# Patient Record
Sex: FEMALE | Race: BLACK OR AFRICAN AMERICAN | NOT HISPANIC OR LATINO | Employment: OTHER | URBAN - METROPOLITAN AREA
[De-identification: names, ages, dates, MRNs, and addresses within clinical notes are randomized per-mention and may not be internally consistent; named-entity substitution may affect disease eponyms.]

---

## 2017-01-03 PROBLEM — D50.9 IRON DEFICIENCY ANEMIA: Status: ACTIVE | Noted: 2017-01-03

## 2017-01-04 ENCOUNTER — LAB VISIT (OUTPATIENT)
Dept: LAB | Facility: HOSPITAL | Age: 72
End: 2017-01-04
Attending: INTERNAL MEDICINE
Payer: MEDICARE

## 2017-01-04 DIAGNOSIS — I82.5Z9 CHRONIC DEEP VEIN THROMBOSIS (DVT) OF DISTAL VEIN OF LOWER EXTREMITY, UNSPECIFIED LATERALITY: ICD-10-CM

## 2017-01-04 PROBLEM — K57.31 DIVERTICULOSIS OF LARGE INTESTINE WITH HEMORRHAGE: Status: ACTIVE | Noted: 2017-01-04

## 2017-01-04 PROBLEM — R79.89 ELEVATED D-DIMER: Status: ACTIVE | Noted: 2017-01-04

## 2017-01-04 LAB
ALBUMIN SERPL BCP-MCNC: 4.4 G/DL
ALP SERPL-CCNC: 76 U/L
ALT SERPL W/O P-5'-P-CCNC: 30 U/L
ANION GAP SERPL CALC-SCNC: 13 MMOL/L
AST SERPL-CCNC: 29 U/L
BASOPHILS # BLD AUTO: 0.02 K/UL
BASOPHILS NFR BLD: 0.3 %
BILIRUB SERPL-MCNC: 0.3 MG/DL
BUN SERPL-MCNC: 9 MG/DL
CALCIUM SERPL-MCNC: 10 MG/DL
CHLORIDE SERPL-SCNC: 103 MMOL/L
CO2 SERPL-SCNC: 23 MMOL/L
CREAT SERPL-MCNC: 0.8 MG/DL
D DIMER PPP IA.FEU-MCNC: 0.21 MG/L FEU
DIFFERENTIAL METHOD: ABNORMAL
EOSINOPHIL # BLD AUTO: 0.3 K/UL
EOSINOPHIL NFR BLD: 4.1 %
ERYTHROCYTE [DISTWIDTH] IN BLOOD BY AUTOMATED COUNT: 15.2 %
EST. GFR  (AFRICAN AMERICAN): >60 ML/MIN/1.73 M^2
EST. GFR  (NON AFRICAN AMERICAN): >60 ML/MIN/1.73 M^2
GLUCOSE SERPL-MCNC: 97 MG/DL
HCT VFR BLD AUTO: 38.1 %
HGB BLD-MCNC: 12.7 G/DL
LYMPHOCYTES # BLD AUTO: 2.1 K/UL
LYMPHOCYTES NFR BLD: 28.6 %
MCH RBC QN AUTO: 30.3 PG
MCHC RBC AUTO-ENTMCNC: 33.3 %
MCV RBC AUTO: 91 FL
MONOCYTES # BLD AUTO: 0.4 K/UL
MONOCYTES NFR BLD: 5.5 %
NEUTROPHILS # BLD AUTO: 4.5 K/UL
NEUTROPHILS NFR BLD: 61.4 %
PLATELET # BLD AUTO: 346 K/UL
PMV BLD AUTO: 10.6 FL
POTASSIUM SERPL-SCNC: 3.4 MMOL/L
PROT SERPL-MCNC: 7.8 G/DL
RBC # BLD AUTO: 4.19 M/UL
SODIUM SERPL-SCNC: 139 MMOL/L
WBC # BLD AUTO: 7.25 K/UL

## 2017-01-04 PROCEDURE — 80053 COMPREHEN METABOLIC PANEL: CPT

## 2017-01-04 PROCEDURE — 85379 FIBRIN DEGRADATION QUANT: CPT

## 2017-01-04 PROCEDURE — 85025 COMPLETE CBC W/AUTO DIFF WBC: CPT

## 2017-01-04 PROCEDURE — 36415 COLL VENOUS BLD VENIPUNCTURE: CPT | Mod: PO

## 2017-01-12 ENCOUNTER — OFFICE VISIT (OUTPATIENT)
Dept: PSYCHIATRY | Facility: CLINIC | Age: 72
End: 2017-01-12
Payer: COMMERCIAL

## 2017-01-12 VITALS
HEART RATE: 74 BPM | BODY MASS INDEX: 28.28 KG/M2 | SYSTOLIC BLOOD PRESSURE: 166 MMHG | HEIGHT: 66 IN | WEIGHT: 176 LBS | RESPIRATION RATE: 96 BRPM | DIASTOLIC BLOOD PRESSURE: 74 MMHG

## 2017-01-12 DIAGNOSIS — F41.1 GENERALIZED ANXIETY DISORDER: ICD-10-CM

## 2017-01-12 DIAGNOSIS — F33.0 MAJOR DEPRESSIVE DISORDER, RECURRENT EPISODE, MILD: Primary | ICD-10-CM

## 2017-01-12 DIAGNOSIS — F43.10 PTSD (POST-TRAUMATIC STRESS DISORDER): ICD-10-CM

## 2017-01-12 DIAGNOSIS — F60.9 PERSONALITY DISORDER: ICD-10-CM

## 2017-01-12 PROCEDURE — 3077F SYST BP >= 140 MM HG: CPT | Mod: S$GLB,,, | Performed by: PSYCHIATRY & NEUROLOGY

## 2017-01-12 PROCEDURE — 99214 OFFICE O/P EST MOD 30 MIN: CPT | Mod: S$GLB,,, | Performed by: PSYCHIATRY & NEUROLOGY

## 2017-01-12 PROCEDURE — 1157F ADVNC CARE PLAN IN RCRD: CPT | Mod: S$GLB,,, | Performed by: PSYCHIATRY & NEUROLOGY

## 2017-01-12 PROCEDURE — 1160F RVW MEDS BY RX/DR IN RCRD: CPT | Mod: S$GLB,,, | Performed by: PSYCHIATRY & NEUROLOGY

## 2017-01-12 PROCEDURE — 3078F DIAST BP <80 MM HG: CPT | Mod: S$GLB,,, | Performed by: PSYCHIATRY & NEUROLOGY

## 2017-01-12 PROCEDURE — 1125F AMNT PAIN NOTED PAIN PRSNT: CPT | Mod: S$GLB,,, | Performed by: PSYCHIATRY & NEUROLOGY

## 2017-01-12 PROCEDURE — 1159F MED LIST DOCD IN RCRD: CPT | Mod: S$GLB,,, | Performed by: PSYCHIATRY & NEUROLOGY

## 2017-01-12 PROCEDURE — 99999 PR PBB SHADOW E&M-EST. PATIENT-LVL III: CPT | Mod: PBBFAC,,, | Performed by: PSYCHIATRY & NEUROLOGY

## 2017-01-12 RX ORDER — DULOXETIN HYDROCHLORIDE 30 MG/1
CAPSULE, DELAYED RELEASE ORAL
Qty: 90 CAPSULE | Refills: 1 | OUTPATIENT
Start: 2017-01-12

## 2017-01-12 RX ORDER — DULOXETIN HYDROCHLORIDE 30 MG/1
30 CAPSULE, DELAYED RELEASE ORAL DAILY
Qty: 30 CAPSULE | Refills: 1 | Status: SHIPPED | OUTPATIENT
Start: 2017-01-12 | End: 2017-05-31 | Stop reason: SDUPTHER

## 2017-01-12 RX ORDER — BUSPIRONE HYDROCHLORIDE 15 MG/1
15 TABLET ORAL 3 TIMES DAILY
Qty: 270 TABLET | Refills: 0 | Status: SHIPPED | OUTPATIENT
Start: 2017-01-12 | End: 2017-04-05 | Stop reason: SDUPTHER

## 2017-01-12 NOTE — PROGRESS NOTES
"Outpatient Psychiatry Follow-Up Visit (MD/NP)    1/12/2017    Clinical Status of Patient:  Outpatient (Ambulatory)    Chief Complaint:  Isabell Preston is a 71 y.o. female who presents today for follow-up of depression and anxiety.  Met with patient.      Interval History and Content of Current Session:  Interim Events/Subjective Report/Content of Current Session: Patient Mohan presents to clinic for follow up after a long hiatus.  She was admitted to the hospital in 11/2016 for a GI bleed on coumadin.  She is sad because her sister told her to "quit complaining".  She is also not speaking to her daughter because of some disagreement.  She feels physically limited with pains and has many somatic complaints.  Says that she has had stress over the holidays which caused more depression.  She says that when she gets upset and anxious, her face "tingles".  She did go to the therapist a couple visits since last I saw her but stopped going.  Still not very open to medication changes but does express a desire to get better.    She has failed Zoloft, Prozac, Lexapro, Effexor, Wellbutrin XL.    Psychotherapy:  · Target symptoms: depression, anxiety   · Why chosen therapy is appropriate versus another modality: relevant to diagnosis  · Outcome monitoring methods: self-report, observation  · Therapeutic intervention type: supportive psychotherapy  · Topics discussed/themes: difficulty managing affect in interpersonal relationships, building skills sets for symptom management, symptom recognition, legal stressors  · The patient's response to the intervention is reluctant. The patient's progress toward treatment goals is limited.   · Duration of intervention: 15 minutes.    Review of Systems   · PSYCHIATRIC: Pertinant items are noted in the narrative.  · CONSTITUTIONAL: No weight gain or loss.   · MUSCULOSKELETAL: Positive for muscle stiffness/tightening and pain.  · NEUROLOGIC: No weakness, sensory changes, seizures, " "confusion, memory loss, tremor or other abnormal movements.  · RESPIRATORY: No shortness of breath.  · CARDIOVASCULAR: No tachycardia or chest pain.  · GASTROINTESTINAL: No nausea, vomiting, pain, constipation or diarrhea.    Past Medical, Family and Social History: The patient's past medical, family and social history have been reviewed and updated as appropriate within the electronic medical record - see encounter notes.    Compliance: no    Side effects: None    Risk Parameters:  Patient reports no suicidal ideation  Patient reports no homicidal ideation  Patient reports no self-injurious behavior  Patient reports no violent behavior    Exam (detailed: at least 9 elements; comprehensive: all 15 elements)   Constitutional  Vitals:  Most recent vital signs, dated less than 90 days prior to this appointment, were reviewed.   Vitals:    01/12/17 0935   BP: (!) 166/74   Pulse: 74   Resp: (!) 96   Weight: 79.8 kg (176 lb)   Height: 5' 6" (1.676 m)        General:  unremarkable, age appropriate     Musculoskeletal  Muscle Strength/Tone:  no tremor, no tic   Gait & Station:  non-ataxic     Psychiatric  Speech:  no latency; no press   Mood & Affect:  depressed  blunted, guarded   Thought Process:  normal and logical   Associations:  intact, circumstantial   Thought Content:  normal, no suicidality, no homicidality, delusions, or paranoia   Insight:  limited awareness of illness   Judgement: behavior is adequate to circumstances   Orientation:  grossly intact, person, place, situation, time/date   Memory: intact for content of interview   Language: grossly intact   Attention Span & Concentration:  able to focus   Fund of Knowledge:  intact and appropriate to age and level of education     Assessment and Diagnosis   Status/Progress: Based on the examination today, the patient's problem(s) is/are inadequately controlled.  New problems have been presented today.   Co-morbidities are complicating management of the primary " condition.  There are no active rule-out diagnoses for this patient at this time.     General Impression: We will continue pharmacological management and adjunctive therapy.      ICD-10-CM ICD-9-CM   1. Major depressive disorder, recurrent episode, mild F33.0 296.31   2. Generalized anxiety disorder F41.1 300.02   3. PTSD (post-traumatic stress disorder) F43.10 309.81   4. Personality disorder F60.9 301.9       Intervention/Counseling/Treatment Plan   · Medication Management: Continue current medications. The risks and benefits of medication were discussed with the patient.  · Counseling provided with patient as follows: importance of compliance with chosen treatment options was emphasized, risks and benefits of treatment options, including medications, were discussed with the patient, risk factor reduction, prognosis, patient education, instructions for  management, treatment and follow-up were reviewed  1. Continue buspirone 15mg TID targeting depression and anxiety. Warned of risk of sola, suicidality, serotonin syndrome. We will have to monitor and make sure this does not interfere with her coumadin.   2. Start trial of Cymbalta 30mg daily targeting depression, anxiety, and chronic pains.  Warned of risk of sola, suicidality, serotonin syndrome.  3. Patient instructed to restart care with a therapist to help work through many deep rooted stressors but has been hesitant.   4. Difficult to ascertain at this time but in addition to depression and anxiety, seems to be a strong underlying personality disorder.  5. Difficult patient.      Return to Clinic: 6 weeks, as needed

## 2017-01-12 NOTE — PATIENT INSTRUCTIONS
"        You have been provided with a certain amount of medication with a specified number of refills.  Please follow up within an adequate time before you run out of medications.  If you run out of medication before your next appointment you may be charged a refill fee.  REFILLS FOR CONTROLLED SUBSTANCES WILL NOT BE GIVEN WITHOUT AN APPOINTMENT.  I will not honor or fill automated refill requests from pharmacies.    Please book your next appointment today by phone: [phone].  Call 8am and 10:30am to speak with my assistant.    PLEASE BE AT LEAST 15 MINUTES EARLY FOR YOUR NEXT APPOINTMENT.  PLEASE, DO NOT BE LATE OR YOU WILL BE TURNED AWAY AND ASKED TO RESCHEDULE.  YOU MUST ALLOW TIME FOR CHECK-IN AS WELL AS GET YOUR VITAL SIGNS AND GO OVER YOUR MEDICATIONS.  Tardiness can affect and is not fair to the patients who present after you and are on time for their appointments.  It causes a delay in the appointments for patients and staff.  IF YOU ARE LATE FOR YOUR APPOINTMENT TIME, YOU WILL BE SEEN FOR A SHORTENED AMOUNT OF TIME OR ASKED TO RESCHEDULE.  THERE IS A POSSIBILITY THAT YOU WILL BE CHARGED FOR THE APPOINTMENT TIME PERSONALLY AND IT WILL NOT GO TO YOUR INSURANCE.  YOU MAY ALSO BE DISCHARGED FROM CLINIC with multiple "No Show" appointments.     -----------------------------------------------------------------------------------------------------------------  IF YOU FEEL SUICIDAL OR HAVING THOUGHTS OR PLANS TO HURT YOURSELF OR OTHERS, CALL 911 OR REPORT TO THE NEAREST EMERGENCY ROOM.  YOU CAN ALSO ACCESS ONE OF THE FOLLOWING HOTLINES:    OMID QUINN  [facility]  [phone]    [facility]   [facility] Crisis Line   [phone]     [facility]  24 hours / 7 days   [phone]       "

## 2017-01-12 NOTE — MR AVS SNAPSHOT
[facility]  [address] [place] [address]  [place] [address]  Phone: [phone]  Fax: [phone]                  Isabell Preston   2017 9:30 AM   Office Visit    Description:  Female : 1945   Provider:  Luke Vasquez MD   Department:  [facility]                To Do List           Future Appointments        Provider Department Dept Phone    2017 10:00 AM [facility] CT2 LIMIT 500 LBS [facility] [phone]    2017 10:30 AM Ben Fine Jr., MD [facility] Hand [facility] [phone]    2017 11:00 AM Teresa Hampton MD [facility] [phone]    2017 10:40 AM Ayo Archuleta MD [facility] [phone]      Goals (5 Years of Data)     None      Follow-Up and Disposition     Return in about 6 weeks (around 2017), or if symptoms worsen or fail to improve.       These Medications        Disp Refills Start End    duloxetine (CYMBALTA) 30 MG capsule 30 capsule 1 2017     Take 1 capsule (30 mg total) by mouth once daily. - Oral    Pharmacy: [facility] [address] [place] [address] [place] AT [facility] Ph #: [phone]       busPIRone (BUSPAR) 15 MG tablet 270 tablet 0 2017     Take 1 tablet (15 mg total) by mouth 3 (three) times daily. - Oral    Pharmacy: [facility] [address] [place] [address] [place] AT [facility] Ph #: [phone]         [facility] On [facility]     [facility] -  Assistance  Registered nurses in the [facility] provide clinical advisement, health education, appointment booking, and other advisory services.  Call for this free service at [phone].             Medications           Message regarding Medications     Verify the changes and/or additions to your medication regime listed below are the same as discussed with your clinician today.  If any of these  "changes or additions are incorrect, please notify your healthcare provider.        START taking these NEW medications        Refills    duloxetine (CYMBALTA) 30 MG capsule 1    Sig: Take 1 capsule (30 mg total) by mouth once daily.    Class: Normal    Route: Oral           Verify that the below list of medications is an accurate representation of the medications you are currently taking.  If none reported, the list may be blank. If incorrect, please contact your healthcare provider. Carry this list with you in case of emergency.           Current Medications     atorvastatin (LIPITOR) 10 MG tablet Take 1 tablet (10 mg total) by mouth once daily.    busPIRone (BUSPAR) 15 MG tablet Take 1 tablet (15 mg total) by mouth 3 (three) times daily.    dicyclomine (BENTYL) 10 MG capsule TK 1 C PO TID    duloxetine (CYMBALTA) 30 MG capsule Take 1 capsule (30 mg total) by mouth once daily.    enoxaparin (LOVENOX) 80 mg/0.8 mL Syrg Inject 0.8 mLs (80 mg total) into the skin once daily.    gabapentin (NEURONTIN) 100 MG capsule TAKE 1 CAPSULE(100 MG) BY MOUTH THREE TIMES DAILY    lancets (TRUEPLUS LANCETS) 28 gauge Misc 1 lancet by Misc.(Non-Drug; Combo Route) route once daily. Test blood sugar once daily, type 2 diabetes, controlled. E11.9    metformin (GLUCOPHAGE) 500 MG tablet Take 500 mg by mouth 2 (two) times daily with meals.    polyethylene glycol (GLYCOLAX) 17 gram/dose powder Take 17 g by mouth daily as needed.           Clinical Reference Information           Vital Signs - Last Recorded  Most recent update: 1/12/2017  9:35 AM by Erica Valencia MA    BP Pulse Resp Ht Wt BMI    (!) 166/74 74 (!) 96 5' 6" (1.676 m) 79.8 kg (176 lb) 28.41 kg/m2      Blood Pressure          Most Recent Value    BP  (!)  166/74      Allergies as of 1/12/2017     Ciprofloxacin    Fructose    Gluten Protein    Lactase    Latex, Natural Rubber      Immunizations Administered on Date of Encounter - 1/12/2017     None      [facility] Sign-Up     " "Activating your [facility] account is as easy as 1-2-3!     1) Visit [email or web address], select Sign Up Now, enter this activation code and your date of birth, then select Next.  [email or web address]  Expires: 2/11/2017 11:50 AM      2) Create a username and password to use when you visit [facility] in the future and select a security question in case you lose your password and select Next.    3) Enter your e-mail address and click Sign Up!    Additional Information  If you have questions, please e-mail [email or web address] or call [phone] to talk to our [facility] staff. Remember, [facility] is NOT to be used for urgent needs. For medical emergencies, dial 911.         Instructions            You have been provided with a certain amount of medication with a specified number of refills.  Please follow up within an adequate time before you run out of medications.  If you run out of medication before your next appointment you may be charged a refill fee.  REFILLS FOR CONTROLLED SUBSTANCES WILL NOT BE GIVEN WITHOUT AN APPOINTMENT.  I will not honor or fill automated refill requests from pharmacies.    Please book your next appointment today by phone: [phone].  Call 8am and 10:30am to speak with my assistant.    PLEASE BE AT LEAST 15 MINUTES EARLY FOR YOUR NEXT APPOINTMENT.  PLEASE, DO NOT BE LATE OR YOU WILL BE TURNED AWAY AND ASKED TO RESCHEDULE.  YOU MUST ALLOW TIME FOR CHECK-IN AS WELL AS GET YOUR VITAL SIGNS AND GO OVER YOUR MEDICATIONS.  Tardiness can affect and is not fair to the patients who present after you and are on time for their appointments.  It causes a delay in the appointments for patients and staff.  IF YOU ARE LATE FOR YOUR APPOINTMENT TIME, YOU WILL BE SEEN FOR A SHORTENED AMOUNT OF TIME OR ASKED TO RESCHEDULE.  THERE IS A POSSIBILITY THAT YOU WILL BE CHARGED FOR THE APPOINTMENT TIME PERSONALLY AND IT WILL NOT GO TO YOUR INSURANCE.  YOU MAY ALSO BE DISCHARGED FROM CLINIC with multiple "No Show" " appointments.     -----------------------------------------------------------------------------------------------------------------  IF YOU FEEL SUICIDAL OR HAVING THOUGHTS OR PLANS TO HURT YOURSELF OR OTHERS, CALL 911 OR REPORT TO THE NEAREST EMERGENCY ROOM.  YOU CAN ALSO ACCESS ONE OF THE FOLLOWING HOTLINES:    OMID QUINN  [facility] Mobile Crisis  [phone]    [facility] Crisis Line   [phone]     [facility]  24 hours / 7 days   [phone])

## 2017-01-13 RX ORDER — METFORMIN HYDROCHLORIDE 500 MG/1
TABLET ORAL
Qty: 180 TABLET | Refills: 0 | Status: SHIPPED | OUTPATIENT
Start: 2017-01-13 | End: 2017-01-23 | Stop reason: SDUPTHER

## 2017-01-16 ENCOUNTER — HOSPITAL ENCOUNTER (OUTPATIENT)
Dept: RADIOLOGY | Facility: HOSPITAL | Age: 72
Discharge: HOME | End: 2017-01-16
Attending: INTERNAL MEDICINE | Admitting: INTERNAL MEDICINE
Payer: MEDICARE

## 2017-01-16 ENCOUNTER — TELEPHONE (OUTPATIENT)
Dept: ORTHOPEDICS | Facility: CLINIC | Age: 72
End: 2017-01-16

## 2017-01-16 DIAGNOSIS — R10.32 LEFT LOWER QUADRANT PAIN: ICD-10-CM

## 2017-01-16 PROCEDURE — 74177 CT ABD & PELVIS W/CONTRAST: CPT | Mod: TC

## 2017-01-16 PROCEDURE — 25500020 PHARM REV CODE 255: Performed by: INTERNAL MEDICINE

## 2017-01-16 PROCEDURE — 74177 CT ABD & PELVIS W/CONTRAST: CPT | Mod: 26,,, | Performed by: RADIOLOGY

## 2017-01-16 RX ADMIN — IOHEXOL 30 ML: 300 INJECTION, SOLUTION INTRAVENOUS at 11:01

## 2017-01-16 RX ADMIN — IOHEXOL 100 ML: 350 INJECTION, SOLUTION INTRAVENOUS at 11:01

## 2017-01-20 ENCOUNTER — OFFICE VISIT (OUTPATIENT)
Dept: SPINE | Facility: CLINIC | Age: 72
End: 2017-01-20
Attending: PHYSICAL MEDICINE & REHABILITATION
Payer: MEDICARE

## 2017-01-20 VITALS
DIASTOLIC BLOOD PRESSURE: 69 MMHG | HEIGHT: 66 IN | HEART RATE: 61 BPM | WEIGHT: 176 LBS | SYSTOLIC BLOOD PRESSURE: 149 MMHG | BODY MASS INDEX: 28.28 KG/M2

## 2017-01-20 DIAGNOSIS — M53.3 SI (SACROILIAC) JOINT DYSFUNCTION: ICD-10-CM

## 2017-01-20 DIAGNOSIS — M51.36 DDD (DEGENERATIVE DISC DISEASE), LUMBAR: ICD-10-CM

## 2017-01-20 DIAGNOSIS — M54.42 CHRONIC LEFT-SIDED LOW BACK PAIN WITH LEFT-SIDED SCIATICA: ICD-10-CM

## 2017-01-20 DIAGNOSIS — M70.62 TROCHANTERIC BURSITIS OF LEFT HIP: Primary | ICD-10-CM

## 2017-01-20 DIAGNOSIS — G89.29 CHRONIC LEFT-SIDED LOW BACK PAIN WITH LEFT-SIDED SCIATICA: ICD-10-CM

## 2017-01-20 PROCEDURE — 99214 OFFICE O/P EST MOD 30 MIN: CPT | Mod: S$GLB,,, | Performed by: PHYSICAL MEDICINE & REHABILITATION

## 2017-01-20 PROCEDURE — 3077F SYST BP >= 140 MM HG: CPT | Mod: S$GLB,,, | Performed by: PHYSICAL MEDICINE & REHABILITATION

## 2017-01-20 PROCEDURE — 3078F DIAST BP <80 MM HG: CPT | Mod: S$GLB,,, | Performed by: PHYSICAL MEDICINE & REHABILITATION

## 2017-01-20 PROCEDURE — 99999 PR PBB SHADOW E&M-EST. PATIENT-LVL III: CPT | Mod: PBBFAC,,, | Performed by: PHYSICAL MEDICINE & REHABILITATION

## 2017-01-20 PROCEDURE — 1157F ADVNC CARE PLAN IN RCRD: CPT | Mod: S$GLB,,, | Performed by: PHYSICAL MEDICINE & REHABILITATION

## 2017-01-20 PROCEDURE — 1160F RVW MEDS BY RX/DR IN RCRD: CPT | Mod: S$GLB,,, | Performed by: PHYSICAL MEDICINE & REHABILITATION

## 2017-01-20 PROCEDURE — 1125F AMNT PAIN NOTED PAIN PRSNT: CPT | Mod: S$GLB,,, | Performed by: PHYSICAL MEDICINE & REHABILITATION

## 2017-01-20 PROCEDURE — 1159F MED LIST DOCD IN RCRD: CPT | Mod: S$GLB,,, | Performed by: PHYSICAL MEDICINE & REHABILITATION

## 2017-01-20 RX ORDER — METOPROLOL SUCCINATE 25 MG/1
50 TABLET, EXTENDED RELEASE ORAL DAILY
COMMUNITY
End: 2017-03-09
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
## 2017-01-23 ENCOUNTER — OFFICE VISIT (OUTPATIENT)
Dept: FAMILY MEDICINE | Facility: CLINIC | Age: 72
End: 2017-01-23
Payer: MEDICARE

## 2017-01-23 ENCOUNTER — LAB VISIT (OUTPATIENT)
Dept: LAB | Facility: HOSPITAL | Age: 72
End: 2017-01-23
Attending: INTERNAL MEDICINE
Payer: MEDICARE

## 2017-01-23 VITALS
BODY MASS INDEX: 27.99 KG/M2 | RESPIRATION RATE: 16 BRPM | SYSTOLIC BLOOD PRESSURE: 132 MMHG | OXYGEN SATURATION: 95 % | DIASTOLIC BLOOD PRESSURE: 84 MMHG | WEIGHT: 174.19 LBS | HEART RATE: 62 BPM | HEIGHT: 66 IN | TEMPERATURE: 98 F

## 2017-01-23 DIAGNOSIS — M54.2 CERVICALGIA: ICD-10-CM

## 2017-01-23 DIAGNOSIS — K57.33 DIVERTICULITIS OF LARGE INTESTINE WITHOUT PERFORATION OR ABSCESS WITH BLEEDING: ICD-10-CM

## 2017-01-23 DIAGNOSIS — E11.9 CONTROLLED TYPE 2 DIABETES MELLITUS WITHOUT COMPLICATION, WITHOUT LONG-TERM CURRENT USE OF INSULIN: Chronic | ICD-10-CM

## 2017-01-23 DIAGNOSIS — E11.9 CONTROLLED TYPE 2 DIABETES MELLITUS WITHOUT COMPLICATION, WITHOUT LONG-TERM CURRENT USE OF INSULIN: Primary | Chronic | ICD-10-CM

## 2017-01-23 DIAGNOSIS — I82.5Z9 CHRONIC DEEP VEIN THROMBOSIS (DVT) OF DISTAL VEIN OF LOWER EXTREMITY, UNSPECIFIED LATERALITY: ICD-10-CM

## 2017-01-23 LAB
ANION GAP SERPL CALC-SCNC: 11 MMOL/L
BASOPHILS # BLD AUTO: 0.03 K/UL
BASOPHILS NFR BLD: 0.3 %
BUN SERPL-MCNC: 15 MG/DL
CALCIUM SERPL-MCNC: 9.9 MG/DL
CHLORIDE SERPL-SCNC: 103 MMOL/L
CO2 SERPL-SCNC: 26 MMOL/L
CREAT SERPL-MCNC: 0.8 MG/DL
DIFFERENTIAL METHOD: ABNORMAL
EOSINOPHIL # BLD AUTO: 0.3 K/UL
EOSINOPHIL NFR BLD: 3 %
ERYTHROCYTE [DISTWIDTH] IN BLOOD BY AUTOMATED COUNT: 14.9 %
EST. GFR  (AFRICAN AMERICAN): >60 ML/MIN/1.73 M^2
EST. GFR  (NON AFRICAN AMERICAN): >60 ML/MIN/1.73 M^2
GLUCOSE SERPL-MCNC: 56 MG/DL
HCT VFR BLD AUTO: 42.4 %
HGB BLD-MCNC: 13.7 G/DL
LYMPHOCYTES # BLD AUTO: 2 K/UL
LYMPHOCYTES NFR BLD: 22 %
MCH RBC QN AUTO: 30.3 PG
MCHC RBC AUTO-ENTMCNC: 32.3 %
MCV RBC AUTO: 94 FL
MONOCYTES # BLD AUTO: 0.7 K/UL
MONOCYTES NFR BLD: 7.8 %
NEUTROPHILS # BLD AUTO: 6 K/UL
NEUTROPHILS NFR BLD: 66.8 %
PLATELET # BLD AUTO: 367 K/UL
PMV BLD AUTO: 10.6 FL
POTASSIUM SERPL-SCNC: 3.8 MMOL/L
RBC # BLD AUTO: 4.52 M/UL
SODIUM SERPL-SCNC: 140 MMOL/L
WBC # BLD AUTO: 8.98 K/UL

## 2017-01-23 PROCEDURE — 85025 COMPLETE CBC W/AUTO DIFF WBC: CPT

## 2017-01-23 PROCEDURE — 1160F RVW MEDS BY RX/DR IN RCRD: CPT | Mod: S$GLB,,, | Performed by: INTERNAL MEDICINE

## 2017-01-23 PROCEDURE — 3044F HG A1C LEVEL LT 7.0%: CPT | Mod: S$GLB,,, | Performed by: INTERNAL MEDICINE

## 2017-01-23 PROCEDURE — 3079F DIAST BP 80-89 MM HG: CPT | Mod: S$GLB,,, | Performed by: INTERNAL MEDICINE

## 2017-01-23 PROCEDURE — 3060F POS MICROALBUMINURIA REV: CPT | Mod: S$GLB,,, | Performed by: INTERNAL MEDICINE

## 2017-01-23 PROCEDURE — 80048 BASIC METABOLIC PNL TOTAL CA: CPT

## 2017-01-23 PROCEDURE — 99214 OFFICE O/P EST MOD 30 MIN: CPT | Mod: S$GLB,,, | Performed by: INTERNAL MEDICINE

## 2017-01-23 PROCEDURE — 1157F ADVNC CARE PLAN IN RCRD: CPT | Mod: S$GLB,,, | Performed by: INTERNAL MEDICINE

## 2017-01-23 PROCEDURE — 1159F MED LIST DOCD IN RCRD: CPT | Mod: S$GLB,,, | Performed by: INTERNAL MEDICINE

## 2017-01-23 PROCEDURE — 36415 COLL VENOUS BLD VENIPUNCTURE: CPT

## 2017-01-23 PROCEDURE — 3075F SYST BP GE 130 - 139MM HG: CPT | Mod: S$GLB,,, | Performed by: INTERNAL MEDICINE

## 2017-01-23 PROCEDURE — 1125F AMNT PAIN NOTED PAIN PRSNT: CPT | Mod: S$GLB,,, | Performed by: INTERNAL MEDICINE

## 2017-01-23 PROCEDURE — 2022F DILAT RTA XM EVC RTNOPTHY: CPT | Mod: S$GLB,,, | Performed by: INTERNAL MEDICINE

## 2017-01-23 PROCEDURE — 83036 HEMOGLOBIN GLYCOSYLATED A1C: CPT

## 2017-01-23 PROCEDURE — 99999 PR PBB SHADOW E&M-EST. PATIENT-LVL IV: CPT | Mod: PBBFAC,,, | Performed by: INTERNAL MEDICINE

## 2017-01-23 RX ORDER — TIZANIDINE 4 MG/1
4 TABLET ORAL NIGHTLY PRN
Qty: 30 TABLET | Refills: 0 | Status: SHIPPED | OUTPATIENT
Start: 2017-01-23 | End: 2017-01-23 | Stop reason: SDUPTHER

## 2017-01-23 RX ORDER — TIZANIDINE 4 MG/1
TABLET ORAL
Qty: 90 TABLET | Refills: 0 | Status: SHIPPED | OUTPATIENT
Start: 2017-01-23 | End: 2017-07-12 | Stop reason: SDUPTHER

## 2017-01-23 RX ORDER — HYDROCHLOROTHIAZIDE 25 MG/1
25 TABLET ORAL DAILY
COMMUNITY
End: 2017-11-03

## 2017-01-23 RX ORDER — ATORVASTATIN CALCIUM 10 MG/1
10 TABLET, FILM COATED ORAL DAILY
Qty: 90 TABLET | Refills: 2 | Status: SHIPPED | OUTPATIENT
Start: 2017-01-23 | End: 2017-07-12 | Stop reason: SDUPTHER

## 2017-01-23 RX ORDER — METFORMIN HYDROCHLORIDE 500 MG/1
500 TABLET ORAL 2 TIMES DAILY
Qty: 180 TABLET | Refills: 0 | Status: SHIPPED | OUTPATIENT
Start: 2017-01-23 | End: 2017-05-01 | Stop reason: SDUPTHER

## 2017-01-23 NOTE — PROGRESS NOTES
"Subjective:       Patient ID: Isabell Preston is a 71 y.o. female.    Chief Complaint: Hypertension; Diabetes; Follow-up (3 month ); and Medication Refill    HPI Comments: F/u chronic conditions    HPI: 71 y.o. With history of multiple DVT's with recent GI bleed related to diveritulitis requiring blood transfusion. Has been following with Dr. Celeste of hematology currently on lovenox injections for anticoagulation in anticiopation of repeat cscope (not yet scheduled). No further bleeding does have chronic lower abdominal "bloating" worse after eating relieved with bowel movements no melean or BRBPR. She also has DM on metformin no hypoglycemic symptoms due for repeat a1c today    Only complaint today is intermittent right occipital pain worse at end of day "throbbing" some relief with APAP no vision changes no radiation no change with neck flexion/extension. No parathesia or upper extremity symtpoms    Review of Systems   Constitutional: Negative for activity change, appetite change, fatigue, fever and unexpected weight change.   HENT: Negative for ear pain, rhinorrhea and sore throat.    Eyes: Negative for discharge and visual disturbance.   Respiratory: Negative for chest tightness, shortness of breath and wheezing.    Cardiovascular: Negative for chest pain, palpitations and leg swelling.   Gastrointestinal: Negative for abdominal pain, constipation and diarrhea.   Endocrine: Negative for cold intolerance and heat intolerance.   Genitourinary: Negative for dysuria and hematuria.   Musculoskeletal: Negative for joint swelling and neck stiffness.   Skin: Negative for rash.   Neurological: Negative for dizziness, syncope, weakness and headaches.   Psychiatric/Behavioral: Negative for suicidal ideas.       Objective:     Vitals:    01/23/17 1046   BP: 132/84   BP Location: Left arm   Patient Position: Sitting   BP Method: Manual   Pulse: 62   Resp: 16   Temp: 98.2 °F (36.8 °C)   TempSrc: Oral   SpO2: 95% " "  Weight: 79 kg (174 lb 2.6 oz)   Height: 5' 6" (1.676 m)          Physical Exam   Constitutional: She is oriented to person, place, and time. She appears well-developed and well-nourished.   HENT:   Head: Normocephalic and atraumatic.   Eyes: Conjunctivae are normal. Pupils are equal, round, and reactive to light.   Neck: Normal range of motion.   Full AROM no palpable masses of mastoid no cervical LAD   Cardiovascular: Normal rate and regular rhythm.  Exam reveals no gallop and no friction rub.    No murmur heard.  No pitting edema of le   Pulmonary/Chest: Effort normal and breath sounds normal. She has no wheezes. She has no rales.   Abdominal: Soft. Bowel sounds are normal. There is no tenderness. There is no rebound and no guarding.   Musculoskeletal: Normal range of motion. She exhibits no edema or tenderness.   Neurological: She is alert and oriented to person, place, and time. No cranial nerve deficit.   Skin: Skin is warm and dry.   Psychiatric: She has a normal mood and affect.       Assessment:       1. Controlled type 2 diabetes mellitus without complication, without long-term current use of insulin    2. Chronic deep vein thrombosis (DVT) of distal vein of lower extremity, unspecified laterality    3. Cervicalgia    4. Diverticulitis of large intestine without perforation or abscess with bleeding        Plan:    1. Continue metformin repeat A1c today due for optomety exam urine for microalbumin    2. Continue lovenox per heme    3. Muscle relaxer qhs topical heat prn    4. referal for GI evaluaiton to plan follow up cscope after symptomatic diverticulitis      "

## 2017-01-24 ENCOUNTER — TELEPHONE (OUTPATIENT)
Dept: FAMILY MEDICINE | Facility: CLINIC | Age: 72
End: 2017-01-24

## 2017-01-24 LAB
CREAT UR-MCNC: 288 MG/DL
ESTIMATED AVG GLUCOSE: 105 MG/DL
HBA1C MFR BLD HPLC: 5.3 %
MICROALBUMIN UR DL<=1MG/L-MCNC: 30 UG/ML
MICROALBUMIN/CREATININE RATIO: 10.4 UG/MG

## 2017-01-24 NOTE — TELEPHONE ENCOUNTER
I spoke with the patient regarding a referral to Optometry she refuse to schedule stating that she will be seeing Dr. Clayton

## 2017-01-27 ENCOUNTER — ANTI-COAG VISIT (OUTPATIENT)
Dept: CARDIOLOGY | Facility: CLINIC | Age: 72
End: 2017-01-27

## 2017-01-27 DIAGNOSIS — I82.5Z9 CHRONIC DEEP VEIN THROMBOSIS (DVT) OF DISTAL VEIN OF LOWER EXTREMITY, UNSPECIFIED LATERALITY: ICD-10-CM

## 2017-02-27 RX ORDER — BUSPIRONE HYDROCHLORIDE 15 MG/1
TABLET ORAL
Qty: 270 TABLET | Refills: 0 | Status: SHIPPED | OUTPATIENT
Start: 2017-02-27 | End: 2017-06-08 | Stop reason: SDUPTHER

## 2017-03-06 RX ORDER — DULOXETIN HYDROCHLORIDE 30 MG/1
CAPSULE, DELAYED RELEASE ORAL
Qty: 30 CAPSULE | Refills: 0 | OUTPATIENT
Start: 2017-03-06

## 2017-03-09 RX ORDER — METOPROLOL SUCCINATE 50 MG/1
50 TABLET, EXTENDED RELEASE ORAL DAILY
Qty: 30 TABLET | Refills: 1 | Status: SHIPPED | OUTPATIENT
Start: 2017-03-09 | End: 2017-06-12 | Stop reason: SDUPTHER

## 2017-03-09 RX ORDER — METOPROLOL TARTRATE 50 MG/1
TABLET ORAL
Qty: 180 TABLET | Refills: 0 | OUTPATIENT
Start: 2017-03-09

## 2017-04-05 ENCOUNTER — OFFICE VISIT (OUTPATIENT)
Dept: FAMILY MEDICINE | Facility: CLINIC | Age: 72
End: 2017-04-05
Payer: MEDICARE

## 2017-04-05 VITALS
RESPIRATION RATE: 17 BRPM | DIASTOLIC BLOOD PRESSURE: 70 MMHG | SYSTOLIC BLOOD PRESSURE: 130 MMHG | OXYGEN SATURATION: 96 % | WEIGHT: 169.75 LBS | BODY MASS INDEX: 27.28 KG/M2 | HEIGHT: 66 IN | HEART RATE: 63 BPM | TEMPERATURE: 98 F

## 2017-04-05 DIAGNOSIS — Z79.01 CHRONIC ANTICOAGULATION: Chronic | ICD-10-CM

## 2017-04-05 DIAGNOSIS — E11.9 CONTROLLED TYPE 2 DIABETES MELLITUS WITHOUT COMPLICATION, WITHOUT LONG-TERM CURRENT USE OF INSULIN: Chronic | ICD-10-CM

## 2017-04-05 DIAGNOSIS — M77.01 EPICONDYLITIS ELBOW, MEDIAL, RIGHT: Primary | ICD-10-CM

## 2017-04-05 DIAGNOSIS — I82.5Z9 CHRONIC DEEP VEIN THROMBOSIS (DVT) OF DISTAL VEIN OF LOWER EXTREMITY, UNSPECIFIED LATERALITY: Chronic | ICD-10-CM

## 2017-04-05 PROCEDURE — 99214 OFFICE O/P EST MOD 30 MIN: CPT | Mod: S$GLB,,, | Performed by: INTERNAL MEDICINE

## 2017-04-05 PROCEDURE — 1160F RVW MEDS BY RX/DR IN RCRD: CPT | Mod: S$GLB,,, | Performed by: INTERNAL MEDICINE

## 2017-04-05 PROCEDURE — 1159F MED LIST DOCD IN RCRD: CPT | Mod: S$GLB,,, | Performed by: INTERNAL MEDICINE

## 2017-04-05 PROCEDURE — 1125F AMNT PAIN NOTED PAIN PRSNT: CPT | Mod: S$GLB,,, | Performed by: INTERNAL MEDICINE

## 2017-04-05 PROCEDURE — 3060F POS MICROALBUMINURIA REV: CPT | Mod: S$GLB,,, | Performed by: INTERNAL MEDICINE

## 2017-04-05 PROCEDURE — 3075F SYST BP GE 130 - 139MM HG: CPT | Mod: S$GLB,,, | Performed by: INTERNAL MEDICINE

## 2017-04-05 PROCEDURE — 3078F DIAST BP <80 MM HG: CPT | Mod: S$GLB,,, | Performed by: INTERNAL MEDICINE

## 2017-04-05 PROCEDURE — 1157F ADVNC CARE PLAN IN RCRD: CPT | Mod: S$GLB,,, | Performed by: INTERNAL MEDICINE

## 2017-04-05 PROCEDURE — 99499 UNLISTED E&M SERVICE: CPT | Mod: S$GLB,,, | Performed by: INTERNAL MEDICINE

## 2017-04-05 PROCEDURE — 99999 PR PBB SHADOW E&M-EST. PATIENT-LVL III: CPT | Mod: PBBFAC,,, | Performed by: INTERNAL MEDICINE

## 2017-04-05 PROCEDURE — 3044F HG A1C LEVEL LT 7.0%: CPT | Mod: S$GLB,,, | Performed by: INTERNAL MEDICINE

## 2017-04-05 NOTE — MR AVS SNAPSHOT
[facility]  [address]  [place] [address]  Phone: [phone]                  Isabell WATTS Mohan   2017 10:40 AM   Office Visit    Description:  Female : 1945   Provider:  Ayo Archuleta MD   Department:  [facility]           Reason for Visit     Arm Pain           Diagnoses this Visit        Comments    Epicondylitis elbow, medial, right    -  Primary            To Do List           Future Appointments        Provider Department Dept Phone    2017 10:30 AM Teresa Hampton MD [facility] [phone]      Goals (5 Years of Data)     None      [facility]     [facility] Nurse Care Line -  Assistance  Unless otherwise directed by your provider, please contact [facility], our nurse care line that is available for  assistance.     Registered nurses in the [facility] Center provide: appointment scheduling, clinical advisement, health education, and other advisory services.  Call: [phone] (toll free)               Medications           Message regarding Medications     Verify the changes and/or additions to your medication regime listed below are the same as discussed with your clinician today.  If any of these changes or additions are incorrect, please notify your healthcare provider.             Verify that the below list of medications is an accurate representation of the medications you are currently taking.  If none reported, the list may be blank. If incorrect, please contact your healthcare provider. Carry this list with you in case of emergency.           Current Medications     atorvastatin (LIPITOR) 10 MG tablet Take 1 tablet (10 mg total) by mouth once daily.    busPIRone (BUSPAR) 15 MG tablet TAKE 1 TABLET(15 MG) BY MOUTH THREE TIMES DAILY    dicyclomine (BENTYL) 10 MG capsule TK 1 C PO TID    duloxetine (CYMBALTA) 30 MG capsule Take 1 capsule (30 mg total) by mouth once daily.     "gabapentin (NEURONTIN) 100 MG capsule TAKE 1 CAPSULE(100 MG) BY MOUTH THREE TIMES DAILY    hydrochlorothiazide (HYDRODIURIL) 25 MG tablet Take 25 mg by mouth once daily.    lancets (TRUEPLUS LANCETS) 28 gauge Misc 1 lancet by Misc.(Non-Drug; Combo Route) route once daily. Test blood sugar once daily, type 2 diabetes, controlled. E11.9    metformin (GLUCOPHAGE) 500 MG tablet Take 1 tablet (500 mg total) by mouth 2 (two) times daily.    metoprolol succinate (TOPROL-XL) 50 MG 24 hr tablet Take 1 tablet (50 mg total) by mouth once daily.    senna-docusate 8.6-50 mg (PERICOLACE) 8.6-50 mg per tablet Take 1 tablet by mouth once daily.    SENNOSIDES (SENNA LAX ORAL) Take by mouth.    tizanidine (ZANAFLEX) 4 MG tablet TAKE 1 TABLET(4 MG) BY MOUTH EVERY NIGHT AS NEEDED    warfarin (COUMADIN) 5 MG tablet Take 1 tablet (5 mg total) by mouth Daily.           Clinical Reference Information           Your Vitals Were     BP Pulse Temp Resp Height Weight    130/70 (BP Location: Right arm, Patient Position: Sitting, BP Method: Manual) 63 98.2 °F (36.8 °C) (Oral) 17 5' 6" (1.676 m) 77 kg (169 lb 12.1 oz)    SpO2 BMI             96% 27.4 kg/m2         Blood Pressure          Most Recent Value    BP  130/70      Allergies as of 4/5/2017     Ciprofloxacin    Fructose    Gluten Protein    Lactase    Latex, Natural Rubber      Immunizations Administered on Date of Encounter - 4/5/2017     None      [facility] Sign-Up     Activating your [facility] account is as easy as 1-2-3!     1) Visit [email or web address], select Sign Up Now, enter this activation code and your date of birth, then select Next.  [email or web address]  Expires: 4/6/2017 11:49 AM      2) Create a username and password to use when you visit [facility] in the future and select a security question in case you lose your password and select Next.    3) Enter your e-mail address and click Sign Up!    Additional Information  If you have questions, please e-mail [email or web address] or " call [phone] to talk to our [facility] staff. Remember, [facility] is NOT to be used for urgent needs. For medical emergencies, dial 911.         Instructions    capscasin cream apply to wrist twice per day       Language Assistance Services     ATTENTION: Language assistance services are available, free of charge. Please call [phone].      ATENCIÓN: Si habla español, tiene a alvarez disposición servicios gratuitos de asistencia lingüística. Llame al [phone].     CHÚ Ý: N?u b?n nói Ti?ng Vi?t, có các d?ch v? h? tr? ngôn ng? mi?n phí dành cho b?n. G?i s? [phone].         [facility] complies with applicable Federal civil rights laws and does not discriminate on the basis of race, color, national origin, age, disability, or sex.

## 2017-04-05 NOTE — PROGRESS NOTES
"Subjective:       Patient ID: Isabell Preston is a 71 y.o. female.    Chief Complaint: Arm Pain (right)    HPI Comments: Wrist pain x 6 weeks    HPI: 72 y/o w/ h/o multiple DVT ons coumadin presents with six weeks of intermittent right medial wrist pain. Pain worse with suppination. No radiation to digits. No motor weakness no parathesia. Has not taken anymedicaitons  For pain. Tried epson salt soaks w/o signficant relief. No new activities or any inciting trauma she does not lifting a heavy object prior to onset of symptoms no  weakness    Review of Systems   Constitutional: Negative for activity change, appetite change, fatigue, fever and unexpected weight change.   HENT: Negative for ear pain, rhinorrhea and sore throat.    Eyes: Negative for discharge and visual disturbance.   Respiratory: Negative for chest tightness, shortness of breath and wheezing.    Cardiovascular: Negative for chest pain, palpitations and leg swelling.   Gastrointestinal: Negative for abdominal pain, constipation and diarrhea.   Endocrine: Negative for cold intolerance and heat intolerance.   Genitourinary: Negative for dysuria and hematuria.   Musculoskeletal: Positive for arthralgias. Negative for joint swelling and neck stiffness.   Skin: Negative for rash.   Neurological: Negative for dizziness, syncope, weakness and headaches.   Psychiatric/Behavioral: Negative for suicidal ideas.       Objective:     Vitals:    04/05/17 1054   BP: 130/70   BP Location: Right arm   Patient Position: Sitting   BP Method: Manual   Pulse: 63   Resp: 17   Temp: 98.2 °F (36.8 °C)   TempSrc: Oral   SpO2: 96%   Weight: 77 kg (169 lb 12.1 oz)   Height: 5' 6" (1.676 m)          Physical Exam   Constitutional: She is oriented to person, place, and time. She appears well-developed and well-nourished.   HENT:   Head: Normocephalic and atraumatic.   Eyes: Conjunctivae are normal. Pupils are equal, round, and reactive to light.   Neck: Normal range of " motion.   Cardiovascular: Normal rate and regular rhythm.  Exam reveals no gallop and no friction rub.    No murmur heard.  No LE edema   Pulmonary/Chest: Effort normal and breath sounds normal. She has no wheezes. She has no rales.   Abdominal: Soft. Bowel sounds are normal. There is no tenderness. There is no rebound and no guarding.   Musculoskeletal: Normal range of motion. She exhibits tenderness. She exhibits no edema.   Pain with opposed supination. No palable nodules of wrist or elbow, no fifth metacarpal pain   Neurological: She is alert and oriented to person, place, and time. No cranial nerve deficit.   Fine motor testing intact w/o deficit. 5/5  strength bilaterally   Skin: Skin is warm and dry.   Psychiatric: She has a normal mood and affect.       Assessment:       1. Epicondylitis elbow, medial, right    2. Controlled type 2 diabetes mellitus without complication, without long-term current use of insulin    3. Chronic deep vein thrombosis (DVT) of distal vein of lower extremity, unspecified laterality    4. Chronic anticoagulation        Plan:     1. Topical heating cream and stretching exercises reveiwed twice per day    2. Well controlled continue metformin    3/4. On coumadin followed by Dr. Celeste of hematology

## 2017-04-07 ENCOUNTER — ANTI-COAG VISIT (OUTPATIENT)
Dept: CARDIOLOGY | Facility: CLINIC | Age: 72
End: 2017-04-07

## 2017-04-07 DIAGNOSIS — I82.5Z9 CHRONIC DEEP VEIN THROMBOSIS (DVT) OF DISTAL VEIN OF LOWER EXTREMITY, UNSPECIFIED LATERALITY: ICD-10-CM

## 2017-04-24 ENCOUNTER — OFFICE VISIT (OUTPATIENT)
Dept: FAMILY MEDICINE | Facility: CLINIC | Age: 72
End: 2017-04-24
Payer: MEDICARE

## 2017-04-24 VITALS
HEART RATE: 57 BPM | DIASTOLIC BLOOD PRESSURE: 90 MMHG | OXYGEN SATURATION: 97 % | WEIGHT: 169.75 LBS | TEMPERATURE: 98 F | SYSTOLIC BLOOD PRESSURE: 140 MMHG | BODY MASS INDEX: 27.28 KG/M2 | HEIGHT: 66 IN

## 2017-04-24 DIAGNOSIS — I10 HYPERTENSION, ESSENTIAL: Chronic | ICD-10-CM

## 2017-04-24 DIAGNOSIS — S90.852A: Primary | ICD-10-CM

## 2017-04-24 DIAGNOSIS — L60.2 LONG TOENAIL: ICD-10-CM

## 2017-04-24 DIAGNOSIS — M46.00 SPINAL ENTHESOPATHY: ICD-10-CM

## 2017-04-24 PROCEDURE — 3077F SYST BP >= 140 MM HG: CPT | Mod: S$GLB,,, | Performed by: INTERNAL MEDICINE

## 2017-04-24 PROCEDURE — 99499 UNLISTED E&M SERVICE: CPT | Mod: S$GLB,,, | Performed by: INTERNAL MEDICINE

## 2017-04-24 PROCEDURE — 1160F RVW MEDS BY RX/DR IN RCRD: CPT | Mod: S$GLB,,, | Performed by: INTERNAL MEDICINE

## 2017-04-24 PROCEDURE — 3080F DIAST BP >= 90 MM HG: CPT | Mod: S$GLB,,, | Performed by: INTERNAL MEDICINE

## 2017-04-24 PROCEDURE — 28190 REMOVAL OF FOOT FOREIGN BODY: CPT | Mod: S$GLB,,, | Performed by: INTERNAL MEDICINE

## 2017-04-24 PROCEDURE — 99214 OFFICE O/P EST MOD 30 MIN: CPT | Mod: 25,S$GLB,, | Performed by: INTERNAL MEDICINE

## 2017-04-24 PROCEDURE — 1159F MED LIST DOCD IN RCRD: CPT | Mod: S$GLB,,, | Performed by: INTERNAL MEDICINE

## 2017-04-24 PROCEDURE — 1125F AMNT PAIN NOTED PAIN PRSNT: CPT | Mod: S$GLB,,, | Performed by: INTERNAL MEDICINE

## 2017-04-24 PROCEDURE — 99999 PR PBB SHADOW E&M-EST. PATIENT-LVL III: CPT | Mod: PBBFAC,,, | Performed by: INTERNAL MEDICINE

## 2017-04-24 RX ORDER — MUPIROCIN 20 MG/G
OINTMENT TOPICAL 3 TIMES DAILY
Qty: 15 G | Refills: 2 | Status: SHIPPED | OUTPATIENT
Start: 2017-04-24 | End: 2018-02-22 | Stop reason: ALTCHOICE
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
## 2017-04-24 NOTE — PROCEDURES
Removal of Foreign Body    Verbal agreement was obtained. Pt was prepped and draped. L heel was cleansed with hydrogen peroxide. L heel wound probed with scissors until enough surrounding skin removed to fully expose small foreign body. Tissue forceps were then used to grab foreign object and was removed. Bacitracin was applied followed by clean, dry dressing.

## 2017-05-01 DIAGNOSIS — E11.9 CONTROLLED TYPE 2 DIABETES MELLITUS WITHOUT COMPLICATION, WITHOUT LONG-TERM CURRENT USE OF INSULIN: Chronic | ICD-10-CM

## 2017-05-02 RX ORDER — TIZANIDINE 4 MG/1
TABLET ORAL
Qty: 270 TABLET | Refills: 2 | Status: SHIPPED | OUTPATIENT
Start: 2017-05-02 | End: 2018-02-22 | Stop reason: ALTCHOICE

## 2017-05-02 RX ORDER — METFORMIN HYDROCHLORIDE 500 MG/1
TABLET ORAL
Qty: 180 TABLET | Refills: 0 | Status: SHIPPED | OUTPATIENT
Start: 2017-05-02 | End: 2017-07-13 | Stop reason: ALTCHOICE

## 2017-05-02 RX ORDER — DULOXETIN HYDROCHLORIDE 30 MG/1
CAPSULE, DELAYED RELEASE ORAL
Qty: 30 CAPSULE | Refills: 0 | OUTPATIENT
Start: 2017-05-02

## 2017-05-27 ENCOUNTER — HOSPITAL ENCOUNTER (EMERGENCY)
Facility: OTHER | Age: 72
Discharge: HOME | End: 2017-05-27
Attending: INTERNAL MEDICINE
Payer: MEDICARE

## 2017-05-27 VITALS
DIASTOLIC BLOOD PRESSURE: 75 MMHG | TEMPERATURE: 99 F | SYSTOLIC BLOOD PRESSURE: 168 MMHG | HEART RATE: 70 BPM | HEIGHT: 66 IN | OXYGEN SATURATION: 99 % | RESPIRATION RATE: 16 BRPM | BODY MASS INDEX: 27.64 KG/M2 | WEIGHT: 172 LBS

## 2017-05-27 DIAGNOSIS — S80.12XA CONTUSION OF LEFT LEG, INITIAL ENCOUNTER: Primary | ICD-10-CM

## 2017-05-27 LAB
POC PTINR: 2.3 (ref 0.9–1.2)
POC PTWBT: 26.7 SEC (ref 9.7–14.3)
SAMPLE: ABNORMAL

## 2017-05-27 PROCEDURE — 99283 EMERGENCY DEPT VISIT LOW MDM: CPT
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
## 2017-05-31 ENCOUNTER — TELEPHONE (OUTPATIENT)
Dept: PSYCHIATRY | Facility: HOSPITAL | Age: 72
End: 2017-05-31

## 2017-05-31 RX ORDER — DULOXETIN HYDROCHLORIDE 30 MG/1
30 CAPSULE, DELAYED RELEASE ORAL DAILY
Qty: 10 CAPSULE | Refills: 0 | Status: SHIPPED | OUTPATIENT
Start: 2017-05-31 | End: 2017-06-08 | Stop reason: SDUPTHER

## 2017-05-31 NOTE — TELEPHONE ENCOUNTER
----- Message from Sonam Correia MA sent at 5/31/2017  1:01 PM CDT -----  Contact: Self [phone]      ----- Message -----  From: Dwain Espino  Sent: 5/31/2017  12:34 PM  To: Sonam Correia MA    Medication Refill for duloxetine (CYMBALTA) 30 MG capsule

## 2017-06-08 ENCOUNTER — OFFICE VISIT (OUTPATIENT)
Dept: PSYCHIATRY | Facility: CLINIC | Age: 72
End: 2017-06-08
Payer: COMMERCIAL

## 2017-06-08 VITALS
HEIGHT: 66 IN | DIASTOLIC BLOOD PRESSURE: 66 MMHG | WEIGHT: 172.5 LBS | BODY MASS INDEX: 27.72 KG/M2 | HEART RATE: 72 BPM | SYSTOLIC BLOOD PRESSURE: 132 MMHG

## 2017-06-08 DIAGNOSIS — F60.9 PERSONALITY DISORDER: ICD-10-CM

## 2017-06-08 DIAGNOSIS — F41.1 GENERALIZED ANXIETY DISORDER: ICD-10-CM

## 2017-06-08 DIAGNOSIS — F33.41 MAJOR DEPRESSIVE DISORDER, RECURRENT EPISODE, IN PARTIAL REMISSION: Primary | ICD-10-CM

## 2017-06-08 DIAGNOSIS — F43.10 PTSD (POST-TRAUMATIC STRESS DISORDER): ICD-10-CM

## 2017-06-08 PROCEDURE — 1125F AMNT PAIN NOTED PAIN PRSNT: CPT | Mod: S$GLB,,, | Performed by: PSYCHIATRY & NEUROLOGY

## 2017-06-08 PROCEDURE — 1159F MED LIST DOCD IN RCRD: CPT | Mod: S$GLB,,, | Performed by: PSYCHIATRY & NEUROLOGY

## 2017-06-08 PROCEDURE — 99213 OFFICE O/P EST LOW 20 MIN: CPT | Mod: PBBFAC | Performed by: PSYCHIATRY & NEUROLOGY

## 2017-06-08 PROCEDURE — 99999 PR PBB SHADOW E&M-EST. PATIENT-LVL III: CPT | Mod: PBBFAC,,, | Performed by: PSYCHIATRY & NEUROLOGY

## 2017-06-08 PROCEDURE — 99499 UNLISTED E&M SERVICE: CPT | Mod: S$PBB,,, | Performed by: PSYCHIATRY & NEUROLOGY

## 2017-06-08 PROCEDURE — 99213 OFFICE O/P EST LOW 20 MIN: CPT | Mod: S$GLB,,, | Performed by: PSYCHIATRY & NEUROLOGY

## 2017-06-08 RX ORDER — BUSPIRONE HYDROCHLORIDE 15 MG/1
TABLET ORAL
Qty: 270 TABLET | Refills: 1 | Status: SHIPPED | OUTPATIENT
Start: 2017-06-08 | End: 2017-07-12

## 2017-06-08 RX ORDER — METOPROLOL SUCCINATE 25 MG/1
25 TABLET, EXTENDED RELEASE ORAL 2 TIMES DAILY
COMMUNITY
End: 2017-06-12 | Stop reason: SDUPTHER

## 2017-06-08 RX ORDER — DULOXETIN HYDROCHLORIDE 30 MG/1
30 CAPSULE, DELAYED RELEASE ORAL DAILY
Qty: 90 CAPSULE | Refills: 1 | Status: SHIPPED | OUTPATIENT
Start: 2017-06-08 | End: 2018-01-03 | Stop reason: SDUPTHER

## 2017-06-08 NOTE — PATIENT INSTRUCTIONS
"        You have been provided with a certain amount of medication with a specified number of refills.  Please follow up within an adequate time before you run out of medications.    REFILLS FOR CONTROLLED SUBSTANCES WILL NOT BE GIVEN WITHOUT AN APPOINTMENT.  I will not honor or fill automated refill requests from pharmacies.  You must come in for an appointment to get refills.    Please book your next appointment for myself or therapist by phone: [phone].        PLEASE BE AT LEAST 15 MINUTES EARLY FOR YOUR NEXT APPOINTMENT.  PLEASE, DO NOT BE LATE OR YOU WILL BE TURNED AWAY AND ASKED TO RESCHEDULE.  YOU MUST ALLOW TIME FOR CHECK-IN AS WELL AS GET YOUR VITAL SIGNS AND GO OVER YOUR MEDICATIONS.  Tardiness can affect and is not fair to the patients who present after you and are on time for their appointments.  It causes a delay in the appointments for patients and staff.  THERE IS A POSSIBILITY THAT YOU WILL BE CHARGED FOR THE APPOINTMENT TIME PERSONALLY AND IT WILL NOT GO TO YOUR INSURANCE.  YOU MAY ALSO BE DISCHARGED FROM CLINIC with multiple "No Show" appointments.       -----------------------------------------------------------------------------------------------------------------  IF YOU FEEL SUICIDAL OR HAVING THOUGHTS OR PLANS TO HURT YOURSELF OR OTHERS, CALL 911 OR REPORT TO THE NEAREST EMERGENCY ROOM.  YOU CAN ALSO ACCESS ONE OF THE FOLLOWING HOTLINES:    [facility]  [facility]  [phone]    [place]   [facility]   [phone]     [facility]  24 hours / 7 days   [phone]       "
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
## 2017-06-12 RX ORDER — METOPROLOL SUCCINATE 50 MG/1
50 TABLET, EXTENDED RELEASE ORAL DAILY
Qty: 30 TABLET | Refills: 5 | Status: SHIPPED | OUTPATIENT
Start: 2017-06-12 | End: 2017-07-11

## 2017-06-29 ENCOUNTER — TELEPHONE (OUTPATIENT)
Dept: FAMILY MEDICINE | Facility: CLINIC | Age: 72
End: 2017-06-29

## 2017-06-29 NOTE — TELEPHONE ENCOUNTER
----- Message from Afshan Castro sent at 6/29/2017  1:05 PM CDT -----  Contact: Self  Pt called to f/u on previous mesg regarding clearance apt. Pt states she needs call back ASAP & needs to come in btw July 12-14. Pt can be reached @ [phone].

## 2017-07-06 ENCOUNTER — TELEPHONE (OUTPATIENT)
Dept: ADMINISTRATIVE | Facility: HOSPITAL | Age: 72
End: 2017-07-06

## 2017-07-11 PROBLEM — R10.84 GENERALIZED ABDOMINAL PAIN: Status: ACTIVE | Noted: 2017-07-11

## 2017-07-11 PROBLEM — I10 HTN, GOAL BELOW 140/90: Status: ACTIVE | Noted: 2017-07-11

## 2017-07-12 ENCOUNTER — LAB VISIT (OUTPATIENT)
Dept: LAB | Facility: HOSPITAL | Age: 72
End: 2017-07-12
Attending: INTERNAL MEDICINE
Payer: MEDICARE

## 2017-07-12 ENCOUNTER — OFFICE VISIT (OUTPATIENT)
Dept: FAMILY MEDICINE | Facility: CLINIC | Age: 72
End: 2017-07-12
Payer: MEDICARE

## 2017-07-12 VITALS
RESPIRATION RATE: 17 BRPM | BODY MASS INDEX: 27.77 KG/M2 | HEIGHT: 66 IN | SYSTOLIC BLOOD PRESSURE: 124 MMHG | OXYGEN SATURATION: 97 % | HEART RATE: 62 BPM | WEIGHT: 172.81 LBS | DIASTOLIC BLOOD PRESSURE: 78 MMHG | TEMPERATURE: 98 F

## 2017-07-12 DIAGNOSIS — K59.00 CONSTIPATION, UNSPECIFIED CONSTIPATION TYPE: ICD-10-CM

## 2017-07-12 DIAGNOSIS — Z79.01 CURRENT USE OF LONG TERM ANTICOAGULATION: ICD-10-CM

## 2017-07-12 DIAGNOSIS — Z01.818 PRE-OP EXAMINATION: Primary | ICD-10-CM

## 2017-07-12 DIAGNOSIS — E11.9 CONTROLLED TYPE 2 DIABETES MELLITUS WITHOUT COMPLICATION, WITHOUT LONG-TERM CURRENT USE OF INSULIN: Chronic | ICD-10-CM

## 2017-07-12 LAB
ANION GAP SERPL CALC-SCNC: 9 MMOL/L
BUN SERPL-MCNC: 10 MG/DL
CALCIUM SERPL-MCNC: 9.7 MG/DL
CHLORIDE SERPL-SCNC: 101 MMOL/L
CO2 SERPL-SCNC: 29 MMOL/L
CREAT SERPL-MCNC: 0.8 MG/DL
EST. GFR  (AFRICAN AMERICAN): >60 ML/MIN/1.73 M^2
EST. GFR  (NON AFRICAN AMERICAN): >60 ML/MIN/1.73 M^2
GLUCOSE SERPL-MCNC: 87 MG/DL
POTASSIUM SERPL-SCNC: 3.4 MMOL/L
SODIUM SERPL-SCNC: 139 MMOL/L

## 2017-07-12 PROCEDURE — 36415 COLL VENOUS BLD VENIPUNCTURE: CPT | Mod: PN

## 2017-07-12 PROCEDURE — 82306 VITAMIN D 25 HYDROXY: CPT

## 2017-07-12 PROCEDURE — 83036 HEMOGLOBIN GLYCOSYLATED A1C: CPT

## 2017-07-12 PROCEDURE — 99214 OFFICE O/P EST MOD 30 MIN: CPT | Mod: S$GLB,,, | Performed by: INTERNAL MEDICINE

## 2017-07-12 PROCEDURE — 1159F MED LIST DOCD IN RCRD: CPT | Mod: S$GLB,,, | Performed by: INTERNAL MEDICINE

## 2017-07-12 PROCEDURE — 1125F AMNT PAIN NOTED PAIN PRSNT: CPT | Mod: S$GLB,,, | Performed by: INTERNAL MEDICINE

## 2017-07-12 PROCEDURE — 99499 UNLISTED E&M SERVICE: CPT | Mod: S$GLB,,, | Performed by: INTERNAL MEDICINE

## 2017-07-12 PROCEDURE — 80048 BASIC METABOLIC PNL TOTAL CA: CPT

## 2017-07-12 PROCEDURE — 99999 PR PBB SHADOW E&M-EST. PATIENT-LVL III: CPT | Mod: PBBFAC,,, | Performed by: INTERNAL MEDICINE

## 2017-07-12 PROCEDURE — 3044F HG A1C LEVEL LT 7.0%: CPT | Mod: S$GLB,,, | Performed by: INTERNAL MEDICINE

## 2017-07-12 RX ORDER — METFORMIN HYDROCHLORIDE 500 MG/1
TABLET ORAL
Qty: 180 TABLET | Refills: 1 | Status: CANCELLED | OUTPATIENT
Start: 2017-07-12

## 2017-07-12 RX ORDER — ATORVASTATIN CALCIUM 10 MG/1
10 TABLET, FILM COATED ORAL DAILY
Qty: 90 TABLET | Refills: 2 | Status: SHIPPED | OUTPATIENT
Start: 2017-07-12 | End: 2018-04-13 | Stop reason: SDUPTHER

## 2017-07-12 RX ORDER — CLOTRIMAZOLE 1 %
CREAM (GRAM) TOPICAL 2 TIMES DAILY
Qty: 24 G | Refills: 1 | Status: SHIPPED | OUTPATIENT
Start: 2017-07-12 | End: 2018-02-22 | Stop reason: ALTCHOICE

## 2017-07-12 NOTE — PROGRESS NOTES
"Subjective:       Patient ID: Isabell Preston is a 71 y.o. female.    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 She has chronic lower abdominal pain she is undergoing evaluation with MRI of abdomen and surgical consultation. seh is moving bowels daily no nausea/vomtting denies exertional dyspnea or chest pain. Able to walk two flights of stairs w/o stopping. Denies orthopne or PND no LE swelling.       Review of Systems   Constitutional: Negative for activity change, appetite change, fatigue, fever and unexpected weight change.   HENT: Negative for ear pain, rhinorrhea and sore throat.    Eyes: Negative for discharge and visual disturbance.   Respiratory: Negative for chest tightness, shortness of breath and wheezing.    Cardiovascular: Negative for chest pain, palpitations and leg swelling.   Gastrointestinal: Positive for abdominal pain (chronic). Negative for constipation and diarrhea.   Endocrine: Negative for cold intolerance and heat intolerance.   Genitourinary: Negative for dysuria and hematuria.   Musculoskeletal: Negative for joint swelling and neck stiffness.   Skin: Negative for rash.   Neurological: Negative for dizziness, syncope, weakness and headaches.   Psychiatric/Behavioral: Negative for suicidal ideas.       Objective:     Vitals:    07/12/17 1111   BP: 124/78   BP Location: Right arm   Patient Position: Sitting   BP Method: Manual   Pulse: 62   Resp: 17   Temp: 98.1 °F (36.7 °C)   TempSrc: Oral   SpO2: 97%   Weight: 78.4 kg (172 lb 13.5 oz)   Height: 5' 6" (1.676 m)          Physical Exam   Constitutional: She is oriented to person, place, and time. She appears well-developed and well-nourished.   HENT:   Head: Normocephalic and atraumatic.   Eyes: Conjunctivae are normal. Pupils are equal, round, and reactive to light.   Neck: Normal " range of motion.   Cardiovascular: Normal rate and regular rhythm.  Exam reveals no gallop and no friction rub.    No murmur heard.  Pulmonary/Chest: Effort normal and breath sounds normal. She has no wheezes. She has no rales.   Abdominal: Soft. Bowel sounds are normal. There is tenderness. There is no rebound and no guarding.   Tenderness to deep palpation bilateral lower quadrents w/o rebound   Musculoskeletal: Normal range of motion. She exhibits no edema or tenderness.   Neurological: She is alert and oriented to person, place, and time. No cranial nerve deficit.   Skin: Skin is warm and dry.   Psychiatric: She has a normal mood and affect.       Assessment:       1. Pre-op examination    2. Controlled type 2 diabetes mellitus without complication, without long-term current use of insulin    3. Constipation, unspecified constipation type    4. Current use of long term anticoagulation        Plan:       1. Intermediate risk for low risk procedure no indication for further CV testing. Clearance form completed will fax    2. Repeat a1c in light of weight loss may be reasonable to discontinue metformin if a1c< 6.0    3. Well controlled on prn laxitative continue follow up with general surgery       4. INR followed by Dr. Celeste agree with him on holding coumadin three days prior to surgery and resume day after surgery with inr monitoring prior

## 2017-07-13 ENCOUNTER — TELEPHONE (OUTPATIENT)
Dept: FAMILY MEDICINE | Facility: CLINIC | Age: 72
End: 2017-07-13

## 2017-07-13 LAB
25(OH)D3+25(OH)D2 SERPL-MCNC: 28 NG/ML
ESTIMATED AVG GLUCOSE: 117 MG/DL
HBA1C MFR BLD HPLC: 5.7 %

## 2017-07-13 NOTE — TELEPHONE ENCOUNTER
Patient contacted and ID confirmed by name and   a1c 5.7% in light of weight loss and abdominal cramping will hold this medication and look for effect. Plan to repeat a1c in three months to monitor

## 2017-07-13 NOTE — TELEPHONE ENCOUNTER
----- Message from Ayo Archuleta MD sent at 7/12/2017 11:50 AM CDT -----  Regarding: call cell for results  If a1c<6.0 would stop metformin

## 2017-07-21 ENCOUNTER — HOSPITAL ENCOUNTER (OUTPATIENT)
Dept: RADIOLOGY | Facility: HOSPITAL | Age: 72
Discharge: HOME | End: 2017-07-21
Attending: INTERNAL MEDICINE
Payer: MEDICARE

## 2017-07-21 DIAGNOSIS — R10.84 GENERALIZED ABDOMINAL PAIN: ICD-10-CM

## 2017-07-21 PROCEDURE — 76856 US EXAM PELVIC COMPLETE: CPT | Mod: 26,,, | Performed by: RADIOLOGY

## 2017-07-21 PROCEDURE — 76830 TRANSVAGINAL US NON-OB: CPT | Mod: 26,,, | Performed by: RADIOLOGY

## 2017-07-21 PROCEDURE — 76856 US EXAM PELVIC COMPLETE: CPT | Mod: TC

## 2017-08-07 ENCOUNTER — HOSPITAL ENCOUNTER (OUTPATIENT)
Dept: RADIOLOGY | Facility: HOSPITAL | Age: 72
Discharge: HOME | End: 2017-08-07
Attending: INTERNAL MEDICINE
Payer: MEDICARE

## 2017-08-07 DIAGNOSIS — R10.84 GENERALIZED ABDOMINAL PAIN: ICD-10-CM

## 2017-08-07 PROCEDURE — 74181 MRI ABDOMEN W/O CONTRAST: CPT | Mod: TC

## 2017-08-07 PROCEDURE — 74181 MRI ABDOMEN W/O CONTRAST: CPT | Mod: 26,,, | Performed by: RADIOLOGY

## 2017-08-22 PROBLEM — R53.82 CHRONIC FATIGUE: Status: ACTIVE | Noted: 2017-08-22

## 2017-09-27 PROBLEM — M79.2 NEUROPATHIC PAIN: Status: ACTIVE | Noted: 2017-09-27

## 2017-10-06 RX ORDER — METOPROLOL SUCCINATE 50 MG/1
50 TABLET, EXTENDED RELEASE ORAL DAILY
Qty: 30 TABLET | Refills: 5 | Status: SHIPPED | OUTPATIENT
Start: 2017-10-06 | End: 2017-11-30 | Stop reason: SDUPTHER

## 2017-10-06 NOTE — TELEPHONE ENCOUNTER
----- Message from Jennifer Grossman sent at 10/6/2017  1:12 PM CDT -----  Contact: self   Patient need a refill for the 25mg . Please call patient at [phone].      metoprolol succinate (TOPROL-XL)      [facility] [address]

## 2017-10-09 ENCOUNTER — TELEPHONE (OUTPATIENT)
Dept: FAMILY MEDICINE | Facility: CLINIC | Age: 72
End: 2017-10-09

## 2017-10-09 NOTE — TELEPHONE ENCOUNTER
----- Message from Jennifer Grossman sent at 10/7/2017  9:24 AM CDT -----  Contact: Self   Patient refill . The wrong medication was called to the pharmacy .  Please send again the correct milligrams     metoprolol 25 mg ER Succinate time release capsule

## 2017-10-12 ENCOUNTER — TELEPHONE (OUTPATIENT)
Dept: FAMILY MEDICINE | Facility: CLINIC | Age: 72
End: 2017-10-12

## 2017-10-12 NOTE — TELEPHONE ENCOUNTER
----- Message from Darshana Hdez sent at 10/12/2017  2:00 PM CDT -----  Contact: [facility]  Pharmacy called stating metoprolol succinate (TOPROL-XL) 50 MG 24 hr tablet should be 25 mg not 50 mg.     Paramjit  [facility] [facility] [address] [place] [id number]  [phone]

## 2017-11-03 DIAGNOSIS — E11.9 CONTROLLED TYPE 2 DIABETES MELLITUS WITHOUT COMPLICATION, WITHOUT LONG-TERM CURRENT USE OF INSULIN: Chronic | ICD-10-CM

## 2017-11-03 RX ORDER — METFORMIN HYDROCHLORIDE 500 MG/1
TABLET ORAL
Qty: 180 TABLET | Refills: 0 | Status: SHIPPED | OUTPATIENT
Start: 2017-11-03 | End: 2018-02-19 | Stop reason: SDUPTHER

## 2017-11-03 RX ORDER — HYDROCHLOROTHIAZIDE 25 MG/1
TABLET ORAL
Qty: 90 TABLET | Refills: 0 | Status: SHIPPED | OUTPATIENT
Start: 2017-11-03 | End: 2018-02-20 | Stop reason: DRUGHIGH

## 2017-11-30 DIAGNOSIS — I10 HTN, GOAL BELOW 140/90: ICD-10-CM

## 2017-11-30 RX ORDER — METOPROLOL SUCCINATE 50 MG/1
50 TABLET, EXTENDED RELEASE ORAL DAILY
Qty: 90 TABLET | Refills: 2 | Status: SHIPPED | OUTPATIENT
Start: 2017-11-30 | End: 2018-02-07 | Stop reason: SDUPTHER

## 2017-11-30 RX ORDER — METOPROLOL TARTRATE 75 MG/1
75 TABLET, FILM COATED ORAL 2 TIMES DAILY
Qty: 180 TABLET | Refills: 3 | Status: CANCELLED | OUTPATIENT
Start: 2017-11-30 | End: 2018-11-30

## 2017-11-30 RX ORDER — METOPROLOL SUCCINATE 25 MG/1
25 TABLET, EXTENDED RELEASE ORAL DAILY
Qty: 90 TABLET | Refills: 2 | Status: SHIPPED | OUTPATIENT
Start: 2017-11-30 | End: 2018-02-07 | Stop reason: SDUPTHER

## 2017-11-30 NOTE — TELEPHONE ENCOUNTER
----- Message from Thuy Torres sent at 11/30/2017 10:00 AM CST -----  Contact: self [phone]  Pt is requesting a refill on Metoprolol 50 mg and 25 MG Time release. Pt said it has to be time release.Pls call [facility] [phone]. Pls call pt [phone]. Thanks......Hiral

## 2017-12-01 DIAGNOSIS — E11.9 TYPE 2 DIABETES MELLITUS WITHOUT COMPLICATION: ICD-10-CM

## 2017-12-20 ENCOUNTER — TELEPHONE (OUTPATIENT)
Dept: FAMILY MEDICINE | Facility: CLINIC | Age: 72
End: 2017-12-20

## 2017-12-20 DIAGNOSIS — Z12.39 SCREENING FOR BREAST CANCER: Primary | ICD-10-CM

## 2017-12-20 NOTE — TELEPHONE ENCOUNTER
----- Message from Yeimi Porter sent at 12/19/2017  4:24 PM CST -----  Contact: self  Patient would like mammogram orders place into the system. Patient last mammogram was 10/26/2016.      Patient can be reached at [phone].      Thanks,

## 2018-01-03 RX ORDER — DULOXETIN HYDROCHLORIDE 30 MG/1
30 CAPSULE, DELAYED RELEASE ORAL DAILY
Qty: 90 CAPSULE | Refills: 0 | Status: SHIPPED | OUTPATIENT
Start: 2018-01-03 | End: 2018-01-15

## 2018-01-15 ENCOUNTER — TELEPHONE (OUTPATIENT)
Dept: PSYCHIATRY | Facility: HOSPITAL | Age: 73
End: 2018-01-15

## 2018-01-15 RX ORDER — DESVENLAFAXINE SUCCINATE 50 MG/1
50 TABLET, EXTENDED RELEASE ORAL DAILY
Qty: 30 TABLET | Refills: 1 | Status: SHIPPED | OUTPATIENT
Start: 2018-01-15 | End: 2018-05-10 | Stop reason: SDUPTHER

## 2018-01-15 NOTE — TELEPHONE ENCOUNTER
Says that she got a new  for duloxetine.  Side effects of palpitations, headaches, dizziness.  It happened again when she took it the next day.  She went to 3 pharmacies and they are all covering Solco generic duloxetine.    Will try Pristiq 50 mg daily.  Told to call back tomorrow and get set up with an appointment.

## 2018-01-15 NOTE — TELEPHONE ENCOUNTER
----- Message from Sonam Correia MA sent at 1/15/2018  2:18 PM CST -----  Contact: Patient  Patient called to say that she is having very bad side effects from the duloxetine 30 mg that she feels is  related. She said the first Rx she took was great but when she refilled it was from a different  ([facility]) and this is when side effects began. She is unsure what she should do.  Please call her at [phone].

## 2018-01-16 RX ORDER — DESVENLAFAXINE SUCCINATE 50 MG/1
TABLET, EXTENDED RELEASE ORAL
Qty: 90 TABLET | Refills: 1 | OUTPATIENT
Start: 2018-01-16

## 2018-01-24 DIAGNOSIS — Z13.5 DIABETIC RETINOPATHY SCREENING: ICD-10-CM

## 2018-02-07 ENCOUNTER — OFFICE VISIT (OUTPATIENT)
Dept: FAMILY MEDICINE | Facility: CLINIC | Age: 73
End: 2018-02-07
Payer: MEDICARE

## 2018-02-07 VITALS
RESPIRATION RATE: 17 BRPM | HEART RATE: 78 BPM | TEMPERATURE: 98 F | BODY MASS INDEX: 28.53 KG/M2 | OXYGEN SATURATION: 97 % | WEIGHT: 177.5 LBS | SYSTOLIC BLOOD PRESSURE: 140 MMHG | DIASTOLIC BLOOD PRESSURE: 70 MMHG | HEIGHT: 66 IN

## 2018-02-07 DIAGNOSIS — Z23 NEED FOR INFLUENZA VACCINATION: ICD-10-CM

## 2018-02-07 DIAGNOSIS — I10 HTN, GOAL BELOW 140/90: ICD-10-CM

## 2018-02-07 DIAGNOSIS — N91.2 AMENORRHEA: ICD-10-CM

## 2018-02-07 DIAGNOSIS — E11.9 CONTROLLED TYPE 2 DIABETES MELLITUS WITHOUT COMPLICATION, WITHOUT LONG-TERM CURRENT USE OF INSULIN: Primary | Chronic | ICD-10-CM

## 2018-02-07 DIAGNOSIS — R10.84 GENERALIZED ABDOMINAL PAIN: ICD-10-CM

## 2018-02-07 DIAGNOSIS — K57.31 DIVERTICULOSIS OF LARGE INTESTINE WITH HEMORRHAGE: ICD-10-CM

## 2018-02-07 DIAGNOSIS — Z23 NEEDS FLU SHOT: ICD-10-CM

## 2018-02-07 PROCEDURE — 90662 IIV NO PRSV INCREASED AG IM: CPT | Mod: S$GLB,,, | Performed by: INTERNAL MEDICINE

## 2018-02-07 PROCEDURE — 99214 OFFICE O/P EST MOD 30 MIN: CPT | Mod: S$GLB,,, | Performed by: INTERNAL MEDICINE

## 2018-02-07 PROCEDURE — 1159F MED LIST DOCD IN RCRD: CPT | Mod: S$GLB,,, | Performed by: INTERNAL MEDICINE

## 2018-02-07 PROCEDURE — G0008 ADMIN INFLUENZA VIRUS VAC: HCPCS | Mod: S$GLB,,, | Performed by: INTERNAL MEDICINE

## 2018-02-07 PROCEDURE — 99999 PR PBB SHADOW E&M-EST. PATIENT-LVL IV: CPT | Mod: PBBFAC,,, | Performed by: INTERNAL MEDICINE

## 2018-02-07 PROCEDURE — 3008F BODY MASS INDEX DOCD: CPT | Mod: S$GLB,,, | Performed by: INTERNAL MEDICINE

## 2018-02-07 PROCEDURE — 1125F AMNT PAIN NOTED PAIN PRSNT: CPT | Mod: S$GLB,,, | Performed by: INTERNAL MEDICINE

## 2018-02-07 PROCEDURE — 99499 UNLISTED E&M SERVICE: CPT | Mod: S$GLB,,, | Performed by: INTERNAL MEDICINE

## 2018-02-07 RX ORDER — METOPROLOL SUCCINATE 50 MG/1
50 TABLET, EXTENDED RELEASE ORAL DAILY
Qty: 90 TABLET | Refills: 2 | Status: SHIPPED | OUTPATIENT
Start: 2018-02-07 | End: 2019-03-15 | Stop reason: SDUPTHER

## 2018-02-07 RX ORDER — CYCLOBENZAPRINE HCL 5 MG
TABLET ORAL
Refills: 4 | COMMUNITY
Start: 2017-11-15 | End: 2019-04-26

## 2018-02-07 RX ORDER — METOPROLOL SUCCINATE 25 MG/1
25 TABLET, EXTENDED RELEASE ORAL DAILY
Qty: 90 TABLET | Refills: 2 | Status: SHIPPED | OUTPATIENT
Start: 2018-02-07 | End: 2019-03-15 | Stop reason: SDUPTHER

## 2018-02-07 NOTE — PROGRESS NOTES
"Subjective:       Patient ID: Isabell Preston is a 72 y.o. female.    Chief Complaint: Abdominal Pain    F/u chronic abdominal pain    HPI: 71 y/o with history of diverticulosis requiring colon resection x 2 DVT on coumadin (monitored at [facility]) presents to discuss chronic abdominal pain. Pain worse approximately one hour after eating she will move bowels with relief. No constipation no vomitting. She has had multiple abdominal surgeries including hysterectomy and cholecystectomy. GI evaluation has included repeat Cscopeds (last in March 2017) abdominal imaging has failed to show any significant abnormality. She using bentyl two to three times per week for sensation of gastric fullness.       Review of Systems   Constitutional: Negative for activity change, appetite change, fatigue, fever and unexpected weight change.   HENT: Negative for ear pain, rhinorrhea and sore throat.    Eyes: Negative for discharge and visual disturbance.   Respiratory: Negative for chest tightness, shortness of breath and wheezing.    Cardiovascular: Negative for chest pain, palpitations and leg swelling.   Gastrointestinal: Positive for abdominal distention and abdominal pain. Negative for blood in stool, constipation, diarrhea, rectal pain and vomiting.   Endocrine: Negative for cold intolerance and heat intolerance.   Genitourinary: Negative for dysuria and hematuria.   Musculoskeletal: Negative for joint swelling and neck stiffness.   Skin: Negative for rash.   Neurological: Negative for dizziness, syncope, weakness and headaches.   Psychiatric/Behavioral: Negative for suicidal ideas.       Objective:     Vitals:    02/07/18 1413   BP: (!) 140/70   BP Location: Left arm   Patient Position: Sitting   BP Method: Medium (Manual)   Pulse: 78   Resp: 17   Temp: 97.7 °F (36.5 °C)   TempSrc: Oral   SpO2: 97%   Weight: 80.5 kg (177 lb 7.5 oz)   Height: 5' 6" (1.676 m)          Physical Exam   Constitutional: She is oriented to person, " place, and time. She appears well-developed and well-nourished.   HENT:   Head: Normocephalic and atraumatic.   Eyes: Conjunctivae are normal. Pupils are equal, round, and reactive to light.   Neck: Normal range of motion.   Cardiovascular: Normal rate and regular rhythm.  Exam reveals no gallop and no friction rub.    No murmur heard.  Pulmonary/Chest: Effort normal and breath sounds normal. She has no wheezes. She has no rales.   Abdominal: Soft. Bowel sounds are normal. There is tenderness. There is no rebound and no guarding.   Lower abdominal incision well healed. There are bowel sounds in all four quadrents. She has some tenderness bilateral lower quadrents wtihout rebound or perotineal signs   Musculoskeletal: Normal range of motion. She exhibits no edema or tenderness.   Neurological: She is alert and oriented to person, place, and time. No cranial nerve deficit.   Skin: Skin is warm and dry.   Psychiatric: She has a normal mood and affect.       Assessment:       1. Controlled type 2 diabetes mellitus without complication, without long-term current use of insulin    2. HTN, goal below 140/90    3. Generalized abdominal pain    4. Diverticulosis of large intestine with hemorrhage    5. Amenorrhea    6. Need for influenza vaccination    7. Needs flu shot        Plan:    1. Due for repeat a1c and renal function on metformin continue    2. bp at goal on  Current meds continue    3/4. Suspect some pain related to abdominal adhesions in light of her numerous abdominal surgeries she does not have any evidence of obstruction or acute abdomen. referal to general surgery for consideration of surgery for lysis    5. DEXA screen for osteoporosis    6/7. Flu vaccine today

## 2018-02-19 ENCOUNTER — HOSPITAL ENCOUNTER (OUTPATIENT)
Dept: RADIOLOGY | Facility: CLINIC | Age: 73
Discharge: HOME | End: 2018-02-19
Attending: INTERNAL MEDICINE
Payer: MEDICARE

## 2018-02-19 DIAGNOSIS — E11.9 CONTROLLED TYPE 2 DIABETES MELLITUS WITHOUT COMPLICATION, WITHOUT LONG-TERM CURRENT USE OF INSULIN: Chronic | ICD-10-CM

## 2018-02-19 DIAGNOSIS — N91.2 AMENORRHEA: ICD-10-CM

## 2018-02-19 PROCEDURE — 77080 DXA BONE DENSITY AXIAL: CPT | Mod: 26,,, | Performed by: INTERNAL MEDICINE

## 2018-02-19 PROCEDURE — 77080 DXA BONE DENSITY AXIAL: CPT | Mod: TC,PO

## 2018-02-19 RX ORDER — METFORMIN HYDROCHLORIDE 500 MG/1
TABLET ORAL
Qty: 180 TABLET | Refills: 2 | Status: SHIPPED | OUTPATIENT
Start: 2018-02-19 | End: 2018-12-14

## 2018-02-19 NOTE — TELEPHONE ENCOUNTER
----- Message from Caterina Stewart sent at 2/16/2018  2:26 PM CST -----  Contact: SELF  REFILL: metFORMIN (GLUCOPHAGE) 500 MG tablet    PLEASE SEND TO [facility]

## 2018-02-20 ENCOUNTER — TELEPHONE (OUTPATIENT)
Dept: FAMILY MEDICINE | Facility: CLINIC | Age: 73
End: 2018-02-20

## 2018-02-20 DIAGNOSIS — E87.6 HYPOKALEMIA: ICD-10-CM

## 2018-02-20 DIAGNOSIS — I10 HYPERTENSION, ESSENTIAL: Primary | ICD-10-CM

## 2018-02-20 RX ORDER — TRIAMTERENE/HYDROCHLOROTHIAZID 37.5-25 MG
1 TABLET ORAL DAILY
Qty: 90 TABLET | Refills: 3 | Status: SHIPPED | OUTPATIENT
Start: 2018-02-20 | End: 2019-02-22 | Stop reason: SDUPTHER

## 2018-02-20 RX ORDER — POTASSIUM CHLORIDE 20 MEQ/1
TABLET, EXTENDED RELEASE ORAL
Qty: 90 TABLET | Refills: 0 | OUTPATIENT
Start: 2018-02-20

## 2018-02-20 RX ORDER — POTASSIUM CHLORIDE 20 MEQ/1
20 TABLET, EXTENDED RELEASE ORAL DAILY
Qty: 7 TABLET | Refills: 0 | Status: SHIPPED | OUTPATIENT
Start: 2018-02-20 | End: 2018-02-27

## 2018-02-20 NOTE — TELEPHONE ENCOUNTER
Patient contacted and ID confirmed by name and   Reviewed low potassium switch HCTZ to HCTZ/trimeterene. Will give daily potassium supplement x one week

## 2018-02-22 ENCOUNTER — OFFICE VISIT (OUTPATIENT)
Dept: PSYCHIATRY | Facility: CLINIC | Age: 73
End: 2018-02-22
Payer: MEDICAID

## 2018-02-22 VITALS
HEART RATE: 68 BPM | DIASTOLIC BLOOD PRESSURE: 78 MMHG | WEIGHT: 178.81 LBS | BODY MASS INDEX: 28.74 KG/M2 | HEIGHT: 66 IN | SYSTOLIC BLOOD PRESSURE: 124 MMHG

## 2018-02-22 DIAGNOSIS — F41.1 GENERALIZED ANXIETY DISORDER: ICD-10-CM

## 2018-02-22 DIAGNOSIS — F60.9 PERSONALITY DISORDER: ICD-10-CM

## 2018-02-22 DIAGNOSIS — F43.10 PTSD (POST-TRAUMATIC STRESS DISORDER): ICD-10-CM

## 2018-02-22 DIAGNOSIS — F33.41 MAJOR DEPRESSIVE DISORDER, RECURRENT EPISODE, IN PARTIAL REMISSION: Primary | ICD-10-CM

## 2018-02-22 PROCEDURE — 1125F AMNT PAIN NOTED PAIN PRSNT: CPT | Mod: ,,, | Performed by: PSYCHIATRY & NEUROLOGY

## 2018-02-22 PROCEDURE — 3008F BODY MASS INDEX DOCD: CPT | Mod: ,,, | Performed by: PSYCHIATRY & NEUROLOGY

## 2018-02-22 PROCEDURE — 99214 OFFICE O/P EST MOD 30 MIN: CPT | Mod: S$PBB,,, | Performed by: PSYCHIATRY & NEUROLOGY

## 2018-02-22 PROCEDURE — 99999 PR PBB SHADOW E&M-EST. PATIENT-LVL III: CPT | Mod: PBBFAC,,, | Performed by: PSYCHIATRY & NEUROLOGY

## 2018-02-22 PROCEDURE — 99213 OFFICE O/P EST LOW 20 MIN: CPT | Mod: PBBFAC | Performed by: PSYCHIATRY & NEUROLOGY

## 2018-02-22 PROCEDURE — 1159F MED LIST DOCD IN RCRD: CPT | Mod: ,,, | Performed by: PSYCHIATRY & NEUROLOGY

## 2018-02-22 RX ORDER — DULOXETIN HYDROCHLORIDE 30 MG/1
30 CAPSULE, DELAYED RELEASE ORAL DAILY
Qty: 90 CAPSULE | Refills: 1 | Status: SHIPPED | OUTPATIENT
Start: 2018-02-22 | End: 2018-08-16 | Stop reason: DRUGHIGH

## 2018-02-22 NOTE — PATIENT INSTRUCTIONS
"        You have been provided with a certain amount of medication with a specified number of refills.  Please follow up within an adequate time before you run out of medications.    REFILLS FOR CONTROLLED SUBSTANCES WILL NOT BE GIVEN WITHOUT AN APPOINTMENT.  I will not honor or fill automated refill requests from pharmacies.  You must come in for an appointment to get refills.    Please book your next appointment for myself or therapist by phone: [phone].        PLEASE BE AT LEAST 15 MINUTES EARLY FOR YOUR NEXT APPOINTMENT.  PLEASE, DO NOT BE LATE OR YOU WILL BE TURNED AWAY AND ASKED TO RESCHEDULE.  YOU MUST ALLOW TIME FOR CHECK-IN AS WELL AS GET YOUR VITAL SIGNS AND GO OVER YOUR MEDICATIONS.  Tardiness can affect and is not fair to the patients who present after you and are on time for their appointments.  It causes a delay in the appointments for patients and staff.  THERE IS A POSSIBILITY THAT YOU WILL BE CHARGED FOR THE APPOINTMENT TIME PERSONALLY AND IT WILL NOT GO TO YOUR INSURANCE.  YOU MAY ALSO BE DISCHARGED FROM CLINIC with multiple "No Show" appointments.       -----------------------------------------------------------------------------------------------------------------  IF YOU FEEL SUICIDAL OR HAVING THOUGHTS OR PLANS TO HURT YOURSELF OR OTHERS, CALL 911 OR REPORT TO THE NEAREST EMERGENCY ROOM.  YOU CAN ALSO ACCESS ONE OF THE FOLLOWING HOTLINES:    [facility]  [facility]  [phone]    [place]   [facility]   [phone]     [facility]  24 hours / 7 days   [phone]       "
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
## 2018-02-26 DIAGNOSIS — E87.6 HYPOKALEMIA: ICD-10-CM

## 2018-02-26 DIAGNOSIS — R21 RASH: Primary | ICD-10-CM

## 2018-03-13 NOTE — TELEPHONE ENCOUNTER
Spoke with patient. She states that she does not need a refill for Potassium. Advised per telephone encounter 2-. She was given only 7 days supply of Potassium due to low levels. She would like to know if she needs to have a repeat Potassium level drawn    She does however, needs a referral to dermatology for a rash on her forehead and under breast. Rash under breast is really itchy and dark red. Rash on forehead is quarter size and dime size for over a month. Advised office visit. Patient refused, would like to go to dermatology.       Please advise

## 2018-03-14 RX ORDER — POTASSIUM CHLORIDE 20 MEQ/1
TABLET, EXTENDED RELEASE ORAL
Qty: 7 TABLET | Refills: 0 | OUTPATIENT
Start: 2018-03-14

## 2018-03-20 RX ORDER — DESVENLAFAXINE SUCCINATE 50 MG/1
TABLET, EXTENDED RELEASE ORAL
Qty: 30 TABLET | Refills: 0 | OUTPATIENT
Start: 2018-03-20

## 2018-03-28 ENCOUNTER — OFFICE VISIT (OUTPATIENT)
Dept: FAMILY MEDICINE | Facility: CLINIC | Age: 73
End: 2018-03-28
Payer: MEDICARE

## 2018-03-28 VITALS
SYSTOLIC BLOOD PRESSURE: 136 MMHG | TEMPERATURE: 98 F | HEART RATE: 68 BPM | RESPIRATION RATE: 17 BRPM | HEIGHT: 66 IN | OXYGEN SATURATION: 97 % | DIASTOLIC BLOOD PRESSURE: 74 MMHG | WEIGHT: 176.13 LBS | BODY MASS INDEX: 28.31 KG/M2

## 2018-03-28 DIAGNOSIS — H69.93 DYSFUNCTION OF BOTH EUSTACHIAN TUBES: ICD-10-CM

## 2018-03-28 DIAGNOSIS — H81.10 BENIGN PAROXYSMAL POSITIONAL VERTIGO, UNSPECIFIED LATERALITY: ICD-10-CM

## 2018-03-28 DIAGNOSIS — B35.4 TINEA CORPORIS: Primary | ICD-10-CM

## 2018-03-28 DIAGNOSIS — R42 DIZZINESS: ICD-10-CM

## 2018-03-28 DIAGNOSIS — E11.9 TYPE 2 DIABETES MELLITUS WITHOUT COMPLICATION: ICD-10-CM

## 2018-03-28 DIAGNOSIS — L30.4 INTERTRIGO: ICD-10-CM

## 2018-03-28 LAB — GLUCOSE SERPL-MCNC: 86 MG/DL (ref 70–110)

## 2018-03-28 PROCEDURE — 99999 PR PBB SHADOW E&M-EST. PATIENT-LVL V: CPT | Mod: PBBFAC,,, | Performed by: NURSE PRACTITIONER

## 2018-03-28 PROCEDURE — 3078F DIAST BP <80 MM HG: CPT | Mod: CPTII,S$GLB,, | Performed by: NURSE PRACTITIONER

## 2018-03-28 PROCEDURE — 82948 REAGENT STRIP/BLOOD GLUCOSE: CPT | Mod: S$GLB,,, | Performed by: NURSE PRACTITIONER

## 2018-03-28 PROCEDURE — 99214 OFFICE O/P EST MOD 30 MIN: CPT | Mod: S$GLB,,, | Performed by: NURSE PRACTITIONER

## 2018-03-28 PROCEDURE — 3075F SYST BP GE 130 - 139MM HG: CPT | Mod: CPTII,S$GLB,, | Performed by: NURSE PRACTITIONER

## 2018-03-28 RX ORDER — NYSTATIN 100000 U/G
CREAM TOPICAL 2 TIMES DAILY
Qty: 15 G | Refills: 0 | Status: SHIPPED | OUTPATIENT
Start: 2018-03-28 | End: 2018-05-02

## 2018-03-28 RX ORDER — TIZANIDINE HYDROCHLORIDE 4 MG/1
CAPSULE, GELATIN COATED ORAL
COMMUNITY
End: 2019-04-26

## 2018-03-28 RX ORDER — NYSTATIN 100000 [USP'U]/G
POWDER TOPICAL 4 TIMES DAILY
Qty: 15 G | Refills: 0 | Status: SHIPPED | OUTPATIENT
Start: 2018-03-28 | End: 2018-05-02

## 2018-03-28 RX ORDER — KETOCONAZOLE 20 MG/ML
SHAMPOO, SUSPENSION TOPICAL
Qty: 120 ML | Refills: 0 | Status: SHIPPED | OUTPATIENT
Start: 2018-03-29 | End: 2018-08-16

## 2018-03-28 RX ORDER — MECLIZINE HYDROCHLORIDE 25 MG/1
25 TABLET ORAL 3 TIMES DAILY PRN
Qty: 30 TABLET | Refills: 0 | Status: SHIPPED | OUTPATIENT
Start: 2018-03-28 | End: 2018-07-11

## 2018-03-28 RX ORDER — FLUTICASONE PROPIONATE 50 MCG
1 SPRAY, SUSPENSION (ML) NASAL DAILY
Qty: 1 BOTTLE | Refills: 2 | Status: SHIPPED | OUTPATIENT
Start: 2018-03-28 | End: 2018-06-25 | Stop reason: SDUPTHER

## 2018-03-28 NOTE — PROGRESS NOTES
Subjective:       Patient ID: Isabell Preston is a 72 y.o. female.    Chief Complaint: Abdominal Pain; Dizziness; and Anorexia    HPI Ms Preston is here for a 4 day history of some dizziness and light headedness.  She denies any inciting event.  It is worse with position changes.  She has not attempted any treatment.  She denies any bloody or black stool.  She also reports a rash to her forehead and under her L breast, it itches, she's been using OTC powder with limited relief.   Review of Systems   Constitutional: Negative for fever.   Cardiovascular: Negative.    Gastrointestinal: Negative.    Skin: Positive for rash.   Neurological: Positive for dizziness.       Objective:      Physical Exam   Constitutional: She is oriented to person, place, and time. She appears well-developed and well-nourished. She does not appear ill. No distress.   HENT:   Right Ear: A middle ear effusion is present.   Left Ear: A middle ear effusion is present.   Nose: Nose normal.   Mouth/Throat: Uvula is midline, oropharynx is clear and moist and mucous membranes are normal.   Eyes: EOM are normal. Pupils are equal, round, and reactive to light.   Cardiovascular: Normal rate, regular rhythm and normal heart sounds.  Exam reveals no friction rub.    No murmur heard.  Pulmonary/Chest: Effort normal and breath sounds normal. No respiratory distress. She has no decreased breath sounds. She has no wheezes. She has no rhonchi. She has no rales.   Musculoskeletal: Normal range of motion.   Neurological: She is alert and oriented to person, place, and time.   CN 2-12 intact   Skin: Skin is warm and dry.   Reddened irritated area under L breast, small circular area to R side of forehead.     Psychiatric: She has a normal mood and affect. Her behavior is normal.   Vitals reviewed.      Assessment:       1. Tinea corporis    2. Intertrigo    3. Benign paroxysmal positional vertigo, unspecified laterality    4. Dysfunction of both eustachian  tubes    5. Dizziness        Plan:       Tinea corporis  -     ketoconazole (NIZORAL) 2 % shampoo; Apply topically twice a week.  Dispense: 120 mL; Refill: 0  -     nystatin (MYCOSTATIN) cream; Apply topically 2 (two) times daily.  Dispense: 15 g; Refill: 0    Intertrigo  -     nystatin (MYCOSTATIN) powder; Apply topically 4 (four) times daily.  Dispense: 15 g; Refill: 0    Benign paroxysmal positional vertigo, unspecified laterality  -     meclizine (ANTIVERT) 25 mg tablet; Take 1 tablet (25 mg total) by mouth 3 (three) times daily as needed for Dizziness.  Dispense: 30 tablet; Refill: 0    Dysfunction of both eustachian tubes  -     fluticasone (FLONASE) 50 mcg/actuation nasal spray; 1 spray (50 mcg total) by Each Nare route once daily.  Dispense: 1 Bottle; Refill: 2    Dizziness  -     POCT Glucose  -     CBC auto differential; Future; Expected date: 03/28/2018  -     Basic metabolic panel; Future; Expected date: 03/28/2018    No red flags, she recently had a low potassium, will recheck, treat with meclizine in the meantime.  F/u if not improved      Follow up with primary care provider if not improved  Go to ER for new, worse or concerning symptoms

## 2018-03-28 NOTE — PATIENT INSTRUCTIONS
Follow up with primary care provider if not improved  Go to ER for new, worse or concerning symptoms    Benign Paroxysmal Positional Vertigo     Your health care provider may move your head in certain ways to treat your BPPV.     Benign paroxysmal positional vertigo (BPPV) is a problem with the inner ear. The inner ear contains the vestibular system. This system is what helps you keep your balance. BPPV causes a feeling of spinning. It is a common problem of the vestibular system.  Understanding the vestibular system  The vestibular system of the ear is made up of very tiny parts. They include the utricle, saccule, and semicircular canals. The utricle is a tiny organ that contains calcium crystals. In some people, the crystals can move into the semicircular canals. When this happens, the system no longer works as it should. This causes BPPV. Benign means it is not life-threatening. Paroxysmal means it happens suddenly. Positional means that it happens when you move your head. Vertigo is a feeling of spinning.  What causes BPPV?  Causes include injury to your head or neck. Other problems with the vestibular system may cause BPPV. In many people, the cause of BPPV is not known.  Symptoms of BPPV  You many have repeated feelings of spinning (vertigo). The vertigo usually lasts less than 1 minute. Some movements, suchas rolling over in bed, can bring on vertigo.  Diagnosing BPPV  Your primary health care provider may diagnose and treat your BPPV. Or you may see an ear, nose, and throat doctor (otolaryngologist). In some cases, you may see a nervous system doctor (neurologist).  The health care provider will ask about your symptoms and your medical history. He or she will examine you. You may have hearing and balance tests. As part of the exam, your health care provider may have you move your head and body in certain ways. If you have BPPV, the movements can bring on vertigo. Your provider will also look for abnormal  movements of your eyes. You may have other tests to check your vestibular or nervous systems.  Treatment for BPPV  Your health care provider may try to move the calcium crystals. This is done by having you move your head and neck in certain ways. This treatment is safe and often works well. You may also be told to do these movements at home. You may still have vertigo for a few weeks. Your health care provider will recheck your symptoms, usually in about a month. Special physical therapy may also be part of treatment. In rare cases surgery may be needed for BPPV that does not go away.     When to call the health care provider  Call your health care provider right away if you have any of these:  · Symptoms that do not go away with treatment  · Symptoms that get worse  · New symptoms   Date Last Reviewed: 3/19/2015  © 0336-4534 [employer]. [address] [place] [address], [place] [address]. All rights reserved. This information is not intended as a substitute for professional medical care. Always follow your healthcare professional's instructions.

## 2018-04-04 RX ORDER — DULOXETIN HYDROCHLORIDE 30 MG/1
CAPSULE, DELAYED RELEASE ORAL
Qty: 90 CAPSULE | Refills: 0 | OUTPATIENT
Start: 2018-04-04

## 2018-04-04 NOTE — TELEPHONE ENCOUNTER
----- Message from Sonam Correia MA sent at 4/4/2018  1:14 PM CDT -----  Contact: pt       ----- Message -----  From: Lydia Vivar  Sent: 4/4/2018  12:44 PM  To: Sonam Correia MA    Refill for meds  desvenlafaxine succinate (PRISTIQ) 50 MG Tb24  [facility] [address] [place] [address] EXPY AT [facility]   [phone]

## 2018-04-06 ENCOUNTER — LAB VISIT (OUTPATIENT)
Dept: LAB | Facility: HOSPITAL | Age: 73
End: 2018-04-06
Attending: INTERNAL MEDICINE
Payer: MEDICARE

## 2018-04-06 DIAGNOSIS — E87.6 HYPOKALEMIA: ICD-10-CM

## 2018-04-06 DIAGNOSIS — R42 DIZZINESS: ICD-10-CM

## 2018-04-06 LAB
LEFT EYE DM RETINOPATHY: NEGATIVE
RIGHT EYE DM RETINOPATHY: NEGATIVE

## 2018-04-09 ENCOUNTER — LAB VISIT (OUTPATIENT)
Dept: LAB | Facility: HOSPITAL | Age: 73
End: 2018-04-09
Attending: INTERNAL MEDICINE
Payer: MEDICARE

## 2018-04-09 ENCOUNTER — TELEPHONE (OUTPATIENT)
Dept: FAMILY MEDICINE | Facility: CLINIC | Age: 73
End: 2018-04-09

## 2018-04-09 DIAGNOSIS — I10 ESSENTIAL HYPERTENSION: ICD-10-CM

## 2018-04-09 DIAGNOSIS — I10 ESSENTIAL HYPERTENSION: Primary | ICD-10-CM

## 2018-04-09 LAB
ANION GAP SERPL CALC-SCNC: 10 MMOL/L
BASOPHILS # BLD AUTO: 0.04 K/UL
BASOPHILS NFR BLD: 0.5 %
BUN SERPL-MCNC: 17 MG/DL
CALCIUM SERPL-MCNC: 9.8 MG/DL
CHLORIDE SERPL-SCNC: 105 MMOL/L
CO2 SERPL-SCNC: 28 MMOL/L
CREAT SERPL-MCNC: 1.2 MG/DL
DIFFERENTIAL METHOD: NORMAL
EOSINOPHIL # BLD AUTO: 0.3 K/UL
EOSINOPHIL NFR BLD: 3.4 %
ERYTHROCYTE [DISTWIDTH] IN BLOOD BY AUTOMATED COUNT: 14 %
EST. GFR  (AFRICAN AMERICAN): 52.5 ML/MIN/1.73 M^2
EST. GFR  (NON AFRICAN AMERICAN): 45.6 ML/MIN/1.73 M^2
GLUCOSE SERPL-MCNC: 149 MG/DL
HCT VFR BLD AUTO: 39.9 %
HGB BLD-MCNC: 12.9 G/DL
IMM GRANULOCYTES # BLD AUTO: 0.02 K/UL
IMM GRANULOCYTES NFR BLD AUTO: 0.2 %
LYMPHOCYTES # BLD AUTO: 1.7 K/UL
LYMPHOCYTES NFR BLD: 20.5 %
MAGNESIUM SERPL-MCNC: 1.8 MG/DL
MCH RBC QN AUTO: 30.4 PG
MCHC RBC AUTO-ENTMCNC: 32.3 G/DL
MCV RBC AUTO: 94 FL
MONOCYTES # BLD AUTO: 0.4 K/UL
MONOCYTES NFR BLD: 5.4 %
NEUTROPHILS # BLD AUTO: 5.6 K/UL
NEUTROPHILS NFR BLD: 70 %
NRBC BLD-RTO: 0 /100 WBC
PLATELET # BLD AUTO: 310 K/UL
PMV BLD AUTO: 10.4 FL
POTASSIUM SERPL-SCNC: 3.7 MMOL/L
RBC # BLD AUTO: 4.24 M/UL
SODIUM SERPL-SCNC: 143 MMOL/L
WBC # BLD AUTO: 8.03 K/UL

## 2018-04-09 PROCEDURE — 83735 ASSAY OF MAGNESIUM: CPT

## 2018-04-09 PROCEDURE — 80048 BASIC METABOLIC PNL TOTAL CA: CPT

## 2018-04-09 PROCEDURE — 85025 COMPLETE CBC W/AUTO DIFF WBC: CPT

## 2018-04-09 PROCEDURE — 36415 COLL VENOUS BLD VENIPUNCTURE: CPT | Mod: PO

## 2018-04-10 ENCOUNTER — TELEPHONE (OUTPATIENT)
Dept: FAMILY MEDICINE | Facility: CLINIC | Age: 73
End: 2018-04-10

## 2018-04-10 DIAGNOSIS — N18.30 CKD (CHRONIC KIDNEY DISEASE), STAGE III: Primary | ICD-10-CM

## 2018-04-11 ENCOUNTER — TELEPHONE (OUTPATIENT)
Dept: FAMILY MEDICINE | Facility: CLINIC | Age: 73
End: 2018-04-11

## 2018-04-13 DIAGNOSIS — E11.9 CONTROLLED TYPE 2 DIABETES MELLITUS WITHOUT COMPLICATION, WITHOUT LONG-TERM CURRENT USE OF INSULIN: Chronic | ICD-10-CM

## 2018-04-13 RX ORDER — ATORVASTATIN CALCIUM 10 MG/1
TABLET, FILM COATED ORAL
Qty: 90 TABLET | Refills: 0 | Status: SHIPPED | OUTPATIENT
Start: 2018-04-13 | End: 2018-08-20 | Stop reason: SDUPTHER

## 2018-04-16 ENCOUNTER — TELEPHONE (OUTPATIENT)
Dept: FAMILY MEDICINE | Facility: CLINIC | Age: 73
End: 2018-04-16

## 2018-04-16 NOTE — TELEPHONE ENCOUNTER
----- Message from Jennifer Grossman sent at 4/16/2018 10:31 AM CDT -----  Contact: Self   Patient says when she go to the restroom she is seeing blood since yesterday and she is on Coumadin, patient says she stop taking it once she saw the blood. Please call to advise  at [phone]

## 2018-04-24 ENCOUNTER — HOSPITAL ENCOUNTER (OUTPATIENT)
Dept: RADIOLOGY | Facility: HOSPITAL | Age: 73
Discharge: HOME | End: 2018-04-24
Attending: INTERNAL MEDICINE
Payer: MEDICARE

## 2018-04-24 DIAGNOSIS — N18.30 CKD (CHRONIC KIDNEY DISEASE), STAGE III: ICD-10-CM

## 2018-04-24 PROCEDURE — 93975 VASCULAR STUDY: CPT | Mod: 26,,, | Performed by: RADIOLOGY

## 2018-04-24 PROCEDURE — 76770 US EXAM ABDO BACK WALL COMP: CPT | Mod: 26,59,, | Performed by: RADIOLOGY

## 2018-04-24 PROCEDURE — 93975 VASCULAR STUDY: CPT | Mod: TC

## 2018-04-25 ENCOUNTER — OFFICE VISIT (OUTPATIENT)
Dept: NEPHROLOGY | Facility: CLINIC | Age: 73
End: 2018-04-25
Payer: MEDICARE

## 2018-04-25 VITALS
BODY MASS INDEX: 28.12 KG/M2 | HEART RATE: 65 BPM | HEIGHT: 66 IN | OXYGEN SATURATION: 97 % | WEIGHT: 175 LBS | SYSTOLIC BLOOD PRESSURE: 124 MMHG | DIASTOLIC BLOOD PRESSURE: 78 MMHG

## 2018-04-25 DIAGNOSIS — R79.89 ELEVATED SERUM CREATININE: ICD-10-CM

## 2018-04-25 DIAGNOSIS — N18.30 CHRONIC KIDNEY DISEASE, STAGE III (MODERATE): Primary | ICD-10-CM

## 2018-04-25 PROCEDURE — 99999 PR PBB SHADOW E&M-EST. PATIENT-LVL III: CPT | Mod: PBBFAC,GC,, | Performed by: INTERNAL MEDICINE

## 2018-04-25 PROCEDURE — 3074F SYST BP LT 130 MM HG: CPT | Mod: CPTII,GC,S$GLB, | Performed by: INTERNAL MEDICINE

## 2018-04-25 PROCEDURE — 3078F DIAST BP <80 MM HG: CPT | Mod: CPTII,GC,S$GLB, | Performed by: INTERNAL MEDICINE

## 2018-04-25 PROCEDURE — 99204 OFFICE O/P NEW MOD 45 MIN: CPT | Mod: GC,S$GLB,, | Performed by: INTERNAL MEDICINE

## 2018-04-25 NOTE — PROGRESS NOTES
"Subjective:       Patient ID: Isabell Preston is a 72 y.o. Black or  female who presents for new evaluation of Chronic Kidney Disease    Referred to nephrology clinic for decreased eGFR on labs drawn in surgical clinic earlier this month.  Note with GFR in the 50s, previously. All labs show eGFR > 60.  There is no obvious change in what she has been doing, except maybe taking a little more Goody powder than usual for headaches, she also may not have been drinking "enough water" on the day of the lab test.       Review of Systems   Constitutional: Negative for chills, fatigue and fever.   Respiratory: Negative for chest tightness and shortness of breath.    Cardiovascular: Negative for chest pain and leg swelling.   Gastrointestinal: Negative for abdominal distention, abdominal pain, diarrhea and nausea.   Genitourinary: Negative for decreased urine volume, difficulty urinating, flank pain, frequency, hematuria and urgency.   Skin: Negative for rash.   Neurological: Negative for tremors, speech difficulty and weakness.       Objective:      Physical Exam   HENT:   Head: Normocephalic and atraumatic.   Neck: No JVD present.   Cardiovascular: Normal rate and regular rhythm.    Pulmonary/Chest: Effort normal and breath sounds normal.   Abdominal: Soft. She exhibits no distension.   Musculoskeletal: She exhibits edema. She exhibits no tenderness.   Neurological: She is alert.   Skin: Skin is warm.       Assessment & Plan:       Elevated serum creatinine  Noted with sCr of 1.2 (baseline 0.8) and eGFR of 50 and previously always with negative eGFR > 60, she is a diabetic but seems well controlled, no significant microalbuminuria, no retinopathy, possibly she could have been a little volume depleted at time of lab draw in, addition to taking more Goody powder than usual for headache, we have no recent urine studies, except microalbumin that was unremarkable    Plan:  1) repeat RFP post hydration  2) check " UA, UPC and repeat urine microalbumin  3) further w/u pending repeat labs  4) avoid Goody powder and NSAIDs       Patient seen with Dr Moraes

## 2018-04-25 NOTE — PROGRESS NOTES
ATTENDING PHYSICIAN ATTESTATION  I have personally interviewed and examined the patient. I thoroughly reviewed the demographic, clinical, laboratorial and imaging information available in medical records. I agree with the assessment and recommendations provided by the subspecialty resident. Dr. Swanson was under my supervision.

## 2018-04-25 NOTE — ASSESSMENT & PLAN NOTE
Noted with sCr of 1.2 (baseline 0.8) and eGFR of 50 and previously always with negative eGFR > 60, she is a diabetic but seems well controlled, no significant microalbuminuria, no retinopathy, possibly she could have been a little volume depleted at time of lab draw in, addition to taking more Goody powder than usual for headache, we have no recent urine studies, except microalbumin that was unremarkable    Plan:  1) repeat RFP post hydration  2) check UA, UPC and repeat urine microalbumin  3) further w/u pending repeat labs  4) avoid Goody powder and NSAIDs

## 2018-04-26 ENCOUNTER — LAB VISIT (OUTPATIENT)
Dept: LAB | Facility: HOSPITAL | Age: 73
End: 2018-04-26
Payer: MEDICARE

## 2018-04-26 DIAGNOSIS — N18.30 CHRONIC KIDNEY DISEASE, STAGE III (MODERATE): ICD-10-CM

## 2018-04-26 LAB
ALBUMIN SERPL BCP-MCNC: 3.9 G/DL
ANION GAP SERPL CALC-SCNC: 9 MMOL/L
BUN SERPL-MCNC: 19 MG/DL
CALCIUM SERPL-MCNC: 8.9 MG/DL
CHLORIDE SERPL-SCNC: 95 MMOL/L
CO2 SERPL-SCNC: 26 MMOL/L
CREAT SERPL-MCNC: 1 MG/DL
EST. GFR  (AFRICAN AMERICAN): >60 ML/MIN/1.73 M^2
EST. GFR  (NON AFRICAN AMERICAN): 56.8 ML/MIN/1.73 M^2
GLUCOSE SERPL-MCNC: 103 MG/DL
PHOSPHATE SERPL-MCNC: 2.4 MG/DL
POTASSIUM SERPL-SCNC: 3.6 MMOL/L
SODIUM SERPL-SCNC: 130 MMOL/L

## 2018-04-26 PROCEDURE — 36415 COLL VENOUS BLD VENIPUNCTURE: CPT

## 2018-04-26 PROCEDURE — 80069 RENAL FUNCTION PANEL: CPT

## 2018-04-27 ENCOUNTER — HOSPITAL ENCOUNTER (OUTPATIENT)
Dept: RADIOLOGY | Facility: HOSPITAL | Age: 73
Discharge: HOME | End: 2018-04-27
Attending: SURGERY
Payer: MEDICARE

## 2018-04-27 DIAGNOSIS — R10.84 GENERALIZED ABDOMINAL PAIN: ICD-10-CM

## 2018-04-27 PROCEDURE — 25500020 PHARM REV CODE 255: Performed by: SURGERY

## 2018-04-27 PROCEDURE — 74177 CT ABD & PELVIS W/CONTRAST: CPT | Mod: TC

## 2018-04-27 PROCEDURE — 74177 CT ABD & PELVIS W/CONTRAST: CPT | Mod: 26,,, | Performed by: RADIOLOGY

## 2018-04-27 RX ADMIN — IOHEXOL 80 ML: 350 INJECTION, SOLUTION INTRAVENOUS at 09:04

## 2018-04-27 RX ADMIN — IOHEXOL 15 ML: 300 INJECTION, SOLUTION INTRAVENOUS at 09:04

## 2018-05-10 ENCOUNTER — TELEPHONE (OUTPATIENT)
Dept: PSYCHIATRY | Facility: HOSPITAL | Age: 73
End: 2018-05-10

## 2018-05-10 RX ORDER — DESVENLAFAXINE SUCCINATE 50 MG/1
50 TABLET, EXTENDED RELEASE ORAL DAILY
Qty: 30 TABLET | Refills: 2 | Status: SHIPPED | OUTPATIENT
Start: 2018-05-10 | End: 2018-08-16

## 2018-05-10 NOTE — TELEPHONE ENCOUNTER
----- Message from Sonam Correia MA sent at 5/10/2018  9:53 AM CDT -----  Contact: Patient  Patient asked that refill for Pristiq 50 mg be sent to pharmacy. She said she spoke to someone last week when she called us and they told her she has to see you first before it can be refilled. She is not due for follow up until late Aug.

## 2018-05-24 RX ORDER — DICYCLOMINE HYDROCHLORIDE 10 MG/1
CAPSULE ORAL
Qty: 84 CAPSULE | Refills: 0 | Status: SHIPPED | OUTPATIENT
Start: 2018-05-24 | End: 2018-08-21 | Stop reason: SDUPTHER

## 2018-06-25 DIAGNOSIS — H69.93 DYSFUNCTION OF BOTH EUSTACHIAN TUBES: ICD-10-CM

## 2018-06-25 RX ORDER — FLUTICASONE PROPIONATE 50 MCG
1 SPRAY, SUSPENSION (ML) NASAL DAILY
Qty: 1 BOTTLE | Refills: 2 | Status: SHIPPED | OUTPATIENT
Start: 2018-06-25 | End: 2018-07-06 | Stop reason: CLARIF

## 2018-07-06 ENCOUNTER — OFFICE VISIT (OUTPATIENT)
Dept: FAMILY MEDICINE | Facility: CLINIC | Age: 73
End: 2018-07-06
Payer: MEDICARE

## 2018-07-06 ENCOUNTER — LAB VISIT (OUTPATIENT)
Dept: LAB | Facility: HOSPITAL | Age: 73
End: 2018-07-06
Attending: FAMILY MEDICINE
Payer: MEDICARE

## 2018-07-06 VITALS
HEART RATE: 68 BPM | SYSTOLIC BLOOD PRESSURE: 138 MMHG | OXYGEN SATURATION: 98 % | RESPIRATION RATE: 18 BRPM | DIASTOLIC BLOOD PRESSURE: 75 MMHG | HEIGHT: 66 IN | TEMPERATURE: 98 F | WEIGHT: 170.44 LBS | BODY MASS INDEX: 27.39 KG/M2

## 2018-07-06 DIAGNOSIS — F48.8 PSYCHOGENIC GENERAL FATIGUE: ICD-10-CM

## 2018-07-06 DIAGNOSIS — Z79.01 CHRONIC ANTICOAGULATION: Chronic | ICD-10-CM

## 2018-07-06 DIAGNOSIS — R19.5 NONSPECIFIC ABNORMAL FINDING IN STOOL CONTENTS: Primary | ICD-10-CM

## 2018-07-06 LAB
INR PPP: 2.8
PROTHROMBIN TIME: 29.4 SEC

## 2018-07-06 PROCEDURE — 85610 PROTHROMBIN TIME: CPT

## 2018-07-06 PROCEDURE — 3075F SYST BP GE 130 - 139MM HG: CPT | Mod: CPTII,S$GLB,, | Performed by: FAMILY MEDICINE

## 2018-07-06 PROCEDURE — 99999 PR PBB SHADOW E&M-EST. PATIENT-LVL IV: CPT | Mod: PBBFAC,,, | Performed by: FAMILY MEDICINE

## 2018-07-06 PROCEDURE — 99214 OFFICE O/P EST MOD 30 MIN: CPT | Mod: S$GLB,,, | Performed by: FAMILY MEDICINE

## 2018-07-06 PROCEDURE — 36415 COLL VENOUS BLD VENIPUNCTURE: CPT | Mod: PN

## 2018-07-06 PROCEDURE — 3078F DIAST BP <80 MM HG: CPT | Mod: CPTII,S$GLB,, | Performed by: FAMILY MEDICINE

## 2018-07-06 RX ORDER — MOMETASONE FUROATE 1 MG/G
CREAM TOPICAL
Refills: 1 | COMMUNITY
Start: 2018-05-24 | End: 2019-03-19 | Stop reason: SDUPTHER

## 2018-07-06 NOTE — PATIENT INSTRUCTIONS
Symptoms With Uncertain Cause (Adult)  You have been examined, and tests may have been done. However, the exact cause of your symptoms is still not certain. Watch for any new symptoms or worsening of your condition. Another exam or more testing at a later time may be needed. Unless told otherwise, you can go back to your normal routine.  Follow-up care  Follow up with your healthcare provider if your symptoms do not begin to improve in the next few days, or as advised by our staff.   When to seek medical advice  Call your healthcare provider if your symptoms get worse or if new symptoms appear.  Date Last Reviewed: 6/26/2015  © 5703-0834 [employer]. [address] [place] [address], [place] [address]. All rights reserved. This information is not intended as a substitute for professional medical care. Always follow your healthcare professional's instructions.

## 2018-07-06 NOTE — PROGRESS NOTES
Chief Complaint   Patient presents with    Follow-up    Diabetes       HPI    Isabell Preston is 72 y.o. female. The primary encounter diagnosis was Nonspecific abnormal finding in stool contents. Diagnoses of Chronic anticoagulation and Psychogenic general fatigue were also pertinent to this visit.    72 year old female with Diabetes comes to clinic with complaint of foreign body in her stool.  Patient reports that she initially thought this abnormality was her undigested Metformin.  Patient reports about 2 months ago she recalls that she began to see a formed disc in her stool.  This occurs randomly therefore cannot associate with any medications.  Seen in the stool 3-4 times per week.  No pain associated with it.  Patient reports that she has been on Linzess for 2 months as well.         She also mentions increased bruising. She is seen in Coumadin clinic at [facility] and admits missing her last INR check.  She denies seeing blood in her stool or urine.  She has only noticed 2-3 bruised areas on her lower legs.    Review of Systems   Constitutional: Negative for activity change.   Respiratory: Negative for shortness of breath.    Cardiovascular: Negative for chest pain.   Gastrointestinal: Negative for abdominal pain, blood in stool, constipation, diarrhea, nausea and vomiting.        Foreign body in stool   Musculoskeletal: Negative for gait problem.   Hematological: Bruises/bleeds easily.   Psychiatric/Behavioral: Negative for suicidal ideas.           Current Outpatient Prescriptions:     atorvastatin (LIPITOR) 10 MG tablet, TAKE 1 TABLET(10 MG) BY MOUTH EVERY DAY, Disp: 90 tablet, Rfl: 0    cyclobenzaprine (FLEXERIL) 5 MG tablet, TK 1 TO 2 TS PO HS, Disp: , Rfl: 4    desvenlafaxine succinate (PRISTIQ) 50 MG Tb24, Take 1 tablet (50 mg total) by mouth once daily., Disp: 30 tablet, Rfl: 2    dicyclomine (BENTYL) 10 MG capsule, TAKE 1 CAPSULE BY MOUTH THREE TIMES DAILY, Disp: 84 capsule, Rfl: 0    DULoxetine  "(CYMBALTA) 30 MG capsule, Take 1 capsule (30 mg total) by mouth once daily., Disp: 90 capsule, Rfl: 1    gabapentin (NEURONTIN) 100 MG capsule, TAKE 1 CAPSULE(100 MG) BY MOUTH THREE TIMES DAILY, Disp: 270 capsule, Rfl: 2    ketoconazole (NIZORAL) 2 % shampoo, Apply topically twice a week., Disp: 120 mL, Rfl: 0    lancets (TRUEPLUS LANCETS) 28 gauge Misc, 1 lancet by Misc.(Non-Drug; Combo Route) route once daily. Test blood sugar once daily, type 2 diabetes, controlled. E11.9, Disp: 100 each, Rfl: 3    linaclotide (LINZESS) 145 mcg Cap capsule, Take 1 capsule (145 mcg total) by mouth once daily., Disp: 30 capsule, Rfl: 2    metFORMIN (GLUCOPHAGE) 500 MG tablet, TAKE 1 TABLET(500 MG) BY MOUTH TWICE DAILY, Disp: 180 tablet, Rfl: 2    metoprolol succinate (TOPROL-XL) 25 MG 24 hr tablet, Take 1 tablet (25 mg total) by mouth once daily. Total daily dose 75mg, Disp: 90 tablet, Rfl: 2    metoprolol succinate (TOPROL-XL) 50 MG 24 hr tablet, Take 1 tablet (50 mg total) by mouth once daily. Total daily dose 75mg, Disp: 90 tablet, Rfl: 2    mometasone 0.1% (ELOCON) 0.1 % cream, BALWINDER TO DARK AREAS BID ON LEGS PRN, Disp: , Rfl: 1    senna-docusate 8.6-50 mg (PERICOLACE) 8.6-50 mg per tablet, Take 1 tablet by mouth once daily., Disp: , Rfl:     tiZANidine 4 mg Cap, Take by mouth., Disp: , Rfl:     triamterene-hydrochlorothiazide 37.5-25 mg (MAXZIDE-25) 37.5-25 mg per tablet, Take 1 tablet by mouth once daily., Disp: 90 tablet, Rfl: 3    antipyrine-benzocaine (AURALGAN OR EQUIV) 5.4-1.4 % Drop, 3 drops every 2 (two) hours as needed., Disp: , Rfl:     meclizine (ANTIVERT) 25 mg tablet, Take 1 tablet (25 mg total) by mouth 3 (three) times daily as needed., Disp: 30 tablet, Rfl: 0    warfarin (COUMADIN) 5 MG tablet, Take 1 tablet (5 mg total) by mouth Daily., Disp: 30 tablet, Rfl: 11      Blood pressure 138/75, pulse 68, temperature 98.2 °F (36.8 °C), temperature source Oral, resp. rate 18, height 5' 6" (1.676 m), weight " 77.3 kg (170 lb 6.7 oz), SpO2 98 %.    Physical Exam   Constitutional: Vital signs are normal. She appears well-developed.   Cardiovascular: Normal heart sounds.    No murmur heard.  Pulmonary/Chest: Effort normal and breath sounds normal.   Skin: Skin is dry and intact. Bruising noted. No ecchymosis, no lesion and no rash noted. No pallor.        Psychiatric: She has a normal mood and affect. Her behavior is normal.       Lab Visit on 07/06/2018   Component Date Value Ref Range Status    Prothrombin Time 07/06/2018 29.4* 9.0 - 12.5 sec Final    INR 07/06/2018 2.8* 0.8 - 1.2 Final   Lab Visit on 04/26/2018   Component Date Value Ref Range Status    Protein, Urine Random 04/26/2018 <7  0 - 15 mg/dL Final    Creatinine, Random Ur 04/26/2018 16.0  15.0 - 325.0 mg/dL Final    Prot/Creat Ratio, Ur 04/26/2018 Unable to calculate  0.00 - 0.20 Final   Lab Visit on 04/26/2018   Component Date Value Ref Range Status    Glucose 04/26/2018 103  70 - 110 mg/dL Final    Sodium 04/26/2018 130* 136 - 145 mmol/L Final    Potassium 04/26/2018 3.6  3.5 - 5.1 mmol/L Final    Chloride 04/26/2018 95  95 - 110 mmol/L Final    CO2 04/26/2018 26  23 - 29 mmol/L Final    BUN, Bld 04/26/2018 19  8 - 23 mg/dL Final    Calcium 04/26/2018 8.9  8.7 - 10.5 mg/dL Final    Creatinine 04/26/2018 1.0  0.5 - 1.4 mg/dL Final    Albumin 04/26/2018 3.9  3.5 - 5.2 g/dL Final    Phosphorus 04/26/2018 2.4* 2.7 - 4.5 mg/dL Final    eGFR if African American 04/26/2018 >60.0  >60 mL/min/1.73 m^2 Final    eGFR if non African American 04/26/2018 56.8* >60 mL/min/1.73 m^2 Final    Anion Gap 04/26/2018 9  8 - 16 mmol/L Final   Lab Visit on 04/24/2018   Component Date Value Ref Range Status    Microalbum.,U,Random 04/24/2018 65.0  ug/mL Final    Creatinine, Random Ur 04/24/2018 181.0  15.0 - 325.0 mg/dL Final    Microalb Creat Ratio 04/24/2018 35.9* 0.0 - 30.0 ug/mg Final   Lab Visit on 04/09/2018   Component Date Value Ref Range Status     Sodium 04/09/2018 143  136 - 145 mmol/L Final    Potassium 04/09/2018 3.7  3.5 - 5.1 mmol/L Final    Chloride 04/09/2018 105  95 - 110 mmol/L Final    CO2 04/09/2018 28  23 - 29 mmol/L Final    Glucose 04/09/2018 149* 70 - 110 mg/dL Final    BUN, Bld 04/09/2018 17  8 - 23 mg/dL Final    Creatinine 04/09/2018 1.2  0.5 - 1.4 mg/dL Final    Calcium 04/09/2018 9.8  8.7 - 10.5 mg/dL Final    Anion Gap 04/09/2018 10  8 - 16 mmol/L Final    eGFR if  04/09/2018 52.5* >60 mL/min/1.73 m^2 Final    eGFR if non African American 04/09/2018 45.6* >60 mL/min/1.73 m^2 Final    WBC 04/09/2018 8.03  3.90 - 12.70 K/uL Final    RBC 04/09/2018 4.24  4.00 - 5.40 M/uL Final    Hemoglobin 04/09/2018 12.9  12.0 - 16.0 g/dL Final    Hematocrit 04/09/2018 39.9  37.0 - 48.5 % Final    MCV 04/09/2018 94  82 - 98 fL Final    MCH 04/09/2018 30.4  27.0 - 31.0 pg Final    MCHC 04/09/2018 32.3  32.0 - 36.0 g/dL Final    RDW 04/09/2018 14.0  11.5 - 14.5 % Final    Platelets 04/09/2018 310  150 - 350 K/uL Final    MPV 04/09/2018 10.4  9.2 - 12.9 fL Final    Immature Granulocytes 04/09/2018 0.2  0.0 - 0.5 % Final    Gran # (ANC) 04/09/2018 5.6  1.8 - 7.7 K/uL Final    Immature Grans (Abs) 04/09/2018 0.02  0.00 - 0.04 K/uL Final    Lymph # 04/09/2018 1.7  1.0 - 4.8 K/uL Final    Mono # 04/09/2018 0.4  0.3 - 1.0 K/uL Final    Eos # 04/09/2018 0.3  0.0 - 0.5 K/uL Final    Baso # 04/09/2018 0.04  0.00 - 0.20 K/uL Final    nRBC 04/09/2018 0  0 /100 WBC Final    Gran% 04/09/2018 70.0  38.0 - 73.0 % Final    Lymph% 04/09/2018 20.5  18.0 - 48.0 % Final    Mono% 04/09/2018 5.4  4.0 - 15.0 % Final    Eosinophil% 04/09/2018 3.4  0.0 - 8.0 % Final    Basophil% 04/09/2018 0.5  0.0 - 1.9 % Final    Differential Method 04/09/2018 Automated   Final    Magnesium 04/09/2018 1.8  1.6 - 2.6 mg/dL Final   ]    Assessment:    1. Nonspecific abnormal finding in stool contents    2. Chronic anticoagulation    3. Psychogenic  general fatigue          Isabell was seen today for follow-up and diabetes.    Diagnoses and all orders for this visit:    Nonspecific abnormal finding in stool contents  -     Tissue Specimen To Pathology, Internal Medicine; Future  - New problem.  Patient's description of foreign body is non-specific.  Patient given specimen cup to collect stool contents next time this is observed.  - Patient to continue monitor for changes associated with this lesion.    Chronic anticoagulation  -     Protime-INR; Future  - Stable. Check INR today.    Psychogenic general fatigue   -New problem.  Patient counseled on lifestyle modifications. Well balanced diet to include fiber, carbs from whole grains and fruit, and lean protein, water, appropriate exercise, and adequate sleep.        FOLLOW UP: Follow-up if symptoms worsen or fail to improve.

## 2018-07-09 ENCOUNTER — TELEPHONE (OUTPATIENT)
Dept: FAMILY MEDICINE | Facility: CLINIC | Age: 73
End: 2018-07-09

## 2018-07-09 NOTE — TELEPHONE ENCOUNTER
----- Message from Marcella Loo MD sent at 7/8/2018  6:15 PM CDT -----  Please contact patient and inform that her INR level is 2.8.  She does not require any changes in her Coumadin at this time.  This is on the higher end of NORMAL so she may experience increased bruising.  She should reschedule her missed appointment with her Coumadin clinic in 1 week for continued monitoring

## 2018-07-11 ENCOUNTER — OFFICE VISIT (OUTPATIENT)
Dept: FAMILY MEDICINE | Facility: CLINIC | Age: 73
End: 2018-07-11
Payer: MEDICARE

## 2018-07-11 VITALS
HEART RATE: 79 BPM | RESPIRATION RATE: 12 BRPM | WEIGHT: 170.63 LBS | SYSTOLIC BLOOD PRESSURE: 120 MMHG | OXYGEN SATURATION: 97 % | TEMPERATURE: 99 F | HEIGHT: 66 IN | BODY MASS INDEX: 27.42 KG/M2 | DIASTOLIC BLOOD PRESSURE: 72 MMHG

## 2018-07-11 DIAGNOSIS — H81.13 BENIGN PAROXYSMAL POSITIONAL VERTIGO DUE TO BILATERAL VESTIBULAR DISORDER: Primary | ICD-10-CM

## 2018-07-11 PROCEDURE — 3078F DIAST BP <80 MM HG: CPT | Mod: CPTII,S$GLB,, | Performed by: FAMILY MEDICINE

## 2018-07-11 PROCEDURE — 3074F SYST BP LT 130 MM HG: CPT | Mod: CPTII,S$GLB,, | Performed by: FAMILY MEDICINE

## 2018-07-11 PROCEDURE — 99214 OFFICE O/P EST MOD 30 MIN: CPT | Mod: S$GLB,,, | Performed by: FAMILY MEDICINE

## 2018-07-11 PROCEDURE — 99999 PR PBB SHADOW E&M-EST. PATIENT-LVL IV: CPT | Mod: PBBFAC,,, | Performed by: FAMILY MEDICINE

## 2018-07-11 RX ORDER — MECLIZINE HYDROCHLORIDE 25 MG/1
25 TABLET ORAL 3 TIMES DAILY PRN
Qty: 30 TABLET | Refills: 0 | Status: SHIPPED | OUTPATIENT
Start: 2018-07-11 | End: 2019-03-26 | Stop reason: SDUPTHER
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
## 2018-08-01 ENCOUNTER — TELEPHONE (OUTPATIENT)
Dept: PSYCHIATRY | Facility: CLINIC | Age: 73
End: 2018-08-01

## 2018-08-01 NOTE — TELEPHONE ENCOUNTER
Returned patient's call, follow up appointment scheduled. We will call to offer sooner appointment if someone cancels.

## 2018-08-07 RX ORDER — DESVENLAFAXINE SUCCINATE 50 MG/1
TABLET, EXTENDED RELEASE ORAL
Qty: 30 TABLET | Refills: 0 | OUTPATIENT
Start: 2018-08-07

## 2018-08-10 ENCOUNTER — TELEPHONE (OUTPATIENT)
Dept: PSYCHIATRY | Facility: HOSPITAL | Age: 73
End: 2018-08-10

## 2018-08-10 RX ORDER — HYDROXYZINE HYDROCHLORIDE 25 MG/1
25 TABLET, FILM COATED ORAL 2 TIMES DAILY PRN
Qty: 60 TABLET | Refills: 0 | Status: SHIPPED | OUTPATIENT
Start: 2018-08-10 | End: 2018-08-16 | Stop reason: SDUPTHER

## 2018-08-10 NOTE — TELEPHONE ENCOUNTER
"----- Message from Sonam Correia MA sent at 8/10/2018  1:43 PM CDT -----  Contact: Patient  Patient called for sooner appointment which is scheduled for 8/20 at 7:30 because she is having "horrible anxiety." She asked you to send medication for anxiety to pharmacy  "

## 2018-08-16 ENCOUNTER — OFFICE VISIT (OUTPATIENT)
Dept: PSYCHIATRY | Facility: CLINIC | Age: 73
End: 2018-08-16
Payer: MEDICARE

## 2018-08-16 ENCOUNTER — LAB VISIT (OUTPATIENT)
Dept: LAB | Facility: HOSPITAL | Age: 73
End: 2018-08-16
Attending: PSYCHIATRY & NEUROLOGY
Payer: MEDICARE

## 2018-08-16 VITALS
HEART RATE: 72 BPM | SYSTOLIC BLOOD PRESSURE: 128 MMHG | WEIGHT: 168.13 LBS | HEIGHT: 66 IN | DIASTOLIC BLOOD PRESSURE: 64 MMHG | BODY MASS INDEX: 27.02 KG/M2

## 2018-08-16 DIAGNOSIS — Z79.899 ENCOUNTER FOR LONG-TERM (CURRENT) USE OF MEDICATIONS: ICD-10-CM

## 2018-08-16 DIAGNOSIS — F41.1 GENERALIZED ANXIETY DISORDER: ICD-10-CM

## 2018-08-16 DIAGNOSIS — F33.41 MAJOR DEPRESSIVE DISORDER, RECURRENT EPISODE, IN PARTIAL REMISSION: Primary | ICD-10-CM

## 2018-08-16 DIAGNOSIS — F60.9 PERSONALITY DISORDER: ICD-10-CM

## 2018-08-16 LAB
T4 FREE SERPL-MCNC: 0.87 NG/DL
TSH SERPL DL<=0.005 MIU/L-ACNC: 1.9 UIU/ML

## 2018-08-16 PROCEDURE — 99999 PR PBB SHADOW E&M-EST. PATIENT-LVL III: CPT | Mod: PBBFAC,,, | Performed by: PSYCHIATRY & NEUROLOGY

## 2018-08-16 PROCEDURE — 36415 COLL VENOUS BLD VENIPUNCTURE: CPT

## 2018-08-16 PROCEDURE — 3074F SYST BP LT 130 MM HG: CPT | Mod: CPTII,S$GLB,, | Performed by: PSYCHIATRY & NEUROLOGY

## 2018-08-16 PROCEDURE — 99214 OFFICE O/P EST MOD 30 MIN: CPT | Mod: S$GLB,,, | Performed by: PSYCHIATRY & NEUROLOGY

## 2018-08-16 PROCEDURE — 84439 ASSAY OF FREE THYROXINE: CPT

## 2018-08-16 PROCEDURE — 3078F DIAST BP <80 MM HG: CPT | Mod: CPTII,S$GLB,, | Performed by: PSYCHIATRY & NEUROLOGY

## 2018-08-16 PROCEDURE — 84443 ASSAY THYROID STIM HORMONE: CPT

## 2018-08-16 PROCEDURE — 90833 PSYTX W PT W E/M 30 MIN: CPT | Mod: S$GLB,,, | Performed by: PSYCHIATRY & NEUROLOGY

## 2018-08-16 PROCEDURE — 99499 UNLISTED E&M SERVICE: CPT | Mod: S$GLB,,, | Performed by: PSYCHIATRY & NEUROLOGY

## 2018-08-16 RX ORDER — POTASSIUM CHLORIDE 750 MG/1
10 TABLET, EXTENDED RELEASE ORAL
COMMUNITY
End: 2019-05-20

## 2018-08-16 RX ORDER — BUSPIRONE HYDROCHLORIDE 7.5 MG/1
7.5 TABLET ORAL
COMMUNITY
End: 2018-08-16

## 2018-08-16 RX ORDER — HYOSCYAMINE SULFATE 0.12 MG/1
0.12 TABLET SUBLINGUAL
COMMUNITY
End: 2019-06-11

## 2018-08-16 RX ORDER — DULOXETIN HYDROCHLORIDE 60 MG/1
60 CAPSULE, DELAYED RELEASE ORAL DAILY
Qty: 30 CAPSULE | Refills: 1 | Status: SHIPPED | OUTPATIENT
Start: 2018-08-16 | End: 2018-10-19 | Stop reason: SDUPTHER

## 2018-08-16 RX ORDER — HYDROXYZINE HYDROCHLORIDE 25 MG/1
25 TABLET, FILM COATED ORAL 2 TIMES DAILY PRN
Qty: 60 TABLET | Refills: 1 | Status: SHIPPED | OUTPATIENT
Start: 2018-08-16 | End: 2018-09-14

## 2018-08-16 RX ORDER — VENLAFAXINE HYDROCHLORIDE 37.5 MG/1
37.5 CAPSULE, EXTENDED RELEASE ORAL
COMMUNITY
End: 2018-08-16

## 2018-08-16 RX ORDER — DULOXETIN HYDROCHLORIDE 60 MG/1
CAPSULE, DELAYED RELEASE ORAL
Qty: 90 CAPSULE | Refills: 1 | OUTPATIENT
Start: 2018-08-16

## 2018-08-16 NOTE — PATIENT INSTRUCTIONS
"        You have been provided with a certain amount of medication with a specified number of refills.  Please follow up within an adequate time before you run out of medications.    REFILLS FOR CONTROLLED SUBSTANCES WILL NOT BE GIVEN WITHOUT AN APPOINTMENT.  I will not honor or fill automated refill requests from pharmacies.  You must come in for an appointment to get refills.        Please book your next appointment for myself or therapist by phone by calling our office at [phone].          PLEASE BE AT LEAST 15 MINUTES EARLY FOR YOUR NEXT APPOINTMENT.  PLEASE, DO NOT BE LATE OR YOU WILL BE TURNED AWAY AND ASKED TO RESCHEDULE.  YOU MUST COME EARLY TO ALLOW TIME FOR CHECK-IN AS WELL AS GET YOUR VITAL SIGNS AND GO OVER YOUR MEDICATIONS.  [employer] is not fair to the patients who present after you and are on time for their appointments.  It causes a delay in the appointments for patients and staff.  IF YOU ARE LATE, THERE IS A POSSIBILITY THAT YOU WILL BE CHARGED FOR THE APPOINTMENT TIME PERSONALLY AND IT WILL NOT GO TO YOUR INSURANCE.  YOU MAY ALSO BE DISCHARGED FROM CLINIC with multiple "No Show" appointments.       -----------------------------------------------------------------------------------------------------------------  IF YOU FEEL SUICIDAL OR HAVING THOUGHTS OR PLANS TO HURT YOURSELF OR OTHERS, CALL 911 OR REPORT TO THE NEAREST EMERGENCY ROOM.  YOU CAN ALSO ACCESS THE FOLLOWING HOTLINE:    National Suicide Hotline Number 9-542-027-TALK (4232)                 "

## 2018-08-16 NOTE — PROGRESS NOTES
"Outpatient Psychiatry Follow-Up Visit (MD/NP)    8/16/2018    Clinical Status of Patient:  Outpatient (Ambulatory)    Chief Complaint:  Isabell Preston is a 72 y.o. female who presents today for follow-up of depression and anxiety.  Met with patient.      Interval History and Content of Current Session:  Interim Events/Subjective Report/Content of Current Session: Patient Mohan presents to clinic for follow up.  What is scheduled as a 30 minute appointment actually takes 45 minutes of time due to counseling and education.  Was given a prescription of Cymbalta at last visit for patient to take after completing Pristiq.  She presents today with dark glasses and says that she is "not good" and "hiding from everyone".  She is staying in night clothes and in her bed for days/weeks.  She doesn't leave her house, has crying spells, and not cleaning as she normally would.  She is having "anxiety attacks" which she describes as trembling inside, headaches, and dizziness.  Has lost interest in reading, cooking, and company.  Says that she shakes if she gets up to fast and perceives this as anxiety.  Not sure if she changes to Cymbalta as discussed at last visit.  Seems somewhat confused about her meds.  She is also taking hydroxyzine once daily and feels that it calms her down a little bit.  She is very somatic and complains a lot today, more than usual.  She complains mostly of stress and anxiety but doesn't exactly identify what her stressor may be.  Mention about her going to therapy but is very hesitant.    She has failed Zoloft, Prozac, Lexapro, Effexor, Wellbutrin XL.    Psychotherapy:  · Target symptoms: depression, anxiety   · Why chosen therapy is appropriate versus another modality: relevant to diagnosis  · Outcome monitoring methods: self-report, observation  · Therapeutic intervention type: supportive psychotherapy  · Topics discussed/themes: difficulty managing affect in interpersonal relationships, building " "skills sets for symptom management, symptom recognition, legal stressors  · The patient's response to the intervention is reluctant. The patient's progress toward treatment goals is limited.   · Duration of intervention: 25 minutes.    Review of Systems   · PSYCHIATRIC: Pertinant items are noted in the narrative.  · CONSTITUTIONAL: No weight gain or loss.   · MUSCULOSKELETAL: Positive for muscle stiffness/tightening and pain.  · NEUROLOGIC: No weakness, sensory changes, seizures, confusion, memory loss, tremor or other abnormal movements.  · RESPIRATORY: No shortness of breath.  · CARDIOVASCULAR: No tachycardia or chest pain.  · GASTROINTESTINAL: No nausea, vomiting, pain, constipation or diarrhea.    Past Medical, Family and Social History: The patient's past medical, family and social history have been reviewed and updated as appropriate within the electronic medical record - see encounter notes.    Compliance: no    Side effects: None    Risk Parameters:  Patient reports no suicidal ideation  Patient reports no homicidal ideation  Patient reports no self-injurious behavior  Patient reports no violent behavior    Exam (detailed: at least 9 elements; comprehensive: all 15 elements)   Constitutional  Vitals:  Most recent vital signs, dated less than 90 days prior to this appointment, were reviewed.   Vitals:    08/16/18 0737   BP: 128/64   Pulse: 72   Weight: 76.2 kg (168 lb 1.6 oz)   Height: 5' 6" (1.676 m)        General:  unremarkable, age appropriate     Musculoskeletal  Muscle Strength/Tone:  no tremor, no tic   Gait & Station:  non-ataxic     Psychiatric  Speech:  no latency; no press   Mood & Affect:  anxious, dysthymic  guarded   Thought Process:  normal and logical   Associations:  intact, circumstantial   Thought Content:  normal, no suicidality, no homicidality, delusions, or paranoia   Insight:  limited awareness of illness   Judgement: behavior is adequate to circumstances   Orientation:  grossly " intact, person, place, situation, time/date   Memory: intact for content of interview   Language: grossly intact   Attention Span & Concentration:  able to focus   Fund of Knowledge:  intact and appropriate to age and level of education     Assessment and Diagnosis   Status/Progress: Based on the examination today, the patient's problem(s) is/are adequately but not ideally controlled.  New problems have been presented today.   Co-morbidities and Lack of compliance are complicating management of the primary condition.  There are no active rule-out diagnoses for this patient at this time.     General Impression: We will continue pharmacological management and adjunctive therapy.      ICD-10-CM ICD-9-CM   1. 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 Generalized anxiety disorder F41.1 300.02   3. Personality disorder F60.9 301.9   4. Encounter for long-term (current) use of medications Z79.899 V58.69       Intervention/Counseling/Treatment Plan   · Medication Management: Continue current medications. The risks and benefits of medication were discussed with the patient.  · Counseling provided with patient as follows: importance of compliance with chosen treatment options was emphasized, risks and benefits of treatment options, including medications, were discussed with the patient, risk factor reduction, prognosis, patient education, instructions for  management, treatment and follow-up were reviewed  1.  Increase Cymbalta to 60mg daily targeting depression, anxiety, and chronic pains.  Warned of risk of sola, suicidality, serotonin syndrome.  2.  Ordered thyroid studies.  Non on record for a while.  Other labs reviewed.  3.  Stop desvenlefaxine.  4.  Strong underlying personality disorder (attention seeking) characteristics.  Would do best with therapy but does not want.  5.  Difficult patient.  Encouraged compliance.  6.  Continue hydroxyzine 25mg PO BID PRN anxiety.  Warned  of risk of oversedation.      Return to Clinic: 2 months, as needed

## 2018-08-20 DIAGNOSIS — E11.9 CONTROLLED TYPE 2 DIABETES MELLITUS WITHOUT COMPLICATION, WITHOUT LONG-TERM CURRENT USE OF INSULIN: Chronic | ICD-10-CM

## 2018-08-20 RX ORDER — ATORVASTATIN CALCIUM 10 MG/1
TABLET, FILM COATED ORAL
Qty: 90 TABLET | Refills: 0 | Status: SHIPPED | OUTPATIENT
Start: 2018-08-20 | End: 2018-11-20 | Stop reason: SDUPTHER

## 2018-08-22 RX ORDER — DICYCLOMINE HYDROCHLORIDE 10 MG/1
CAPSULE ORAL
Qty: 90 CAPSULE | Refills: 0 | Status: SHIPPED | OUTPATIENT
Start: 2018-08-22 | End: 2019-04-01 | Stop reason: SDUPTHER

## 2018-08-29 ENCOUNTER — OFFICE VISIT (OUTPATIENT)
Dept: PSYCHIATRY | Facility: CLINIC | Age: 73
End: 2018-08-29
Payer: COMMERCIAL

## 2018-08-29 DIAGNOSIS — F33.41 MAJOR DEPRESSIVE DISORDER, RECURRENT EPISODE, IN PARTIAL REMISSION: Primary | ICD-10-CM

## 2018-08-29 DIAGNOSIS — F60.9 PERSONALITY DISORDER: ICD-10-CM

## 2018-08-29 DIAGNOSIS — F41.1 GENERALIZED ANXIETY DISORDER: ICD-10-CM

## 2018-08-29 PROCEDURE — 90791 PSYCH DIAGNOSTIC EVALUATION: CPT | Mod: S$GLB,,, | Performed by: SOCIAL WORKER
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
## 2018-09-14 ENCOUNTER — OFFICE VISIT (OUTPATIENT)
Dept: FAMILY MEDICINE | Facility: CLINIC | Age: 73
End: 2018-09-14
Payer: MEDICARE

## 2018-09-14 VITALS
SYSTOLIC BLOOD PRESSURE: 118 MMHG | TEMPERATURE: 98 F | OXYGEN SATURATION: 97 % | RESPIRATION RATE: 17 BRPM | BODY MASS INDEX: 27.46 KG/M2 | WEIGHT: 170.88 LBS | HEIGHT: 66 IN | DIASTOLIC BLOOD PRESSURE: 70 MMHG | HEART RATE: 82 BPM

## 2018-09-14 DIAGNOSIS — F33.41 RECURRENT MAJOR DEPRESSIVE DISORDER, IN PARTIAL REMISSION: ICD-10-CM

## 2018-09-14 DIAGNOSIS — E11.9 CONTROLLED TYPE 2 DIABETES MELLITUS WITHOUT COMPLICATION, WITHOUT LONG-TERM CURRENT USE OF INSULIN: Primary | Chronic | ICD-10-CM

## 2018-09-14 DIAGNOSIS — R10.84 GENERALIZED ABDOMINAL PAIN: ICD-10-CM

## 2018-09-14 PROCEDURE — 3078F DIAST BP <80 MM HG: CPT | Mod: CPTII,,, | Performed by: INTERNAL MEDICINE

## 2018-09-14 PROCEDURE — 99213 OFFICE O/P EST LOW 20 MIN: CPT | Mod: PBBFAC,PN | Performed by: INTERNAL MEDICINE

## 2018-09-14 PROCEDURE — 3044F HG A1C LEVEL LT 7.0%: CPT | Mod: CPTII,,, | Performed by: INTERNAL MEDICINE

## 2018-09-14 PROCEDURE — 3074F SYST BP LT 130 MM HG: CPT | Mod: CPTII,,, | Performed by: INTERNAL MEDICINE

## 2018-09-14 PROCEDURE — 99215 OFFICE O/P EST HI 40 MIN: CPT | Mod: S$PBB,,, | Performed by: INTERNAL MEDICINE

## 2018-09-14 PROCEDURE — 1101F PT FALLS ASSESS-DOCD LE1/YR: CPT | Mod: CPTII,,, | Performed by: INTERNAL MEDICINE

## 2018-09-14 PROCEDURE — 99999 PR PBB SHADOW E&M-EST. PATIENT-LVL III: CPT | Mod: PBBFAC,,, | Performed by: INTERNAL MEDICINE

## 2018-09-14 PROCEDURE — 99499 UNLISTED E&M SERVICE: CPT | Mod: S$GLB,,, | Performed by: INTERNAL MEDICINE

## 2018-09-14 RX ORDER — BUSPIRONE HYDROCHLORIDE 7.5 MG/1
7.5 TABLET ORAL
Status: ON HOLD | COMMUNITY
End: 2019-06-06

## 2018-09-14 RX ORDER — METOPROLOL SUCCINATE 25 MG/1
25 TABLET, EXTENDED RELEASE ORAL
COMMUNITY
End: 2019-03-15 | Stop reason: DRUGHIGH

## 2018-09-14 RX ORDER — METFORMIN HYDROCHLORIDE 500 MG/1
500 TABLET ORAL
COMMUNITY
End: 2018-12-14

## 2018-09-14 RX ORDER — CHOLECALCIFEROL (VITAMIN D3) 25 MCG
1000 TABLET ORAL
Status: ON HOLD | COMMUNITY
End: 2019-06-06

## 2018-09-14 RX ORDER — KETOCONAZOLE 20 MG/ML
SHAMPOO, SUSPENSION TOPICAL
COMMUNITY
End: 2019-03-19

## 2018-09-14 RX ORDER — VENLAFAXINE HYDROCHLORIDE 37.5 MG/1
37.5 CAPSULE, EXTENDED RELEASE ORAL
COMMUNITY
End: 2018-11-27

## 2018-09-14 RX ORDER — NYSTATIN 100000 U/G
CREAM TOPICAL 2 TIMES DAILY
Status: ON HOLD | COMMUNITY
End: 2019-06-06

## 2018-09-14 RX ORDER — DESOXIMETASONE 0.5 MG/G
CREAM TOPICAL
Status: ON HOLD | COMMUNITY
End: 2019-06-06

## 2018-09-14 NOTE — Clinical Note
Please inform patient a1c is unchanged from last check at 6.1%. As we discussed at her appointment I would like for her to discontinue metformin to see if this helps at all with her abdominal pain, and repeat her a1c in December with follow up with me after (please schedule this). Thank you

## 2018-09-15 ENCOUNTER — LAB VISIT (OUTPATIENT)
Dept: LAB | Facility: HOSPITAL | Age: 73
End: 2018-09-15
Attending: INTERNAL MEDICINE
Payer: MEDICARE

## 2018-09-15 DIAGNOSIS — E11.9 CONTROLLED TYPE 2 DIABETES MELLITUS WITHOUT COMPLICATION, WITHOUT LONG-TERM CURRENT USE OF INSULIN: Chronic | ICD-10-CM

## 2018-09-15 LAB
ALBUMIN SERPL BCP-MCNC: 4 G/DL
ALP SERPL-CCNC: 83 U/L
ALT SERPL W/O P-5'-P-CCNC: 15 U/L
ANION GAP SERPL CALC-SCNC: 10 MMOL/L
AST SERPL-CCNC: 20 U/L
BILIRUB SERPL-MCNC: 0.5 MG/DL
BUN SERPL-MCNC: 29 MG/DL
CALCIUM SERPL-MCNC: 9.8 MG/DL
CHLORIDE SERPL-SCNC: 105 MMOL/L
CO2 SERPL-SCNC: 22 MMOL/L
CREAT SERPL-MCNC: 1.3 MG/DL
EST. GFR  (AFRICAN AMERICAN): 47.7 ML/MIN/1.73 M^2
EST. GFR  (NON AFRICAN AMERICAN): 41.4 ML/MIN/1.73 M^2
ESTIMATED AVG GLUCOSE: 128 MG/DL
GLUCOSE SERPL-MCNC: 108 MG/DL
HBA1C MFR BLD HPLC: 6.1 %
POTASSIUM SERPL-SCNC: 3.9 MMOL/L
PROT SERPL-MCNC: 7.5 G/DL
SODIUM SERPL-SCNC: 137 MMOL/L

## 2018-09-15 PROCEDURE — 36415 COLL VENOUS BLD VENIPUNCTURE: CPT | Mod: PO

## 2018-09-15 PROCEDURE — 80053 COMPREHEN METABOLIC PANEL: CPT

## 2018-09-15 PROCEDURE — 83036 HEMOGLOBIN GLYCOSYLATED A1C: CPT

## 2018-09-17 ENCOUNTER — TELEPHONE (OUTPATIENT)
Dept: FAMILY MEDICINE | Facility: CLINIC | Age: 73
End: 2018-09-17

## 2018-09-17 NOTE — PROGRESS NOTES
"Subjective:       Patient ID: Isabell Preston is a 72 y.o. female.    Chief Complaint: Diabetes and Abdominal Pain    Mood and abdominal pain    HPI: 73 y/o with history of multiple DVT's on coumadin therapy ([facility] monitors INR) s/p partial colectomy chronic abdominal pain presents with daugther to follow up. Daughter concerned due to long standing abdominal pain and over last six month more social isolated. Patient endorses depressed mood related to fear of abdominal pain limiting her activities. Does have constipation recently started on linzess, but developed diarrhea. No vomitting. Weight is unchanged       Review of Systems   Constitutional: Negative for activity change, appetite change, fatigue, fever and unexpected weight change.   HENT: Negative for ear pain, rhinorrhea and sore throat.    Eyes: Negative for discharge and visual disturbance.   Respiratory: Negative for chest tightness, shortness of breath and wheezing.    Cardiovascular: Negative for chest pain, palpitations and leg swelling.   Gastrointestinal: Positive for abdominal pain and constipation. Negative for diarrhea.   Endocrine: Negative for cold intolerance and heat intolerance.   Genitourinary: Negative for dysuria and hematuria.   Musculoskeletal: Negative for joint swelling and neck stiffness.   Skin: Negative for rash.   Neurological: Negative for dizziness, syncope, weakness and headaches.   Psychiatric/Behavioral: Negative for suicidal ideas.       Objective:     Vitals:    09/14/18 1504   BP: 118/70   BP Location: Right arm   Patient Position: Sitting   BP Method: Medium (Manual)   Pulse: 82   Resp: 17   Temp: 98.1 °F (36.7 °C)   TempSrc: Oral   SpO2: 97%   Weight: 77.5 kg (170 lb 13.7 oz)   Height: 5' 6" (1.676 m)          Physical Exam   Constitutional: She is oriented to person, place, and time. She appears well-developed and well-nourished.   HENT:   Head: Normocephalic and atraumatic.   Eyes: Conjunctivae are normal. Pupils " are equal, round, and reactive to light. No scleral icterus.   Neck: Normal range of motion.   Cardiovascular: Normal rate and regular rhythm. Exam reveals no gallop and no friction rub.   No murmur heard.  Pulmonary/Chest: Effort normal and breath sounds normal. She has no wheezes. She has no rales.   Abdominal: Soft. Bowel sounds are normal. There is no tenderness. There is no rebound and no guarding.   Bowel sounds present in all quadrents   Musculoskeletal: Normal range of motion. She exhibits no edema or tenderness.   Neurological: She is alert and oriented to person, place, and time. No cranial nerve deficit.   Skin: Skin is warm and dry.   Psychiatric: She has a normal mood and affect.       Assessment and Plan   1. Controlled type 2 diabetes mellitus without complication, without long-term current use of insulin  a1c has been at goal given GI symptoms if still at goal would discontinue metformin and repeat a1c in three more months  - Hemoglobin A1c; Future  - Comprehensive metabolic panel; Future    2. Generalized abdominal pain  Decrease dose of linzess given diarrhea wit higher dose  - linaclotide (LINZESS) 72 mcg Cap; Take 1 capsule (72 mcg total) by mouth Daily.  Dispense: 30 capsule; Refill: 1    3. Recurrent major depressive disorder, in partial remission  Would benefit from counselling services continue follow up with psychiatry, number given to patient and daughter to scheduled this  - Ambulatory referral to Psychology    > 45 minutes spent on counseling with patient and her daughter reviewing of all medications and developing treatment plan

## 2018-09-17 NOTE — TELEPHONE ENCOUNTER
Patient notified of charted test result with charted recommendations. Patient scheduled lab for 12/12/18 with follow up office visit 12/14/18. Appointment slips in the mail.

## 2018-09-17 NOTE — TELEPHONE ENCOUNTER
----- Message from Ayo Archuleta MD sent at 9/17/2018  7:56 AM CDT -----  Please inform patient a1c is unchanged from last check at 6.1%. As we discussed at her appointment I would like for her to discontinue metformin to see if this helps at all with her abdominal pain, and repeat her a1c in December with follow up with me after (please schedule this). Thank you

## 2018-10-03 ENCOUNTER — HOSPITAL ENCOUNTER (OUTPATIENT)
Dept: RADIOLOGY | Facility: HOSPITAL | Age: 73
Discharge: HOME | End: 2018-10-03
Attending: INTERNAL MEDICINE
Payer: MEDICARE

## 2018-10-03 DIAGNOSIS — Z12.39 SCREENING FOR BREAST CANCER: ICD-10-CM

## 2018-10-03 PROCEDURE — 77063 BREAST TOMOSYNTHESIS BI: CPT | Mod: TC,PO

## 2018-10-03 PROCEDURE — 77067 SCR MAMMO BI INCL CAD: CPT | Mod: TC,PO

## 2018-10-03 PROCEDURE — 77067 SCR MAMMO BI INCL CAD: CPT | Mod: 26,,, | Performed by: RADIOLOGY

## 2018-10-03 PROCEDURE — 77063 BREAST TOMOSYNTHESIS BI: CPT | Mod: 26,,, | Performed by: RADIOLOGY

## 2018-10-08 ENCOUNTER — TELEPHONE (OUTPATIENT)
Dept: RADIOLOGY | Facility: HOSPITAL | Age: 73
End: 2018-10-08

## 2018-10-19 RX ORDER — DULOXETIN HYDROCHLORIDE 60 MG/1
CAPSULE, DELAYED RELEASE ORAL
Qty: 30 CAPSULE | Refills: 0 | Status: SHIPPED | OUTPATIENT
Start: 2018-10-19 | End: 2018-11-27 | Stop reason: SDUPTHER

## 2018-11-02 ENCOUNTER — OFFICE VISIT (OUTPATIENT)
Dept: PSYCHIATRY | Facility: CLINIC | Age: 73
End: 2018-11-02
Payer: COMMERCIAL

## 2018-11-02 DIAGNOSIS — F43.10 PTSD (POST-TRAUMATIC STRESS DISORDER): Primary | ICD-10-CM

## 2018-11-02 PROCEDURE — 90834 PSYTX W PT 45 MINUTES: CPT | Mod: S$GLB,,, | Performed by: SOCIAL WORKER

## 2018-11-02 PROCEDURE — 90834 PSYTX W PT 45 MINUTES: CPT | Mod: PBBFAC | Performed by: SOCIAL WORKER
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
## 2018-11-05 ENCOUNTER — HOSPITAL ENCOUNTER (OUTPATIENT)
Dept: RADIOLOGY | Facility: HOSPITAL | Age: 73
Discharge: HOME | End: 2018-11-05
Attending: INTERNAL MEDICINE
Payer: MEDICARE

## 2018-11-05 DIAGNOSIS — R92.8 ABNORMAL MAMMOGRAM OF RIGHT BREAST: ICD-10-CM

## 2018-11-05 PROCEDURE — 77061 BREAST TOMOSYNTHESIS UNI: CPT | Mod: TC

## 2018-11-05 PROCEDURE — 77065 DX MAMMO INCL CAD UNI: CPT | Mod: TC

## 2018-11-05 PROCEDURE — 76642 ULTRASOUND BREAST LIMITED: CPT | Mod: TC,RT

## 2018-11-05 PROCEDURE — 77065 DX MAMMO INCL CAD UNI: CPT | Mod: 26,,, | Performed by: RADIOLOGY

## 2018-11-05 PROCEDURE — 76642 ULTRASOUND BREAST LIMITED: CPT | Mod: 26,RT,, | Performed by: RADIOLOGY

## 2018-11-05 PROCEDURE — 77061 BREAST TOMOSYNTHESIS UNI: CPT | Mod: 26,,, | Performed by: RADIOLOGY

## 2018-11-20 DIAGNOSIS — E11.9 CONTROLLED TYPE 2 DIABETES MELLITUS WITHOUT COMPLICATION, WITHOUT LONG-TERM CURRENT USE OF INSULIN: Chronic | ICD-10-CM

## 2018-11-20 RX ORDER — ATORVASTATIN CALCIUM 10 MG/1
TABLET, FILM COATED ORAL
Qty: 90 TABLET | Refills: 0 | Status: SHIPPED | OUTPATIENT
Start: 2018-11-20 | End: 2018-11-27 | Stop reason: SDUPTHER

## 2018-11-20 RX ORDER — METFORMIN HYDROCHLORIDE 500 MG/1
TABLET ORAL
Qty: 180 TABLET | Refills: 0 | OUTPATIENT
Start: 2018-11-20

## 2018-11-27 ENCOUNTER — TELEPHONE (OUTPATIENT)
Dept: PSYCHIATRY | Facility: HOSPITAL | Age: 73
End: 2018-11-27

## 2018-11-27 DIAGNOSIS — E11.9 CONTROLLED TYPE 2 DIABETES MELLITUS WITHOUT COMPLICATION, WITHOUT LONG-TERM CURRENT USE OF INSULIN: Chronic | ICD-10-CM

## 2018-11-27 RX ORDER — DULOXETIN HYDROCHLORIDE 60 MG/1
60 CAPSULE, DELAYED RELEASE ORAL DAILY
Qty: 90 CAPSULE | Refills: 0 | Status: SHIPPED | OUTPATIENT
Start: 2018-11-27 | End: 2019-01-22 | Stop reason: SDUPTHER

## 2018-11-27 RX ORDER — ATORVASTATIN CALCIUM 10 MG/1
TABLET, FILM COATED ORAL
Qty: 90 TABLET | Refills: 0 | Status: SHIPPED | OUTPATIENT
Start: 2018-11-27 | End: 2019-06-10 | Stop reason: SDUPTHER

## 2018-11-27 RX ORDER — DULOXETIN HYDROCHLORIDE 60 MG/1
CAPSULE, DELAYED RELEASE ORAL
Qty: 30 CAPSULE | Refills: 0 | OUTPATIENT
Start: 2018-11-27

## 2018-11-27 RX ORDER — HYDROXYZINE HYDROCHLORIDE 25 MG/1
25 TABLET, FILM COATED ORAL DAILY PRN
Qty: 90 TABLET | Refills: 0 | Status: ON HOLD | OUTPATIENT
Start: 2018-11-27 | End: 2019-07-04 | Stop reason: ALTCHOICE

## 2018-11-27 NOTE — TELEPHONE ENCOUNTER
----- Message from Vaishnavi Solis sent at 11/27/2018  1:29 PM CST -----  Contact: Patient  Cell     Cell    Patient called regarding refills for the following medication.    DULOXETINE DR 60 MG     HYDROXYZINE HCL 25MG ([facility] AUTHORIZED)    Pharmacy- [facility] ([address])

## 2018-12-06 ENCOUNTER — TELEPHONE (OUTPATIENT)
Dept: PSYCHIATRY | Facility: CLINIC | Age: 73
End: 2018-12-06

## 2018-12-06 NOTE — TELEPHONE ENCOUNTER
I called MsMana Mohan about the upcoming CPT group for PTSD.  She did not answer and I left a voicemail requesting a call back to me or Aleshia Jones.

## 2018-12-12 ENCOUNTER — LAB VISIT (OUTPATIENT)
Dept: LAB | Facility: HOSPITAL | Age: 73
End: 2018-12-12
Attending: INTERNAL MEDICINE
Payer: MEDICARE

## 2018-12-12 DIAGNOSIS — E11.9 CONTROLLED TYPE 2 DIABETES MELLITUS WITHOUT COMPLICATION, WITHOUT LONG-TERM CURRENT USE OF INSULIN: Chronic | ICD-10-CM

## 2018-12-12 LAB
ESTIMATED AVG GLUCOSE: 123 MG/DL
HBA1C MFR BLD HPLC: 5.9 %

## 2018-12-12 PROCEDURE — 83036 HEMOGLOBIN GLYCOSYLATED A1C: CPT

## 2018-12-12 PROCEDURE — 36415 COLL VENOUS BLD VENIPUNCTURE: CPT | Mod: PO

## 2018-12-14 ENCOUNTER — OFFICE VISIT (OUTPATIENT)
Dept: FAMILY MEDICINE | Facility: CLINIC | Age: 73
End: 2018-12-14
Payer: MEDICARE

## 2018-12-14 VITALS
BODY MASS INDEX: 27.77 KG/M2 | RESPIRATION RATE: 16 BRPM | TEMPERATURE: 98 F | HEIGHT: 66 IN | HEART RATE: 76 BPM | DIASTOLIC BLOOD PRESSURE: 70 MMHG | OXYGEN SATURATION: 98 % | WEIGHT: 172.81 LBS | SYSTOLIC BLOOD PRESSURE: 134 MMHG

## 2018-12-14 DIAGNOSIS — Z79.01 CHRONIC ANTICOAGULATION: Chronic | ICD-10-CM

## 2018-12-14 DIAGNOSIS — Z23 NEED FOR PNEUMOCOCCAL VACCINE: ICD-10-CM

## 2018-12-14 DIAGNOSIS — R10.9 CHRONIC ABDOMINAL PAIN: ICD-10-CM

## 2018-12-14 DIAGNOSIS — F43.10 PTSD (POST-TRAUMATIC STRESS DISORDER): Primary | ICD-10-CM

## 2018-12-14 DIAGNOSIS — I82.5Z9 CHRONIC DEEP VEIN THROMBOSIS (DVT) OF DISTAL VEIN OF LOWER EXTREMITY, UNSPECIFIED LATERALITY: Chronic | ICD-10-CM

## 2018-12-14 DIAGNOSIS — G89.29 CHRONIC ABDOMINAL PAIN: ICD-10-CM

## 2018-12-14 PROCEDURE — 3075F SYST BP GE 130 - 139MM HG: CPT | Mod: CPTII,S$GLB,, | Performed by: INTERNAL MEDICINE

## 2018-12-14 PROCEDURE — 3078F DIAST BP <80 MM HG: CPT | Mod: CPTII,S$GLB,, | Performed by: INTERNAL MEDICINE

## 2018-12-14 PROCEDURE — 99999 PR PBB SHADOW E&M-EST. PATIENT-LVL III: CPT | Mod: PBBFAC,,, | Performed by: INTERNAL MEDICINE

## 2018-12-14 PROCEDURE — 99214 OFFICE O/P EST MOD 30 MIN: CPT | Mod: S$GLB,,, | Performed by: INTERNAL MEDICINE

## 2018-12-14 PROCEDURE — 1101F PT FALLS ASSESS-DOCD LE1/YR: CPT | Mod: CPTII,S$GLB,, | Performed by: INTERNAL MEDICINE

## 2018-12-14 NOTE — PROGRESS NOTES
"Subjective:       Patient ID: Isabell Preston is a 73 y.o. female.    Chief Complaint: Follow-up (f/u on Diabetes)    HPI  Review of Systems    Objective:     Vitals:    12/14/18 1141   BP: 134/70   BP Location: Right arm   Patient Position: Sitting   Pulse: 76   Resp: 16   Temp: 98.1 °F (36.7 °C)   TempSrc: Oral   SpO2: 98%   Weight: 78.4 kg (172 lb 13.5 oz)   Height: 5' 6" (1.676 m)          Physical Exam    Assessment and Plan   1. Need for pneumococcal vaccine  ***  - Pneumococcal Polysaccharide Vaccine (23 Valent) (SQ/IM)    2. Chronic anticoagulation  ***    3. Chronic deep vein thrombosis (DVT) of distal vein of lower extremity, unspecified laterality  ***    4. PTSD (post-traumatic stress disorder)  ***    5. Chronic abdominal pain  ***  "
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
## 2019-01-17 ENCOUNTER — HOSPITAL ENCOUNTER (EMERGENCY)
Facility: HOSPITAL | Age: 74
Discharge: HOME | End: 2019-01-17
Attending: EMERGENCY MEDICINE
Payer: MEDICARE

## 2019-01-17 VITALS
RESPIRATION RATE: 20 BRPM | WEIGHT: 175 LBS | TEMPERATURE: 99 F | HEART RATE: 88 BPM | OXYGEN SATURATION: 99 % | DIASTOLIC BLOOD PRESSURE: 76 MMHG | BODY MASS INDEX: 28.25 KG/M2 | SYSTOLIC BLOOD PRESSURE: 128 MMHG

## 2019-01-17 DIAGNOSIS — I80.00 SUPERFICIAL THROMBOPHLEBITIS OF LOWER EXTREMITY, UNSPECIFIED LATERALITY: Primary | ICD-10-CM

## 2019-01-17 DIAGNOSIS — M79.606 LEG PAIN: ICD-10-CM

## 2019-01-17 DIAGNOSIS — S80.12XA CONTUSION OF LEFT LOWER EXTREMITY, INITIAL ENCOUNTER: ICD-10-CM

## 2019-01-17 LAB
ALBUMIN SERPL-MCNC: 3.9 G/DL (ref 3.3–5.5)
ALP SERPL-CCNC: 81 U/L (ref 42–141)
BILIRUB SERPL-MCNC: 0.7 MG/DL (ref 0.2–1.6)
BUN SERPL-MCNC: 18 MG/DL (ref 7–22)
CALCIUM SERPL-MCNC: 9.5 MG/DL (ref 8–10.3)
CHLORIDE SERPL-SCNC: 102 MMOL/L (ref 98–108)
CREAT SERPL-MCNC: 1.5 MG/DL (ref 0.6–1.2)
GLUCOSE SERPL-MCNC: 138 MG/DL (ref 73–118)
POC ALT (SGPT): 22 U/L (ref 10–47)
POC AST (SGOT): 30 U/L (ref 11–38)
POC PTINR: 2.1 (ref 0.9–1.2)
POC PTWBT: 24 SEC (ref 9.7–14.3)
POC TCO2: 30 MMOL/L (ref 18–33)
POCT GLUCOSE: 151 MG/DL (ref 70–110)
POTASSIUM BLD-SCNC: 3.7 MMOL/L (ref 3.6–5.1)
PROTEIN, POC: 7.5 G/DL (ref 6.4–8.1)
SAMPLE: ABNORMAL
SODIUM BLD-SCNC: 142 MMOL/L (ref 128–145)

## 2019-01-17 PROCEDURE — 80053 COMPREHEN METABOLIC PANEL: CPT | Mod: ER

## 2019-01-17 PROCEDURE — 85025 COMPLETE CBC W/AUTO DIFF WBC: CPT | Mod: ER

## 2019-01-17 PROCEDURE — 85610 PROTHROMBIN TIME: CPT | Mod: ER

## 2019-01-17 PROCEDURE — 99284 EMERGENCY DEPT VISIT MOD MDM: CPT | Mod: ER
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
## 2019-01-22 ENCOUNTER — TELEPHONE (OUTPATIENT)
Dept: PSYCHIATRY | Facility: HOSPITAL | Age: 74
End: 2019-01-22

## 2019-01-22 RX ORDER — DULOXETIN HYDROCHLORIDE 60 MG/1
60 CAPSULE, DELAYED RELEASE ORAL DAILY
Qty: 90 CAPSULE | Refills: 0 | Status: SHIPPED | OUTPATIENT
Start: 2019-01-22 | End: 2019-06-10 | Stop reason: SDUPTHER

## 2019-01-22 NOTE — TELEPHONE ENCOUNTER
----- Message from Sonam Correia MA sent at 1/22/2019  2:28 PM CST -----  Contact: Patient  Patient asked for refill for Cymbalta 60 mg. Please send to [address]

## 2019-02-22 DIAGNOSIS — I10 HYPERTENSION, ESSENTIAL: ICD-10-CM

## 2019-02-22 RX ORDER — TRIAMTERENE/HYDROCHLOROTHIAZID 37.5-25 MG
TABLET ORAL
Qty: 90 TABLET | Refills: 0 | Status: SHIPPED | OUTPATIENT
Start: 2019-02-22 | End: 2019-04-26 | Stop reason: SDUPTHER

## 2019-03-15 DIAGNOSIS — I10 HTN, GOAL BELOW 140/90: ICD-10-CM

## 2019-03-15 RX ORDER — METOPROLOL SUCCINATE 25 MG/1
TABLET, EXTENDED RELEASE ORAL
Qty: 90 TABLET | Refills: 0 | Status: SHIPPED | OUTPATIENT
Start: 2019-03-15 | End: 2019-06-10 | Stop reason: SDUPTHER

## 2019-03-15 RX ORDER — METOPROLOL SUCCINATE 50 MG/1
TABLET, EXTENDED RELEASE ORAL
Qty: 90 TABLET | Refills: 0 | Status: ON HOLD | OUTPATIENT
Start: 2019-03-15 | End: 2019-06-07 | Stop reason: HOSPADM

## 2019-03-19 ENCOUNTER — OFFICE VISIT (OUTPATIENT)
Dept: FAMILY MEDICINE | Facility: CLINIC | Age: 74
End: 2019-03-19
Payer: MEDICARE

## 2019-03-19 ENCOUNTER — LAB VISIT (OUTPATIENT)
Dept: LAB | Facility: HOSPITAL | Age: 74
End: 2019-03-19
Attending: INTERNAL MEDICINE
Payer: MEDICARE

## 2019-03-19 VITALS
BODY MASS INDEX: 29.23 KG/M2 | SYSTOLIC BLOOD PRESSURE: 130 MMHG | WEIGHT: 181.88 LBS | OXYGEN SATURATION: 97 % | RESPIRATION RATE: 17 BRPM | HEIGHT: 66 IN | TEMPERATURE: 99 F | HEART RATE: 75 BPM | DIASTOLIC BLOOD PRESSURE: 67 MMHG

## 2019-03-19 DIAGNOSIS — R10.84 GENERALIZED ABDOMINAL PAIN: Primary | ICD-10-CM

## 2019-03-19 DIAGNOSIS — E11.9 CONTROLLED TYPE 2 DIABETES MELLITUS WITHOUT COMPLICATION, WITHOUT LONG-TERM CURRENT USE OF INSULIN: Chronic | ICD-10-CM

## 2019-03-19 DIAGNOSIS — Z79.01 CHRONIC ANTICOAGULATION: Chronic | ICD-10-CM

## 2019-03-19 DIAGNOSIS — Z23 NEED FOR SHINGLES VACCINE: ICD-10-CM

## 2019-03-19 DIAGNOSIS — I83.93 VARICOSE VEINS OF BOTH LOWER EXTREMITIES, UNSPECIFIED WHETHER COMPLICATED: ICD-10-CM

## 2019-03-19 DIAGNOSIS — I82.5Z9 CHRONIC DEEP VEIN THROMBOSIS (DVT) OF DISTAL VEIN OF LOWER EXTREMITY, UNSPECIFIED LATERALITY: Chronic | ICD-10-CM

## 2019-03-19 LAB
ALBUMIN SERPL BCP-MCNC: 4 G/DL
ALP SERPL-CCNC: 90 U/L
ALT SERPL W/O P-5'-P-CCNC: 17 U/L
ANION GAP SERPL CALC-SCNC: 8 MMOL/L
AST SERPL-CCNC: 24 U/L
BASOPHILS # BLD AUTO: 0.03 K/UL
BASOPHILS NFR BLD: 0.4 %
BILIRUB SERPL-MCNC: 0.4 MG/DL
BUN SERPL-MCNC: 21 MG/DL
CALCIUM SERPL-MCNC: 9.8 MG/DL
CHLORIDE SERPL-SCNC: 104 MMOL/L
CO2 SERPL-SCNC: 28 MMOL/L
CREAT SERPL-MCNC: 1.2 MG/DL
DIFFERENTIAL METHOD: NORMAL
EOSINOPHIL # BLD AUTO: 0.2 K/UL
EOSINOPHIL NFR BLD: 2.5 %
ERYTHROCYTE [DISTWIDTH] IN BLOOD BY AUTOMATED COUNT: 14.2 %
EST. GFR  (AFRICAN AMERICAN): 52 ML/MIN/1.73 M^2
EST. GFR  (NON AFRICAN AMERICAN): 45 ML/MIN/1.73 M^2
GLUCOSE SERPL-MCNC: 88 MG/DL
HCT VFR BLD AUTO: 37.2 %
HGB BLD-MCNC: 12.2 G/DL
INR PPP: 2
LYMPHOCYTES # BLD AUTO: 2.1 K/UL
LYMPHOCYTES NFR BLD: 29.2 %
MCH RBC QN AUTO: 30.3 PG
MCHC RBC AUTO-ENTMCNC: 32.8 G/DL
MCV RBC AUTO: 93 FL
MONOCYTES # BLD AUTO: 0.6 K/UL
MONOCYTES NFR BLD: 8.1 %
NEUTROPHILS # BLD AUTO: 4.2 K/UL
NEUTROPHILS NFR BLD: 59.8 %
PLATELET # BLD AUTO: 345 K/UL
PMV BLD AUTO: 10.2 FL
POTASSIUM SERPL-SCNC: 3.4 MMOL/L
PROT SERPL-MCNC: 7.6 G/DL
PROTHROMBIN TIME: 20.8 SEC
RBC # BLD AUTO: 4.02 M/UL
SODIUM SERPL-SCNC: 140 MMOL/L
WBC # BLD AUTO: 7.06 K/UL

## 2019-03-19 PROCEDURE — 80053 COMPREHEN METABOLIC PANEL: CPT

## 2019-03-19 PROCEDURE — 85025 COMPLETE CBC W/AUTO DIFF WBC: CPT

## 2019-03-19 PROCEDURE — 1101F PR PT FALLS ASSESS DOC 0-1 FALLS W/OUT INJ PAST YR: ICD-10-PCS | Mod: CPTII,S$GLB,, | Performed by: INTERNAL MEDICINE

## 2019-03-19 PROCEDURE — 99214 PR OFFICE/OUTPT VISIT, EST, LEVL IV, 30-39 MIN: ICD-10-PCS | Mod: S$GLB,,, | Performed by: INTERNAL MEDICINE

## 2019-03-19 PROCEDURE — 3075F SYST BP GE 130 - 139MM HG: CPT | Mod: CPTII,S$GLB,, | Performed by: INTERNAL MEDICINE

## 2019-03-19 PROCEDURE — 3044F PR MOST RECENT HEMOGLOBIN A1C LEVEL <7.0%: ICD-10-PCS | Mod: CPTII,S$GLB,, | Performed by: INTERNAL MEDICINE

## 2019-03-19 PROCEDURE — 3044F HG A1C LEVEL LT 7.0%: CPT | Mod: CPTII,S$GLB,, | Performed by: INTERNAL MEDICINE

## 2019-03-19 PROCEDURE — 3078F PR MOST RECENT DIASTOLIC BLOOD PRESSURE < 80 MM HG: ICD-10-PCS | Mod: CPTII,S$GLB,, | Performed by: INTERNAL MEDICINE

## 2019-03-19 PROCEDURE — 99999 PR PBB SHADOW E&M-EST. PATIENT-LVL IV: ICD-10-PCS | Mod: PBBFAC,,, | Performed by: INTERNAL MEDICINE

## 2019-03-19 PROCEDURE — 99999 PR PBB SHADOW E&M-EST. PATIENT-LVL IV: CPT | Mod: PBBFAC,,, | Performed by: INTERNAL MEDICINE

## 2019-03-19 PROCEDURE — 99214 OFFICE O/P EST MOD 30 MIN: CPT | Mod: S$GLB,,, | Performed by: INTERNAL MEDICINE

## 2019-03-19 PROCEDURE — 83036 HEMOGLOBIN GLYCOSYLATED A1C: CPT

## 2019-03-19 PROCEDURE — 36415 COLL VENOUS BLD VENIPUNCTURE: CPT | Mod: PN

## 2019-03-19 PROCEDURE — 3075F PR MOST RECENT SYSTOLIC BLOOD PRESS GE 130-139MM HG: ICD-10-PCS | Mod: CPTII,S$GLB,, | Performed by: INTERNAL MEDICINE

## 2019-03-19 PROCEDURE — 3078F DIAST BP <80 MM HG: CPT | Mod: CPTII,S$GLB,, | Performed by: INTERNAL MEDICINE

## 2019-03-19 PROCEDURE — 1101F PT FALLS ASSESS-DOCD LE1/YR: CPT | Mod: CPTII,S$GLB,, | Performed by: INTERNAL MEDICINE

## 2019-03-19 PROCEDURE — 85610 PROTHROMBIN TIME: CPT

## 2019-03-19 RX ORDER — MOMETASONE FUROATE 1 MG/G
CREAM TOPICAL
Qty: 15 G | Refills: 1 | Status: SHIPPED | OUTPATIENT
Start: 2019-03-19 | End: 2021-01-01

## 2019-03-19 NOTE — PROGRESS NOTES
"Subjective:       Patient ID: Isabell Preston is a 73 y.o. female.    Chief Complaint: Establish Care; Follow-up; and Medication Refill    F/u chronic conditions    HPI: 74 y/o w/ impaired fasting glucose, chronic abdominal pain and h/o DVT on coumadin therapy presents for scheduled follow up. She feels well bowels more regular using lizness every other day no melena or BRBPR no orthostatic symptoms. Last INR three weeks ago was elevated at 3.9 she is taking half tab on Tuesday and one tab every other day       Review of Systems   Constitutional: Negative for activity change, appetite change, fatigue, fever and unexpected weight change.   HENT: Negative for ear pain, rhinorrhea and sore throat.    Eyes: Negative for discharge and visual disturbance.   Respiratory: Negative for chest tightness, shortness of breath and wheezing.    Cardiovascular: Negative for chest pain, palpitations and leg swelling.   Gastrointestinal: Negative for abdominal pain, constipation and diarrhea.   Endocrine: Negative for cold intolerance and heat intolerance.   Genitourinary: Negative for dysuria and hematuria.   Musculoskeletal: Negative for joint swelling and neck stiffness.   Skin: Negative for rash.   Neurological: Negative for dizziness, syncope, weakness and headaches.   Psychiatric/Behavioral: Negative for suicidal ideas.       Objective:     Vitals:    03/19/19 1113   BP: 130/67   BP Location: Right arm   Patient Position: Sitting   Pulse: 75   Resp: 17   Temp: 98.9 °F (37.2 °C)   TempSrc: Oral   SpO2: 97%   Weight: 82.5 kg (181 lb 14.1 oz)   Height: 5' 6" (1.676 m)          Physical Exam   Constitutional: She is oriented to person, place, and time. She appears well-developed and well-nourished.   HENT:   Head: Normocephalic and atraumatic.   Eyes: Conjunctivae are normal. No scleral icterus.   Neck: Normal range of motion.   Cardiovascular: Normal rate and regular rhythm. Exam reveals no gallop and no friction rub.   No " murmur heard.  No LE edema   Pulmonary/Chest: Effort normal and breath sounds normal. She has no wheezes. She has no rales.   Abdominal: Soft. Bowel sounds are normal. There is no tenderness. There is no rebound and no guarding.   Musculoskeletal: Normal range of motion. She exhibits no edema or tenderness.   Neurological: She is alert and oriented to person, place, and time. No cranial nerve deficit.   Skin: Skin is warm and dry.   Psychiatric: She has a normal mood and affect.       Assessment and Plan   1. Generalized abdominal pain  Well controlled with bentyl and prn linzess continue    2. Controlled type 2 diabetes mellitus without complication, without long-term current use of insulin  Repeat labs today   - CBC auto differential; Future  - Comprehensive metabolic panel; Future  - Hemoglobin A1c; Future    3. Chronic deep vein thrombosis (DVT) of distal vein of lower extremity, unspecified laterality  inr today no evidence of bleeding  - Protime-INR; Future    4. Chronic anticoagulation  As above  - Protime-INR; Future    5. Varicose veins of both lower extremities, unspecified whether complicated  Topical steroid cream prn  - mometasone 0.1% (ELOCON) 0.1 % cream; BALWINDER TO DARK AREAS BID ON LEGS PRN  Dispense: 15 g; Refill: 1    6. Need for shingles vaccine  shingrix vaccine at her pharmacy  - varicella-zoster [id number], PF, (SHINGRIX, PF,) 50 mcg/0.5 mL injection; Inject 0.5 mLs into the muscle once. for 1 dose  Dispense: 0.5 mL; Refill: 0

## 2019-03-20 LAB
ESTIMATED AVG GLUCOSE: 143 MG/DL
HBA1C MFR BLD HPLC: 6.6 %

## 2019-03-22 DIAGNOSIS — E11.9 TYPE 2 DIABETES MELLITUS WITHOUT COMPLICATION: ICD-10-CM

## 2019-03-25 DIAGNOSIS — R10.84 GENERALIZED ABDOMINAL PAIN: ICD-10-CM

## 2019-03-25 DIAGNOSIS — H81.10 BENIGN PAROXYSMAL POSITIONAL VERTIGO, UNSPECIFIED LATERALITY: ICD-10-CM

## 2019-03-26 RX ORDER — MECLIZINE HYDROCHLORIDE 25 MG/1
TABLET ORAL
Qty: 30 TABLET | Refills: 0 | Status: SHIPPED | OUTPATIENT
Start: 2019-03-26 | End: 2019-06-11 | Stop reason: SDUPTHER

## 2019-03-26 RX ORDER — LINACLOTIDE 72 UG/1
CAPSULE, GELATIN COATED ORAL
Qty: 30 CAPSULE | Refills: 0 | Status: SHIPPED | OUTPATIENT
Start: 2019-03-26 | End: 2019-04-27 | Stop reason: SDUPTHER

## 2019-04-01 DIAGNOSIS — I10 HYPERTENSION, ESSENTIAL: ICD-10-CM

## 2019-04-01 RX ORDER — DICYCLOMINE HYDROCHLORIDE 10 MG/1
CAPSULE ORAL
Qty: 90 CAPSULE | Refills: 0 | Status: SHIPPED | OUTPATIENT
Start: 2019-04-01 | End: 2019-05-10 | Stop reason: SDUPTHER

## 2019-04-01 RX ORDER — TRIAMTERENE/HYDROCHLOROTHIAZID 37.5-25 MG
TABLET ORAL
Qty: 90 TABLET | Refills: 0 | Status: ON HOLD | OUTPATIENT
Start: 2019-04-01 | End: 2019-06-06

## 2019-04-24 ENCOUNTER — TELEPHONE (OUTPATIENT)
Dept: FAMILY MEDICINE | Facility: CLINIC | Age: 74
End: 2019-04-24

## 2019-04-24 DIAGNOSIS — I10 HYPERTENSION, ESSENTIAL: Primary | ICD-10-CM

## 2019-04-24 RX ORDER — LOSARTAN POTASSIUM 50 MG/1
50 TABLET ORAL DAILY
Qty: 90 TABLET | Refills: 0 | Status: SHIPPED | OUTPATIENT
Start: 2019-04-24 | End: 2019-05-15 | Stop reason: ALTCHOICE

## 2019-04-24 NOTE — TELEPHONE ENCOUNTER
Advise starting third blood pressure medication, losartan 50mg To take once per day (sent to her [facility] pharmacy). Please schedule office visit with myself or NP jude within next two weeks to monitor blood pressure on new medication

## 2019-04-24 NOTE — TELEPHONE ENCOUNTER
Patient states while she was at the coumadin clinic yesterday her BP was 188/60. Today when she took her BP it was 183/62. Patient states she has a slight headache but denies any SOB, or CP. Please advise.

## 2019-04-24 NOTE — TELEPHONE ENCOUNTER
Patient made aware of new BP medication called in. Patient has OV scheduled for 5/8/19. Patient verbalized understanding of all of the above.

## 2019-04-26 ENCOUNTER — OFFICE VISIT (OUTPATIENT)
Dept: FAMILY MEDICINE | Facility: CLINIC | Age: 74
End: 2019-04-26
Payer: MEDICARE

## 2019-04-26 VITALS
BODY MASS INDEX: 29.05 KG/M2 | OXYGEN SATURATION: 96 % | SYSTOLIC BLOOD PRESSURE: 146 MMHG | HEIGHT: 66 IN | DIASTOLIC BLOOD PRESSURE: 73 MMHG | HEART RATE: 76 BPM | WEIGHT: 180.75 LBS | RESPIRATION RATE: 17 BRPM | TEMPERATURE: 99 F

## 2019-04-26 DIAGNOSIS — I10 HYPERTENSION, ESSENTIAL: ICD-10-CM

## 2019-04-26 DIAGNOSIS — I82.5Z9 CHRONIC DEEP VEIN THROMBOSIS (DVT) OF DISTAL VEIN OF LOWER EXTREMITY, UNSPECIFIED LATERALITY: Chronic | ICD-10-CM

## 2019-04-26 DIAGNOSIS — E11.9 TYPE 2 DIABETES MELLITUS WITHOUT COMPLICATION, UNSPECIFIED WHETHER LONG TERM INSULIN USE: ICD-10-CM

## 2019-04-26 DIAGNOSIS — R35.0 URINARY FREQUENCY: ICD-10-CM

## 2019-04-26 DIAGNOSIS — E11.9 TYPE 2 DIABETES MELLITUS WITHOUT COMPLICATION, WITHOUT LONG-TERM CURRENT USE OF INSULIN: Primary | ICD-10-CM

## 2019-04-26 LAB
BILIRUB SERPL-MCNC: NORMAL MG/DL
BLOOD URINE, POC: NORMAL
COLOR, POC UA: YELLOW
GLUCOSE SERPL-MCNC: 107 MG/DL (ref 70–110)
GLUCOSE UR QL STRIP: NORMAL
KETONES UR QL STRIP: NORMAL
LEUKOCYTE ESTERASE URINE, POC: NORMAL
NITRITE, POC UA: NORMAL
PH, POC UA: 5
PROTEIN, POC: NORMAL
SPECIFIC GRAVITY, POC UA: 1.01
UROBILINOGEN, POC UA: NORMAL

## 2019-04-26 PROCEDURE — 99214 OFFICE O/P EST MOD 30 MIN: CPT | Mod: 25,S$GLB,, | Performed by: INTERNAL MEDICINE

## 2019-04-26 PROCEDURE — 81002 URINALYSIS NONAUTO W/O SCOPE: CPT | Mod: S$GLB,,, | Performed by: INTERNAL MEDICINE

## 2019-04-26 PROCEDURE — 99214 PR OFFICE/OUTPT VISIT, EST, LEVL IV, 30-39 MIN: ICD-10-PCS | Mod: 25,S$GLB,, | Performed by: INTERNAL MEDICINE

## 2019-04-26 PROCEDURE — 99499 RISK ADDL DX/OHS AUDIT: ICD-10-PCS | Mod: S$GLB,,, | Performed by: INTERNAL MEDICINE

## 2019-04-26 PROCEDURE — 82962 POCT GLUCOSE, HAND-HELD DEVICE: ICD-10-PCS | Mod: S$GLB,,, | Performed by: INTERNAL MEDICINE

## 2019-04-26 PROCEDURE — 81002 POCT URINE DIPSTICK WITHOUT MICROSCOPE: ICD-10-PCS | Mod: S$GLB,,, | Performed by: INTERNAL MEDICINE

## 2019-04-26 PROCEDURE — 3077F SYST BP >= 140 MM HG: CPT | Mod: CPTII,S$GLB,, | Performed by: INTERNAL MEDICINE

## 2019-04-26 PROCEDURE — 1101F PR PT FALLS ASSESS DOC 0-1 FALLS W/OUT INJ PAST YR: ICD-10-PCS | Mod: CPTII,S$GLB,, | Performed by: INTERNAL MEDICINE

## 2019-04-26 PROCEDURE — 3077F PR MOST RECENT SYSTOLIC BLOOD PRESSURE >= 140 MM HG: ICD-10-PCS | Mod: CPTII,S$GLB,, | Performed by: INTERNAL MEDICINE

## 2019-04-26 PROCEDURE — 99499 UNLISTED E&M SERVICE: CPT | Mod: S$GLB,,, | Performed by: INTERNAL MEDICINE

## 2019-04-26 PROCEDURE — 82962 GLUCOSE BLOOD TEST: CPT | Mod: S$GLB,,, | Performed by: INTERNAL MEDICINE

## 2019-04-26 PROCEDURE — 99999 PR PBB SHADOW E&M-EST. PATIENT-LVL IV: ICD-10-PCS | Mod: PBBFAC,,, | Performed by: INTERNAL MEDICINE

## 2019-04-26 PROCEDURE — 3078F PR MOST RECENT DIASTOLIC BLOOD PRESSURE < 80 MM HG: ICD-10-PCS | Mod: CPTII,S$GLB,, | Performed by: INTERNAL MEDICINE

## 2019-04-26 PROCEDURE — 3078F DIAST BP <80 MM HG: CPT | Mod: CPTII,S$GLB,, | Performed by: INTERNAL MEDICINE

## 2019-04-26 PROCEDURE — 3044F PR MOST RECENT HEMOGLOBIN A1C LEVEL <7.0%: ICD-10-PCS | Mod: CPTII,S$GLB,, | Performed by: INTERNAL MEDICINE

## 2019-04-26 PROCEDURE — 99999 PR PBB SHADOW E&M-EST. PATIENT-LVL IV: CPT | Mod: PBBFAC,,, | Performed by: INTERNAL MEDICINE

## 2019-04-26 PROCEDURE — 3044F HG A1C LEVEL LT 7.0%: CPT | Mod: CPTII,S$GLB,, | Performed by: INTERNAL MEDICINE

## 2019-04-26 PROCEDURE — 1101F PT FALLS ASSESS-DOCD LE1/YR: CPT | Mod: CPTII,S$GLB,, | Performed by: INTERNAL MEDICINE

## 2019-04-26 RX ORDER — TIZANIDINE 4 MG/1
4 TABLET ORAL NIGHTLY PRN
Qty: 90 TABLET | Refills: 1 | Status: SHIPPED | OUTPATIENT
Start: 2019-04-26 | End: 2019-05-06

## 2019-04-26 NOTE — PROGRESS NOTES
"Subjective:       Patient ID: Isabell Preston is a 73 y.o. female.    Chief Complaint: Hypertension (ER last week for High BP); Establish Care; Anticoagulation; Headache; and Dizziness    Discuss blood pressure and headache    HPI: 72 y/o w/ h/o DVT on coumadin HTN presents after elevated blood pressure reading two days ago. Was at coumadin clinic blood pressure was systolic 180's sent to ED no evidence of end organ damage. She reports a posterior headache for last day. I did start her on losartan which she first took yesterday. No LE swelling no orthopnea no chest pain no vision changes no motor weakness. She does endorse more frequent urination. She has previously been on metformin but this was discontinued due to weight loss not currently taking any hypoglycemic agents    Review of Systems   Constitutional: Negative for activity change, appetite change, fatigue, fever and unexpected weight change.   HENT: Negative for ear pain, rhinorrhea and sore throat.    Eyes: Negative for discharge and visual disturbance.   Respiratory: Negative for chest tightness, shortness of breath and wheezing.    Cardiovascular: Negative for chest pain, palpitations and leg swelling.   Gastrointestinal: Negative for abdominal pain, constipation and diarrhea.   Endocrine: Negative for cold intolerance and heat intolerance.   Genitourinary: Negative for dysuria and hematuria.   Musculoskeletal: Negative for joint swelling and neck stiffness.   Skin: Negative for rash.   Neurological: Positive for headaches. Negative for dizziness, syncope and weakness.   Psychiatric/Behavioral: Negative for suicidal ideas.       Objective:     Vitals:    04/26/19 1431 04/26/19 1435   BP: (!) 165/95 (!) 146/73   BP Location: Right arm    Patient Position: Sitting    Pulse: 76    Resp: 17    Temp: 98.5 °F (36.9 °C)    TempSrc: Oral    SpO2: 96%    Weight: 82 kg (180 lb 12.4 oz)    Height: 5' 6" (1.676 m)           Physical Exam   Constitutional: She is " oriented to person, place, and time. She appears well-developed and well-nourished.   HENT:   Head: Normocephalic and atraumatic.   Eyes: Conjunctivae are normal. No scleral icterus.   Neck: Normal range of motion.   Cardiovascular: Normal rate and regular rhythm. Exam reveals no gallop and no friction rub.   No murmur heard.  No LE edema   Pulmonary/Chest: Effort normal and breath sounds normal. She has no wheezes. She has no rales.   Abdominal: Soft. Bowel sounds are normal. There is no tenderness. There is no rebound and no guarding.   Musculoskeletal: Normal range of motion. She exhibits no edema or tenderness.   Neurological: She is alert and oriented to person, place, and time. No cranial nerve deficit.   Negative pronator drift 5/5 distal motor strength in all extremities normal gait symmetric face   Skin: Skin is warm and dry.   Psychiatric: She has a normal mood and affect.       Assessment and Plan   1. Type 2 diabetes mellitus without complication, without long-term current use of insulin  Glucose not acutely elevated today will monitor at follow up yesenia a1c  - POCT Glucose, Hand-Held Device    2. Hypertension, essential  Above goal recently started arb short follow up in two weeks. Trial nightly muscle relaxer avoid NSAIDs    3. Urinary frequency  Minerva urine no glucosuria or nitrites will monitor  - POCT URINE DIPSTICK WITHOUT MICROSCOPE    4. Chronic deep vein thrombosis (DVT) of distal vein of lower extremity, unspecified laterality  Continue follow up at coumadin clinic

## 2019-04-27 DIAGNOSIS — R10.84 GENERALIZED ABDOMINAL PAIN: ICD-10-CM

## 2019-04-29 RX ORDER — LINACLOTIDE 72 UG/1
CAPSULE, GELATIN COATED ORAL
Qty: 90 CAPSULE | Refills: 0 | Status: SHIPPED | OUTPATIENT
Start: 2019-04-29 | End: 2019-10-16 | Stop reason: SDUPTHER

## 2019-05-01 ENCOUNTER — HOSPITAL ENCOUNTER (EMERGENCY)
Facility: HOSPITAL | Age: 74
Discharge: HOME | End: 2019-05-01
Attending: EMERGENCY MEDICINE
Payer: MEDICARE

## 2019-05-01 VITALS
HEIGHT: 66 IN | HEART RATE: 60 BPM | OXYGEN SATURATION: 97 % | DIASTOLIC BLOOD PRESSURE: 90 MMHG | BODY MASS INDEX: 29.57 KG/M2 | WEIGHT: 184 LBS | RESPIRATION RATE: 17 BRPM | TEMPERATURE: 99 F | SYSTOLIC BLOOD PRESSURE: 191 MMHG

## 2019-05-01 DIAGNOSIS — I10 HYPERTENSION, UNSPECIFIED TYPE: Primary | ICD-10-CM

## 2019-05-01 DIAGNOSIS — R42 DIZZY: ICD-10-CM

## 2019-05-01 LAB
ALBUMIN SERPL-MCNC: 3.7 G/DL
ALP SERPL-CCNC: 79 U/L
BILIRUB SERPL-MCNC: 0.6 MG/DL
BUN SERPL-MCNC: 14 MG/DL
CALCIUM SERPL-MCNC: 8.9 MG/DL
CHLORIDE SERPL-SCNC: 109 MMOL/L
CREAT SERPL-MCNC: 0.9 MG/DL
GLUCOSE SERPL-MCNC: 103 MG/DL (ref 70–110)
POC ALT (SGPT): 16 U/L
POC AST (SGOT): 26 U/L
POC TCO2: 26 MMOL/L
POTASSIUM BLD-SCNC: 3.5 MMOL/L
PROTEIN, POC: 6.8 G/DL
SODIUM BLD-SCNC: 143 MMOL/L

## 2019-05-01 PROCEDURE — 93005 ELECTROCARDIOGRAM TRACING: CPT | Mod: ER

## 2019-05-01 PROCEDURE — 80053 COMPREHEN METABOLIC PANEL: CPT | Mod: ER

## 2019-05-01 PROCEDURE — 93010 ELECTROCARDIOGRAM REPORT: CPT | Mod: ,,, | Performed by: INTERNAL MEDICINE

## 2019-05-01 PROCEDURE — 85025 COMPLETE CBC W/AUTO DIFF WBC: CPT | Mod: ER

## 2019-05-01 PROCEDURE — 93010 EKG 12-LEAD: ICD-10-PCS | Mod: ,,, | Performed by: INTERNAL MEDICINE

## 2019-05-01 PROCEDURE — 99284 EMERGENCY DEPT VISIT MOD MDM: CPT | Mod: 25,ER

## 2019-05-02 NOTE — ED NOTES
"Pt educated in depth on sx to be aware of with elevated blood pressures. Examples given such as numbness and tingling to extremities, feeling of being "off balanced", inability to walk or stand straight, changes in vision, etc. Pt verbalizes understanding and given literature for reference. Pt very thankful of information.  "
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
## 2019-05-02 NOTE — DISCHARGE INSTRUCTIONS
MonitorYour blood pressure morning noon and night at the same time every day and bring this to your doctor so that he can make appropriate adjustments to her antihypertensive medications.

## 2019-05-06 ENCOUNTER — HOSPITAL ENCOUNTER (EMERGENCY)
Facility: HOSPITAL | Age: 74
Discharge: HOME | End: 2019-05-06
Attending: EMERGENCY MEDICINE
Payer: MEDICARE

## 2019-05-06 VITALS
SYSTOLIC BLOOD PRESSURE: 150 MMHG | DIASTOLIC BLOOD PRESSURE: 72 MMHG | HEIGHT: 67 IN | WEIGHT: 184 LBS | HEART RATE: 74 BPM | BODY MASS INDEX: 28.88 KG/M2 | TEMPERATURE: 100 F | OXYGEN SATURATION: 98 % | RESPIRATION RATE: 18 BRPM

## 2019-05-06 DIAGNOSIS — I10 HTN (HYPERTENSION): Primary | ICD-10-CM

## 2019-05-06 LAB
ALBUMIN SERPL-MCNC: 4.1 G/DL (ref 3.3–5.5)
ALP SERPL-CCNC: 84 U/L (ref 42–141)
BILIRUB SERPL-MCNC: 0.6 MG/DL (ref 0.2–1.6)
BILIRUBIN, POC UA: NEGATIVE
BLOOD, POC UA: ABNORMAL
BUN SERPL-MCNC: 14 MG/DL (ref 7–22)
CALCIUM SERPL-MCNC: 9.6 MG/DL (ref 8–10.3)
CHLORIDE SERPL-SCNC: 104 MMOL/L (ref 98–108)
CLARITY, POC UA: CLEAR
COLOR, POC UA: YELLOW
CREAT SERPL-MCNC: 1 MG/DL (ref 0.6–1.2)
GLUCOSE SERPL-MCNC: 97 MG/DL (ref 73–118)
GLUCOSE, POC UA: NEGATIVE
KETONES, POC UA: NEGATIVE
LEUKOCYTE EST, POC UA: NEGATIVE
NITRITE, POC UA: NEGATIVE
PH UR STRIP: 6 [PH]
POC ALT (SGPT): 20 U/L (ref 10–47)
POC AST (SGOT): 28 U/L (ref 11–38)
POC TCO2: 30 MMOL/L (ref 18–33)
POTASSIUM BLD-SCNC: 4 MMOL/L (ref 3.6–5.1)
PROTEIN, POC UA: ABNORMAL
PROTEIN, POC: 7.8 G/DL (ref 6.4–8.1)
SODIUM BLD-SCNC: 143 MMOL/L (ref 128–145)
SPECIFIC GRAVITY, POC UA: 1.01
UROBILINOGEN, POC UA: 0.2 E.U./DL

## 2019-05-06 PROCEDURE — 81003 URINALYSIS AUTO W/O SCOPE: CPT | Mod: ER

## 2019-05-06 PROCEDURE — 99283 EMERGENCY DEPT VISIT LOW MDM: CPT | Mod: 25,ER

## 2019-05-06 PROCEDURE — 93010 ELECTROCARDIOGRAM REPORT: CPT | Mod: ,,, | Performed by: INTERNAL MEDICINE

## 2019-05-06 PROCEDURE — 93005 ELECTROCARDIOGRAM TRACING: CPT | Mod: ER

## 2019-05-06 PROCEDURE — 80053 COMPREHEN METABOLIC PANEL: CPT | Mod: ER

## 2019-05-06 PROCEDURE — 85025 COMPLETE CBC W/AUTO DIFF WBC: CPT | Mod: ER

## 2019-05-06 PROCEDURE — 93010 EKG 12-LEAD: ICD-10-PCS | Mod: ,,, | Performed by: INTERNAL MEDICINE

## 2019-05-06 NOTE — DISCHARGE INSTRUCTIONS
Continue your current medication regimen. Follow-up with Dr. Archuleta as planned. Return to this ED if your headache worsens, if the character of your symptoms changes, if you begin with chest pain, if you begin with facial or one-sided numbness or weakness, if any other problems occur.

## 2019-05-06 NOTE — ED NOTES
Venous blood draw performed w/o complication or complaint from pt. Gauze with coban place to draw area. Bleeding controlled at this time.
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
## 2019-05-08 ENCOUNTER — OFFICE VISIT (OUTPATIENT)
Dept: FAMILY MEDICINE | Facility: CLINIC | Age: 74
End: 2019-05-08
Payer: MEDICARE

## 2019-05-08 VITALS
DIASTOLIC BLOOD PRESSURE: 88 MMHG | OXYGEN SATURATION: 95 % | BODY MASS INDEX: 28.86 KG/M2 | WEIGHT: 183.88 LBS | HEART RATE: 60 BPM | HEIGHT: 67 IN | TEMPERATURE: 99 F | SYSTOLIC BLOOD PRESSURE: 158 MMHG | RESPIRATION RATE: 18 BRPM

## 2019-05-08 DIAGNOSIS — R06.02 SHORTNESS OF BREATH: ICD-10-CM

## 2019-05-08 DIAGNOSIS — I10 HYPERTENSION, ESSENTIAL: Primary | Chronic | ICD-10-CM

## 2019-05-08 PROCEDURE — 3079F PR MOST RECENT DIASTOLIC BLOOD PRESSURE 80-89 MM HG: ICD-10-PCS | Mod: CPTII,S$GLB,, | Performed by: INTERNAL MEDICINE

## 2019-05-08 PROCEDURE — 99214 PR OFFICE/OUTPT VISIT, EST, LEVL IV, 30-39 MIN: ICD-10-PCS | Mod: S$GLB,,, | Performed by: INTERNAL MEDICINE

## 2019-05-08 PROCEDURE — 99999 PR PBB SHADOW E&M-EST. PATIENT-LVL IV: ICD-10-PCS | Mod: PBBFAC,,, | Performed by: INTERNAL MEDICINE

## 2019-05-08 PROCEDURE — 3077F SYST BP >= 140 MM HG: CPT | Mod: CPTII,S$GLB,, | Performed by: INTERNAL MEDICINE

## 2019-05-08 PROCEDURE — 1101F PR PT FALLS ASSESS DOC 0-1 FALLS W/OUT INJ PAST YR: ICD-10-PCS | Mod: CPTII,S$GLB,, | Performed by: INTERNAL MEDICINE

## 2019-05-08 PROCEDURE — 1101F PT FALLS ASSESS-DOCD LE1/YR: CPT | Mod: CPTII,S$GLB,, | Performed by: INTERNAL MEDICINE

## 2019-05-08 PROCEDURE — 3079F DIAST BP 80-89 MM HG: CPT | Mod: CPTII,S$GLB,, | Performed by: INTERNAL MEDICINE

## 2019-05-08 PROCEDURE — 3077F PR MOST RECENT SYSTOLIC BLOOD PRESSURE >= 140 MM HG: ICD-10-PCS | Mod: CPTII,S$GLB,, | Performed by: INTERNAL MEDICINE

## 2019-05-08 PROCEDURE — 99999 PR PBB SHADOW E&M-EST. PATIENT-LVL IV: CPT | Mod: PBBFAC,,, | Performed by: INTERNAL MEDICINE

## 2019-05-08 PROCEDURE — 99214 OFFICE O/P EST MOD 30 MIN: CPT | Mod: S$GLB,,, | Performed by: INTERNAL MEDICINE

## 2019-05-08 RX ORDER — AMLODIPINE BESYLATE 10 MG/1
10 TABLET ORAL DAILY
Qty: 90 TABLET | Refills: 1 | Status: ON HOLD | OUTPATIENT
Start: 2019-05-08 | End: 2019-06-06 | Stop reason: CLARIF

## 2019-05-08 NOTE — PROGRESS NOTES
"Subjective:       Patient ID: Isabell Preston is a 73 y.o. female.    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 Last visit was have bitemporal headahces and noted in coumadin clinic to have systolic BP In 180s, started on losartan 50mg. She feels since starting the medicaiton her blood pressure has been higher. She denies any high salt foods no preserved meats or canned foods. No LE swelling. She has gone to ED twice because of high readings at home. She denies vision changes. She reports today headache is less severe. She does not feel more short of breath when doing house work she has not blacked out/passed out. She denies chest pain.     Review of Systems   Constitutional: Negative for activity change, appetite change, fatigue, fever and unexpected weight change.   HENT: Negative for ear pain, rhinorrhea and sore throat.    Eyes: Negative for discharge and visual disturbance.   Respiratory: Positive for shortness of breath. Negative for chest tightness and wheezing.    Cardiovascular: Negative for chest pain, palpitations and leg swelling.   Gastrointestinal: Negative for abdominal pain, constipation and diarrhea.   Endocrine: Negative for cold intolerance and heat intolerance.   Genitourinary: Negative for dysuria and hematuria.   Musculoskeletal: Negative for joint swelling and neck stiffness.   Skin: Negative for rash.   Neurological: Positive for headaches. Negative for dizziness, syncope and weakness.   Psychiatric/Behavioral: Negative for suicidal ideas.       Objective:     Vitals:    05/08/19 1354 05/08/19 1432   BP: (!) 160/90 (!) 158/88   BP Location: Left arm    Patient Position: Sitting    Pulse: 75 60   Resp: 18    Temp: 98.6 °F (37 °C)    TempSrc: Oral    SpO2: 95%    Weight: 83.4 kg (183 lb 13.8 oz)    Height: 5' 7" (1.702 m)           Physical Exam "   Constitutional: She is oriented to person, place, and time. She appears well-developed and well-nourished.   HENT:   Head: Normocephalic and atraumatic.   Eyes: Pupils are equal, round, and reactive to light. Conjunctivae are normal.   Neck: Normal range of motion.   Cardiovascular: Normal rate and regular rhythm. Exam reveals no gallop and no friction rub.   No murmur heard.  Pulmonary/Chest: Effort normal and breath sounds normal. She has no wheezes. She has no rales.   Abdominal: Soft. Bowel sounds are normal. There is no tenderness. There is no rebound and no guarding.   Musculoskeletal: Normal range of motion. She exhibits no edema or tenderness.   Neurological: She is alert and oriented to person, place, and time. No cranial nerve deficit.   Skin: Skin is warm and dry.   Psychiatric: She has a normal mood and affect.     reviewed recent ekgs from ED visit without ischemic changes  Assessment and Plan   1. Hypertension, essential  She is reluctant to increase losartan because she feels this is causing her blood pressure to increase, add amlodipine referral for ischemic testing with cardiology BP check in one week, I suggested she not repeatedly check blood pressure as worry over elevated readings could be driving pressure up no evidence of end organ damage  - amLODIPine (NORVASC) 10 MG tablet; Take 1 tablet (10 mg total) by mouth once daily.  Dispense: 90 tablet; Refill: 1  - Ambulatory referral to Cardiology    2. Shortness of breath  As above  - Ambulatory referral to Cardiology

## 2019-05-10 RX ORDER — DICYCLOMINE HYDROCHLORIDE 10 MG/1
CAPSULE ORAL
Qty: 90 CAPSULE | Refills: 0 | Status: ON HOLD | OUTPATIENT
Start: 2019-05-10 | End: 2019-07-08 | Stop reason: SDUPTHER

## 2019-05-15 ENCOUNTER — OFFICE VISIT (OUTPATIENT)
Dept: FAMILY MEDICINE | Facility: CLINIC | Age: 74
End: 2019-05-15
Payer: MEDICARE

## 2019-05-15 VITALS
BODY MASS INDEX: 28.58 KG/M2 | TEMPERATURE: 99 F | WEIGHT: 182.13 LBS | OXYGEN SATURATION: 97 % | DIASTOLIC BLOOD PRESSURE: 88 MMHG | HEIGHT: 67 IN | RESPIRATION RATE: 16 BRPM | SYSTOLIC BLOOD PRESSURE: 150 MMHG | HEART RATE: 65 BPM

## 2019-05-15 DIAGNOSIS — I10 HYPERTENSION, ESSENTIAL: Chronic | ICD-10-CM

## 2019-05-15 DIAGNOSIS — I10 HTN, GOAL BELOW 140/90: Primary | ICD-10-CM

## 2019-05-15 DIAGNOSIS — R07.89 COSTOCHONDRAL CHEST PAIN: ICD-10-CM

## 2019-05-15 PROCEDURE — 99999 PR PBB SHADOW E&M-EST. PATIENT-LVL III: ICD-10-PCS | Mod: PBBFAC,,, | Performed by: NURSE PRACTITIONER

## 2019-05-15 PROCEDURE — 1101F PR PT FALLS ASSESS DOC 0-1 FALLS W/OUT INJ PAST YR: ICD-10-PCS | Mod: CPTII,S$GLB,, | Performed by: NURSE PRACTITIONER

## 2019-05-15 PROCEDURE — 99214 OFFICE O/P EST MOD 30 MIN: CPT | Mod: S$GLB,,, | Performed by: NURSE PRACTITIONER

## 2019-05-15 PROCEDURE — 99499 RISK ADDL DX/OHS AUDIT: ICD-10-PCS | Mod: S$GLB,,, | Performed by: NURSE PRACTITIONER

## 2019-05-15 PROCEDURE — 3077F PR MOST RECENT SYSTOLIC BLOOD PRESSURE >= 140 MM HG: ICD-10-PCS | Mod: CPTII,S$GLB,, | Performed by: NURSE PRACTITIONER

## 2019-05-15 PROCEDURE — 1101F PT FALLS ASSESS-DOCD LE1/YR: CPT | Mod: CPTII,S$GLB,, | Performed by: NURSE PRACTITIONER

## 2019-05-15 PROCEDURE — 3079F DIAST BP 80-89 MM HG: CPT | Mod: CPTII,S$GLB,, | Performed by: NURSE PRACTITIONER

## 2019-05-15 PROCEDURE — 3077F SYST BP >= 140 MM HG: CPT | Mod: CPTII,S$GLB,, | Performed by: NURSE PRACTITIONER

## 2019-05-15 PROCEDURE — 99499 UNLISTED E&M SERVICE: CPT | Mod: S$GLB,,, | Performed by: NURSE PRACTITIONER

## 2019-05-15 PROCEDURE — 99214 PR OFFICE/OUTPT VISIT, EST, LEVL IV, 30-39 MIN: ICD-10-PCS | Mod: S$GLB,,, | Performed by: NURSE PRACTITIONER

## 2019-05-15 PROCEDURE — 3079F PR MOST RECENT DIASTOLIC BLOOD PRESSURE 80-89 MM HG: ICD-10-PCS | Mod: CPTII,S$GLB,, | Performed by: NURSE PRACTITIONER

## 2019-05-15 PROCEDURE — 99999 PR PBB SHADOW E&M-EST. PATIENT-LVL III: CPT | Mod: PBBFAC,,, | Performed by: NURSE PRACTITIONER

## 2019-05-15 RX ORDER — LOSARTAN POTASSIUM 100 MG/1
100 TABLET ORAL DAILY
Qty: 30 TABLET | Refills: 1 | Status: ON HOLD | OUTPATIENT
Start: 2019-05-15 | End: 2019-06-30 | Stop reason: SDUPTHER
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
## 2019-05-20 ENCOUNTER — OFFICE VISIT (OUTPATIENT)
Dept: CARDIOLOGY | Facility: CLINIC | Age: 74
End: 2019-05-20
Payer: MEDICARE

## 2019-05-20 VITALS
HEART RATE: 83 BPM | OXYGEN SATURATION: 96 % | HEIGHT: 67 IN | BODY MASS INDEX: 28.55 KG/M2 | WEIGHT: 181.88 LBS | DIASTOLIC BLOOD PRESSURE: 74 MMHG | SYSTOLIC BLOOD PRESSURE: 132 MMHG

## 2019-05-20 DIAGNOSIS — E78.2 MIXED HYPERLIPIDEMIA: ICD-10-CM

## 2019-05-20 DIAGNOSIS — Z86.73 HISTORY OF CVA (CEREBROVASCULAR ACCIDENT): ICD-10-CM

## 2019-05-20 DIAGNOSIS — Z79.01 CHRONIC ANTICOAGULATION: ICD-10-CM

## 2019-05-20 DIAGNOSIS — R07.9 CHEST PAIN, UNSPECIFIED TYPE: Primary | ICD-10-CM

## 2019-05-20 DIAGNOSIS — I82.5Z9 CHRONIC DEEP VEIN THROMBOSIS (DVT) OF DISTAL VEIN OF LOWER EXTREMITY, UNSPECIFIED LATERALITY: ICD-10-CM

## 2019-05-20 DIAGNOSIS — I10 HYPERTENSION, ESSENTIAL: ICD-10-CM

## 2019-05-20 DIAGNOSIS — R06.02 SHORTNESS OF BREATH: ICD-10-CM

## 2019-05-20 PROCEDURE — 1101F PT FALLS ASSESS-DOCD LE1/YR: CPT | Mod: CPTII,S$GLB,, | Performed by: INTERNAL MEDICINE

## 2019-05-20 PROCEDURE — 1101F PR PT FALLS ASSESS DOC 0-1 FALLS W/OUT INJ PAST YR: ICD-10-PCS | Mod: CPTII,S$GLB,, | Performed by: INTERNAL MEDICINE

## 2019-05-20 PROCEDURE — 3078F PR MOST RECENT DIASTOLIC BLOOD PRESSURE < 80 MM HG: ICD-10-PCS | Mod: CPTII,S$GLB,, | Performed by: INTERNAL MEDICINE

## 2019-05-20 PROCEDURE — 99999 PR PBB SHADOW E&M-EST. PATIENT-LVL III: ICD-10-PCS | Mod: PBBFAC,,, | Performed by: INTERNAL MEDICINE

## 2019-05-20 PROCEDURE — 99204 OFFICE O/P NEW MOD 45 MIN: CPT | Mod: S$GLB,,, | Performed by: INTERNAL MEDICINE

## 2019-05-20 PROCEDURE — 3075F SYST BP GE 130 - 139MM HG: CPT | Mod: CPTII,S$GLB,, | Performed by: INTERNAL MEDICINE

## 2019-05-20 PROCEDURE — 3078F DIAST BP <80 MM HG: CPT | Mod: CPTII,S$GLB,, | Performed by: INTERNAL MEDICINE

## 2019-05-20 PROCEDURE — 99204 PR OFFICE/OUTPT VISIT, NEW, LEVL IV, 45-59 MIN: ICD-10-PCS | Mod: S$GLB,,, | Performed by: INTERNAL MEDICINE

## 2019-05-20 PROCEDURE — 99999 PR PBB SHADOW E&M-EST. PATIENT-LVL III: CPT | Mod: PBBFAC,,, | Performed by: INTERNAL MEDICINE

## 2019-05-20 PROCEDURE — 3075F PR MOST RECENT SYSTOLIC BLOOD PRESS GE 130-139MM HG: ICD-10-PCS | Mod: CPTII,S$GLB,, | Performed by: INTERNAL MEDICINE

## 2019-05-20 NOTE — LETTER
May 20, 2019      Ayo Archuleta MD  [address] [facility] [address]           [facility] - Cardiology  [address] [facility] [address]  [facility] [address]  Phone: [phone]          Patient: Isabell Preston   MR Number: [id number]   YOB: 1945   Date of Visit: 5/20/2019       Dear Dr. Ayo Archuleta:    Thank you for referring Isabell rPeston to me for evaluation. Attached you will find relevant portions of my assessment and plan of care.    If you have questions, please do not hesitate to call me. I look forward to following Isabell Preston along with you.    Sincerely,    Barry Ortiz MD    Enclosure  CC:  No Recipients    If you would like to receive this communication electronically, please contact [email or web address] or [phone] to request more information on [employer] access.    For providers and/or their staff who would like to refer a patient to [facility], please contact us through our one-stop-shop provider referral line, [facility], at [phone].    If you feel you have received this communication in error or would no longer like to receive these types of communications, please e-mail [email or web address]
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
## 2019-05-22 ENCOUNTER — PATIENT OUTREACH (OUTPATIENT)
Dept: ADMINISTRATIVE | Facility: HOSPITAL | Age: 74
End: 2019-05-22

## 2019-05-22 NOTE — PROGRESS NOTES
Reached out to pt in regards to overdue HM pt unavailable, left voicemail. Letter also mailed out in regards to overdue HM. Please schedule overdue lipid and discuss eye exam.

## 2019-05-24 ENCOUNTER — CLINICAL SUPPORT (OUTPATIENT)
Dept: FAMILY MEDICINE | Facility: CLINIC | Age: 74
End: 2019-05-24
Payer: MEDICARE

## 2019-05-24 ENCOUNTER — TELEPHONE (OUTPATIENT)
Dept: FAMILY MEDICINE | Facility: CLINIC | Age: 74
End: 2019-05-24

## 2019-05-24 VITALS
HEIGHT: 67 IN | BODY MASS INDEX: 28.49 KG/M2 | HEART RATE: 80 BPM | DIASTOLIC BLOOD PRESSURE: 60 MMHG | RESPIRATION RATE: 18 BRPM | SYSTOLIC BLOOD PRESSURE: 138 MMHG

## 2019-05-24 DIAGNOSIS — I10 HYPERTENSION, ESSENTIAL: Primary | ICD-10-CM

## 2019-05-24 PROCEDURE — 99499 NO LOS: ICD-10-PCS | Mod: S$GLB,,, | Performed by: INTERNAL MEDICINE

## 2019-05-24 PROCEDURE — 99499 UNLISTED E&M SERVICE: CPT | Mod: S$GLB,,, | Performed by: INTERNAL MEDICINE

## 2019-05-24 PROCEDURE — 99999 PR PBB SHADOW E&M-EST. PATIENT-LVL II: ICD-10-PCS | Mod: PBBFAC,,,

## 2019-05-24 PROCEDURE — 99999 PR PBB SHADOW E&M-EST. PATIENT-LVL II: CPT | Mod: PBBFAC,,,

## 2019-05-24 NOTE — PROGRESS NOTES
Isabell WATTS Mohan 73 y.o. female is here today for Blood Pressure check.   History of HTN yes.    Review of patient's allergies indicates:   Allergen Reactions    Ciprofloxacin Anaphylaxis    Fructose     Gluten protein Other (See Comments)     GI upset  GI upset    Lactase Other (See Comments)     GI upset  GI upset    Latex, natural rubber Rash     Creatinine   Date Value Ref Range Status   03/19/2019 1.2 0.5 - 1.4 mg/dL Final     Sodium   Date Value Ref Range Status   03/19/2019 140 136 - 145 mmol/L Final     Potassium   Date Value Ref Range Status   03/19/2019 3.4 (L) 3.5 - 5.1 mmol/L Final   ]  Patient verifies taking blood pressure medications on a regular basis at the same time of the day.     Current Outpatient Medications:     amLODIPine (NORVASC) 10 MG tablet, Take 1 tablet (10 mg total) by mouth once daily., Disp: 90 tablet, Rfl: 1    antipyrine-benzocaine (AURALGAN OR EQUIV) 5.4-1.4 % Drop, 3 drops every 2 (two) hours as needed., Disp: , Rfl:     atorvastatin (LIPITOR) 10 MG tablet, TAKE 1 TABLET(10 MG) BY MOUTH EVERY DAY, Disp: 90 tablet, Rfl: 0    busPIRone (BUSPAR) 7.5 MG tablet, Take 7.5 mg by mouth., Disp: , Rfl:     COUMADIN 5 mg tablet, , Disp: , Rfl: 1    desoximetasone (TOPICORT) 0.05 % cream, Apply topically., Disp: , Rfl:     dicyclomine (BENTYL) 10 MG capsule, TAKE 1 CAPSULE BY MOUTH THREE TIMES DAILY, Disp: 90 capsule, Rfl: 0    DULoxetine (CYMBALTA) 60 MG capsule, Take 1 capsule (60 mg total) by mouth once daily., Disp: 90 capsule, Rfl: 0    gabapentin (NEURONTIN) 100 MG capsule, TAKE 1 CAPSULE(100 MG) BY MOUTH THREE TIMES DAILY, Disp: 270 capsule, Rfl: 2    glucosamine/chondr alvarez A sod (OSTEO BI-FLEX ORAL), Take 1 tablet by mouth., Disp: , Rfl:     hydrOXYzine HCl (ATARAX) 25 MG tablet, Take 1 tablet (25 mg total) by mouth daily as needed for Anxiety., Disp: 90 tablet, Rfl: 0    hyoscyamine (LEVSIN/SL) 0.125 mg Subl, Take 0.125 mg by mouth., Disp: , Rfl:     lancets  "(TRUEPLUS LANCETS) 28 gauge Misc, 1 lancet by Misc.(Non-Drug; Combo Route) route once daily. Test blood sugar once daily, type 2 diabetes, controlled. E11.9, Disp: 100 each, Rfl: 3    LINZESS 72 mcg Cap, TAKE 1 CAPSULE(72 MCG) BY MOUTH DAILY, Disp: 90 capsule, Rfl: 0    losartan (COZAAR) 100 MG tablet, Take 1 tablet (100 mg total) by mouth once daily., Disp: 30 tablet, Rfl: 1    meclizine (ANTIVERT) 25 mg tablet, TAKE 1 TABLET(25 MG) BY MOUTH THREE TIMES DAILY AS NEEDED FOR DIZZINESS, Disp: 30 tablet, Rfl: 0    metoprolol succinate (TOPROL-XL) 25 MG 24 hr tablet, TAKE 1 TABLET(25 MG) BY MOUTH EVERY DAY. TOTAL DAILY DOSE 75 MG, Disp: 90 tablet, Rfl: 0    metoprolol succinate (TOPROL-XL) 50 MG 24 hr tablet, TAKE 1 TABLET(50 MG) BY MOUTH EVERY DAY. TOTAL DAILY DOSE 75 MG, Disp: 90 tablet, Rfl: 0    mometasone 0.1% (ELOCON) 0.1 % cream, BALWINDER TO DARK AREAS BID ON LEGS PRN, Disp: 15 g, Rfl: 1    nystatin (MYCOSTATIN) cream, Apply topically 2 (two) times daily., Disp: , Rfl:     oxiconazole (OXISTAT) 1 % lotion, Apply topically., Disp: , Rfl:     senna-docusate 8.6-50 mg (PERICOLACE) 8.6-50 mg per tablet, Take 1 tablet by mouth once daily., Disp: , Rfl:     triamterene-hydrochlorothiazide 37.5-25 mg (MAXZIDE-25) 37.5-25 mg per tablet, TAKE 1 TABLET BY MOUTH EVERY DAY, Disp: 90 tablet, Rfl: 0    vitamin D (VITAMIN D3) 1000 units Tab, Take 1,000 Units by mouth., Disp: , Rfl:   Does patient have record of home blood pressure readings yes.   Last dose of blood pressure medication was taken at 9 am  Patient is asymptomatic.       Vitals:    05/24/19 0856   BP: 138/60   BP Location: Right arm   Patient Position: Sitting   BP Method: Large (Manual)   Pulse: 80   Resp: 18   Height: 5' 7" (1.702 m)          informed of nurse visit.   "

## 2019-05-28 ENCOUNTER — HOSPITAL ENCOUNTER (OUTPATIENT)
Dept: CARDIOLOGY | Facility: HOSPITAL | Age: 74
Discharge: HOME | End: 2019-05-28
Attending: INTERNAL MEDICINE
Payer: MEDICARE

## 2019-05-28 ENCOUNTER — HOSPITAL ENCOUNTER (OUTPATIENT)
Dept: RADIOLOGY | Facility: HOSPITAL | Age: 74
Discharge: HOME | End: 2019-05-28
Attending: INTERNAL MEDICINE
Payer: MEDICARE

## 2019-05-28 VITALS
BODY MASS INDEX: 28.41 KG/M2 | SYSTOLIC BLOOD PRESSURE: 132 MMHG | DIASTOLIC BLOOD PRESSURE: 74 MMHG | HEIGHT: 67 IN | WEIGHT: 181 LBS | HEART RATE: 66 BPM

## 2019-05-28 DIAGNOSIS — R07.9 CHEST PAIN, UNSPECIFIED TYPE: ICD-10-CM

## 2019-05-28 LAB
AORTIC ROOT ANNULUS: 2.73 CM
AORTIC VALVE CUSP SEPERATION: 1.85 CM
ASCENDING AORTA: 2.91 CM
AV INDEX (PROSTH): 0.68
AV MEAN GRADIENT: 3.97 MMHG
AV PEAK GRADIENT: 7.18 MMHG
AV VALVE AREA: 2.28 CM2
AV VELOCITY RATIO: 0.8
BSA FOR ECHO PROCEDURE: 1.97 M2
CV ECHO LV RWT: 0.54 CM
CV STRESS BASE HR: 64 BPM
DIASTOLIC BLOOD PRESSURE: 74 MMHG
DOP CALC AO PEAK VEL: 1.34 M/S
DOP CALC AO VTI: 32.68 CM
DOP CALC LVOT AREA: 3.33 CM2
DOP CALC LVOT DIAMETER: 2.06 CM
DOP CALC LVOT PEAK VEL: 1.08 M/S
DOP CALC LVOT STROKE VOLUME: 74.39 CM3
DOP CALCLVOT PEAK VEL VTI: 22.33 CM
E WAVE DECELERATION TIME: 185.96 MSEC
E/A RATIO: 1
ECHO LV POSTERIOR WALL: 1.19 CM (ref 0.6–1.1)
FRACTIONAL SHORTENING: 37 % (ref 28–44)
INTERVENTRICULAR SEPTUM: 1.29 CM (ref 0.6–1.1)
IVRT: 0.09 MSEC
LA MAJOR: 4.91 CM
LA MINOR: 4.88 CM
LA WIDTH: 3.8 CM
LEFT ATRIUM SIZE: 3.62 CM
LEFT ATRIUM VOLUME INDEX: 29.5 ML/M2
LEFT ATRIUM VOLUME: 57.23 CM3
LEFT INTERNAL DIMENSION IN SYSTOLE: 2.8 CM (ref 2.1–4)
LEFT VENTRICLE DIASTOLIC VOLUME INDEX: 46.06 ML/M2
LEFT VENTRICLE DIASTOLIC VOLUME: 89.26 ML
LEFT VENTRICLE MASS INDEX: 104.6 G/M2
LEFT VENTRICLE SYSTOLIC VOLUME INDEX: 15.2 ML/M2
LEFT VENTRICLE SYSTOLIC VOLUME: 29.53 ML
LEFT VENTRICULAR INTERNAL DIMENSION IN DIASTOLE: 4.43 CM (ref 3.5–6)
LEFT VENTRICULAR MASS: 202.78 G
MV PEAK A VEL: 0.62 M/S
MV PEAK E VEL: 0.62 M/S
NUC STRESS DIASTOLIC VOLUME INDEX: 61
NUC STRESS EJECTION FRACTION: 70 %
NUC STRESS SYSTOLIC VOLUME INDEX: 18
OHS CV CPX 85 PERCENT MAX PREDICTED HEART RATE MALE: 121
OHS CV CPX MAX PREDICTED HEART RATE: 143
OHS CV CPX PATIENT IS FEMALE: 1
OHS CV CPX PATIENT IS MALE: 0
OHS CV CPX PEAK DIASTOLIC BLOOD PRESSURE: 57 MMHG
OHS CV CPX PEAK HEAR RATE: 82 BPM
OHS CV CPX PEAK RATE PRESSURE PRODUCT: NORMAL
OHS CV CPX PEAK SYSTOLIC BLOOD PRESSURE: 141 MMHG
OHS CV CPX PERCENT MAX PREDICTED HEART RATE ACHIEVED: 57
OHS CV CPX RATE PRESSURE PRODUCT PRESENTING: 8960
PISA TR MAX VEL: 3.09 M/S
PULM VEIN S/D RATIO: 1.85
PV PEAK D VEL: 0.41 M/S
PV PEAK S VEL: 0.76 M/S
PV PEAK VELOCITY: 0.73 CM/S
RA MAJOR: 4.21 CM
RA PRESSURE: 8 MMHG
RA WIDTH: 3.95 CM
RIGHT VENTRICULAR END-DIASTOLIC DIMENSION: 3.21 CM
RV TISSUE DOPPLER FREE WALL SYSTOLIC VELOCITY 1 (APICAL 4 CHAMBER VIEW): 11.85 M/S
SINUS: 2.95 CM
STJ: 2.76 CM
STRESS ECHO TARGET HR: 125.8 BPM
SYSTOLIC BLOOD PRESSURE: 140 MMHG
TR MAX PG: 38.19 MMHG
TRICUSPID ANNULAR PLANE SYSTOLIC EXCURSION: 2.81 CM
TV REST PULMONARY ARTERY PRESSURE: 46 MMHG

## 2019-05-28 PROCEDURE — 93016 STRESS TEST WITH MYOCARDIAL PERFUSION (CUPID ONLY): ICD-10-PCS | Mod: ,,, | Performed by: INTERNAL MEDICINE

## 2019-05-28 PROCEDURE — 93306 TRANSTHORACIC ECHO (TTE) COMPLETE (CUPID ONLY): ICD-10-PCS | Mod: 26,,, | Performed by: INTERNAL MEDICINE

## 2019-05-28 PROCEDURE — 93306 TTE W/DOPPLER COMPLETE: CPT | Mod: 26,,, | Performed by: INTERNAL MEDICINE

## 2019-05-28 PROCEDURE — 93018 STRESS TEST WITH MYOCARDIAL PERFUSION (CUPID ONLY): ICD-10-PCS | Mod: ,,, | Performed by: INTERNAL MEDICINE

## 2019-05-28 PROCEDURE — A9502 TC99M TETROFOSMIN: HCPCS

## 2019-05-28 PROCEDURE — 93017 CV STRESS TEST TRACING ONLY: CPT

## 2019-05-28 PROCEDURE — 78452 STRESS TEST WITH MYOCARDIAL PERFUSION (CUPID ONLY): ICD-10-PCS | Mod: 26,,, | Performed by: INTERNAL MEDICINE

## 2019-05-28 PROCEDURE — 78452 HT MUSCLE IMAGE SPECT MULT: CPT | Mod: 26,,, | Performed by: INTERNAL MEDICINE

## 2019-05-28 PROCEDURE — 93016 CV STRESS TEST SUPVJ ONLY: CPT | Mod: ,,, | Performed by: INTERNAL MEDICINE

## 2019-05-28 PROCEDURE — 63600175 PHARM REV CODE 636 W HCPCS: Performed by: INTERNAL MEDICINE

## 2019-05-28 PROCEDURE — 93306 TTE W/DOPPLER COMPLETE: CPT

## 2019-05-28 PROCEDURE — 93018 CV STRESS TEST I&R ONLY: CPT | Mod: ,,, | Performed by: INTERNAL MEDICINE

## 2019-05-28 RX ORDER — REGADENOSON 0.08 MG/ML
0.4 INJECTION, SOLUTION INTRAVENOUS ONCE
Status: COMPLETED | OUTPATIENT
Start: 2019-05-28 | End: 2019-05-28

## 2019-05-28 RX ADMIN — REGADENOSON 0.4 MG: 0.08 INJECTION, SOLUTION INTRAVENOUS at 09:05

## 2019-06-05 ENCOUNTER — LAB VISIT (OUTPATIENT)
Dept: LAB | Facility: HOSPITAL | Age: 74
End: 2019-06-05
Attending: INTERNAL MEDICINE
Payer: MEDICARE

## 2019-06-05 ENCOUNTER — OFFICE VISIT (OUTPATIENT)
Dept: FAMILY MEDICINE | Facility: CLINIC | Age: 74
End: 2019-06-05
Payer: MEDICARE

## 2019-06-05 ENCOUNTER — TELEPHONE (OUTPATIENT)
Dept: FAMILY MEDICINE | Facility: CLINIC | Age: 74
End: 2019-06-05

## 2019-06-05 ENCOUNTER — PATIENT OUTREACH (OUTPATIENT)
Dept: ADMINISTRATIVE | Facility: HOSPITAL | Age: 74
End: 2019-06-05

## 2019-06-05 VITALS
HEART RATE: 66 BPM | TEMPERATURE: 99 F | SYSTOLIC BLOOD PRESSURE: 130 MMHG | RESPIRATION RATE: 17 BRPM | OXYGEN SATURATION: 96 % | BODY MASS INDEX: 28.82 KG/M2 | WEIGHT: 183.63 LBS | DIASTOLIC BLOOD PRESSURE: 80 MMHG | HEIGHT: 67 IN

## 2019-06-05 DIAGNOSIS — I27.20 PULMONARY HYPERTENSION: ICD-10-CM

## 2019-06-05 DIAGNOSIS — I10 HYPERTENSION, ESSENTIAL: Chronic | ICD-10-CM

## 2019-06-05 DIAGNOSIS — E78.2 MIXED HYPERLIPIDEMIA: Primary | Chronic | ICD-10-CM

## 2019-06-05 DIAGNOSIS — E11.36 TYPE 2 DIABETES MELLITUS WITH DIABETIC CATARACT, WITHOUT LONG-TERM CURRENT USE OF INSULIN: ICD-10-CM

## 2019-06-05 DIAGNOSIS — I10 HYPERTENSION, ESSENTIAL: Primary | Chronic | ICD-10-CM

## 2019-06-05 DIAGNOSIS — I82.5Z9 CHRONIC DEEP VEIN THROMBOSIS (DVT) OF DISTAL VEIN OF LOWER EXTREMITY, UNSPECIFIED LATERALITY: Chronic | ICD-10-CM

## 2019-06-05 DIAGNOSIS — J90 PLEURAL EFFUSION: Primary | ICD-10-CM

## 2019-06-05 LAB
ALBUMIN SERPL BCP-MCNC: 3.7 G/DL (ref 3.5–5.2)
ALP SERPL-CCNC: 101 U/L (ref 55–135)
ALT SERPL W/O P-5'-P-CCNC: 16 U/L (ref 10–44)
ANION GAP SERPL CALC-SCNC: 6 MMOL/L (ref 8–16)
AST SERPL-CCNC: 23 U/L (ref 10–40)
BILIRUB SERPL-MCNC: 0.5 MG/DL (ref 0.1–1)
BUN SERPL-MCNC: 21 MG/DL (ref 8–23)
CALCIUM SERPL-MCNC: 9.6 MG/DL (ref 8.7–10.5)
CHLORIDE SERPL-SCNC: 104 MMOL/L (ref 95–110)
CHOLEST SERPL-MCNC: 144 MG/DL (ref 120–199)
CHOLEST/HDLC SERPL: 2.9 {RATIO} (ref 2–5)
CO2 SERPL-SCNC: 29 MMOL/L (ref 23–29)
CREAT SERPL-MCNC: 1.3 MG/DL (ref 0.5–1.4)
EST. GFR  (AFRICAN AMERICAN): 47 ML/MIN/1.73 M^2
EST. GFR  (NON AFRICAN AMERICAN): 41 ML/MIN/1.73 M^2
GLUCOSE SERPL-MCNC: 105 MG/DL (ref 70–110)
HDLC SERPL-MCNC: 49 MG/DL (ref 40–75)
HDLC SERPL: 34 % (ref 20–50)
INR PPP: 2.5 (ref 0.8–1.2)
LDLC SERPL CALC-MCNC: 72.4 MG/DL (ref 63–159)
NONHDLC SERPL-MCNC: 95 MG/DL
POTASSIUM SERPL-SCNC: 3.7 MMOL/L (ref 3.5–5.1)
PROT SERPL-MCNC: 7.6 G/DL (ref 6–8.4)
PROTHROMBIN TIME: 26.2 SEC (ref 9–12.5)
SODIUM SERPL-SCNC: 139 MMOL/L (ref 136–145)
TRIGL SERPL-MCNC: 113 MG/DL (ref 30–150)

## 2019-06-05 PROCEDURE — 85610 PROTHROMBIN TIME: CPT

## 2019-06-05 PROCEDURE — 3044F PR MOST RECENT HEMOGLOBIN A1C LEVEL <7.0%: ICD-10-PCS | Mod: CPTII,S$GLB,, | Performed by: INTERNAL MEDICINE

## 2019-06-05 PROCEDURE — 99999 PR PBB SHADOW E&M-EST. PATIENT-LVL IV: ICD-10-PCS | Mod: PBBFAC,,, | Performed by: INTERNAL MEDICINE

## 2019-06-05 PROCEDURE — 99499 RISK ADDL DX/OHS AUDIT: ICD-10-PCS | Mod: S$GLB,,, | Performed by: INTERNAL MEDICINE

## 2019-06-05 PROCEDURE — 1101F PT FALLS ASSESS-DOCD LE1/YR: CPT | Mod: CPTII,S$GLB,, | Performed by: INTERNAL MEDICINE

## 2019-06-05 PROCEDURE — 99999 PR PBB SHADOW E&M-EST. PATIENT-LVL IV: CPT | Mod: PBBFAC,,, | Performed by: INTERNAL MEDICINE

## 2019-06-05 PROCEDURE — 36415 COLL VENOUS BLD VENIPUNCTURE: CPT | Mod: PN

## 2019-06-05 PROCEDURE — 3075F SYST BP GE 130 - 139MM HG: CPT | Mod: CPTII,S$GLB,, | Performed by: INTERNAL MEDICINE

## 2019-06-05 PROCEDURE — 99499 UNLISTED E&M SERVICE: CPT | Mod: S$GLB,,, | Performed by: INTERNAL MEDICINE

## 2019-06-05 PROCEDURE — 3044F HG A1C LEVEL LT 7.0%: CPT | Mod: CPTII,S$GLB,, | Performed by: INTERNAL MEDICINE

## 2019-06-05 PROCEDURE — 83036 HEMOGLOBIN GLYCOSYLATED A1C: CPT

## 2019-06-05 PROCEDURE — 3075F PR MOST RECENT SYSTOLIC BLOOD PRESS GE 130-139MM HG: ICD-10-PCS | Mod: CPTII,S$GLB,, | Performed by: INTERNAL MEDICINE

## 2019-06-05 PROCEDURE — 3079F PR MOST RECENT DIASTOLIC BLOOD PRESSURE 80-89 MM HG: ICD-10-PCS | Mod: CPTII,S$GLB,, | Performed by: INTERNAL MEDICINE

## 2019-06-05 PROCEDURE — 1101F PR PT FALLS ASSESS DOC 0-1 FALLS W/OUT INJ PAST YR: ICD-10-PCS | Mod: CPTII,S$GLB,, | Performed by: INTERNAL MEDICINE

## 2019-06-05 PROCEDURE — 99214 PR OFFICE/OUTPT VISIT, EST, LEVL IV, 30-39 MIN: ICD-10-PCS | Mod: S$GLB,,, | Performed by: INTERNAL MEDICINE

## 2019-06-05 PROCEDURE — 99214 OFFICE O/P EST MOD 30 MIN: CPT | Mod: S$GLB,,, | Performed by: INTERNAL MEDICINE

## 2019-06-05 PROCEDURE — 3079F DIAST BP 80-89 MM HG: CPT | Mod: CPTII,S$GLB,, | Performed by: INTERNAL MEDICINE

## 2019-06-05 PROCEDURE — 80061 LIPID PANEL: CPT

## 2019-06-05 PROCEDURE — 80053 COMPREHEN METABOLIC PANEL: CPT

## 2019-06-05 NOTE — LETTER
June 5, 2019     Arnulfo Lynn OD                June 5, 2019     Patient: Isabell Preston    YOB: 1945   Date of Visit: 6/5/2019       To Whom It May Concern:                                             We are seeing Isabell Preston in the clinic at [facility].  Ayo Archuleta MD is their PCP.  She/He has an outstanding lab/procedure at the time we reviewed their chart.  To help with our Health Maintenance records will you please supply the following report(s):      []  Mammogram                                                []  Colonoscopy   []  Pap Smear                                                   []  Outside Lab Results   []  Dexa scans                                                   [x]  Eye Exam   []  Foot Exam                                                     [] Other___________   []  Outside Immunizations                                               Please Fax to [facility] at [phone].    Thank you for your help. If my Care Coordinator can be of any assistance, you can call Gabbie Sun MA-[facility] at [phone].

## 2019-06-05 NOTE — TELEPHONE ENCOUNTER
Chest xray shows large right pleural effusion given patient on coumdin not canidate for thoracentesis currently. Will pursue advanced imaging with CT and refer for outpaitent IR with plan to hold coumadin three days prior scheduled tap and INR prior to procedure. Results reviewed in person with patient and her daughter

## 2019-06-05 NOTE — PROGRESS NOTES
"Subjective:       Patient ID: Isabell Preston is a 73 y.o. female.    Chief Complaint: bp follow up and Establish Care    F/u blood pressure    HPI: 74 y/o with recurrent DVT on coumadin HTN and DM (diet controlled) presents for scheduled follow up. Since last visit had losartan increased to 100mg two weeks ago. Ischemic testing with cardiology was negative. Did have evidence on transthoracic echo of pulmonary HTN. No further headaches still with exertional dyspnea. No LE swelling.     Review of Systems   Constitutional: Negative for activity change, appetite change, fatigue, fever and unexpected weight change.   HENT: Negative for ear pain, rhinorrhea and sore throat.    Eyes: Negative for discharge and visual disturbance.   Respiratory: Positive for shortness of breath. Negative for chest tightness and wheezing.    Cardiovascular: Negative for chest pain, palpitations and leg swelling.   Gastrointestinal: Negative for abdominal pain, constipation and diarrhea.   Endocrine: Negative for cold intolerance and heat intolerance.   Genitourinary: Negative for dysuria and hematuria.   Musculoskeletal: Negative for joint swelling and neck stiffness.   Skin: Negative for rash.   Neurological: Negative for dizziness, syncope, weakness and headaches.   Psychiatric/Behavioral: Negative for suicidal ideas.       Objective:     Vitals:    06/05/19 1454   BP: 130/80   BP Location: Right arm   Patient Position: Sitting   Pulse: 66   Resp: 17   Temp: 98.7 °F (37.1 °C)   TempSrc: Oral   SpO2: 96%   Weight: 83.3 kg (183 lb 10.3 oz)   Height: 5' 7" (1.702 m)          Physical Exam   Constitutional: She is oriented to person, place, and time. She appears well-developed and well-nourished.   HENT:   Head: Normocephalic and atraumatic.   Eyes: Conjunctivae are normal. No scleral icterus.   Neck: Normal range of motion.   Cardiovascular: Normal rate and regular rhythm. Exam reveals no gallop and no friction rub.   No murmur heard.  No " LE edema   Pulmonary/Chest: Effort normal and breath sounds normal. She has no wheezes. She has no rales.   Abdominal: Soft. Bowel sounds are normal. There is no tenderness. There is no rebound and no guarding.   Musculoskeletal: Normal range of motion. She exhibits no edema or tenderness.   Neurological: She is alert and oriented to person, place, and time. No cranial nerve deficit.   Skin: Skin is warm and dry.   Psychiatric: She has a normal mood and affect.       Assessment and Plan   1. Hypertension, essential  Improved with ARB and CCB check renal funciton and electrolytes  - Comprehensive metabolic panel; Future    2. Chronic deep vein thrombosis (DVT) of distal vein of lower extremity, unspecified laterality  Due for INR check results to [facility] coumadin clinic  - Protime-INR; Future    3. Pulmonary hypertension  Check chest xray for pulmonary effusion   - X-Ray Chest PA And Lateral; Future    4. Type 2 diabetes mellitus with diabetic cataract, without long-term current use of insulin  Repeat a1c today diet controlled will get DFE with outside optometrist (Dr. Delgado)  - Hemoglobin A1c; Future  - Lipid panel; Future

## 2019-06-05 NOTE — LETTER
June 25, 2019     Arnulfo Lynn OD                June 25, 2019     Patient: Isabell Preston    YOB: 1945   Date of Visit: 6/5/2019       To Whom It May Concern:                                             We are seeing Isabell Preston in the clinic at [facility].  Ayo Archuleta MD is their PCP.  She/He has an outstanding lab/procedure at the time we reviewed their chart.  To help with our Health Maintenance records will you please supply the following report(s):      []  Mammogram                                                []  Colonoscopy   []  Pap Smear                                                   []  Outside Lab Results   []  Dexa scans                                                   [x]  Eye Exam   []  Foot Exam                                                     [] Other___________   []  Outside Immunizations                                               Please Fax to [facility] at [phone].    Thank you for your help. If my Care Coordinator can be of any assistance, you can call Gabbie Sun MA-[facility] at [phone].

## 2019-06-05 NOTE — PROGRESS NOTES
Reached out to pt in regards to overdue HM pt unavailable, left voicemail. Letter also mailed out in regards to overdue HM. Please schedule overdue lipid. Efax sent requesting most recent eye exam.

## 2019-06-06 ENCOUNTER — TELEPHONE (OUTPATIENT)
Dept: FAMILY MEDICINE | Facility: CLINIC | Age: 74
End: 2019-06-06

## 2019-06-06 ENCOUNTER — HOSPITAL ENCOUNTER (OUTPATIENT)
Facility: HOSPITAL | Age: 74
Discharge: HOME | End: 2019-06-07
Attending: EMERGENCY MEDICINE | Admitting: INTERNAL MEDICINE
Payer: MEDICARE

## 2019-06-06 ENCOUNTER — HOSPITAL ENCOUNTER (OUTPATIENT)
Dept: RADIOLOGY | Facility: HOSPITAL | Age: 74
Discharge: HOME | End: 2019-06-06
Attending: INTERNAL MEDICINE
Payer: MEDICARE

## 2019-06-06 DIAGNOSIS — R09.02 HYPOXIA: ICD-10-CM

## 2019-06-06 DIAGNOSIS — J90 PLEURAL EFFUSION: ICD-10-CM

## 2019-06-06 DIAGNOSIS — Z79.01 CHRONIC ANTICOAGULATION: Primary | Chronic | ICD-10-CM

## 2019-06-06 DIAGNOSIS — E78.2 MIXED HYPERLIPIDEMIA: Chronic | ICD-10-CM

## 2019-06-06 DIAGNOSIS — I82.5Z9 CHRONIC DEEP VEIN THROMBOSIS (DVT) OF DISTAL VEIN OF LOWER EXTREMITY, UNSPECIFIED LATERALITY: Chronic | ICD-10-CM

## 2019-06-06 DIAGNOSIS — I10 HYPERTENSION, ESSENTIAL: Chronic | ICD-10-CM

## 2019-06-06 DIAGNOSIS — R06.02 SOB (SHORTNESS OF BREATH): ICD-10-CM

## 2019-06-06 DIAGNOSIS — D50.0 IRON DEFICIENCY ANEMIA DUE TO CHRONIC BLOOD LOSS: ICD-10-CM

## 2019-06-06 DIAGNOSIS — J90 PLEURAL EFFUSION, RIGHT: ICD-10-CM

## 2019-06-06 DIAGNOSIS — E11.36 TYPE 2 DIABETES MELLITUS WITH DIABETIC CATARACT, WITHOUT LONG-TERM CURRENT USE OF INSULIN: Chronic | ICD-10-CM

## 2019-06-06 PROBLEM — S80.12XA CONTUSION OF LEFT LEG: Status: RESOLVED | Noted: 2019-01-17 | Resolved: 2019-06-06

## 2019-06-06 LAB
ABO + RH BLD: NORMAL
ALBUMIN SERPL BCP-MCNC: 3.9 G/DL (ref 3.5–5.2)
ALLENS TEST: ABNORMAL
ALP SERPL-CCNC: 108 U/L (ref 55–135)
ALT SERPL W/O P-5'-P-CCNC: 16 U/L (ref 10–44)
ANION GAP SERPL CALC-SCNC: 8 MMOL/L (ref 8–16)
APTT BLDCRRT: 42.7 SEC (ref 21–32)
AST SERPL-CCNC: 21 U/L (ref 10–40)
BASOPHILS # BLD AUTO: 0.02 K/UL (ref 0–0.2)
BASOPHILS NFR BLD: 0.2 % (ref 0–1.9)
BILIRUB SERPL-MCNC: 0.5 MG/DL (ref 0.1–1)
BLD GP AB SCN CELLS X3 SERPL QL: NORMAL
BNP SERPL-MCNC: 80 PG/ML (ref 0–99)
BUN SERPL-MCNC: 18 MG/DL (ref 8–23)
CALCIUM SERPL-MCNC: 9.7 MG/DL (ref 8.7–10.5)
CHLORIDE SERPL-SCNC: 105 MMOL/L (ref 95–110)
CO2 SERPL-SCNC: 27 MMOL/L (ref 23–29)
CREAT SERPL-MCNC: 1.1 MG/DL (ref 0.5–1.4)
DELSYS: ABNORMAL
DIFFERENTIAL METHOD: ABNORMAL
EOSINOPHIL # BLD AUTO: 0.1 K/UL (ref 0–0.5)
EOSINOPHIL NFR BLD: 0.6 % (ref 0–8)
ERYTHROCYTE [DISTWIDTH] IN BLOOD BY AUTOMATED COUNT: 14.3 % (ref 11.5–14.5)
EST. GFR  (AFRICAN AMERICAN): 58 ML/MIN/1.73 M^2
EST. GFR  (NON AFRICAN AMERICAN): 50 ML/MIN/1.73 M^2
ESTIMATED AVG GLUCOSE: 131 MG/DL (ref 68–131)
FIO2: 21
GLUCOSE SERPL-MCNC: 108 MG/DL (ref 70–110)
HBA1C MFR BLD HPLC: 6.2 % (ref 4–5.6)
HCO3 UR-SCNC: 26.2 MMOL/L (ref 24–28)
HCT VFR BLD AUTO: 38.4 % (ref 37–48.5)
HGB BLD-MCNC: 12.5 G/DL (ref 12–16)
INR PPP: 2.6 (ref 0.8–1.2)
LACTATE SERPL-SCNC: 1 MMOL/L (ref 0.5–2.2)
LYMPHOCYTES # BLD AUTO: 1.3 K/UL (ref 1–4.8)
LYMPHOCYTES NFR BLD: 13.4 % (ref 18–48)
MCH RBC QN AUTO: 29.5 PG (ref 27–31)
MCHC RBC AUTO-ENTMCNC: 32.6 G/DL (ref 32–36)
MCV RBC AUTO: 91 FL (ref 82–98)
MODE: ABNORMAL
MONOCYTES # BLD AUTO: 0.7 K/UL (ref 0.3–1)
MONOCYTES NFR BLD: 7.2 % (ref 4–15)
NEUTROPHILS # BLD AUTO: 7.4 K/UL (ref 1.8–7.7)
NEUTROPHILS NFR BLD: 78.6 % (ref 38–73)
PCO2 BLDA: 44.1 MMHG (ref 35–45)
PH SMN: 7.38 [PH] (ref 7.35–7.45)
PLATELET # BLD AUTO: 350 K/UL (ref 150–350)
PMV BLD AUTO: 9.5 FL (ref 9.2–12.9)
PO2 BLDA: 66 MMHG (ref 80–100)
POC BE: 1 MMOL/L
POC SATURATED O2: 92 % (ref 95–100)
POC TCO2: 28 MMOL/L (ref 23–27)
POTASSIUM SERPL-SCNC: 3.3 MMOL/L (ref 3.5–5.1)
PROT SERPL-MCNC: 7.6 G/DL (ref 6–8.4)
PROTHROMBIN TIME: 26.9 SEC (ref 9–12.5)
RBC # BLD AUTO: 4.24 M/UL (ref 4–5.4)
SAMPLE: ABNORMAL
SITE: ABNORMAL
SODIUM SERPL-SCNC: 140 MMOL/L (ref 136–145)
SP02: 95
TROPONIN I SERPL DL<=0.01 NG/ML-MCNC: 0.01 NG/ML (ref 0–0.03)
WBC # BLD AUTO: 9.4 K/UL (ref 3.9–12.7)

## 2019-06-06 PROCEDURE — 86850 RBC ANTIBODY SCREEN: CPT

## 2019-06-06 PROCEDURE — 63600175 PHARM REV CODE 636 W HCPCS: Performed by: HOSPITALIST

## 2019-06-06 PROCEDURE — 25000003 PHARM REV CODE 250: Performed by: EMERGENCY MEDICINE

## 2019-06-06 PROCEDURE — 94761 N-INVAS EAR/PLS OXIMETRY MLT: CPT

## 2019-06-06 PROCEDURE — 96375 TX/PRO/DX INJ NEW DRUG ADDON: CPT

## 2019-06-06 PROCEDURE — 93005 ELECTROCARDIOGRAM TRACING: CPT

## 2019-06-06 PROCEDURE — 84484 ASSAY OF TROPONIN QUANT: CPT

## 2019-06-06 PROCEDURE — 25000003 PHARM REV CODE 250: Performed by: HOSPITALIST

## 2019-06-06 PROCEDURE — 85610 PROTHROMBIN TIME: CPT

## 2019-06-06 PROCEDURE — G0378 HOSPITAL OBSERVATION PER HR: HCPCS

## 2019-06-06 PROCEDURE — 93010 EKG 12-LEAD: ICD-10-PCS | Mod: ,,, | Performed by: INTERNAL MEDICINE

## 2019-06-06 PROCEDURE — 96374 THER/PROPH/DIAG INJ IV PUSH: CPT | Mod: 59

## 2019-06-06 PROCEDURE — 83605 ASSAY OF LACTIC ACID: CPT

## 2019-06-06 PROCEDURE — 25500020 PHARM REV CODE 255: Performed by: EMERGENCY MEDICINE

## 2019-06-06 PROCEDURE — 99900035 HC TECH TIME PER 15 MIN (STAT)

## 2019-06-06 PROCEDURE — 85730 THROMBOPLASTIN TIME PARTIAL: CPT

## 2019-06-06 PROCEDURE — 71250 CT THORAX DX C-: CPT | Mod: 26,,, | Performed by: RADIOLOGY

## 2019-06-06 PROCEDURE — 99285 EMERGENCY DEPT VISIT HI MDM: CPT | Mod: 25

## 2019-06-06 PROCEDURE — 36600 WITHDRAWAL OF ARTERIAL BLOOD: CPT

## 2019-06-06 PROCEDURE — 83880 ASSAY OF NATRIURETIC PEPTIDE: CPT

## 2019-06-06 PROCEDURE — 71250 CT THORAX DX C-: CPT | Mod: TC

## 2019-06-06 PROCEDURE — 96361 HYDRATE IV INFUSION ADD-ON: CPT

## 2019-06-06 PROCEDURE — 27000221 HC OXYGEN, UP TO 24 HOURS

## 2019-06-06 PROCEDURE — 82803 BLOOD GASES ANY COMBINATION: CPT

## 2019-06-06 PROCEDURE — 71250 CT CHEST WITHOUT CONTRAST: ICD-10-PCS | Mod: 26,,, | Performed by: RADIOLOGY

## 2019-06-06 PROCEDURE — 87040 BLOOD CULTURE FOR BACTERIA: CPT

## 2019-06-06 PROCEDURE — 63600175 PHARM REV CODE 636 W HCPCS: Performed by: EMERGENCY MEDICINE

## 2019-06-06 PROCEDURE — 93010 ELECTROCARDIOGRAM REPORT: CPT | Mod: ,,, | Performed by: INTERNAL MEDICINE

## 2019-06-06 PROCEDURE — 85025 COMPLETE CBC W/AUTO DIFF WBC: CPT

## 2019-06-06 PROCEDURE — 80053 COMPREHEN METABOLIC PANEL: CPT

## 2019-06-06 RX ORDER — HYDRALAZINE HYDROCHLORIDE 20 MG/ML
10 INJECTION INTRAMUSCULAR; INTRAVENOUS
Status: COMPLETED | OUTPATIENT
Start: 2019-06-06 | End: 2019-06-06

## 2019-06-06 RX ORDER — METOPROLOL SUCCINATE 25 MG/1
25 TABLET, EXTENDED RELEASE ORAL DAILY
Status: DISCONTINUED | OUTPATIENT
Start: 2019-06-07 | End: 2019-06-06

## 2019-06-06 RX ORDER — ONDANSETRON 2 MG/ML
4 INJECTION INTRAMUSCULAR; INTRAVENOUS EVERY 6 HOURS PRN
Status: DISCONTINUED | OUTPATIENT
Start: 2019-06-06 | End: 2019-06-07 | Stop reason: HOSPADM

## 2019-06-06 RX ORDER — DIPHENHYDRAMINE HYDROCHLORIDE 50 MG/ML
25 INJECTION INTRAMUSCULAR; INTRAVENOUS ONCE
Status: COMPLETED | OUTPATIENT
Start: 2019-06-06 | End: 2019-06-06

## 2019-06-06 RX ORDER — HYDROXYZINE HYDROCHLORIDE 25 MG/1
25 TABLET, FILM COATED ORAL DAILY PRN
Status: DISCONTINUED | OUTPATIENT
Start: 2019-06-06 | End: 2019-06-07 | Stop reason: HOSPADM

## 2019-06-06 RX ORDER — GABAPENTIN 100 MG/1
100 CAPSULE ORAL 2 TIMES DAILY
Status: DISCONTINUED | OUTPATIENT
Start: 2019-06-06 | End: 2019-06-07 | Stop reason: HOSPADM

## 2019-06-06 RX ORDER — PROCHLORPERAZINE EDISYLATE 5 MG/ML
10 INJECTION INTRAMUSCULAR; INTRAVENOUS ONCE
Status: COMPLETED | OUTPATIENT
Start: 2019-06-06 | End: 2019-06-06

## 2019-06-06 RX ORDER — DICYCLOMINE HYDROCHLORIDE 10 MG/1
10 CAPSULE ORAL 3 TIMES DAILY
Status: DISCONTINUED | OUTPATIENT
Start: 2019-06-06 | End: 2019-06-07 | Stop reason: HOSPADM

## 2019-06-06 RX ORDER — LOSARTAN POTASSIUM 25 MG/1
100 TABLET ORAL DAILY
Status: DISCONTINUED | OUTPATIENT
Start: 2019-06-07 | End: 2019-06-07 | Stop reason: HOSPADM

## 2019-06-06 RX ORDER — SODIUM CHLORIDE 9 MG/ML
1000 INJECTION, SOLUTION INTRAVENOUS
Status: COMPLETED | OUTPATIENT
Start: 2019-06-06 | End: 2019-06-07

## 2019-06-06 RX ORDER — METOPROLOL SUCCINATE 50 MG/1
50 TABLET, EXTENDED RELEASE ORAL DAILY
Status: DISCONTINUED | OUTPATIENT
Start: 2019-06-07 | End: 2019-06-06

## 2019-06-06 RX ORDER — ATORVASTATIN CALCIUM 10 MG/1
10 TABLET, FILM COATED ORAL DAILY
Status: DISCONTINUED | OUTPATIENT
Start: 2019-06-07 | End: 2019-06-07 | Stop reason: HOSPADM

## 2019-06-06 RX ORDER — ACETAMINOPHEN 325 MG/1
650 TABLET ORAL EVERY 6 HOURS PRN
Status: DISCONTINUED | OUTPATIENT
Start: 2019-06-06 | End: 2019-06-07 | Stop reason: HOSPADM

## 2019-06-06 RX ORDER — SODIUM CHLORIDE 0.9 % (FLUSH) 0.9 %
10 SYRINGE (ML) INJECTION
Status: DISCONTINUED | OUTPATIENT
Start: 2019-06-06 | End: 2019-06-07 | Stop reason: HOSPADM

## 2019-06-06 RX ORDER — RAMELTEON 8 MG/1
8 TABLET ORAL NIGHTLY PRN
Status: DISCONTINUED | OUTPATIENT
Start: 2019-06-06 | End: 2019-06-07 | Stop reason: HOSPADM

## 2019-06-06 RX ORDER — DULOXETIN HYDROCHLORIDE 30 MG/1
60 CAPSULE, DELAYED RELEASE ORAL NIGHTLY
Status: DISCONTINUED | OUTPATIENT
Start: 2019-06-06 | End: 2019-06-07 | Stop reason: HOSPADM

## 2019-06-06 RX ORDER — WARFARIN SODIUM 5 MG/1
5 TABLET ORAL DAILY
Status: DISCONTINUED | OUTPATIENT
Start: 2019-06-07 | End: 2019-06-07 | Stop reason: HOSPADM

## 2019-06-06 RX ADMIN — SODIUM CHLORIDE 1000 ML: 0.9 INJECTION, SOLUTION INTRAVENOUS at 05:06

## 2019-06-06 RX ADMIN — HYDRALAZINE HYDROCHLORIDE 10 MG: 20 INJECTION INTRAMUSCULAR; INTRAVENOUS at 04:06

## 2019-06-06 RX ADMIN — GABAPENTIN 100 MG: 100 CAPSULE ORAL at 08:06

## 2019-06-06 RX ADMIN — DICYCLOMINE HYDROCHLORIDE 10 MG: 10 CAPSULE ORAL at 08:06

## 2019-06-06 RX ADMIN — PROCHLORPERAZINE EDISYLATE 10 MG: 5 INJECTION INTRAMUSCULAR; INTRAVENOUS at 08:06

## 2019-06-06 RX ADMIN — IOHEXOL 75 ML: 350 INJECTION, SOLUTION INTRAVENOUS at 03:06

## 2019-06-06 RX ADMIN — DIPHENHYDRAMINE HYDROCHLORIDE 25 MG: 50 INJECTION, SOLUTION INTRAMUSCULAR; INTRAVENOUS at 08:06

## 2019-06-06 RX ADMIN — DULOXETINE 60 MG: 30 CAPSULE, DELAYED RELEASE ORAL at 08:06

## 2019-06-06 NOTE — TELEPHONE ENCOUNTER
Called patient to advised  at MD request states on her way to ER now for breathing staff sending message to MD

## 2019-06-06 NOTE — ED TRIAGE NOTES
Pt presents to ED via personal vehicle by self; pt c/o SOB x 3 days with last night being the worst night; pt states she had to sleep sitting up and she was told that she had fluid on her lungs the other day when she went to her PCP; hx of DM and HTN; pt is in NAD; AAOx4; will continue to monitor

## 2019-06-06 NOTE — ASSESSMENT & PLAN NOTE
Last HgbA1c   Lab Results   Component Value Date    HGBA1C 6.2 (H) 06/05/2019     Hold oral antihyperglycemics while inpatient  PRN sliding scale insulin  ACHS glucose monitoring   ADA diet

## 2019-06-06 NOTE — NURSING
1730-Patient arrived to floor vitals obtained came with one liter of fluid from emergency area of care infusing x one liter report received from daniel marquez rn bag started in emergency area of care prior to arrival . Patient on 4 liters of oxygen nasal canula. Head of bed up side rail up x 3 bed alarm on . Home medication verified. Patient does report mild headache . Assessment completed plan on board updated and reviewed with patient.
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
## 2019-06-06 NOTE — PROGRESS NOTES
ABG Results for ANA LANDA (MRN [id number]) as of 6/6/2019 14:53   Ref. Range 6/6/2019 14:45   POC PH Latest Ref Range: 7.35 - 7.45  7.382   POC PCO2 Latest Ref Range: 35 - 45 mmHg 44.1   POC PO2 Latest Ref Range: 80 - 100 mmHg 66 (L)   POC BE Latest Ref Range: -2 to 2 mmol/L 1   POC HCO3 Latest Ref Range: 24 - 28 mmol/L 26.2   POC SATURATED O2 Latest Ref Range: 95 - 100 % 92 (L)   POC TCO2 Latest Ref Range: 23 - 27 mmol/L 28 (H)   FiO2 Unknown 21   Sample Unknown ARTERIAL   DelSys Unknown Room Air   Allens Test Unknown Pass   Site Unknown LR   Mode Unknown SPONT   Per Dr. Mcneal I placed pt on 4 l/m NC to treat the PO2 of 66. MAY Lennon, RRT

## 2019-06-06 NOTE — HPI
73 y.o. female with hypertension, hyperlipidemia, chronic DVT on chronic OA C with Coumadin, lumbar DDD, chronic pain, iron deficiency anemia, chronic fatigue, dm 2, history of psychiatric hospitalization and history of CVA presents with a complaint of dyspnea on exertion which has been progressively worsening over the past few weeks.  Particularly severe for the past 3 days.  Associated with orthopnea and right flank pain beginning yesterday.  Chest x-ray and chest CT performed as outpatient by her PCP reveal large right pleural effusion.  She denies fever, chills, cough, chest pain, palpitations, lower extremity edema, syncope, dizziness, nausea, vomiting, diarrhea, abdominal pain, dysuria, frequency, or urgency.  Noted to be mildly hypoxic on room air in the ED and placed on supplemental oxygen.  Routine labs unremarkable.  INR is in therapeutic range.  Placed in observation for further evaluation and treatment.
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
## 2019-06-07 VITALS
RESPIRATION RATE: 18 BRPM | BODY MASS INDEX: 28.56 KG/M2 | SYSTOLIC BLOOD PRESSURE: 155 MMHG | DIASTOLIC BLOOD PRESSURE: 73 MMHG | TEMPERATURE: 98 F | OXYGEN SATURATION: 98 % | HEIGHT: 67 IN | WEIGHT: 182 LBS | HEART RATE: 63 BPM

## 2019-06-07 VITALS — OXYGEN SATURATION: 95 %

## 2019-06-07 LAB
INR PPP: 2.4 (ref 0.8–1.2)
LDH SERPL L TO P-CCNC: 248 U/L (ref 110–260)
PROT SERPL-MCNC: 6.9 G/DL (ref 6–8.4)
PROTHROMBIN TIME: 24.8 SEC (ref 9–12.5)

## 2019-06-07 PROCEDURE — 84157 ASSAY OF PROTEIN OTHER: CPT

## 2019-06-07 PROCEDURE — 88112 CYTOLOGY SPECIMEN- PULMONARY MEDICAL CYTOLOGY: ICD-10-PCS | Mod: 26,,, | Performed by: PATHOLOGY

## 2019-06-07 PROCEDURE — 85610 PROTHROMBIN TIME: CPT

## 2019-06-07 PROCEDURE — 96376 TX/PRO/DX INJ SAME DRUG ADON: CPT

## 2019-06-07 PROCEDURE — 82042 OTHER SOURCE ALBUMIN QUAN EA: CPT

## 2019-06-07 PROCEDURE — 88305 TISSUE EXAM BY PATHOLOGIST: CPT | Performed by: PATHOLOGY

## 2019-06-07 PROCEDURE — 84155 ASSAY OF PROTEIN SERUM: CPT

## 2019-06-07 PROCEDURE — 89051 BODY FLUID CELL COUNT: CPT

## 2019-06-07 PROCEDURE — 87015 SPECIMEN INFECT AGNT CONCNTJ: CPT

## 2019-06-07 PROCEDURE — 87116 MYCOBACTERIA CULTURE: CPT

## 2019-06-07 PROCEDURE — 87206 SMEAR FLUORESCENT/ACID STAI: CPT

## 2019-06-07 PROCEDURE — 84311 SPECTROPHOTOMETRY: CPT

## 2019-06-07 PROCEDURE — G0378 HOSPITAL OBSERVATION PER HR: HCPCS

## 2019-06-07 PROCEDURE — 83615 LACTATE (LD) (LDH) ENZYME: CPT | Mod: 91

## 2019-06-07 PROCEDURE — 96375 TX/PRO/DX INJ NEW DRUG ADDON: CPT

## 2019-06-07 PROCEDURE — 83615 LACTATE (LD) (LDH) ENZYME: CPT

## 2019-06-07 PROCEDURE — 25000003 PHARM REV CODE 250: Performed by: HOSPITALIST

## 2019-06-07 PROCEDURE — 99204 OFFICE O/P NEW MOD 45 MIN: CPT | Mod: ,,, | Performed by: INTERNAL MEDICINE

## 2019-06-07 PROCEDURE — 99204 PR OFFICE/OUTPT VISIT, NEW, LEVL IV, 45-59 MIN: ICD-10-PCS | Mod: ,,, | Performed by: INTERNAL MEDICINE

## 2019-06-07 PROCEDURE — 88112 CYTOPATH CELL ENHANCE TECH: CPT | Mod: 26,,, | Performed by: PATHOLOGY

## 2019-06-07 PROCEDURE — 88305 TISSUE EXAM BY PATHOLOGIST: CPT | Mod: 26,,, | Performed by: PATHOLOGY

## 2019-06-07 PROCEDURE — 87102 FUNGUS ISOLATION CULTURE: CPT

## 2019-06-07 PROCEDURE — 63600175 PHARM REV CODE 636 W HCPCS: Performed by: RADIOLOGY

## 2019-06-07 PROCEDURE — 82945 GLUCOSE OTHER FLUID: CPT

## 2019-06-07 PROCEDURE — 36415 COLL VENOUS BLD VENIPUNCTURE: CPT

## 2019-06-07 PROCEDURE — 25000003 PHARM REV CODE 250: Performed by: NURSE PRACTITIONER

## 2019-06-07 PROCEDURE — 88305 CYTOLOGY SPECIMEN- PULMONARY MEDICAL CYTOLOGY: ICD-10-PCS | Mod: 26,,, | Performed by: PATHOLOGY

## 2019-06-07 RX ORDER — OXYCODONE AND ACETAMINOPHEN 5; 325 MG/1; MG/1
1 TABLET ORAL EVERY 4 HOURS PRN
Status: DISCONTINUED | OUTPATIENT
Start: 2019-06-07 | End: 2019-06-07 | Stop reason: HOSPADM

## 2019-06-07 RX ORDER — METHYLPREDNISOLONE SOD SUCC 125 MG
125 VIAL (EA) INJECTION
Status: COMPLETED | OUTPATIENT
Start: 2019-06-07 | End: 2019-06-07

## 2019-06-07 RX ORDER — BUTALBITAL, ACETAMINOPHEN AND CAFFEINE 50; 325; 40 MG/1; MG/1; MG/1
1 TABLET ORAL EVERY 4 HOURS PRN
Status: DISCONTINUED | OUTPATIENT
Start: 2019-06-07 | End: 2019-06-07 | Stop reason: HOSPADM

## 2019-06-07 RX ORDER — DIPHENHYDRAMINE HYDROCHLORIDE 50 MG/ML
50 INJECTION INTRAMUSCULAR; INTRAVENOUS ONCE
Status: COMPLETED | OUTPATIENT
Start: 2019-06-07 | End: 2019-06-07

## 2019-06-07 RX ADMIN — GABAPENTIN 100 MG: 100 CAPSULE ORAL at 09:06

## 2019-06-07 RX ADMIN — OXYCODONE HYDROCHLORIDE AND ACETAMINOPHEN 1 TABLET: 5; 325 TABLET ORAL at 04:06

## 2019-06-07 RX ADMIN — DIPHENHYDRAMINE HYDROCHLORIDE 50 MG: 50 INJECTION INTRAMUSCULAR; INTRAVENOUS at 02:06

## 2019-06-07 RX ADMIN — METOPROLOL SUCCINATE 75 MG: 50 TABLET, EXTENDED RELEASE ORAL at 09:06

## 2019-06-07 RX ADMIN — WARFARIN SODIUM 5 MG: 5 TABLET ORAL at 04:06

## 2019-06-07 RX ADMIN — DICYCLOMINE HYDROCHLORIDE 10 MG: 10 CAPSULE ORAL at 03:06

## 2019-06-07 RX ADMIN — DICYCLOMINE HYDROCHLORIDE 10 MG: 10 CAPSULE ORAL at 09:06

## 2019-06-07 RX ADMIN — METHYLPREDNISOLONE SODIUM SUCCINATE 125 MG: 125 INJECTION, POWDER, FOR SOLUTION INTRAMUSCULAR; INTRAVENOUS at 02:06

## 2019-06-07 RX ADMIN — LOSARTAN POTASSIUM 100 MG: 25 TABLET, FILM COATED ORAL at 09:06

## 2019-06-07 RX ADMIN — BUTALBITAL, ACETAMINOPHEN AND CAFFEINE 1 TABLET: 50; 325; 40 TABLET ORAL at 03:06

## 2019-06-07 RX ADMIN — ATORVASTATIN CALCIUM 10 MG: 10 TABLET, FILM COATED ORAL at 09:06

## 2019-06-07 NOTE — PLAN OF CARE
" TN met with pt at bedside. TN explained her role in Care Management. Pt voiced understanding. TN inquired about HELP AT HOME. Pt stated that she will have daughter at home to help for support. TN voiced understanding. TN inquired about responsibilities when it comes to  MANAGING HER HEALTH at home and what it entails. Pt inquired about details. TN informed pt of RESPONSIBILITIES of:    1. Follow up appointments  2. Getting Prescriptions filled  3. Taking medications as prescribed.     Pt voiced understanding.  TN explained "My Health Packet" blue folder and the pink and green tabs that are on the folder as well. Pt voiced understanding.     Pt's pharmacy:   [facility] [address] [place] [address] [place] AT [address] [address]  Phone: [phone] Fax: [phone]    Pt's preference for appointments: mid-afternoon appts around 11:30 AM       06/07/19 1117   Discharge Assessment   Assessment Type Discharge Planning Assessment   Confirmed/corrected address and phone number on facesheet? Yes   Assessment information obtained from? Patient   Communicated expected length of stay with patient/caregiver no   Prior to hospitilization cognitive status: Alert/Oriented   Prior to hospitalization functional status: Independent   Current cognitive status: Alert/Oriented   Current Functional Status: Independent   Lives With alone   Able to Return to Prior Arrangements yes   Is patient able to care for self after discharge? Yes   Who are your caregiver(s) and their phone number(s)?   (Daughter and Sister, Debbie Farmer [phone])   Patient's perception of discharge disposition home or selfcare   Readmission Within the Last 30 Days no previous admission in last 30 days   Patient currently being followed by outpatient case management? No   Patient currently receives any other outside agency services? No   Equipment Currently Used at Home glucometer;cane, straight "   Do you have any problems affording any of your prescribed medications? No   Is the patient taking medications as prescribed? yes   Does the patient have transportation home? Yes   Transportation Anticipated family or friend will provide   Does the patient receive services at the Coumadin Clinic? No  (Pt is on Coumadin; previously monitored bu [facility]; wants someone through [facility] to monitor.)   Discharge Plan A Home   DME Needed Upon Discharge  none   Patient/Family in Agreement with Plan yes

## 2019-06-07 NOTE — NURSING
Report given to mary carmen triana rn on patient progress this evening and updated handoff report sheet given to her . No acute changes. Tolerating iv fluid and oxygen therapy at 4 liters by nasal canula. Rails up x 3 head of bed up too. Call light in her reach .

## 2019-06-07 NOTE — HPI
Patient is 73 y.o. female  has a past medical history of Ambulates with cane, Anticoagulant long-term use, Anxiety, Behavioral problem, Cataract, Clotting disorder, DDD (degenerative disc disease), lumbar (6/27/2016), Deep vein thrombosis, Depression, Diabetes mellitus, Diabetes mellitus type II, Diverticulosis, Eye injuries, General anesthetics causing adverse effect in therapeutic use, History of blood clots, History of psychiatric care, History of psychiatric hospitalization, Hyperlipidemia, Hypertension, Psychiatric problem, Retinal defect (2006), and Ulcer. [facility]/19 for worsening sob over past 1 month.  Patient was seen by Dr. Archuleta and was recommended ED visit.  In ED, ct and cxr confirmed right effusion.  No fever/chill.  No chest pain.  No weight changes.  No coughing or wheezing.      +recurring dvt x 4.  2 in left leg, 1 in left arm and 1 in left subclavian.  Lifelong coumadin.

## 2019-06-07 NOTE — PROGRESS NOTES
Follow-up Information     Ayo Archuleta MD On 6/19/2019.    Specialties:  Internal Medicine, Wound Care  Why:  For outpatient follow-up/post hospitalizaton , Outpatient Services PCP Follow-Up Wednesday at 1:00 PM   Contact information:  [place] [address]  [phone]             [facility] Cou[facility].    Specialty:  Cardiology  Why:  Outpatient Services Coumadin Monitoring; ambulatory referral to coumadin  Contact information:  [address]  [facility] [address] [phone]  Additional information:  [facility] on the 2nd Floor           Shima Ortiz MD On 6/11/2019.    Specialties:  Family Medicine, Wound Care  Why:  Outpatient Services Pulmonology Follow-Up Tuesday at 1:40 PM  Contact information:  [address] [place] [address]  [phone]               PLEASE BRING TO ALL FOLLOW UP APPOINTMENTS:   1) A COPY YOUR DISCHARGE INSTRUCTIONS   2) ALL MEDICINES YOU ARE CURRENTLY TAKING IN THEIR ORIGINAL BOTTLES   3) IDENTIFICATION CARD   4) INSURANCE CARD    **PLEASE ARRIVE 15 MINUTES AHEAD OF SCHEDULED APPOINTMENT TIME   ++PLEASE CALL 24 HOURS IN ADVANCE IF YOU MUST RESCHEDULE YOUR APPOINTMENT DAY AND/OR TIME     [facility] CARE MANAGEMENT WRITTEN DISCHARGE INFORMATION    APPOINTMENTS AND RESOURCES TO HELP YOU MANAGE YOUR CARE AT HOME BASED ON YOUR PREFERENCES:  (If an appointment is not scheduled for you when you leave the hospital, call your doctor to schedule a follow up visit within a week)        Healthy Living Instructions to HELP MANAGE YOUR CARE AT HOME:  Things You are responsible for:  1.    Getting your prescriptions filled   2.    Taking your medications as directed, DO NOT MISS ANY DOSES!  3.    Following the diet and exercise recommended by your doctor  4.    Going to your follow-up doctor appointment. This is important because it allows the doctor to monitor your progress and determine if any changes need to made to your treatment plan.  5. If you have any  questions about MANAGING YOUR CARE AT HOME Call the Nurse Care Line for 24/7 Assistance [phone]       Please answer any calls you may receive from [facility]. We want to continue to support you as you manage your healthcare needs. [facility] is happy to have the opportunity to serve you.      Thank you for choosing [facility] for your healthcare needs!  Your [facility] Case Management Team,    MAJO Pace  Registered Nurse Transition Navigator  [phone]

## 2019-06-07 NOTE — CONSULTS
Food & Nutrition  Education    Diet Education: Coumadin  Time Spent: 10 min  Learners: Patient      Nutrition Education provided with handouts:   1. Vitamin K and coumadin      Comments: Pt reports she has been on coumadin ~ 10 years and monitors her intake of Vitamin K foods. Pt did request Vitamin K food list to add to her education material. Provided list and reviewed. Pt receptive.      All questions and concerns answered. Dietitian's contact information provided.       Please Re-consult as needed    Petra Gutierrez RD        Thanks!

## 2019-06-07 NOTE — PLAN OF CARE
"   06/07/19 1633   Final Note   Assessment Type Final Discharge Note   Anticipated Discharge Disposition Home   What phone number can be called within the next 1-3 days to see how you are doing after discharge?   (Listed in chart)   Hospital Follow Up  Appt(s) scheduled? Yes   Discharge plans and expectations educations in teach back method with documentation complete? Yes   Right Care Referral Info   Post Acute Recommendation No Care     EDUCATION:  TN provided with educational information on Pleural Effusion.  Information reviewed and placed in :My Healthcare Packet" to be brought home for pt to use as resource after discharge.  Information included:  signs and symptoms to look for and call the doctor if experiencing, and symptoms that may indicate a medical emergency: CALL 911.  All questions answered.  Teach back method used.  Patient stated, "I will go to the ER if I have chest pain and breathing problems".    TN informed floor nurseElen, care management is complete and can proceed with discharge teaching.        "
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
## 2019-06-07 NOTE — NURSING
1353-remains npo status transporting to radiology by bed  for procedure no acute changes . Awaiting for her return to floor.

## 2019-06-07 NOTE — DISCHARGE SUMMARY
[facility] Hospital Medicine  Discharge Summary      Patient Name: Isabell Preston  MRN: [id number]  Admission Date: 6/6/2019  Hospital Length of Stay: 0 days  Discharge Date and Time:  06/07/2019 3:02 PM  Attending Physician: Leeann Martin MD   Discharging Provider: MELINA Mercado  Primary Care Provider: Ayo Archuleta MD      HPI:   73 y.o. female with hypertension, hyperlipidemia, chronic DVT on chronic OA C with Coumadin, lumbar DDD, chronic pain, iron deficiency anemia, chronic fatigue, dm 2, history of psychiatric hospitalization and history of CVA presents with a complaint of dyspnea on exertion which has been progressively worsening over the past few weeks.  Particularly severe for the past 3 days.  Associated with orthopnea and right flank pain beginning yesterday.  Chest x-ray and chest CT performed as outpatient by her PCP reveal large right pleural effusion.  She denies fever, chills, cough, chest pain, palpitations, lower extremity edema, syncope, dizziness, nausea, vomiting, diarrhea, abdominal pain, dysuria, frequency, or urgency.  Noted to be mildly hypoxic on room air in the ED and placed on supplemental oxygen.  Routine labs unremarkable.  INR is in therapeutic range.  Placed in observation for further evaluation and treatment.    * No surgery found *      Hospital Course:   73 y.o. very pleasant AA female with history significant for recurrent DVT on oral anticoagulation. She presented for progressively worsening SOB and dyspnea and was found to have an impressive Right pulmonary pleural effusion of unknown etiology. She was seen by pulmonology and Interventional Radiology was consulted for possible thoracentesis. Thoracentesis was completed successfully and pleural studies sent for diagnostics 1.5L. Repeat CXR was taken to verify post thoracentesis condition. Pulmonology cleared the patient for discharge to home and follow up in clinic. Her INR is therapeutic  at 2.4. She wants to change her coumadin clinic monitoring site [facility]. She is hemodynamically stable. CM to schedule follow up with Dr. Suazo, Pulmonology.     Consults:   Consults (From admission, onward)        Status Ordering Provider     Inpatient consult to Interventional Radiology  Once     Provider:  (Not yet assigned)    Completed MICHELL WILDER     Inpatient consult to Pulmonology  Once     Provider:  Derrek Suazo MD    Completed MICHELL WILDER     Nutrition Services Referral  Once     Provider:  (Not yet assigned)    Completed GEORGE GARCIA JR          No new Assessment & Plan notes have been filed under this hospital service since the last note was generated.  Service: Hospital Medicine    Final Active Diagnoses:    Diagnosis Date Noted POA    PRINCIPAL PROBLEM:  Pleural effusion, right [J90] 06/06/2019 Yes    Type 2 diabetes mellitus with diabetic cataract, without long-term current use of insulin [E11.36] 06/05/2019 Yes     Chronic    Hyperlipidemia [E78.5] 11/11/2016 Yes     Chronic    Chronic deep vein thrombosis (DVT) of distal vein of lower extremity, unspecified laterality [I82.5Z9] 10/21/2016 Yes     Chronic    Hypertension, essential [I10] 07/31/2015 Yes     Chronic      Problems Resolved During this Admission:       Discharged Condition: stable    Disposition: Home or Self Care    Follow Up:  Follow-up Information     Ayo Archuleta MD On 6/19/2019.    Specialties:  Internal Medicine, Wound Care  Why:  For outpatient follow-up/post hospitalizaton , Outpatient Services PCP Follow-Up Wednesday at 1:00 PM   Contact information:  [address] [facility] [address] [phone]             [facility] - Coumadin.    Specialty:  Cardiology  Why:  Outpatient Services Coumadin Monitoring; ambulatory referral to coumadin  Contact information:  [address] [facility] [address] [phone]  Additional  information:  [facility] on the 2nd Floor           Shima Ortiz MD On 6/11/2019.    Specialties:  Family Medicine, Wound Care  Why:  Outpatient Services Pulmonology Follow-Up Tuesday at 1:40 PM  Contact information:  [address] [id number] [phone]                 Patient Instructions:      Diet Cardiac     Activity as tolerated       Significant Diagnostic Studies: Labs:   CMP   Recent Labs   Lab 06/05/19  1528 06/06/19  1500 06/07/19  1240    140  --    K 3.7 3.3*  --     105  --    CO2 29 27  --     108  --    BUN 21 18  --    CREATININE 1.3 1.1  --    CALCIUM 9.6 9.7  --    PROT 7.6 7.6 6.9   ALBUMIN 3.7 3.9  --    BILITOT 0.5 0.5  --    ALKPHOS 101 108  --    AST 23 21  --    ALT 16 16  --    ANIONGAP 6* 8  --    ESTGFRAFRICA 47* 58*  --    EGFRNONAA 41* 50*  --    , CBC   Recent Labs   Lab 06/06/19  1500   WBC 9.40   HGB 12.5   HCT 38.4      , INR   Lab Results   Component Value Date    INR 2.4 (H) 06/07/2019    INR 2.6 (H) 06/06/2019    INR 2.5 (H) 06/05/2019   , Lipid Panel   Lab Results   Component Value Date    CHOL 144 06/05/2019    HDL 49 06/05/2019    LDLCALC 72.4 06/05/2019    TRIG 113 06/05/2019    CHOLHDL 34.0 06/05/2019   , Troponin   Recent Labs   Lab 06/06/19  1500   TROPONINI 0.009    and A1C:   Recent Labs   Lab 12/12/18  1025 03/19/19  1146 06/05/19  1528   HGBA1C 5.9* 6.6* 6.2*         Medications:  Reconciled Home Medications:      Medication List      CHANGE how you take these medications    DULoxetine 60 MG capsule  Commonly known as:  CYMBALTA  Take 1 capsule (60 mg total) by mouth once daily.  What changed:  when to take this     gabapentin 100 MG capsule  Commonly known as:  NEURONTIN  TAKE 1 CAPSULE(100 MG) BY MOUTH THREE TIMES DAILY  What changed:    · how much to take  · when to take this  · additional instructions     metoprolol succinate 25 MG 24 hr tablet  Commonly known as:  TOPROL-XL  TAKE 1 TABLET(25 MG) BY MOUTH EVERY DAY. TOTAL  DAILY DOSE 75 MG  What changed:  Another medication with the same name was removed. Continue taking this medication, and follow the directions you see here.        CONTINUE taking these medications    atorvastatin 10 MG tablet  Commonly known as:  LIPITOR  TAKE 1 TABLET(10 MG) BY MOUTH EVERY DAY     COUMADIN 5 MG tablet  Generic drug:  warfarin     dicyclomine 10 MG capsule  Commonly known as:  BENTYL  TAKE 1 CAPSULE BY MOUTH THREE TIMES DAILY     fluticasone 27.5 mcg/actuation nasal spray  Commonly known as:  VERAMYST  2 sprays by Nasal route once daily.     hydrOXYzine HCl 25 MG tablet  Commonly known as:  ATARAX  Take 1 tablet (25 mg total) by mouth daily as needed for Anxiety.     hyoscyamine 0.125 mg Subl  Commonly known as:  LEVSIN/SL  Take 0.125 mg by mouth.     lancets 28 gauge Misc  Commonly known as:  TRUEPLUS LANCETS  1 lancet by Misc.(Non-Drug; Combo Route) route once daily. Test blood sugar once daily, type 2 diabetes, controlled. E11.9     LINZESS 72 mcg Cap capsule  Generic drug:  linaclotide  TAKE 1 CAPSULE(72 MCG) BY MOUTH DAILY     losartan 100 MG tablet  Commonly known as:  COZAAR  Take 1 tablet (100 mg total) by mouth once daily.     meclizine 25 mg tablet  Commonly known as:  ANTIVERT  TAKE 1 TABLET(25 MG) BY MOUTH THREE TIMES DAILY AS NEEDED FOR DIZZINESS     mometasone 0.1% 0.1 % cream  Commonly known as:  ELOCON  BALWINDER TO DARK AREAS BID ON LEGS PRN            Indwelling Lines/Drains at time of discharge:   Lines/Drains/Airways          None          Time spent on the discharge of patient: 35 minutes  Patient was seen and examined on the date of discharge and determined to be suitable for discharge.         LATOYA Arriaga, FNP-C  Hospitalist - Department of Hospital Medicine  [address], [place] [address]  Office [phone]; Pager [phone]

## 2019-06-07 NOTE — CARE UPDATE
Oxygen sat on room air 91%    LATOYA Arriaga, FNP-C  Hospitalist - Department of Hospital Medicine  [address] [place] [address]  Office [phone]; Pager [phone]

## 2019-06-07 NOTE — DISCHARGE SUMMARY
[facility] Hospital Medicine  Discharge Summary      Patient Name: Isabell Preston  MRN: [id number]  Admission Date: 6/6/2019  Hospital Length of Stay: 0 days  Discharge Date and Time:  06/07/2019 2:36 PM  Attending Physician: Leeann Martin MD   Discharging Provider: MELINA Mercado  Primary Care Provider: Ayo Archuleta MD      HPI:   73 y.o. female with hypertension, hyperlipidemia, chronic DVT on chronic OA C with Coumadin, lumbar DDD, chronic pain, iron deficiency anemia, chronic fatigue, dm 2, history of psychiatric hospitalization and history of CVA presents with a complaint of dyspnea on exertion which has been progressively worsening over the past few weeks.  Particularly severe for the past 3 days.  Associated with orthopnea and right flank pain beginning yesterday.  Chest x-ray and chest CT performed as outpatient by her PCP reveal large right pleural effusion.  She denies fever, chills, cough, chest pain, palpitations, lower extremity edema, syncope, dizziness, nausea, vomiting, diarrhea, abdominal pain, dysuria, frequency, or urgency.  Noted to be mildly hypoxic on room air in the ED and placed on supplemental oxygen.  Routine labs unremarkable.  INR is in therapeutic range.  Placed in observation for further evaluation and treatment.    * No surgery found *      Hospital Course:   73 y.o. very pleasant AA female with history significant for recurrent DVT on oral anticoagulation. She presented for progressively worsening SOB and dyspnea and was found to have an impressive Right pulmonary pleural effusion of unknown etiology. She was seen by pulmonology and Interventional Radiology was consulted for possible thoracentesis. Thoracentesis was completed successfully and pleural studies sent for diagnostics. Repeat CXR was taken to verify post thoracentesis condition. Pulmonology cleared the patient for discharge to home and follow up in clinic. Her INR is therapeutic at  2.4. She is hemodynamically stable. CM to schedule follow up with Dr. Suazo, Pulmonology.         Final Active Diagnoses:    Diagnosis Date Noted POA    PRINCIPAL PROBLEM:  Pleural effusion, right [J90] 06/06/2019 Yes    Type 2 diabetes mellitus with diabetic cataract, without long-term current use of insulin [E11.36] 06/05/2019 Yes     Chronic    Hyperlipidemia [E78.5] 11/11/2016 Yes     Chronic    Chronic deep vein thrombosis (DVT) of distal vein of lower extremity, unspecified laterality [I82.5Z9] 10/21/2016 Yes     Chronic    Hypertension, essential [I10] 07/31/2015 Yes     Chronic      Problems Resolved During this Admission:       Discharged Condition: stable    Disposition: Home or Self Care    Follow Up:  Follow-up Information     Ayo Archuleta MD On 6/19/2019.    Specialties:  Internal Medicine, Wound Care  Why:  For outpatient follow-up/post hospitalizaton , Outpatient Services PCP Follow-Up Wednesday at 1:00 PM   Contact information:  [address] [place] [address]  David BENZ [id number]  [phone]                 Patient Instructions:      Diet Cardiac     Activity as tolerated       Significant Diagnostic Studies: Labs:   CMP   Recent Labs   Lab 06/05/19  1528 06/06/19  1500 06/07/19  1240    140  --    K 3.7 3.3*  --     105  --    CO2 29 27  --     108  --    BUN 21 18  --    CREATININE 1.3 1.1  --    CALCIUM 9.6 9.7  --    PROT 7.6 7.6 6.9   ALBUMIN 3.7 3.9  --    BILITOT 0.5 0.5  --    ALKPHOS 101 108  --    AST 23 21  --    ALT 16 16  --    ANIONGAP 6* 8  --    ESTGFRAFRICA 47* 58*  --    EGFRNONAA 41* 50*  --    , CBC   Recent Labs   Lab 06/06/19  1500   WBC 9.40   HGB 12.5   HCT 38.4      , INR   Lab Results   Component Value Date    INR 2.4 (H) 06/07/2019    INR 2.6 (H) 06/06/2019    INR 2.5 (H) 06/05/2019   , Lipid Panel   Lab Results   Component Value Date    CHOL 144 06/05/2019    HDL 49 06/05/2019    LDLCALC 72.4 06/05/2019    TRIG 113 06/05/2019    CHOLHDL 34.0 06/05/2019    , Troponin   Recent Labs   Lab 06/06/19  1500   TROPONINI 0.009    and A1C:   Recent Labs   Lab 12/12/18  1025 03/19/19  1146 06/05/19  1528   HGBA1C 5.9* 6.6* 6.2*       Pending Diagnostic Studies:     Procedure Component Value Units Date/Time    Albumin, Peritoneal, Pleural Fluid or CHUCHO Drainage, In-House Thoracentesis Fluid [[id number]] Collected:  06/07/19 1410    Order Status:  Sent Lab Status:  In process Updated:  06/07/19 1417    Specimen:  Body Fluid     Cholesterol, Body Fluid (Reference Lab) Pleural Fluid, Right [[id number]] Collected:  06/07/19 1410    Order Status:  Sent Lab Status:  In process Updated:  06/07/19 1417    Specimen:  Body Fluid     Cytology Specimen - Please Refrigerate [[id number]] Collected:  06/07/19 1410    Order Status:  Sent Lab Status:  No result     Specimen:  Other specimen location (comment)     Glucose, Peritoneal, Pleural Fluid or CHUCHO Drainage, In-House Thoracentesis Fluid [[id number]] Collected:  06/07/19 1410    Order Status:  Sent Lab Status:  In process Updated:  06/07/19 1417    Specimen:  Body Fluid     IR Thoracentesis with Imaging [[id number]] Resulted:  06/07/19 1407    Order Status:  Sent Lab Status:  In process Updated:  06/07/19 1407    LDH, Peritoneal, Pleural Fluid or CHUCHO Drainage, In-House Thoracentesis Fluid [[id number]] Collected:  06/07/19 1410    Order Status:  Sent Lab Status:  In process Updated:  06/07/19 1417    Specimen:  Body Fluid     Protein, Peritoneal, Pleural Fluid or CHUCHO Drainage, In-House Thoracentesis Fluid [[id number]] Collected:  06/07/19 1410    Order Status:  Sent Lab Status:  In process Updated:  06/07/19 1417    Specimen:  Body Fluid     WBC & Diff,Body Fluid Thoracentesis Fluid [[id number]] Collected:  06/07/19 1410    Order Status:  Sent Lab Status:  In process Updated:  06/07/19 1417    Specimen:  Body Fluid     X-Ray Chest 1 View [[id number]]     Order Status:  Sent Lab Status:  No result          Medications:  Reconciled Home Medications:       Medication List      CHANGE how you take these medications    DULoxetine 60 MG capsule  Commonly known as:  CYMBALTA  Take 1 capsule (60 mg total) by mouth once daily.  What changed:  when to take this     gabapentin 100 MG capsule  Commonly known as:  NEURONTIN  TAKE 1 CAPSULE(100 MG) BY MOUTH THREE TIMES DAILY  What changed:    · how much to take  · when to take this  · additional instructions     metoprolol succinate 25 MG 24 hr tablet  Commonly known as:  TOPROL-XL  TAKE 1 TABLET(25 MG) BY MOUTH EVERY DAY. TOTAL DAILY DOSE 75 MG  What changed:  Another medication with the same name was removed. Continue taking this medication, and follow the directions you see here.        CONTINUE taking these medications    atorvastatin 10 MG tablet  Commonly known as:  LIPITOR  TAKE 1 TABLET(10 MG) BY MOUTH EVERY DAY     COUMADIN 5 MG tablet  Generic drug:  warfarin     dicyclomine 10 MG capsule  Commonly known as:  BENTYL  TAKE 1 CAPSULE BY MOUTH THREE TIMES DAILY     fluticasone 27.5 mcg/actuation nasal spray  Commonly known as:  VERAMYST  2 sprays by Nasal route once daily.     hydrOXYzine HCl 25 MG tablet  Commonly known as:  ATARAX  Take 1 tablet (25 mg total) by mouth daily as needed for Anxiety.     hyoscyamine 0.125 mg Subl  Commonly known as:  LEVSIN/SL  Take 0.125 mg by mouth.     lancets 28 gauge Misc  Commonly known as:  TRUEPLUS LANCETS  1 lancet by Misc.(Non-Drug; Combo Route) route once daily. Test blood sugar once daily, type 2 diabetes, controlled. E11.9     LINZESS 72 mcg Cap capsule  Generic drug:  linaclotide  TAKE 1 CAPSULE(72 MCG) BY MOUTH DAILY     losartan 100 MG tablet  Commonly known as:  COZAAR  Take 1 tablet (100 mg total) by mouth once daily.     meclizine 25 mg tablet  Commonly known as:  ANTIVERT  TAKE 1 TABLET(25 MG) BY MOUTH THREE TIMES DAILY AS NEEDED FOR DIZZINESS     mometasone 0.1% 0.1 % cream  Commonly known as:  ELOCON  BALWINDER TO DARK AREAS BID ON LEGS PRN            Indwelling  Lines/Drains at time of discharge:   Lines/Drains/Airways          None          Time spent on the discharge of patient: 35 minutes  Patient was seen and examined on the date of discharge and determined to be suitable for discharge.         LATOYA Arriaga, FNP-C  Hospitalist - Department of Hospital Medicine  [address] [place] [address]  Office [phone]; Pager [phone]
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
## 2019-06-07 NOTE — PROGRESS NOTES
WRITTEN DISCHARGE HEALTH INFORMATION ON PLEURAL EFFUSION    Pleural Effusion    The pleura is a smooth double membrane that surrounds the lungs. It separates the lungs from the chest wall. One side of the pleura attaches to the lung. The other side attaches to the chest wall. This membrane makes it easier for the chest to inflate and deflate as you breathe without rubbing against the ribs. You normally have a small amount of lubricating fluid (pleural fluid) between the pleural membranes.  A pleural effusion is when too much fluid collects in the space between the two pleural membranes (pleural space). As the amount of fluid increases, it begins to press on the lung. This makes it harder to take a full breath.    Pleural effusion may cause any of these symptoms:  · Shortness of breath  · Rapid breathing  · Cough or hiccups  · Sharp chest pain that hurts more with coughing or deep breathing  A small pleural effusion may cause no symptoms at all.  Treatment will be directed at the cause of the pleural effusion. If you are having a lot of trouble breathing, a thoracentesis procedure may be done to remove the fluid from the pleural space. This involves placing a needle or tube (catheter) through the chest wall into the pleural space. This usually gives relief right away. But the fluid may gradually return.    Home care  Follow these guidelines when caring for yourself at home:  · Rest until you feel better. Exerting yourself may make your symptoms worse.  · Your healthcare provider may have prescribed medicines to treat the underlying cause of the pleural effusion. Take these exactly as directed.    Follow-up care  Follow up with your healthcare provider, or as advised.    When to seek medical advice  Call your healthcare provider right away if any of these occur:  · Fever of 100.4ºF (38ºC) or higher    Call 911 or get immediate medical care  Contact emergency services right away if any of the following  occur:  · Shortness of breath gets worse  · Chest pain gets worse  · Coughing up blood  · Weakness, dizziness, or fainting

## 2019-06-07 NOTE — NURSING
1622- oxygen on room air at rest 97% .      1626-Patient resting in bed right posterior backside transparent dressing clean dry and intact no drainage vitals stable no changes on telemetry . Iv site clean dry and intact removed. Instructed patient to keep all follow up visits and bring after visit summary booklet to all visits monitor puncture site for any drainage keep covered til site is closed report streaking or swelling to dr edgar . Take all medications as ordered don't stop taking unless discussed with doctor first. Patient voiced understanding.

## 2019-06-07 NOTE — NURSING
1758- no prescriptions given reviewed all follow up visits with patient. No questions or concerns. Patient is discharged no acute events.

## 2019-06-07 NOTE — PROGRESS NOTES
5355 TN contacted [facility] at [phone]; spoke with staff who stated an ambulatory referral needs to be completed. TN contacted attending, NP, Elif, who will put in ambulatory referral.    TN scheduled a pulmonology f/u with Dr. Shima Ortiz electronically on 06/11/19 at 1:40 PM.    PCP with Dr. Archuleta previously scheduled on 06/19/19 at 1:00 PM.

## 2019-06-07 NOTE — PROCEDURES
Radiology Post Procedure Note:     Procedure: IR Right Thoracentesis    (s): Raimundo    Blood Loss: Minimal    Specimen: Clear yellow pleural fluid    Findings:   Patient came to IR and under imaging guidance had a 5 F Yueh placed into the right pleural cavity.     Sidney QUINTERO M.D. personally reviewed and agree with the above dictated note.

## 2019-06-07 NOTE — NURSING
Pt states she is starting to feel a little better but that her back hurts where doctor did thoracentesis. Pt appears in no immediate distress.

## 2019-06-07 NOTE — HOSPITAL COURSE
73 y.o. very pleasant AA female with history significant for recurrent DVT on oral anticoagulation. She presented for progressively worsening SOB and dyspnea and was found to have an impressive Right pulmonary pleural effusion of unknown etiology. She was seen by pulmonology and Interventional Radiology was consulted for possible thoracentesis. Thoracentesis was completed successfully and pleural studies sent for diagnostics 1.5L. Repeat CXR was taken to verify post thoracentesis condition. Pulmonology cleared the patient for discharge to home and follow up in clinic. Her INR is therapeutic at 2.4. She wants to change her coumadin clinic monitoring site therefore ambulatory referral to [facility] coumadin clinic was entere. She is hemodynamically stable. CM to schedule follow up with Dr. Suazo, Pulmonology.

## 2019-06-07 NOTE — ASSESSMENT & PLAN NOTE
New onset when compared to prior imaging 4/18.  History suggestive of subacute course.  Patient has been dyspneic over past 1 month.  Ddx:   malignancy vs inflammation.  No evidence of infection.  Thoracentesis today per IR.  Will send for cytology and chemistry.  Patient can follow up with me upon discharge.

## 2019-06-07 NOTE — NURSING
"Pt appears to have tolerated thoracentesis well. VSS stable. Pt then states "my throat feels funny" like her throat is closing. Dr. Pabon made aware and orders received for benadryl and solumedrol.  "

## 2019-06-07 NOTE — NURSING
Bedside rounding report received from mary carmen triana rn on patients progress and updated handoff report sheet assessment completed plan updated on board and reviewed with patient wearing oxygen at 3 liters by nasal canula head of bed up rails up x 3 bed alarm on . Denies needs.

## 2019-06-08 LAB
ALBUMIN FLD-MCNC: 2.9 G/DL
BODY FLUID SOURCE, LDH: NORMAL
GLUCOSE FLD-MCNC: 119 MG/DL
LDH FLD L TO P-CCNC: 192 U/L
PROT FLD-MCNC: 4.7 G/DL
SPECIMEN SOURCE: NORMAL

## 2019-06-10 ENCOUNTER — TELEPHONE (OUTPATIENT)
Dept: PSYCHIATRY | Facility: CLINIC | Age: 74
End: 2019-06-10

## 2019-06-10 ENCOUNTER — ANTI-COAG VISIT (OUTPATIENT)
Dept: CARDIOLOGY | Facility: CLINIC | Age: 74
End: 2019-06-10

## 2019-06-10 ENCOUNTER — TELEPHONE (OUTPATIENT)
Dept: PSYCHIATRY | Facility: HOSPITAL | Age: 74
End: 2019-06-10

## 2019-06-10 DIAGNOSIS — I10 HTN, GOAL BELOW 140/90: ICD-10-CM

## 2019-06-10 DIAGNOSIS — E11.9 CONTROLLED TYPE 2 DIABETES MELLITUS WITHOUT COMPLICATION, WITHOUT LONG-TERM CURRENT USE OF INSULIN: Chronic | ICD-10-CM

## 2019-06-10 DIAGNOSIS — Z79.01 CHRONIC ANTICOAGULATION: ICD-10-CM

## 2019-06-10 DIAGNOSIS — I82.5Z9 CHRONIC DEEP VEIN THROMBOSIS (DVT) OF DISTAL VEIN OF LOWER EXTREMITY, UNSPECIFIED LATERALITY: ICD-10-CM

## 2019-06-10 DIAGNOSIS — I10 HYPERTENSION, ESSENTIAL: ICD-10-CM

## 2019-06-10 LAB
APPEARANCE FLD: NORMAL
BODY FLD TYPE: NORMAL
COLOR FLD: NORMAL
LYMPHOCYTES NFR FLD MANUAL: 59 %
MESOTHL CELL NFR FLD MANUAL: 28 %
MONOS+MACROS NFR FLD MANUAL: 8 %
NEUTROPHILS NFR FLD MANUAL: 5 %
PATH INTERP FLD-IMP: NORMAL
WBC # FLD: 1149 /CU MM

## 2019-06-10 RX ORDER — METOPROLOL SUCCINATE 25 MG/1
TABLET, EXTENDED RELEASE ORAL
Qty: 90 TABLET | Refills: 0 | Status: SHIPPED | OUTPATIENT
Start: 2019-06-10 | End: 2019-06-11 | Stop reason: SDUPTHER

## 2019-06-10 RX ORDER — METOPROLOL SUCCINATE 50 MG/1
TABLET, EXTENDED RELEASE ORAL
Qty: 90 TABLET | Refills: 0 | Status: SHIPPED | OUTPATIENT
Start: 2019-06-10 | End: 2019-06-11 | Stop reason: SDUPTHER

## 2019-06-10 RX ORDER — TRIAMTERENE/HYDROCHLOROTHIAZID 37.5-25 MG
TABLET ORAL
Qty: 90 TABLET | Refills: 0 | Status: SHIPPED | OUTPATIENT
Start: 2019-06-10 | End: 2019-06-11 | Stop reason: SDUPTHER

## 2019-06-10 RX ORDER — DULOXETIN HYDROCHLORIDE 60 MG/1
60 CAPSULE, DELAYED RELEASE ORAL DAILY
Qty: 90 CAPSULE | Refills: 0 | Status: SHIPPED | OUTPATIENT
Start: 2019-06-10 | End: 2019-08-07 | Stop reason: SDUPTHER

## 2019-06-10 RX ORDER — ATORVASTATIN CALCIUM 10 MG/1
TABLET, FILM COATED ORAL
Qty: 90 TABLET | Refills: 0 | Status: SHIPPED | OUTPATIENT
Start: 2019-06-10 | End: 2019-06-11 | Stop reason: SDUPTHER

## 2019-06-10 NOTE — TELEPHONE ENCOUNTER
----- Message from Sonam Correia MA sent at 6/10/2019  3:19 PM CDT -----  Contact: Patient  Patient called to request Rx refill for duloxetine.

## 2019-06-10 NOTE — PROGRESS NOTES
73 year old female,  history significant for recurrent DVT on oral anticoagulation, admitted 6/6/19 -6/7/19 for PE treated by thoracentesis, calendar updated on dose given during admit    PMHX  Recurrent DVT, CVA, clotting disorder, HTN, HLD, GI bleed, Diverticulosis, DM, ID anemia      Patient verified she takes Coumadin 5 mg strength tab, 1/2 tab on Tuesday and 1 tab all other days which she has been on this dose for over a year.  Her clinic education appointment is on 6/17/19.  She avoids greens in her plan of care.    Dr Archuleta sent a reply to my staff message to use the INR goal of 2.0 - 3.0

## 2019-06-10 NOTE — TELEPHONE ENCOUNTER
Called patient to let her know that we did get Rx refill request that she left on our voicemail. A message was sent to Dr Vasquez so she should check with pharmacy later today or tomorrow. Asked that she call our clinic with any issues.

## 2019-06-11 ENCOUNTER — OFFICE VISIT (OUTPATIENT)
Dept: FAMILY MEDICINE | Facility: CLINIC | Age: 74
End: 2019-06-11
Payer: MEDICARE

## 2019-06-11 ENCOUNTER — TELEPHONE (OUTPATIENT)
Dept: PULMONOLOGY | Facility: CLINIC | Age: 74
End: 2019-06-11

## 2019-06-11 VITALS
HEART RATE: 88 BPM | HEIGHT: 67 IN | TEMPERATURE: 98 F | SYSTOLIC BLOOD PRESSURE: 110 MMHG | OXYGEN SATURATION: 96 % | WEIGHT: 176.56 LBS | BODY MASS INDEX: 27.71 KG/M2 | DIASTOLIC BLOOD PRESSURE: 80 MMHG

## 2019-06-11 DIAGNOSIS — M51.36 DDD (DEGENERATIVE DISC DISEASE), LUMBAR: ICD-10-CM

## 2019-06-11 DIAGNOSIS — I82.5Z9 CHRONIC DEEP VEIN THROMBOSIS (DVT) OF DISTAL VEIN OF LOWER EXTREMITY, UNSPECIFIED LATERALITY: ICD-10-CM

## 2019-06-11 DIAGNOSIS — F33.0 MAJOR DEPRESSIVE DISORDER, RECURRENT EPISODE, MILD: ICD-10-CM

## 2019-06-11 DIAGNOSIS — J90 PLEURAL EFFUSION: Primary | ICD-10-CM

## 2019-06-11 DIAGNOSIS — I10 HTN, GOAL BELOW 140/90: ICD-10-CM

## 2019-06-11 DIAGNOSIS — M46.00 SPINAL ENTHESOPATHY: ICD-10-CM

## 2019-06-11 DIAGNOSIS — H81.10 BENIGN PAROXYSMAL POSITIONAL VERTIGO, UNSPECIFIED LATERALITY: ICD-10-CM

## 2019-06-11 DIAGNOSIS — Z79.01 CHRONIC ANTICOAGULATION: ICD-10-CM

## 2019-06-11 DIAGNOSIS — E11.9 CONTROLLED TYPE 2 DIABETES MELLITUS WITHOUT COMPLICATION, WITHOUT LONG-TERM CURRENT USE OF INSULIN: Chronic | ICD-10-CM

## 2019-06-11 DIAGNOSIS — I10 HYPERTENSION, ESSENTIAL: ICD-10-CM

## 2019-06-11 DIAGNOSIS — N18.30 CHRONIC KIDNEY DISEASE, STAGE III (MODERATE): ICD-10-CM

## 2019-06-11 LAB
BACTERIA BLD CULT: NORMAL
BACTERIA BLD CULT: NORMAL
CHOLEST FLD-MCNC: 85 MG/DL
SPECIMEN SOURCE: NORMAL

## 2019-06-11 PROCEDURE — 99999 PR PBB SHADOW E&M-EST. PATIENT-LVL IV: CPT | Mod: PBBFAC,,, | Performed by: FAMILY MEDICINE

## 2019-06-11 PROCEDURE — 99214 PR OFFICE/OUTPT VISIT, EST, LEVL IV, 30-39 MIN: ICD-10-PCS | Mod: S$GLB,,, | Performed by: FAMILY MEDICINE

## 2019-06-11 PROCEDURE — 99999 PR PBB SHADOW E&M-EST. PATIENT-LVL IV: ICD-10-PCS | Mod: PBBFAC,,, | Performed by: FAMILY MEDICINE

## 2019-06-11 PROCEDURE — 99499 RISK ADDL DX/OHS AUDIT: ICD-10-PCS | Mod: S$GLB,,, | Performed by: FAMILY MEDICINE

## 2019-06-11 PROCEDURE — 99214 OFFICE O/P EST MOD 30 MIN: CPT | Mod: S$GLB,,, | Performed by: FAMILY MEDICINE

## 2019-06-11 PROCEDURE — 99499 UNLISTED E&M SERVICE: CPT | Mod: S$GLB,,, | Performed by: FAMILY MEDICINE

## 2019-06-11 RX ORDER — TIZANIDINE 4 MG/1
4 TABLET ORAL DAILY PRN
COMMUNITY
End: 2019-09-25 | Stop reason: SDUPTHER

## 2019-06-11 RX ORDER — ATORVASTATIN CALCIUM 10 MG/1
TABLET, FILM COATED ORAL
Qty: 90 TABLET | Refills: 0 | Status: SHIPPED | OUTPATIENT
Start: 2019-06-11 | End: 2019-09-09 | Stop reason: SDUPTHER

## 2019-06-11 RX ORDER — METOPROLOL SUCCINATE 50 MG/1
TABLET, EXTENDED RELEASE ORAL
Qty: 90 TABLET | Refills: 0 | Status: SHIPPED | OUTPATIENT
Start: 2019-06-11 | End: 2019-09-09 | Stop reason: SDUPTHER

## 2019-06-11 RX ORDER — TRIAMTERENE/HYDROCHLOROTHIAZID 37.5-25 MG
1 TABLET ORAL DAILY
Qty: 90 TABLET | Refills: 0 | Status: ON HOLD | OUTPATIENT
Start: 2019-06-11 | End: 2019-06-30 | Stop reason: SDUPTHER

## 2019-06-11 RX ORDER — MECLIZINE HYDROCHLORIDE 25 MG/1
TABLET ORAL
Qty: 30 TABLET | Refills: 0 | Status: SHIPPED | OUTPATIENT
Start: 2019-06-11 | End: 2020-02-19

## 2019-06-11 RX ORDER — AMLODIPINE BESYLATE 10 MG/1
10 TABLET ORAL DAILY
Status: ON HOLD | COMMUNITY
End: 2019-07-08 | Stop reason: HOSPADM

## 2019-06-11 RX ORDER — METOPROLOL SUCCINATE 25 MG/1
TABLET, EXTENDED RELEASE ORAL
Qty: 90 TABLET | Refills: 0 | Status: SHIPPED | OUTPATIENT
Start: 2019-06-11 | End: 2019-09-09 | Stop reason: SDUPTHER

## 2019-06-11 NOTE — TELEPHONE ENCOUNTER
----- Message from Derrek Suazo MD sent at 6/11/2019  2:25 PM CDT -----  Patient has an appointment with me on 7/15.  Please schedule clinic follow up in 2 weeks as an establish patient.  Ok to overbook.

## 2019-06-11 NOTE — PROGRESS NOTES
Routine Office Visit    Patient Name: Isabell Preston    : 1945  MRN: [id number]    Subjective:  Isabell is a 73 y.o. female who presents today for     Transitional Care Note    Family and/or Caretaker present at visit?  No.  Diagnostic tests reviewed/disposition: I have reviewed all completed as well as pending diagnostic tests at the time of discharge.   Disease/illness education: pleural effusion   Home health/community services discussion/referrals: Patient does not have home health established from hospital visit.  They do not need home health.  If needed, we will set up home health for the patient.   Establishment or re-establishment of referral orders for community resources: No other necessary community resources.   Discussion with other health care providers: pt will need to follow-up with pulmonology - referral placed.     HPI - pt was recently admitted for severe SOB especially upon exertion. Pt noted to have right sided pleural effusion on cxr. Pt was admitted and had thoracentesis and had 1.5L removed. 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 She states fluid removed from thoracentesis was bloody. She was noted to be stable and had stable INR. She continues with her coumadin and follow-up with coumadin clinic. She has referral to see cardiology.  She was to have follow-up with Dr. Suazo pulmonology and thought she was coming here today for that. No referral noted in system. Will refer.       Review of Systems   Constitutional: Negative for chills and fever.   HENT: Negative for congestion.    Eyes: Negative for blurred vision.   Respiratory: Positive for shortness of breath. Negative for cough.    Cardiovascular: Negative for chest pain.   Gastrointestinal: Negative for abdominal pain, constipation, diarrhea, heartburn, nausea and vomiting.   Genitourinary: Negative for dysuria.   Musculoskeletal: Negative for myalgias.   Skin: Negative for itching and rash.   Neurological:  Negative for dizziness and headaches.   Psychiatric/Behavioral: Negative for depression.       Active Problem List  Patient Active Problem List   Diagnosis    Nausea    Hypertension, essential    Blurred vision, left eye    SI (sacroiliac) joint dysfunction    Muscle spasm    Spinal enthesopathy    DDD (degenerative disc disease), lumbar    Chronic pain of left ankle    Pain    Chronic deep vein thrombosis (DVT) of distal vein of lower extremity, unspecified laterality    Hyperlipidemia    History of CVA (cerebrovascular accident)    Chronic anticoagulation    Iron deficiency anemia due to chronic blood loss    Iron deficiency anemia due to sideropenic dysphagia    Iron deficiency anemia    Diverticulosis of large intestine with hemorrhage    Elevated d-dimer    Generalized abdominal pain    Chronic fatigue    Neuropathic pain    Elevated serum creatinine    Type 2 diabetes mellitus with diabetic cataract, without long-term current use of insulin    Pleural effusion, right    Chronic kidney disease, stage III (moderate)    Major depressive disorder, recurrent episode, mild       Past Surgical History  Past Surgical History:   Procedure Laterality Date    ANKLE FRACTURE SURGERY      left ankle    APENDIX AND GALL BLADDER REMOVED      APPENDECTOMY      BREAST SURGERY  1998    lumpectomy right side - benign    CHOLECYSTECTOMY      colon resection for diverticulitis x 2      COLONOSCOPY N/A 6/6/2013    [facility] ([facility])    EGD (ESOPHAGOGASTRODUODENOSCOPY) N/A 6/6/2013    Performed by [facility] at [facility] ENDO (4TH FLR)    ESOPHAGOGASTRODUODENOSCOPY (EGD) N/A 11/11/2016    [facility] at [facility] ENDO    HEMORRHOID SURGERY      HERNIA REPAIR  2000    umbilical hernia repair    HYSTERECTOMY      INJECTION-STEROID-EPIDURAL-TRANSFORAMINAL Left 9/8/2016    [facility] PAIN T     TONSILLECTOMY      TOTAL ABDOMINAL HYSTERECTOMY W/ BILATERAL SALPINGOOPHORECTOMY      UMBILICAL HERNIA REPAIR         Family History  Family History   Problem Relation Age of Onset    Glaucoma Mother     Stroke Mother     Stroke Paternal Uncle     Early death Paternal Uncle          from stroke in 40s    Cancer Father         multiple myeloma    Arthritis Father     Cataracts Sister     Diabetes Sister     Arthritis Sister     Alcohol abuse Brother     Depression Brother     Clotting disorder Maternal Aunt         DVT    Birth defects Daughter         bilateral ear defects    Heart disease Daughter         Sinus tachycardia    Cataracts Paternal Grandmother     Arthritis Paternal Grandmother     Diabetes Paternal Grandmother     Glaucoma Paternal Grandmother     Breast cancer Maternal Aunt     Schizophrenia Neg Hx     Suicide Neg Hx        Social History  Social History     Socioeconomic History    Marital status:      Spouse name: Not on file    Number of children: 3    Years of education: Not on file    Highest education level: Not on file   Occupational History    Occupation: retired -    Social Needs    Financial resource strain: Not on file    Food insecurity:     Worry: Not on file     Inability: Not on file    Transportation needs:     Medical: Not on file     Non-medical: Not on file   Tobacco Use    Smoking status: Former Smoker     Types: Cigarettes     Last attempt to quit: 1985     Years since quittin.9    Smokeless tobacco: Never Used   Substance and Sexual Activity    Alcohol use: No    Drug use: No    Sexual activity: Never   Lifestyle    Physical activity:     Days per week: Not on file     Minutes per session: Not on file    Stress: Not on file   Relationships    Social connections:     Talks on phone: Not on file     Gets together: Not on file     Attends [employer] service: Not on file     Active member of club or  organization: Not on file     Attends meetings of clubs or organizations: Not on file     Relationship status: Not on file   Other Topics Concern    Patient feels they ought to cut down on drinking/drug use Not Asked    Patient annoyed by others criticizing their drinking/drug use Not Asked    Patient has felt bad or guilty about drinking/drug use Not Asked    Patient has had a drink/used drugs as an eye opener in the AM Not Asked   Social History Narrative    Not on file       Medications and Allergies  Reviewed and updated.   Current Outpatient Medications   Medication Sig    amLODIPine (NORVASC) 10 MG tablet Take 10 mg by mouth once daily.    atorvastatin (LIPITOR) 10 MG tablet TAKE 1 TABLET(10 MG) BY MOUTH EVERY DAY    COUMADIN 5 mg tablet     dicyclomine (BENTYL) 10 MG capsule TAKE 1 CAPSULE BY MOUTH THREE TIMES DAILY    DULoxetine (CYMBALTA) 60 MG capsule Take 1 capsule (60 mg total) by mouth once daily.    fluticasone (VERAMYST) 27.5 mcg/actuation nasal spray 2 sprays by Nasal route once daily.    gabapentin (NEURONTIN) 100 MG capsule TAKE 1 CAPSULE(100 MG) BY MOUTH THREE TIMES DAILY (Patient taking differently: 100 mg 2 (two) times daily. TAKE 1 CAPSULE(100 MG) BY MOUTH THREE TIMES DAILY)    hydrOXYzine HCl (ATARAX) 25 MG tablet Take 1 tablet (25 mg total) by mouth daily as needed for Anxiety.    lancets (TRUEPLUS LANCETS) 28 gauge Misc 1 lancet by Misc.(Non-Drug; Combo Route) route once daily. Test blood sugar once daily, type 2 diabetes, controlled. E11.9    LINZESS 72 mcg Cap TAKE 1 CAPSULE(72 MCG) BY MOUTH DAILY    losartan (COZAAR) 100 MG tablet Take 1 tablet (100 mg total) by mouth once daily.    meclizine (ANTIVERT) 25 mg tablet TAKE 1 TABLET(25 MG) BY MOUTH THREE TIMES DAILY AS NEEDED FOR DIZZINESS    metoprolol succinate (TOPROL-XL) 25 MG 24 hr tablet TAKE 1 TABLET(25 MG) BY MOUTH EVERY DAY. TOTAL DAILY DOSE 75 MG    metoprolol succinate (TOPROL-XL) 50 MG 24 hr tablet TAKE 1  "TABLET(50 MG) BY MOUTH EVERY DAY. TOTAL DAILY DOSE 75 MG    mometasone 0.1% (ELOCON) 0.1 % cream BALWINDER TO DARK AREAS BID ON LEGS PRN    tiZANidine (ZANAFLEX) 4 MG tablet Take 4 mg by mouth as needed.    triamterene-hydrochlorothiazide 37.5-25 mg (MAXZIDE-25) 37.5-25 mg per tablet Take 1 tablet by mouth once daily.     No current facility-administered medications for this visit.        Physical Exam  /80 (BP Location: Right arm, Patient Position: Sitting, BP Method: Large (Manual))   Pulse 88   Temp 97.9 °F (36.6 °C) (Oral)   Ht 5' 7" (1.702 m)   Wt 80.1 kg (176 lb 9.4 oz)   SpO2 96%   BMI 27.66 kg/m²   Physical Exam   Constitutional: She is oriented to person, place, and time. She appears well-developed and well-nourished.   HENT:   Head: Normocephalic and atraumatic.   Eyes: Pupils are equal, round, and reactive to light. Conjunctivae and EOM are normal.   Neck: Normal range of motion. Neck supple.   Cardiovascular: Normal rate, regular rhythm and normal heart sounds. Exam reveals no gallop and no friction rub.   No murmur heard.  Pulmonary/Chest: Breath sounds normal. No respiratory distress.   Abdominal: Soft. Bowel sounds are normal. She exhibits no distension. There is no tenderness.   Musculoskeletal: Normal range of motion.   Lymphadenopathy:     She has no cervical adenopathy.   Neurological: She is alert and oriented to person, place, and time.   Skin: Skin is warm.   Psychiatric: She has a normal mood and affect.         Assessment/Plan:  Isabell Preston is a 73 y.o. female who presents today for :    Problem List Items Addressed This Visit        Neuro    DDD (degenerative disc disease), lumbar  Noted in chart         Psychiatric    Major depressive disorder, recurrent episode, mild  Noted in chart         Cardiac/Vascular    Hypertension, essential (Chronic)    Relevant Medications    amLODIPine (NORVASC) 10 MG tablet    triamterene-hydrochlorothiazide 37.5-25 mg (MAXZIDE-25) 37.5-25 mg " per tablet  The current medical regimen is effective;  continue present plan and medications.         Renal/    Chronic kidney disease, stage III (moderate)  Noted in chart  Continue to monitor  Consider referral to nephrology        Hematology    Chronic anticoagulation (Chronic)    Chronic deep vein thrombosis (DVT) of distal vein of lower extremity, unspecified laterality (Chronic)  The current medical regimen is effective;  continue present plan and medications.  Scheduled to see coumadin clinic and Dr. Ortiz        Orthopedic    Spinal enthesopathy      Other Visit Diagnoses     Pleural effusion    -  Primary    Relevant Orders    Ambulatory referral to Pulmonology  Saw Dr. Suazo in hospital  Will refer to pulmonology       HTN, goal below 140/90        Relevant Medications    metoprolol succinate (TOPROL-XL) 25 MG 24 hr tablet    metoprolol succinate (TOPROL-XL) 50 MG 24 hr tablet  The current medical regimen is effective;  continue present plan and medications.      Benign paroxysmal positional vertigo, unspecified laterality        Relevant Medications    meclizine (ANTIVERT) 25 mg tablet    Controlled type 2 diabetes mellitus without complication, without long-term current use of insulin  (Chronic)       Relevant Medications    atorvastatin (LIPITOR) 10 MG tablet  The current medical regimen is effective;  continue present plan and medications.            F/u with pcp next week.     Follow up if symptoms worsen or fail to improve.

## 2019-06-12 ENCOUNTER — TELEPHONE (OUTPATIENT)
Dept: PULMONOLOGY | Facility: CLINIC | Age: 74
End: 2019-06-12

## 2019-06-12 PROBLEM — F33.0 MAJOR DEPRESSIVE DISORDER, RECURRENT EPISODE, MILD: Status: ACTIVE | Noted: 2019-06-12

## 2019-06-12 PROBLEM — N18.30 CHRONIC KIDNEY DISEASE, STAGE III (MODERATE): Status: ACTIVE | Noted: 2019-06-12

## 2019-06-12 NOTE — TELEPHONE ENCOUNTER
----- Message from Derrek Suazo MD sent at 6/12/2019  8:51 AM CDT -----  Just this one time.  thanks  ----- Message -----  From: Elida Robles MA  Sent: 6/11/2019   4:08 PM  To: Derrek Suazo MD    You are  doubled booked up everyday this month in the 1:30 slot would you like for me to double book in the am.  ----- Message -----  From: Derrek Suazo MD  Sent: 6/11/2019   2:25 PM  To: Lakeisha Anglin V Staff    Patient has an appointment with me on 7/15.  Please schedule clinic follow up in 2 weeks as an establish patient.  Ok to overbook.

## 2019-06-12 NOTE — TELEPHONE ENCOUNTER
Rescheduled patient appointment per Dr. Suazo. Spoke with patients daughter regarding appointment . Appointment letter mailed

## 2019-06-20 ENCOUNTER — HOSPITAL ENCOUNTER (OUTPATIENT)
Dept: RADIOLOGY | Facility: HOSPITAL | Age: 74
Discharge: HOME | End: 2019-06-20
Attending: INTERNAL MEDICINE
Payer: MEDICARE

## 2019-06-20 ENCOUNTER — TELEPHONE (OUTPATIENT)
Dept: PULMONOLOGY | Facility: CLINIC | Age: 74
End: 2019-06-20

## 2019-06-20 ENCOUNTER — OFFICE VISIT (OUTPATIENT)
Dept: PULMONOLOGY | Facility: CLINIC | Age: 74
End: 2019-06-20
Payer: MEDICARE

## 2019-06-20 VITALS
HEIGHT: 67 IN | BODY MASS INDEX: 27.51 KG/M2 | WEIGHT: 175.25 LBS | HEART RATE: 84 BPM | OXYGEN SATURATION: 95 % | DIASTOLIC BLOOD PRESSURE: 61 MMHG | SYSTOLIC BLOOD PRESSURE: 100 MMHG

## 2019-06-20 DIAGNOSIS — J90 PLEURAL EFFUSION, RIGHT: ICD-10-CM

## 2019-06-20 DIAGNOSIS — Z01.818 PRE-OP EVALUATION: Primary | ICD-10-CM

## 2019-06-20 DIAGNOSIS — R91.8 OTHER NONSPECIFIC ABNORMAL FINDING OF LUNG FIELD: ICD-10-CM

## 2019-06-20 DIAGNOSIS — R55 SYNCOPE, UNSPECIFIED SYNCOPE TYPE: Primary | ICD-10-CM

## 2019-06-20 PROCEDURE — 99214 OFFICE O/P EST MOD 30 MIN: CPT | Mod: S$GLB,,, | Performed by: INTERNAL MEDICINE

## 2019-06-20 PROCEDURE — 1100F PTFALLS ASSESS-DOCD GE2>/YR: CPT | Mod: CPTII,S$GLB,, | Performed by: INTERNAL MEDICINE

## 2019-06-20 PROCEDURE — 99999 PR PBB SHADOW E&M-EST. PATIENT-LVL V: CPT | Mod: PBBFAC,,, | Performed by: INTERNAL MEDICINE

## 2019-06-20 PROCEDURE — 1100F PR PT FALLS ASSESS DOC 2+ FALLS/FALL W/INJURY/YR: ICD-10-PCS | Mod: CPTII,S$GLB,, | Performed by: INTERNAL MEDICINE

## 2019-06-20 PROCEDURE — 71046 X-RAY EXAM CHEST 2 VIEWS: CPT | Mod: 26,,, | Performed by: RADIOLOGY

## 2019-06-20 PROCEDURE — 99999 PR PBB SHADOW E&M-EST. PATIENT-LVL V: ICD-10-PCS | Mod: PBBFAC,,, | Performed by: INTERNAL MEDICINE

## 2019-06-20 PROCEDURE — 71046 XR CHEST PA AND LATERAL: ICD-10-PCS | Mod: 26,,, | Performed by: RADIOLOGY

## 2019-06-20 PROCEDURE — 3074F SYST BP LT 130 MM HG: CPT | Mod: CPTII,S$GLB,, | Performed by: INTERNAL MEDICINE

## 2019-06-20 PROCEDURE — 3288F FALL RISK ASSESSMENT DOCD: CPT | Mod: CPTII,S$GLB,, | Performed by: INTERNAL MEDICINE

## 2019-06-20 PROCEDURE — 99214 PR OFFICE/OUTPT VISIT, EST, LEVL IV, 30-39 MIN: ICD-10-PCS | Mod: S$GLB,,, | Performed by: INTERNAL MEDICINE

## 2019-06-20 PROCEDURE — 71046 X-RAY EXAM CHEST 2 VIEWS: CPT | Mod: TC,FY

## 2019-06-20 PROCEDURE — 3078F DIAST BP <80 MM HG: CPT | Mod: CPTII,S$GLB,, | Performed by: INTERNAL MEDICINE

## 2019-06-20 PROCEDURE — 3078F PR MOST RECENT DIASTOLIC BLOOD PRESSURE < 80 MM HG: ICD-10-PCS | Mod: CPTII,S$GLB,, | Performed by: INTERNAL MEDICINE

## 2019-06-20 PROCEDURE — 3074F PR MOST RECENT SYSTOLIC BLOOD PRESSURE < 130 MM HG: ICD-10-PCS | Mod: CPTII,S$GLB,, | Performed by: INTERNAL MEDICINE

## 2019-06-20 PROCEDURE — 3288F PR FALLS RISK ASSESSMENT DOCUMENTED: ICD-10-PCS | Mod: CPTII,S$GLB,, | Performed by: INTERNAL MEDICINE

## 2019-06-20 NOTE — ASSESSMENT & PLAN NOTE
Ddx:  Infection vs inflammatory.  Effusion persist after thoracentesis.  Will refer to ct surgery for vats +/- pleuradesis

## 2019-06-20 NOTE — LETTER
June 20, 2019      Shima Ortiz MD  [address]  [place] [address]           [facility]  [address] [facility] [address]  [place] [address]  Phone: [phone]  Fax: [phone]          Patient: Isabell Preston   MR Number: [id number]   YOB: 1945   Date of Visit: 6/20/2019       Dear Dr. Shima Ortiz:    Thank you for referring Isabell Preston to me for evaluation. Attached you will find relevant portions of my assessment and plan of care.    If you have questions, please do not hesitate to call me. I look forward to following Isabell Preston along with you.    Sincerely,    Derrek Suazo MD    Enclosure  CC:  No Recipients    If you would like to receive this communication electronically, please contact [email or web address] or [phone] to request more information on [employer] Link access.    For providers and/or their staff who would like to refer a patient to [facility], please contact us through our one-stop-shop provider referral line, [facility], at [phone].    If you feel you have received this communication in error or would no longer like to receive these types of communications, please e-mail [email or web address]

## 2019-06-20 NOTE — PROGRESS NOTES
Isabell Preston  was seen as a hospital follow up.    CHIEF COMPLAINT:  Consult and Shortness of Breath      HISTORY OF PRESENT ILLNESS: Isabell Preston is a 73 y.o. female  has a past medical history of Ambulates with cane, Anticoagulant long-term use, Anxiety, Behavioral problem, Cataract, Clotting disorder, DDD (degenerative disc disease), lumbar (6/27/2016), Deep vein thrombosis, Depression, Diabetes mellitus type II, Diverticulosis, Eye injuries, General anesthetics causing adverse effect in therapeutic use, History of blood clots, History of psychiatric care, History of psychiatric hospitalization, Hyperlipidemia, Hypertension, Psychiatric problem, Retinal defect (2006), and Ulcer.  Patient presented to [facility] on 6/6/19 for worsening sob over past 1 month.  Patient was seen by Dr. Archuleta and was recommended ED visit.  In ED, ct and cxr confirmed right effusion.  No fever/chill.  No chest pain.  No weight changes.  No coughing or wheezing.       +recurring dvt x 4.  2 in left leg, 1 in left arm and 1 in left subclavian.  Lifelong coumadin.  s/p thoracentesis 6/7/19 with 1.5 liters removed.  Dyspnea improve with thoracentesis.  Still with sanchez x 1 block. +syncopal episode last week.  Patient endorsed palpitation with diaphoresis.  Patient fall and passed out for unspecified period of time.  Wake up with contusion in knee and left shin.      PAST MEDICAL HISTORY:    Active Ambulatory Problems     Diagnosis Date Noted    Nausea 05/08/2013    Hypertension, essential 07/31/2015    Blurred vision, left eye 07/31/2015    SI (sacroiliac) joint dysfunction 06/27/2016    Muscle spasm 06/27/2016    Spinal enthesopathy 06/27/2016    DDD (degenerative disc disease), lumbar 06/27/2016    Chronic pain of left ankle 06/27/2016    Pain 09/08/2016    Chronic deep vein thrombosis (DVT) of distal vein of lower extremity, unspecified laterality 10/21/2016    Hyperlipidemia 11/11/2016    History of CVA  (cerebrovascular accident) 11/11/2016    Chronic anticoagulation 11/11/2016    Iron deficiency anemia due to chronic blood loss 12/22/2016    Iron deficiency anemia due to sideropenic dysphagia 12/28/2016    Iron deficiency anemia 01/03/2017    Diverticulosis of large intestine with hemorrhage 01/04/2017    Elevated d-dimer 01/04/2017    Generalized abdominal pain 07/11/2017    Chronic fatigue 08/22/2017    Neuropathic pain 09/27/2017    Elevated serum creatinine 04/25/2018    Type 2 diabetes mellitus with diabetic cataract, without long-term current use of insulin 06/05/2019    Pleural effusion, right 06/06/2019    Chronic kidney disease, stage III (moderate) 06/12/2019    Major depressive disorder, recurrent episode, mild 06/12/2019    Syncope 06/20/2019     Resolved Ambulatory Problems     Diagnosis Date Noted    Diverticulosis     DVT (deep venous thrombosis) 07/17/2012    Abdominal pain, other specified site 07/17/2012    LLQ abdominal pain 10/10/2012    Diarrhea 05/08/2013    DM2 (diabetes mellitus, type 2) 07/24/2013    Anticoagulated on Coumadin 07/24/2013    Headache(784.0) 07/24/2013    Abdominal pain 11/12/2014    Chest pain at rest 07/30/2015    Elevated INR 07/31/2015    Hypertension associated with diabetes 08/18/2015    Combined hyperlipidemia associated with type 2 diabetes mellitus 08/18/2015    Knee pain, left anterior 06/27/2016    Long term (current) use of anticoagulants 10/21/2016    Acute lower GI bleeding 11/11/2016    Hematochezia 11/11/2016    Type 2 diabetes mellitus, controlled 11/11/2016    Hypomagnesemia 11/12/2016    Weakness 11/12/2016    Hypotension 11/12/2016    Acute lower GI bleeding 11/12/2016    Contusion of left leg 01/17/2019     Past Medical History:   Diagnosis Date    Ambulates with cane     Anticoagulant long-term use     Anxiety     Behavioral problem     Cataract     Clotting disorder     DDD (degenerative disc disease),  lumbar 2016    Deep vein thrombosis     Depression     Diabetes mellitus type II     Diverticulosis     Eye injuries     General anesthetics causing adverse effect in therapeutic use     History of blood clots     History of psychiatric care     History of psychiatric hospitalization     Hyperlipidemia     Hypertension     Psychiatric problem     Retinal defect 2006    Ulcer                 PAST SURGICAL HISTORY:    Past Surgical History:   Procedure Laterality Date    ANKLE FRACTURE SURGERY      left ankle    APENDIX AND GALL BLADDER REMOVED      APPENDECTOMY      BREAST SURGERY      lumpectomy right side - benign    CHOLECYSTECTOMY      colon resection for diverticulitis x 2      COLONOSCOPY N/A 2013    Performed by [facility]O (4TH FLR)    EGD (ESOPHAGOGASTRODUODENOSCOPY) N/A 2013    Performed by [facility] at [facility] ENDO (4TH FLR)    ESOPHAGOGASTRODUODENOSCOPY (EGD) N/A 2016    [facility]    HEMORRHOID SURGERY      HERNIA REPAIR      umbilical hernia repair    HYSTERECTOMY      INJECTION-STEROID-EPIDURAL-TRANSFORAMINAL Left 2016    [facility] PAIN MGT    TONSILLECTOMY      TOTAL ABDOMINAL HYSTERECTOMY W/ BILATERAL SALPINGOOPHORECTOMY      UMBILICAL HERNIA REPAIR           FAMILY HISTORY:                Family History   Problem Relation Age of Onset    Glaucoma Mother     Stroke Mother     Stroke Paternal Uncle     Early death Paternal Uncle          from stroke in 40s    Cancer Father         multiple myeloma    Arthritis Father     Cataracts Sister     Diabetes Sister     Arthritis Sister     Alcohol abuse Brother     Depression Brother     Clotting disorder Maternal Aunt         DVT    Birth defects Daughter         bilateral ear defects    Heart disease Daughter         Sinus tachycardia    Cataracts Paternal Grandmother      Arthritis Paternal Grandmother     Diabetes Paternal Grandmother     Glaucoma Paternal Grandmother     Breast cancer Maternal Aunt     Schizophrenia Neg Hx     Suicide Neg Hx        SOCIAL HISTORY:          Tobacco:   Social History     Tobacco Use   Smoking Status Former Smoker    Types: Cigarettes    Last attempt to quit: 1985    Years since quittin.9   Smokeless Tobacco Never Used     alcohol use:    Social History     Substance and Sexual Activity   Alcohol Use No               Occupation:  Former intertional exporting    ALLERGIES:    Review of patient's allergies indicates:   Allergen Reactions    Ciprofloxacin Anaphylaxis    Fructose     Gluten protein Other (See Comments)     GI upset  GI upset    Lactase Other (See Comments)     GI upset  GI upset    Latex, natural rubber Rash       CURRENT MEDICATIONS:    Current Outpatient Medications   Medication Sig Dispense Refill    amLODIPine (NORVASC) 10 MG tablet Take 10 mg by mouth once daily.      atorvastatin (LIPITOR) 10 MG tablet TAKE 1 TABLET(10 MG) BY MOUTH EVERY DAY 90 tablet 0    COUMADIN 5 mg tablet   1    dicyclomine (BENTYL) 10 MG capsule TAKE 1 CAPSULE BY MOUTH THREE TIMES DAILY 90 capsule 0    DULoxetine (CYMBALTA) 60 MG capsule Take 1 capsule (60 mg total) by mouth once daily. 90 capsule 0    fluticasone (VERAMYST) 27.5 mcg/actuation nasal spray 2 sprays by Nasal route once daily.      gabapentin (NEURONTIN) 100 MG capsule TAKE 1 CAPSULE(100 MG) BY MOUTH THREE TIMES DAILY (Patient taking differently: 100 mg 2 (two) times daily. TAKE 1 CAPSULE(100 MG) BY MOUTH THREE TIMES DAILY) 270 capsule 2    hydrOXYzine HCl (ATARAX) 25 MG tablet Take 1 tablet (25 mg total) by mouth daily as needed for Anxiety. 90 tablet 0    lancets (TRUEPLUS LANCETS) 28 gauge Misc 1 lancet by Misc.(Non-Drug; Combo Route) route once daily. Test blood sugar once daily, type 2 diabetes, controlled. E11.9 100 each 3    LINZESS 72 mcg Cap TAKE 1  "CAPSULE(72 MCG) BY MOUTH DAILY 90 capsule 0    losartan (COZAAR) 100 MG tablet Take 1 tablet (100 mg total) by mouth once daily. 30 tablet 1    meclizine (ANTIVERT) 25 mg tablet TAKE 1 TABLET(25 MG) BY MOUTH THREE TIMES DAILY AS NEEDED FOR DIZZINESS 30 tablet 0    metoprolol succinate (TOPROL-XL) 25 MG 24 hr tablet TAKE 1 TABLET(25 MG) BY MOUTH EVERY DAY. TOTAL DAILY DOSE 75 MG 90 tablet 0    metoprolol succinate (TOPROL-XL) 50 MG 24 hr tablet TAKE 1 TABLET(50 MG) BY MOUTH EVERY DAY. TOTAL DAILY DOSE 75 MG 90 tablet 0    mometasone 0.1% (ELOCON) 0.1 % cream BALWINDER TO DARK AREAS BID ON LEGS PRN 15 g 1    tiZANidine (ZANAFLEX) 4 MG tablet Take 4 mg by mouth as needed.      triamterene-hydrochlorothiazide 37.5-25 mg (MAXZIDE-25) 37.5-25 mg per tablet Take 1 tablet by mouth once daily. 90 tablet 0     No current facility-administered medications for this visit.                   REVIEW OF SYSTEMS:   No acute changes from previous encounter dated 6/6/19 with exceptions mentioned in HPI.        PHYSICAL EXAM:  Vitals:    06/20/19 0918   BP: 100/61   Pulse: 84   SpO2: 95%   Weight: 79.5 kg (175 lb 4.3 oz)   Height: 5' 7" (1.702 m)   PainSc: 0-No pain     Body mass index is 27.45 kg/m².     GENERAL:  well develop; no apparent distress  HEENT:  no nasal congestion; no discharge noted; class 2 modified mallampatti.   NECK:  supple; no palpable masses.  CARDIO: regular rate and rhythm  PULM:  Decreased breathsound on left; no intercostals retractions; no accessory muscle usage   ABDOMEN:  soft nontender/nondistended.  +bowel sound  EXTREMITIES no cce  NEURO:  CN II-XII intact.  5/5 motor in all extremities.  sensation grossly intact   to light touch.  PSYCH:  normal affect.  Alert and oriented x 4    LABS  Pulmonary Functions Testing Results(personally reviewed):  none  ABG (personally reviewed):  6/6/19 7.38/44/66/26 on room air  CXR (personally reviewed):  6/5/19 new large effusion when compared to 7/30/15  CT " CHEST(personally reviewed):  6/6/19 Clarge right effusion; no lad.  No pe    Echo 5/28/19  · Normal left ventricular systolic function. The estimated ejection fraction is 60%  · No wall motion abnormalities.  · Mild concentric left ventricular hypertrophy.  · Normal right ventricular systolic function.  · Mild to moderate tricuspid regurgitation.  · The estimated PA systolic pressure is 46 mm Hg  · Pulmonary hypertension present.    ASSESSMENT/PLAN  Problem List Items Addressed This Visit     Pleural effusion, right    Overview     Exudate with high lymphocyte count.         Current Assessment & Plan     Ddx:  Infection vs inflammatory.  Effusion persist after thoracentesis.  Will refer to ct surgery for vats +/- pleuradesis         Relevant Orders    Ambulatory consult to Cardiothoracic Surgery    X-Ray Chest PA And Lateral    Syncope - Primary    Current Assessment & Plan     etiology unclear.  Appointment with Dr. Ortiz on 7/8.  Will set up holter monitoring due to syncope.  Advise patient to go to ED if syncope recur.  Also, patient will stay with daughter.           Relevant Orders    Holter Monitor - 3-14 Day Adult          Patient will No follow-ups on file. with md/np.    CC: Send copy of this note to Shima Ortiz MD

## 2019-06-20 NOTE — TELEPHONE ENCOUNTER
Spoke with Dr Claire nurse Nguyen and was infotmed that she will contact patient to schedule Ambulatory consult CT surgury.

## 2019-06-20 NOTE — ASSESSMENT & PLAN NOTE
etiology unclear.  Appointment with Dr. Ortiz on 7/8.  Will set up holter monitoring due to syncope.  Advise patient to go to ED if syncope recur.  Also, patient will stay with daughter.

## 2019-06-21 ENCOUNTER — TELEPHONE (OUTPATIENT)
Dept: CARDIOTHORACIC SURGERY | Facility: CLINIC | Age: 74
End: 2019-06-21

## 2019-06-21 NOTE — TELEPHONE ENCOUNTER
Patient notified of the referral from Dr. Suazo.  Patient is scheduled for PFT's on 06/26/2019 and appointment with Dr. Smith on 06/27/2019.  Reminder mailed.

## 2019-06-25 NOTE — PROGRESS NOTES
Spoke to the pt daughter Tera and she stated that the pt has an consult appt on 6/27/19 with Dr. Smith. She stated that the patient may be taken off of coumadin. She will give a call back after the appt and let the clinic know. The daughter took the call back number.

## 2019-06-26 ENCOUNTER — HOSPITAL ENCOUNTER (OUTPATIENT)
Dept: RESPIRATORY THERAPY | Facility: HOSPITAL | Age: 74
Discharge: HOME | End: 2019-06-26
Attending: THORACIC SURGERY (CARDIOTHORACIC VASCULAR SURGERY)
Payer: MEDICARE

## 2019-06-26 DIAGNOSIS — R91.8 OTHER NONSPECIFIC ABNORMAL FINDING OF LUNG FIELD: ICD-10-CM

## 2019-06-26 DIAGNOSIS — Z01.818 PRE-OP EVALUATION: ICD-10-CM

## 2019-06-26 LAB
BRPFT: ABNORMAL
DLCO ADJ PRE: 9.64 ML/(MIN*MMHG) (ref 17.07–28.54)
DLCO SINGLE BREATH LLN: 17.07
DLCO SINGLE BREATH PRE REF: 42.3 %
DLCO SINGLE BREATH REF: 22.81
DLCOC SBVA LLN: 2.9
DLCOC SBVA PRE REF: 109 %
DLCOC SBVA REF: 4.2
DLCOC SINGLE BREATH LLN: 17.07
DLCOC SINGLE BREATH PRE REF: 42.3 %
DLCOC SINGLE BREATH REF: 22.81
DLCOVA LLN: 2.9
DLCOVA PRE REF: 109 %
DLCOVA PRE: 4.58 ML/(MIN*MMHG*L) (ref 2.9–5.51)
DLCOVA REF: 4.2
DLVAADJ PRE: 4.58 ML/(MIN*MMHG*L) (ref 2.9–5.51)
ERV LLN: 0.65
ERV REF: 0.65
ERVN2 LLN: 0.65
ERVN2 PRE REF: 40.4 %
ERVN2 PRE: 0.26 L (ref 0.65–0.65)
ERVN2 REF: 0.65
FEF 25 75 CHG: 42.1 %
FEF 25 75 LLN: 0.62
FEF 25 75 POST REF: 74.4 %
FEF 25 75 PRE REF: 52.4 %
FEF 25 75 REF: 1.68
FET100 CHG: -2.2 %
FEV1 CHG: 2.4 %
FEV1 FVC CHG: 7.5 %
FEV1 FVC LLN: 65
FEV1 FVC POST REF: 103.4 %
FEV1 FVC PRE REF: 96.2 %
FEV1 FVC REF: 78
FEV1 LLN: 1.4
FEV1 POST REF: 56.2 %
FEV1 PRE REF: 54.9 %
FEV1 REF: 2.02
FRCN2 LLN: 2.06
FRCN2 PRE REF: 39.2 %
FRCN2 REF: 2.88
FRCPLETH LLN: 2.06
FRCPLETH REF: 2.88
FVC CHG: -4.7 %
FVC LLN: 1.84
FVC POST REF: 53.9 %
FVC PRE REF: 56.6 %
FVC REF: 2.62
IVC PRE: 1.27 L (ref 1.84–3.41)
IVC SINGLE BREATH LLN: 1.84
IVC SINGLE BREATH PRE REF: 48.4 %
IVC SINGLE BREATH REF: 2.62
MVV LLN: 79
MVV REF: 93
PEF CHG: -12.5 %
PEF LLN: 2.97
PEF POST REF: 71.2 %
PEF PRE REF: 81.3 %
PEF REF: 5.17
POST FEF 25 75: 1.25 L/S (ref 0.62–2.74)
POST FET 100: 7.52 SEC
POST FEV1 FVC: 80.54 % (ref 64.7–91.06)
POST FEV1: 1.14 L (ref 1.4–2.65)
POST FVC: 1.41 L (ref 1.84–3.41)
POST PEF: 3.68 L/S (ref 2.97–7.36)
PRE DLCO: 9.64 ML/(MIN*MMHG) (ref 17.07–28.54)
PRE FEF 25 75: 0.88 L/S (ref 0.62–2.74)
PRE FET 100: 7.69 SEC
PRE FEV1 FVC: 74.92 % (ref 64.7–91.06)
PRE FEV1: 1.11 L (ref 1.4–2.65)
PRE FRC N2: 1.13 L
PRE FVC: 1.48 L (ref 1.84–3.41)
PRE PEF: 4.2 L/S (ref 2.97–7.36)
RAW LLN: 3.06
RAW REF: 3.06
RV LLN: 1.65
RV REF: 2.23
RVN2 LLN: 1.65
RVN2 PRE REF: 36.8 %
RVN2 PRE: 0.82 L (ref 1.65–2.8)
RVN2 REF: 2.23
RVN2TLCN2 LLN: 33.85
RVN2TLCN2 PRE REF: 81.7 %
RVN2TLCN2 PRE: 35.5 % (ref 33.85–53.03)
RVN2TLCN2 REF: 43.44
RVTLC LLN: 34
RVTLC REF: 43
TLC LLN: 4.44
TLC REF: 5.43
TLCN2 LLN: 4.44
TLCN2 PRE REF: 42.5 %
TLCN2 PRE: 2.31 L (ref 4.44–6.42)
TLCN2 REF: 5.43
VA PRE: 2.11 L (ref 5.28–5.28)
VA SINGLE BREATH LLN: 5.28
VA SINGLE BREATH PRE REF: 39.9 %
VA SINGLE BREATH REF: 5.28
VC LLN: 1.84
VC REF: 2.62
VCMAXN2 LLN: 1.84
VCMAXN2 PRE REF: 56.8 %
VCMAXN2 PRE: 1.49 L (ref 1.84–3.41)
VCMAXN2 REF: 2.62

## 2019-06-26 PROCEDURE — 94060 PR EVAL OF BRONCHOSPASM: ICD-10-PCS | Mod: 26,,, | Performed by: INTERNAL MEDICINE

## 2019-06-26 PROCEDURE — 94727 PR PULM FUNCTION TEST BY GAS: ICD-10-PCS | Mod: 26,,, | Performed by: INTERNAL MEDICINE

## 2019-06-26 PROCEDURE — 94060 EVALUATION OF WHEEZING: CPT | Mod: 26,,, | Performed by: INTERNAL MEDICINE

## 2019-06-26 PROCEDURE — 94729 DIFFUSING CAPACITY: CPT | Mod: 26,,, | Performed by: INTERNAL MEDICINE

## 2019-06-26 PROCEDURE — 94727 GAS DIL/WSHOT DETER LNG VOL: CPT | Mod: 26,,, | Performed by: INTERNAL MEDICINE

## 2019-06-26 PROCEDURE — 94729 PR C02/MEMBANE DIFFUSE CAPACITY: ICD-10-PCS | Mod: 26,,, | Performed by: INTERNAL MEDICINE

## 2019-06-26 PROCEDURE — 25000242 PHARM REV CODE 250 ALT 637 W/ HCPCS: Performed by: THORACIC SURGERY (CARDIOTHORACIC VASCULAR SURGERY)

## 2019-06-26 RX ORDER — ALBUTEROL SULFATE 2.5 MG/.5ML
2.5 SOLUTION RESPIRATORY (INHALATION) ONCE
Status: DISCONTINUED | OUTPATIENT
Start: 2019-06-26 | End: 2019-06-27 | Stop reason: HOSPADM

## 2019-06-26 RX ADMIN — ALBUTEROL SULFATE 2.5 MG: 2.5 SOLUTION RESPIRATORY (INHALATION) at 02:06

## 2019-06-27 ENCOUNTER — OFFICE VISIT (OUTPATIENT)
Dept: CARDIOTHORACIC SURGERY | Facility: CLINIC | Age: 74
End: 2019-06-27
Payer: MEDICARE

## 2019-06-27 ENCOUNTER — CLINICAL SUPPORT (OUTPATIENT)
Dept: HEMATOLOGY/ONCOLOGY | Facility: CLINIC | Age: 74
End: 2019-06-27
Payer: MEDICARE

## 2019-06-27 VITALS
HEART RATE: 78 BPM | RESPIRATION RATE: 18 BRPM | OXYGEN SATURATION: 94 % | BODY MASS INDEX: 27.93 KG/M2 | OXYGEN SATURATION: 95 % | DIASTOLIC BLOOD PRESSURE: 65 MMHG | WEIGHT: 177.94 LBS | HEIGHT: 67 IN | HEART RATE: 93 BPM | SYSTOLIC BLOOD PRESSURE: 116 MMHG

## 2019-06-27 DIAGNOSIS — J90 PLEURAL EFFUSION, RIGHT: Primary | ICD-10-CM

## 2019-06-27 PROCEDURE — 3078F DIAST BP <80 MM HG: CPT | Mod: CPTII,S$GLB,, | Performed by: THORACIC SURGERY (CARDIOTHORACIC VASCULAR SURGERY)

## 2019-06-27 PROCEDURE — 3074F PR MOST RECENT SYSTOLIC BLOOD PRESSURE < 130 MM HG: ICD-10-PCS | Mod: CPTII,S$GLB,, | Performed by: THORACIC SURGERY (CARDIOTHORACIC VASCULAR SURGERY)

## 2019-06-27 PROCEDURE — 3078F PR MOST RECENT DIASTOLIC BLOOD PRESSURE < 80 MM HG: ICD-10-PCS | Mod: CPTII,S$GLB,, | Performed by: THORACIC SURGERY (CARDIOTHORACIC VASCULAR SURGERY)

## 2019-06-27 PROCEDURE — 99205 OFFICE O/P NEW HI 60 MIN: CPT | Mod: S$GLB,,, | Performed by: THORACIC SURGERY (CARDIOTHORACIC VASCULAR SURGERY)

## 2019-06-27 PROCEDURE — 99999 PR PBB SHADOW E&M-EST. PATIENT-LVL I: CPT | Mod: PBBFAC,,,

## 2019-06-27 PROCEDURE — 1101F PR PT FALLS ASSESS DOC 0-1 FALLS W/OUT INJ PAST YR: ICD-10-PCS | Mod: CPTII,S$GLB,, | Performed by: THORACIC SURGERY (CARDIOTHORACIC VASCULAR SURGERY)

## 2019-06-27 PROCEDURE — 99205 PR OFFICE/OUTPT VISIT, NEW, LEVL V, 60-74 MIN: ICD-10-PCS | Mod: S$GLB,,, | Performed by: THORACIC SURGERY (CARDIOTHORACIC VASCULAR SURGERY)

## 2019-06-27 PROCEDURE — 99999 PR PBB SHADOW E&M-EST. PATIENT-LVL I: ICD-10-PCS | Mod: PBBFAC,,,

## 2019-06-27 PROCEDURE — 99999 PR PBB SHADOW E&M-EST. PATIENT-LVL V: ICD-10-PCS | Mod: PBBFAC,,, | Performed by: THORACIC SURGERY (CARDIOTHORACIC VASCULAR SURGERY)

## 2019-06-27 PROCEDURE — 1101F PT FALLS ASSESS-DOCD LE1/YR: CPT | Mod: CPTII,S$GLB,, | Performed by: THORACIC SURGERY (CARDIOTHORACIC VASCULAR SURGERY)

## 2019-06-27 PROCEDURE — 3074F SYST BP LT 130 MM HG: CPT | Mod: CPTII,S$GLB,, | Performed by: THORACIC SURGERY (CARDIOTHORACIC VASCULAR SURGERY)

## 2019-06-27 PROCEDURE — 99999 PR PBB SHADOW E&M-EST. PATIENT-LVL V: CPT | Mod: PBBFAC,,, | Performed by: THORACIC SURGERY (CARDIOTHORACIC VASCULAR SURGERY)

## 2019-06-27 RX ORDER — ENOXAPARIN SODIUM 100 MG/ML
80 INJECTION SUBCUTANEOUS 2 TIMES DAILY
Qty: 12 SYRINGE | Refills: 0 | Status: ON HOLD | OUTPATIENT
Start: 2019-06-28 | End: 2019-07-08 | Stop reason: HOSPADM

## 2019-06-27 NOTE — LETTER
June 27, 2019      Derrek Suazo MD  [address]  [place] [address]           [facility]  [address]  [place] [address]  Phone: [phone]  Fax: [phone]          Patient: Isabell Preston   MR Number: [id number]   YOB: 1945   Date of Visit: 6/27/2019       Dear Dr. Derrek Suazo:    Thank you for referring Isabell Preston to me for evaluation. Attached you will find relevant portions of my assessment and plan of care.    If you have questions, please do not hesitate to call me. I look forward to following Isabell Preston along with you.    Sincerely,    Chelsie Manrique RN    Enclosure  CC:  No Recipients    If you would like to receive this communication electronically, please contact [email or web address] or [phone] to request more information on [employer] Link access.    For providers and/or their staff who would like to refer a patient to [facility], please contact us through our one-stop-shop provider referral line, [facility], [place], at [phone].    If you feel you have received this communication in error or would no longer like to receive these types of communications, please e-mail [email or web address]

## 2019-06-27 NOTE — NURSING
Nurse Navigator Note  Met with patient during consult appointment with Dr. Smith,  Patient, daughters and I reviewed the information discussed with Dr. Smith.  Pt scheduled for surgery 7/3, all preop instructions given by clinic nurse.   Oncology Navigation   Intake  Cancer Type: Thoracic  MD Assigned: Sarah  Internal / External Referral: Internal  Initial Nurse Navigator Contact: 19  Contact Method: Individual basket  Date Worked: 19  Appointment Date: 19  Schedule to Appointment Timeline (days): 0  Multiple appointments: No     Treatment  Current Status: Staging work-up  Treatment Type(s): Surgery  Surgery: VATS Biopsy, Pleurodesis Possible Pleurx   Surgeon Name: Dr. Smith  Surgery Schedule Date: 19    Procedures: Biopsy  Biopsy Schedule Date: 19    General Referrals: Psychology    Support Systems: Children;Family members  Barriers of Care: Psych  Psych: Psych history  Concerns: Family members(daughters) concerned that patient's living conditions has attributed to current health conditions.      Acuity  Surgical Procedure Complexity: 2  Treatment Tolerability: Has not started treatment yet/treatment fully completed and side effects resolved  ECO  Comorbidities in Medical History: 1  Hospitalization Within the Past Month: 0  Other Medical Factors (+1 each): Pleurx catheter;In wheelchair (Wheelchair needed while exiting appointment with thoracic)   Needed: 0  Support: 0  Verbalizes Financial Concerns: 0  Transportation: 0  Mental Health: PHQ Score: 1  Psychological Factors (+1 each): Seeing oncology psychology  History of noncompliance/frequent no shows and cancellations: 0  Verbalizes the need for more education: 0  Navigation Acuity: 7     Follow Up  Follow up in about 3 weeks (around 2019).
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
## 2019-06-28 ENCOUNTER — TELEPHONE (OUTPATIENT)
Dept: CARDIOLOGY | Facility: CLINIC | Age: 74
End: 2019-06-28

## 2019-06-29 ENCOUNTER — NURSE TRIAGE (OUTPATIENT)
Dept: ADMINISTRATIVE | Facility: CLINIC | Age: 74
End: 2019-06-29

## 2019-06-29 PROBLEM — R10.84 GENERALIZED ABDOMINAL PAIN: Status: RESOLVED | Noted: 2017-07-11 | Resolved: 2019-06-29

## 2019-06-29 PROBLEM — D50.9 IRON DEFICIENCY ANEMIA: Status: ACTIVE | Noted: 2019-06-29

## 2019-06-29 PROBLEM — J90 PLEURAL EFFUSION: Status: ACTIVE | Noted: 2019-06-29

## 2019-06-29 PROBLEM — N17.9 AKI (ACUTE KIDNEY INJURY): Status: ACTIVE | Noted: 2019-06-29

## 2019-06-29 PROBLEM — R55 SYNCOPE: Status: RESOLVED | Noted: 2019-06-20 | Resolved: 2019-06-29

## 2019-06-29 PROBLEM — N17.9 ACUTE RENAL FAILURE SUPERIMPOSED ON STAGE 3 CHRONIC KIDNEY DISEASE: Status: ACTIVE | Noted: 2019-06-12

## 2019-06-29 NOTE — TELEPHONE ENCOUNTER
Reason for Disposition   Severe difficulty breathing (e.g., struggling for each breath, speaks in single words)    Protocols used: BREATHING DIFFICULTY-A-AH    Patient has audible wheezing and has to slowly speak while on the phone. She is calling asking for an inhaler and oxygen tank. Her expiratory wheezing sounds wet over the phone and then the patient states it hurts to walk to the bathroom to urinate. She states she took her fluid pill today. Advised her to call 911 now. She is at her daughter's home and close the [facility]. She was told she needs to go to the nearest hospital. I asked to speak with her daughter, but she told me she was outside paying the [employer]. I told her to tell her daughter she needs to call 911. Unsure if patient will call or  Have her daughter bring her to the hospital.

## 2019-06-30 PROBLEM — J90 PLEURAL EFFUSION: Status: RESOLVED | Noted: 2019-06-29 | Resolved: 2019-06-30

## 2019-06-30 PROBLEM — R06.89 ACUTE RESPIRATORY INSUFFICIENCY: Status: ACTIVE | Noted: 2019-06-30

## 2019-07-01 ENCOUNTER — NURSE TRIAGE (OUTPATIENT)
Dept: ADMINISTRATIVE | Facility: CLINIC | Age: 74
End: 2019-07-01

## 2019-07-01 PROCEDURE — 99285 PR EMERGENCY DEPT VISIT,LEVEL V: ICD-10-PCS | Mod: ,,, | Performed by: EMERGENCY MEDICINE

## 2019-07-01 PROCEDURE — 93005 ELECTROCARDIOGRAM TRACING: CPT

## 2019-07-01 PROCEDURE — 96375 TX/PRO/DX INJ NEW DRUG ADDON: CPT

## 2019-07-01 PROCEDURE — 99285 EMERGENCY DEPT VISIT HI MDM: CPT | Mod: ,,, | Performed by: EMERGENCY MEDICINE

## 2019-07-01 PROCEDURE — 93010 ELECTROCARDIOGRAM REPORT: CPT | Mod: ,,, | Performed by: INTERNAL MEDICINE

## 2019-07-01 PROCEDURE — 93010 EKG 12-LEAD: ICD-10-PCS | Mod: ,,, | Performed by: INTERNAL MEDICINE

## 2019-07-01 PROCEDURE — 99285 EMERGENCY DEPT VISIT HI MDM: CPT | Mod: 25

## 2019-07-01 PROCEDURE — 96374 THER/PROPH/DIAG INJ IV PUSH: CPT

## 2019-07-02 ENCOUNTER — ANESTHESIA EVENT (OUTPATIENT)
Dept: SURGERY | Facility: HOSPITAL | Age: 74
DRG: 981 | End: 2019-07-02
Payer: MEDICARE

## 2019-07-02 ENCOUNTER — HOSPITAL ENCOUNTER (INPATIENT)
Facility: HOSPITAL | Age: 74
LOS: 6 days | Discharge: HOME HEALTH | DRG: 981 | End: 2019-07-08
Attending: EMERGENCY MEDICINE | Admitting: THORACIC SURGERY (CARDIOTHORACIC VASCULAR SURGERY)
Payer: MEDICARE

## 2019-07-02 DIAGNOSIS — R07.9 CHEST PAIN: ICD-10-CM

## 2019-07-02 DIAGNOSIS — M54.32 SCIATICA OF LEFT SIDE: ICD-10-CM

## 2019-07-02 DIAGNOSIS — R07.81 PLEURITIC CHEST PAIN: ICD-10-CM

## 2019-07-02 DIAGNOSIS — M53.3 SI (SACROILIAC) JOINT DYSFUNCTION: ICD-10-CM

## 2019-07-02 DIAGNOSIS — D64.9 ANEMIA, UNSPECIFIED TYPE: ICD-10-CM

## 2019-07-02 DIAGNOSIS — Z86.73 HISTORY OF CVA (CEREBROVASCULAR ACCIDENT): Chronic | ICD-10-CM

## 2019-07-02 DIAGNOSIS — J90 RECURRENT PLEURAL EFFUSION ON RIGHT: ICD-10-CM

## 2019-07-02 DIAGNOSIS — L76.32 POSTPROCEDURAL HEMATOMA OF SKIN AND SUBCUTANEOUS TISSUE FOLLOWING OTHER PROCEDURE: ICD-10-CM

## 2019-07-02 DIAGNOSIS — J90 PLEURAL EFFUSION, RIGHT: ICD-10-CM

## 2019-07-02 DIAGNOSIS — S20.211S: ICD-10-CM

## 2019-07-02 DIAGNOSIS — R07.89 RIGHT-SIDED CHEST WALL PAIN: Primary | ICD-10-CM

## 2019-07-02 PROBLEM — R74.01 TRANSAMINITIS: Status: ACTIVE | Noted: 2019-07-02

## 2019-07-02 PROBLEM — N17.9 AKI (ACUTE KIDNEY INJURY): Status: ACTIVE | Noted: 2019-07-02

## 2019-07-02 PROBLEM — S20.211A: Status: ACTIVE | Noted: 2019-07-02

## 2019-07-02 PROBLEM — D62 ACUTE BLOOD LOSS ANEMIA: Status: ACTIVE | Noted: 2019-07-02

## 2019-07-02 LAB
ABO + RH BLD: NORMAL
ALBUMIN SERPL BCP-MCNC: 2.5 G/DL (ref 3.5–5.2)
ALBUMIN SERPL BCP-MCNC: 2.8 G/DL (ref 3.5–5.2)
ALP SERPL-CCNC: 135 U/L (ref 55–135)
ALP SERPL-CCNC: 139 U/L (ref 55–135)
ALT SERPL W/O P-5'-P-CCNC: 112 U/L (ref 10–44)
ALT SERPL W/O P-5'-P-CCNC: 72 U/L (ref 10–44)
ANION GAP SERPL CALC-SCNC: 11 MMOL/L (ref 8–16)
ANION GAP SERPL CALC-SCNC: 9 MMOL/L (ref 8–16)
ANISOCYTOSIS BLD QL SMEAR: SLIGHT
AST SERPL-CCNC: 157 U/L (ref 10–40)
AST SERPL-CCNC: 72 U/L (ref 10–40)
BASOPHILS # BLD AUTO: 0.03 K/UL (ref 0–0.2)
BASOPHILS # BLD AUTO: 0.04 K/UL (ref 0–0.2)
BASOPHILS # BLD AUTO: 0.05 K/UL (ref 0–0.2)
BASOPHILS # BLD AUTO: 0.06 K/UL (ref 0–0.2)
BASOPHILS NFR BLD: 0.2 % (ref 0–1.9)
BASOPHILS NFR BLD: 0.3 % (ref 0–1.9)
BILIRUB SERPL-MCNC: 0.3 MG/DL (ref 0.1–1)
BILIRUB SERPL-MCNC: 0.3 MG/DL (ref 0.1–1)
BLD GP AB SCN CELLS X3 SERPL QL: NORMAL
BLD PROD TYP BPU: NORMAL
BLD PROD TYP BPU: NORMAL
BLOOD UNIT EXPIRATION DATE: NORMAL
BLOOD UNIT EXPIRATION DATE: NORMAL
BLOOD UNIT TYPE CODE: 5100
BLOOD UNIT TYPE CODE: 5100
BLOOD UNIT TYPE: NORMAL
BLOOD UNIT TYPE: NORMAL
BUN SERPL-MCNC: 26 MG/DL (ref 8–23)
BUN SERPL-MCNC: 27 MG/DL (ref 8–23)
CALCIUM SERPL-MCNC: 8 MG/DL (ref 8.7–10.5)
CALCIUM SERPL-MCNC: 8.5 MG/DL (ref 8.7–10.5)
CHLORIDE SERPL-SCNC: 100 MMOL/L (ref 95–110)
CHLORIDE SERPL-SCNC: 103 MMOL/L (ref 95–110)
CO2 SERPL-SCNC: 24 MMOL/L (ref 23–29)
CO2 SERPL-SCNC: 25 MMOL/L (ref 23–29)
CODING SYSTEM: NORMAL
CODING SYSTEM: NORMAL
CREAT SERPL-MCNC: 1.9 MG/DL (ref 0.5–1.4)
CREAT SERPL-MCNC: 1.9 MG/DL (ref 0.5–1.4)
DIFFERENTIAL METHOD: ABNORMAL
DISPENSE STATUS: NORMAL
DISPENSE STATUS: NORMAL
EOSINOPHIL # BLD AUTO: 0 K/UL (ref 0–0.5)
EOSINOPHIL NFR BLD: 0 % (ref 0–8)
EOSINOPHIL NFR BLD: 0 % (ref 0–8)
EOSINOPHIL NFR BLD: 0.1 % (ref 0–8)
EOSINOPHIL NFR BLD: 0.1 % (ref 0–8)
ERYTHROCYTE [DISTWIDTH] IN BLOOD BY AUTOMATED COUNT: 14 % (ref 11.5–14.5)
ERYTHROCYTE [DISTWIDTH] IN BLOOD BY AUTOMATED COUNT: 14.1 % (ref 11.5–14.5)
ERYTHROCYTE [DISTWIDTH] IN BLOOD BY AUTOMATED COUNT: 14.1 % (ref 11.5–14.5)
ERYTHROCYTE [DISTWIDTH] IN BLOOD BY AUTOMATED COUNT: 14.5 % (ref 11.5–14.5)
EST. GFR  (AFRICAN AMERICAN): 29.9 ML/MIN/1.73 M^2
EST. GFR  (AFRICAN AMERICAN): 29.9 ML/MIN/1.73 M^2
EST. GFR  (NON AFRICAN AMERICAN): 26 ML/MIN/1.73 M^2
EST. GFR  (NON AFRICAN AMERICAN): 26 ML/MIN/1.73 M^2
GLUCOSE SERPL-MCNC: 131 MG/DL (ref 70–110)
GLUCOSE SERPL-MCNC: 163 MG/DL (ref 70–110)
GLUCOSE SERPL-MCNC: 210 MG/DL (ref 70–110)
HCT VFR BLD AUTO: 17.2 % (ref 37–48.5)
HCT VFR BLD AUTO: 17.4 % (ref 37–48.5)
HCT VFR BLD AUTO: 21.5 % (ref 37–48.5)
HCT VFR BLD AUTO: 29.6 % (ref 37–48.5)
HGB BLD-MCNC: 5.6 G/DL (ref 12–16)
HGB BLD-MCNC: 5.7 G/DL (ref 12–16)
HGB BLD-MCNC: 7 G/DL (ref 12–16)
HGB BLD-MCNC: 9.7 G/DL (ref 12–16)
HYPOCHROMIA BLD QL SMEAR: ABNORMAL
IMM GRANULOCYTES # BLD AUTO: 0.07 K/UL (ref 0–0.04)
IMM GRANULOCYTES # BLD AUTO: 0.08 K/UL (ref 0–0.04)
IMM GRANULOCYTES # BLD AUTO: 0.09 K/UL (ref 0–0.04)
IMM GRANULOCYTES # BLD AUTO: 0.15 K/UL (ref 0–0.04)
IMM GRANULOCYTES NFR BLD AUTO: 0.5 % (ref 0–0.5)
IMM GRANULOCYTES NFR BLD AUTO: 0.6 % (ref 0–0.5)
IMM GRANULOCYTES NFR BLD AUTO: 0.6 % (ref 0–0.5)
IMM GRANULOCYTES NFR BLD AUTO: 0.7 % (ref 0–0.5)
INR PPP: 1.3 (ref 0.8–1.2)
LYMPHOCYTES # BLD AUTO: 1.5 K/UL (ref 1–4.8)
LYMPHOCYTES # BLD AUTO: 1.7 K/UL (ref 1–4.8)
LYMPHOCYTES # BLD AUTO: 2 K/UL (ref 1–4.8)
LYMPHOCYTES # BLD AUTO: 2 K/UL (ref 1–4.8)
LYMPHOCYTES NFR BLD: 12.1 % (ref 18–48)
LYMPHOCYTES NFR BLD: 14.2 % (ref 18–48)
LYMPHOCYTES NFR BLD: 9.5 % (ref 18–48)
LYMPHOCYTES NFR BLD: 9.8 % (ref 18–48)
MCH RBC QN AUTO: 29 PG (ref 27–31)
MCH RBC QN AUTO: 29.8 PG (ref 27–31)
MCH RBC QN AUTO: 30.2 PG (ref 27–31)
MCH RBC QN AUTO: 30.3 PG (ref 27–31)
MCHC RBC AUTO-ENTMCNC: 32.2 G/DL (ref 32–36)
MCHC RBC AUTO-ENTMCNC: 32.6 G/DL (ref 32–36)
MCHC RBC AUTO-ENTMCNC: 32.8 G/DL (ref 32–36)
MCHC RBC AUTO-ENTMCNC: 33.1 G/DL (ref 32–36)
MCV RBC AUTO: 88 FL (ref 82–98)
MCV RBC AUTO: 91 FL (ref 82–98)
MCV RBC AUTO: 93 FL (ref 82–98)
MCV RBC AUTO: 93 FL (ref 82–98)
MONOCYTES # BLD AUTO: 0.8 K/UL (ref 0.3–1)
MONOCYTES # BLD AUTO: 1.1 K/UL (ref 0.3–1)
MONOCYTES # BLD AUTO: 1.1 K/UL (ref 0.3–1)
MONOCYTES # BLD AUTO: 1.5 K/UL (ref 0.3–1)
MONOCYTES NFR BLD: 5.4 % (ref 4–15)
MONOCYTES NFR BLD: 7.3 % (ref 4–15)
MONOCYTES NFR BLD: 7.5 % (ref 4–15)
MONOCYTES NFR BLD: 7.6 % (ref 4–15)
NEUTROPHILS # BLD AUTO: 11.1 K/UL (ref 1.8–7.7)
NEUTROPHILS # BLD AUTO: 11.2 K/UL (ref 1.8–7.7)
NEUTROPHILS # BLD AUTO: 12.8 K/UL (ref 1.8–7.7)
NEUTROPHILS # BLD AUTO: 16.9 K/UL (ref 1.8–7.7)
NEUTROPHILS NFR BLD: 77.5 % (ref 38–73)
NEUTROPHILS NFR BLD: 79.4 % (ref 38–73)
NEUTROPHILS NFR BLD: 82.2 % (ref 38–73)
NEUTROPHILS NFR BLD: 83.8 % (ref 38–73)
NRBC BLD-RTO: 0 /100 WBC
OVALOCYTES BLD QL SMEAR: ABNORMAL
PLATELET # BLD AUTO: 341 K/UL (ref 150–350)
PLATELET # BLD AUTO: 343 K/UL (ref 150–350)
PLATELET # BLD AUTO: 358 K/UL (ref 150–350)
PLATELET # BLD AUTO: 409 K/UL (ref 150–350)
PLATELET BLD QL SMEAR: ABNORMAL
PMV BLD AUTO: 10 FL (ref 9.2–12.9)
PMV BLD AUTO: 10.3 FL (ref 9.2–12.9)
PMV BLD AUTO: 10.3 FL (ref 9.2–12.9)
PMV BLD AUTO: 9.9 FL (ref 9.2–12.9)
POCT GLUCOSE: 163 MG/DL (ref 70–110)
POCT GLUCOSE: 170 MG/DL (ref 70–110)
POTASSIUM SERPL-SCNC: 4.2 MMOL/L (ref 3.5–5.1)
POTASSIUM SERPL-SCNC: 4.2 MMOL/L (ref 3.5–5.1)
PROT SERPL-MCNC: 5.3 G/DL (ref 6–8.4)
PROT SERPL-MCNC: 6 G/DL (ref 6–8.4)
PROTHROMBIN TIME: 13.1 SEC (ref 9–12.5)
RBC # BLD AUTO: 1.88 M/UL (ref 4–5.4)
RBC # BLD AUTO: 1.89 M/UL (ref 4–5.4)
RBC # BLD AUTO: 2.31 M/UL (ref 4–5.4)
RBC # BLD AUTO: 3.35 M/UL (ref 4–5.4)
SODIUM SERPL-SCNC: 136 MMOL/L (ref 136–145)
SODIUM SERPL-SCNC: 136 MMOL/L (ref 136–145)
TRANS ERYTHROCYTES VOL PATIENT: NORMAL ML
TRANS ERYTHROCYTES VOL PATIENT: NORMAL ML
WBC # BLD AUTO: 14.13 K/UL (ref 3.9–12.7)
WBC # BLD AUTO: 14.33 K/UL (ref 3.9–12.7)
WBC # BLD AUTO: 15.27 K/UL (ref 3.9–12.7)
WBC # BLD AUTO: 20.62 K/UL (ref 3.9–12.7)

## 2019-07-02 PROCEDURE — 25000003 PHARM REV CODE 250: Performed by: STUDENT IN AN ORGANIZED HEALTH CARE EDUCATION/TRAINING PROGRAM

## 2019-07-02 PROCEDURE — P9021 RED BLOOD CELLS UNIT: HCPCS

## 2019-07-02 PROCEDURE — 36430 TRANSFUSION BLD/BLD COMPNT: CPT

## 2019-07-02 PROCEDURE — 80053 COMPREHEN METABOLIC PANEL: CPT | Mod: 91

## 2019-07-02 PROCEDURE — 93010 ELECTROCARDIOGRAM REPORT: CPT | Mod: ,,, | Performed by: INTERNAL MEDICINE

## 2019-07-02 PROCEDURE — 63600175 PHARM REV CODE 636 W HCPCS: Performed by: STUDENT IN AN ORGANIZED HEALTH CARE EDUCATION/TRAINING PROGRAM

## 2019-07-02 PROCEDURE — 63600175 PHARM REV CODE 636 W HCPCS: Performed by: EMERGENCY MEDICINE

## 2019-07-02 PROCEDURE — 93010 EKG 12-LEAD: ICD-10-PCS | Mod: ,,, | Performed by: INTERNAL MEDICINE

## 2019-07-02 PROCEDURE — 51798 US URINE CAPACITY MEASURE: CPT

## 2019-07-02 PROCEDURE — 36415 COLL VENOUS BLD VENIPUNCTURE: CPT

## 2019-07-02 PROCEDURE — 86850 RBC ANTIBODY SCREEN: CPT

## 2019-07-02 PROCEDURE — 20600001 HC STEP DOWN PRIVATE ROOM

## 2019-07-02 PROCEDURE — 99223 PR INITIAL HOSPITAL CARE,LEVL III: ICD-10-PCS | Mod: GC,,, | Performed by: INTERNAL MEDICINE

## 2019-07-02 PROCEDURE — 63600175 PHARM REV CODE 636 W HCPCS: Performed by: PHYSICIAN ASSISTANT

## 2019-07-02 PROCEDURE — 85610 PROTHROMBIN TIME: CPT

## 2019-07-02 PROCEDURE — 93005 ELECTROCARDIOGRAM TRACING: CPT

## 2019-07-02 PROCEDURE — 80053 COMPREHEN METABOLIC PANEL: CPT

## 2019-07-02 PROCEDURE — 99223 1ST HOSP IP/OBS HIGH 75: CPT | Mod: GC,,, | Performed by: INTERNAL MEDICINE

## 2019-07-02 PROCEDURE — 99222 PR INITIAL HOSPITAL CARE,LEVL II: ICD-10-PCS | Mod: AI,GC,, | Performed by: INTERNAL MEDICINE

## 2019-07-02 PROCEDURE — 86920 COMPATIBILITY TEST SPIN: CPT

## 2019-07-02 PROCEDURE — 85025 COMPLETE CBC W/AUTO DIFF WBC: CPT | Mod: 91

## 2019-07-02 PROCEDURE — 99222 1ST HOSP IP/OBS MODERATE 55: CPT | Mod: AI,GC,, | Performed by: INTERNAL MEDICINE

## 2019-07-02 PROCEDURE — 25000003 PHARM REV CODE 250: Performed by: PHYSICIAN ASSISTANT

## 2019-07-02 RX ORDER — METOPROLOL SUCCINATE 25 MG/1
75 TABLET, EXTENDED RELEASE ORAL DAILY
Status: DISCONTINUED | OUTPATIENT
Start: 2019-07-02 | End: 2019-07-02

## 2019-07-02 RX ORDER — HYDROCODONE BITARTRATE AND ACETAMINOPHEN 500; 5 MG/1; MG/1
TABLET ORAL
Status: DISCONTINUED | OUTPATIENT
Start: 2019-07-02 | End: 2019-07-08

## 2019-07-02 RX ORDER — HYDROMORPHONE HYDROCHLORIDE 1 MG/ML
0.5 INJECTION, SOLUTION INTRAMUSCULAR; INTRAVENOUS; SUBCUTANEOUS EVERY 6 HOURS PRN
Status: DISCONTINUED | OUTPATIENT
Start: 2019-07-02 | End: 2019-07-04

## 2019-07-02 RX ORDER — ENOXAPARIN SODIUM 100 MG/ML
80 INJECTION SUBCUTANEOUS 2 TIMES DAILY
Status: DISCONTINUED | OUTPATIENT
Start: 2019-07-02 | End: 2019-07-02

## 2019-07-02 RX ORDER — DULOXETIN HYDROCHLORIDE 20 MG/1
60 CAPSULE, DELAYED RELEASE ORAL DAILY
Status: DISCONTINUED | OUTPATIENT
Start: 2019-07-02 | End: 2019-07-08 | Stop reason: HOSPADM

## 2019-07-02 RX ORDER — MORPHINE SULFATE 4 MG/ML
4 INJECTION, SOLUTION INTRAMUSCULAR; INTRAVENOUS
Status: COMPLETED | OUTPATIENT
Start: 2019-07-02 | End: 2019-07-02

## 2019-07-02 RX ORDER — SODIUM CHLORIDE, SODIUM LACTATE, POTASSIUM CHLORIDE, CALCIUM CHLORIDE 600; 310; 30; 20 MG/100ML; MG/100ML; MG/100ML; MG/100ML
INJECTION, SOLUTION INTRAVENOUS CONTINUOUS
Status: DISCONTINUED | OUTPATIENT
Start: 2019-07-02 | End: 2019-07-03

## 2019-07-02 RX ORDER — ONDANSETRON 8 MG/1
8 TABLET, ORALLY DISINTEGRATING ORAL EVERY 8 HOURS PRN
Status: DISCONTINUED | OUTPATIENT
Start: 2019-07-02 | End: 2019-07-08 | Stop reason: HOSPADM

## 2019-07-02 RX ORDER — INSULIN ASPART 100 [IU]/ML
0-5 INJECTION, SOLUTION INTRAVENOUS; SUBCUTANEOUS
Status: DISCONTINUED | OUTPATIENT
Start: 2019-07-02 | End: 2019-07-08 | Stop reason: HOSPADM

## 2019-07-02 RX ORDER — ATORVASTATIN CALCIUM 10 MG/1
10 TABLET, FILM COATED ORAL DAILY
Status: DISCONTINUED | OUTPATIENT
Start: 2019-07-02 | End: 2019-07-08 | Stop reason: HOSPADM

## 2019-07-02 RX ORDER — IBUPROFEN 200 MG
16 TABLET ORAL
Status: DISCONTINUED | OUTPATIENT
Start: 2019-07-02 | End: 2019-07-08 | Stop reason: HOSPADM

## 2019-07-02 RX ORDER — OXYCODONE AND ACETAMINOPHEN 10; 325 MG/1; MG/1
1 TABLET ORAL EVERY 4 HOURS PRN
Status: DISCONTINUED | OUTPATIENT
Start: 2019-07-02 | End: 2019-07-04

## 2019-07-02 RX ORDER — GABAPENTIN 100 MG/1
100 CAPSULE ORAL 2 TIMES DAILY
Status: DISCONTINUED | OUTPATIENT
Start: 2019-07-02 | End: 2019-07-03

## 2019-07-02 RX ORDER — IBUPROFEN 200 MG
24 TABLET ORAL
Status: DISCONTINUED | OUTPATIENT
Start: 2019-07-02 | End: 2019-07-08 | Stop reason: HOSPADM

## 2019-07-02 RX ORDER — GLUCAGON 1 MG
1 KIT INJECTION
Status: DISCONTINUED | OUTPATIENT
Start: 2019-07-02 | End: 2019-07-08 | Stop reason: HOSPADM

## 2019-07-02 RX ORDER — DICYCLOMINE HYDROCHLORIDE 10 MG/1
10 CAPSULE ORAL 3 TIMES DAILY
Status: DISCONTINUED | OUTPATIENT
Start: 2019-07-02 | End: 2019-07-04

## 2019-07-02 RX ORDER — ACETAMINOPHEN 325 MG/1
650 TABLET ORAL EVERY 8 HOURS PRN
Status: DISCONTINUED | OUTPATIENT
Start: 2019-07-02 | End: 2019-07-03

## 2019-07-02 RX ORDER — OXYCODONE AND ACETAMINOPHEN 5; 325 MG/1; MG/1
1 TABLET ORAL EVERY 4 HOURS PRN
Status: DISCONTINUED | OUTPATIENT
Start: 2019-07-02 | End: 2019-07-04

## 2019-07-02 RX ORDER — SODIUM CHLORIDE 0.9 % (FLUSH) 0.9 %
10 SYRINGE (ML) INJECTION
Status: DISCONTINUED | OUTPATIENT
Start: 2019-07-02 | End: 2019-07-05

## 2019-07-02 RX ORDER — RAMELTEON 8 MG/1
8 TABLET ORAL NIGHTLY PRN
Status: DISCONTINUED | OUTPATIENT
Start: 2019-07-02 | End: 2019-07-08 | Stop reason: HOSPADM

## 2019-07-02 RX ORDER — ONDANSETRON 2 MG/ML
4 INJECTION INTRAMUSCULAR; INTRAVENOUS EVERY 6 HOURS PRN
Status: DISCONTINUED | OUTPATIENT
Start: 2019-07-02 | End: 2019-07-03

## 2019-07-02 RX ORDER — HYDROMORPHONE HYDROCHLORIDE 1 MG/ML
0.5 INJECTION, SOLUTION INTRAMUSCULAR; INTRAVENOUS; SUBCUTANEOUS ONCE
Status: COMPLETED | OUTPATIENT
Start: 2019-07-02 | End: 2019-07-02

## 2019-07-02 RX ADMIN — MORPHINE SULFATE 4 MG: 4 INJECTION INTRAVENOUS at 01:07

## 2019-07-02 RX ADMIN — SODIUM CHLORIDE: 0.9 INJECTION, SOLUTION INTRAVENOUS at 09:07

## 2019-07-02 RX ADMIN — SODIUM CHLORIDE 1000 ML: 0.9 INJECTION, SOLUTION INTRAVENOUS at 06:07

## 2019-07-02 RX ADMIN — ATORVASTATIN CALCIUM 10 MG: 10 TABLET, FILM COATED ORAL at 11:07

## 2019-07-02 RX ADMIN — GABAPENTIN 100 MG: 100 CAPSULE ORAL at 11:07

## 2019-07-02 RX ADMIN — OXYCODONE HYDROCHLORIDE AND ACETAMINOPHEN 1 TABLET: 5; 325 TABLET ORAL at 07:07

## 2019-07-02 RX ADMIN — HYDROMORPHONE HYDROCHLORIDE 0.5 MG: 1 INJECTION, SOLUTION INTRAMUSCULAR; INTRAVENOUS; SUBCUTANEOUS at 05:07

## 2019-07-02 RX ADMIN — OXYCODONE HYDROCHLORIDE AND ACETAMINOPHEN 1 TABLET: 10; 325 TABLET ORAL at 04:07

## 2019-07-02 RX ADMIN — OXYCODONE HYDROCHLORIDE AND ACETAMINOPHEN 1 TABLET: 5; 325 TABLET ORAL at 12:07

## 2019-07-02 RX ADMIN — SODIUM CHLORIDE, SODIUM LACTATE, POTASSIUM CHLORIDE, AND CALCIUM CHLORIDE: .6; .31; .03; .02 INJECTION, SOLUTION INTRAVENOUS at 06:07

## 2019-07-02 RX ADMIN — OXYCODONE HYDROCHLORIDE AND ACETAMINOPHEN 1 TABLET: 5; 325 TABLET ORAL at 11:07

## 2019-07-02 RX ADMIN — GABAPENTIN 100 MG: 100 CAPSULE ORAL at 10:07

## 2019-07-02 RX ADMIN — ONDANSETRON 4 MG: 2 INJECTION INTRAMUSCULAR; INTRAVENOUS at 09:07

## 2019-07-02 RX ADMIN — DICYCLOMINE HYDROCHLORIDE 10 MG: 10 CAPSULE ORAL at 10:07

## 2019-07-02 RX ADMIN — DULOXETINE HYDROCHLORIDE 60 MG: 20 CAPSULE, DELAYED RELEASE ORAL at 11:07

## 2019-07-02 RX ADMIN — HYDROMORPHONE HYDROCHLORIDE 0.5 MG: 1 INJECTION, SOLUTION INTRAMUSCULAR; INTRAVENOUS; SUBCUTANEOUS at 06:07

## 2019-07-02 RX ADMIN — ONDANSETRON 8 MG: 8 TABLET, ORALLY DISINTEGRATING ORAL at 07:07
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
## 2019-07-02 NOTE — ED NOTES
Pt reports being here Saturday and having 2 liters of blood drained from right lung. Since procedure pt reports worsening pain and bruising in her back that radiates to her side. Pt reports having surgery scheduled for this up coming wednesday and is using oxygen at home in the mean time.

## 2019-07-02 NOTE — ASSESSMENT & PLAN NOTE
- Patient type and crossed.   - 2 units to be transfused.   - Repeat H/H following transfusions.   - Will continue to monitor

## 2019-07-02 NOTE — PROGRESS NOTES
Pt arrived to floor at this time via stretcher. No family present at bedside. Pt is nauseous, iv zofran will be given. Awaiting blood consent to start transfusions. Pt complains of lower back pain. No pressure sores detected to pressure points.

## 2019-07-02 NOTE — PROVIDER PROGRESS NOTES - EMERGENCY DEPT.
Encounter Date: 7/1/2019   I, Bren De La Cruz, am scribing for, and in the presence of, Dr. Scott. I performed the above scribed service and the documentation accurately describes the services I performed. I attest to the accuracy of the note.         ED Physician Progress Notes         EKG - STEMI Decision  Initial Reading: No STEMI present.

## 2019-07-02 NOTE — ASSESSMENT & PLAN NOTE
- Volume resuscitation with blood transfusions and fluids  - trend Cr and BUN  - If no improvement, urine studies recommended   - will continue to monitor

## 2019-07-02 NOTE — HPI
Isabell Preston is a 73 y.o. 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 Patient was recently admitted to the hospital for shortness of breath and underwent a thoracentesis which removed 2L of bloody fluid. She was discharged on 6/30 and returns with worsening back pain and shortness of breath. CT scan showed hematoma at site of thoracentesis and reaccumulation of pleural fluid. Patient afebrile and hemodynamically stable on admission. Labs concerning for decrease in hemoglobin from 10 to 5.6 with a basline Hg of 12.  She reports shortness of breath is worse due to back pain, currently with saturations of 92% on 3L NC. Was discharged from hospital on home O2.    Pulmonlogy consulted for recurent pleural effusion and new hematoma s/p thoracentesis on 6/29.

## 2019-07-02 NOTE — HPI
Ms. Preston is a 74 yo AAF who presented to the ED with increasing back pain (Right sided) and SOB. 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 The patient had recently been admitted to the hospital on 6/29/19 for similar symptoms including back pain and SOB where it was determined that she had a pleural effusion and a thoracentesis was preformed producing 2L of bloody fluid. She was then discharged home on 6/30/19 on a heparin bridge to coumadin, but returned to the ED around 2300 on 7/1/19 for worsening back pain (Right sided) and SOB. CT shows the formation of a hematoma at the site of the thoracentesis and a reaccumulation of the pleural effusion. She was originally admitted by Cardiothoracic Surgery and medicine was consulted. Her labs were concerning for a low Hgb (5.6), leukocytosis (15k) and increased Cr (1.9), and the patient was admitted to our service for GINA and hematoma s/p thoracentesis.     Of note, the patient has also had a 10 month history of a weight loss of 35 pounds. She has also reported episodes of dizziness and palpitations, which sound presyncopal in nature, which last for 30 seconds and are relieved by sitting down and placing a cold towel on her body. During this period, she has also complained of early satiety, decreased PO intake, and decreased appetite. Patient reports that her health care maintenance is up-to-date with mammogram and colonoscopy screenings and follows up regularly with her PCP, Ayo Archuleta MD. Patient also reports a family history of clotting disorders, with a maternal aunt who had recurrent DVTs as well as her mom who had a DVT.    She has a 8 year history of asbestos exposure while working in [place] as a , and while being around her father, who worked for the [employer] for  20 years. Per patient's daughter, she also is a hoarder, who's home may be inhabitable.

## 2019-07-02 NOTE — NURSING
Pt admitted to [facility] [id number] via stretcher, accompanied by staff.  VS stable, critical H/H noted & will transfuse 2 units PRBC as ordered.  Very large hematoma noted to right upper/outer back - monitor closely for further bleeding
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
## 2019-07-02 NOTE — ASSESSMENT & PLAN NOTE
Isabell Preston is a 73 y.o. 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 Patient was recently admitted to the hospital for shortness of breath and underwent a thoracentesis on 6/29 which removed 2L of bloody fluid.   CT Chest showed hematoma at site of thoracentesis and reaccumulation of pleural fluid  - Hg 10 to 5.6 today with a basline Hg of 12.    - INR 1.3    Plan  - Transfuse for Hg <7, currently 2 units PRBCs ordered   - check CBC Q8H  - Hold anticoagulation and blood pressure meds   - Place pressure dressing over hematoma   - IR consulted

## 2019-07-02 NOTE — ED NOTES
Adult Physical Assessment  LOC: Isabell Preston, 73 y.o. female verified via two identifiers.  The patient is awake, alert, oriented and speaking appropriately at this time.  APPEARANCE: Patient uncomfortable and expressing severe pain near drainage site. Patient is clean and well groomed, patient's clothing is properly fastened.  SKIN:The skin is warm and dry, color consistent with ethnicity, patient has normal skin turgor and moist mucus membranes, skin intact, no breakdown noted. Bruising noted at drainage site on right side.  MUSCULOSKELETAL: Patient moving all extremities well, no obvious swelling or deformities noted.  RESPIRATORY: Airway is open and patent, respirations are spontaneous, patient has a normal effort and rate, no accessory muscle use noted. Pt on 2L nasal cannula.  CARDIAC: Patient has a normal rate and rhythm, no periphreal edema noted in any extremity, capillary refill < 3 seconds in all extremities  ABDOMEN: Soft and non tender to palpation, no abdominal distention noted. Bowel sounds present in all four quadrants.  NEUROLOGIC: Eyes open spontaneously, behavior appropriate to situation, follows commands, facial expression symmetrical, bilateral hand grasp equal and even, purposeful motor response noted, normal sensation in all extremities when touched with a finger.   patient, mom, pain management Dr. Damico, vascular, nurse

## 2019-07-02 NOTE — ANESTHESIA PREPROCEDURE EVALUATION
[facility] [address]  Anesthesia Pre-Operative Evaluation         Patient Name: Isabell Preston  YOB: 1945  MRN: [id number]    SUBJECTIVE:     Pre-operative evaluation for Procedure(s) (LRB):  VATS, WITH PLEURA BIOPSY (Right)  VATS, WITH PLEURODESIS (Right)  VATS, WITH CHEST TUBE INSERTION FOR DRAINAGE OF PLEURAL EFFUSION (Right)     07/02/2019    Isabell Preston is a 73 y.o. female w/ a significant PMHx of DVT on warfarin, HTN, HLD, DM2, recurrent pleural effusion of unknown etiology s/p thoracentesis 6/29 now presenting 7/1 with iatrogenic hematoma at site of thoracentesis and with recurrence of pleural fluid. Labs concerning for mild transaminitis, GINA (Cr 1.9), anemia (hgb 5.6, now s/p 2u prbc on 7/2). Currently on 3L NC. Per thoracic surgery note, planned VATS likely canceled for 7/3 in setting of suspected thoracic bleeding from prior thoracentesis.     Patient now presents for the above procedure(s).      LDA:        Peripheral IV - Single Lumen 06/29/19 1739 18 G Left Antecubital (Active)   Number of days: 3            Peripheral IV - Single Lumen 07/02/19 0100 18 G Left Antecubital (Active)   Site Assessment Clean;Dry;Intact;No redness;No swelling 7/2/2019 10:00 AM   Line Status Infusing 7/2/2019 10:00 AM   Dressing Status Clean;Dry;Intact 7/2/2019 10:00 AM   Dressing Change Due 07/07/19 7/2/2019 10:00 AM   Site Change Due 07/07/19 7/2/2019 10:00 AM   Reason Not Rotated Not due 7/2/2019 10:00 AM   Number of days: 0       Drips:    lactated ringers 75 mL/hr at 07/02/19 1400       Patient Active Problem List   Diagnosis    Hypertension, essential    Blurred vision, left eye    SI (sacroiliac) joint dysfunction    Muscle spasm    Spinal enthesopathy    DDD (degenerative disc disease), lumbar    Chronic pain of left ankle    Chronic deep vein thrombosis (DVT) of distal vein of lower extremity, unspecified laterality    Hyperlipidemia    History of CVA (cerebrovascular  accident)    Chronic anticoagulation    Iron deficiency anemia due to chronic blood loss    Iron deficiency anemia due to sideropenic dysphagia    Diverticulosis of large intestine with hemorrhage    Chronic fatigue    Neuropathic pain    Type 2 diabetes mellitus with diabetic cataract, without long-term current use of insulin    Pleural effusion, right    Acute renal failure superimposed on stage 3 chronic kidney disease    Major depressive disorder, recurrent episode, mild    Iron deficiency anemia    Acute respiratory insufficiency    Chest pain    Hematoma of chest wall, right, initial encounter    Postprocedural hematoma     Acute blood loss anemia    GINA (acute kidney injury)    Transaminitis       Review of patient's allergies indicates:   Allergen Reactions    Ciprofloxacin Anaphylaxis    Fructose     Gluten protein Other (See Comments)     GI upset  GI upset    Lactase Other (See Comments)     GI upset  GI upset    Latex, natural rubber Rash       Current Inpatient Medications:   atorvastatin  10 mg Oral Daily    dicyclomine  10 mg Oral TID    DULoxetine  60 mg Oral Daily    gabapentin  100 mg Oral BID       No current facility-administered medications on file prior to encounter.      Current Outpatient Medications on File Prior to Encounter   Medication Sig Dispense Refill    COUMADIN 5 mg tablet Take 1 tablet (5 mg total) by mouth Daily. Hold until resumed by MID  1    enoxaparin (LOVENOX) 80 mg/0.8 mL Syrg Inject 0.8 mLs (80 mg total) into the skin 2 (two) times daily. for 5 days 12 Syringe 0    hydrOXYzine HCl (ATARAX) 25 MG tablet Take 1 tablet (25 mg total) by mouth daily as needed for Anxiety. 90 tablet 0    oxyCODONE-acetaminophen (PERCOCET) 5-325 mg per tablet Take 1 tablet by mouth every 4 (four) hours as needed. 16 tablet 0    amLODIPine (NORVASC) 10 MG tablet Take 10 mg by mouth once daily.      atorvastatin (LIPITOR) 10 MG tablet TAKE 1 TABLET(10 MG) BY MOUTH  EVERY DAY 90 tablet 0    dicyclomine (BENTYL) 10 MG capsule TAKE 1 CAPSULE BY MOUTH THREE TIMES DAILY 90 capsule 0    DULoxetine (CYMBALTA) 60 MG capsule Take 1 capsule (60 mg total) by mouth once daily. 90 capsule 0    fluticasone (VERAMYST) 27.5 mcg/actuation nasal spray 2 sprays by Nasal route once daily.      gabapentin (NEURONTIN) 100 MG capsule TAKE 1 CAPSULE(100 MG) BY MOUTH THREE TIMES DAILY (Patient taking differently: 100 mg 2 (two) times daily. TAKE 1 CAPSULE(100 MG) BY MOUTH THREE TIMES DAILY) 270 capsule 2    lancets (TRUEPLUS LANCETS) 28 gauge Misc 1 lancet by Misc.(Non-Drug; Combo Route) route once daily. Test blood sugar once daily, type 2 diabetes, controlled. E11.9 100 each 3    LINZESS 72 mcg Cap TAKE 1 CAPSULE(72 MCG) BY MOUTH DAILY 90 capsule 0    losartan (COZAAR) 100 MG tablet Take 1 tablet (100 mg total) by mouth once daily. Hold until resumed by PCP 30 tablet 1    meclizine (ANTIVERT) 25 mg tablet TAKE 1 TABLET(25 MG) BY MOUTH THREE TIMES DAILY AS NEEDED FOR DIZZINESS 30 tablet 0    metoprolol succinate (TOPROL-XL) 25 MG 24 hr tablet TAKE 1 TABLET(25 MG) BY MOUTH EVERY DAY. TOTAL DAILY DOSE 75 MG 90 tablet 0    metoprolol succinate (TOPROL-XL) 50 MG 24 hr tablet TAKE 1 TABLET(50 MG) BY MOUTH EVERY DAY. TOTAL DAILY DOSE 75 MG 90 tablet 0    mometasone 0.1% (ELOCON) 0.1 % cream BALWINDER TO DARK AREAS BID ON LEGS PRN 15 g 1    tiZANidine (ZANAFLEX) 4 MG tablet Take 4 mg by mouth as needed.      triamterene-hydrochlorothiazide 37.5-25 mg (MAXZIDE-25) 37.5-25 mg per tablet Take 1 tablet by mouth once daily. Hold until resumed by PCP 90 tablet 0       Past Surgical History:   Procedure Laterality Date    ANKLE FRACTURE SURGERY      left ankle    APENDIX AND GALL BLADDER REMOVED      APPENDECTOMY      BREAST SURGERY  1998    lumpectomy right side - benign    CHOLECYSTECTOMY      colon resection for diverticulitis x 2      COLONOSCOPY N/A 6/6/2013    Performed by An[facility]ul  [facility]ia at [facility] ENDO (4[facility] F[facility])    EGD (ESOPHAGOGASTRODUODENOSCOPY) N/A 2013    Performed by [facility] at [facility] ENDO (4TH FLR)    ESOPHAGOGASTRODUODENOSCOPY (EGD) N/A 2016    [facility]    HEMORRHOID SURGERY      HERNIA REPAIR  2000    umbilical hernia repair    HYSTERECTOMY      INJECTION-STEROID-EPIDURAL-TRANSFORAMINAL Left 2016    Performed [facility] at [facility] PAIN MGT    TONSILLECTOMY      TOTAL ABDOMINAL HYSTERECTOMY W/ BILATERAL SALPINGOOPHORECTOMY      UMBILICAL HERNIA REPAIR         Social History     Socioeconomic History    Marital status:      Spouse name: Not on file    Number of children: 3    Years of education: Not on file    Highest education level: Not on file   Occupational History    Occupation: retired -    Social Needs    Financial resource strain: Not on file    Food insecurity:     Worry: Not on file     Inability: Not on file    Transportation needs:     Medical: Not on file     Non-medical: Not on file   Tobacco Use    Smoking status: Former Smoker     Types: Cigarettes     Last attempt to quit: 1985     Years since quittin.9    Smokeless tobacco: Never Used   Substance and Sexual Activity    Alcohol use: No    Drug use: No    Sexual activity: Never   Lifestyle    Physical activity:     Days per week: Not on file     Minutes per session: Not on file    Stress: Not on file   Relationships    Social connections:     Talks on phone: Not on file     Gets together: Not on file     Attends [employer] service: Not on file     Active member of club or organization: Not on file     Attends meetings of clubs or organizations: Not on file     Relationship status: Not on file   Other Topics Concern    Patient feels they ought to cut down on drinking/drug use Not Asked    Patient annoyed by others criticizing their drinking/drug use Not Asked    Patient has  felt bad or guilty about drinking/drug use Not Asked    Patient has had a drink/used drugs as an eye opener in the AM Not Asked   Social History Narrative    Not on file       OBJECTIVE:     Vital Signs Range (Last 24H):  Temp:  [36.7 °C (98.1 °F)-37 °C (98.6 °F)]   Pulse:  [79-94]   Resp:  [11-22]   BP: (105-140)/(51-80)   SpO2:  [93 %-99 %]       Significant Labs:  Lab Results   Component Value Date    WBC 20.62 (H) 07/02/2019    HGB 9.7 (L) 07/02/2019    HCT 29.6 (L) 07/02/2019     07/02/2019    CHOL 144 06/05/2019    TRIG 113 06/05/2019    HDL 49 06/05/2019     (H) 07/02/2019     (H) 07/02/2019     07/02/2019    K 4.2 07/02/2019     07/02/2019    CREATININE 1.9 (H) 07/02/2019    BUN 27 (H) 07/02/2019    CO2 24 07/02/2019    TSH 1.904 08/16/2018    INR 1.3 (H) 07/02/2019    HGBA1C 6.2 (H) 06/05/2019       Diagnostic Studies: 7/2/2019  Narrative     EXAMINATION:  XR CHEST 1 VIEW    CLINICAL HISTORY:  Hemothorax;    TECHNIQUE:  Single frontal view of the chest was performed.    COMPARISON:  Chest 07/02/2019, performed at 1.55 a.m..    FINDINGS:  Increasing right pleural effusion when compared to the study performed earlier.  Superimposed atelectasis of the right lung base suspected.  No definite pneumothorax.    Since the previous study there is also some mild central pulmonary vascular congestion.  No mediastinal shift.  No tracheal abnormality.  Heart size is not evaluated well due to the obscuration of the right heart border.      Impression       Slight increase in the right pleural effusion when compared to the previous study.  Rest of the examination without significant change.      Electronically signed by: Josue Mcleod MD  Date: 07/02/2019  Time: 09:03       EKG:   Results for orders placed or performed during the hospital encounter of 07/02/19   EKG 12-lead    Collection Time: 07/02/19  2:10 AM    Narrative    Test Reason : R07.9,    Vent. Rate : 090 BPM     Atrial Rate :  090 BPM     P-R Int : 140 ms          QRS Dur : 080 ms      QT Int : 358 ms       P-R-T Axes : 024 047 169 degrees     QTc Int : 437 ms    Normal sinus rhythm  ST and T wave abnormality, consider inferolateral ischemia  Abnormal ECG  When compared with ECG of 01-JUL-2019 23:51,  Inverted T waves have replaced nonspecific T wave abnormality in Inferior  leads  T wave inversion more evident in Lateral leads  Confirmed by KRZYSZTOF QUINTERO MD ([id number]) on 7/2/2019 2:03:52 PM    Referred By: [email or web address]   SELF           Confirmed By:KRZYSZTOF QUINTERO MD       2D ECHO:  5/28/2019  · Normal left ventricular systolic function. The estimated ejection fraction is 60%  · No wall motion abnormalities.  · Mild concentric left ventricular hypertrophy.  · Normal right ventricular systolic function.  · Mild to moderate tricuspid regurgitation.  · The estimated PA systolic pressure is 46 mm Hg  · Pulmonary hypertension present.       ASSESSMENT/PLAN:         Anesthesia Evaluation    I have reviewed the Patient Summary Reports.    I have reviewed the Nursing Notes.   I have reviewed the Medications.     Review of Systems  Anesthesia Hx:  No problems with previous Anesthesia Denies Hx of Anesthetic complications  Denies Family Hx of Anesthesia complications.   Denies Personal Hx of Anesthesia complications.   Social:  No Alcohol Use, Non-Smoker    Hematology/Oncology:         -- Anemia:   Cardiovascular:   Hypertension VANESSA ECG has been reviewed.    Pulmonary:   Shortness of breath    Renal/:   Chronic Renal Disease    Musculoskeletal:   Arthritis     Neurological:   Denies CVA. Denies Seizures.    Endocrine:   Diabetes        Physical Exam  General:  Well nourished    Airway/Jaw/Neck:  Airway Findings: Mouth Opening: Normal General Airway Assessment: Adult  Mallampati: II  TM Distance: Normal, at least 6 cm  Jaw/Neck Findings:  Neck ROM: Normal ROM     Eyes/Ears/Nose:  Eyes/Ears/Nose Findings:    Dental:  Dental Findings: Upper Dentures,  Lower Dentures   Chest/Lungs:  Chest/Lungs Findings: Decreased Breathe Sounds Right     Heart/Vascular:  Heart Findings: Rate: Tachycardia  Rhythm: Regular Rhythm  Sounds: Normal  Heart murmur: negative    Abdomen:  Abdomen Findings: Normal      Mental Status:  Mental Status Findings:  Cooperative, Alert and Oriented         Anesthesia Plan  Type of Anesthesia, risks & benefits discussed:  Anesthesia Type:  general  Patient's Preference:   Intra-op Monitoring Plan: standard ASA monitors  Intra-op Monitoring Plan Comments:   Post Op Pain Control Plan: multimodal analgesia, IV/PO Opioids PRN and per primary service following discharge from PACU  Post Op Pain Control Plan Comments:   Induction:   IV  Beta Blocker:  Patient is on a Beta-Blocker and has received one dose within the past 24 hours (No further documentation required).       Informed Consent: Patient understands risks and agrees with Anesthesia plan.  Questions answered. Anesthesia consent signed with patient.  ASA Score: 4     Day of Surgery Review of History & Physical:            Ready For Surgery From Anesthesia Perspective.
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
## 2019-07-02 NOTE — HOSPITAL COURSE
Patient presented to ED on 6/29/19 with back pain and SOB. Pleural effusion was found and thoracentesis was preformed. Patient was discharged home on 6/30/19. Patient returned to ED with worsening back pain and SOB around on 07/01/19. CT showed formation of hematoma at thoracentesis site (Right side) and reaccumulation of pleural effusion. On 07/03/19, the patient was moved to the floor and consulted to the medicine team.. She was started on continous LR for her worsening GINA and received 2 units of blood on the floor. Her hemglobin improved following 2 units pRBC from 5.7 to 9.7. She was then transferred to the medicine team as primary and Thoracic Surgery was following as consultants. On 07/03, her GINA worsened from Cr 1.9 to Cr 2.7, and she was given IVF, placed on strict Is/Os, and urine electrolytes were ordered. On the same day, thoracic surgery  performed a VATS drainage (2L of bloody fluid), pleural biopsy of a large exophytic mass, doxycycline pleurodesis, and pleurX catheter placement. A frozen sample was highly suggestive of malignancy and her biopsy was sent of for pathology. CT ABD/Pelvis and CT Head were ordered for staging, however imaging was non-revealing. She was placed on heparin 5k sq q8drip on tfloor post-op, which she will remain on upon discharge. On 07/04, long discussion with patient and family with our team regarding the findings from her procedure and what to expect from here. Further workup in search of a primary tumor diagnosis was done, including  to screen for ovarian cancer as well as a lymphoma panel. Discussions with Vascular Surgery occurred regarding managing her hypercoaguable state, in the setting of high bleeding risk, and a plan was developed to keep her on heparin 5k sq q8, and to place an IVC filter if a DVT develops. Her pain medication was adjusted as well, having Percocet PRN for moderate and Dilaudid PRN for severe. Overnight on 07/04 to the morning of 07/05,  patient developed some delirium, complained of having hallucinations and conversations of people who were not there. On 07/06, her medications were adjusted due to her delirium. On 07/07, patient no longer having episodes of delirium. Her hemoglobin dropped to 7, and she was transfused 1 unit pRBC. Thoracic Surgery has recommended that she can go home with home health, as long as that home health is familiar with PleurX catheters.     Pt with negative UA and MRI 7/8. Hemoglobin stable since transfusion. For anticoagulation, pt will get prior auth for low dose lovenox at home. Pt to d/c home under care of daughter.

## 2019-07-02 NOTE — ASSESSMENT & PLAN NOTE
"Subjective:       Patient ID: Valentin Brito is a 56 y.o. male.    Chief Complaint: Mole (LT side ) and Gastroesophageal Reflux    Here for 6 month check up    While traveling to home town of [place] last month he saw blood in ejaculate for 3.5 days, initially blood tinged then gross blood once. He reports some chronic intermittent symptoms of bladder pressure in the morning, he is bale to void 3/4 then has to stop for a minute then is able to relive the remainder of bladder and discomfort subsided. Symptoms consistently occur in the morning but not everyday. He will occasionally have similar daytime symptoms. He had U/S of kidneys that was reprotedly normal and PSA reportedly 0.87. He has reports at home and will scan and send to me via [facility]. He denies dysuria, F/C, recurrence of symptoms.         Review of Systems   Constitutional: Negative for activity change and unexpected weight change.   HENT: Positive for hearing loss. Negative for rhinorrhea and trouble swallowing.    Eyes: Positive for visual disturbance. Negative for discharge.   Respiratory: Negative for chest tightness and wheezing.    Cardiovascular: Negative for chest pain and palpitations.   Gastrointestinal: Negative for blood in stool, constipation, diarrhea and vomiting.   Endocrine: Negative for polydipsia and polyuria.   Genitourinary: Positive for hematuria. Negative for difficulty urinating and urgency.   Musculoskeletal: Negative for arthralgias, joint swelling and neck pain.   Neurological: Negative for weakness and headaches.   Psychiatric/Behavioral: Negative for confusion and dysphoric mood.       Objective:      Vitals:    07/18/17 0834   BP: 110/74   Pulse: 68   Weight: 103.9 kg (229 lb 0.9 oz)   Height: 6' 5" (1.956 m)      Physical Exam   Constitutional: He is oriented to person, place, and time. He appears well-developed and well-nourished. He does not have a sickly appearance. No distress.   HENT:   Head: Normocephalic and " - hx of recurrent pleural effusions of unknown etiology  - exudate with high lymphocyte count.  - underwent a thoracentesis on 6/29 which removed 2L of bloody fluid.   - CT Chest showed hematoma at site of thoracentesis and reaccumulation of pleural fluid  - Was scheduled for VATS tomorrow, 7/3  - Will monitor and consider thoracentesis once Hg stable    atraumatic.   Eyes: Conjunctivae and EOM are normal. Right eye exhibits no discharge. Left eye exhibits no discharge. No scleral icterus.   Pulmonary/Chest: Effort normal. No respiratory distress.   Abdominal: Normal appearance. He exhibits no distension.   Neurological: He is alert and oriented to person, place, and time.   Skin: Skin is warm and dry. He is not diaphoretic.        Psychiatric: He has a normal mood and affect. His speech is normal.       Assessment:       1. Hematuria    2. Atypical mole        Plan:       Valentin was seen today for mole and gastroesophageal reflux.    Diagnoses and all orders for this visit:    Hematuria  -     POCT URINE DIPSTICK WITH MICROSCOPE, AUTOMATED  -     URINALYSIS  -     Ambulatory referral to Urology  -     Cytology, urine    Atypical mole  -     Ambulatory referral to Dermatology               Side effects of medication(s) were discussed in detail and patient voiced understanding.  Patient will call back for any issues or complications.
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
## 2019-07-02 NOTE — CONSULTS
Consult acknowledged. Full H&P to Follow.    Keanu Lunsford  PGY-1  Hospital Medicine Consults (IM6)

## 2019-07-03 ENCOUNTER — PATIENT OUTREACH (OUTPATIENT)
Dept: ADMINISTRATIVE | Facility: OTHER | Age: 74
End: 2019-07-03

## 2019-07-03 ENCOUNTER — ANESTHESIA (OUTPATIENT)
Dept: SURGERY | Facility: HOSPITAL | Age: 74
DRG: 981 | End: 2019-07-03
Payer: MEDICARE

## 2019-07-03 PROBLEM — R07.89 RIGHT-SIDED CHEST WALL PAIN: Status: ACTIVE | Noted: 2019-07-03

## 2019-07-03 PROBLEM — Z86.718 HISTORY OF DVT OF LOWER EXTREMITY: Status: ACTIVE | Noted: 2019-07-03

## 2019-07-03 PROBLEM — R00.2 HEART PALPITATIONS: Status: ACTIVE | Noted: 2019-07-03

## 2019-07-03 PROBLEM — R07.81 PLEURITIC CHEST PAIN: Status: ACTIVE | Noted: 2019-07-02

## 2019-07-03 LAB
ALBUMIN SERPL BCP-MCNC: 3.1 G/DL (ref 3.5–5.2)
ALP SERPL-CCNC: 153 U/L (ref 55–135)
ALT SERPL W/O P-5'-P-CCNC: 89 U/L (ref 10–44)
ANION GAP SERPL CALC-SCNC: 13 MMOL/L (ref 8–16)
ANISOCYTOSIS BLD QL SMEAR: SLIGHT
AST SERPL-CCNC: 70 U/L (ref 10–40)
BASOPHILS # BLD AUTO: 0.05 K/UL (ref 0–0.2)
BASOPHILS # BLD AUTO: 0.05 K/UL (ref 0–0.2)
BASOPHILS # BLD AUTO: 0.06 K/UL (ref 0–0.2)
BASOPHILS NFR BLD: 0.3 % (ref 0–1.9)
BILIRUB SERPL-MCNC: 0.5 MG/DL (ref 0.1–1)
BUN SERPL-MCNC: 33 MG/DL (ref 8–23)
CALCIUM SERPL-MCNC: 8.8 MG/DL (ref 8.7–10.5)
CHLORIDE SERPL-SCNC: 99 MMOL/L (ref 95–110)
CO2 SERPL-SCNC: 23 MMOL/L (ref 23–29)
CREAT SERPL-MCNC: 2.7 MG/DL (ref 0.5–1.4)
DIFFERENTIAL METHOD: ABNORMAL
EOSINOPHIL # BLD AUTO: 0 K/UL (ref 0–0.5)
EOSINOPHIL NFR BLD: 0.2 % (ref 0–8)
ERYTHROCYTE [DISTWIDTH] IN BLOOD BY AUTOMATED COUNT: 14.8 % (ref 11.5–14.5)
ERYTHROCYTE [DISTWIDTH] IN BLOOD BY AUTOMATED COUNT: 14.8 % (ref 11.5–14.5)
ERYTHROCYTE [DISTWIDTH] IN BLOOD BY AUTOMATED COUNT: 14.9 % (ref 11.5–14.5)
EST. GFR  (AFRICAN AMERICAN): 19.6 ML/MIN/1.73 M^2
EST. GFR  (NON AFRICAN AMERICAN): 17 ML/MIN/1.73 M^2
GLUCOSE SERPL-MCNC: 145 MG/DL (ref 70–110)
HCT VFR BLD AUTO: 25.6 % (ref 37–48.5)
HCT VFR BLD AUTO: 28.1 % (ref 37–48.5)
HCT VFR BLD AUTO: 29.2 % (ref 37–48.5)
HGB BLD-MCNC: 8.3 G/DL (ref 12–16)
HGB BLD-MCNC: 9 G/DL (ref 12–16)
HGB BLD-MCNC: 9.3 G/DL (ref 12–16)
IMM GRANULOCYTES # BLD AUTO: 0.12 K/UL (ref 0–0.04)
IMM GRANULOCYTES # BLD AUTO: 0.17 K/UL (ref 0–0.04)
IMM GRANULOCYTES # BLD AUTO: 0.26 K/UL (ref 0–0.04)
IMM GRANULOCYTES NFR BLD AUTO: 0.7 % (ref 0–0.5)
IMM GRANULOCYTES NFR BLD AUTO: 0.9 % (ref 0–0.5)
IMM GRANULOCYTES NFR BLD AUTO: 1.1 % (ref 0–0.5)
INR PPP: 1.1 (ref 0.8–1.2)
LYMPHOCYTES # BLD AUTO: 1.4 K/UL (ref 1–4.8)
LYMPHOCYTES NFR BLD: 5.8 % (ref 18–48)
LYMPHOCYTES NFR BLD: 7.4 % (ref 18–48)
LYMPHOCYTES NFR BLD: 7.5 % (ref 18–48)
MAGNESIUM SERPL-MCNC: 1.8 MG/DL (ref 1.6–2.6)
MCH RBC QN AUTO: 29.2 PG (ref 27–31)
MCH RBC QN AUTO: 29.5 PG (ref 27–31)
MCH RBC QN AUTO: 29.5 PG (ref 27–31)
MCHC RBC AUTO-ENTMCNC: 31.8 G/DL (ref 32–36)
MCHC RBC AUTO-ENTMCNC: 32 G/DL (ref 32–36)
MCHC RBC AUTO-ENTMCNC: 32.4 G/DL (ref 32–36)
MCV RBC AUTO: 91 FL (ref 82–98)
MCV RBC AUTO: 92 FL (ref 82–98)
MCV RBC AUTO: 92 FL (ref 82–98)
MONOCYTES # BLD AUTO: 1.4 K/UL (ref 0.3–1)
MONOCYTES # BLD AUTO: 1.5 K/UL (ref 0.3–1)
MONOCYTES # BLD AUTO: 1.7 K/UL (ref 0.3–1)
MONOCYTES NFR BLD: 7.1 % (ref 4–15)
MONOCYTES NFR BLD: 7.7 % (ref 4–15)
MONOCYTES NFR BLD: 7.9 % (ref 4–15)
NEUTROPHILS # BLD AUTO: 15.1 K/UL (ref 1.8–7.7)
NEUTROPHILS # BLD AUTO: 15.4 K/UL (ref 1.8–7.7)
NEUTROPHILS # BLD AUTO: 20.5 K/UL (ref 1.8–7.7)
NEUTROPHILS NFR BLD: 83.3 % (ref 38–73)
NEUTROPHILS NFR BLD: 83.6 % (ref 38–73)
NEUTROPHILS NFR BLD: 85.5 % (ref 38–73)
NRBC BLD-RTO: 0 /100 WBC
OVALOCYTES BLD QL SMEAR: ABNORMAL
PHOSPHATE SERPL-MCNC: 4.9 MG/DL (ref 2.7–4.5)
PLATELET # BLD AUTO: 336 K/UL (ref 150–350)
PLATELET # BLD AUTO: 339 K/UL (ref 150–350)
PLATELET # BLD AUTO: 386 K/UL (ref 150–350)
PLATELET BLD QL SMEAR: ABNORMAL
PMV BLD AUTO: 10.1 FL (ref 9.2–12.9)
PMV BLD AUTO: 9.8 FL (ref 9.2–12.9)
PMV BLD AUTO: 9.8 FL (ref 9.2–12.9)
POCT GLUCOSE: 142 MG/DL (ref 70–110)
POCT GLUCOSE: 149 MG/DL (ref 70–110)
POCT GLUCOSE: 171 MG/DL (ref 70–110)
POLYCHROMASIA BLD QL SMEAR: ABNORMAL
POTASSIUM SERPL-SCNC: 4.3 MMOL/L (ref 3.5–5.1)
PROT SERPL-MCNC: 6.4 G/DL (ref 6–8.4)
PROTHROMBIN TIME: 11.6 SEC (ref 9–12.5)
RBC # BLD AUTO: 2.81 M/UL (ref 4–5.4)
RBC # BLD AUTO: 3.05 M/UL (ref 4–5.4)
RBC # BLD AUTO: 3.19 M/UL (ref 4–5.4)
SODIUM SERPL-SCNC: 135 MMOL/L (ref 136–145)
WBC # BLD AUTO: 18.07 K/UL (ref 3.9–12.7)
WBC # BLD AUTO: 18.55 K/UL (ref 3.9–12.7)
WBC # BLD AUTO: 23.97 K/UL (ref 3.9–12.7)

## 2019-07-03 PROCEDURE — 20600001 HC STEP DOWN PRIVATE ROOM

## 2019-07-03 PROCEDURE — 36415 COLL VENOUS BLD VENIPUNCTURE: CPT

## 2019-07-03 PROCEDURE — 88305 TISSUE EXAM BY PATHOLOGIST: CPT | Mod: 26,,, | Performed by: PATHOLOGY

## 2019-07-03 PROCEDURE — 85610 PROTHROMBIN TIME: CPT

## 2019-07-03 PROCEDURE — D9220A PRA ANESTHESIA: Mod: CRNA,,, | Performed by: NURSE ANESTHETIST, CERTIFIED REGISTERED

## 2019-07-03 PROCEDURE — 25000003 PHARM REV CODE 250: Performed by: STUDENT IN AN ORGANIZED HEALTH CARE EDUCATION/TRAINING PROGRAM

## 2019-07-03 PROCEDURE — 88342 TISSUE SPECIMEN TO PATHOLOGY - SURGERY: ICD-10-PCS | Mod: 26,,, | Performed by: PATHOLOGY

## 2019-07-03 PROCEDURE — 36000710: Performed by: THORACIC SURGERY (CARDIOTHORACIC VASCULAR SURGERY)

## 2019-07-03 PROCEDURE — 37000009 HC ANESTHESIA EA ADD 15 MINS: Performed by: THORACIC SURGERY (CARDIOTHORACIC VASCULAR SURGERY)

## 2019-07-03 PROCEDURE — 10140 PR DRAINAGE OF HEMATOMA/FLUID: ICD-10-PCS | Mod: 51,,, | Performed by: THORACIC SURGERY (CARDIOTHORACIC VASCULAR SURGERY)

## 2019-07-03 PROCEDURE — 99233 SBSQ HOSP IP/OBS HIGH 50: CPT | Mod: GC,,, | Performed by: HOSPITALIST

## 2019-07-03 PROCEDURE — 88341 PR IHC OR ICC EACH ADD'L SINGLE ANTIBODY  STAINPR: ICD-10-PCS | Mod: 26,,, | Performed by: PATHOLOGY

## 2019-07-03 PROCEDURE — 63600175 PHARM REV CODE 636 W HCPCS: Performed by: NURSE ANESTHETIST, CERTIFIED REGISTERED

## 2019-07-03 PROCEDURE — 25000003 PHARM REV CODE 250: Performed by: THORACIC SURGERY (CARDIOTHORACIC VASCULAR SURGERY)

## 2019-07-03 PROCEDURE — 27000221 HC OXYGEN, UP TO 24 HOURS

## 2019-07-03 PROCEDURE — 27201423 OPTIME MED/SURG SUP & DEVICES STERILE SUPPLY: Performed by: THORACIC SURGERY (CARDIOTHORACIC VASCULAR SURGERY)

## 2019-07-03 PROCEDURE — 80053 COMPREHEN METABOLIC PANEL: CPT

## 2019-07-03 PROCEDURE — 88342 IMHCHEM/IMCYTCHM 1ST ANTB: CPT | Mod: 26,,, | Performed by: PATHOLOGY

## 2019-07-03 PROCEDURE — 88112 CYTOLOGY SPECIMEN- MEDICAL CYTOLOGY (FLUID/WASH/BRUSH): ICD-10-PCS | Mod: 26,,, | Performed by: PATHOLOGY

## 2019-07-03 PROCEDURE — 88305 CYTOLOGY SPECIMEN- MEDICAL CYTOLOGY (FLUID/WASH/BRUSH): ICD-10-PCS | Mod: 26,,, | Performed by: PATHOLOGY

## 2019-07-03 PROCEDURE — 82962 GLUCOSE BLOOD TEST: CPT | Performed by: THORACIC SURGERY (CARDIOTHORACIC VASCULAR SURGERY)

## 2019-07-03 PROCEDURE — 25000003 PHARM REV CODE 250: Performed by: NURSE ANESTHETIST, CERTIFIED REGISTERED

## 2019-07-03 PROCEDURE — 88184 FLOWCYTOMETRY/ TC 1 MARKER: CPT | Performed by: PATHOLOGY

## 2019-07-03 PROCEDURE — 88331 TISSUE SPECIMEN TO PATHOLOGY - SURGERY: ICD-10-PCS | Mod: 26,,, | Performed by: PATHOLOGY

## 2019-07-03 PROCEDURE — D9220A PRA ANESTHESIA: ICD-10-PCS | Mod: CRNA,,, | Performed by: NURSE ANESTHETIST, CERTIFIED REGISTERED

## 2019-07-03 PROCEDURE — 63600175 PHARM REV CODE 636 W HCPCS: Performed by: PHYSICIAN ASSISTANT

## 2019-07-03 PROCEDURE — 85025 COMPLETE CBC W/AUTO DIFF WBC: CPT

## 2019-07-03 PROCEDURE — 88189 PR  FLOWCYTOMETRY/READ, 16 & > MARKERS: ICD-10-PCS | Mod: ,,, | Performed by: PATHOLOGY

## 2019-07-03 PROCEDURE — 94761 N-INVAS EAR/PLS OXIMETRY MLT: CPT

## 2019-07-03 PROCEDURE — D9220A PRA ANESTHESIA: ICD-10-PCS | Mod: ANES,,, | Performed by: ANESTHESIOLOGY

## 2019-07-03 PROCEDURE — 10140 I&D HMTMA SEROMA/FLUID COLLJ: CPT | Mod: 51,,, | Performed by: THORACIC SURGERY (CARDIOTHORACIC VASCULAR SURGERY)

## 2019-07-03 PROCEDURE — 32650 THORACOSCOPY W/PLEURODESIS: CPT | Mod: RT,,, | Performed by: THORACIC SURGERY (CARDIOTHORACIC VASCULAR SURGERY)

## 2019-07-03 PROCEDURE — 32650 PR THORACOSCOPY SURG W/PLEURODESIS: ICD-10-PCS | Mod: RT,,, | Performed by: THORACIC SURGERY (CARDIOTHORACIC VASCULAR SURGERY)

## 2019-07-03 PROCEDURE — 71000033 HC RECOVERY, INTIAL HOUR: Performed by: THORACIC SURGERY (CARDIOTHORACIC VASCULAR SURGERY)

## 2019-07-03 PROCEDURE — 88189 FLOWCYTOMETRY/READ 16 & >: CPT | Mod: ,,, | Performed by: PATHOLOGY

## 2019-07-03 PROCEDURE — 32550 INSERT PLEURAL CATH: CPT | Mod: RT,,, | Performed by: THORACIC SURGERY (CARDIOTHORACIC VASCULAR SURGERY)

## 2019-07-03 PROCEDURE — 83735 ASSAY OF MAGNESIUM: CPT

## 2019-07-03 PROCEDURE — C1729 CATH, DRAINAGE: HCPCS | Performed by: THORACIC SURGERY (CARDIOTHORACIC VASCULAR SURGERY)

## 2019-07-03 PROCEDURE — 88331 PATH CONSLTJ SURG 1 BLK 1SPC: CPT | Mod: 26,,, | Performed by: PATHOLOGY

## 2019-07-03 PROCEDURE — 88112 CYTOPATH CELL ENHANCE TECH: CPT | Mod: 26,,, | Performed by: PATHOLOGY

## 2019-07-03 PROCEDURE — 71000039 HC RECOVERY, EACH ADD'L HOUR: Performed by: THORACIC SURGERY (CARDIOTHORACIC VASCULAR SURGERY)

## 2019-07-03 PROCEDURE — 88341 IMHCHEM/IMCYTCHM EA ADD ANTB: CPT | Mod: 26,,, | Performed by: PATHOLOGY

## 2019-07-03 PROCEDURE — 88305 TISSUE EXAM BY PATHOLOGIST: CPT | Performed by: PATHOLOGY

## 2019-07-03 PROCEDURE — 84100 ASSAY OF PHOSPHORUS: CPT

## 2019-07-03 PROCEDURE — 36000711: Performed by: THORACIC SURGERY (CARDIOTHORACIC VASCULAR SURGERY)

## 2019-07-03 PROCEDURE — 32550 PR INSERTION INDWELLING TUNNELED PLEURAL CATHETER: ICD-10-PCS | Mod: RT,,, | Performed by: THORACIC SURGERY (CARDIOTHORACIC VASCULAR SURGERY)

## 2019-07-03 PROCEDURE — D9220A PRA ANESTHESIA: Mod: ANES,,, | Performed by: ANESTHESIOLOGY

## 2019-07-03 PROCEDURE — 88185 FLOWCYTOMETRY/TC ADD-ON: CPT | Performed by: PATHOLOGY

## 2019-07-03 PROCEDURE — 37000008 HC ANESTHESIA 1ST 15 MINUTES: Performed by: THORACIC SURGERY (CARDIOTHORACIC VASCULAR SURGERY)

## 2019-07-03 PROCEDURE — 27201037 HC PRESSURE MONITORING SET UP

## 2019-07-03 PROCEDURE — 88305 TISSUE SPECIMEN TO PATHOLOGY - SURGERY: ICD-10-PCS | Mod: 26,,, | Performed by: PATHOLOGY

## 2019-07-03 PROCEDURE — 99233 PR SUBSEQUENT HOSPITAL CARE,LEVL III: ICD-10-PCS | Mod: GC,,, | Performed by: HOSPITALIST

## 2019-07-03 RX ORDER — PROPOFOL 10 MG/ML
VIAL (ML) INTRAVENOUS
Status: DISCONTINUED | OUTPATIENT
Start: 2019-07-03 | End: 2019-07-03

## 2019-07-03 RX ORDER — GABAPENTIN 100 MG/1
100 CAPSULE ORAL 3 TIMES DAILY
Status: DISCONTINUED | OUTPATIENT
Start: 2019-07-03 | End: 2019-07-04

## 2019-07-03 RX ORDER — CEFAZOLIN SODIUM 1 G/3ML
2 INJECTION, POWDER, FOR SOLUTION INTRAMUSCULAR; INTRAVENOUS
Status: COMPLETED | OUTPATIENT
Start: 2019-07-03 | End: 2019-07-03

## 2019-07-03 RX ORDER — ACETAMINOPHEN 500 MG
1000 TABLET ORAL 3 TIMES DAILY
Status: DISCONTINUED | OUTPATIENT
Start: 2019-07-03 | End: 2019-07-08 | Stop reason: HOSPADM

## 2019-07-03 RX ORDER — LIDOCAINE HCL/PF 100 MG/5ML
SYRINGE (ML) INTRAVENOUS
Status: DISCONTINUED | OUTPATIENT
Start: 2019-07-03 | End: 2019-07-03

## 2019-07-03 RX ORDER — DOXYCYCLINE 100 MG/10ML
INJECTION, POWDER, LYOPHILIZED, FOR SOLUTION INTRAVENOUS
Status: DISCONTINUED | OUTPATIENT
Start: 2019-07-03 | End: 2019-07-03 | Stop reason: HOSPADM

## 2019-07-03 RX ORDER — NEOSTIGMINE METHYLSULFATE 1 MG/ML
INJECTION, SOLUTION INTRAVENOUS
Status: DISCONTINUED | OUTPATIENT
Start: 2019-07-03 | End: 2019-07-03

## 2019-07-03 RX ORDER — LIDOCAINE HYDROCHLORIDE 10 MG/ML
1 INJECTION, SOLUTION EPIDURAL; INFILTRATION; INTRACAUDAL; PERINEURAL ONCE
Status: DISCONTINUED | OUTPATIENT
Start: 2019-07-03 | End: 2019-07-03

## 2019-07-03 RX ORDER — HYDROXYZINE HYDROCHLORIDE 25 MG/1
25 TABLET, FILM COATED ORAL DAILY PRN
Status: DISCONTINUED | OUTPATIENT
Start: 2019-07-03 | End: 2019-07-06

## 2019-07-03 RX ORDER — NALOXONE HCL 0.4 MG/ML
0.4 VIAL (ML) INJECTION
Status: DISCONTINUED | OUTPATIENT
Start: 2019-07-04 | End: 2019-07-08 | Stop reason: HOSPADM

## 2019-07-03 RX ORDER — SUCCINYLCHOLINE CHLORIDE 20 MG/ML
INJECTION INTRAMUSCULAR; INTRAVENOUS
Status: DISCONTINUED | OUTPATIENT
Start: 2019-07-03 | End: 2019-07-03

## 2019-07-03 RX ORDER — ROCURONIUM BROMIDE 10 MG/ML
INJECTION, SOLUTION INTRAVENOUS
Status: DISCONTINUED | OUTPATIENT
Start: 2019-07-03 | End: 2019-07-03

## 2019-07-03 RX ORDER — GLYCOPYRROLATE 0.2 MG/ML
INJECTION INTRAMUSCULAR; INTRAVENOUS
Status: DISCONTINUED | OUTPATIENT
Start: 2019-07-03 | End: 2019-07-03

## 2019-07-03 RX ORDER — FENTANYL CITRATE 50 UG/ML
INJECTION, SOLUTION INTRAMUSCULAR; INTRAVENOUS
Status: DISCONTINUED | OUTPATIENT
Start: 2019-07-03 | End: 2019-07-03

## 2019-07-03 RX ORDER — HYDROMORPHONE HYDROCHLORIDE 1 MG/ML
0.2 INJECTION, SOLUTION INTRAMUSCULAR; INTRAVENOUS; SUBCUTANEOUS EVERY 5 MIN PRN
Status: DISCONTINUED | OUTPATIENT
Start: 2019-07-03 | End: 2019-07-03 | Stop reason: HOSPADM

## 2019-07-03 RX ORDER — MIDAZOLAM HYDROCHLORIDE 1 MG/ML
INJECTION, SOLUTION INTRAMUSCULAR; INTRAVENOUS
Status: DISCONTINUED | OUTPATIENT
Start: 2019-07-03 | End: 2019-07-03

## 2019-07-03 RX ORDER — SODIUM CHLORIDE 9 MG/ML
1000 INJECTION, SOLUTION INTRAVENOUS CONTINUOUS
Status: ACTIVE | OUTPATIENT
Start: 2019-07-03 | End: 2019-07-04

## 2019-07-03 RX ORDER — SODIUM CHLORIDE 0.9 % (FLUSH) 0.9 %
10 SYRINGE (ML) INJECTION
Status: DISCONTINUED | OUTPATIENT
Start: 2019-07-03 | End: 2019-07-08 | Stop reason: HOSPADM

## 2019-07-03 RX ORDER — ACETAMINOPHEN 500 MG
1000 TABLET ORAL EVERY 8 HOURS PRN
Status: DISCONTINUED | OUTPATIENT
Start: 2019-07-03 | End: 2019-07-03

## 2019-07-03 RX ORDER — ONDANSETRON 2 MG/ML
INJECTION INTRAMUSCULAR; INTRAVENOUS
Status: DISCONTINUED | OUTPATIENT
Start: 2019-07-03 | End: 2019-07-03

## 2019-07-03 RX ADMIN — HYDROMORPHONE HYDROCHLORIDE 0.5 MG: 1 INJECTION, SOLUTION INTRAMUSCULAR; INTRAVENOUS; SUBCUTANEOUS at 01:07

## 2019-07-03 RX ADMIN — ONDANSETRON 4 MG: 2 INJECTION INTRAMUSCULAR; INTRAVENOUS at 05:07

## 2019-07-03 RX ADMIN — GABAPENTIN 100 MG: 100 CAPSULE ORAL at 08:07

## 2019-07-03 RX ADMIN — GLYCOPYRROLATE 0.6 MG: 0.2 INJECTION, SOLUTION INTRAMUSCULAR; INTRAVENOUS at 05:07

## 2019-07-03 RX ADMIN — OXYCODONE HYDROCHLORIDE AND ACETAMINOPHEN 1 TABLET: 10; 325 TABLET ORAL at 06:07

## 2019-07-03 RX ADMIN — ATORVASTATIN CALCIUM 10 MG: 10 TABLET, FILM COATED ORAL at 08:07

## 2019-07-03 RX ADMIN — DULOXETINE HYDROCHLORIDE 60 MG: 20 CAPSULE, DELAYED RELEASE ORAL at 08:07

## 2019-07-03 RX ADMIN — ROCURONIUM BROMIDE 35 MG: 10 INJECTION, SOLUTION INTRAVENOUS at 04:07

## 2019-07-03 RX ADMIN — FENTANYL CITRATE 175 MCG: 50 INJECTION, SOLUTION INTRAMUSCULAR; INTRAVENOUS at 04:07

## 2019-07-03 RX ADMIN — ROCURONIUM BROMIDE 10 MG: 10 INJECTION, SOLUTION INTRAVENOUS at 04:07

## 2019-07-03 RX ADMIN — LIDOCAINE HYDROCHLORIDE 100 MG: 20 INJECTION, SOLUTION INTRAVENOUS at 04:07

## 2019-07-03 RX ADMIN — ROCURONIUM BROMIDE 5 MG: 10 INJECTION, SOLUTION INTRAVENOUS at 04:07

## 2019-07-03 RX ADMIN — FENTANYL CITRATE 25 MCG: 50 INJECTION, SOLUTION INTRAMUSCULAR; INTRAVENOUS at 03:07

## 2019-07-03 RX ADMIN — SUCCINYLCHOLINE CHLORIDE 120 MG: 20 INJECTION, SOLUTION INTRAMUSCULAR; INTRAVENOUS at 04:07

## 2019-07-03 RX ADMIN — CEFAZOLIN 2 G: 330 INJECTION, POWDER, FOR SOLUTION INTRAMUSCULAR; INTRAVENOUS at 04:07

## 2019-07-03 RX ADMIN — OXYCODONE HYDROCHLORIDE AND ACETAMINOPHEN 1 TABLET: 10; 325 TABLET ORAL at 08:07

## 2019-07-03 RX ADMIN — DICYCLOMINE HYDROCHLORIDE 10 MG: 10 CAPSULE ORAL at 08:07

## 2019-07-03 RX ADMIN — NEOSTIGMINE METHYLSULFATE 5 MG: 1 INJECTION INTRAVENOUS at 05:07

## 2019-07-03 RX ADMIN — PROPOFOL 100 MG: 10 INJECTION, EMULSION INTRAVENOUS at 04:07

## 2019-07-03 RX ADMIN — MIDAZOLAM HYDROCHLORIDE 1 MG: 1 INJECTION, SOLUTION INTRAMUSCULAR; INTRAVENOUS at 03:07

## 2019-07-03 RX ADMIN — SODIUM CHLORIDE 1000 ML: 0.9 INJECTION, SOLUTION INTRAVENOUS at 12:07

## 2019-07-03 NOTE — PROGRESS NOTES
[facility]  Pulmonology  Progress Note    Patient Name: Isabell Preston  MRN: [id number]  Admission Date: 7/2/2019  Hospital Length of Stay: 1 days  Code Status: Full Code  Attending Provider: Jennifer Pack MD  Primary Care Provider: Ayo Archuleta MD   Principal Problem: Postprocedural hematoma of skin and subcutaneous tissue following other procedure    Subjective:     Interval History: Hg stable at 9.0 and received 2 units PRBCs yesterday. VATS planned for today.     Objective:     Vital Signs (Most Recent):  Temp: 98.7 °F (37.1 °C) (07/03/19 0815)  Pulse: 97 (07/03/19 0830)  Resp: 17 (07/03/19 0830)  BP: (!) 154/85 (07/03/19 0830)  SpO2: 96 % (07/03/19 0830) Vital Signs (24h Range):  Temp:  [98.1 °F (36.7 °C)-98.7 °F (37.1 °C)] 98.7 °F (37.1 °C)  Pulse:  [] 97  Resp:  [10-19] 17  SpO2:  [92 %-99 %] 96 %  BP: (118-154)/(67-85) 154/85     Weight: 80.2 kg (176 lb 12.9 oz)  Body mass index is 27.69 kg/m².      Intake/Output Summary (Last 24 hours) at 7/3/2019 0845  Last data filed at 7/3/2019 0400  Gross per 24 hour   Intake 2347.5 ml   Output 600 ml   Net 1747.5 ml       Physical Exam   Constitutional: She is oriented to person, place, and time. She appears well-developed and well-nourished. No distress.   HENT:   Head: Normocephalic and atraumatic.   Neck: Normal range of motion.   Cardiovascular: Normal rate and regular rhythm.   No murmur heard.  Pulmonary/Chest: Effort normal. She has decreased breath sounds in the right middle field and the right lower field.           Abdominal: Soft. There is no tenderness.   Musculoskeletal: Normal range of motion.   Neurological: She is alert and oriented to person, place, and time.   Skin: Skin is warm and dry. She is not diaphoretic.   Nursing note and vitals reviewed.      Vents:       Lines/Drains/Airways     Peripheral Intravenous Line                 Peripheral IV - Single Lumen 07/02/19 0100 18 G Left Antecubital 1 day                 Significant Labs:    CBC/Anemia Profile:  Recent Labs   Lab 07/02/19  0837 07/02/19  1538 07/03/19  0639   WBC 14.33* 20.62* 18.55*   HGB 5.6* 9.7* 9.0*   HCT 17.4* 29.6* 28.1*   * 341 339   MCV 93 88 92   RDW 14.0 14.5 14.9*        Chemistries:  Recent Labs   Lab 07/02/19  0136 07/02/19  0742 07/03/19  0639    136 135*   K 4.2 4.2 4.3    103 99   CO2 25 24 23   BUN 26* 27* 33*   CREATININE 1.9* 1.9* 2.7*   CALCIUM 8.5* 8.0* 8.8   ALBUMIN 2.8* 2.5* 3.1*   PROT 6.0 5.3* 6.4   BILITOT 0.3 0.3 0.5   ALKPHOS 135 139* 153*   ALT 72* 112* 89*   AST 72* 157* 70*   MG  --   --  1.8   PHOS  --   --  4.9*       All pertinent labs within the past 24 hours have been reviewed.    Significant Imaging:  I have reviewed all pertinent imaging results/findings within the past 24 hours.    Assessment/Plan:     Hematoma of chest wall, right, initial encounter  Isabell Preston is a 73 y.o. 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 Patient was recently admitted to the hospital for shortness of breath and underwent a thoracentesis on 6/29 which removed 2L of bloody fluid.   CT Chest showed hematoma at site of thoracentesis and reaccumulation of pleural fluid  - Hg 10 to 5.6 today with a basline Hg of 12.    - INR 1.3  - Right pleural fluid cytology negative for malignant cells from 6/29    Plan  - Transfuse for Hg <7, received 2 units PRBCs yesterday  - Hold anticoagulation  - Place pressure dressing over hematoma   - IR consulted, no role for urgent/emergent embolization at this time.  - VATS planned for today            Kam Christian MD  Pulmonology  [facility]

## 2019-07-03 NOTE — ASSESSMENT & PLAN NOTE
-Patient presented with R sided back pain and SOB s/p thoracentesis  -CT shows R pleural effusion  -Hemoglobin on admission was 5.7  -Transfused 2 units pRBC, increased to 9.7 on 07/02 and now 9 on 07/03  -Thoracic surgery consulted and following  -Will take patient to OR on 07/03 for VATS drainage with pleural biopsy, possible pleurodesis vs pleurX  -Will trend CBCs and continue to monitor

## 2019-07-03 NOTE — SUBJECTIVE & OBJECTIVE
Interval History: Hg stable at 9.0 and received 2 units PRBCs yesterday. VATS planned for today.     Objective:     Vital Signs (Most Recent):  Temp: 98.7 °F (37.1 °C) (07/03/19 0815)  Pulse: 97 (07/03/19 0830)  Resp: 17 (07/03/19 0830)  BP: (!) 154/85 (07/03/19 0830)  SpO2: 96 % (07/03/19 0830) Vital Signs (24h Range):  Temp:  [98.1 °F (36.7 °C)-98.7 °F (37.1 °C)] 98.7 °F (37.1 °C)  Pulse:  [] 97  Resp:  [10-19] 17  SpO2:  [92 %-99 %] 96 %  BP: (118-154)/(67-85) 154/85     Weight: 80.2 kg (176 lb 12.9 oz)  Body mass index is 27.69 kg/m².      Intake/Output Summary (Last 24 hours) at 7/3/2019 0845  Last data filed at 7/3/2019 0400  Gross per 24 hour   Intake 2347.5 ml   Output 600 ml   Net 1747.5 ml       Physical Exam   Constitutional: She is oriented to person, place, and time. She appears well-developed and well-nourished. No distress.   HENT:   Head: Normocephalic and atraumatic.   Neck: Normal range of motion.   Cardiovascular: Normal rate and regular rhythm.   No murmur heard.  Pulmonary/Chest: Effort normal. She has decreased breath sounds in the right middle field and the right lower field.           Abdominal: Soft. There is no tenderness.   Musculoskeletal: Normal range of motion.   Neurological: She is alert and oriented to person, place, and time.   Skin: Skin is warm and dry. She is not diaphoretic.   Nursing note and vitals reviewed.      Vents:       Lines/Drains/Airways     Peripheral Intravenous Line                 Peripheral IV - Single Lumen 07/02/19 0100 18 G Left Antecubital 1 day                Significant Labs:    CBC/Anemia Profile:  Recent Labs   Lab 07/02/19  0837 07/02/19  1538 07/03/19  0639   WBC 14.33* 20.62* 18.55*   HGB 5.6* 9.7* 9.0*   HCT 17.4* 29.6* 28.1*   * 341 339   MCV 93 88 92   RDW 14.0 14.5 14.9*        Chemistries:  Recent Labs   Lab 07/02/19  0136 07/02/19  0742 07/03/19  0639    136 135*   K 4.2 4.2 4.3    103 99   CO2 25 24 23   BUN 26* 27* 33*    CREATININE 1.9* 1.9* 2.7*   CALCIUM 8.5* 8.0* 8.8   ALBUMIN 2.8* 2.5* 3.1*   PROT 6.0 5.3* 6.4   BILITOT 0.3 0.3 0.5   ALKPHOS 135 139* 153*   ALT 72* 112* 89*   AST 72* 157* 70*   MG  --   --  1.8   PHOS  --   --  4.9*       All pertinent labs within the past 24 hours have been reviewed.    Significant Imaging:  I have reviewed all pertinent imaging results/findings within the past 24 hours.

## 2019-07-03 NOTE — PLAN OF CARE
Problem: Adult Inpatient Plan of Care  Goal: Plan of Care Review  Outcome: Ongoing (interventions implemented as appropriate)  Pt AAOx4. Pt free from falls. Pt wears non slip socks when ambulating. Pt bed low and locked position. Pt afebrile. Pt IV site without redness or edema. Educated pt on importance of hand washing. Pt has denied any pain or discomfort this shift. Pressure
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
## 2019-07-03 NOTE — HPI
73 year old female with HTN, HLD, DMII, recurrent DVTs, PTSD, anxiety, depression and recurrent right pleural effusions. Initial Ct guided thoracentesis drained 1.5L bloody fluid. Cytology was negative. She was scheduled for VATS drainage/pleural biopsy however in the interval she had another thoracentesis while coagulopathic which was complicated by hematoma and hemothorax.

## 2019-07-03 NOTE — PROGRESS NOTES
Called [facility] nurse Jenn and notified her that surgery wants patients chest tube to suction now.

## 2019-07-03 NOTE — ASSESSMENT & PLAN NOTE
-Patient with remote history of DVTs in the 1980s of LLE and L subclavian  -Has been managed with warfarin with therapeutic INR between 2-3  -Patient to be placed on heparin drip following her VATS drainage, if ok with surgery team  -Family history of hypercoagulability, will discuss hypercoagulability workup

## 2019-07-03 NOTE — BRIEF OP NOTE
[facility]  Brief Operative Note    SUMMARY     Surgery Date: 7/3/2019     Surgeon(s) and Role:     * Ben Smith MD - Primary     * Kenia Lepe MD - Resident - Assisting        Pre-op Diagnosis:  Pleural effusion, right [J90]    Post-op Diagnosis:  Post-Op Diagnosis Codes:     * Pleural effusion, right [J90]    Procedure(s) (LRB):  VATS, WITH PLEURA BIOPSY (Right)  VATS, WITH PLEURODESIS (Right)  VATS, WITH CHEST TUBE INSERTION FOR DRAINAGE OF PLEURAL EFFUSION (Right)    Anesthesia: General    Description of Procedure:   VATS with biopsy of pleural mass  Doxycycline pleuridesis  Pleurx catheter placement    Description of the findings of the procedure:   Evacuation of 2L bloody pleural effusion  Large exophytic mass biopsied and sent for frozen as well as permanent sample.   Numerous exophytic masses on pleura  Back hematoma evacuated    Estimated Blood Loss: 10ml         Specimens:   Specimen (12h ago, onward)    Start     Ordered    07/03/19 1651  Specimen to Pathology - Surgery  Once     Comments:  1. Right pleural biopsy #1 - frozen2. Right pleural biopsy #2 - permanent     Start Status     07/03/19 1651 Collected (07/03/19 1711) Order ID: [id number]       07/03/19 1711        Kenia Lepe MD, PGY-4  General Surgery  [phone]

## 2019-07-03 NOTE — ASSESSMENT & PLAN NOTE
Cytology negative from repeat thoracentesis.     Continue NPO.   To OR today (likely sometime in the afternoon/evening) for VATS drainage, pleural biopsy possible pleurodesis vs pleurX.    Consents signed and in media.
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
## 2019-07-03 NOTE — TRANSFER OF CARE
"Anesthesia Transfer of Care Note    Patient: Isabell Preston    Procedure(s) Performed: Procedure(s) (LRB):  VATS, WITH PLEURA BIOPSY (Right)  VATS, WITH PLEURODESIS (Right)  VATS, WITH CHEST TUBE INSERTION FOR DRAINAGE OF PLEURAL EFFUSION (Right)    Patient location: PACU    Anesthesia Type: general    Transport from OR: Transported from OR on 6-10 L/min O2 by face mask with adequate spontaneous ventilation    Post pain: adequate analgesia    Post assessment: no apparent anesthetic complications    Post vital signs: stable    Level of consciousness: awake    Nausea/Vomiting: no nausea/vomiting    Complications: none    Transfer of care protocol was followed      Last vitals:   Visit Vitals  BP (!) 152/71 (BP Location: Right arm, Patient Position: Lying)   Pulse 99   Temp 37.1 °C (98.8 °F) (Temporal)   Resp (!) 26   Ht 5' 7" (1.702 m)   Wt 80.2 kg (176 lb 12.9 oz)   SpO2 100%   Breastfeeding? No   BMI 27.69 kg/m²     "

## 2019-07-03 NOTE — SUBJECTIVE & OBJECTIVE
Interval History: 2U PRBC transfused yesterday with appropriate response. H/H 9/28 this morning. Remains on 1LNC. Cytology returned this morning negative for malignancy. NPO. AC held.     Medications:  Continuous Infusions:   lactated ringers 75 mL/hr at 07/02/19 1400     Scheduled Meds:   atorvastatin  10 mg Oral Daily    dicyclomine  10 mg Oral TID    DULoxetine  60 mg Oral Daily    gabapentin  100 mg Oral BID     PRN Meds:sodium chloride, acetaminophen, dextrose 50%, dextrose 50%, glucagon (human recombinant), glucose, glucose, HYDROmorphone, insulin aspart U-100, ondansetron, ondansetron, oxyCODONE-acetaminophen, oxyCODONE-acetaminophen, promethazine (PHENERGAN) IVPB, ramelteon, sodium chloride 0.9%     Review of patient's allergies indicates:   Allergen Reactions    Ciprofloxacin Anaphylaxis    Fructose     Gluten protein Other (See Comments)     GI upset  GI upset    Lactase Other (See Comments)     GI upset  GI upset    Latex, natural rubber Rash     Objective:     Vital Signs (Most Recent):  Temp: 98.2 °F (36.8 °C) (07/03/19 0445)  Pulse: 94 (07/03/19 0738)  Resp: 10 (07/03/19 0738)  BP: 136/78 (07/03/19 0738)  SpO2: 95 % (07/03/19 0738) Vital Signs (24h Range):  Temp:  [98.1 °F (36.7 °C)-98.6 °F (37 °C)] 98.2 °F (36.8 °C)  Pulse:  [] 94  Resp:  [10-19] 10  SpO2:  [92 %-99 %] 95 %  BP: (118-140)/(67-80) 136/78     Intake/Output - Last 3 Shifts       07/01 0700 - 07/02 0659 07/02 0700 - 07/03 0659 07/03 0700 - 07/04 0659    P.O.  420     I.V. (mL/kg)  1427.5 (17.8)     Blood  500     IV Piggyback  1000     Total Intake(mL/kg)  3347.5 (41.7)     Urine (mL/kg/hr)  600 (0.3)     Emesis/NG output  0     Stool  0     Total Output  600     Net  +2747.5            Urine Occurrence  0 x     Stool Occurrence  0 x     Emesis Occurrence  0 x           SpO2: 95 %  O2 Device (Oxygen Therapy): nasal cannula    Physical Exam   Constitutional: She is oriented to person, place, and time. She appears  well-developed and well-nourished.   HENT:   Head: Atraumatic.   Eyes: EOM are normal.   Cardiovascular: Normal rate and regular rhythm.   Pulmonary/Chest: Effort normal. She has decreased breath sounds in the right middle field and the right lower field.   Abdominal: Soft.   Musculoskeletal: Normal range of motion. She exhibits no edema.   Neurological: She is alert and oriented to person, place, and time.   Skin: Skin is warm and dry.   Psychiatric: She has a normal mood and affect. Thought content normal.   Vitals reviewed.      Significant Labs:  BMP:   Recent Labs   Lab 07/03/19 0639   *   *   K 4.3   CL 99   CO2 23   BUN 33*   CREATININE 2.7*   CALCIUM 8.8   MG 1.8     CBC:   Recent Labs   Lab 07/03/19 0639   WBC 18.55*   RBC 3.05*   HGB 9.0*   HCT 28.1*      MCV 92   MCH 29.5   MCHC 32.0       Significant Diagnostics:  CXR: I have reviewed all pertinent results/findings within the past 24 hours    VTE Risk Mitigation (From admission, onward)        Ordered     Place sequential compression device  Until discontinued      07/02/19 0528     IP VTE HIGH RISK PATIENT  Once      07/02/19 0528

## 2019-07-03 NOTE — PROGRESS NOTES
[facility]  Thoracic Surgery  Progress Note    Subjective:     History of Present Illness:  73 year old female with HTN, HLD, DMII, recurrent DVTs, PTSD, anxiety, depression and recurrent right pleural effusions. Initial Ct guided thoracentesis drained 1.5L bloody fluid. Cytology was negative. She was scheduled for VATS drainage/pleural biopsy however in the interval she had another thoracentesis while coagulopathic which was complicated by hematoma and hemothorax.        Post-Op Info:  Procedure(s) (LRB):  VATS, WITH PLEURA BIOPSY (Right)  VATS, WITH PLEURODESIS (Right)  VATS, WITH CHEST TUBE INSERTION FOR DRAINAGE OF PLEURAL EFFUSION (Right)         Interval History: 2U PRBC transfused yesterday with appropriate response. H/H 9/28 this morning. Remains on 1LNC. Cytology returned this morning negative for malignancy. NPO. AC held.     Medications:  Continuous Infusions:   lactated ringers 75 mL/hr at 07/02/19 1400     Scheduled Meds:   atorvastatin  10 mg Oral Daily    dicyclomine  10 mg Oral TID    DULoxetine  60 mg Oral Daily    gabapentin  100 mg Oral BID     PRN Meds:sodium chloride, acetaminophen, dextrose 50%, dextrose 50%, glucagon (human recombinant), glucose, glucose, HYDROmorphone, insulin aspart U-100, ondansetron, ondansetron, oxyCODONE-acetaminophen, oxyCODONE-acetaminophen, promethazine (PHENERGAN) IVPB, ramelteon, sodium chloride 0.9%     Review of patient's allergies indicates:   Allergen Reactions    Ciprofloxacin Anaphylaxis    Fructose     Gluten protein Other (See Comments)     GI upset  GI upset    Lactase Other (See Comments)     GI upset  GI upset    Latex, natural rubber Rash     Objective:     Vital Signs (Most Recent):  Temp: 98.2 °F (36.8 °C) (07/03/19 0445)  Pulse: 94 (07/03/19 0738)  Resp: 10 (07/03/19 0738)  BP: 136/78 (07/03/19 0738)  SpO2: 95 % (07/03/19 0738) Vital Signs (24h Range):  Temp:  [98.1 °F (36.7 °C)-98.6 °F (37 °C)] 98.2 °F (36.8 °C)  Pulse:   [] 94  Resp:  [10-19] 10  SpO2:  [92 %-99 %] 95 %  BP: (118-140)/(67-80) 136/78     Intake/Output - Last 3 Shifts       07/01 0700 - 07/02 0659 07/02 0700 - 07/03 0659 07/03 0700 - 07/04 0659    P.O.  420     I.V. (mL/kg)  1427.5 (17.8)     Blood  500     IV Piggyback  1000     Total Intake(mL/kg)  3347.5 (41.7)     Urine (mL/kg/hr)  600 (0.3)     Emesis/NG output  0     Stool  0     Total Output  600     Net  +2747.5            Urine Occurrence  0 x     Stool Occurrence  0 x     Emesis Occurrence  0 x           SpO2: 95 %  O2 Device (Oxygen Therapy): nasal cannula    Physical Exam   Constitutional: She is oriented to person, place, and time. She appears well-developed and well-nourished.   HENT:   Head: Atraumatic.   Eyes: EOM are normal.   Cardiovascular: Normal rate and regular rhythm.   Pulmonary/Chest: Effort normal. She has decreased breath sounds in the right middle field and the right lower field.   Abdominal: Soft.   Musculoskeletal: Normal range of motion. She exhibits no edema.   Neurological: She is alert and oriented to person, place, and time.   Skin: Skin is warm and dry.   Psychiatric: She has a normal mood and affect. Thought content normal.   Vitals reviewed.      Significant Labs:  BMP:   Recent Labs   Lab 07/03/19  0639   *   *   K 4.3   CL 99   CO2 23   BUN 33*   CREATININE 2.7*   CALCIUM 8.8   MG 1.8     CBC:   Recent Labs   Lab 07/03/19  0639   WBC 18.55*   RBC 3.05*   HGB 9.0*   HCT 28.1*      MCV 92   MCH 29.5   MCHC 32.0       Significant Diagnostics:  CXR: I have reviewed all pertinent results/findings within the past 24 hours    VTE Risk Mitigation (From admission, onward)        Ordered     Place sequential compression device  Until discontinued      07/02/19 0528     IP VTE HIGH RISK PATIENT  Once      07/02/19 0528        Assessment/Plan:     Pleural effusion, right  Cytology negative from repeat thoracentesis.     Continue NPO.   To OR today (likely sometime  in the afternoon/evening) for VATS drainage, pleural biopsy possible pleurodesis vs pleurX.    Consents signed and in media.     Acute blood loss anemia  Transfused 2 units with appropriate response     Chronic anticoagulation  Continue to hold anticoagulation         Thao Munoz PA-C  Thoracic Surgery  [facility]

## 2019-07-03 NOTE — CARE UPDATE
This note also relates to the following rows which could not be included:  SpO2 - Cannot attach notes to unvalidated device data  Pulse - Cannot attach notes to unvalidated device data  Resp - Cannot attach notes to unvalidated device data  BP - Cannot attach notes to unvalidated device data    PT FOUND SATING 96% ON 2LNC;  RT TITRATED TO 1LNC PER PROTOCOL

## 2019-07-03 NOTE — ASSESSMENT & PLAN NOTE
Continue chest tube to suction      Please set up with Home health company familiar with PleurX. Company should supply kits. Patient will be discharged with 4 kits for emergency purposes.   Pleurx Home Health Instructions    Please drain Pleurx catheter every other day and record the amount of drainage. Please also take note of the color of the drainage. Encourage patient to change positions and cough at least once while draining to get more fluid. Patient can shower and go about normal activities as long as the dressing provided in Pleurx kits in securely intact.  If gauze underneath become wet or dirty, please change.   Notify MD if:   - Patient is draining more than 1,000 mL in one sitting.   - Drainage abruptly stops.  It is normal for drainage to gradually decrease.  - You see purulent drainage coming from the catheter or near the drain exit site.   - The catheter will not flush and appears clogged.  - Any signs of infection at the Pleurx catheter exit site.    Thoracic Surgery Clinic: [phone]

## 2019-07-03 NOTE — PLAN OF CARE
Pt is AAOx4; afebrile; vital signs stable. She is on 2L O2 per nasal cannula for comfort. Pressure held at hematoma site with abdominal binder. SCD's worn in bed; Dr. Pack does not want JACKLYN hose on her. She was NPO since midnight and went for a VATS procedure this afternoon. BG ranged from 142-149. Daughters are at bedside, attentive to pt. Waiting on urine sample. Patient had VATS procedure with biopsy, doxy pleridesis, and rt chest tube placement. Emotional support provided for daughters following news of the masses. CT abd/pelvis and liver u/s ordered.

## 2019-07-03 NOTE — NURSING TRANSFER
Nursing Transfer Note      7/3/2019     Transfer To: [id number]    Transfer via stretcher    Transfer with cardiac monitoring - verified by tele room; 2L NC oxygen; chest tube     Transported by Luana YOO RN    Medicines sent: none    Chart send with patient: Yes    Notified: daughter

## 2019-07-03 NOTE — PLAN OF CARE
07/03/19 1640   Discharge Assessment   Assessment Type Discharge Planning Assessment   Confirmed/corrected address and phone number on facesheet? Yes   Assessment information obtained from? Patient   Expected Length of Stay (days) 4   Communicated expected length of stay with patient/caregiver yes   Prior to hospitilization cognitive status: Alert/Oriented   Prior to hospitalization functional status: Assistive Equipment   Current cognitive status: Alert/Oriented   Current Functional Status: Assistive Equipment   Lives With alone   Able to Return to Prior Arrangements no   Is patient able to care for self after discharge? Unable to determine at this time (comments)   Who are your caregiver(s) and their phone number(s)? Debbie salgado-[phone]   Patient's perception of discharge disposition home health   Readmission Within the Last 30 Days no previous admission in last 30 days   Patient currently being followed by outpatient case management? No   Patient currently receives any other outside agency services? No   Equipment Currently Used at Home cane, straight;oxygen   Do you have any problems affording any of your prescribed medications? No   Is the patient taking medications as prescribed? yes   Does the patient have transportation home? Yes   Transportation Anticipated family or friend will provide   Does the patient receive services at the [facility]? No   Discharge Plan A Home Health   Discharge Plan B Skilled Nursing Facility   DME Needed Upon Discharge  none   Patient/Family in Agreement with Plan yes

## 2019-07-03 NOTE — ASSESSMENT & PLAN NOTE
-Patient with remote history of DVTs in the 1980s  -Has been managed with warfarin with therapeutic INR between 2-3  -Patient to be placed on heparin drip following her VATS drainage, if ok with surgery team  -Family history of hypercoagulability, will discuss hypercoagulability workup

## 2019-07-03 NOTE — ASSESSMENT & PLAN NOTE
-CT revealing R pleural effusion  -To OR on 07/03 with Thoracic Surgery for VATS drainage  -Percocet 5 for moderate pain, 10 for severe pain  -Dilaudid 0.5 mg q6 PRN  -Tylenol 1g  -Gabapentin 100 mg TID

## 2019-07-03 NOTE — ASSESSMENT & PLAN NOTE
-volume resuscitation with blood transfusions and fluids  -urine electrolytes  -strict Is/Os  -avoid nephrotoxic agents  -trend Cr and BUN  -will continue to monitor

## 2019-07-04 PROBLEM — J94.8 PLEURAL MASS: Status: ACTIVE | Noted: 2019-07-04

## 2019-07-04 PROBLEM — J90 RECURRENT PLEURAL EFFUSION ON RIGHT: Status: ACTIVE | Noted: 2019-07-02

## 2019-07-04 LAB
ALBUMIN SERPL BCP-MCNC: 2.3 G/DL (ref 3.5–5.2)
ALP SERPL-CCNC: 124 U/L (ref 55–135)
ALT SERPL W/O P-5'-P-CCNC: 48 U/L (ref 10–44)
ANION GAP SERPL CALC-SCNC: 10 MMOL/L (ref 8–16)
AST SERPL-CCNC: 35 U/L (ref 10–40)
BASOPHILS # BLD AUTO: 0.01 K/UL (ref 0–0.2)
BASOPHILS # BLD AUTO: 0.03 K/UL (ref 0–0.2)
BASOPHILS NFR BLD: 0.1 % (ref 0–1.9)
BASOPHILS NFR BLD: 0.2 % (ref 0–1.9)
BILIRUB SERPL-MCNC: 0.5 MG/DL (ref 0.1–1)
BUN SERPL-MCNC: 31 MG/DL (ref 8–23)
CALCIUM SERPL-MCNC: 8.3 MG/DL (ref 8.7–10.5)
CHLORIDE SERPL-SCNC: 102 MMOL/L (ref 95–110)
CO2 SERPL-SCNC: 23 MMOL/L (ref 23–29)
CREAT SERPL-MCNC: 2 MG/DL (ref 0.5–1.4)
CREAT UR-MCNC: 90 MG/DL (ref 15–325)
CREAT UR-MCNC: 90 MG/DL (ref 15–325)
DIFFERENTIAL METHOD: ABNORMAL
EOSINOPHIL # BLD AUTO: 0 K/UL (ref 0–0.5)
EOSINOPHIL # BLD AUTO: 0.1 K/UL (ref 0–0.5)
EOSINOPHIL NFR BLD: 0.2 % (ref 0–8)
EOSINOPHIL NFR BLD: 0.4 % (ref 0–8)
EOSINOPHIL NFR BLD: 0.5 % (ref 0–8)
EOSINOPHIL NFR BLD: 0.8 % (ref 0–8)
ERYTHROCYTE [DISTWIDTH] IN BLOOD BY AUTOMATED COUNT: 15 % (ref 11.5–14.5)
ERYTHROCYTE [DISTWIDTH] IN BLOOD BY AUTOMATED COUNT: 15.1 % (ref 11.5–14.5)
ERYTHROCYTE [DISTWIDTH] IN BLOOD BY AUTOMATED COUNT: 15.1 % (ref 11.5–14.5)
ERYTHROCYTE [DISTWIDTH] IN BLOOD BY AUTOMATED COUNT: 15.2 % (ref 11.5–14.5)
EST. GFR  (AFRICAN AMERICAN): 28.1 ML/MIN/1.73 M^2
EST. GFR  (NON AFRICAN AMERICAN): 24.4 ML/MIN/1.73 M^2
GLUCOSE SERPL-MCNC: 109 MG/DL (ref 70–110)
HCT VFR BLD AUTO: 23.5 % (ref 37–48.5)
HCT VFR BLD AUTO: 24.1 % (ref 37–48.5)
HCT VFR BLD AUTO: 24.6 % (ref 37–48.5)
HCT VFR BLD AUTO: 25.4 % (ref 37–48.5)
HGB BLD-MCNC: 7.8 G/DL (ref 12–16)
HGB BLD-MCNC: 7.8 G/DL (ref 12–16)
HGB BLD-MCNC: 7.9 G/DL (ref 12–16)
HGB BLD-MCNC: 8 G/DL (ref 12–16)
IMM GRANULOCYTES # BLD AUTO: 0.08 K/UL (ref 0–0.04)
IMM GRANULOCYTES # BLD AUTO: 0.09 K/UL (ref 0–0.04)
IMM GRANULOCYTES # BLD AUTO: 0.09 K/UL (ref 0–0.04)
IMM GRANULOCYTES # BLD AUTO: 0.1 K/UL (ref 0–0.04)
IMM GRANULOCYTES NFR BLD AUTO: 0.5 % (ref 0–0.5)
IMM GRANULOCYTES NFR BLD AUTO: 0.6 % (ref 0–0.5)
INR PPP: 1.1 (ref 0.8–1.2)
LYMPHOCYTES # BLD AUTO: 1 K/UL (ref 1–4.8)
LYMPHOCYTES # BLD AUTO: 1.1 K/UL (ref 1–4.8)
LYMPHOCYTES # BLD AUTO: 1.1 K/UL (ref 1–4.8)
LYMPHOCYTES # BLD AUTO: 1.2 K/UL (ref 1–4.8)
LYMPHOCYTES NFR BLD: 6.5 % (ref 18–48)
LYMPHOCYTES NFR BLD: 6.6 % (ref 18–48)
LYMPHOCYTES NFR BLD: 6.8 % (ref 18–48)
LYMPHOCYTES NFR BLD: 7.2 % (ref 18–48)
MAGNESIUM SERPL-MCNC: 1.8 MG/DL (ref 1.6–2.6)
MCH RBC QN AUTO: 28.9 PG (ref 27–31)
MCH RBC QN AUTO: 29.3 PG (ref 27–31)
MCH RBC QN AUTO: 29.5 PG (ref 27–31)
MCH RBC QN AUTO: 29.8 PG (ref 27–31)
MCHC RBC AUTO-ENTMCNC: 31.5 G/DL (ref 32–36)
MCHC RBC AUTO-ENTMCNC: 31.7 G/DL (ref 32–36)
MCHC RBC AUTO-ENTMCNC: 32.8 G/DL (ref 32–36)
MCHC RBC AUTO-ENTMCNC: 33.2 G/DL (ref 32–36)
MCV RBC AUTO: 90 FL (ref 82–98)
MCV RBC AUTO: 90 FL (ref 82–98)
MCV RBC AUTO: 92 FL (ref 82–98)
MCV RBC AUTO: 93 FL (ref 82–98)
MONOCYTES # BLD AUTO: 1.3 K/UL (ref 0.3–1)
MONOCYTES # BLD AUTO: 1.3 K/UL (ref 0.3–1)
MONOCYTES # BLD AUTO: 1.4 K/UL (ref 0.3–1)
MONOCYTES # BLD AUTO: 1.7 K/UL (ref 0.3–1)
MONOCYTES NFR BLD: 10 % (ref 4–15)
MONOCYTES NFR BLD: 8 % (ref 4–15)
MONOCYTES NFR BLD: 8.7 % (ref 4–15)
MONOCYTES NFR BLD: 8.7 % (ref 4–15)
NEUTROPHILS # BLD AUTO: 12.9 K/UL (ref 1.8–7.7)
NEUTROPHILS # BLD AUTO: 12.9 K/UL (ref 1.8–7.7)
NEUTROPHILS # BLD AUTO: 13.4 K/UL (ref 1.8–7.7)
NEUTROPHILS # BLD AUTO: 14.1 K/UL (ref 1.8–7.7)
NEUTROPHILS NFR BLD: 81.2 % (ref 38–73)
NEUTROPHILS NFR BLD: 83.3 % (ref 38–73)
NEUTROPHILS NFR BLD: 84 % (ref 38–73)
NEUTROPHILS NFR BLD: 84.1 % (ref 38–73)
NRBC BLD-RTO: 0 /100 WBC
PHOSPHATE SERPL-MCNC: 3.9 MG/DL (ref 2.7–4.5)
PLATELET # BLD AUTO: 319 K/UL (ref 150–350)
PLATELET # BLD AUTO: 324 K/UL (ref 150–350)
PLATELET # BLD AUTO: 350 K/UL (ref 150–350)
PLATELET # BLD AUTO: 367 K/UL (ref 150–350)
PMV BLD AUTO: 9.6 FL (ref 9.2–12.9)
PMV BLD AUTO: 9.6 FL (ref 9.2–12.9)
PMV BLD AUTO: 9.7 FL (ref 9.2–12.9)
PMV BLD AUTO: 9.9 FL (ref 9.2–12.9)
POCT GLUCOSE: 120 MG/DL (ref 70–110)
POCT GLUCOSE: 133 MG/DL (ref 70–110)
POCT GLUCOSE: 173 MG/DL (ref 70–110)
POCT GLUCOSE: 193 MG/DL (ref 70–110)
POCT GLUCOSE: 198 MG/DL (ref 70–110)
POTASSIUM SERPL-SCNC: 4.4 MMOL/L (ref 3.5–5.1)
POTASSIUM UR-SCNC: 44 MMOL/L (ref 15–95)
PROT SERPL-MCNC: 5.1 G/DL (ref 6–8.4)
PROT UR-MCNC: 15 MG/DL (ref 0–15)
PROT/CREAT UR: 0.17 MG/G{CREAT} (ref 0–0.2)
PROTHROMBIN TIME: 11.6 SEC (ref 9–12.5)
RBC # BLD AUTO: 2.62 M/UL (ref 4–5.4)
RBC # BLD AUTO: 2.66 M/UL (ref 4–5.4)
RBC # BLD AUTO: 2.68 M/UL (ref 4–5.4)
RBC # BLD AUTO: 2.77 M/UL (ref 4–5.4)
SODIUM SERPL-SCNC: 135 MMOL/L (ref 136–145)
SODIUM UR-SCNC: <20 MMOL/L (ref 20–250)
TSH SERPL DL<=0.005 MIU/L-ACNC: 0.76 UIU/ML (ref 0.4–4)
UUN UR-MCNC: 507 MG/DL (ref 140–1050)
WBC # BLD AUTO: 15.35 K/UL (ref 3.9–12.7)
WBC # BLD AUTO: 15.5 K/UL (ref 3.9–12.7)
WBC # BLD AUTO: 15.97 K/UL (ref 3.9–12.7)
WBC # BLD AUTO: 17.33 K/UL (ref 3.9–12.7)

## 2019-07-04 PROCEDURE — 84443 ASSAY THYROID STIM HORMONE: CPT

## 2019-07-04 PROCEDURE — 85025 COMPLETE CBC W/AUTO DIFF WBC: CPT | Mod: 91

## 2019-07-04 PROCEDURE — 83735 ASSAY OF MAGNESIUM: CPT

## 2019-07-04 PROCEDURE — 63600175 PHARM REV CODE 636 W HCPCS: Performed by: STUDENT IN AN ORGANIZED HEALTH CARE EDUCATION/TRAINING PROGRAM

## 2019-07-04 PROCEDURE — 99233 PR SUBSEQUENT HOSPITAL CARE,LEVL III: ICD-10-PCS | Mod: GC,,, | Performed by: HOSPITALIST

## 2019-07-04 PROCEDURE — 85610 PROTHROMBIN TIME: CPT

## 2019-07-04 PROCEDURE — 84540 ASSAY OF URINE/UREA-N: CPT

## 2019-07-04 PROCEDURE — 84100 ASSAY OF PHOSPHORUS: CPT

## 2019-07-04 PROCEDURE — 84300 ASSAY OF URINE SODIUM: CPT

## 2019-07-04 PROCEDURE — 99233 SBSQ HOSP IP/OBS HIGH 50: CPT | Mod: GC,,, | Performed by: HOSPITALIST

## 2019-07-04 PROCEDURE — 80053 COMPREHEN METABOLIC PANEL: CPT

## 2019-07-04 PROCEDURE — 25000003 PHARM REV CODE 250: Performed by: STUDENT IN AN ORGANIZED HEALTH CARE EDUCATION/TRAINING PROGRAM

## 2019-07-04 PROCEDURE — 84133 ASSAY OF URINE POTASSIUM: CPT

## 2019-07-04 PROCEDURE — 20600001 HC STEP DOWN PRIVATE ROOM

## 2019-07-04 PROCEDURE — 36415 COLL VENOUS BLD VENIPUNCTURE: CPT

## 2019-07-04 PROCEDURE — 84156 ASSAY OF PROTEIN URINE: CPT

## 2019-07-04 RX ORDER — DICYCLOMINE HYDROCHLORIDE 10 MG/1
10 CAPSULE ORAL 2 TIMES DAILY
Status: DISCONTINUED | OUTPATIENT
Start: 2019-07-05 | End: 2019-07-06

## 2019-07-04 RX ORDER — GABAPENTIN 100 MG/1
200 CAPSULE ORAL 3 TIMES DAILY
Status: DISCONTINUED | OUTPATIENT
Start: 2019-07-04 | End: 2019-07-06

## 2019-07-04 RX ORDER — HYDROMORPHONE HYDROCHLORIDE 1 MG/ML
1 INJECTION, SOLUTION INTRAMUSCULAR; INTRAVENOUS; SUBCUTANEOUS
Status: DISCONTINUED | OUTPATIENT
Start: 2019-07-04 | End: 2019-07-08 | Stop reason: HOSPADM

## 2019-07-04 RX ORDER — HEPARIN SODIUM 5000 [USP'U]/ML
5000 INJECTION, SOLUTION INTRAVENOUS; SUBCUTANEOUS EVERY 8 HOURS
Status: DISCONTINUED | OUTPATIENT
Start: 2019-07-04 | End: 2019-07-08 | Stop reason: HOSPADM

## 2019-07-04 RX ORDER — OXYCODONE HYDROCHLORIDE 10 MG/1
10 TABLET ORAL
Status: DISCONTINUED | OUTPATIENT
Start: 2019-07-04 | End: 2019-07-06

## 2019-07-04 RX ADMIN — OXYCODONE HYDROCHLORIDE 10 MG: 10 TABLET ORAL at 08:07

## 2019-07-04 RX ADMIN — ATORVASTATIN CALCIUM 10 MG: 10 TABLET, FILM COATED ORAL at 08:07

## 2019-07-04 RX ADMIN — DULOXETINE HYDROCHLORIDE 60 MG: 20 CAPSULE, DELAYED RELEASE ORAL at 08:07

## 2019-07-04 RX ADMIN — HEPARIN SODIUM 5000 UNITS: 5000 INJECTION, SOLUTION INTRAVENOUS; SUBCUTANEOUS at 08:07

## 2019-07-04 RX ADMIN — GABAPENTIN 200 MG: 100 CAPSULE ORAL at 08:07

## 2019-07-04 RX ADMIN — ACETAMINOPHEN 1000 MG: 500 TABLET ORAL at 08:07

## 2019-07-04 RX ADMIN — HYDROMORPHONE HYDROCHLORIDE 1 MG: 1 INJECTION, SOLUTION INTRAMUSCULAR; INTRAVENOUS; SUBCUTANEOUS at 02:07

## 2019-07-04 RX ADMIN — DICYCLOMINE HYDROCHLORIDE 10 MG: 10 CAPSULE ORAL at 04:07

## 2019-07-04 RX ADMIN — DICYCLOMINE HYDROCHLORIDE 10 MG: 10 CAPSULE ORAL at 11:07

## 2019-07-04 RX ADMIN — GABAPENTIN 100 MG: 100 CAPSULE ORAL at 11:07

## 2019-07-04 RX ADMIN — ONDANSETRON 8 MG: 8 TABLET, ORALLY DISINTEGRATING ORAL at 08:07

## 2019-07-04 RX ADMIN — OXYCODONE HYDROCHLORIDE AND ACETAMINOPHEN 1 TABLET: 10; 325 TABLET ORAL at 04:07

## 2019-07-04 RX ADMIN — HYDROMORPHONE HYDROCHLORIDE 0.5 MG: 1 INJECTION, SOLUTION INTRAMUSCULAR; INTRAVENOUS; SUBCUTANEOUS at 08:07

## 2019-07-04 RX ADMIN — GABAPENTIN 200 MG: 100 CAPSULE ORAL at 04:07

## 2019-07-04 RX ADMIN — HEPARIN SODIUM 5000 UNITS: 5000 INJECTION, SOLUTION INTRAVENOUS; SUBCUTANEOUS at 02:07

## 2019-07-04 NOTE — SUBJECTIVE & OBJECTIVE
Review of Systems   Constitutional: Positive for unexpected weight change. Negative for chills and fever.   HENT: Negative for trouble swallowing.    Eyes: Negative for visual disturbance.   Respiratory: Positive for shortness of breath and wheezing.    Cardiovascular: Positive for chest pain and palpitations. Negative for leg swelling.   Gastrointestinal: Negative for abdominal distention and abdominal pain.   Endocrine: Positive for polydipsia.   Genitourinary: Negative for dysuria.   Musculoskeletal: Negative for arthralgias.   Skin:        Bruise on L shin   Neurological: Positive for dizziness, syncope, weakness and light-headedness.   Psychiatric/Behavioral: Negative for agitation.     Objective:     Vital Signs (Most Recent):  Temp: 98 °F (36.7 °C) (07/04/19 1609)  Pulse: 96 (07/04/19 1500)  Resp: 20 (07/04/19 1210)  BP: 118/65 (07/04/19 1210)  SpO2: 96 % (07/04/19 1210) Vital Signs (24h Range):  Temp:  [97.9 °F (36.6 °C)-99.1 °F (37.3 °C)] 98 °F (36.7 °C)  Pulse:  [] 96  Resp:  [14-23] 20  SpO2:  [93 %-98 %] 96 %  BP: (118-158)/(65-82) 118/65     Weight: 80.2 kg (176 lb 12.9 oz)  Body mass index is 27.69 kg/m².    Physical Exam   Constitutional: She is oriented to person, place, and time. She appears well-developed and well-nourished.   Patient lying in bed in good spirits this morning   HENT:   Head: Normocephalic and atraumatic.   Eyes: EOM are normal.   Neck: Normal range of motion. Neck supple.   Cardiovascular: Normal rate, regular rhythm and normal heart sounds.   No murmur heard.  Pulmonary/Chest: Effort normal.   Patient appears to be breathing without difficulty   Abdominal: She exhibits no distension. There is no tenderness.   Musculoskeletal: Normal range of motion. She exhibits no edema.   -Negative Richter sign bilaterally  -Bruise on L shin  -Bruising on back, localized on on her right lateral side. Dressings in place, appear clear and dry   Neurological: She is alert and oriented to  person, place, and time.   Skin: Skin is warm and dry. No rash noted.   Psychiatric: She has a normal mood and affect. Her behavior is normal.         CRANIAL NERVES     CN III, IV, VI   Extraocular motions are normal.        Significant Labs: All pertinent labs within the past 24 hours have been reviewed.    Significant Imaging: I have reviewed all pertinent imaging results/findings within the past 24 hours.

## 2019-07-04 NOTE — ASSESSMENT & PLAN NOTE
-Recurrent bloody pleural effusions  -Hemoglobin on admission was 5.7  -Transfused 2 units pRBC, increased to 9.7 on 07/02 and now 9 on 07/03  -serial CBCs, continue to monitor, and transfuse if hemogbloin <7

## 2019-07-04 NOTE — ASSESSMENT & PLAN NOTE
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

## 2019-07-04 NOTE — NURSING
"Pt c/o of Percocet not "working" for her. Notified Dr. Ramone MD increased Percocet to 15 mg Q3H and Dilaudid to 1 mg Q3H.  "

## 2019-07-04 NOTE — ASSESSMENT & PLAN NOTE
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 Milian with [facility], dicussed placing IVC filter if DVT develops

## 2019-07-04 NOTE — PLAN OF CARE
"Problem: Adult Inpatient Plan of Care  Goal: Plan of Care Review  Outcome: Ongoing (interventions implemented as appropriate)  Pt AAOx4, VSS, Tmax 99.1. Pt c/o severe pain @ CTube site throughout day, Dilaudid increased to 1 mg Q3H, and oxy 10 Q3H. SCD therapy maintained. Heparin Q8H scheduled. CTube continues to have dark sanguineous drainage. Dr. Pack met with pt and pt's daughters today having an in-depth conversation about findings from yesterday's procedures and plan of care. CXR today confirming proper placement of CTube. O2 sats > 93. Gathering from MD notes, they are awaiting bx results for staging. Depending on results will determine if team will have talk with family about palliative care or hospice. Pt up in chair for a few hours today. CT of head this AM only noting "chronic changes". Pt made "full code". Daughters at bedside throughout the day, pt in great spirits despite the news she received. Bed in low/locked position, pt instructed to call staff for mobility - pt verbalized understanding, personal belongings/call light within reach, non-slip socks on when OOB, WCTM.      "

## 2019-07-04 NOTE — ASSESSMENT & PLAN NOTE
-CT revealing R pleural effusion  -OR on 07/03 with Thoracic Surgery for VATS drainage  -Percocet 10 for moderate pain  -Dilaudid 1 mg q6 PRN for severe pain  -Tylenol 1g  -Gabapentin 200 mg TID

## 2019-07-04 NOTE — PROGRESS NOTES
[facility] Hospital Medicine  Progress Note    Patient Name: Isabell Preston  MRN: [id number]  Patient Class: IP- Inpatient   Admission Date: 7/2/2019  Length of Stay: 2 days  Attending Physician: Jennifer Pack MD  Primary Care Provider: Ayo Archuleta MD    [facility] Medicine Team: [facility] MED 2 Nabeel Cleveland MD    Subjective:     Principal Problem:Recurrent pleural effusion on right      HPI:  Ms. Preston is a 74 yo AAF who presented to the ED with increasing back pain (Right sided) and SOB. 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 The patient had recently been admitted to the hospital on 6/29/19 for similar symptoms including back pain and SOB where it was determined that she had a pleural effusion and a thoracentesis was preformed producing 2L of bloody fluid. She was then discharged home on 6/30/19 on a heparin bridge to coumadin, but returned to the ED around 2300 on 7/1/19 for worsening back pain (Right sided) and SOB. CT shows the formation of a hematoma at the site of the thoracentesis and a reaccumulation of the pleural effusion. She was originally admitted by Cardiothoracic Surgery and medicine was consulted. Her labs were concerning for a low Hgb (5.6), leukocytosis (15k) and increased Cr (1.9), and the patient was admitted to our service for GINA and hematoma s/p thoracentesis.     Of note, the patient has also had a 10 month history of a weight loss of 35 pounds. She has also reported episodes of dizziness and palpitations, which sound presyncopal in nature, which last for 30 seconds and are relieved by sitting down and placing a cold towel on her body. During this period, she has also complained of early satiety, decreased PO intake, and decreased appetite. Patient reports that her health care  maintenance is up-to-date with mammogram and colonoscopy screenings and follows up regularly with her PCP, Ayo Archuleta MD. Patient also reports a family history of clotting disorders, with a maternal aunt who had recurrent DVTs as well as her mom who had a DVT.    She has a 8 year history of asbestos exposure while working in [employer] as a , and while being around her father, who worked for the [employer] for 20 years. Per patient's daughter, she also is a hoarder, who's home may be inhabitable.    Overview/Hospital Course:  Patient presented to ED on 6/29/19 with back pain and SOB. Pleural effusion was found and thoracentesis was preformed. Patient was discharged home on 6/30/19. Patient returned to ED with worsening back pain and SOB around on 07/01/19. CT showed formation of hematoma at thoracentesis site (Right side) and reaccumulation of pleural effusion. On 07/03/19, the patient was moved to the floor and consulted to the medicine team.. She was started on continous LR for her worsening GINA and received 2 units of blood on the floor. Her hemglobin improved following 2 units pRBC from 5.7 to 9.7. She was then transferred to the medicine team as primary and Thoracic Surgery was following as consultants. On 07/03, her GINA worsened from Cr 1.9 to Cr 2.7, and she was given IVF, placed on strict Is/Os, and urine electrolytes were ordered. On the same day, thoracic surgery  performed a VATS drainage (2L of bloody fluid), pleural biopsy of a large exophytic mass, doxycycline pleurodesis, and pleurX catheter placement. A frozen sample was highly suggestive of malignancy and her biopsy was sent of for pathology. CT ABD/Pelvis and CT Head were ordered for staging, however imaging was non-revealing. She was placed on heparin 5k sq q8drip on tfloor post-op, which she will remain on upon discharge. On 07/04, long discussion with patient and family with our team regarding the findings from her procedure  and what to expect from here. Further workup in search of a primary tumor diagnosis was done, including  to screen for ovarian cancer as well as a lymphoma panel. Discussions with Vascular Surgery occurred regarding managing her hypercoaguable state, in the setting of high bleeding risk, and a plan was developed to keep her on heparin 5k sq q8, and to place an IVC filter if a DVT develops. Her pain medication was adjusted as well, having Percocet PRN for moderate and Dilaudid PRN for severe.     Review of Systems   Constitutional: Positive for unexpected weight change. Negative for chills and fever.   HENT: Negative for trouble swallowing.    Eyes: Negative for visual disturbance.   Respiratory: Positive for shortness of breath and wheezing.    Cardiovascular: Positive for chest pain and palpitations. Negative for leg swelling.   Gastrointestinal: Negative for abdominal distention and abdominal pain.   Endocrine: Positive for polydipsia.   Genitourinary: Negative for dysuria.   Musculoskeletal: Negative for arthralgias.   Skin:        Bruise on L shin   Neurological: Positive for dizziness, syncope, weakness and light-headedness.   Psychiatric/Behavioral: Negative for agitation.     Objective:     Vital Signs (Most Recent):  Temp: 98 °F (36.7 °C) (07/04/19 1609)  Pulse: 96 (07/04/19 1500)  Resp: 20 (07/04/19 1210)  BP: 118/65 (07/04/19 1210)  SpO2: 96 % (07/04/19 1210) Vital Signs (24h Range):  Temp:  [97.9 °F (36.6 °C)-99.1 °F (37.3 °C)] 98 °F (36.7 °C)  Pulse:  [] 96  Resp:  [14-23] 20  SpO2:  [93 %-98 %] 96 %  BP: (118-158)/(65-82) 118/65     Weight: 80.2 kg (176 lb 12.9 oz)  Body mass index is 27.69 kg/m².    Physical Exam   Constitutional: She is oriented to person, place, and time. She appears well-developed and well-nourished.   Patient lying in bed in good spirits this morning   HENT:   Head: Normocephalic and atraumatic.   Eyes: EOM are normal.   Neck: Normal range of motion. Neck supple.    Cardiovascular: Normal rate, regular rhythm and normal heart sounds.   No murmur heard.  Pulmonary/Chest: Effort normal.   Patient appears to be breathing without difficulty   Abdominal: She exhibits no distension. There is no tenderness.   Musculoskeletal: Normal range of motion. She exhibits no edema.   -Negative Richter sign bilaterally  -Bruise on L shin  -Bruising on back, localized on on her right lateral side. Dressings in place, appear clear and dry   Neurological: She is alert and oriented to person, place, and time.   Skin: Skin is warm and dry. No rash noted.   Psychiatric: She has a normal mood and affect. Her behavior is normal.         CRANIAL NERVES     CN III, IV, VI   Extraocular motions are normal.        Significant Labs: All pertinent labs within the past 24 hours have been reviewed.    Significant Imaging: I have reviewed all pertinent imaging results/findings within the past 24 hours.      Assessment/Plan:      * Recurrent pleural effusion on right, bloody, likely 2/2 malignancy  -Patient presented with R sided back pain and SOB s/p thoracentesis  -CT showed R pleural effusion  -Thoracic surgery consulted  -OR on 07/03 for VATS drainage with biopsy of exophytic pleural mass (frozen path reveals malignancy), doxycycline pleurodesis, and pleurX catheter placement  -CT ABD/Pelvis and CT Head ordered for staging  -MRI Brain as outpatient  -awaiting biopsy results  -pursuing workup in search of primary,  for ovarian cancer and lymphoma panel  -f/u labs  -likely full workup done in outpatient setting      History of DVT of lower extremity  -Patient with remote history of DVTs in the 1980s of LLE and L subclavian  -Has been managed in the past with warfarin with therapeutic INR between 2-3  -Family history of hypercoagulability  -Heparin 5k sq q8 prophylactically  -Dr. Milian with [facility], dicussed placing IVC filter if DVT develops      GINA (acute kidney injury)  -volume resuscitation  with blood transfusions and fluids  -urine electrolytes  -strict Is/Os  -avoid nephrotoxic agents  -trend Cr and BUN  -will continue to monitor      Acute blood loss anemia  -Recurrent bloody pleural effusions  -Hemoglobin on admission was 5.7  -Transfused 2 units pRBC, increased to 9.7 on 07/02 and now 9 on 07/03  -serial CBCs, continue to monitor, and transfuse if hemogbloin <7    Pain from hematoma evacuation  -CT revealing R pleural effusion  -OR on 07/03 with Thoracic Surgery for VATS drainage  -Percocet 10 for moderate pain  -Dilaudid 1 mg q6 PRN for severe pain  -Tylenol 1g  -Gabapentin 200 mg TID      Hypertension, essential  -Patient with PMH of HTN  -Currently holding home antihypertensives in setting of hematoma      Type 2 diabetes mellitus with diabetic cataract, without long-term current use of insulin  -diet controlled per patient  -last A1c 6.2 on 06/05/19  -will continue to monitor    Transaminitis  -trend labs and continue to monitor      VTE Risk Mitigation (From admission, onward)        Ordered     heparin (porcine) injection 5,000 Units  Every 8 hours      07/04/19 0800     Place sequential compression device  Until discontinued      07/02/19 5566                Nabeel Cleveland MD  Department of Hospital Medicine   [facility]

## 2019-07-04 NOTE — PLAN OF CARE
Problem: Adult Inpatient Plan of Care  Goal: Plan of Care Review  Outcome: Ongoing (interventions implemented as appropriate)  -AAOx4 once anesthesia completely wore off.   -RH 18g PIV saline locked.   -LFA 18g PIV infusing NS @ 75ml/hr.  -5L NC to maintain SpO2 > 92%.  -R side incisions from VATS procedure with gauze dressing. CDI.  -R CT to -20mmhg wall suction. 50ml bloody OP thus far this shift.   -CTH and CT A/P completed. Results pending.   -Urine sample sent to lab. Results pending.   -No issues overnight.   -2 person assist when oob. Wears non slip socks when oob. Free from falls/injuries thus far this shift.   -Bed in lowest, locked position. Bed rails up x3. Call light and personal belongings within reach.   -Will continue to monitor.

## 2019-07-04 NOTE — PHARMACY MED REC
"Admission Medication Reconciliation - Pharmacy Consult Note    The home medication history was taken by Zoraida Roberson, Pharmacy Technician.  Based on information gathered and subsequent review by the clinical pharmacist, the items below may need attention.     You may go to "Admission" then "Reconcile Home Medications" tabs to review and/or act upon these items.     Potentially problematic discrepancies with current MAR  o Patient IS taking the following which was not ordered upon admit  o Tizanidine 4 mg PO daily as needed for muscle spasms  o Gabapentin 100 mg "as needed" for neuropathy (suggest counseling patient on the appropriate use of this medication in order to have desired effect)  o Linzess 72 mcg PO daily as needed constipation (also suggest counseling on this drug as it is intended to be taken regularly)  o Patient IS NOT taking the following which was ordered upon admit  o Hydroxyzine  o Patient is taking a drug DIFFERENTLY than how ordered upon admit  o Dicyclomine 10 mg PO BID    Please address this information as you see fit.  Feel free to contact us if you have any questions or require assistance.    Vaishnavi Lemons, JoD  [id number]                  .    .            "

## 2019-07-04 NOTE — SUBJECTIVE & OBJECTIVE
Interval History:   VATS yesterday with biopsy of pleural mass. Some pain at incision site. Back pain improved following drainage of hematoma. H/H down today. CT with 520ml thin sanguinous drainage.    Medications:  Continuous Infusions:  Scheduled Meds:   acetaminophen  1,000 mg Oral TID    atorvastatin  10 mg Oral Daily    dicyclomine  10 mg Oral TID    DULoxetine  60 mg Oral Daily    gabapentin  100 mg Oral TID    heparin (porcine)  5,000 Units Subcutaneous Q8H     PRN Meds:sodium chloride, Dextrose 10% Bolus, Dextrose 10% Bolus, glucagon (human recombinant), glucose, glucose, HYDROmorphone, hydrOXYzine HCl, insulin aspart U-100, naloxone, ondansetron, oxyCODONE-acetaminophen, oxyCODONE-acetaminophen, promethazine (PHENERGAN) IVPB, ramelteon, sodium chloride 0.9%, sodium chloride 0.9%     Review of patient's allergies indicates:   Allergen Reactions    Ciprofloxacin Anaphylaxis    Fructose     Gluten protein Other (See Comments)     GI upset  GI upset    Lactase Other (See Comments)     GI upset  GI upset    Latex, natural rubber Rash     Objective:     Vital Signs (Most Recent):  Temp: 97.9 °F (36.6 °C) (07/03/19 1941)  Pulse: 96 (07/04/19 0700)  Resp: (!) 23 (07/04/19 0300)  BP: (!) 155/77 (07/04/19 0300)  SpO2: (!) 94 % (07/04/19 0300) Vital Signs (24h Range):  Temp:  [97.9 °F (36.6 °C)-98.8 °F (37.1 °C)] 97.9 °F (36.6 °C)  Pulse:  [] 96  Resp:  [14-26] 23  SpO2:  [93 %-100 %] 94 %  BP: (131-171)/(65-82) 155/77     Intake/Output - Last 3 Shifts       07/02 0700 - 07/03 0659 07/03 0700 - 07/04 0659 07/04 0700 - 07/05 0659    P.O. 420 180     I.V. (mL/kg) 1427.5 (17.8) 1350 (16.8)     Blood 500      IV Piggyback 1000      Total Intake(mL/kg) 3347.5 (41.7) 1530 (19.1)     Urine (mL/kg/hr) 600 (0.3) 150 (0.1)     Emesis/NG output 0      Stool 0 0     Chest Tube  520     Total Output 600 670     Net +2747.5 +860            Urine Occurrence 0 x 1 x     Stool Occurrence 0 x 0 x     Emesis Occurrence  0 x            SpO2: (!) 94 %  O2 Device (Oxygen Therapy): nasal cannula    Physical Exam   Constitutional: She is oriented to person, place, and time. She appears well-developed and well-nourished. No distress.   Cardiovascular: Normal rate and regular rhythm.   Pulmonary/Chest: Effort normal. No respiratory distress.   Thin dark sanguinous drainage   Abdominal: Soft. She exhibits no distension. There is no tenderness.   Neurological: She is alert and oriented to person, place, and time.   Skin: Skin is warm and dry.   Psychiatric: She has a normal mood and affect. Her behavior is normal.       Significant Labs:  BMP:   Recent Labs   Lab 07/04/19  0637      *   K 4.4      CO2 23   BUN 31*   CREATININE 2.0*   CALCIUM 8.3*   MG 1.8     CBC:   Recent Labs   Lab 07/04/19  0923   WBC 15.50*   RBC 2.68*   HGB 7.9*   HCT 24.1*      MCV 90   MCH 29.5   MCHC 32.8       Significant Diagnostics:  I have reviewed all pertinent imaging results/findings within the past 24 hours.    VTE Risk Mitigation (From admission, onward)        Ordered     heparin (porcine) injection 5,000 Units  Every 8 hours      07/04/19 0800     Place sequential compression device  Until discontinued      07/02/19 0651

## 2019-07-04 NOTE — PROGRESS NOTES
[facility]  Thoracic Surgery  Progress Note    Subjective:     History of Present Illness:  73 year old female with HTN, HLD, DMII, recurrent DVTs, PTSD, anxiety, depression and recurrent right pleural effusions. Initial Ct guided thoracentesis drained 1.5L bloody fluid. Cytology was negative. She was scheduled for VATS drainage/pleural biopsy however in the interval she had another thoracentesis while coagulopathic which was complicated by hematoma and hemothorax.        Post-Op Info:  Procedure(s) (LRB):  VATS, WITH PLEURA BIOPSY (Right)  VATS, WITH PLEURODESIS (Right)  VATS, WITH CHEST TUBE INSERTION FOR DRAINAGE OF PLEURAL EFFUSION (Right)   1 Day Post-Op     Interval History:   VATS yesterday with biopsy of pleural mass. Some pain at incision site. Back pain improved following drainage of hematoma. H/H down today. CT with 520ml thin sanguinous drainage.    Medications:  Continuous Infusions:  Scheduled Meds:   acetaminophen  1,000 mg Oral TID    atorvastatin  10 mg Oral Daily    dicyclomine  10 mg Oral TID    DULoxetine  60 mg Oral Daily    gabapentin  100 mg Oral TID    heparin (porcine)  5,000 Units Subcutaneous Q8H     PRN Meds:sodium chloride, Dextrose 10% Bolus, Dextrose 10% Bolus, glucagon (human recombinant), glucose, glucose, HYDROmorphone, hydrOXYzine HCl, insulin aspart U-100, naloxone, ondansetron, oxyCODONE-acetaminophen, oxyCODONE-acetaminophen, promethazine (PHENERGAN) IVPB, ramelteon, sodium chloride 0.9%, sodium chloride 0.9%     Review of patient's allergies indicates:   Allergen Reactions    Ciprofloxacin Anaphylaxis    Fructose     Gluten protein Other (See Comments)     GI upset  GI upset    Lactase Other (See Comments)     GI upset  GI upset    Latex, natural rubber Rash     Objective:     Vital Signs (Most Recent):  Temp: 97.9 °F (36.6 °C) (07/03/19 1941)  Pulse: 96 (07/04/19 0700)  Resp: (!) 23 (07/04/19 0300)  BP: (!) 155/77 (07/04/19 0300)  SpO2: (!) 94 %  (07/04/19 0300) Vital Signs (24h Range):  Temp:  [97.9 °F (36.6 °C)-98.8 °F (37.1 °C)] 97.9 °F (36.6 °C)  Pulse:  [] 96  Resp:  [14-26] 23  SpO2:  [93 %-100 %] 94 %  BP: (131-171)/(65-82) 155/77     Intake/Output - Last 3 Shifts       07/02 0700 - 07/03 0659 07/03 0700 - 07/04 0659 07/04 0700 - 07/05 0659    P.O. 420 180     I.V. (mL/kg) 1427.5 (17.8) 1350 (16.8)     Blood 500      IV Piggyback 1000      Total Intake(mL/kg) 3347.5 (41.7) 1530 (19.1)     Urine (mL/kg/hr) 600 (0.3) 150 (0.1)     Emesis/NG output 0      Stool 0 0     Chest Tube  520     Total Output 600 670     Net +2747.5 +860            Urine Occurrence 0 x 1 x     Stool Occurrence 0 x 0 x     Emesis Occurrence 0 x            SpO2: (!) 94 %  O2 Device (Oxygen Therapy): nasal cannula    Physical Exam   Constitutional: She is oriented to person, place, and time. She appears well-developed and well-nourished. No distress.   Cardiovascular: Normal rate and regular rhythm.   Pulmonary/Chest: Effort normal. No respiratory distress.   Thin dark sanguinous drainage   Abdominal: Soft. She exhibits no distension. There is no tenderness.   Neurological: She is alert and oriented to person, place, and time.   Skin: Skin is warm and dry.   Psychiatric: She has a normal mood and affect. Her behavior is normal.       Significant Labs:  BMP:   Recent Labs   Lab 07/04/19  0637      *   K 4.4      CO2 23   BUN 31*   CREATININE 2.0*   CALCIUM 8.3*   MG 1.8     CBC:   Recent Labs   Lab 07/04/19  0923   WBC 15.50*   RBC 2.68*   HGB 7.9*   HCT 24.1*      MCV 90   MCH 29.5   MCHC 32.8       Significant Diagnostics:  I have reviewed all pertinent imaging results/findings within the past 24 hours.    VTE Risk Mitigation (From admission, onward)        Ordered     heparin (porcine) injection 5,000 Units  Every 8 hours      07/04/19 0800     Place sequential compression device  Until discontinued      07/02/19 1479        Assessment/Plan:      Acute blood loss anemia  Transfusion per primary team    Pleural effusion, right  Continue chest tube to suction      Please set up with Home health company familiar with [employer]. Company should supply kits. Patient will be discharged with 4 kits for emergency purposes.   [facility] Home Health Instructions    Please drain Pleurx catheter every other day and record the amount of drainage. Please also take note of the color of the drainage. Encourage patient to change positions and cough at least once while draining to get more fluid. Patient can shower and go about normal activities as long as the dressing provided in Ple[employer] kits in securely intact.  If gauze underneath become wet or dirty, please change.   Notify MD if:   - Patient is draining more than 1,000 mL in one sitting.   - Drainage abruptly stops.  It is normal for drainage to gradually decrease.  - You see purulent drainage coming from the catheter or near the drain exit site.   - The catheter will not flush and appears clogged.  - Any signs of infection at the Pleurx catheter exit site.    Thoracic Surgery Clinic: [phone]          Chronic anticoagulation  Long term anticoagulation per Medicine team  Recommend Hematology consult         Kenia Lepe MD  Thoracic Surgery  [facility]

## 2019-07-04 NOTE — PLAN OF CARE
Pt returned from surgery. Vitals stable. O2 increased to 5L to keep sats >92%. Chest tube placed to suction. Daughters at bedside, attentive to pt. SCDs placed and on. Chest tube draining dark red fluid.

## 2019-07-05 LAB
ALBUMIN SERPL BCP-MCNC: 2.4 G/DL (ref 3.5–5.2)
ALP SERPL-CCNC: 133 U/L (ref 55–135)
ALT SERPL W/O P-5'-P-CCNC: 34 U/L (ref 10–44)
ANION GAP SERPL CALC-SCNC: 9 MMOL/L (ref 8–16)
AST SERPL-CCNC: 28 U/L (ref 10–40)
BASOPHILS # BLD AUTO: 0.02 K/UL (ref 0–0.2)
BASOPHILS # BLD AUTO: 0.03 K/UL (ref 0–0.2)
BASOPHILS NFR BLD: 0.2 % (ref 0–1.9)
BASOPHILS NFR BLD: 0.2 % (ref 0–1.9)
BILIRUB SERPL-MCNC: 0.6 MG/DL (ref 0.1–1)
BUN SERPL-MCNC: 34 MG/DL (ref 8–23)
CALCIUM SERPL-MCNC: 8.3 MG/DL (ref 8.7–10.5)
CANCER AG125 SERPL-ACNC: 11 U/ML (ref 0–30)
CHLORIDE SERPL-SCNC: 99 MMOL/L (ref 95–110)
CO2 SERPL-SCNC: 24 MMOL/L (ref 23–29)
CREAT SERPL-MCNC: 1.6 MG/DL (ref 0.5–1.4)
DIFFERENTIAL METHOD: ABNORMAL
DIFFERENTIAL METHOD: ABNORMAL
EOSINOPHIL # BLD AUTO: 0.3 K/UL (ref 0–0.5)
EOSINOPHIL # BLD AUTO: 0.4 K/UL (ref 0–0.5)
EOSINOPHIL NFR BLD: 2.7 % (ref 0–8)
EOSINOPHIL NFR BLD: 2.8 % (ref 0–8)
ERYTHROCYTE [DISTWIDTH] IN BLOOD BY AUTOMATED COUNT: 15.1 % (ref 11.5–14.5)
ERYTHROCYTE [DISTWIDTH] IN BLOOD BY AUTOMATED COUNT: 15.2 % (ref 11.5–14.5)
EST. GFR  (AFRICAN AMERICAN): 36.8 ML/MIN/1.73 M^2
EST. GFR  (NON AFRICAN AMERICAN): 32 ML/MIN/1.73 M^2
FLOW CYTOMETRY ANTIBODIES ANALYZED - TISSUE: NORMAL
FLOW CYTOMETRY COMMENT - TISSUE: NORMAL
FLOW CYTOMETRY INTERPRETATION - TISSUE: NORMAL
GLUCOSE SERPL-MCNC: 138 MG/DL (ref 70–110)
HCT VFR BLD AUTO: 23.2 % (ref 37–48.5)
HCT VFR BLD AUTO: 23.3 % (ref 37–48.5)
HCT VFR BLD AUTO: 23.8 % (ref 37–48.5)
HGB BLD-MCNC: 7.3 G/DL (ref 12–16)
HGB BLD-MCNC: 7.4 G/DL (ref 12–16)
HGB BLD-MCNC: 7.5 G/DL (ref 12–16)
IMM GRANULOCYTES # BLD AUTO: 0.07 K/UL (ref 0–0.04)
IMM GRANULOCYTES # BLD AUTO: 0.1 K/UL (ref 0–0.04)
IMM GRANULOCYTES NFR BLD AUTO: 0.6 % (ref 0–0.5)
IMM GRANULOCYTES NFR BLD AUTO: 0.8 % (ref 0–0.5)
INR PPP: 1.1 (ref 0.8–1.2)
LYMPHOCYTES # BLD AUTO: 1.1 K/UL (ref 1–4.8)
LYMPHOCYTES # BLD AUTO: 1.1 K/UL (ref 1–4.8)
LYMPHOCYTES NFR BLD: 8.4 % (ref 18–48)
LYMPHOCYTES NFR BLD: 8.9 % (ref 18–48)
MAGNESIUM SERPL-MCNC: 1.8 MG/DL (ref 1.6–2.6)
MCH RBC QN AUTO: 28.9 PG (ref 27–31)
MCH RBC QN AUTO: 29.4 PG (ref 27–31)
MCHC RBC AUTO-ENTMCNC: 31.5 G/DL (ref 32–36)
MCHC RBC AUTO-ENTMCNC: 31.8 G/DL (ref 32–36)
MCV RBC AUTO: 91 FL (ref 82–98)
MCV RBC AUTO: 94 FL (ref 82–98)
MONOCYTES # BLD AUTO: 1.2 K/UL (ref 0.3–1)
MONOCYTES # BLD AUTO: 1.3 K/UL (ref 0.3–1)
MONOCYTES NFR BLD: 9.8 % (ref 4–15)
MONOCYTES NFR BLD: 9.8 % (ref 4–15)
NEUTROPHILS # BLD AUTO: 10.2 K/UL (ref 1.8–7.7)
NEUTROPHILS # BLD AUTO: 9.1 K/UL (ref 1.8–7.7)
NEUTROPHILS NFR BLD: 77.7 % (ref 38–73)
NEUTROPHILS NFR BLD: 78.1 % (ref 38–73)
NRBC BLD-RTO: 0 /100 WBC
NRBC BLD-RTO: 0 /100 WBC
PHOSPHATE SERPL-MCNC: 3.3 MG/DL (ref 2.7–4.5)
PLATELET # BLD AUTO: 329 K/UL (ref 150–350)
PLATELET # BLD AUTO: 341 K/UL (ref 150–350)
PMV BLD AUTO: 9.7 FL (ref 9.2–12.9)
PMV BLD AUTO: 9.7 FL (ref 9.2–12.9)
POCT GLUCOSE: 136 MG/DL (ref 70–110)
POCT GLUCOSE: 137 MG/DL (ref 70–110)
POCT GLUCOSE: 158 MG/DL (ref 70–110)
POCT GLUCOSE: 164 MG/DL (ref 70–110)
POTASSIUM SERPL-SCNC: 4.1 MMOL/L (ref 3.5–5.1)
PROT SERPL-MCNC: 5.6 G/DL (ref 6–8.4)
PROTHROMBIN TIME: 10.9 SEC (ref 9–12.5)
RBC # BLD AUTO: 2.48 M/UL (ref 4–5.4)
RBC # BLD AUTO: 2.56 M/UL (ref 4–5.4)
SODIUM SERPL-SCNC: 132 MMOL/L (ref 136–145)
SPECIMEN TYPE - TISSUE: NORMAL
WBC # BLD AUTO: 11.75 K/UL (ref 3.9–12.7)
WBC # BLD AUTO: 13.11 K/UL (ref 3.9–12.7)

## 2019-07-05 PROCEDURE — 99233 SBSQ HOSP IP/OBS HIGH 50: CPT | Mod: GC,,, | Performed by: HOSPITALIST

## 2019-07-05 PROCEDURE — 86304 IMMUNOASSAY TUMOR CA 125: CPT

## 2019-07-05 PROCEDURE — 83735 ASSAY OF MAGNESIUM: CPT

## 2019-07-05 PROCEDURE — 85025 COMPLETE CBC W/AUTO DIFF WBC: CPT | Mod: 91

## 2019-07-05 PROCEDURE — 85018 HEMOGLOBIN: CPT

## 2019-07-05 PROCEDURE — 20600001 HC STEP DOWN PRIVATE ROOM

## 2019-07-05 PROCEDURE — 25000003 PHARM REV CODE 250: Performed by: STUDENT IN AN ORGANIZED HEALTH CARE EDUCATION/TRAINING PROGRAM

## 2019-07-05 PROCEDURE — 84100 ASSAY OF PHOSPHORUS: CPT

## 2019-07-05 PROCEDURE — 85610 PROTHROMBIN TIME: CPT

## 2019-07-05 PROCEDURE — 80053 COMPREHEN METABOLIC PANEL: CPT

## 2019-07-05 PROCEDURE — 25000003 PHARM REV CODE 250: Performed by: HOSPITALIST

## 2019-07-05 PROCEDURE — 63600175 PHARM REV CODE 636 W HCPCS: Performed by: STUDENT IN AN ORGANIZED HEALTH CARE EDUCATION/TRAINING PROGRAM

## 2019-07-05 PROCEDURE — 36415 COLL VENOUS BLD VENIPUNCTURE: CPT

## 2019-07-05 PROCEDURE — 99233 PR SUBSEQUENT HOSPITAL CARE,LEVL III: ICD-10-PCS | Mod: GC,,, | Performed by: HOSPITALIST

## 2019-07-05 PROCEDURE — 85014 HEMATOCRIT: CPT

## 2019-07-05 RX ORDER — POLYETHYLENE GLYCOL 3350 17 G/17G
17 POWDER, FOR SOLUTION ORAL 2 TIMES DAILY PRN
Status: DISCONTINUED | OUTPATIENT
Start: 2019-07-05 | End: 2019-07-08 | Stop reason: HOSPADM

## 2019-07-05 RX ORDER — DIPHENHYDRAMINE HCL 25 MG
25 CAPSULE ORAL EVERY 6 HOURS PRN
Status: DISCONTINUED | OUTPATIENT
Start: 2019-07-05 | End: 2019-07-06

## 2019-07-05 RX ADMIN — DULOXETINE HYDROCHLORIDE 60 MG: 20 CAPSULE, DELAYED RELEASE ORAL at 10:07

## 2019-07-05 RX ADMIN — HEPARIN SODIUM 5000 UNITS: 5000 INJECTION, SOLUTION INTRAVENOUS; SUBCUTANEOUS at 01:07

## 2019-07-05 RX ADMIN — GABAPENTIN 200 MG: 100 CAPSULE ORAL at 10:07

## 2019-07-05 RX ADMIN — ACETAMINOPHEN 1000 MG: 500 TABLET ORAL at 02:07

## 2019-07-05 RX ADMIN — HEPARIN SODIUM 5000 UNITS: 5000 INJECTION, SOLUTION INTRAVENOUS; SUBCUTANEOUS at 09:07

## 2019-07-05 RX ADMIN — HEPARIN SODIUM 5000 UNITS: 5000 INJECTION, SOLUTION INTRAVENOUS; SUBCUTANEOUS at 05:07

## 2019-07-05 RX ADMIN — DIPHENHYDRAMINE HYDROCHLORIDE 25 MG: 25 CAPSULE ORAL at 02:07

## 2019-07-05 RX ADMIN — POLYETHYLENE GLYCOL 3350 17 G: 17 POWDER, FOR SOLUTION ORAL at 09:07

## 2019-07-05 RX ADMIN — DICYCLOMINE HYDROCHLORIDE 10 MG: 10 CAPSULE ORAL at 10:07

## 2019-07-05 RX ADMIN — ACETAMINOPHEN 1000 MG: 500 TABLET ORAL at 09:07

## 2019-07-05 RX ADMIN — DIPHENHYDRAMINE HYDROCHLORIDE 25 MG: 25 CAPSULE ORAL at 09:07

## 2019-07-05 RX ADMIN — OXYCODONE HYDROCHLORIDE 10 MG: 10 TABLET ORAL at 07:07

## 2019-07-05 RX ADMIN — DICYCLOMINE HYDROCHLORIDE 10 MG: 10 CAPSULE ORAL at 09:07

## 2019-07-05 RX ADMIN — GABAPENTIN 200 MG: 100 CAPSULE ORAL at 02:07

## 2019-07-05 RX ADMIN — ATORVASTATIN CALCIUM 10 MG: 10 TABLET, FILM COATED ORAL at 10:07

## 2019-07-05 RX ADMIN — GABAPENTIN 200 MG: 100 CAPSULE ORAL at 09:07

## 2019-07-05 RX ADMIN — OXYCODONE HYDROCHLORIDE 10 MG: 10 TABLET ORAL at 01:07

## 2019-07-05 RX ADMIN — ACETAMINOPHEN 1000 MG: 500 TABLET ORAL at 10:07

## 2019-07-05 NOTE — PLAN OF CARE
07/05/19 1418   Discharge Reassessment   Assessment Type Discharge Planning Reassessment   Anticipated Discharge Disposition Home-Health   Provided patient/caregiver education on the expected discharge date and the discharge plan Yes   Do you have any problems affording any of your prescribed medications? No   Discharge Plan A Home Health   Discharge Plan B Home with family   DME Needed Upon Discharge  none   Patient choice form signed by patient/caregiver Yes   Post-Acute Status   Post-Acute Authorization Other   Home Health/Hospice Status Awaiting Therapy Documentation

## 2019-07-05 NOTE — ANESTHESIA POSTPROCEDURE EVALUATION
Anesthesia Post Evaluation    Patient: Isabell Preston    Procedure(s) Performed: Procedure(s) (LRB):  VATS, WITH PLEURA BIOPSY (Right)  VATS, WITH PLEURODESIS (Right)  VATS, WITH CHEST TUBE INSERTION FOR DRAINAGE OF PLEURAL EFFUSION (Right)    Final Anesthesia Type: general  Patient location during evaluation: PACU  Patient participation: Yes- Able to Participate  Level of consciousness: awake and alert  Post-procedure vital signs: reviewed and stable  Pain management: adequate  Airway patency: patent  PONV status at discharge: No PONV  Anesthetic complications: no      Cardiovascular status: blood pressure returned to baseline  Respiratory status: unassisted, spontaneous ventilation and room air  Hydration status: euvolemic  Follow-up not needed.          Vitals Value Taken Time   /70 7/5/2019  3:25 PM   Temp 36.9 °C (98.4 °F) 7/5/2019 11:55 AM   Pulse 87 7/5/2019  3:25 PM   Resp 12 7/5/2019  3:25 PM   SpO2 96 % 7/5/2019  3:25 PM   Vitals shown include unvalidated device data.      Event Time     Out of Recovery 07/03/2019 18:52:47          Pain/Sotero Score: Pain Rating Prior to Med Admin: 6 (7/5/2019  2:54 PM)  Pain Rating Post Med Admin: 6 (7/5/2019  2:54 PM)

## 2019-07-05 NOTE — ASSESSMENT & PLAN NOTE
Chest tube to suction. Will change to water seal this afternoon.   Wean supplemental O2 for SpO2 > 90%  OOB, ambulation in halls. Okay to come off suction for ambulation.   Path pending   Care per primary.     Please set up with Home health company familiar with [employer]. Company should supply kits. Patient will be discharged with 4 kits for emergency purposes.   [facility] Home Health Instructions     Please drain Pleurx catheter every other day and record the amount of drainage. Please also take note of the color of the drainage. Encourage patient to change positions and cough at least once while draining to get more fluid. Patient can shower and go about normal activities as long as the dressing provided in Pleurx kits in securely intact.  If gauze underneath become wet or dirty, please change.   Notify MD if:   · Patient is draining more than 1,000 mL in one sitting.   · Drainage abruptly stops.  It is normal for drainage to gradually decrease.  · You see purulent drainage coming from the catheter or near the drain exit site.   · The catheter will not flush and appears clogged.  · Any signs of infection at the Pleurx catheter exit site.     Thoracic Surgery Clinic: [phone]

## 2019-07-05 NOTE — SUBJECTIVE & OBJECTIVE
Interval History: CT atrium tipped over and changed overnight. No output in tubing this morning. Recorded 110mL. Labs pending. CXR mildly worse consolidation and right effusion.     Medications:  Continuous Infusions:  Scheduled Meds:   acetaminophen  1,000 mg Oral TID    atorvastatin  10 mg Oral Daily    dicyclomine  10 mg Oral BID    DULoxetine  60 mg Oral Daily    gabapentin  200 mg Oral TID    heparin (porcine)  5,000 Units Subcutaneous Q8H     PRN Meds:sodium chloride, Dextrose 10% Bolus, Dextrose 10% Bolus, glucagon (human recombinant), glucose, glucose, HYDROmorphone, hydrOXYzine HCl, insulin aspart U-100, naloxone, ondansetron, oxyCODONE, promethazine (PHENERGAN) IVPB, ramelteon, sodium chloride 0.9%, sodium chloride 0.9%     Review of patient's allergies indicates:   Allergen Reactions    Ciprofloxacin Anaphylaxis    Fructose     Gluten protein Other (See Comments)     GI upset  GI upset    Lactase Other (See Comments)     GI upset  GI upset    Latex, natural rubber Rash     Objective:     Vital Signs (Most Recent):  Temp: 98.1 °F (36.7 °C) (07/05/19 0735)  Pulse: 83 (07/05/19 0735)  Resp: 10 (07/05/19 0735)  BP: 122/66 (07/05/19 0735)  SpO2: 98 % (07/05/19 0735) Vital Signs (24h Range):  Temp:  [98 °F (36.7 °C)-98.8 °F (37.1 °C)] 98.1 °F (36.7 °C)  Pulse:  [] 83  Resp:  [10-20] 10  SpO2:  [94 %-98 %] 98 %  BP: (118-132)/(65-73) 122/66     Intake/Output - Last 3 Shifts       07/03 0700 - 07/04 0659 07/04 0700 - 07/05 0659 07/05 0700 - 07/06 0659    P.O. 180 120     I.V. (mL/kg) 1350 (16.8)      Blood       IV Piggyback       Total Intake(mL/kg) 1530 (19.1) 120 (1.5)     Urine (mL/kg/hr) 150 (0.1) 750 (0.4)     Emesis/NG output       Stool 0 0     Chest Tube 520 110     Total Output 670 860     Net +860 -740            Urine Occurrence 1 x      Stool Occurrence 0 x 0 x           SpO2: 98 %  O2 Device (Oxygen Therapy): nasal cannula    Physical Exam   Constitutional: She is oriented to  person, place, and time. She appears well-developed and well-nourished. No distress.   Cardiovascular: Normal rate and regular rhythm.   Pulmonary/Chest: Effort normal. No respiratory distress.   pleurX in place, connected to suction    Abdominal: Soft. She exhibits no distension. There is no tenderness.   Neurological: She is alert and oriented to person, place, and time.   Skin: Skin is warm and dry.   Psychiatric: She has a normal mood and affect.       Significant Labs:  BMP:   Recent Labs   Lab 07/04/19  0637      *   K 4.4      CO2 23   BUN 31*   CREATININE 2.0*   CALCIUM 8.3*   MG 1.8     CBC:   Recent Labs   Lab 07/05/19  0629   WBC 11.75   RBC 2.48*   HGB 7.3*   HCT 23.2*      MCV 94   MCH 29.4   MCHC 31.5*       Significant Diagnostics:  CXR: I have reviewed all pertinent results/findings within the past 24 hours    VTE Risk Mitigation (From admission, onward)        Ordered     heparin (porcine) injection 5,000 Units  Every 8 hours      07/04/19 0800     Place sequential compression device  Until discontinued      07/02/19 1961

## 2019-07-05 NOTE — NURSING
Dr. Smith notified CT has no output this the Pleure vac was changed this am. No kinks noted to tubing.

## 2019-07-05 NOTE — PROGRESS NOTES
[facility] Hospital Medicine  Progress Note    Patient Name: Isabell Preston  MRN: [id number]  Patient Class: IP- Inpatient   Admission Date: 7/2/2019  Length of Stay: 3 days  Attending Physician: Jennifer Pack MD  Primary Care Provider: Ayo Archuleta MD    [facility] Medicine Team: [facility] 2 Nabeel Cleveland MD    Subjective:     Principal Problem:Recurrent pleural effusion on right      HPI:  Ms. Preston is a 74 yo AAF who presented to the ED with increasing back pain (Right sided) and SOB. 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 The patient had recently been admitted to the hospital on 6/29/19 for similar symptoms including back pain and SOB where it was determined that she had a pleural effusion and a thoracentesis was preformed producing 2L of bloody fluid. She was then discharged home on 6/30/19 on a heparin bridge to coumadin, but returned to the ED around 2300 on 7/1/19 for worsening back pain (Right sided) and SOB. CT shows the formation of a hematoma at the site of the thoracentesis and a reaccumulation of the pleural effusion. She was originally admitted by Cardiothoracic Surgery and medicine was consulted. Her labs were concerning for a low Hgb (5.6), leukocytosis (15k) and increased Cr (1.9), and the patient was admitted to our service for GINA and hematoma s/p thoracentesis.     Of note, the patient has also had a 10 month history of a weight loss of 35 pounds. She has also reported episodes of dizziness and palpitations, which sound presyncopal in nature, which last for 30 seconds and are relieved by sitting down and placing a cold towel on her body. During this period, she has also complained of early satiety, decreased PO intake, and decreased appetite. Patient reports that her health care  maintenance is up-to-date with mammogram and colonoscopy screenings and follows up regularly with her PCP, Ayo Archuleta MD. Patient also reports a family history of clotting disorders, with a maternal aunt who had recurrent DVTs as well as her mom who had a DVT.    She has a 8 year history of asbestos exposure while working in [employer] as a , and while being around her father, who worked for the [employer] for 20 years. Per patient's daughter, she also is a hoarder, who's home may be inhabitable.    Overview/Hospital Course:  Patient presented to ED on 6/29/19 with back pain and SOB. Pleural effusion was found and thoracentesis was preformed. Patient was discharged home on 6/30/19. Patient returned to ED with worsening back pain and SOB around on 07/01/19. CT showed formation of hematoma at thoracentesis site (Right side) and reaccumulation of pleural effusion. On 07/03/19, the patient was moved to the floor and consulted to the medicine team.. She was started on continous LR for her worsening GINA and received 2 units of blood on the floor. Her hemglobin improved following 2 units pRBC from 5.7 to 9.7. She was then transferred to the medicine team as primary and Thoracic Surgery was following as consultants. On 07/03, her GINA worsened from Cr 1.9 to Cr 2.7, and she was given IVF, placed on strict Is/Os, and urine electrolytes were ordered. On the same day, thoracic surgery  performed a VATS drainage (2L of bloody fluid), pleural biopsy of a large exophytic mass, doxycycline pleurodesis, and pleurX catheter placement. A frozen sample was highly suggestive of malignancy and her biopsy was sent of for pathology. CT ABD/Pelvis and CT Head were ordered for staging, however imaging was non-revealing. She was placed on heparin 5k sq q8drip on tfloor post-op, which she will remain on upon discharge. On 07/04, long discussion with patient and family with our team regarding the findings from her procedure  and what to expect from here. Further workup in search of a primary tumor diagnosis was done, including  to screen for ovarian cancer as well as a lymphoma panel. Discussions with Vascular Surgery occurred regarding managing her hypercoaguable state, in the setting of high bleeding risk, and a plan was developed to keep her on heparin 5k sq q8, and to place an IVC filter if a DVT develops. Her pain medication was adjusted as well, having Percocet PRN for moderate and Dilaudid PRN for severe. On 07/05, there was a moderate increase in pleural effusion per CXR. Will follow up with Thoracic Surgery for their recommendations. Gabapentin was increased to 300 mg TID for better pain control. Physical Therapy was consulted.     Review of Systems   Constitutional: Positive for unexpected weight change. Negative for chills and fever.   HENT: Negative for trouble swallowing.    Eyes: Negative for visual disturbance.   Respiratory: Positive for shortness of breath and wheezing.    Cardiovascular: Positive for chest pain and palpitations. Negative for leg swelling.   Gastrointestinal: Negative for abdominal distention and abdominal pain.   Endocrine: Positive for polydipsia.   Genitourinary: Negative for dysuria.   Musculoskeletal: Negative for arthralgias.   Skin:        Bruise on L shin   Neurological: Positive for dizziness, syncope, weakness and light-headedness.   Psychiatric/Behavioral: Negative for agitation.     Objective:     Vital Signs (Most Recent):  Temp: 97.7 °F (36.5 °C) (07/05/19 1619)  Pulse: 87 (07/05/19 1600)  Resp: 11 (07/05/19 1600)  BP: 120/69 (07/05/19 1600)  SpO2: 95 % (07/05/19 1600) Vital Signs (24h Range):  Temp:  [97.7 °F (36.5 °C)-98.4 °F (36.9 °C)] 97.7 °F (36.5 °C)  Pulse:  [] 87  Resp:  [10-21] 11  SpO2:  [91 %-99 %] 95 %  BP: (120-132)/(66-76) 120/69     Weight: 80.2 kg (176 lb 12.9 oz)  Body mass index is 27.69 kg/m².    Physical Exam   Constitutional: She is oriented to person,  place, and time. She appears well-developed and well-nourished.   Patient lying in bed in good spirits this morning   HENT:   Head: Normocephalic and atraumatic.   Eyes: EOM are normal.   Neck: Normal range of motion. Neck supple.   Cardiovascular: Normal rate, regular rhythm and normal heart sounds.   No murmur heard.  Pulmonary/Chest: Effort normal.   Patient appears to be breathing without difficulty   Abdominal: She exhibits no distension. There is no tenderness.   Musculoskeletal: Normal range of motion. She exhibits no edema.   -Negative Richter sign bilaterally  -Bruise on L shin  -Bruising on back, localized on on her right lateral side. Dressings in place, appear clear and dry   Neurological: She is alert and oriented to person, place, and time.   Skin: Skin is warm and dry. No rash noted.   Psychiatric: She has a normal mood and affect. Her behavior is normal.         CRANIAL NERVES     CN III, IV, VI   Extraocular motions are normal.        Significant Labs: All pertinent labs within the past 24 hours have been reviewed.    Significant Imaging: I have reviewed all pertinent imaging results/findings within the past 24 hours.      Assessment/Plan:      * Recurrent pleural effusion on right, bloody, likely 2/2 malignancy  -Patient presented with R sided back pain and SOB s/p thoracentesis  -CT showed R pleural effusion  -Thoracic surgery consulted  -OR on 07/03 for VATS drainage with biopsy of exophytic pleural mass (frozen path reveals malignancy), doxycycline pleurodesis, and pleurX catheter placement  -CT ABD/Pelvis and CT Head ordered for staging  -MRI Brain as outpatient  -awaiting biopsy results  -pursuing workup in search of primary,  for ovarian cancer and lymphoma panel  -f/u labs  -likely full workup done in outpatient setting      History of DVT of lower extremity  -Patient with remote history of DVTs in the 1980s of LLE and L subclavian  -Has been managed in the past with warfarin with  therapeutic INR between 2-3  -Family history of hypercoagulability  -Heparin 5k sq q8 prophylactically  -Dr. Milian with Vascular aware, dicussed placing IVC filter if DVT develops      GINA (acute kidney injury)  -volume resuscitation with blood transfusions and fluids  -urine electrolytes  -strict Is/Os  -avoid nephrotoxic agents  -trend Cr and BUN  -will continue to monitor      Acute blood loss anemia  -Recurrent bloody pleural effusions  -Hemoglobin on admission was 5.7  -Transfused 2 units pRBC, increased to 9.7 on 07/02 and now 9 on 07/03  -serial CBCs, continue to monitor, and transfuse if hemogbloin <7    Pain from hematoma evacuation  -CT revealing R pleural effusion  -OR on 07/03 with Thoracic Surgery for VATS drainage  -Percocet 10 for moderate pain  -Dilaudid 1 mg q6 PRN for severe pain  -Tylenol 1g  -Gabapentin 200 mg TID      Hypertension, essential  -Patient with PMH of HTN  -Currently holding home antihypertensives in setting of hematoma      Type 2 diabetes mellitus with diabetic cataract, without long-term current use of insulin  -diet controlled per patient  -last A1c 6.2 on 06/05/19  -will continue to monitor    Transaminitis  -trend labs and continue to monitor        VTE Risk Mitigation (From admission, onward)        Ordered     heparin (porcine) injection 5,000 Units  Every 8 hours      07/04/19 0800     Place sequential compression device  Until discontinued      07/02/19 0540                Nabeel Cleveland MD  Department of Hospital Medicine   [facility]

## 2019-07-05 NOTE — PLAN OF CARE
Problem: Adult Inpatient Plan of Care  Goal: Plan of Care Review  Outcome: Ongoing (interventions implemented as appropriate)  CT remains to -20cm suction. No output noted. Dr. Smith aware. Weaned O2 to RA=91%. Pt stated felt a little SOB. O2 at 1L/NC applied and maintained. PO=95% on O2 at 1L/NC. SOB resolved. Pt using BSC.  Pt stated fell at home. PT/OT ordered. Reinforced to wear non-skid socks and call for assistance OOB to prevent falling. Verbalized understanding. Skin intact. Bruising noted to lower back and R flank. Light bruising noted to L shin stated was caused by fall. TEDS on bilat. Daughter at BS today. C/O itching. IM2 MD notified. Benadryl ordered and given. Daughter stated she was conversing in a confused manner after Benadryl taken and then went to sleep. Pt slept this afternoon. Pain decreased with Oxycodone and Tylenol. Glucoses monitored. No Insulin indicated. Continuing to monitor.

## 2019-07-05 NOTE — SUBJECTIVE & OBJECTIVE
Review of Systems   Constitutional: Positive for unexpected weight change. Negative for chills and fever.   HENT: Negative for trouble swallowing.    Eyes: Negative for visual disturbance.   Respiratory: Positive for shortness of breath and wheezing.    Cardiovascular: Positive for chest pain and palpitations. Negative for leg swelling.   Gastrointestinal: Negative for abdominal distention and abdominal pain.   Endocrine: Positive for polydipsia.   Genitourinary: Negative for dysuria.   Musculoskeletal: Negative for arthralgias.   Skin:        Bruise on L shin   Neurological: Positive for dizziness, syncope, weakness and light-headedness.   Psychiatric/Behavioral: Negative for agitation.     Objective:     Vital Signs (Most Recent):  Temp: 97.7 °F (36.5 °C) (07/05/19 1619)  Pulse: 87 (07/05/19 1600)  Resp: 11 (07/05/19 1600)  BP: 120/69 (07/05/19 1600)  SpO2: 95 % (07/05/19 1600) Vital Signs (24h Range):  Temp:  [97.7 °F (36.5 °C)-98.4 °F (36.9 °C)] 97.7 °F (36.5 °C)  Pulse:  [] 87  Resp:  [10-21] 11  SpO2:  [91 %-99 %] 95 %  BP: (120-132)/(66-76) 120/69     Weight: 80.2 kg (176 lb 12.9 oz)  Body mass index is 27.69 kg/m².    Physical Exam   Constitutional: She is oriented to person, place, and time. She appears well-developed and well-nourished.   Patient lying in bed in good spirits this morning   HENT:   Head: Normocephalic and atraumatic.   Eyes: EOM are normal.   Neck: Normal range of motion. Neck supple.   Cardiovascular: Normal rate, regular rhythm and normal heart sounds.   No murmur heard.  Pulmonary/Chest: Effort normal.   Patient appears to be breathing without difficulty   Abdominal: She exhibits no distension. There is no tenderness.   Musculoskeletal: Normal range of motion. She exhibits no edema.   -Negative Richter sign bilaterally  -Bruise on L shin  -Bruising on back, localized on on her right lateral side. Dressings in place, appear clear and dry   Neurological: She is alert and oriented to  person, place, and time.   Skin: Skin is warm and dry. No rash noted.   Psychiatric: She has a normal mood and affect. Her behavior is normal.         CRANIAL NERVES     CN III, IV, VI   Extraocular motions are normal.        Significant Labs: All pertinent labs within the past 24 hours have been reviewed.    Significant Imaging: I have reviewed all pertinent imaging results/findings within the past 24 hours.

## 2019-07-05 NOTE — PROGRESS NOTES
[facility]  Thoracic Surgery  Progress Note    Subjective:     History of Present Illness:  73 year old female with HTN, HLD, DMII, recurrent DVTs, PTSD, anxiety, depression and recurrent right pleural effusions. Initial Ct guided thoracentesis drained 1.5L bloody fluid. Cytology was negative. She was scheduled for VATS drainage/pleural biopsy however in the interval she had another thoracentesis while coagulopathic which was complicated by hematoma and hemothorax.        Post-Op Info:  Procedure(s) (LRB):  VATS, WITH PLEURA BIOPSY (Right)  VATS, WITH PLEURODESIS (Right)  VATS, WITH CHEST TUBE INSERTION FOR DRAINAGE OF PLEURAL EFFUSION (Right)   2 Days Post-Op     Interval History: CT atrium tipped over and changed overnight. No output in tubing this morning. Recorded 110mL. Labs pending. CXR mildly worse consolidation and right effusion.     Medications:  Continuous Infusions:  Scheduled Meds:   acetaminophen  1,000 mg Oral TID    atorvastatin  10 mg Oral Daily    dicyclomine  10 mg Oral BID    DULoxetine  60 mg Oral Daily    gabapentin  200 mg Oral TID    heparin (porcine)  5,000 Units Subcutaneous Q8H     PRN Meds:sodium chloride, Dextrose 10% Bolus, Dextrose 10% Bolus, glucagon (human recombinant), glucose, glucose, HYDROmorphone, hydrOXYzine HCl, insulin aspart U-100, naloxone, ondansetron, oxyCODONE, promethazine (PHENERGAN) IVPB, ramelteon, sodium chloride 0.9%, sodium chloride 0.9%     Review of patient's allergies indicates:   Allergen Reactions    Ciprofloxacin Anaphylaxis    Fructose     Gluten protein Other (See Comments)     GI upset  GI upset    Lactase Other (See Comments)     GI upset  GI upset    Latex, natural rubber Rash     Objective:     Vital Signs (Most Recent):  Temp: 98.1 °F (36.7 °C) (07/05/19 0735)  Pulse: 83 (07/05/19 0735)  Resp: 10 (07/05/19 0735)  BP: 122/66 (07/05/19 0735)  SpO2: 98 % (07/05/19 0735) Vital Signs (24h Range):  Temp:  [98 °F (36.7 °C)-98.8  °F (37.1 °C)] 98.1 °F (36.7 °C)  Pulse:  [] 83  Resp:  [10-20] 10  SpO2:  [94 %-98 %] 98 %  BP: (118-132)/(65-73) 122/66     Intake/Output - Last 3 Shifts       07/03 0700 - 07/04 0659 07/04 0700 - 07/05 0659 07/05 0700 - 07/06 0659    P.O. 180 120     I.V. (mL/kg) 1350 (16.8)      Blood       IV Piggyback       Total Intake(mL/kg) 1530 (19.1) 120 (1.5)     Urine (mL/kg/hr) 150 (0.1) 750 (0.4)     Emesis/NG output       Stool 0 0     Chest Tube 520 110     Total Output 670 860     Net +860 -740            Urine Occurrence 1 x      Stool Occurrence 0 x 0 x           SpO2: 98 %  O2 Device (Oxygen Therapy): nasal cannula    Physical Exam   Constitutional: She is oriented to person, place, and time. She appears well-developed and well-nourished. No distress.   Cardiovascular: Normal rate and regular rhythm.   Pulmonary/Chest: Effort normal. No respiratory distress.   pleurX in place, connected to suction    Abdominal: Soft. She exhibits no distension. There is no tenderness.   Neurological: She is alert and oriented to person, place, and time.   Skin: Skin is warm and dry.   Psychiatric: She has a normal mood and affect.       Significant Labs:  BMP:   Recent Labs   Lab 07/04/19  0637      *   K 4.4      CO2 23   BUN 31*   CREATININE 2.0*   CALCIUM 8.3*   MG 1.8     CBC:   Recent Labs   Lab 07/05/19  0629   WBC 11.75   RBC 2.48*   HGB 7.3*   HCT 23.2*      MCV 94   MCH 29.4   MCHC 31.5*       Significant Diagnostics:  CXR: I have reviewed all pertinent results/findings within the past 24 hours    VTE Risk Mitigation (From admission, onward)        Ordered     heparin (porcine) injection 5,000 Units  Every 8 hours      07/04/19 0800     Place sequential compression device  Until discontinued      07/02/19 0528        Assessment/Plan:     * Recurrent pleural effusion on right, bloody, likely 2/2 malignancy  Chest tube to suction. Will change to water seal this afternoon.   Wean supplemental  O2 for SpO2 > 90%  OOB, ambulation in halls. Okay to come off suction for ambulation.   Path pending   Care per primary.     Please set up with Home health company familiar with PleurX. Company should supply kits. Patient will be discharged with 4 kits for emergency purposes.   Pleurx Home Health Instructions     Please drain Pleurx catheter every other day and record the amount of drainage. Please also take note of the color of the drainage. Encourage patient to change positions and cough at least once while draining to get more fluid. Patient can shower and go about normal activities as long as the dressing provided in Pleurx kits in securely intact.  If gauze underneath become wet or dirty, please change.   Notify MD if:   · Patient is draining more than 1,000 mL in one sitting.   · Drainage abruptly stops.  It is normal for drainage to gradually decrease.  · You see purulent drainage coming from the catheter or near the drain exit site.   · The catheter will not flush and appears clogged.  · Any signs of infection at the Pleurx catheter exit site.     Thoracic Surgery Clinic: [phone]    Acute blood loss anemia  Transfusion per primary team        Thao Munoz PA-C  Thoracic Surgery  [facility]

## 2019-07-05 NOTE — PLAN OF CARE
Problem: Adult Inpatient Plan of Care  Goal: Plan of Care Review  Outcome: Ongoing (interventions implemented as appropriate)  -AAOx4 once anesthesia completely wore off.   -RH 18g PIV saline locked.   -LFA 18g PIV saline locked.   -5L NC to maintain SpO2 > 92%.  -R side incisions from VATS procedure with gauze dressing. CDI.  -R CT to -20mmhg wall suction with SS OP.     -Plan for cancer w/u OP. Awaiting bx results from VATS on 07/03/19.   -No issues overnight.   -2 person assist when oob. Wears non slip socks when oob. Free from falls/injuries thus far this shift.   -Bed in lowest, locked position. Bed rails up x3. Call light and personal belongings within reach.   -Will continue to monitor.

## 2019-07-06 PROBLEM — R41.0 DRUG-INDUCED DELIRIUM: Status: ACTIVE | Noted: 2019-07-06

## 2019-07-06 PROBLEM — T50.905A DRUG-INDUCED DELIRIUM: Status: ACTIVE | Noted: 2019-07-06

## 2019-07-06 PROBLEM — K59.00 CONSTIPATION: Status: ACTIVE | Noted: 2019-07-06

## 2019-07-06 LAB
ALBUMIN SERPL BCP-MCNC: 2.4 G/DL (ref 3.5–5.2)
ALP SERPL-CCNC: 142 U/L (ref 55–135)
ALT SERPL W/O P-5'-P-CCNC: 33 U/L (ref 10–44)
ANION GAP SERPL CALC-SCNC: 10 MMOL/L (ref 8–16)
AST SERPL-CCNC: 34 U/L (ref 10–40)
BASOPHILS # BLD AUTO: 0.03 K/UL (ref 0–0.2)
BASOPHILS NFR BLD: 0.3 % (ref 0–1.9)
BILIRUB SERPL-MCNC: 1 MG/DL (ref 0.1–1)
BLD PROD TYP BPU: NORMAL
BLD PROD TYP BPU: NORMAL
BLOOD UNIT EXPIRATION DATE: NORMAL
BLOOD UNIT EXPIRATION DATE: NORMAL
BLOOD UNIT TYPE CODE: 5100
BLOOD UNIT TYPE CODE: 5100
BLOOD UNIT TYPE: NORMAL
BLOOD UNIT TYPE: NORMAL
BUN SERPL-MCNC: 30 MG/DL (ref 8–23)
CALCIUM SERPL-MCNC: 8.7 MG/DL (ref 8.7–10.5)
CHLORIDE SERPL-SCNC: 96 MMOL/L (ref 95–110)
CO2 SERPL-SCNC: 26 MMOL/L (ref 23–29)
CODING SYSTEM: NORMAL
CODING SYSTEM: NORMAL
CREAT SERPL-MCNC: 1.5 MG/DL (ref 0.5–1.4)
DIFFERENTIAL METHOD: ABNORMAL
DISPENSE STATUS: NORMAL
DISPENSE STATUS: NORMAL
EOSINOPHIL # BLD AUTO: 0.5 K/UL (ref 0–0.5)
EOSINOPHIL NFR BLD: 4.3 % (ref 0–8)
ERYTHROCYTE [DISTWIDTH] IN BLOOD BY AUTOMATED COUNT: 15.3 % (ref 11.5–14.5)
EST. GFR  (AFRICAN AMERICAN): 39.8 ML/MIN/1.73 M^2
EST. GFR  (NON AFRICAN AMERICAN): 34.6 ML/MIN/1.73 M^2
GLUCOSE SERPL-MCNC: 133 MG/DL (ref 70–110)
HCT VFR BLD AUTO: 23.7 % (ref 37–48.5)
HGB BLD-MCNC: 7.7 G/DL (ref 12–16)
IMM GRANULOCYTES # BLD AUTO: 0.07 K/UL (ref 0–0.04)
IMM GRANULOCYTES NFR BLD AUTO: 0.6 % (ref 0–0.5)
LYMPHOCYTES # BLD AUTO: 1.4 K/UL (ref 1–4.8)
LYMPHOCYTES NFR BLD: 11.8 % (ref 18–48)
MAGNESIUM SERPL-MCNC: 1.9 MG/DL (ref 1.6–2.6)
MCH RBC QN AUTO: 29.5 PG (ref 27–31)
MCHC RBC AUTO-ENTMCNC: 32.5 G/DL (ref 32–36)
MCV RBC AUTO: 91 FL (ref 82–98)
MONOCYTES # BLD AUTO: 1 K/UL (ref 0.3–1)
MONOCYTES NFR BLD: 8.7 % (ref 4–15)
NEUTROPHILS # BLD AUTO: 8.9 K/UL (ref 1.8–7.7)
NEUTROPHILS NFR BLD: 74.3 % (ref 38–73)
NRBC BLD-RTO: 0 /100 WBC
PHOSPHATE SERPL-MCNC: 2.9 MG/DL (ref 2.7–4.5)
PLATELET # BLD AUTO: 398 K/UL (ref 150–350)
PMV BLD AUTO: 9.7 FL (ref 9.2–12.9)
POCT GLUCOSE: 127 MG/DL (ref 70–110)
POCT GLUCOSE: 134 MG/DL (ref 70–110)
POCT GLUCOSE: 147 MG/DL (ref 70–110)
POCT GLUCOSE: 254 MG/DL (ref 70–110)
POTASSIUM SERPL-SCNC: 3.6 MMOL/L (ref 3.5–5.1)
PROT SERPL-MCNC: 5.9 G/DL (ref 6–8.4)
RBC # BLD AUTO: 2.61 M/UL (ref 4–5.4)
SODIUM SERPL-SCNC: 132 MMOL/L (ref 136–145)
TRANS ERYTHROCYTES VOL PATIENT: NORMAL ML
TRANS ERYTHROCYTES VOL PATIENT: NORMAL ML
WBC # BLD AUTO: 11.9 K/UL (ref 3.9–12.7)

## 2019-07-06 PROCEDURE — 25000003 PHARM REV CODE 250: Performed by: STUDENT IN AN ORGANIZED HEALTH CARE EDUCATION/TRAINING PROGRAM

## 2019-07-06 PROCEDURE — 84100 ASSAY OF PHOSPHORUS: CPT

## 2019-07-06 PROCEDURE — 63600175 PHARM REV CODE 636 W HCPCS: Performed by: STUDENT IN AN ORGANIZED HEALTH CARE EDUCATION/TRAINING PROGRAM

## 2019-07-06 PROCEDURE — 94761 N-INVAS EAR/PLS OXIMETRY MLT: CPT

## 2019-07-06 PROCEDURE — 20600001 HC STEP DOWN PRIVATE ROOM

## 2019-07-06 PROCEDURE — 25000003 PHARM REV CODE 250: Performed by: HOSPITALIST

## 2019-07-06 PROCEDURE — 27000221 HC OXYGEN, UP TO 24 HOURS

## 2019-07-06 PROCEDURE — 83735 ASSAY OF MAGNESIUM: CPT

## 2019-07-06 PROCEDURE — 99233 SBSQ HOSP IP/OBS HIGH 50: CPT | Mod: GC,,, | Performed by: HOSPITALIST

## 2019-07-06 PROCEDURE — 36415 COLL VENOUS BLD VENIPUNCTURE: CPT

## 2019-07-06 PROCEDURE — 99233 PR SUBSEQUENT HOSPITAL CARE,LEVL III: ICD-10-PCS | Mod: GC,,, | Performed by: HOSPITALIST

## 2019-07-06 PROCEDURE — 85025 COMPLETE CBC W/AUTO DIFF WBC: CPT

## 2019-07-06 PROCEDURE — 80053 COMPREHEN METABOLIC PANEL: CPT

## 2019-07-06 RX ORDER — HYDROCODONE BITARTRATE AND ACETAMINOPHEN 5; 325 MG/1; MG/1
1 TABLET ORAL EVERY 6 HOURS PRN
Status: DISCONTINUED | OUTPATIENT
Start: 2019-07-06 | End: 2019-07-08 | Stop reason: HOSPADM

## 2019-07-06 RX ORDER — SYRING-NEEDL,DISP,INSUL,0.3 ML 29 G X1/2"
296 SYRINGE, EMPTY DISPOSABLE MISCELLANEOUS
Status: DISPENSED | OUTPATIENT
Start: 2019-07-06 | End: 2019-07-06

## 2019-07-06 RX ORDER — PSEUDOEPHEDRINE/ACETAMINOPHEN 30MG-500MG
100 TABLET ORAL
Status: DISPENSED | OUTPATIENT
Start: 2019-07-06 | End: 2019-07-06

## 2019-07-06 RX ORDER — GABAPENTIN 300 MG/1
300 CAPSULE ORAL 3 TIMES DAILY
Status: DISCONTINUED | OUTPATIENT
Start: 2019-07-06 | End: 2019-07-06

## 2019-07-06 RX ORDER — HYDROXYZINE HYDROCHLORIDE 10 MG/1
10 TABLET, FILM COATED ORAL DAILY PRN
Status: DISCONTINUED | OUTPATIENT
Start: 2019-07-06 | End: 2019-07-08 | Stop reason: HOSPADM

## 2019-07-06 RX ORDER — SENNOSIDES 8.6 MG/1
8.6 TABLET ORAL DAILY
Status: DISCONTINUED | OUTPATIENT
Start: 2019-07-06 | End: 2019-07-08 | Stop reason: HOSPADM

## 2019-07-06 RX ORDER — GABAPENTIN 100 MG/1
100 CAPSULE ORAL 3 TIMES DAILY
Status: DISCONTINUED | OUTPATIENT
Start: 2019-07-06 | End: 2019-07-08 | Stop reason: HOSPADM

## 2019-07-06 RX ORDER — LACTULOSE 10 G/15ML
15 SOLUTION ORAL EVERY 6 HOURS PRN
Status: DISCONTINUED | OUTPATIENT
Start: 2019-07-06 | End: 2019-07-08 | Stop reason: HOSPADM

## 2019-07-06 RX ADMIN — INSULIN ASPART 3 UNITS: 100 INJECTION, SOLUTION INTRAVENOUS; SUBCUTANEOUS at 08:07

## 2019-07-06 RX ADMIN — HEPARIN SODIUM 5000 UNITS: 5000 INJECTION, SOLUTION INTRAVENOUS; SUBCUTANEOUS at 01:07

## 2019-07-06 RX ADMIN — HEPARIN SODIUM 5000 UNITS: 5000 INJECTION, SOLUTION INTRAVENOUS; SUBCUTANEOUS at 05:07

## 2019-07-06 RX ADMIN — SENNOSIDES 8.6 MG: 8.6 TABLET, FILM COATED ORAL at 01:07

## 2019-07-06 RX ADMIN — ATORVASTATIN CALCIUM 10 MG: 10 TABLET, FILM COATED ORAL at 08:07

## 2019-07-06 RX ADMIN — DICYCLOMINE HYDROCHLORIDE 10 MG: 10 CAPSULE ORAL at 08:07

## 2019-07-06 RX ADMIN — DIPHENHYDRAMINE HYDROCHLORIDE 25 MG: 25 CAPSULE ORAL at 09:07

## 2019-07-06 RX ADMIN — ACETAMINOPHEN 1000 MG: 500 TABLET ORAL at 08:07

## 2019-07-06 RX ADMIN — GABAPENTIN 100 MG: 100 CAPSULE ORAL at 08:07

## 2019-07-06 RX ADMIN — ALUMINUM HYDROXIDE, MAGNESIUM HYDROXIDE, AND SIMETHICONE 50 ML: 200; 200; 20 SUSPENSION ORAL at 12:07

## 2019-07-06 RX ADMIN — OXYCODONE HYDROCHLORIDE 10 MG: 10 TABLET ORAL at 09:07

## 2019-07-06 RX ADMIN — DULOXETINE HYDROCHLORIDE 60 MG: 20 CAPSULE, DELAYED RELEASE ORAL at 08:07

## 2019-07-06 RX ADMIN — HEPARIN SODIUM 5000 UNITS: 5000 INJECTION, SOLUTION INTRAVENOUS; SUBCUTANEOUS at 08:07

## 2019-07-06 RX ADMIN — GABAPENTIN 100 MG: 100 CAPSULE ORAL at 04:07

## 2019-07-06 RX ADMIN — GABAPENTIN 200 MG: 100 CAPSULE ORAL at 08:07

## 2019-07-06 NOTE — ASSESSMENT & PLAN NOTE
-Overnight on 07/05 to morning of 07/06, patient started to have hallucinations and conversations with persons who were not in the room with her.  -Likely drug-induced  -benadryl dc'd, and atarax now being given for severe itching  -Oxycodone dc'd, now on oxycodone  -Gabapentin decreased back down to 100 mg TID

## 2019-07-06 NOTE — NURSING
Notified IM2 of pt starting with visual hallucinations, disoriented to situation. Having conversations while asleep. WCTM.

## 2019-07-06 NOTE — ASSESSMENT & PLAN NOTE
-CT revealing R pleural effusion  -OR on 07/03 with Thoracic Surgery for VATS drainage  -Norco 5 for moderate pain  -Dilaudid 1 mg q6 PRN for severe pain  -Tylenol 1g  -Gabapentin 100 mg TID

## 2019-07-06 NOTE — SUBJECTIVE & OBJECTIVE
Review of Systems   Constitutional: Positive for unexpected weight change. Negative for chills and fever.   HENT: Negative for trouble swallowing.    Eyes: Negative for visual disturbance.   Respiratory: Positive for shortness of breath and wheezing.    Cardiovascular: Positive for chest pain and palpitations. Negative for leg swelling.   Gastrointestinal: Negative for abdominal distention and abdominal pain.   Endocrine: Positive for polydipsia.   Genitourinary: Negative for dysuria.   Musculoskeletal: Negative for arthralgias.   Skin:        Bruise on L shin   Neurological: Positive for dizziness, syncope, weakness and light-headedness.   Psychiatric/Behavioral: Negative for agitation.     Objective:     Vital Signs (Most Recent):  Temp: 98.6 °F (37 °C) (07/06/19 1210)  Pulse: 95 (07/06/19 1231)  Resp: 17 (07/06/19 1231)  BP: 128/68 (07/06/19 1231)  SpO2: 96 % (07/06/19 1231) Vital Signs (24h Range):  Temp:  [97.7 °F (36.5 °C)-98.6 °F (37 °C)] 98.6 °F (37 °C)  Pulse:  [82-99] 95  Resp:  [10-20] 17  SpO2:  [93 %-97 %] 96 %  BP: (120-139)/(68-73) 128/68     Weight: 91.8 kg (202 lb 6.1 oz)  Body mass index is 31.7 kg/m².    Physical Exam   Constitutional: She is oriented to person, place, and time. She appears well-developed and well-nourished.   Patient lying in bed in good spirits this morning   HENT:   Head: Normocephalic and atraumatic.   Eyes: EOM are normal.   Neck: Normal range of motion. Neck supple.   Cardiovascular: Normal rate, regular rhythm and normal heart sounds.   No murmur heard.  Pulmonary/Chest: Effort normal.   Patient appears to be breathing without difficulty   Abdominal: She exhibits no distension. There is no tenderness.   Musculoskeletal: Normal range of motion. She exhibits no edema.   -Negative Richter sign bilaterally  -Bruise on L shin  -Bruising on back, localized on on her right lateral side. Dressings in place, appear clear and dry   Neurological: She is alert and oriented to person,  place, and time.   Skin: Skin is warm and dry. No rash noted.   Psychiatric: She has a normal mood and affect. Her behavior is normal.       CRANIAL NERVES     CN III, IV, VI   Extraocular motions are normal.

## 2019-07-06 NOTE — NURSING
Notified Dr. Hawkins (IM2) of pt's hallucinations becoming more constant than intermittent and pt flailing arms while asleep. MD to come to bedside. Will monitor. Also instructed to begin with delirium precautions. Will monitor.

## 2019-07-06 NOTE — ASSESSMENT & PLAN NOTE
-Recurrent bloody pleural effusions  -Hemoglobin on admission was 5.7  -Transfused 2 units pRBC, increased to 9.7 on 07/02 and now 9 on 07/03  -daily CBCs, continue to monitor, and transfuse if hemogbloin <7

## 2019-07-06 NOTE — PROGRESS NOTES
Pt with no relief from laxative and GI cocktail. C/o gas pains and abdominal distention. Notified IM2 team. Will continue to monitor.

## 2019-07-06 NOTE — SUBJECTIVE & OBJECTIVE
Interval History: NAEON. Pleurex cath in place with 8 cc out over 24hrs. CXR mildly improving.   Medications:  Continuous Infusions:  Scheduled Meds:   acetaminophen  1,000 mg Oral TID    atorvastatin  10 mg Oral Daily    dicyclomine  10 mg Oral BID    DULoxetine  60 mg Oral Daily    gabapentin  300 mg Oral TID    heparin (porcine)  5,000 Units Subcutaneous Q8H     PRN Meds:sodium chloride, Dextrose 10% Bolus, Dextrose 10% Bolus, diphenhydrAMINE, glucagon (human recombinant), glucose, glucose, HYDROmorphone, hydrOXYzine HCl, insulin aspart U-100, lactulose, naloxone, ondansetron, oxyCODONE, polyethylene glycol, promethazine (PHENERGAN) IVPB, ramelteon, sodium chloride 0.9%     Review of patient's allergies indicates:   Allergen Reactions    Ciprofloxacin Anaphylaxis    Fructose     Gluten protein Other (See Comments)     GI upset  GI upset    Lactase Other (See Comments)     GI upset  GI upset    Latex, natural rubber Rash     Objective:     Vital Signs (Most Recent):  Temp: 98 °F (36.7 °C) (07/05/19 1930)  Pulse: 99 (07/06/19 0836)  Resp: 20 (07/06/19 0836)  BP: 139/73 (07/06/19 0836)  SpO2: 96 % (07/06/19 0836) Vital Signs (24h Range):  Temp:  [97.7 °F (36.5 °C)-98.4 °F (36.9 °C)] 98 °F (36.7 °C)  Pulse:  [] 99  Resp:  [10-20] 20  SpO2:  [91 %-96 %] 96 %  BP: (120-139)/(69-76) 139/73     Intake/Output - Last 3 Shifts       07/04 0700 - 07/05 0659 07/05 0700 - 07/06 0659 07/06 0700 - 07/07 0659    P.O. 120 1130     I.V. (mL/kg)       Total Intake(mL/kg) 120 (1.5) 1130 (12.3)     Urine (mL/kg/hr) 750 (0.4) 1100 (0.5)     Emesis/NG output  0     Other  0     Stool 0 0     Blood  0     Chest Tube 110 8     Total Output 860 1108     Net -740 +22            Urine Occurrence  1 x     Stool Occurrence 0 x 0 x     Emesis Occurrence  0 x           SpO2: 96 %  O2 Device (Oxygen Therapy): nasal cannula    Physical Exam   Constitutional: She is oriented to person, place, and time. She appears well-developed  and well-nourished. No distress.   Cardiovascular: Normal rate and regular rhythm.   Pulmonary/Chest: Effort normal. No respiratory distress.   pleurX in place, connected to suction    Abdominal: Soft. She exhibits no distension. There is no tenderness.   Neurological: She is alert and oriented to person, place, and time.   Skin: Skin is warm and dry.   Psychiatric: She has a normal mood and affect.       Significant Labs:  BMP:   Recent Labs   Lab 07/06/19  0638   *   *   K 3.6   CL 96   CO2 26   BUN 30*   CREATININE 1.5*   CALCIUM 8.7   MG 1.9     CBC:   Recent Labs   Lab 07/06/19  0638   WBC 11.90   RBC 2.61*   HGB 7.7*   HCT 23.7*   *   MCV 91   MCH 29.5   MCHC 32.5       Significant Diagnostics:  CXR: I have reviewed all pertinent results/findings within the past 24 hours    VTE Risk Mitigation (From admission, onward)        Ordered     heparin (porcine) injection 5,000 Units  Every 8 hours      07/04/19 0800     Place sequential compression device  Until discontinued      07/02/19 0717

## 2019-07-06 NOTE — PROGRESS NOTES
[facility] Hospital Medicine  Progress Note    Patient Name: Isabell Preston  MRN: [id number]  Patient Class: IP- Inpatient   Admission Date: 7/2/2019  Length of Stay: 4 days  Attending Physician: Jennifer Pack MD  Primary Care Provider: Ayo Archuleta MD    [facility] Medicine Team: [facility] Nabeel Cleveland MD    Subjective:     Principal Problem:Recurrent pleural effusion on right      HPI:  Ms. Preston is a 72 yo AAF who presented to the ED with increasing back pain (Right sided) and SOB. 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 The patient had recently been admitted to the hospital on 6/29/19 for similar symptoms including back pain and SOB where it was determined that she had a pleural effusion and a thoracentesis was preformed producing 2L of bloody fluid. She was then discharged home on 6/30/19 on a heparin bridge to coumadin, but returned to the ED around 2300 on 7/1/19 for worsening back pain (Right sided) and SOB. CT shows the formation of a hematoma at the site of the thoracentesis and a reaccumulation of the pleural effusion. She was originally admitted by Cardiothoracic Surgery and medicine was consulted. Her labs were concerning for a low Hgb (5.6), leukocytosis (15k) and increased Cr (1.9), and the patient was admitted to our service for GINA and hematoma s/p thoracentesis.     Of note, the patient has also had a 10 month history of a weight loss of 35 pounds. She has also reported episodes of dizziness and palpitations, which sound presyncopal in nature, which last for 30 seconds and are relieved by sitting down and placing a cold towel on her body. During this period, she has also complained of early satiety, decreased PO intake, and decreased appetite. Patient reports that her health care  maintenance is up-to-date with mammogram and colonoscopy screenings and follows up regularly with her PCP, Ayo Archuleta MD. Patient also reports a family history of clotting disorders, with a maternal aunt who had recurrent DVTs as well as her mom who had a DVT.    She has a 8 year history of asbestos exposure while working in [place] as a , and while being around her father, who worked for the [employer] for 20 years. Per patient's daughter, she also is a hoarder, who's home may be inhabitable.    Overview/Hospital Course:  Patient presented to ED on 6/29/19 with back pain and SOB. Pleural effusion was found and thoracentesis was preformed. Patient was discharged home on 6/30/19. Patient returned to ED with worsening back pain and SOB around on 07/01/19. CT showed formation of hematoma at thoracentesis site (Right side) and reaccumulation of pleural effusion. On 07/03/19, the patient was moved to the floor and consulted to the medicine team.. She was started on continous LR for her worsening GINA and received 2 units of blood on the floor. Her hemglobin improved following 2 units pRBC from 5.7 to 9.7. She was then transferred to the medicine team as primary and Thoracic Surgery was following as consultants. On 07/03, her GINA worsened from Cr 1.9 to Cr 2.7, and she was given IVF, placed on strict Is/Os, and urine electrolytes were ordered. On the same day, thoracic surgery  performed a VATS drainage (2L of bloody fluid), pleural biopsy of a large exophytic mass, doxycycline pleurodesis, and pleurX catheter placement. A frozen sample was highly suggestive of malignancy and her biopsy was sent of for pathology. CT ABD/Pelvis and CT Head were ordered for staging, however imaging was non-revealing. She was placed on heparin 5k sq q8drip on tfloor post-op, which she will remain on upon discharge. On 07/04, long discussion with patient and family with our team regarding the findings from her procedure  and what to expect from here. Further workup in search of a primary tumor diagnosis was done, including  to screen for ovarian cancer as well as a lymphoma panel. Discussions with Vascular Surgery occurred regarding managing her hypercoaguable state, in the setting of high bleeding risk, and a plan was developed to keep her on heparin 5k sq q8, and to place an IVC filter if a DVT develops. Her pain medication was adjusted as well, having Percocet PRN for moderate and Dilaudid PRN for severe. Overnight on 07/04 to the morning of 07/05, patient developed some delirium, complained of having hallucinations and conversations of people who were not there. On 07/05, there was a moderate increase in pleural effusion per CXR. Per Thoracic Surgery, keep the chest tube on suction for now, will re-evaluate for decreased drain output versus a blockage, awaiting their recommendations.    Review of Systems   Constitutional: Positive for unexpected weight change. Negative for chills and fever.   HENT: Negative for trouble swallowing.    Eyes: Negative for visual disturbance.   Respiratory: Positive for shortness of breath and wheezing.    Cardiovascular: Positive for chest pain and palpitations. Negative for leg swelling.   Gastrointestinal: Negative for abdominal distention and abdominal pain.   Endocrine: Positive for polydipsia.   Genitourinary: Negative for dysuria.   Musculoskeletal: Negative for arthralgias.   Skin:        Bruise on L shin   Neurological: Positive for dizziness, syncope, weakness and light-headedness.   Psychiatric/Behavioral: Negative for agitation.     Objective:     Vital Signs (Most Recent):  Temp: 98.6 °F (37 °C) (07/06/19 1210)  Pulse: 95 (07/06/19 1231)  Resp: 17 (07/06/19 1231)  BP: 128/68 (07/06/19 1231)  SpO2: 96 % (07/06/19 1231) Vital Signs (24h Range):  Temp:  [97.7 °F (36.5 °C)-98.6 °F (37 °C)] 98.6 °F (37 °C)  Pulse:  [82-99] 95  Resp:  [10-20] 17  SpO2:  [93 %-97 %] 96 %  BP:  (120-139)/(68-73) 128/68     Weight: 91.8 kg (202 lb 6.1 oz)  Body mass index is 31.7 kg/m².    Physical Exam   Constitutional: She is oriented to person, place, and time. She appears well-developed and well-nourished.   Patient lying in bed in good spirits this morning   HENT:   Head: Normocephalic and atraumatic.   Eyes: EOM are normal.   Neck: Normal range of motion. Neck supple.   Cardiovascular: Normal rate, regular rhythm and normal heart sounds.   No murmur heard.  Pulmonary/Chest: Effort normal.   Patient appears to be breathing without difficulty   Abdominal: She exhibits no distension. There is no tenderness.   Musculoskeletal: Normal range of motion. She exhibits no edema.   -Negative Richter sign bilaterally  -Bruise on L shin  -Bruising on back, localized on on her right lateral side. Dressings in place, appear clear and dry   Neurological: She is alert and oriented to person, place, and time.   Skin: Skin is warm and dry. No rash noted.   Psychiatric: She has a normal mood and affect. Her behavior is normal.       CRANIAL NERVES     CN III, IV, VI   Extraocular motions are normal.           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 Milian with Vascular aware, dicussed placing IVC filter if DVT develops      GINA (acute kidney injury)  -volume resuscitation with blood transfusions and fluids  -urine electrolytes  -strict Is/Os  -avoid nephrotoxic agents  -trend Cr and BUN  -will continue to monitor      Acute blood loss anemia  -Recurrent bloody pleural effusions  -Hemoglobin on admission was 5.7  -Transfused 2 units pRBC, increased to 9.7 on 07/02 and now 9 on 07/03  -daily CBCs, continue to monitor, and transfuse if hemogbloin <7    Pain from hematoma evacuation  -CT revealing R pleural effusion  -OR on 07/03 with Thoracic Surgery for VATS drainage  -Norco 5 for moderate pain  -Dilaudid 1 mg q6 PRN for severe pain  -Tylenol 1g  -Gabapentin 100 mg TID      Drug-induced delirium  -Overnight on 07/05 to morning of 07/06, patient started to have hallucinations and conversations with persons who were not in the room with her.  -Likely drug-induced  -benadryl dc'd, and atarax now being given for severe itching  -Oxycodone dc'd, now on oxycodone  -Gabapentin decreased back down to 100 mg TID      Hypertension, essential  -Patient with PMH of HTN  -Currently holding home antihypertensives in setting of hematoma      Type 2 diabetes mellitus with diabetic cataract, without long-term current use of insulin  -diet controlled per patient  -last A1c 6.2 on 06/05/19  -will continue to monitor    Transaminitis  -trend labs and continue to monitor      VTE Risk Mitigation (From admission, onward)        Ordered     heparin (porcine) injection 5,000 Units  Every 8 hours      07/04/19 0800     Place sequential compression device  Until discontinued      07/02/19 0528            Nabeel Cleveland MD  Department of Hospital Medicine   [facility]

## 2019-07-06 NOTE — NURSING
Notified Dr. Hawkins (IM2) of pt's connection site to container has come out 2x, taped until thoracic comes to assess. Dr. Hawkins to page them again. Will monitor.

## 2019-07-06 NOTE — PLAN OF CARE
Pt AAOx4, VSS, afebrile. Pt maintain SpO2> 92% on 1L NC. Telemetry monitoring continued showing NSR. R CT to -20cm suction, draining scant serosanguinous output. Pt c/o constipation; MD notified and ordered miralax; pt passing minimal gas but no BM yet. Pt c/o acid reflux; GI cocktail ordered with minimal relief noted. Pain controlled with scheduled tylenol and PRN oxycodone. PRN benadryl given x1 with relief noted. Fall risk precautions initiated.  Pt in lowest bed position setting, lighting adjusted, pt to wear nonskid socks when ambulating, side rails up x2.  Pt remain free from falls during shift. Call light within reach. Will continue to monitor.

## 2019-07-06 NOTE — PLAN OF CARE
Problem: Adult Inpatient Plan of Care  Goal: Plan of Care Review  Outcome: Ongoing (interventions implemented as appropriate)  Pt with in and out orientation all day, mostly disoriented to situation. VSS. Pt having visual and auditory hallucinations, including seeing dead people. Pt very lethargic today, team aware. Medication changes made, benadryl d/c, atarax ordered, gabapentin decreased from 200 to 100 TID, oxy to percocet. Upon assessment this AM, pt's CT was not connected to suction. This RN connected CT to suction.   Pt with constipation (no BM since 7/2), KUB ordered - unremarkable, refused enema ordered. Pt did end up having a large BM with no interventions. Delirium precautions in place, per Dr. Hawkins (see prev notes).  & bx pending. CT with 87 mL output. Daughters at bedside all day, bed in low/locked position, non-slip socks on while OOB, call light/personal belongings within reach, TM.

## 2019-07-06 NOTE — ASSESSMENT & PLAN NOTE
Continue chest tube to suction        Please set up with Home health company familiar with PleurX. Company should supply kits. Patient will be discharged with 4 kits for emergency purposes.   [facility]leur[employer] Home Health Instructions     Please drain Pleurx catheter every other day and record the amount of drainage. Please also take note of the color of the drainage. Encourage patient to change positions and cough at least once while draining to get more fluid. Patient can shower and go about normal activities as long as the dressing provided in Pleurx kits in securely intact.  If gauze underneath become wet or dirty, please change.   Notify MD if:   · Patient is draining more than 1,000 mL in one sitting.   · Drainage abruptly stops.  It is normal for drainage to gradually decrease.  · You see purulent drainage coming from the catheter or near the drain exit site.   · The catheter will not flush and appears clogged.  · Any signs of infection at the Pleurx catheter exit site.     Th[facility] Surgery Clinic: [phone]

## 2019-07-07 LAB
ABO + RH BLD: NORMAL
ALBUMIN SERPL BCP-MCNC: 2.1 G/DL (ref 3.5–5.2)
ALP SERPL-CCNC: 130 U/L (ref 55–135)
ALT SERPL W/O P-5'-P-CCNC: 24 U/L (ref 10–44)
ANION GAP SERPL CALC-SCNC: 8 MMOL/L (ref 8–16)
AST SERPL-CCNC: 23 U/L (ref 10–40)
BASOPHILS # BLD AUTO: 0.02 K/UL (ref 0–0.2)
BASOPHILS # BLD AUTO: 0.02 K/UL (ref 0–0.2)
BASOPHILS NFR BLD: 0.1 % (ref 0–1.9)
BASOPHILS NFR BLD: 0.2 % (ref 0–1.9)
BILIRUB SERPL-MCNC: 1 MG/DL (ref 0.1–1)
BLD GP AB SCN CELLS X3 SERPL QL: NORMAL
BLD PROD TYP BPU: NORMAL
BLOOD UNIT EXPIRATION DATE: NORMAL
BLOOD UNIT TYPE CODE: 5100
BLOOD UNIT TYPE: NORMAL
BUN SERPL-MCNC: 22 MG/DL (ref 8–23)
CALCIUM SERPL-MCNC: 8.4 MG/DL (ref 8.7–10.5)
CHLORIDE SERPL-SCNC: 101 MMOL/L (ref 95–110)
CO2 SERPL-SCNC: 27 MMOL/L (ref 23–29)
CODING SYSTEM: NORMAL
CREAT SERPL-MCNC: 1 MG/DL (ref 0.5–1.4)
DIFFERENTIAL METHOD: ABNORMAL
DIFFERENTIAL METHOD: ABNORMAL
DISPENSE STATUS: NORMAL
EOSINOPHIL # BLD AUTO: 0.3 K/UL (ref 0–0.5)
EOSINOPHIL # BLD AUTO: 0.3 K/UL (ref 0–0.5)
EOSINOPHIL NFR BLD: 2.3 % (ref 0–8)
EOSINOPHIL NFR BLD: 3.6 % (ref 0–8)
ERYTHROCYTE [DISTWIDTH] IN BLOOD BY AUTOMATED COUNT: 14.9 % (ref 11.5–14.5)
ERYTHROCYTE [DISTWIDTH] IN BLOOD BY AUTOMATED COUNT: 15.5 % (ref 11.5–14.5)
EST. GFR  (AFRICAN AMERICAN): >60 ML/MIN/1.73 M^2
EST. GFR  (NON AFRICAN AMERICAN): 56.4 ML/MIN/1.73 M^2
GLUCOSE SERPL-MCNC: 110 MG/DL (ref 70–110)
HCT VFR BLD AUTO: 22.4 % (ref 37–48.5)
HCT VFR BLD AUTO: 26.1 % (ref 37–48.5)
HGB BLD-MCNC: 7 G/DL (ref 12–16)
HGB BLD-MCNC: 8.2 G/DL (ref 12–16)
IMM GRANULOCYTES # BLD AUTO: 0.07 K/UL (ref 0–0.04)
IMM GRANULOCYTES # BLD AUTO: 0.09 K/UL (ref 0–0.04)
IMM GRANULOCYTES NFR BLD AUTO: 0.6 % (ref 0–0.5)
IMM GRANULOCYTES NFR BLD AUTO: 0.7 % (ref 0–0.5)
LYMPHOCYTES # BLD AUTO: 1 K/UL (ref 1–4.8)
LYMPHOCYTES # BLD AUTO: 1.3 K/UL (ref 1–4.8)
LYMPHOCYTES NFR BLD: 10.5 % (ref 18–48)
LYMPHOCYTES NFR BLD: 9 % (ref 18–48)
MAGNESIUM SERPL-MCNC: 1.9 MG/DL (ref 1.6–2.6)
MCH RBC QN AUTO: 28.8 PG (ref 27–31)
MCH RBC QN AUTO: 29.2 PG (ref 27–31)
MCHC RBC AUTO-ENTMCNC: 31.3 G/DL (ref 32–36)
MCHC RBC AUTO-ENTMCNC: 31.4 G/DL (ref 32–36)
MCV RBC AUTO: 92 FL (ref 82–98)
MCV RBC AUTO: 93 FL (ref 82–98)
MONOCYTES # BLD AUTO: 0.8 K/UL (ref 0.3–1)
MONOCYTES # BLD AUTO: 0.8 K/UL (ref 0.3–1)
MONOCYTES NFR BLD: 5.5 % (ref 4–15)
MONOCYTES NFR BLD: 8.1 % (ref 4–15)
NEUTROPHILS # BLD AUTO: 11.5 K/UL (ref 1.8–7.7)
NEUTROPHILS # BLD AUTO: 7.3 K/UL (ref 1.8–7.7)
NEUTROPHILS NFR BLD: 76.9 % (ref 38–73)
NEUTROPHILS NFR BLD: 82.5 % (ref 38–73)
NRBC BLD-RTO: 0 /100 WBC
NRBC BLD-RTO: 0 /100 WBC
PHOSPHATE SERPL-MCNC: 2.3 MG/DL (ref 2.7–4.5)
PLATELET # BLD AUTO: 385 K/UL (ref 150–350)
PLATELET # BLD AUTO: 388 K/UL (ref 150–350)
PMV BLD AUTO: 9 FL (ref 9.2–12.9)
PMV BLD AUTO: 9.5 FL (ref 9.2–12.9)
POCT GLUCOSE: 101 MG/DL (ref 70–110)
POCT GLUCOSE: 122 MG/DL (ref 70–110)
POCT GLUCOSE: 130 MG/DL (ref 70–110)
POCT GLUCOSE: 92 MG/DL (ref 70–110)
POTASSIUM SERPL-SCNC: 4.1 MMOL/L (ref 3.5–5.1)
PROT SERPL-MCNC: 5.2 G/DL (ref 6–8.4)
RBC # BLD AUTO: 2.43 M/UL (ref 4–5.4)
RBC # BLD AUTO: 2.81 M/UL (ref 4–5.4)
SODIUM SERPL-SCNC: 136 MMOL/L (ref 136–145)
TRANS ERYTHROCYTES VOL PATIENT: NORMAL ML
WBC # BLD AUTO: 13.99 K/UL (ref 3.9–12.7)
WBC # BLD AUTO: 9.5 K/UL (ref 3.9–12.7)

## 2019-07-07 PROCEDURE — 63600175 PHARM REV CODE 636 W HCPCS: Performed by: STUDENT IN AN ORGANIZED HEALTH CARE EDUCATION/TRAINING PROGRAM

## 2019-07-07 PROCEDURE — 99233 PR SUBSEQUENT HOSPITAL CARE,LEVL III: ICD-10-PCS | Mod: GC,,, | Performed by: HOSPITALIST

## 2019-07-07 PROCEDURE — 36430 TRANSFUSION BLD/BLD COMPNT: CPT

## 2019-07-07 PROCEDURE — 97116 GAIT TRAINING THERAPY: CPT

## 2019-07-07 PROCEDURE — P9021 RED BLOOD CELLS UNIT: HCPCS

## 2019-07-07 PROCEDURE — 80053 COMPREHEN METABOLIC PANEL: CPT

## 2019-07-07 PROCEDURE — 25000003 PHARM REV CODE 250: Performed by: STUDENT IN AN ORGANIZED HEALTH CARE EDUCATION/TRAINING PROGRAM

## 2019-07-07 PROCEDURE — 97161 PT EVAL LOW COMPLEX 20 MIN: CPT

## 2019-07-07 PROCEDURE — 83735 ASSAY OF MAGNESIUM: CPT

## 2019-07-07 PROCEDURE — 25000003 PHARM REV CODE 250: Performed by: HOSPITALIST

## 2019-07-07 PROCEDURE — 36415 COLL VENOUS BLD VENIPUNCTURE: CPT

## 2019-07-07 PROCEDURE — 84100 ASSAY OF PHOSPHORUS: CPT

## 2019-07-07 PROCEDURE — 86850 RBC ANTIBODY SCREEN: CPT

## 2019-07-07 PROCEDURE — 99233 SBSQ HOSP IP/OBS HIGH 50: CPT | Mod: GC,,, | Performed by: HOSPITALIST

## 2019-07-07 PROCEDURE — 85025 COMPLETE CBC W/AUTO DIFF WBC: CPT

## 2019-07-07 PROCEDURE — 20600001 HC STEP DOWN PRIVATE ROOM

## 2019-07-07 PROCEDURE — 86920 COMPATIBILITY TEST SPIN: CPT

## 2019-07-07 RX ORDER — HYDROCODONE BITARTRATE AND ACETAMINOPHEN 500; 5 MG/1; MG/1
TABLET ORAL
Status: DISCONTINUED | OUTPATIENT
Start: 2019-07-07 | End: 2019-07-08 | Stop reason: HOSPADM

## 2019-07-07 RX ADMIN — DULOXETINE HYDROCHLORIDE 60 MG: 20 CAPSULE, DELAYED RELEASE ORAL at 08:07

## 2019-07-07 RX ADMIN — GABAPENTIN 100 MG: 100 CAPSULE ORAL at 08:07

## 2019-07-07 RX ADMIN — HEPARIN SODIUM 5000 UNITS: 5000 INJECTION, SOLUTION INTRAVENOUS; SUBCUTANEOUS at 02:07

## 2019-07-07 RX ADMIN — GABAPENTIN 100 MG: 100 CAPSULE ORAL at 02:07

## 2019-07-07 RX ADMIN — GABAPENTIN 100 MG: 100 CAPSULE ORAL at 09:07

## 2019-07-07 RX ADMIN — HEPARIN SODIUM 5000 UNITS: 5000 INJECTION, SOLUTION INTRAVENOUS; SUBCUTANEOUS at 05:07

## 2019-07-07 RX ADMIN — ATORVASTATIN CALCIUM 10 MG: 10 TABLET, FILM COATED ORAL at 08:07

## 2019-07-07 RX ADMIN — HEPARIN SODIUM 5000 UNITS: 5000 INJECTION, SOLUTION INTRAVENOUS; SUBCUTANEOUS at 09:07

## 2019-07-07 RX ADMIN — ACETAMINOPHEN 1000 MG: 500 TABLET ORAL at 09:07

## 2019-07-07 RX ADMIN — ACETAMINOPHEN 1000 MG: 500 TABLET ORAL at 12:07

## 2019-07-07 RX ADMIN — SENNOSIDES 8.6 MG: 8.6 TABLET, FILM COATED ORAL at 08:07

## 2019-07-07 NOTE — PLAN OF CARE
"Problem: Adult Inpatient Plan of Care  Goal: Plan of Care Review  Outcome: Ongoing (interventions implemented as appropriate)  Pt AAOx4, VSS. Pt started with visual and auditory hallucinations around 1615, after being hallucination free all day, especially when falling asleep. IM2 notified and aware of the abrupt changes. Daughters at bedside, daughters questioning if its possible to get a MRI of brain ordered, to rule out mets to the brain - stating their mother has never experienced delirium or hallucinations. Also requesting a u/a be performed to r/o UTI. H/H 7.0/22.4, 1U PRBC given. L f/a 22g placed, saline locked. Pt did have small BM today. The enema ordered yesterday pt had declined. Pt has agreed to have enema administered tony, as she "does not feel well right now". Pt's daughters told her she doesn't have a choice tomorrow and that she needs to have it done. CBC ordered. Pt CT output only 3 mL, still serosanguineous. Bed in low/locked position, call light/personal belongings within reach, non-slip socks on when ambulating (with walker), WCTM.      "

## 2019-07-07 NOTE — PROGRESS NOTES
[facility]  General Surgery  Progress Note    Subjective:         Post-Op Info:  Procedure(s) (LRB):  VATS, WITH PLEURA BIOPSY (Right)  VATS, WITH PLEURODESIS (Right)  VATS, WITH CHEST TUBE INSERTION FOR DRAINAGE OF PLEURAL EFFUSION (Right)   4 Days Post-Op     Interval History: NAEON. VSS Pleurex cath in place with 141cc out over 24hrs. Good UOP.  Medications:  Continuous Infusions:  Scheduled Meds:   acetaminophen  1,000 mg Oral TID    atorvastatin  10 mg Oral Daily    DULoxetine  60 mg Oral Daily    gabapentin  100 mg Oral TID    heparin (porcine)  5,000 Units Subcutaneous Q8H    senna  8.6 mg Oral Daily     PRN Meds:sodium chloride, sodium chloride, Dextrose 10% Bolus, Dextrose 10% Bolus, glucagon (human recombinant), glucose, glucose, HYDROcodone-acetaminophen, HYDROmorphone, hydrOXYzine HCl, insulin aspart U-100, lactulose, naloxone, ondansetron, polyethylene glycol, promethazine (PHENERGAN) IVPB, ramelteon, sodium chloride 0.9%     Review of patient's allergies indicates:   Allergen Reactions    Ciprofloxacin Anaphylaxis    Fructose     Gluten protein Other (See Comments)     GI upset  GI upset    Lactase Other (See Comments)     GI upset  GI upset    Latex, natural rubber Rash     Objective:     Vital Signs (Most Recent):  Temp: 98.6 °F (37 °C) (07/07/19 0756)  Pulse: 94 (07/07/19 0756)  Resp: 14 (07/07/19 0756)  BP: 125/74 (07/07/19 0756)  SpO2: 98 % (07/07/19 0756) Vital Signs (24h Range):  Temp:  [98.1 °F (36.7 °C)-98.6 °F (37 °C)] 98.6 °F (37 °C)  Pulse:  [84-99] 94  Resp:  [10-17] 14  SpO2:  [96 %-99 %] 98 %  BP: (125-146)/(59-74) 125/74     Intake/Output - Last 3 Shifts       07/05 0700 - 07/06 0659 07/06 0700 - 07/07 0659 07/07 0700 - 07/08 0659    P.O. 1130 740     Total Intake(mL/kg) 1130 (12.3) 740 (8)     Urine (mL/kg/hr) 1100 (0.5) 1300 (0.6)     Emesis/NG output 0 0     Other 0      Stool 0 0     Blood 0 0     Chest Tube 8 141     Total Output 1108 1441     Net +22  -701            Urine Occurrence 1 x 2 x     Stool Occurrence 0 x 2 x     Emesis Occurrence 0 x            SpO2: 98 %  O2 Device (Oxygen Therapy): nasal cannula    Physical Exam   Constitutional: She is oriented to person, place, and time. She appears well-developed and well-nourished. No distress.   Cardiovascular: Normal rate and regular rhythm.   Pulmonary/Chest: Effort normal. No respiratory distress.   pleurX in place, connected to suction    Abdominal: Soft. She exhibits no distension. There is no tenderness.   Neurological: She is alert and oriented to person, place, and time.   Skin: Skin is warm and dry.   Psychiatric: She has a normal mood and affect.       Significant Labs:  BMP:   Recent Labs   Lab 07/07/19  0635         K 4.1      CO2 27   BUN 22   CREATININE 1.0   CALCIUM 8.4*   MG 1.9     CBC:   Recent Labs   Lab 07/07/19  0635   WBC 9.50   RBC 2.43*   HGB 7.0*   HCT 22.4*   *   MCV 92   MCH 28.8   MCHC 31.3*       Significant Diagnostics:  CXR: I have reviewed all pertinent results/findings within the past 24 hours    VTE Risk Mitigation (From admission, onward)        Ordered     heparin (porcine) injection 5,000 Units  Every 8 hours      07/04/19 0800     Place sequential compression device  Until discontinued      07/02/19 0528        Assessment/Plan:     * Recurrent pleural effusion on right, bloody, likely 2/2 malignancy  Continue Pleurex to suction. Pending home health setup prior to discharge.        Please set up with Home health company familiar with PleurX. Company should supply kits. Patient will be discharged with 4 kits for emergency purposes.   Pleurx Home Health Instructions     Please drain Pleurx catheter every other day and record the amount of drainage. Please also take note of the color of the drainage. Encourage patient to change positions and cough at least once while draining to get more fluid. Patient can shower and go about normal activities as long  as the dressing provided in Pleurx kits in securely intact.  If gauze underneath become wet or dirty, please change.   Notify MD if:   · Patient is draining more than 1,000 mL in one sitting.   · Drainage abruptly stops.  It is normal for drainage to gradually decrease.  · You see purulent drainage coming from the catheter or near the drain exit site.   · The catheter will not flush and appears clogged.  · Any signs of infection at the Pleurx catheter exit site.     [facility]: [phone]              Melba Archibald MD  General Surgery  [facility]

## 2019-07-07 NOTE — ASSESSMENT & PLAN NOTE
Continue Pleurex to suction. Pending home health setup prior to discharge.        Please set up with Home health company familiar with PleurX. Company should supply kits. Patient will be discharged with 4 kits for emergency purposes.   Pleurx Home Health Instructions     Please drain Pleurx catheter every other day and record the amount of drainage. Please also take note of the color of the drainage. Encourage patient to change positions and cough at least once while draining to get more fluid. Patient can shower and go about normal activities as long as the dressing provided in Pleurx kits in securely intact.  If gauze underneath become wet or dirty, please change.   Notify MD if:   · Patient is draining more than 1,000 mL in one sitting.   · Drainage abruptly stops.  It is normal for drainage to gradually decrease.  · You see purulent drainage coming from the catheter or near the drain exit site.   · The catheter will not flush and appears clogged.  · Any signs of infection at the Pleurx catheter exit site.     [facility]: [phone]

## 2019-07-07 NOTE — PLAN OF CARE
Pt disoriented x situation with visual hallucinations intermittently. Pt with moments of clarity overnight. VSS, afebrile. Pt maintain SpO2> 92% on 1L NC. Telemetry monitoring continued showing NSR. R CT to -20cm suction, draining serosanguinous output. BG WNL, no SSI required. Pain controlled with scheduled tylenol. Fall risk precautions initiated.  Pt in lowest bed position setting, lighting adjusted, pt to wear nonskid socks when ambulating, side rails up x2.  Pt remain free from falls during shift. Bed alarm set. Call light within reach. Will continue to monitor.

## 2019-07-07 NOTE — PLAN OF CARE
Problem: Physical Therapy Goal  Goal: Physical Therapy Goal  Goals to be met by: 19     Patient will increase functional independence with mobility by performin. Supine to sit with Contact Guard Assistance.  2. Sit to supine with Contact Guard Assistance.  3. Sit to stand transfer with Modified [place].  4. Bed to chair transfer with Modified [place] using Rolling Walker.  5. Gait  x 150 feet with Modified [place] using Rolling Walker.   6. Ascend/Descend 6 inch curb step with Modified [place] using Rolling Walker.  7. Lower extremity exercise program x 20 reps per handout, with assistance as needed.    Outcome: Ongoing (interventions implemented as appropriate)  PT goals established.

## 2019-07-07 NOTE — ASSESSMENT & PLAN NOTE
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 Milian with [facility], dicussed placing IVC filter if DVT develops  -will talk with Heme/Onc to determine if patient can be discharged on home lovenox

## 2019-07-07 NOTE — PROGRESS NOTES
[facility] Hospital Medicine  Progress Note    Patient Name: Isabell Preston  MRN: [id number]  Patient Class: IP- Inpatient   Admission Date: 7/2/2019  Length of Stay: 5 days  Attending Physician: Jennifer Pack MD  Primary Care Provider: Ayo Archuleta MD    [facility] Hospital Medicine Team: [facility] Nabeel Cleveland MD    Subjective:     Principal Problem:Recurrent pleural effusion on right      HPI:  Ms. Preston is a 72 yo AAF who presented to the ED with increasing back pain (Right sided) and SOB. 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 The patient had recently been admitted to the hospital on 6/29/19 for similar symptoms including back pain and SOB where it was determined that she had a pleural effusion and a thoracentesis was preformed producing 2L of bloody fluid. She was then discharged home on 6/30/19 on a heparin bridge to coumadin, but returned to the ED around 2300 on 7/1/19 for worsening back pain (Right sided) and SOB. CT shows the formation of a hematoma at the site of the thoracentesis and a reaccumulation of the pleural effusion. She was originally admitted by Cardiothoracic Surgery and medicine was consulted. Her labs were concerning for a low Hgb (5.6), leukocytosis (15k) and increased Cr (1.9), and the patient was admitted to our service for GINA and hematoma s/p thoracentesis.     Of note, the patient has also had a 10 month history of a weight loss of 35 pounds. She has also reported episodes of dizziness and palpitations, which sound presyncopal in nature, which last for 30 seconds and are relieved by sitting down and placing a cold towel on her body. During this period, she has also complained of early satiety, decreased PO intake, and decreased appetite. Patient reports that her health care  maintenance is up-to-date with mammogram and colonoscopy screenings and follows up regularly with her PCP, Ayo Archuleta MD. Patient also reports a family history of clotting disorders, with a maternal aunt who had recurrent DVTs as well as her mom who had a DVT.    She has a 8 year history of asbestos exposure while working in [place] as a , and while being around her father, who worked for the [employer] for 20 years. Per patient's daughter, she also is a hoarder, who's home may be inhabitable.    Overview/Hospital Course:  Patient presented to ED on 6/29/19 with back pain and SOB. Pleural effusion was found and thoracentesis was preformed. Patient was discharged home on 6/30/19. Patient returned to ED with worsening back pain and SOB around on 07/01/19. CT showed formation of hematoma at thoracentesis site (Right side) and reaccumulation of pleural effusion. On 07/03/19, the patient was moved to the floor and consulted to the medicine team.. She was started on continous LR for her worsening GINA and received 2 units of blood on the floor. Her hemglobin improved following 2 units pRBC from 5.7 to 9.7. She was then transferred to the medicine team as primary and Thoracic Surgery was following as consultants. On 07/03, her GINA worsened from Cr 1.9 to Cr 2.7, and she was given IVF, placed on strict Is/Os, and urine electrolytes were ordered. On the same day, thoracic surgery  performed a VATS drainage (2L of bloody fluid), pleural biopsy of a large exophytic mass, doxycycline pleurodesis, and pleurX catheter placement. A frozen sample was highly suggestive of malignancy and her biopsy was sent of for pathology. CT ABD/Pelvis and CT Head were ordered for staging, however imaging was non-revealing. She was placed on heparin 5k sq q8drip on tfloor post-op, which she will remain on upon discharge. On 07/04, long discussion with patient and family with our team regarding the findings from her procedure  and what to expect from here. Further workup in search of a primary tumor diagnosis was done, including  to screen for ovarian cancer as well as a lymphoma panel. Discussions with Vascular Surgery occurred regarding managing her hypercoaguable state, in the setting of high bleeding risk, and a plan was developed to keep her on heparin 5k sq q8, and to place an IVC filter if a DVT develops. Her pain medication was adjusted as well, having Percocet PRN for moderate and Dilaudid PRN for severe. Overnight on 07/04 to the morning of 07/05, patient developed some delirium, complained of having hallucinations and conversations of people who were not there. On 07/06, her medications were adjusted due to her delirium. On 07/07, patient no longer having episodes of delirium. Her hemoglobin dropped to 7, and she was transfused 1 unit pRBC. Thoracic Surgery has recommended that she can go home with home health, as long as that home health is familiar with PleurX catheters.     Review of Systems   Constitutional: Positive for unexpected weight change. Negative for chills and fever.   HENT: Negative for trouble swallowing.    Eyes: Negative for visual disturbance.   Respiratory: Positive for shortness of breath and wheezing.    Cardiovascular: Positive for chest pain and palpitations. Negative for leg swelling.   Gastrointestinal: Negative for abdominal distention and abdominal pain.   Endocrine: Positive for polydipsia.   Genitourinary: Negative for dysuria.   Musculoskeletal: Negative for arthralgias.   Skin:        Bruise on L shin   Neurological: Positive for dizziness, syncope, weakness and light-headedness.   Psychiatric/Behavioral: Negative for agitation.     Objective:     Vital Signs (Most Recent):  Temp: 98.6 °F (37 °C) (07/07/19 1241)  Pulse: 97 (07/07/19 1241)  Resp: 16 (07/07/19 1241)  BP: 136/81 (07/07/19 1215)  SpO2: 97 % (07/07/19 1241) Vital Signs (24h Range):  Temp:  [98.1 °F (36.7 °C)-98.6 °F (37 °C)] 98.6  °F (37 °C)  Pulse:  [] 97  Resp:  [10-16] 16  SpO2:  [97 %-99 %] 97 %  BP: (125-146)/(59-81) 136/81     Weight: 92 kg (202 lb 13.2 oz)  Body mass index is 31.77 kg/m².    Physical Exam   Constitutional: She is oriented to person, place, and time. She appears well-developed and well-nourished.   Patient lying in bed in good spirits this morning   HENT:   Head: Normocephalic and atraumatic.   Eyes: EOM are normal.   Neck: Normal range of motion. Neck supple.   Cardiovascular: Normal rate, regular rhythm and normal heart sounds.   No murmur heard.  Pulmonary/Chest: Effort normal.   Patient appears to be breathing without difficulty   Abdominal: She exhibits no distension. There is no tenderness.   Musculoskeletal: Normal range of motion. She exhibits no edema.   -Negative Richter sign bilaterally  -Bruise on L shin  -Bruising on back, localized on on her right lateral side. Dressings in place, appear clear and dry   Neurological: She is alert and oriented to person, place, and time.   Skin: Skin is warm and dry. No rash noted.   Psychiatric: She has a normal mood and affect. Her behavior is normal.       CRANIAL NERVES     CN III, IV, VI   Extraocular motions are normal.       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 Milian with [facility], dicussed placing IVC filter if DVT develops  -will talk with Heme/Onc to determine if patient can be discharged on home lovenox      GINA (acute kidney injury)  -volume resuscitation with blood transfusions and fluids  -urine electrolytes  -strict Is/Os  -avoid nephrotoxic agents  -trend Cr and BUN  -will continue to monitor      Acute blood loss anemia  -Recurrent bloody pleural effusions  -Hemoglobin on admission was 5.7  -Transfused 2 units pRBC, increased to 9.7 on 07/02 and now 9 on 07/03  -07/07, hemoglobin of 7 on morning labs.  -Transfused 1 unit pRBC  -daily CBCs, continue to monitor, and transfuse if hemogbloin <7    Pain from hematoma evacuation  -CT revealing R pleural effusion  -OR on 07/03 with Thoracic Surgery for VATS drainage  -Norco 5 for moderate pain  -Dilaudid 1 mg q6 PRN for severe pain  -Tylenol 1g  -Gabapentin 100 mg TID      Drug-induced delirium  -Overnight on 07/05 to morning of 07/06, patient started to have hallucinations and conversations with persons who were not in the room with her.  -Likely drug-induced  -benadryl dc'd, and atarax now being given for severe itching  -Oxycodone dc'd, now on oxycodone  -Gabapentin decreased back down to 100 mg TID      Hypertension, essential  -Patient with PMH of HTN  -Currently holding home antihypertensives in setting of hematoma      Type 2 diabetes mellitus with diabetic cataract, without long-term current use of insulin  -diet controlled per patient  -last A1c 6.2 on 06/05/19  -will continue to monitor    Transaminitis  -trend labs and continue to monitor        VTE Risk Mitigation (From admission, onward)        Ordered     heparin (porcine) injection 5,000 Units  Every 8 hours      07/04/19 0800     Place sequential compression device  Until discontinued      07/02/19 2559                 Nabeel Cleveland MD  Department of Hospital Medicine   [facility]

## 2019-07-07 NOTE — SUBJECTIVE & OBJECTIVE
Review of Systems   Constitutional: Positive for unexpected weight change. Negative for chills and fever.   HENT: Negative for trouble swallowing.    Eyes: Negative for visual disturbance.   Respiratory: Positive for shortness of breath and wheezing.    Cardiovascular: Positive for chest pain and palpitations. Negative for leg swelling.   Gastrointestinal: Negative for abdominal distention and abdominal pain.   Endocrine: Positive for polydipsia.   Genitourinary: Negative for dysuria.   Musculoskeletal: Negative for arthralgias.   Skin:        Bruise on L shin   Neurological: Positive for dizziness, syncope, weakness and light-headedness.   Psychiatric/Behavioral: Negative for agitation.     Objective:     Vital Signs (Most Recent):  Temp: 98.6 °F (37 °C) (07/07/19 1241)  Pulse: 97 (07/07/19 1241)  Resp: 16 (07/07/19 1241)  BP: 136/81 (07/07/19 1215)  SpO2: 97 % (07/07/19 1241) Vital Signs (24h Range):  Temp:  [98.1 °F (36.7 °C)-98.6 °F (37 °C)] 98.6 °F (37 °C)  Pulse:  [] 97  Resp:  [10-16] 16  SpO2:  [97 %-99 %] 97 %  BP: (125-146)/(59-81) 136/81     Weight: 92 kg (202 lb 13.2 oz)  Body mass index is 31.77 kg/m².    Physical Exam   Constitutional: She is oriented to person, place, and time. She appears well-developed and well-nourished.   Patient lying in bed in good spirits this morning   HENT:   Head: Normocephalic and atraumatic.   Eyes: EOM are normal.   Neck: Normal range of motion. Neck supple.   Cardiovascular: Normal rate, regular rhythm and normal heart sounds.   No murmur heard.  Pulmonary/Chest: Effort normal.   Patient appears to be breathing without difficulty   Abdominal: She exhibits no distension. There is no tenderness.   Musculoskeletal: Normal range of motion. She exhibits no edema.   -Negative Richter sign bilaterally  -Bruise on L shin  -Bruising on back, localized on on her right lateral side. Dressings in place, appear clear and dry   Neurological: She is alert and oriented to person,  place, and time.   Skin: Skin is warm and dry. No rash noted.   Psychiatric: She has a normal mood and affect. Her behavior is normal.       CRANIAL NERVES     CN III, IV, VI   Extraocular motions are normal.

## 2019-07-07 NOTE — SUBJECTIVE & OBJECTIVE
Interval History: NAEON. VSS Pleurex cath in place with 141cc out over 24hrs. Good UOP.  Medications:  Continuous Infusions:  Scheduled Meds:   acetaminophen  1,000 mg Oral TID    atorvastatin  10 mg Oral Daily    DULoxetine  60 mg Oral Daily    gabapentin  100 mg Oral TID    heparin (porcine)  5,000 Units Subcutaneous Q8H    senna  8.6 mg Oral Daily     PRN Meds:sodium chloride, sodium chloride, Dextrose 10% Bolus, Dextrose 10% Bolus, glucagon (human recombinant), glucose, glucose, HYDROcodone-acetaminophen, HYDROmorphone, hydrOXYzine HCl, insulin aspart U-100, lactulose, naloxone, ondansetron, polyethylene glycol, promethazine (PHENERGAN) IVPB, ramelteon, sodium chloride 0.9%     Review of patient's allergies indicates:   Allergen Reactions    Ciprofloxacin Anaphylaxis    Fructose     Gluten protein Other (See Comments)     GI upset  GI upset    Lactase Other (See Comments)     GI upset  GI upset    Latex, natural rubber Rash     Objective:     Vital Signs (Most Recent):  Temp: 98.6 °F (37 °C) (07/07/19 0756)  Pulse: 94 (07/07/19 0756)  Resp: 14 (07/07/19 0756)  BP: 125/74 (07/07/19 0756)  SpO2: 98 % (07/07/19 0756) Vital Signs (24h Range):  Temp:  [98.1 °F (36.7 °C)-98.6 °F (37 °C)] 98.6 °F (37 °C)  Pulse:  [84-99] 94  Resp:  [10-17] 14  SpO2:  [96 %-99 %] 98 %  BP: (125-146)/(59-74) 125/74     Intake/Output - Last 3 Shifts       07/05 0700 - 07/06 0659 07/06 0700 - 07/07 0659 07/07 0700 - 07/08 0659    P.O. 1130 740     Total Intake(mL/kg) 1130 (12.3) 740 (8)     Urine (mL/kg/hr) 1100 (0.5) 1300 (0.6)     Emesis/NG output 0 0     Other 0      Stool 0 0     Blood 0 0     Chest Tube 8 141     Total Output 1108 1441     Net +22 -701            Urine Occurrence 1 x 2 x     Stool Occurrence 0 x 2 x     Emesis Occurrence 0 x            SpO2: 98 %  O2 Device (Oxygen Therapy): nasal cannula    Physical Exam   Constitutional: She is oriented to person, place, and time. She appears well-developed and  well-nourished. No distress.   Cardiovascular: Normal rate and regular rhythm.   Pulmonary/Chest: Effort normal. No respiratory distress.   pleurX in place, connected to suction    Abdominal: Soft. She exhibits no distension. There is no tenderness.   Neurological: She is alert and oriented to person, place, and time.   Skin: Skin is warm and dry.   Psychiatric: She has a normal mood and affect.       Significant Labs:  BMP:   Recent Labs   Lab 07/07/19  0635         K 4.1      CO2 27   BUN 22   CREATININE 1.0   CALCIUM 8.4*   MG 1.9     CBC:   Recent Labs   Lab 07/07/19  0635   WBC 9.50   RBC 2.43*   HGB 7.0*   HCT 22.4*   *   MCV 92   MCH 28.8   MCHC 31.3*       Significant Diagnostics:  CXR: I have reviewed all pertinent results/findings within the past 24 hours    VTE Risk Mitigation (From admission, onward)        Ordered     heparin (porcine) injection 5,000 Units  Every 8 hours      07/04/19 0800     Place sequential compression device  Until discontinued      07/02/19 9085

## 2019-07-07 NOTE — ASSESSMENT & PLAN NOTE
-Recurrent bloody pleural effusions  -Hemoglobin on admission was 5.7  -Transfused 2 units pRBC, increased to 9.7 on 07/02 and now 9 on 07/03  -07/07, hemoglobin of 7 on morning labs.  -Transfused 1 unit pRBC  -daily CBCs, continue to monitor, and transfuse if hemogbloin <7

## 2019-07-07 NOTE — NURSING
PRBCs resumed, spoke with blood bank and was instructed to re-spike the bag. PT tolerating well. Will monitor.
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
## 2019-07-08 VITALS
HEART RATE: 94 BPM | SYSTOLIC BLOOD PRESSURE: 123 MMHG | TEMPERATURE: 98 F | BODY MASS INDEX: 32.42 KG/M2 | OXYGEN SATURATION: 94 % | DIASTOLIC BLOOD PRESSURE: 71 MMHG | HEIGHT: 67 IN | RESPIRATION RATE: 14 BRPM | WEIGHT: 206.56 LBS

## 2019-07-08 DIAGNOSIS — C34.90 LUNG CANCER: Primary | ICD-10-CM

## 2019-07-08 LAB
ALBUMIN SERPL BCP-MCNC: 2.1 G/DL (ref 3.5–5.2)
ALP SERPL-CCNC: 132 U/L (ref 55–135)
ALT SERPL W/O P-5'-P-CCNC: 25 U/L (ref 10–44)
ANION GAP SERPL CALC-SCNC: 6 MMOL/L (ref 8–16)
AST SERPL-CCNC: 26 U/L (ref 10–40)
BASOPHILS # BLD AUTO: 0.02 K/UL (ref 0–0.2)
BASOPHILS NFR BLD: 0.2 % (ref 0–1.9)
BILIRUB SERPL-MCNC: 0.9 MG/DL (ref 0.1–1)
BILIRUB UR QL STRIP: NEGATIVE
BUN SERPL-MCNC: 14 MG/DL (ref 8–23)
CALCIUM SERPL-MCNC: 8.5 MG/DL (ref 8.7–10.5)
CHLORIDE SERPL-SCNC: 101 MMOL/L (ref 95–110)
CLARITY UR REFRACT.AUTO: CLEAR
CO2 SERPL-SCNC: 31 MMOL/L (ref 23–29)
COLOR UR AUTO: YELLOW
CREAT SERPL-MCNC: 0.9 MG/DL (ref 0.5–1.4)
DIFFERENTIAL METHOD: ABNORMAL
EOSINOPHIL # BLD AUTO: 0.4 K/UL (ref 0–0.5)
EOSINOPHIL NFR BLD: 3.6 % (ref 0–8)
ERYTHROCYTE [DISTWIDTH] IN BLOOD BY AUTOMATED COUNT: 15.4 % (ref 11.5–14.5)
EST. GFR  (AFRICAN AMERICAN): >60 ML/MIN/1.73 M^2
EST. GFR  (NON AFRICAN AMERICAN): >60 ML/MIN/1.73 M^2
FUNGUS SPEC CULT: NORMAL
GLUCOSE SERPL-MCNC: 108 MG/DL (ref 70–110)
GLUCOSE UR QL STRIP: ABNORMAL
HCT VFR BLD AUTO: 25.3 % (ref 37–48.5)
HGB BLD-MCNC: 8.3 G/DL (ref 12–16)
HGB UR QL STRIP: NEGATIVE
IMM GRANULOCYTES # BLD AUTO: 0.07 K/UL (ref 0–0.04)
IMM GRANULOCYTES NFR BLD AUTO: 0.7 % (ref 0–0.5)
KETONES UR QL STRIP: NEGATIVE
LEUKOCYTE ESTERASE UR QL STRIP: NEGATIVE
LYMPHOCYTES # BLD AUTO: 1.2 K/UL (ref 1–4.8)
LYMPHOCYTES NFR BLD: 12.2 % (ref 18–48)
MAGNESIUM SERPL-MCNC: 1.7 MG/DL (ref 1.6–2.6)
MCH RBC QN AUTO: 29.7 PG (ref 27–31)
MCHC RBC AUTO-ENTMCNC: 32.8 G/DL (ref 32–36)
MCV RBC AUTO: 91 FL (ref 82–98)
MONOCYTES # BLD AUTO: 0.8 K/UL (ref 0.3–1)
MONOCYTES NFR BLD: 8.4 % (ref 4–15)
NEUTROPHILS # BLD AUTO: 7.2 K/UL (ref 1.8–7.7)
NEUTROPHILS NFR BLD: 74.9 % (ref 38–73)
NITRITE UR QL STRIP: NEGATIVE
NRBC BLD-RTO: 0 /100 WBC
PH UR STRIP: 6 [PH] (ref 5–8)
PHOSPHATE SERPL-MCNC: 1.9 MG/DL (ref 2.7–4.5)
PLATELET # BLD AUTO: 422 K/UL (ref 150–350)
PMV BLD AUTO: 9.3 FL (ref 9.2–12.9)
POCT GLUCOSE: 104 MG/DL (ref 70–110)
POCT GLUCOSE: 137 MG/DL (ref 70–110)
POTASSIUM SERPL-SCNC: 4.1 MMOL/L (ref 3.5–5.1)
PROT SERPL-MCNC: 5.4 G/DL (ref 6–8.4)
PROT UR QL STRIP: NEGATIVE
RBC # BLD AUTO: 2.79 M/UL (ref 4–5.4)
SODIUM SERPL-SCNC: 138 MMOL/L (ref 136–145)
SP GR UR STRIP: 1.01 (ref 1–1.03)
URN SPEC COLLECT METH UR: ABNORMAL
WBC # BLD AUTO: 9.65 K/UL (ref 3.9–12.7)

## 2019-07-08 PROCEDURE — 25000003 PHARM REV CODE 250: Performed by: STUDENT IN AN ORGANIZED HEALTH CARE EDUCATION/TRAINING PROGRAM

## 2019-07-08 PROCEDURE — 36415 COLL VENOUS BLD VENIPUNCTURE: CPT

## 2019-07-08 PROCEDURE — 81003 URINALYSIS AUTO W/O SCOPE: CPT

## 2019-07-08 PROCEDURE — 97535 SELF CARE MNGMENT TRAINING: CPT

## 2019-07-08 PROCEDURE — 85025 COMPLETE CBC W/AUTO DIFF WBC: CPT

## 2019-07-08 PROCEDURE — 99238 HOSP IP/OBS DSCHRG MGMT 30/<: CPT | Mod: GC,,, | Performed by: HOSPITALIST

## 2019-07-08 PROCEDURE — 83735 ASSAY OF MAGNESIUM: CPT

## 2019-07-08 PROCEDURE — 97165 OT EVAL LOW COMPLEX 30 MIN: CPT

## 2019-07-08 PROCEDURE — A9585 GADOBUTROL INJECTION: HCPCS | Performed by: HOSPITALIST

## 2019-07-08 PROCEDURE — 80053 COMPREHEN METABOLIC PANEL: CPT

## 2019-07-08 PROCEDURE — 63600175 PHARM REV CODE 636 W HCPCS: Performed by: STUDENT IN AN ORGANIZED HEALTH CARE EDUCATION/TRAINING PROGRAM

## 2019-07-08 PROCEDURE — 25500020 PHARM REV CODE 255: Performed by: HOSPITALIST

## 2019-07-08 PROCEDURE — 84100 ASSAY OF PHOSPHORUS: CPT

## 2019-07-08 PROCEDURE — 99238 PR HOSPITAL DISCHARGE DAY,<30 MIN: ICD-10-PCS | Mod: GC,,, | Performed by: HOSPITALIST

## 2019-07-08 PROCEDURE — 25000003 PHARM REV CODE 250: Performed by: HOSPITALIST

## 2019-07-08 RX ORDER — SENNOSIDES 8.6 MG/1
1 TABLET ORAL DAILY
Status: ON HOLD | COMMUNITY
Start: 2019-07-09 | End: 2019-08-05 | Stop reason: CLARIF

## 2019-07-08 RX ORDER — SODIUM,POTASSIUM PHOSPHATES 280-250MG
2 POWDER IN PACKET (EA) ORAL ONCE
Status: COMPLETED | OUTPATIENT
Start: 2019-07-08 | End: 2019-07-08

## 2019-07-08 RX ORDER — ONDANSETRON 8 MG/1
8 TABLET, ORALLY DISINTEGRATING ORAL EVERY 8 HOURS PRN
Qty: 30 TABLET | Refills: 0 | Status: SHIPPED | OUTPATIENT
Start: 2019-07-08 | End: 2019-08-08 | Stop reason: SDUPTHER

## 2019-07-08 RX ORDER — MIDAZOLAM HYDROCHLORIDE 1 MG/ML
2 INJECTION INTRAMUSCULAR; INTRAVENOUS
Status: DISCONTINUED | OUTPATIENT
Start: 2019-07-08 | End: 2019-07-08 | Stop reason: HOSPADM

## 2019-07-08 RX ORDER — ENOXAPARIN SODIUM 100 MG/ML
40 INJECTION SUBCUTANEOUS DAILY
Qty: 12 ML | Refills: 1 | Status: SHIPPED | OUTPATIENT
Start: 2019-07-08 | End: 2019-08-08

## 2019-07-08 RX ORDER — HYDROXYZINE HYDROCHLORIDE 10 MG/1
10 TABLET, FILM COATED ORAL 3 TIMES DAILY PRN
Qty: 30 TABLET | Refills: 0 | Status: SHIPPED | OUTPATIENT
Start: 2019-07-08 | End: 2019-07-22 | Stop reason: SDUPTHER

## 2019-07-08 RX ORDER — GABAPENTIN 100 MG/1
100 CAPSULE ORAL 2 TIMES DAILY PRN
Start: 2019-07-08 | End: 2019-07-22 | Stop reason: SDUPTHER

## 2019-07-08 RX ORDER — GADOBUTROL 604.72 MG/ML
10 INJECTION INTRAVENOUS
Status: COMPLETED | OUTPATIENT
Start: 2019-07-08 | End: 2019-07-08

## 2019-07-08 RX ORDER — DICYCLOMINE HYDROCHLORIDE 10 MG/1
10 CAPSULE ORAL 3 TIMES DAILY PRN
Qty: 90 CAPSULE | Refills: 0 | Status: ON HOLD
Start: 2019-07-08 | End: 2019-09-04 | Stop reason: HOSPADM

## 2019-07-08 RX ORDER — LACTULOSE 10 G/15ML
15 SOLUTION ORAL; RECTAL EVERY 6 HOURS PRN
Qty: 100 ML | Refills: 0 | Status: SHIPPED | OUTPATIENT
Start: 2019-07-08 | End: 2019-07-18

## 2019-07-08 RX ORDER — HYDROCODONE BITARTRATE AND ACETAMINOPHEN 5; 325 MG/1; MG/1
1 TABLET ORAL EVERY 4 HOURS PRN
Qty: 30 TABLET | Refills: 0 | Status: SHIPPED | OUTPATIENT
Start: 2019-07-08 | End: 2019-10-14

## 2019-07-08 RX ORDER — HYDROCODONE BITARTRATE AND ACETAMINOPHEN 5; 325 MG/1; MG/1
1 TABLET ORAL EVERY 4 HOURS PRN
Qty: 30 TABLET | Refills: 0 | Status: SHIPPED | OUTPATIENT
Start: 2019-07-08 | End: 2019-07-08

## 2019-07-08 RX ADMIN — GADOBUTROL 10 ML: 604.72 INJECTION INTRAVENOUS at 01:07

## 2019-07-08 RX ADMIN — ATORVASTATIN CALCIUM 10 MG: 10 TABLET, FILM COATED ORAL at 08:07

## 2019-07-08 RX ADMIN — ACETAMINOPHEN 500 MG: 500 TABLET ORAL at 03:07

## 2019-07-08 RX ADMIN — POTASSIUM & SODIUM PHOSPHATES POWDER PACK 280-160-250 MG 2 PACKET: 280-160-250 PACK at 06:07

## 2019-07-08 RX ADMIN — DULOXETINE HYDROCHLORIDE 60 MG: 20 CAPSULE, DELAYED RELEASE ORAL at 08:07

## 2019-07-08 RX ADMIN — SENNOSIDES 8.6 MG: 8.6 TABLET, FILM COATED ORAL at 08:07

## 2019-07-08 RX ADMIN — GABAPENTIN 100 MG: 100 CAPSULE ORAL at 03:07

## 2019-07-08 RX ADMIN — HYDROCODONE BITARTRATE AND ACETAMINOPHEN 1 TABLET: 5; 325 TABLET ORAL at 03:07

## 2019-07-08 RX ADMIN — HEPARIN SODIUM 5000 UNITS: 5000 INJECTION, SOLUTION INTRAVENOUS; SUBCUTANEOUS at 05:07

## 2019-07-08 RX ADMIN — GABAPENTIN 100 MG: 100 CAPSULE ORAL at 08:07

## 2019-07-08 RX ADMIN — ACETAMINOPHEN 1000 MG: 500 TABLET ORAL at 08:07

## 2019-07-08 RX ADMIN — HEPARIN SODIUM 5000 UNITS: 5000 INJECTION, SOLUTION INTRAVENOUS; SUBCUTANEOUS at 03:07

## 2019-07-08 NOTE — NURSING
1656: 1st page placed to team  1752: 2nd page placed to team    Have also sent secure chat to resident, holding discharge re:    Pharmacy will not leave medications with patient until there is a hard copy of Norco prescription to exchange for meds

## 2019-07-08 NOTE — SUBJECTIVE & OBJECTIVE
Interval History: Hallucinations and delirium improving. 1 unit of PRBC yesterday. Very little output from PleurX. On 1LNC with saturations nearly 100%.    Medications:  Continuous Infusions:  Scheduled Meds:   acetaminophen  1,000 mg Oral TID    atorvastatin  10 mg Oral Daily    DULoxetine  60 mg Oral Daily    gabapentin  100 mg Oral TID    heparin (porcine)  5,000 Units Subcutaneous Q8H    senna  8.6 mg Oral Daily     PRN Meds:sodium chloride, sodium chloride, Dextrose 10% Bolus, Dextrose 10% Bolus, glucagon (human recombinant), glucose, glucose, HYDROcodone-acetaminophen, HYDROmorphone, hydrOXYzine HCl, insulin aspart U-100, lactulose, naloxone, ondansetron, polyethylene glycol, promethazine (PHENERGAN) IVPB, ramelteon, sodium chloride 0.9%     Review of patient's allergies indicates:   Allergen Reactions    Ciprofloxacin Anaphylaxis    Fructose     Gluten protein Other (See Comments)     GI upset  GI upset    Lactase Other (See Comments)     GI upset  GI upset    Latex, natural rubber Rash     Objective:     Vital Signs (Most Recent):  Temp: 97.6 °F (36.4 °C) (07/07/19 1930)  Pulse: 86 (07/08/19 0708)  Resp: 14 (07/08/19 0415)  BP: 127/67 (07/08/19 0415)  SpO2: 99 % (07/08/19 0415) Vital Signs (24h Range):  Temp:  [97.6 °F (36.4 °C)-98.6 °F (37 °C)] 97.6 °F (36.4 °C)  Pulse:  [] 86  Resp:  [13-20] 14  SpO2:  [97 %-99 %] 99 %  BP: (125-150)/(67-81) 127/67     Intake/Output - Last 3 Shifts       07/06 0700 - 07/07 0659 07/07 0700 - 07/08 0659 07/08 0700 - 07/09 0659    P.O. 740 650     Blood  200     Total Intake(mL/kg) 740 (8) 850 (9.1)     Urine (mL/kg/hr) 1300 (0.6) 500 (0.2)     Emesis/NG output 0      Other       Stool 0 0     Blood 0      Chest Tube 141 53     Total Output 1441 553     Net -701 +297            Urine Occurrence 2 x 2 x     Stool Occurrence 2 x 4 x           SpO2: 99 %  O2 Device (Oxygen Therapy): nasal cannula    Physical Exam   Constitutional: She is oriented to person,  place, and time. She appears well-developed and well-nourished. No distress.   Cardiovascular: Normal rate and regular rhythm.   Pulmonary/Chest: Effort normal. No respiratory distress. She has decreased breath sounds in the right lower field and the left lower field. She has no wheezes.   PleurX in place, connected to suction   Incisions c/d/i   Abdominal: Soft. She exhibits no distension. There is no tenderness.   Lymphadenopathy:     She has no cervical adenopathy.   Neurological: She is alert and oriented to person, place, and time.   Skin: Skin is warm and dry.   Psychiatric: She has a normal mood and affect. Cognition and memory are impaired.       Significant Labs:  ABGs: No results for input(s): PH, PCO2, PO2, HCO3, POCSATURATED, BE in the last 48 hours.  BMP:   Recent Labs   Lab 07/07/19  0635         K 4.1      CO2 27   BUN 22   CREATININE 1.0   CALCIUM 8.4*   MG 1.9     CBC:   Recent Labs   Lab 07/08/19  0645   WBC 9.65   RBC 2.79*   HGB 8.3*   HCT 25.3*   *   MCV 91   MCH 29.7   MCHC 32.8     CMP:   Recent Labs   Lab 07/07/19  0635      CALCIUM 8.4*   ALBUMIN 2.1*   PROT 5.2*      K 4.1   CO2 27      BUN 22   CREATININE 1.0   ALKPHOS 130   ALT 24   AST 23   BILITOT 1.0     Coagulation: No results for input(s): PT, INR, APTT in the last 48 hours.    Significant Diagnostics:  CXR: I have reviewed all pertinent results/findings within the past 24 hours    VTE Risk Mitigation (From admission, onward)        Ordered     heparin (porcine) injection 5,000 Units  Every 8 hours      07/04/19 0800     Place sequential compression device  Until discontinued      07/02/19 4673

## 2019-07-08 NOTE — ASSESSMENT & PLAN NOTE
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 Milian with [facility] aware, dicussed placing IVC filter if DVT develops  -heme/onc consulted for DVT prophylaxis in the setting of likely malignancy vs bleeding risk and recommended low dose lovenox on d/c

## 2019-07-08 NOTE — PROGRESS NOTES
[facility]  Thoracic Surgery  Progress Note    Subjective:     History of Present Illness:  73 year old female with HTN, HLD, DMII, recurrent DVTs, PTSD, anxiety, depression and recurrent right pleural effusions. Initial Ct guided thoracentesis drained 1.5L bloody fluid. Cytology was negative. She was scheduled for VATS drainage/pleural biopsy however in the interval she had another thoracentesis while coagulopathic which was complicated by hematoma and hemothorax.        Post-Op Info:  Procedure(s) (LRB):  VATS, WITH PLEURA BIOPSY (Right)  VATS, WITH PLEURODESIS (Right)  VATS, WITH CHEST TUBE INSERTION FOR DRAINAGE OF PLEURAL EFFUSION (Right)   5 Days Post-Op     Interval History: Hallucinations and delirium improving. 1 unit of PRBC yesterday. Very little output from PleurX. On 1LNC with saturations nearly 100%.    Medications:  Continuous Infusions:  Scheduled Meds:   acetaminophen  1,000 mg Oral TID    atorvastatin  10 mg Oral Daily    DULoxetine  60 mg Oral Daily    gabapentin  100 mg Oral TID    heparin (porcine)  5,000 Units Subcutaneous Q8H    senna  8.6 mg Oral Daily     PRN Meds:sodium chloride, sodium chloride, Dextrose 10% Bolus, Dextrose 10% Bolus, glucagon (human recombinant), glucose, glucose, HYDROcodone-acetaminophen, HYDROmorphone, hydrOXYzine HCl, insulin aspart U-100, lactulose, naloxone, ondansetron, polyethylene glycol, promethazine (PHENERGAN) IVPB, ramelteon, sodium chloride 0.9%     Review of patient's allergies indicates:   Allergen Reactions    Ciprofloxacin Anaphylaxis    Fructose     Gluten protein Other (See Comments)     GI upset  GI upset    Lactase Other (See Comments)     GI upset  GI upset    Latex, natural rubber Rash     Objective:     Vital Signs (Most Recent):  Temp: 97.6 °F (36.4 °C) (07/07/19 1930)  Pulse: 86 (07/08/19 0708)  Resp: 14 (07/08/19 0415)  BP: 127/67 (07/08/19 0415)  SpO2: 99 % (07/08/19 0415) Vital Signs (24h Range):  Temp:  [97.6  °F (36.4 °C)-98.6 °F (37 °C)] 97.6 °F (36.4 °C)  Pulse:  [] 86  Resp:  [13-20] 14  SpO2:  [97 %-99 %] 99 %  BP: (125-150)/(67-81) 127/67     Intake/Output - Last 3 Shifts       07/06 0700 - 07/07 0659 07/07 0700 - 07/08 0659 07/08 0700 - 07/09 0659    P.O. 740 650     Blood  200     Total Intake(mL/kg) 740 (8) 850 (9.1)     Urine (mL/kg/hr) 1300 (0.6) 500 (0.2)     Emesis/NG output 0      Other       Stool 0 0     Blood 0      Chest Tube 141 53     Total Output 1441 553     Net -701 +297            Urine Occurrence 2 x 2 x     Stool Occurrence 2 x 4 x           SpO2: 99 %  O2 Device (Oxygen Therapy): nasal cannula    Physical Exam   Constitutional: She is oriented to person, place, and time. She appears well-developed and well-nourished. No distress.   Cardiovascular: Normal rate and regular rhythm.   Pulmonary/Chest: Effort normal. No respiratory distress. She has decreased breath sounds in the right lower field and the left lower field. She has no wheezes.   PleurX in place, connected to suction   Incisions c/d/i   Abdominal: Soft. She exhibits no distension. There is no tenderness.   Lymphadenopathy:     She has no cervical adenopathy.   Neurological: She is alert and oriented to person, place, and time.   Skin: Skin is warm and dry.   Psychiatric: She has a normal mood and affect. Cognition and memory are impaired.       Significant Labs:  ABGs: No results for input(s): PH, PCO2, PO2, HCO3, POCSATURATED, BE in the last 48 hours.  BMP:   Recent Labs   Lab 07/07/19  0635         K 4.1      CO2 27   BUN 22   CREATININE 1.0   CALCIUM 8.4*   MG 1.9     CBC:   Recent Labs   Lab 07/08/19  0645   WBC 9.65   RBC 2.79*   HGB 8.3*   HCT 25.3*   *   MCV 91   MCH 29.7   MCHC 32.8     CMP:   Recent Labs   Lab 07/07/19  0635      CALCIUM 8.4*   ALBUMIN 2.1*   PROT 5.2*      K 4.1   CO2 27      BUN 22   CREATININE 1.0   ALKPHOS 130   ALT 24   AST 23   BILITOT 1.0      Coagulation: No results for input(s): PT, INR, APTT in the last 48 hours.    Significant Diagnostics:  CXR: I have reviewed all pertinent results/findings within the past 24 hours    VTE Risk Mitigation (From admission, onward)        Ordered     heparin (porcine) injection 5,000 Units  Every 8 hours      07/04/19 0800     Place sequential compression device  Until discontinued      07/02/19 0539        Assessment/Plan:     * Recurrent pleural effusion on right, bloody, likely 2/2 malignancy  73 year old female s/p right VATS drainage of effusion, pleural biopsy, doxy pleurodesis, PleurX placement and evacuation of hematoma.     - Will cap PleurX today. Review PleurX education with patient and daughters.   - Wean supplemental O2 for SpO2 > 90%  - OOB, ambulation in halls. Okay to come off suction for ambulation.   - Path pending- frozen pathology suspicious of malignancy   - Will schedule follow up with Dr. Smith in 1 month with CXR    Please set up with Home health company familiar with PleurX. Company should supply kits. Patient will be discharged with 4 kits for emergency purposes.   Pleurx Home Health Instructions     Please drain Pleurx catheter every other day and record the amount of drainage. Please also take note of the color of the drainage. Encourage patient to change positions and cough at least once while draining to get more fluid. Patient can shower and go about normal activities as long as the dressing provided in Pleurx kits in securely intact.  If gauze underneath become wet or dirty, please change.   Notify MD if:   · Patient is draining more than 1,000 mL in one sitting.   · Drainage abruptly stops.  It is normal for drainage to gradually decrease.  · You see purulent drainage coming from the catheter or near the drain exit site.   · The catheter will not flush and appears clogged.  · Any signs of infection at the Pleurx catheter exit site.     Thoracic Surgery Clinic: [phone]    Acute  blood loss anemia  Transfusion per primary team        SHYANNE Franks  Thoracic Surgery  [facility]

## 2019-07-08 NOTE — PLAN OF CARE
07/08/19 1516   Post-Acute Status   Post-Acute Authorization Placement   Post-Acute Placement Status Referrals Sent

## 2019-07-08 NOTE — ASSESSMENT & PLAN NOTE
-Patient presented with R sided back pain and SOB s/p thoracentesis  -CT showed R pleural effusion  -Thoracic surgery consulted  -OR on 07/03 for VATS drainage with biopsy of exophytic pleural mass (frozen path reveals malignancy), doxycycline pleurodesis, and pleurX catheter placement  -CT ABD/Pelvis and CT Head ordered for staging  -MRI Brain done inpt d/t delirium and hallucinations-negative for mets  -awaiting biopsy results  -pursuing workup in search of primary,  for ovarian cancer and lymphoma panel  -f/u labs  -likely full workup done in outpatient setting
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
## 2019-07-08 NOTE — OP NOTE
Date of Procedure: 7/3/2019    Pre-operative Diagnosis: Recurrent Right Pleural Effusion; Iatrogenic Right Chest Wall Hematoma    Post-operative Diagnosis: Same    Procedure(s): 1. Incision and Drainage of Right Chest Wall Hematoma.  2. Right Thoracoscopy with Drainage of Pleural Effusion, Pleural Biopsy and Chemical (Doxycycline) Pleurodesis.  3. Insertion of Indwelling Tunneled Right Pleural Catheter (PleurX)    Surgeon: Ben Smith MD    Assistant(s): Kenia Lepe MD    Anesthesia: GETA    Findings: 350cc hematoma drained.  Pleural Carcinomatosis.  >1200mL bloody pleural fluid    Estimated Blood Loss: 10mL    Drains: PleurX catheter in right pleural space    Specimen(s): Right Pleural Fluid for Cytology.  Right Pleural Biopsy for Frozen, Permanent and Flow Cytometry    Complications: None    Indications for Procedure: 72 yo female with a right pleural effusion that was found to be bloody on elective outpatient thoracentesis.  She was referred to thoracic surgery and scheduled for thoracoscopy.  During the week prior to the scheduled procedure, she developed worsening respiratory symptoms and presented to the ER, at which time she had another thoracentesis performed with improvement in her symptoms.  However, she was coagulopathic at the time and returned to the ER after discharge with recurrent symptoms and a large submusuclar hematoma on her posterolateral chest.  The thoracentesis cytology was negative and her coagulopathy resolved after an appropriate time off Coumadin, so it was decided to proceed with thoracoscopy as initially planned.  Risks, benefits and possible outcomes were discussed in detail with the patient, and she  and her family were given the opportunity to ask questions and have those questions answered to their satisfaction.  she desires to proceed and signed consent.    Procedure in Detail: The patient was taken to the operating room and placed supine on the operating table.   Adequate general anesthesia was achieved.  Double lumen endotracheal tube was placed and its position and function were confirmed with bronchoscopy.  She was turned to the left lateral decubitus position, padded appropriately and secured.  Right chest was prepped and draped in standard sterile fashion.  Prophylactic intravenous antibiotics were administered.  Right lung was isolated.  Time-out was performed.  A 1.5cm incision was made in the 8th intercostal space in the posterior axillary line.  The submuscular hematoma was then drained, yielding approximately 350mL of dark blood.  Port and camera were inserted.  There was diffuse pleural carcinomatosis with many large exophytic pleural nodules.  A second 1.5cm incision was made in the 6th intercostal space in the mid axillary line.  Subcutaneous tissue was divided with electrocautery and the chest was entered.  Residual dark sanguinous pleural fluid was completely drained, yielding >1200mL.  Multiple pleural biopsies were taken using cupped forceps.  A separate incision was made on the anterolateral chest. A pleurx catheter was then passed through the anterior incision, tunneled in the subcutaneous tissue until the cuff was covered and brought out posteriorly through the existing camera port incision. The PleurX was then passed into the pleural space and directed to sit over the diaphragm. The Pleurx was secured to the skin anteriorly with silk suture.  Pleurodesis was performed by dispersing 500mg of Doxycyline in 100mL saline throughout the pleural space.  Hemostasis was excellent.  The lung was inflated under direct vision.  Incisions were closed in layers with absorbable suture.  Steri-strips and sterile dressings were applied.  All sponge, needle and instrument counts were correct at the end of the case.  The patient tolerated the procedure well.  There were no immediate complications.  She was extubated in the operating room.    Disposition: PACU in stable  condition

## 2019-07-08 NOTE — PLAN OF CARE
Problem: Occupational Therapy Goal  Goal: Occupational Therapy Goal  Goals to be met by: 7/18/19    Patient will increase functional independence with ADLs by performing:    UE Dressing with Supervision.  LE Dressing with Supervision.  Grooming while standing at sink with Supervision.  Toileting from toilet with Supervision for hygiene and clothing management.   Supine to sit with [place].  Toilet transfer to toilet with Supervision.  Upper extremity exercise program per handout, with independence.    Outcome: Outcome(s) achieved Date Met: 07/08/19  Evaluation completed. Initiate POC.   Catrina Davis OT  7/8/2019

## 2019-07-08 NOTE — ASSESSMENT & PLAN NOTE
-Overnight on 07/05 to morning of 07/06, patient started to have hallucinations and conversations with persons who were not in the room with her.  -Likely multifactorial: acute illness, psychological stress of new cancer diagnosis, drug-induced, hospital delirium  -benadryl dc'd, and atarax now being given for severe itching  -Oxycodone dc'd, now on oxycodone  -Gabapentin decreased back down to 100 mg TID  -mostly pleasant visual hallucinations are still present on discharge; current medication regimen may be contributing but reviewed by the team and seems medically necessary and tolerable for the time being

## 2019-07-08 NOTE — ASSESSMENT & PLAN NOTE
73 year old female s/p right VATS drainage of effusion, pleural biopsy, doxy pleurodesis, PleurX placement and evacuation of hematoma.     - Will cap PleurX today. Review PleurX education with patient and daughters.   - Wean supplemental O2 for SpO2 > 90%  - OOB, ambulation in halls. Okay to come off suction for ambulation.   - Path pending- frozen pathology suspicious of malignancy   - Will schedule follow up with Dr. Smith in 1 month with CXR    Please set up with Home health company familiar with PleurX. Company should supply kits. Patient will be discharged with 4 kits for emergency purposes.   Pleurx Home Health Instructions     Please drain Pleurx catheter every other day and record the amount of drainage. Please also take note of the color of the drainage. Encourage patient to change positions and cough at least once while draining to get more fluid. Patient can shower and go about normal activities as long as the dressing provided in Pleurx kits in securely intact.  If gauze underneath become wet or dirty, please change.   Notify MD if:   · Patient is draining more than 1,000 mL in one sitting.   · Drainage abruptly stops.  It is normal for drainage to gradually decrease.  · You see purulent drainage coming from the catheter or near the drain exit site.   · The catheter will not flush and appears clogged.  · Any signs of infection at the Pleurx catheter exit site.     [facility]: [phone]

## 2019-07-08 NOTE — PLAN OF CARE
07/08/19 1516   Post-Acute Status   Post-Acute Authorization Home Health/Hospice   Post-Acute Placement Status Referrals Sent

## 2019-07-08 NOTE — NURSING
Page placed through hospital  for prescription issue.  3rd secure chat message sent    1833: Page placed to Dr. Pack

## 2019-07-08 NOTE — PLAN OF CARE
[facility]    HOME HEALTH ORDERS  FACE TO FACE ENCOUNTER    Patient Name: Isabell Preston  YOB: 1945    PCP: Ayo Archuleta MD   PCP Address: [address] / [place] [address]  PCP Phone Number: [phone]  PCP Fax: [phone]    Encounter Date: 07/08/2019    Admit to Home Health    Diagnoses:  Active Hospital Problems    Diagnosis  POA    *Recurrent pleural effusion on right, bloody, likely 2/2 malignancy [J90]  Yes    Drug-induced delirium [F19.921]  No    Constipation [K59.00]  No     -Overnight on 07/05, patient complaied of constipation, last bowel movements ~ 5 days ago  -senna and miralax ordered, did not work  -brown bomb enema given as 1x dose on 07/06  -lactulose PRN until 2-3 large bowel movements        Pleural mass [J94.9]  Yes    History of DVT of lower extremity [Z86.718]  Not Applicable     DVTs of LLE 1970s and 1980s; DVT of L subclavian 1980s.       Heart palpitations [R00.2]  Yes     -Patient complaining of episodes of heart palpitations and presyncope  -On 07/03, CT ABD/Pelvis and US Liver with Dopper ordered for workup of possible malignancy      Right-sided chest wall pain [R07.89]  Yes    Pain from hematoma evacuation [R07.81]  Yes    Hematoma of chest wall, right, initial encounter [S20.211A]  Yes    Acute blood loss anemia [D62]  Yes     Hematoma s/p thoracentesis      GINA (acute kidney injury) [N17.9]  Yes     Patients labs concerning for GIAN with Cr of 1.9 and BUN of 27      Transaminitis [R74.0]  Yes     Patients labs concerning, elevated LFTs - AST (157) and ALT (112)      Type 2 diabetes mellitus with diabetic cataract, without long-term current use of insulin [E11.36]  Yes     Chronic    Hypertension, essential [I10]  Yes     Chronic      Resolved Hospital Problems   No resolved problems to display.       Future Appointments   Date Time Provider Department Center   7/9/2019 10:20 AM Ayo Archuleta MD [facility]    7/23/2019  9:45 AM Honorio Kamara MD [facility] [place]   8/7/2019  9:00 AM Luke Vasquez MD [facility]           I have seen and examined this patient face to face today. My clinical findings that support the need for the home health skilled services and home bound status are the following:  Weakness/numbness causing balance and gait disturbance due to Weakness/Debility and Surgery making it taxing to leave home.    Allergies:  Review of patient's allergies indicates:   Allergen Reactions    Ciprofloxacin Anaphylaxis    Fructose     Gluten protein Other (See Comments)     GI upset  GI upset    Lactase Other (See Comments)     GI upset  GI upset    Latex, natural rubber Rash       Diet: diabetic diet: 2000 calorie    Activities: activity as tolerated    Nursing:   SN to complete comprehensive assessment including routine vital signs. Instruct on disease process and s/s of complications to report to MD. Review/verify medication list sent home with the patient at time of discharge  and instruct patient/caregiver as needed. Frequency may be adjusted depending on start of care date.    Notify MD if SBP > 160 or < 90; DBP > 90 or < 50; HR > 120 or < 50; Temp > 101; Other:         CONSULTS:    Physical Therapy to evaluate and treat. Evaluate for home safety and equipment needs; Establish/upgrade home exercise program. Perform / instruct on therapeutic exercises, gait training, transfer training, and Range of Motion.  Occupational Therapy to evaluate and treat. Evaluate home environment for safety and equipment needs. Perform/Instruct on transfers, ADL training, ROM, and therapeutic exercises.   to evaluate for community resources/long-range planning.    MISCELLANEOUS CARE:  Please drain Pleurx catheter every other day and record the amount of drainage. Please also take note of the color of the drainage. Encourage patient to change positions and cough at least once while  draining to get more fluid. Patient can shower and go about normal activities as long as the dressing provided in Pleurx kits in securely intact.  If gauze underneath become wet or dirty, please change.    Notify MD if:   · Patient is draining more than 1,000 mL in one sitting.   · Drainage abruptly stops.  It is normal for drainage to gradually decrease.  · You see purulent drainage coming from the catheter or near the drain exit site.   · The catheter will not flush and appears clogged.  · Any signs of infection at the Pleurx catheter exit site.      Medications: Review discharge medications with patient and family and provide education.      Current Discharge Medication List      START taking these medications    Details   HYDROcodone-acetaminophen (NORCO) 5-325 mg per tablet Take 1 tablet by mouth every 4 (four) hours as needed (severe pain).  Qty: 30 tablet, Refills: 0      hydrOXYzine HCl (ATARAX) 10 MG Tab Take 1 tablet (10 mg total) by mouth 3 (three) times daily as needed (severe anxiety or itching).  Qty: 30 tablet, Refills: 0      lactulose (CHRONULAC) 20 gram/30 mL Soln Take 22.5 mLs (15 g total) by mouth every 6 (six) hours as needed.  Qty: 100 mL, Refills: 0      ondansetron (ZOFRAN-ODT) 8 MG TbDL Take 1 tablet (8 mg total) by mouth every 8 (eight) hours as needed (nausea/vomiting).  Qty: 30 tablet, Refills: 0      senna (SENOKOT) 8.6 mg tablet Take 1 tablet by mouth once daily.         CONTINUE these medications which have CHANGED    Details   dicyclomine (BENTYL) 10 MG capsule Take 1 capsule (10 mg total) by mouth 3 (three) times daily as needed (irritable bowel symptoms).  Qty: 90 capsule, Refills: 0      enoxaparin (LOVENOX) 40 mg/0.4 mL Syrg Inject 0.4 mLs (40 mg total) into the skin once daily.  Qty: 12 mL, Refills: 1      gabapentin (NEURONTIN) 100 MG capsule Take 1 capsule (100 mg total) by mouth 2 (two) times daily as needed (neuropathy/nerve pain from chest tube site).    Associated  Diagnoses: Sciatica of left side         CONTINUE these medications which have NOT CHANGED    Details   atorvastatin (LIPITOR) 10 MG tablet TAKE 1 TABLET(10 MG) BY MOUTH EVERY DAY  Qty: 90 tablet, Refills: 0    Associated Diagnoses: Controlled type 2 diabetes mellitus without complication, without long-term current use of insulin      DULoxetine (CYMBALTA) 60 MG capsule Take 1 capsule (60 mg total) by mouth once daily.  Qty: 90 capsule, Refills: 0      fluticasone (VERAMYST) 27.5 mcg/actuation nasal spray 2 sprays by Nasal route daily as needed for Rhinitis or Allergies.       lancets (TRUEPLUS LANCETS) 28 gauge Misc 1 lancet by Misc.(Non-Drug; Combo Route) route once daily. Test blood sugar once daily, type 2 diabetes, controlled. E11.9  Qty: 100 each, Refills: 3    Associated Diagnoses: Diabetes mellitus without complication      LINZESS 72 mcg Cap TAKE 1 CAPSULE(72 MCG) BY MOUTH DAILY  Qty: 90 capsule, Refills: 0    Associated Diagnoses: Generalized abdominal pain      meclizine (ANTIVERT) 25 mg tablet TAKE 1 TABLET(25 MG) BY MOUTH THREE TIMES DAILY AS NEEDED FOR DIZZINESS  Qty: 30 tablet, Refills: 0    Associated Diagnoses: Benign paroxysmal positional vertigo, unspecified laterality      !! metoprolol succinate (TOPROL-XL) 25 MG 24 hr tablet TAKE 1 TABLET(25 MG) BY MOUTH EVERY DAY. TOTAL DAILY DOSE 75 MG  Qty: 90 tablet, Refills: 0    Associated Diagnoses: HTN, goal below 140/90      !! metoprolol succinate (TOPROL-XL) 50 MG 24 hr tablet TAKE 1 TABLET(50 MG) BY MOUTH EVERY DAY. TOTAL DAILY DOSE 75 MG  Qty: 90 tablet, Refills: 0    Associated Diagnoses: HTN, goal below 140/90      mometasone 0.1% (ELOCON) 0.1 % cream BALWINDER TO DARK AREAS BID ON LEGS PRN  Qty: 15 g, Refills: 1    Associated Diagnoses: Varicose veins of both lower extremities, unspecified whether complicated      tiZANidine (ZANAFLEX) 4 MG tablet Take 4 mg by mouth daily as needed (muscle spasms).       triamterene-hydrochlorothiazide 37.5-25 mg  (MAXZIDE-25) 37.5-25 mg per tablet Take 1 tablet by mouth once daily. Hold until resumed by PCP  Qty: 90 tablet, Refills: 0    Associated Diagnoses: Hypertension, essential       !! - Potential duplicate medications found. Please discuss with provider.      STOP taking these medications       amLODIPine (NORVASC) 10 MG tablet Comments:   Reason for Stopping:         losartan (COZAAR) 100 MG tablet Comments:   Reason for Stopping:         oxyCODONE-acetaminophen (PERCOCET) 5-325 mg per tablet Comments:   Reason for Stopping:         COUMADIN 5 mg tablet Comments:   Reason for Stopping:               I certify that this patient is confined to her home and needs physical therapy and occupational therapy.

## 2019-07-08 NOTE — DISCHARGE SUMMARY
[facility] Hospital Medicine  Discharge Summary      Patient Name: Isabell Preston  MRN: [id number]  Admission Date: 7/2/2019  Hospital Length of Stay: 6 days  Discharge Date and Time: No discharge date for patient encounter.  Attending Physician: Jennifer Pack MD   Discharging Provider: Michael Street MD  Primary Care Provider: Ayo Archuleta MD  [facility] Medicine Team: [facility] 2 Michael Street MD    HPI:   Ms. Preston is a 74 yo AAF who presented to the ED with increasing back pain (Right sided) and SOB. 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 The patient had recently been admitted to the hospital on 6/29/19 for similar symptoms including back pain and SOB where it was determined that she had a pleural effusion and a thoracentesis was preformed producing 2L of bloody fluid. She was then discharged home on 6/30/19 on a heparin bridge to coumadin, but returned to the ED around 2300 on 7/1/19 for worsening back pain (Right sided) and SOB. CT shows the formation of a hematoma at the site of the thoracentesis and a reaccumulation of the pleural effusion. She was originally admitted by Cardiothoracic Surgery and medicine was consulted. Her labs were concerning for a low Hgb (5.6), leukocytosis (15k) and increased Cr (1.9), and the patient was admitted to our service for GINA and hematoma s/p thoracentesis.     Of note, the patient has also had a 10 month history of a weight loss of 35 pounds. She has also reported episodes of dizziness and palpitations, which sound presyncopal in nature, which last for 30 seconds and are relieved by sitting down and placing a cold towel on her body. During this period, she has also complained of early satiety, decreased PO intake, and decreased appetite. Patient reports that  her health care maintenance is up-to-date with mammogram and colonoscopy screenings and follows up regularly with her PCP, Ayo Archuleta MD. Patient also reports a family history of clotting disorders, with a maternal aunt who had recurrent DVTs as well as her mom who had a DVT.    She has a 8 year history of asbestos exposure while working in [place] as a , and while being around her father, who worked for the [employer] for 20 years. Per patient's daughter, she also is a hoarder, who's home may be inhabitable.    Procedure(s) (LRB):  VATS, WITH PLEURA BIOPSY (Right)  VATS, WITH PLEURODESIS (Right)  VATS, WITH CHEST TUBE INSERTION FOR DRAINAGE OF PLEURAL EFFUSION (Right)      Hospital Course:   Patient presented to ED on 6/29/19 with back pain and SOB. Pleural effusion was found and thoracentesis was preformed. Patient was discharged home on 6/30/19. Patient returned to ED with worsening back pain and SOB around on 07/01/19. CT showed formation of hematoma at thoracentesis site (Right side) and reaccumulation of pleural effusion. On 07/03/19, the patient was moved to the floor and consulted to the medicine team.. She was started on continous LR for her worsening GINA and received 2 units of blood on the floor. Her hemglobin improved following 2 units pRBC from 5.7 to 9.7. She was then transferred to the medicine team as primary and Thoracic Surgery was following as consultants. On 07/03, her GINA worsened from Cr 1.9 to Cr 2.7, and she was given IVF, placed on strict Is/Os, and urine electrolytes were ordered. On the same day, thoracic surgery  performed a VATS drainage (2L of bloody fluid), pleural biopsy of a large exophytic mass, doxycycline pleurodesis, and pleurX catheter placement. A frozen sample was highly suggestive of malignancy and her biopsy was sent of for pathology. CT ABD/Pelvis and CT Head were ordered for staging, however imaging was non-revealing. She was placed on heparin 5k sq  q8drip on tfloor post-op, which she will remain on upon discharge. On 07/04, long discussion with patient and family with our team regarding the findings from her procedure and what to expect from here. Further workup in search of a primary tumor diagnosis was done, including  to screen for ovarian cancer as well as a lymphoma panel. Discussions with Vascular Surgery occurred regarding managing her hypercoaguable state, in the setting of high bleeding risk, and a plan was developed to keep her on heparin 5k sq q8, and to place an IVC filter if a DVT develops. Her pain medication was adjusted as well, having Percocet PRN for moderate and Dilaudid PRN for severe. Overnight on 07/04 to the morning of 07/05, patient developed some delirium, complained of having hallucinations and conversations of people who were not there. On 07/06, her medications were adjusted due to her delirium. On 07/07, patient no longer having episodes of delirium. Her hemoglobin dropped to 7, and she was transfused 1 unit pRBC. Thoracic Surgery has recommended that she can go home with home health, as long as that home health is familiar with PleurX catheters.     Pt with negative UA and MRI 7/8. Hemoglobin stable since transfusion. For anticoagulation, pt will get prior auth for low dose lovenox at home. Pt to d/c home under care of daughter.      Consults:   Consults (From admission, onward)        Status Ordering Provider     Inpatient consult to Cardiothoracic Surgery  Once     Provider:  (Not yet assigned)    Completed ANYI OSORIO     Inpatient consult to [facility] Medicine-General  Once     Provider:  (Not yet assigned)    Completed MASON HAIR     Inpatient consult to Interventional Radiology  Once     Provider:  (Not yet assigned)    Completed LAMIN MAGALLANES     Inpatient consult to Pulmonology  Once     Provider:  (Not yet assigned)    Completed MASON HAIR          * Recurrent pleural effusion  on right, bloody, likely 2/2 malignancy  -Patient presented with R sided back pain and SOB s/p thoracentesis  -CT showed R pleural effusion  -Thoracic surgery consulted  -OR on 07/03 for VATS drainage with biopsy of exophytic pleural mass (frozen path reveals malignancy), doxycycline pleurodesis, and pleurX catheter placement  -CT ABD/Pelvis and CT Head ordered for staging  -MRI Brain done inpt d/t delirium and hallucinations-negative for mets  -awaiting biopsy results  -pursuing workup in search of primary,  for ovarian cancer and lymphoma panel  -f/u labs  -likely full workup done in outpatient setting      Drug-induced delirium  -Overnight on 07/05 to morning of 07/06, patient started to have hallucinations and conversations with persons who were not in the room with her.  -Likely multifactorial: acute illness, psychological stress of new cancer diagnosis, drug-induced, hospital delirium  -benadryl dc'd, and atarax now being given for severe itching  -Oxycodone dc'd, now on oxycodone  -Gabapentin decreased back down to 100 mg TID  -mostly pleasant visual hallucinations are still present on discharge; current medication regimen may be contributing but reviewed by the team and seems medically necessary and tolerable for the time being    History of DVT of lower extremity  -Patient with remote history of DVTs in the 1980s of LLE and L subclavian  -Has been managed in the past with warfarin with therapeutic INR between 2-3  -Family history of hypercoagulability  -Heparin 5k sq q8 prophylactically  -Dr. Milian with [facility], dicussed placing IVC filter if DVT develops  -heme/onc consulted for DVT prophylaxis in the setting of likely malignancy vs bleeding risk and recommended low dose lovenox on d/c        Final Active Diagnoses:    Diagnosis Date Noted POA    PRINCIPAL PROBLEM:  Recurrent pleural effusion on right, bloody, likely 2/2 malignancy [J90] 07/02/2019 Yes    Drug-induced delirium [F19.921]  "07/06/2019 No    Constipation [K59.00] 07/06/2019 No    Pleural mass [J94.9] 07/04/2019 Yes    History of DVT of lower extremity [Z86.718] 07/03/2019 Not Applicable    Heart palpitations [R00.2] 07/03/2019 Yes    Right-sided chest wall pain [R07.89] 07/03/2019 Yes    Pain from hematoma evacuation [R07.81] 07/02/2019 Yes    Hematoma of chest wall, right, initial encounter [S20.211A] 07/02/2019 Yes    Acute blood loss anemia [D62] 07/02/2019 Yes    GINA (acute kidney injury) [N17.9] 07/02/2019 Yes    Transaminitis [R74.0] 07/02/2019 Yes    Type 2 diabetes mellitus with diabetic cataract, without long-term current use of insulin [E11.36] 06/05/2019 Yes     Chronic    Hypertension, essential [I10] 07/31/2015 Yes     Chronic      Problems Resolved During this Admission:       Discharged Condition: stable    Disposition: Home or Self Care    Follow Up:    Patient Instructions:      WALKER FOR HOME USE     Order Specific Question Answer Comments   Type of Walker: Rollator    With wheels? Yes    Height: 5' 7" (1.702 m)    Weight: 93.7 kg (206 lb 9.1 oz)    Length of need (1-99 months): 99    Does patient have medical equipment at home? glucometer    Please check all that apply: Patient is unable to safely ambulate without equipment.    Please check all that apply: Patient's condition impairs ambulation.    Vendor: [facility]    Expected Date of Delivery: 7/8/2019      COMMODE FOR HOME USE     Order Specific Question Answer Comments   Type: Standard    Height: 5' 7" (1.702 m)    Weight: 93.7 kg (206 lb 9.1 oz)    Does patient have medical equipment at home? glucometer    Length of need (1-99 months): 99    Vendor: [facility]    Expected Date of Delivery: 7/8/2019      Diet Adult Regular     Notify your health care provider if you experience any of the following:  temperature >100.4     Notify your health care provider if you experience any of the following:  persistent nausea and vomiting or diarrhea     Notify " your health care provider if you experience any of the following:  severe uncontrolled pain     Notify your health care provider if you experience any of the following:  difficulty breathing or increased cough     Notify your health care provider if you experience any of the following:  persistent dizziness, light-headedness, or visual disturbances     Notify your health care provider if you experience any of the following:  increased confusion or weakness     Activity as tolerated       Significant Diagnostic Studies:    MRI: negative for mets or other acute findings      Pending Diagnostic Studies:     None         Medications:  Reconciled Home Medications:      Medication List      START taking these medications    HYDROcodone-acetaminophen 5-325 mg per tablet  Commonly known as:  NORCO  Take 1 tablet by mouth every 4 (four) hours as needed (severe pain).     hydrOXYzine HCl 10 MG Tab  Commonly known as:  ATARAX  Take 1 tablet (10 mg total) by mouth 3 (three) times daily as needed (severe anxiety or itching).     lactulose 10 gram/15 mL solution  Commonly known as:  CHRONULAC  Take 22.5 mLs (15 g total) by mouth every 6 (six) hours as needed.     ondansetron 8 MG Tbdl  Commonly known as:  ZOFRAN-ODT  Dissolve 1 tablet (8 mg total) by mouth every 8 (eight) hours as needed (nausea/vomiting).     senna 8.6 mg tablet  Commonly known as:  SENOKOT  Take 1 tablet by mouth once daily.  Start taking on:  7/9/2019        CHANGE how you take these medications    dicyclomine 10 MG capsule  Commonly known as:  BENTYL  Take 1 capsule (10 mg total) by mouth 3 (three) times daily as needed (irritable bowel symptoms).  What changed:    · when to take this  · reasons to take this     enoxaparin 40 mg/0.4 mL Syrg  Commonly known as:  LOVENOX  Inject 0.4 mLs (40 mg total) into the skin once daily.  What changed:    · medication strength  · how much to take  · when to take this     gabapentin 100 MG capsule  Commonly known as:   NEURONTIN  Take 1 capsule (100 mg total) by mouth 2 (two) times daily as needed (neuropathy/nerve pain from chest tube site).  What changed:    · how much to take  · how to take this  · when to take this  · reasons to take this  · additional instructions     lancets 28 gauge Misc  Commonly known as:  TRUEPLUS LANCETS  1 lancet by Misc.(Non-Drug; Combo Route) route once daily. Test blood sugar once daily, type 2 diabetes, controlled. E11.9  What changed:    · when to take this  · reasons to take this  · additional instructions     LINZESS 72 mcg Cap capsule  Generic drug:  linaCLOtide  TAKE 1 CAPSULE(72 MCG) BY MOUTH DAILY  What changed:  See the new instructions.     triamterene-hydrochlorothiazide 37.5-25 mg 37.5-25 mg per tablet  Commonly known as:  MAXZIDE-25  Take 1 tablet by mouth once daily. Hold until resumed by PCP  What changed:  additional instructions        CONTINUE taking these medications    atorvastatin 10 MG tablet  Commonly known as:  LIPITOR  TAKE 1 TABLET(10 MG) BY MOUTH EVERY DAY     DULoxetine 60 MG capsule  Commonly known as:  CYMBALTA  Take 1 capsule (60 mg total) by mouth once daily.     fluticasone 27.5 mcg/actuation nasal spray  Commonly known as:  VERAMYST  2 sprays by Nasal route daily as needed for Rhinitis or Allergies.     meclizine 25 mg tablet  Commonly known as:  ANTIVERT  TAKE 1 TABLET(25 MG) BY MOUTH THREE TIMES DAILY AS NEEDED FOR DIZZINESS     * metoprolol succinate 25 MG 24 hr tablet  Commonly known as:  TOPROL-XL  TAKE 1 TABLET(25 MG) BY MOUTH EVERY DAY. TOTAL DAILY DOSE 75 MG     * metoprolol succinate 50 MG 24 hr tablet  Commonly known as:  TOPROL-XL  TAKE 1 TABLET(50 MG) BY MOUTH EVERY DAY. TOTAL DAILY DOSE 75 MG     mometasone 0.1% 0.1 % cream  Commonly known as:  ELOCON  BALWINDER TO DARK AREAS BID ON LEGS PRN     tiZANidine 4 MG tablet  Commonly known as:  ZANAFLEX  Take 4 mg by mouth daily as needed (muscle spasms).         * This list has 2 medication(s) that are the same as  other medications prescribed for you. Read the directions carefully, and ask your doctor or other care provider to review them with you.            STOP taking these medications    amLODIPine 10 MG tablet  Commonly known as:  NORVASC     COUMADIN 5 MG tablet  Generic drug:  warfarin     losartan 100 MG tablet  Commonly known as:  COZAAR     oxyCODONE-acetaminophen 5-325 mg per tablet  Commonly known as:  PERCOCET            Indwelling Lines/Drains at time of discharge:   Lines/Drains/Airways     Drain                 Chest Tube 07/03/19 1703 1 Right Pleural 15.5 Fr. 4 days                Time spent on the discharge of patient: 35 minutes  Patient was seen and examined on the date of discharge and determined to be suitable for discharge.         Michael Street MD  Department of Hospital Medicine  [facility]

## 2019-07-08 NOTE — PLAN OF CARE
Pt AAOx4, no hallucinations overnight. Pt slept most of the night. VSS, afebrile. Pt maintain SpO2> 92% on 1L NC. Telemetry monitoring continued showing NSR. R CT to -20cm suction, draining serosanguinous output. BG WNL, no SSI required. Pain controlled with scheduled tylenol. Fall risk precautions initiated.  Pt in lowest bed position setting, lighting adjusted, pt to wear nonskid socks when ambulating, side rails up x2.  Pt remain free from falls during shift. Bed alarm set. Call light within reach. Will continue to monitor.

## 2019-07-08 NOTE — PLAN OF CARE
07/08/19 1630   Final Note   Assessment Type Final Discharge Note   Anticipated Discharge Disposition Home-Health   What phone number can be called within the next 1-3 days to see how you are doing after discharge? [phone]   Hospital Follow Up  Appt(s) scheduled? Yes   Discharge plans and expectations educations in teach back method with documentation complete? Yes     Future Appointments   Date Time Provider Department Center   7/9/2019 10:20 AM Ayo Archuleta MD [facility] -    7/23/2019  9:45 AM Honorio Kamara MD [facility] [place]   8/7/2019  9:00 AM Luke Vasquez MD [facility]     Auth requested for HH with PHN.

## 2019-07-09 NOTE — NURSING
Pt to discharge to daughter's home per order.  Reviewed all discharge orders with patient's daughter & medications were delivered by pharmacy.  SHYANNE Aparicio (CTS) educated patient's daughter earlier today on use of Pleur-x drainage system.  Reviewed when/where to follow-up & where to go in case of urgent/emergent need.  Pt's daughter stated understanding.  She will call for transport assist once she & patient are ready to go.

## 2019-07-10 ENCOUNTER — ANTI-COAG VISIT (OUTPATIENT)
Dept: CARDIOLOGY | Facility: CLINIC | Age: 74
End: 2019-07-10

## 2019-07-10 DIAGNOSIS — I82.5Z9 CHRONIC DEEP VEIN THROMBOSIS (DVT) OF DISTAL VEIN OF LOWER EXTREMITY, UNSPECIFIED LATERALITY: ICD-10-CM

## 2019-07-10 DIAGNOSIS — I10 HYPERTENSION, ESSENTIAL: ICD-10-CM

## 2019-07-10 PROCEDURE — G0180 PR HOME HEALTH MD CERTIFICATION: ICD-10-PCS | Mod: ,,, | Performed by: HOSPITALIST

## 2019-07-10 PROCEDURE — G0180 MD CERTIFICATION HHA PATIENT: HCPCS | Mod: ,,, | Performed by: HOSPITALIST

## 2019-07-10 RX ORDER — LOSARTAN POTASSIUM 50 MG/1
TABLET ORAL
Qty: 90 TABLET | Refills: 0 | OUTPATIENT
Start: 2019-07-10

## 2019-07-10 NOTE — PROGRESS NOTES
Per recent d/c summary, patient is not on coumadin. She was d/c on low dose lovenox. D/c coumadin clinic referral

## 2019-07-15 RX ORDER — ENOXAPARIN SODIUM 100 MG/ML
INJECTION SUBCUTANEOUS
Qty: 9.6 ML | Refills: 0 | OUTPATIENT
Start: 2019-07-15

## 2019-07-16 ENCOUNTER — TELEPHONE (OUTPATIENT)
Dept: HEMATOLOGY/ONCOLOGY | Facility: CLINIC | Age: 74
End: 2019-07-16

## 2019-07-16 DIAGNOSIS — C38.4 MALIGNANT NEOPLASM OF PLEURA: ICD-10-CM

## 2019-07-16 DIAGNOSIS — C34.90 LUNG CANCER: Primary | ICD-10-CM

## 2019-07-16 NOTE — NURSING
Called to schedule PET.  Spoke with daughter and agreeable with scheduling PET scan for  before seeing Dr. Smith.  Instructions given on NPO in preparation for scan. Verbalized understanding  Oncology Navigation   Intake  Cancer Type: Thoracic  MD Assigned: Sarah  Internal / External Referral: Internal  Initial Nurse Navigator Contact: 19  Contact Method: Individual basket  Date Worked: 19  Appointment Date: 19  Schedule to Appointment Timeline (days): 0  Multiple appointments: No     Treatment  Current Status: Staging work-up  Treatment Type(s): Other (Comment)  Other Treatment: Referral for Dr. Burt  Surgery: VATS Biopsy, Pleurodesis Possible Pleurx   Surgeon Name: Dr. Smith  Surgery Schedule Date: 19    Procedures: PET scan  Biopsy Schedule Date: 19  PET Scan Schedule Date: 19    General Referrals: Psychology    Support Systems: Children;Family members  Barriers of Care: Psych  Psych: Psych history  Concerns: Family members(daughters) concerned that patient's living conditions has attributed to current health conditions.      Acuity  Surgical Procedure Complexity: 2  Treatment Tolerability: Has not started treatment yet/treatment fully completed and side effects resolved  ECO  Comorbidities in Medical History: 1  Hospitalization Within the Past Month: 0  Other Medical Factors (+1 each): Pleurx catheter;In wheelchair (Wheelchair needed while exiting appointment with thoracic)   Needed: 0  Support: 0  Verbalizes Financial Concerns: 0  Transportation: 0  Mental Health: PHQ Score: 1  Psychological Factors (+1 each): Seeing oncology psychology  History of noncompliance/frequent no shows and cancellations: 0  Verbalizes the need for more education: 0  Navigation Acuity: 7     Follow Up  No follow-ups on file.

## 2019-07-17 ENCOUNTER — EXTERNAL HOME HEALTH (OUTPATIENT)
Dept: HOME HEALTH SERVICES | Facility: HOSPITAL | Age: 74
End: 2019-07-17
Payer: MEDICARE

## 2019-07-18 ENCOUNTER — OFFICE VISIT (OUTPATIENT)
Dept: CARDIOTHORACIC SURGERY | Facility: CLINIC | Age: 74
End: 2019-07-18
Payer: MEDICARE

## 2019-07-18 ENCOUNTER — HOSPITAL ENCOUNTER (OUTPATIENT)
Dept: RADIOLOGY | Facility: HOSPITAL | Age: 74
Discharge: HOME | End: 2019-07-18
Attending: THORACIC SURGERY (CARDIOTHORACIC VASCULAR SURGERY)
Payer: MEDICARE

## 2019-07-18 ENCOUNTER — HOSPITAL ENCOUNTER (OUTPATIENT)
Dept: RADIOLOGY | Facility: HOSPITAL | Age: 74
Discharge: HOME | End: 2019-07-18
Attending: INTERNAL MEDICINE
Payer: MEDICARE

## 2019-07-18 VITALS
OXYGEN SATURATION: 95 % | HEIGHT: 67 IN | HEART RATE: 88 BPM | BODY MASS INDEX: 26.75 KG/M2 | SYSTOLIC BLOOD PRESSURE: 127 MMHG | DIASTOLIC BLOOD PRESSURE: 72 MMHG | WEIGHT: 170.44 LBS

## 2019-07-18 DIAGNOSIS — C34.90 LUNG CANCER: ICD-10-CM

## 2019-07-18 DIAGNOSIS — C45.0 MALIGNANT PLEURAL MESOTHELIOMA: Primary | ICD-10-CM

## 2019-07-18 DIAGNOSIS — C38.4 MALIGNANT NEOPLASM OF PLEURA: ICD-10-CM

## 2019-07-18 PROCEDURE — 71046 X-RAY EXAM CHEST 2 VIEWS: CPT | Mod: TC,FY

## 2019-07-18 PROCEDURE — A9552 F18 FDG: HCPCS

## 2019-07-18 PROCEDURE — 99999 PR PBB SHADOW E&M-EST. PATIENT-LVL IV: ICD-10-PCS | Mod: PBBFAC,,, | Performed by: THORACIC SURGERY (CARDIOTHORACIC VASCULAR SURGERY)

## 2019-07-18 PROCEDURE — 71046 X-RAY EXAM CHEST 2 VIEWS: CPT | Mod: 26,,, | Performed by: RADIOLOGY

## 2019-07-18 PROCEDURE — 99999 PR PBB SHADOW E&M-EST. PATIENT-LVL IV: CPT | Mod: PBBFAC,,, | Performed by: THORACIC SURGERY (CARDIOTHORACIC VASCULAR SURGERY)

## 2019-07-18 PROCEDURE — 99024 POSTOP FOLLOW-UP VISIT: CPT | Mod: S$GLB,,, | Performed by: THORACIC SURGERY (CARDIOTHORACIC VASCULAR SURGERY)

## 2019-07-18 PROCEDURE — 71046 XR CHEST PA AND LATERAL: ICD-10-PCS | Mod: 26,,, | Performed by: RADIOLOGY

## 2019-07-18 PROCEDURE — 78815 PET IMAGE W/CT SKULL-THIGH: CPT | Mod: 26,PI,, | Performed by: RADIOLOGY

## 2019-07-18 PROCEDURE — 78815 PET IMAGE W/CT SKULL-THIGH: CPT | Mod: TC

## 2019-07-18 PROCEDURE — 99024 PR POST-OP FOLLOW-UP VISIT: ICD-10-PCS | Mod: S$GLB,,, | Performed by: THORACIC SURGERY (CARDIOTHORACIC VASCULAR SURGERY)

## 2019-07-18 PROCEDURE — 78815 NM PET CT ROUTINE: ICD-10-PCS | Mod: 26,PI,, | Performed by: RADIOLOGY

## 2019-07-18 NOTE — PROGRESS NOTES
"Subjective:       Patient ID: Isabell Preston is a 73 y.o. female.    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     HPI   73 year old female returns for follow up s/p right VATS drainage of effusion, drainage of hematoma, chemical pleurodesis, insertion of pleurX and pleural biopsies. Pathology positive for biphasic mesothelioma. Since discharge, she has had minimal drainage from PleurX. Pain well controlled. Denies fever, chills, SOB, CP, N/V or changes in bowel in bladder functioning since discharge.     Review of Systems   Constitutional: Positive for fatigue. Negative for activity change, appetite change and fever.   HENT: Negative for congestion, trouble swallowing and voice change.    Eyes: Negative for visual disturbance.   Respiratory: Positive for shortness of breath. Negative for chest tightness and wheezing.    Cardiovascular: Positive for palpitations. Negative for chest pain and leg swelling.   Gastrointestinal: Negative for abdominal pain, diarrhea, nausea and vomiting.   Genitourinary: Negative for difficulty urinating.   Musculoskeletal: Positive for back pain. Negative for neck pain.   Neurological: Positive for syncope and light-headedness. Negative for weakness and headaches.   Hematological: Negative for adenopathy.   Psychiatric/Behavioral: Negative for confusion.         Objective:       Vitals:    07/18/19 1136   BP: 127/72   Pulse: 88   SpO2: 95%   Weight: 77.3 kg (170 lb 6.7 oz)   Height: 5' 7" (1.702 m)   PainSc: 0-No pain       Physical Exam   Constitutional: She is oriented to person, place, and time. Vital signs are normal. "   Frail elderly black female   HENT:   Head: Normocephalic.   Mouth/Throat: Oropharynx is clear and moist.   Eyes: Conjunctivae are normal.   Neck: Normal range of motion. Neck supple.   Cardiovascular: Normal rate, regular rhythm, normal heart sounds and intact distal pulses.   Pulmonary/Chest: Effort normal. She has decreased breath sounds in the right middle field and the right lower field. She has no wheezes. She has no rhonchi.   PleurX site c/d/i. Incisions well healed. Hematoma improving.    Abdominal: Soft. Bowel sounds are normal. She exhibits no distension.   Musculoskeletal: She exhibits no edema.   Large anterior left lower extremity bruise and surrounding edema   Lymphadenopathy:     She has no cervical adenopathy.   Neurological: She is alert and oriented to person, place, and time.   Psychiatric: She has a normal mood and affect.         7/18/19- PA/Lat- Trachea and mediastinal structures are midline. Aorta is tortuous.  The large right-sided the lung and or pleural mass decreased in size as compared to the previous study.  There is a persistent right pleural effusion that is intervally improved since prior exam 07/08/2019.  The effusion tracks along the right minor fissure.  There is an opacity within the right middle lobe which could represent pneumonia or compressive atelectasis associated with the pleural fluid.  The multiple nodular densities identified bilaterally.  No evidence of pneumothorax.  No acute osseous abnormality.  Further evaluation CT scan of the chest recommended    7/18/19 PET- Reviewed.     Assessment:       73 year old female returns for follow up s/p right VATS drainage of effusion, drainage of hematoma, chemical pleurodesis, insertion of pleurX and pleural biopsies    Plan:       Recommend weekly drainage of PleurX. RTC in 2 weeks with CXR.   Follow up with Dr. Burt on Monday to discuss chemotherapy.     ATTENDING ATTESTATION:    I evaluated the patient and I agree with the  assessment and plan.

## 2019-07-18 NOTE — PHYSICIAN QUERY
PT Name: Isabell Preston  MR #: [id number]    Physician Query Form - Pathology Findings Clarification     CDS/: Mandie Colon               Contact information: [email or web address]    This form is a permanent document in the medical record.     Query Date: July 18, 2019      By submitting this query, we are merely seeking further clarification of documentation.  Please utilize your independent clinical judgment when addressing the question(s) below.      The medical record contains the following:     Findings Supporting Clinical Information Location in Medical Record   FINAL PATHOLOGIC DIAGNOSIS  1. RIGHT PLEURA #1 FOR FROZEN SECTION, BIOPSY:  MALIGNANT MESOTHELIOMA, BIPHASIC TYPE.  2. RIGHT PLEURA #2 FOR PERMANENTS, BIOPSY:  MALIGNANT MESOTHELIOMA, BIPHASIC TYPE.                             Recurrent Right Pleural Effusion; Iatrogenic Right Chest Wall Hematoma  Procedure(s): 1. Incision and Drainage of Right Chest Wall Hematoma.  2. Right Thoracoscopy with Drainage of Pleural Effusion, Pleural Biopsy and Chemical (Doxycycline) Pleurodesis.  3. Insertion of Indwelling Tunneled Right Pleural Catheter (PleurX)  Specimen(s): Right Pleural Fluid for Cytology.  Right Pleural Biopsy for Frozen, Permanent and Flow Cytometry      Recurrent pleural effusion on right, bloody, likely 2/2 malignancy   Pathology Report Collection Date: 07/03, Reported Date: 07/16                Op Note 07/03, filed 07/07                        Thoracic Surgery PN 07/08             Please document the clinical significance of the Pathologists findings of _Pleural Malignant Mesothelioma__.    [X] I agree with the Pathology Findings   [   ] I do not agree with the Pathology Findings   [   ] Other/Clarification of Findings:   [  ] Clinically Undetermined       Please document in your progress notes daily for the duration of treatment until resolved and include in your discharge summary.

## 2019-07-19 ENCOUNTER — PATIENT OUTREACH (OUTPATIENT)
Dept: ADMINISTRATIVE | Facility: OTHER | Age: 74
End: 2019-07-19
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
## 2019-07-22 ENCOUNTER — INITIAL CONSULT (OUTPATIENT)
Dept: HEMATOLOGY/ONCOLOGY | Facility: CLINIC | Age: 74
End: 2019-07-22
Payer: MEDICARE

## 2019-07-22 VITALS
TEMPERATURE: 98 F | HEART RATE: 69 BPM | WEIGHT: 173.06 LBS | HEIGHT: 66 IN | SYSTOLIC BLOOD PRESSURE: 121 MMHG | DIASTOLIC BLOOD PRESSURE: 58 MMHG | BODY MASS INDEX: 27.81 KG/M2 | RESPIRATION RATE: 18 BRPM

## 2019-07-22 DIAGNOSIS — C45.0 MALIGNANT PLEURAL MESOTHELIOMA: Primary | ICD-10-CM

## 2019-07-22 DIAGNOSIS — M54.32 SCIATICA OF LEFT SIDE: ICD-10-CM

## 2019-07-22 PROCEDURE — 99499 UNLISTED E&M SERVICE: CPT | Mod: S$GLB,,, | Performed by: INTERNAL MEDICINE

## 2019-07-22 PROCEDURE — 99205 PR OFFICE/OUTPT VISIT, NEW, LEVL V, 60-74 MIN: ICD-10-PCS | Mod: 25,S$GLB,, | Performed by: INTERNAL MEDICINE

## 2019-07-22 PROCEDURE — 3074F SYST BP LT 130 MM HG: CPT | Mod: CPTII,S$GLB,, | Performed by: INTERNAL MEDICINE

## 2019-07-22 PROCEDURE — 1101F PR PT FALLS ASSESS DOC 0-1 FALLS W/OUT INJ PAST YR: ICD-10-PCS | Mod: CPTII,S$GLB,, | Performed by: INTERNAL MEDICINE

## 2019-07-22 PROCEDURE — 3074F PR MOST RECENT SYSTOLIC BLOOD PRESSURE < 130 MM HG: ICD-10-PCS | Mod: CPTII,S$GLB,, | Performed by: INTERNAL MEDICINE

## 2019-07-22 PROCEDURE — 96372 THER/PROPH/DIAG INJ SC/IM: CPT | Mod: S$GLB,,, | Performed by: INTERNAL MEDICINE

## 2019-07-22 PROCEDURE — 99205 OFFICE O/P NEW HI 60 MIN: CPT | Mod: 25,S$GLB,, | Performed by: INTERNAL MEDICINE

## 2019-07-22 PROCEDURE — 3078F PR MOST RECENT DIASTOLIC BLOOD PRESSURE < 80 MM HG: ICD-10-PCS | Mod: CPTII,S$GLB,, | Performed by: INTERNAL MEDICINE

## 2019-07-22 PROCEDURE — 99499 RISK ADDL DX/OHS AUDIT: ICD-10-PCS | Mod: S$GLB,,, | Performed by: INTERNAL MEDICINE

## 2019-07-22 PROCEDURE — 3078F DIAST BP <80 MM HG: CPT | Mod: CPTII,S$GLB,, | Performed by: INTERNAL MEDICINE

## 2019-07-22 PROCEDURE — 1101F PT FALLS ASSESS-DOCD LE1/YR: CPT | Mod: CPTII,S$GLB,, | Performed by: INTERNAL MEDICINE

## 2019-07-22 PROCEDURE — 99999 PR PBB SHADOW E&M-EST. PATIENT-LVL IV: ICD-10-PCS | Mod: PBBFAC,,, | Performed by: INTERNAL MEDICINE

## 2019-07-22 PROCEDURE — 99999 PR PBB SHADOW E&M-EST. PATIENT-LVL IV: CPT | Mod: PBBFAC,,, | Performed by: INTERNAL MEDICINE

## 2019-07-22 PROCEDURE — 96372 PR INJECTION,THERAP/PROPH/DIAG2ST, IM OR SUBCUT: ICD-10-PCS | Mod: S$GLB,,, | Performed by: INTERNAL MEDICINE

## 2019-07-22 RX ORDER — GABAPENTIN 100 MG/1
100 CAPSULE ORAL 2 TIMES DAILY PRN
Start: 2019-07-22 | End: 2019-08-08 | Stop reason: SDUPTHER

## 2019-07-22 RX ORDER — HYDROXYZINE HYDROCHLORIDE 10 MG/1
10 TABLET, FILM COATED ORAL 3 TIMES DAILY PRN
Qty: 30 TABLET | Refills: 0 | Status: SHIPPED | OUTPATIENT
Start: 2019-07-22 | End: 2019-08-07

## 2019-07-22 RX ORDER — CYANOCOBALAMIN 1000 UG/ML
100 INJECTION, SOLUTION INTRAMUSCULAR; SUBCUTANEOUS
Status: COMPLETED | OUTPATIENT
Start: 2019-07-22 | End: 2019-07-22

## 2019-07-22 RX ADMIN — CYANOCOBALAMIN 100 MCG: 1000 INJECTION, SOLUTION INTRAMUSCULAR; SUBCUTANEOUS at 05:07

## 2019-07-22 NOTE — LETTER
July 22, 2019      Ben Smith MD  [address]  [facility] [address]           [facility] Hematology Oncology  [address]  [facility] [address]  Phone: [phone]          Patient: Isabell Preston   MR Number: [id number]   YOB: 1945   Date of Visit: 7/22/2019       Dear Dr. Ben Smith:    Thank you for referring Isabell Preston to me for evaluation. Attached you will find relevant portions of my assessment and plan of care.    If you have questions, please do not hesitate to call me. I look forward to following Isabell Preston along with you.    Sincerely,    Austin Burt MD    Enclosure  CC:  No Recipients    If you would like to receive this communication electronically, please contact [email or web address] or [phone] to request more information on [facility] access.    For providers and/or their staff who would like to refer a patient to [facility], please contact us through our one-stop-shop provider referral line, [facility], at [phone].    If you feel you have received this communication in error or would no longer like to receive these types of communications, please e-mail [email or web address]

## 2019-07-22 NOTE — Clinical Note
Scheduled for port next week with IR.  Schedule for chemo class 1 week from today.  Return to clinic next week to see me or Antonella with CBC and CMP and to start chemotherapy with cisplatin and pemetrexed.

## 2019-07-23 ENCOUNTER — TELEPHONE (OUTPATIENT)
Dept: HEMATOLOGY/ONCOLOGY | Facility: CLINIC | Age: 74
End: 2019-07-23

## 2019-07-23 DIAGNOSIS — C45.0 MALIGNANT PLEURAL MESOTHELIOMA: Primary | ICD-10-CM

## 2019-07-23 NOTE — PLAN OF CARE
START ON PATHWAY REGIMEN - Mesothelioma    LOS20        Pemetrexed (Alimta(R))       Cisplatin (Platinol(R))           Additional Orders: Patient to receive the following prior to the   initiation of therapy:  1) Dexamethasone 4 mg orally twice daily x 6 doses.    First dose 24 hours before chemotherapy.  2) Folic acid ? 400 mcg orally daily.    First dose at least 5 days prior to the first dose of pemetrexed.  3) Vitamin   B12 1,000 mcg intramuscularly every 9 weeks.  First dose at least 5 days prior   to the first dose of pemetrexed.    **Always confirm dose/schedule in your pharmacy ordering system**    Patient Characteristics:  First Line  AJCC M Category: M0  AJCC 8 Stage Grouping: Unknown  Current evidence of distant metastases<= No  AJCC T Category: T2  AJCC N Category: NX  Line of therapy: First Line  Intent of Therapy:  Non-Curative / Palliative Intent, Discussed with Patient

## 2019-07-23 NOTE — TELEPHONE ENCOUNTER
----- Message from Austin Burt MD sent at 7/22/2019  6:06 PM CDT -----  Scheduled for port next week with IR.  Schedule for chemo class 1 week from today.  Return to clinic next week to see me or Antonella with CBC and CMP and to start chemotherapy with cisplatin and pemetrexed.

## 2019-07-23 NOTE — TELEPHONE ENCOUNTER
tried reaching out to pt on t oday to discuss upcoming appointments, but no answer, a detail message was left on v/m.

## 2019-07-24 ENCOUNTER — TELEPHONE (OUTPATIENT)
Dept: HEMATOLOGY/ONCOLOGY | Facility: CLINIC | Age: 74
End: 2019-07-24

## 2019-07-24 NOTE — TELEPHONE ENCOUNTER
Spoke to patient's daughter discussed upcoming port placement. Also discussed about area on mammogram. Daughter will verify if mammogram was the one from November. She just wanted to let us know.

## 2019-07-24 NOTE — TELEPHONE ENCOUNTER
----- Message from Jayla Schultz sent at 7/24/2019  3:16 PM CDT -----  Contact: Gely (daughter)  Would like to speak with Dr Burt regarding a spot on her mother's breast that was detected during her mammogram. Says that Dr Smith told her to make sure Dr Burt is aware.        Contact:: [phone]

## 2019-07-29 ENCOUNTER — CLINICAL SUPPORT (OUTPATIENT)
Dept: HEMATOLOGY/ONCOLOGY | Facility: CLINIC | Age: 74
End: 2019-07-29
Payer: MEDICARE

## 2019-07-29 DIAGNOSIS — C45.0 MALIGNANT PLEURAL MESOTHELIOMA: Primary | ICD-10-CM

## 2019-07-29 NOTE — PROGRESS NOTES
Patient & daughters attended chemo class with navigator:  Viewed video presentation, received binder that has medication information specific to the patient, participated in Q&A.    The group was oriented to the Infusion Center.

## 2019-07-30 ENCOUNTER — TELEPHONE (OUTPATIENT)
Dept: HEMATOLOGY/ONCOLOGY | Facility: CLINIC | Age: 74
End: 2019-07-30

## 2019-07-30 RX ORDER — SODIUM CHLORIDE 9 MG/ML
500 INJECTION, SOLUTION INTRAVENOUS ONCE
Status: CANCELLED | OUTPATIENT
Start: 2019-07-30 | End: 2019-07-30

## 2019-07-30 RX ORDER — MIDAZOLAM HYDROCHLORIDE 1 MG/ML
1 INJECTION INTRAMUSCULAR; INTRAVENOUS
Status: CANCELLED | OUTPATIENT
Start: 2019-07-30

## 2019-07-30 RX ORDER — FENTANYL CITRATE 50 UG/ML
50 INJECTION, SOLUTION INTRAMUSCULAR; INTRAVENOUS
Status: CANCELLED | OUTPATIENT
Start: 2019-07-30

## 2019-07-30 RX ORDER — CEFAZOLIN SODIUM 1 G/3ML
1 INJECTION, POWDER, FOR SOLUTION INTRAMUSCULAR; INTRAVENOUS
Status: CANCELLED | OUTPATIENT
Start: 2019-07-30 | End: 2019-07-30

## 2019-07-30 NOTE — PHYSICIAN QUERY
PT Name: Isabell Preston  MR #: [id number]     CDS/: Mandie Colon               Contact information: [email or web address]  This form is a permanent document in the medical record.     Query Date: July 30, 2019    Physician Query - Neurological Condition Clarification    By submitting this query, we are merely seeking further clarification of documentation to reflect the severity of illness of your patient. Please utilize your independent clinical judgment when addressing the question(s) below.    The Medical record reflects the following:     Indicators   Supporting Clinical Findings Location in Medical Record   x AMS, Confusion,  LOC, etc.  Pt starting with visual hallucinations, disoriented to situation. Having conversations while asleep    Pt with in and out orientation all day, mostly disoriented to situation. Pt having visual and auditory hallucinations, including seeing dead people. Pt very lethargic today    Disoriented x situation with visual hallucinations intermittently. Pt with moments of clarity overnight    Overnight on 07/04 to the morning of 07/05, patient developed some delirium, complained of having hallucinations and conversations of people who were not there. On 07/06, her medications were adjusted due to her delirium. On 07/07, patient no longer having episodes of delirium.     Nurse's Note 07/06 11:51 am      Nurse's Plan of Care 07/06 6:00 pm        Nurse's Plan of Care 07/07 4:09 am    Hospital Medicine 07/07   x Acute / Chronic Illness Acute delirium (acute encephalopathy) with VH - multifactorial - limited CNS-active meds, treating underlying pain and severe constipation (colon full of stool on XR)    Recurrent pleural effusion on right, bloody, likely 2/2 malignancy  GINA  Drug-induced delirium     Hospital Medicine Attestation PN 07/06, filed 07/07        Hospital Medicine PN 07/06, filed 07/07    Radiology Findings      Electrolyte Imbalance     x Medication Benadryl gilberto'd,  and atarax now being given for severe itching  Oxycodone dc'd, now on oxycodone  Gabapentin decreased back down to 100 mg TID Hospital Medicine PN 07/06, filed 07/07    Treatment              Other       Encephalopathy- is a general term for any diffuse disease of the brain that alters brain function or structure. Treatment of the cognitive dysfunction varies but is ultimately dependent on the treatment of the underlying condition.    Major Symptoms of Encephalopathy - Decreased level of consciousness, fluctuating alertness/concentration, confusion, agitation, lethargy, somnolence, drowsiness, obtundation, stupor, or coma.         References: National Institutes of Healths (NIH) National [place] of Neurological Disorders and Strokes;  HCPro 2016; Advisory Board     Clinical Guidelines:   These guidelines will set system standards to assist providers in managing, documentation, and coding of encephalopathy. The intent of this document is to serve as a system guideline, not replace the providers clinical judgment:  Provider, please specify the diagnosis or diagnoses associated with above clinical findings.    [x] Metabolic Encephalopathy - Due to electrolye imbalance, metabolic derangements, or infections processes, includes Septic Encephalopathy   [x] Toxic Encephalopathy - Due to drugs, chemicals, or other toxic substances   [   ] Other Encephalopathy - Includes uremic encephalopathy   [   ] Unspecified Encephalopathy      [   ] Other Neurological Condition-  Includes Post-ictal altered mental status. (please specify condition): _________   [   ]  Clinically Undetermined     Please document in your progress notes daily for the duration of treatment until resolved, and include in your discharge summary.

## 2019-07-30 NOTE — NURSING
Returned call to confirm appointment scheduling with Dr. Smith for 8/5.  Daughter has access to my [facility] and deferred mailing of appointment slip.

## 2019-07-31 ENCOUNTER — HOSPITAL ENCOUNTER (OUTPATIENT)
Dept: INTERVENTIONAL RADIOLOGY/VASCULAR | Facility: HOSPITAL | Age: 74
Discharge: HOME | End: 2019-07-31
Attending: INTERNAL MEDICINE
Payer: MEDICARE

## 2019-07-31 ENCOUNTER — HOSPITAL ENCOUNTER (OUTPATIENT)
Facility: HOSPITAL | Age: 74
Discharge: HOME | End: 2019-07-31
Attending: INTERNAL MEDICINE | Admitting: INTERNAL MEDICINE
Payer: MEDICARE

## 2019-07-31 ENCOUNTER — TELEPHONE (OUTPATIENT)
Dept: HEMATOLOGY/ONCOLOGY | Facility: CLINIC | Age: 74
End: 2019-07-31

## 2019-07-31 DIAGNOSIS — C45.0 MALIGNANT PLEURAL MESOTHELIOMA: ICD-10-CM

## 2019-07-31 DIAGNOSIS — M54.32 SCIATICA OF LEFT SIDE: ICD-10-CM

## 2019-07-31 NOTE — NURSING
Received a call from daughter who would like to have port insertion rescheduled to Monday.  Port insertion was cancelled today due to patient eating.  Spoke with IR department (Angella), port placement rescheduled to  at [facility].  Preop instructions given to be NPO after midnight, wear blouse that is button down the front, and phone number to call.  Arrival time given 11:00 AM as given by Angella.  Confirmed phone number to reach patient/daughter [phone].  Daughter verbalized understanding.   Oncology Navigation   Intake  Cancer Type: Thoracic  MD Assigned: Sarah  Internal / External Referral: Internal  Initial Nurse Navigator Contact: 19  Contact Method: Individual basket  Date Worked: 19  Appointment Date: 19  Schedule to Appointment Timeline (days): 0  Multiple appointments: No     Treatment  Current Status: Staging work-up  Treatment Type(s): Other (Comment)  Other Treatment: Referral for Dr. Burt  Surgery: VATS Biopsy, Pleurodesis Possible Pleurx   Surgeon Name: Dr. Smith  Surgery Schedule Date: 19    Procedures: Port / PICC  Biopsy Schedule Date: 19  PET Scan Schedule Date: 19  Port / PICC Schedule Date: 19    General Referrals: Psychology    Support Systems: Children  Barriers of Care: Psych  Psych: Psych history  Concerns: Family members(daughters) concerned that patient's living conditions has attributed to current health conditions.      Acuity  Surgical Procedure Complexity: 2  Treatment Tolerability: Has not started treatment yet/treatment fully completed and side effects resolved  ECO  Comorbidities in Medical History: 1  Hospitalization Within the Past Month: 0  Other Medical Factors (+1 each): Pleurx catheter;In wheelchair (Wheelchair needed while exiting appointment with thoracic)   Needed: 0  Support: 0  Verbalizes Financial Concerns: 0  Transportation: 0  Mental Health: PHQ Score: 1  Psychological Factors (+1 each): Seeing  oncology psychology  History of noncompliance/frequent no shows and cancellations: 0  Verbalizes the need for more education: 0  Navigation Acuity: 7     Follow Up  Follow up in about 1 week (around 8/7/2019).

## 2019-08-01 ENCOUNTER — OFFICE VISIT (OUTPATIENT)
Dept: CARDIOTHORACIC SURGERY | Facility: CLINIC | Age: 74
End: 2019-08-01
Attending: INTERNAL MEDICINE
Payer: MEDICARE

## 2019-08-01 ENCOUNTER — HOSPITAL ENCOUNTER (OUTPATIENT)
Dept: RADIOLOGY | Facility: HOSPITAL | Age: 74
Discharge: HOME | End: 2019-08-01
Attending: THORACIC SURGERY (CARDIOTHORACIC VASCULAR SURGERY) | Admitting: INTERNAL MEDICINE
Payer: MEDICARE

## 2019-08-01 VITALS
DIASTOLIC BLOOD PRESSURE: 77 MMHG | OXYGEN SATURATION: 96 % | BODY MASS INDEX: 27.85 KG/M2 | WEIGHT: 173.31 LBS | HEART RATE: 74 BPM | HEIGHT: 66 IN | SYSTOLIC BLOOD PRESSURE: 133 MMHG

## 2019-08-01 DIAGNOSIS — J90 PLEURAL EFFUSION, RIGHT: ICD-10-CM

## 2019-08-01 DIAGNOSIS — C45.0 MALIGNANT PLEURAL MESOTHELIOMA: ICD-10-CM

## 2019-08-01 DIAGNOSIS — C45.0 MALIGNANT PLEURAL MESOTHELIOMA: Primary | ICD-10-CM

## 2019-08-01 PROCEDURE — 71046 X-RAY EXAM CHEST 2 VIEWS: CPT | Mod: 26,,, | Performed by: RADIOLOGY

## 2019-08-01 PROCEDURE — 71046 XR CHEST PA AND LATERAL: ICD-10-PCS | Mod: 26,,, | Performed by: RADIOLOGY

## 2019-08-01 PROCEDURE — 99024 PR POST-OP FOLLOW-UP VISIT: ICD-10-PCS | Mod: S$GLB,,, | Performed by: THORACIC SURGERY (CARDIOTHORACIC VASCULAR SURGERY)

## 2019-08-01 PROCEDURE — 71046 X-RAY EXAM CHEST 2 VIEWS: CPT | Mod: TC,FY

## 2019-08-01 PROCEDURE — 99999 PR PBB SHADOW E&M-EST. PATIENT-LVL IV: CPT | Mod: PBBFAC,,, | Performed by: THORACIC SURGERY (CARDIOTHORACIC VASCULAR SURGERY)

## 2019-08-01 PROCEDURE — 99499 RISK ADDL DX/OHS AUDIT: ICD-10-PCS | Mod: S$GLB,,, | Performed by: THORACIC SURGERY (CARDIOTHORACIC VASCULAR SURGERY)

## 2019-08-01 PROCEDURE — 99499 UNLISTED E&M SERVICE: CPT | Mod: S$GLB,,, | Performed by: THORACIC SURGERY (CARDIOTHORACIC VASCULAR SURGERY)

## 2019-08-01 PROCEDURE — 32552 REMOVE LUNG CATHETER: CPT | Mod: 58,S$GLB,, | Performed by: THORACIC SURGERY (CARDIOTHORACIC VASCULAR SURGERY)

## 2019-08-01 PROCEDURE — 99024 POSTOP FOLLOW-UP VISIT: CPT | Mod: S$GLB,,, | Performed by: THORACIC SURGERY (CARDIOTHORACIC VASCULAR SURGERY)

## 2019-08-01 PROCEDURE — 99999 PR PBB SHADOW E&M-EST. PATIENT-LVL IV: ICD-10-PCS | Mod: PBBFAC,,, | Performed by: THORACIC SURGERY (CARDIOTHORACIC VASCULAR SURGERY)

## 2019-08-01 PROCEDURE — 32552 PR REMOVAL OF INDWELLING TUNNELED PLEURAL CATHETER WITH CUFF: ICD-10-PCS | Mod: 58,S$GLB,, | Performed by: THORACIC SURGERY (CARDIOTHORACIC VASCULAR SURGERY)

## 2019-08-01 NOTE — PROGRESS NOTES
"Subjective:       Patient ID: Isabell Preston is a 73 y.o. female.    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     HPI   73 year old female returns for follow up s/p right VATS drainage of effusion, drainage of hematoma, chemical pleurodesis, insertion of pleurX and pleural biopsies. Pathology positive for biphasic mesothelioma. During first post op visit she was having minimal drainage from PleurX.  Returns today with PleurX drainage diary and CXR. Last drained <50cc 6 days ago. Breathing stable. No SOB.  Denies fever, chills, SOB, CP, N/V or changes in bowel in bladder functioning since discharge.     Review of Systems   Constitutional: Positive for fatigue. Negative for activity change, appetite change and fever.   HENT: Negative for congestion, trouble swallowing and voice change.    Eyes: Negative for visual disturbance.   Respiratory: Negative for chest tightness, shortness of breath and wheezing.    Cardiovascular: Positive for palpitations. Negative for chest pain and leg swelling.   Gastrointestinal: Negative for abdominal pain, diarrhea, nausea and vomiting.   Genitourinary: Negative for difficulty urinating.   Musculoskeletal: Positive for back pain. Negative for neck pain.   Neurological: Negative for syncope, weakness, light-headedness and headaches.   Hematological: Negative for adenopathy.   Psychiatric/Behavioral: Negative for confusion.         Objective:       Vitals:    08/01/19 1003   BP: 133/77   Pulse: 74   SpO2: 96%   Weight: 78.6 kg (173 lb 4.5 oz)   Height: 5' 6" (1.676 m)   PainSc: 0-No pain       Physical Exam   Constitutional: She is " oriented to person, place, and time. Vital signs are normal.   Frail elderly black female   HENT:   Head: Normocephalic.   Mouth/Throat: Oropharynx is clear and moist.   Eyes: Conjunctivae are normal.   Neck: Normal range of motion. Neck supple.   Cardiovascular: Normal rate, regular rhythm, normal heart sounds and intact distal pulses.   Pulmonary/Chest: Effort normal. She has decreased breath sounds in the right middle field and the right lower field. She has no wheezes. She has no rhonchi.   PleurX site c/d/i. Incisions well healed.    Abdominal: Soft. Bowel sounds are normal. She exhibits no distension.   Musculoskeletal: She exhibits no edema.   Large anterior left lower extremity bruise and surrounding edema   Lymphadenopathy:     She has no cervical adenopathy.   Neurological: She is alert and oriented to person, place, and time.   Skin: Skin is warm and dry.   Psychiatric: She has a normal mood and affect. Thought content normal.         7/18/19- PA/Lat- Trachea and mediastinal structures are midline. Aorta is tortuous.  The large right-sided the lung and or pleural mass decreased in size as compared to the previous study.  There is a persistent right pleural effusion that is intervally improved since prior exam 07/08/2019.  The effusion tracks along the right minor fissure.  There is an opacity within the right middle lobe which could represent pneumonia or compressive atelectasis associated with the pleural fluid.  The multiple nodular densities identified bilaterally.  No evidence of pneumothorax.  No acute osseous abnormality.  Further evaluation CT scan of the chest recommended    7/18/19 PET:  1.  Several right-sided pleural based nodules with minimal hyperactivity.  Focal hyperactivity medially adjacent to the distal thoracic esophagus and IVC, possibly representing hyperactive pleural nodule versus posterior mediastinal lymph node.  If management would change, recommend contrast enhanced CT or MRI  for further evaluation.  2.  Postsurgical changes of right lateral chest wall hematoma evacuation, pleurodesis with placement of right-sided chest tube.  3.  Incidental hypermetabolic nodule in the right thyroid lobe.   Recommend further evaluation with nonemergent thyroid ultrasound.    8/1/2019:  Allowing for postoperative changes reflecting recent right thoracotomy and the patient's known pleural nodularity I doubt aspiration or pneumonia.  Right PleurX catheter noted.    Assessment:       73 year old female returns for follow up s/p right VATS drainage of effusion, drainage of hematoma, chemical pleurodesis, insertion of pleurX and pleural biopsies    Plan:       <10cc drained in clinic. PleurX removed without difficulty. Call office if increasing SOB. Otherwise, RTC prn.     ATTENDING ATTESTATION:    I evaluated the patient and I agree with the assessment and plan.

## 2019-08-02 ENCOUNTER — TELEPHONE (OUTPATIENT)
Dept: HEMATOLOGY/ONCOLOGY | Facility: CLINIC | Age: 74
End: 2019-08-02

## 2019-08-02 DIAGNOSIS — C45.0 MALIGNANT PLEURAL MESOTHELIOMA: Primary | ICD-10-CM

## 2019-08-02 NOTE — TELEPHONE ENCOUNTER
----- Message from Ira Jin RN sent at 8/2/2019 11:03 AM CDT -----  Any day chemo can accommodate---  There is not waiting period post port placement necessarily.  Let me know if you need a time.  ~ira    ----- Message -----  From: Jen Yoon  Sent: 8/2/2019  10:59 AM  To: Javed Olivares    Chemo is approved, patient is getting port on Monday 8/5. When should I get them in with antonella or  ?    MD Manan Webster     Scheduled for port next week with IR.  Schedule for chemo class 1 week from today.  Return to clinic next week to see me or Antonella with CBC and CMP and to start chemotherapy with cisplatin and pemetrexed.

## 2019-08-02 NOTE — TELEPHONE ENCOUNTER
Called and spoke with patient and her daughter and assisted with scheduling her mother for next week to come in for labs, see Antonella and start her chemo. Patient and daughter agreed to all apts.

## 2019-08-05 ENCOUNTER — HOSPITAL ENCOUNTER (OUTPATIENT)
Facility: OTHER | Age: 74
Discharge: HOME | End: 2019-08-05
Attending: INTERNAL MEDICINE | Admitting: RADIOLOGY
Payer: MEDICARE

## 2019-08-05 VITALS
OXYGEN SATURATION: 96 % | RESPIRATION RATE: 18 BRPM | DIASTOLIC BLOOD PRESSURE: 57 MMHG | WEIGHT: 173 LBS | SYSTOLIC BLOOD PRESSURE: 116 MMHG | TEMPERATURE: 98 F | HEIGHT: 66 IN | BODY MASS INDEX: 27.8 KG/M2 | HEART RATE: 61 BPM

## 2019-08-05 DIAGNOSIS — C45.0 MALIGNANT PLEURAL MESOTHELIOMA: ICD-10-CM

## 2019-08-05 DIAGNOSIS — M54.32 LEFT SIDED SCIATICA: ICD-10-CM

## 2019-08-05 LAB — POCT GLUCOSE: 149 MG/DL (ref 70–110)

## 2019-08-05 PROCEDURE — 82962 GLUCOSE BLOOD TEST: CPT

## 2019-08-05 PROCEDURE — 25000003 PHARM REV CODE 250: Performed by: RADIOLOGY

## 2019-08-05 PROCEDURE — 99153 MOD SED SAME PHYS/QHP EA: CPT | Performed by: RADIOLOGY

## 2019-08-05 PROCEDURE — 99152 MOD SED SAME PHYS/QHP 5/>YRS: CPT | Performed by: RADIOLOGY

## 2019-08-05 PROCEDURE — C1788 PORT, INDWELLING, IMP: HCPCS | Performed by: RADIOLOGY

## 2019-08-05 PROCEDURE — C1894 INTRO/SHEATH, NON-LASER: HCPCS | Performed by: RADIOLOGY

## 2019-08-05 PROCEDURE — 63600175 PHARM REV CODE 636 W HCPCS: Performed by: RADIOLOGY

## 2019-08-05 DEVICE — POWERPORT CLEARVUE IMPLANTABLE PORT WITH ATTACHABLE 8F POLYURETHANE OPEN-ENDED SINGLE-LUMEN VENOUS CATHETER INTERMEDIATE KIT
Type: IMPLANTABLE DEVICE | Site: CHEST  WALL | Status: FUNCTIONAL
Brand: POWERPORT CLEARVUE

## 2019-08-05 RX ORDER — CEFAZOLIN SODIUM 1 G/50ML
1 SOLUTION INTRAVENOUS
Status: DISCONTINUED | OUTPATIENT
Start: 2019-08-05 | End: 2019-08-05 | Stop reason: HOSPADM

## 2019-08-05 RX ORDER — HEPARIN SODIUM 1000 [USP'U]/ML
INJECTION, SOLUTION INTRAVENOUS; SUBCUTANEOUS
Status: DISCONTINUED | OUTPATIENT
Start: 2019-08-05 | End: 2019-08-05 | Stop reason: HOSPADM

## 2019-08-05 RX ORDER — LIDOCAINE HYDROCHLORIDE 10 MG/ML
INJECTION INFILTRATION; PERINEURAL
Status: DISCONTINUED | OUTPATIENT
Start: 2019-08-05 | End: 2019-08-05 | Stop reason: HOSPADM

## 2019-08-05 RX ORDER — HEPARIN SOD,PORCINE/0.9 % NACL 1000/500ML
INTRAVENOUS SOLUTION INTRAVENOUS
Status: DISCONTINUED | OUTPATIENT
Start: 2019-08-05 | End: 2019-08-05 | Stop reason: HOSPADM

## 2019-08-05 RX ORDER — HYDROCODONE BITARTRATE AND ACETAMINOPHEN 5; 325 MG/1; MG/1
1 TABLET ORAL EVERY 4 HOURS PRN
Status: DISCONTINUED | OUTPATIENT
Start: 2019-08-05 | End: 2019-08-05 | Stop reason: HOSPADM

## 2019-08-05 RX ORDER — FENTANYL CITRATE 50 UG/ML
INJECTION, SOLUTION INTRAMUSCULAR; INTRAVENOUS
Status: DISCONTINUED | OUTPATIENT
Start: 2019-08-05 | End: 2019-08-05 | Stop reason: HOSPADM

## 2019-08-05 RX ORDER — MIDAZOLAM HYDROCHLORIDE 1 MG/ML
INJECTION, SOLUTION INTRAMUSCULAR; INTRAVENOUS
Status: DISCONTINUED | OUTPATIENT
Start: 2019-08-05 | End: 2019-08-05 | Stop reason: HOSPADM

## 2019-08-05 RX ORDER — SODIUM CHLORIDE 9 MG/ML
500 INJECTION, SOLUTION INTRAVENOUS ONCE
Status: DISCONTINUED | OUTPATIENT
Start: 2019-08-05 | End: 2019-08-05 | Stop reason: HOSPADM

## 2019-08-05 RX ORDER — CEFAZOLIN SODIUM 1 G/3ML
INJECTION, POWDER, FOR SOLUTION INTRAMUSCULAR; INTRAVENOUS
Status: DISCONTINUED | OUTPATIENT
Start: 2019-08-05 | End: 2019-08-05 | Stop reason: HOSPADM

## 2019-08-05 RX ADMIN — HYDROCODONE BITARTRATE AND ACETAMINOPHEN 1 TABLET: 5; 325 TABLET ORAL at 12:08

## 2019-08-05 NOTE — OP NOTE
[facility] Cath Lab [facility] 1  Interventional Radiology  High Risk Procedure - Outpatient    Date: 08/05/2019 Time: 12:23 PM    Pre-Op Diagnosis: Mesothelioma    Post-Op Diagnosis: Mesothelioma    Procedure Performed by: Sebastian Prasad MD    Assistant: none    Procedure: Left IJ tunneled catheter with chest Port    Specimen/Tissue Removed: No    Estimated Blood Loss: Less than 10 mL    Procedure Note/Findings: Left IJ venous chest port. Catheter in place and functions well. No immediate post-procedure complications noted.    Please refer to dictated report for additional details.

## 2019-08-05 NOTE — DISCHARGE INSTRUCTIONS
***RESUME LOVENOX ON 8/7/19 (48 HRS.) PER DR. TOWNSEND!!!      Anesthesia: Monitored Anesthesia Care (MAC)    Youre due to have surgery. During surgery, youll be given medicine called anesthesia. This will keep you comfortable and pain-free. Your surgeon will use monitored anesthesia care (MAC). This sheet tells you more about this type of anesthesia.  What is monitored anesthesia care?  MAC keeps you very drowsy during surgery. You may be awake, but you will likely not remember much. And you wont feel pain. With MAC, medicines are given through an IV line into a vein in your arm or hand. A local anesthetic will usually be injected into the skin and muscle around the surgical site to numb it. The anesthesia provider monitors you during the procedure. He or she checks your heart rate and rhythm, blood pressure, and blood oxygen level.  Anesthesia tools and medicines that may be near you during your procedure  You will likely have:  · A pulse oximeter on the end of your finger. This measures your blood oxygen level.  · Electrocardiography leads (electrodes) on your chest. These record your heart rate and rhythm.  · Medicines given through an IV. These relax you and prevent pain. You may be awake or sleep lightly. If you have local anesthetic, it is injected directly into your skin.  · A facemask to give you oxygen, if needed.  Risks and possible complications  MAC has some risks. These include:  · Breathing problems  · Nausea and vomiting  · Allergic reaction to the anesthetic    Anesthesia safety  Tips for anesthesia safety include the following:   · Follow all instructions you are given for how long not to eat or drink before your procedure.  · Be sure your healthcare provider knows what medicines you take, especially any anti-inflammatory medicine or blood thinners. This includes aspirin and any other over-the-counter medicines, herbs, and supplements.  · Have an adult family member or friend drive you home  after the procedure.  · For the first 24 hours after your surgery:  ¨ Do not drive or use heavy equipment.  ¨ Do not make important decisions or sign documents.  ¨ Avoid alcohol.  ¨ Have someone stay with you, if possible. They can watch for problems and help keep you safe.  Date Last Reviewed: 12/1/2016  © 4184-3747 [employer]. [address] [place] [address], [place] [address]. All rights reserved. This information is not intended as a substitute for professional medical care. Always follow your healthcare professional's instructions.

## 2019-08-05 NOTE — DISCHARGE SUMMARY
Radiology Discharge Summary      Admit date: 8/5/2019 10:09 AM  Discharge date: August 5, 2019    Instructions Given to patient: YesVerbal    Diet: Regular    Activity:Restriction as listed: No strenuous activites for 48 hours    Medications on discharge (List): Refer to Discharge Medication List    Hospital Course: Left IJ tunneled venous catheter with port placement    Description of Condition on Discharge: stable    Discharge Disposition: Home    Discharge Diagnosis: Mesothelioma    Follow uip with Dr. Burt as scheduled.
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
## 2019-08-05 NOTE — OR NURSING
DR. PRASAD NOTIFIED THAT HIS MED REC WAS NOT COMPLETED. Dr. Prasad states he does not know any of her meds, he did not prescribe them. Dr. Prasad asked specifically about lovenox. Dr. Prasad states he does not want the patient to restart lovenox for 48 hours. This instruction typed in AVS.

## 2019-08-05 NOTE — PLAN OF CARE
Isabell Preston has met all discharge criteria from Phase II. Vital Signs are stable, ambulating  without difficulty. Discharge instructions given, patient verbalized understanding. Discharged from facility via wheelchair in stable condition.       
Patient prefers to have Marly present for discharge.  
No complaints

## 2019-08-07 ENCOUNTER — OFFICE VISIT (OUTPATIENT)
Dept: PSYCHIATRY | Facility: CLINIC | Age: 74
End: 2019-08-07
Payer: MEDICARE

## 2019-08-07 VITALS
HEART RATE: 60 BPM | WEIGHT: 174.63 LBS | HEIGHT: 66 IN | SYSTOLIC BLOOD PRESSURE: 104 MMHG | BODY MASS INDEX: 28.06 KG/M2 | DIASTOLIC BLOOD PRESSURE: 62 MMHG

## 2019-08-07 DIAGNOSIS — F60.9 PERSONALITY DISORDER: ICD-10-CM

## 2019-08-07 DIAGNOSIS — F33.41 MAJOR DEPRESSIVE DISORDER, RECURRENT EPISODE, IN PARTIAL REMISSION: Primary | ICD-10-CM

## 2019-08-07 DIAGNOSIS — F41.1 GENERALIZED ANXIETY DISORDER: ICD-10-CM

## 2019-08-07 PROCEDURE — 99499 UNLISTED E&M SERVICE: CPT | Mod: S$GLB,,, | Performed by: PSYCHIATRY & NEUROLOGY

## 2019-08-07 PROCEDURE — 99213 PR OFFICE/OUTPT VISIT, EST, LEVL III, 20-29 MIN: ICD-10-PCS | Mod: S$GLB,,, | Performed by: PSYCHIATRY & NEUROLOGY

## 2019-08-07 PROCEDURE — 1101F PR PT FALLS ASSESS DOC 0-1 FALLS W/OUT INJ PAST YR: ICD-10-PCS | Mod: CPTII,S$GLB,, | Performed by: PSYCHIATRY & NEUROLOGY

## 2019-08-07 PROCEDURE — 99999 PR PBB SHADOW E&M-EST. PATIENT-LVL III: CPT | Mod: PBBFAC,,, | Performed by: PSYCHIATRY & NEUROLOGY

## 2019-08-07 PROCEDURE — 3074F PR MOST RECENT SYSTOLIC BLOOD PRESSURE < 130 MM HG: ICD-10-PCS | Mod: CPTII,S$GLB,, | Performed by: PSYCHIATRY & NEUROLOGY

## 2019-08-07 PROCEDURE — 99999 PR PBB SHADOW E&M-EST. PATIENT-LVL III: ICD-10-PCS | Mod: PBBFAC,,, | Performed by: PSYCHIATRY & NEUROLOGY

## 2019-08-07 PROCEDURE — 1101F PT FALLS ASSESS-DOCD LE1/YR: CPT | Mod: CPTII,S$GLB,, | Performed by: PSYCHIATRY & NEUROLOGY

## 2019-08-07 PROCEDURE — 3078F DIAST BP <80 MM HG: CPT | Mod: CPTII,S$GLB,, | Performed by: PSYCHIATRY & NEUROLOGY

## 2019-08-07 PROCEDURE — 99499 RISK ADDL DX/OHS AUDIT: ICD-10-PCS | Mod: S$GLB,,, | Performed by: PSYCHIATRY & NEUROLOGY

## 2019-08-07 PROCEDURE — 3074F SYST BP LT 130 MM HG: CPT | Mod: CPTII,S$GLB,, | Performed by: PSYCHIATRY & NEUROLOGY

## 2019-08-07 PROCEDURE — 3078F PR MOST RECENT DIASTOLIC BLOOD PRESSURE < 80 MM HG: ICD-10-PCS | Mod: CPTII,S$GLB,, | Performed by: PSYCHIATRY & NEUROLOGY

## 2019-08-07 PROCEDURE — 99213 OFFICE O/P EST LOW 20 MIN: CPT | Mod: S$GLB,,, | Performed by: PSYCHIATRY & NEUROLOGY

## 2019-08-07 RX ORDER — DULOXETIN HYDROCHLORIDE 60 MG/1
60 CAPSULE, DELAYED RELEASE ORAL DAILY
Qty: 90 CAPSULE | Refills: 3 | Status: SHIPPED | OUTPATIENT
Start: 2019-08-07 | End: 2020-06-26

## 2019-08-07 RX ORDER — HYDROXYZINE HYDROCHLORIDE 25 MG/1
25-50 TABLET, FILM COATED ORAL DAILY PRN
Qty: 60 TABLET | Refills: 11 | Status: SHIPPED | OUTPATIENT
Start: 2019-08-07 | End: 2019-09-16 | Stop reason: SDUPTHER
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
## 2019-08-07 NOTE — PROGRESS NOTES
Outpatient Psychiatry Follow-Up Visit (MD/NP)    2019    Clinical Status of Patient:  Outpatient (Ambulatory)    Chief Complaint:  Isabell Preston is a 73 y.o. female who presents today for follow-up of depression and anxiety.  Met with patient.      Interval History and Content of Current Session:  Interim Events/Subjective Report/Content of Current Session: Patient Mohan presents to clinic for follow up.  Patient has been diagnosed with mesothelioma and is currently under treatment.  She says that she is okay to die and has a lot of cathi in her Taoist.  She tells me of an experience in which she  during her 2nd childbirth and had an out of body experience.  Family has moved her out of her house and she is currently bouncing around between her daughters.  She feels as if her depression was worse before her diagnosis but since has gotten better.  Would like to continue on her medications but also would like an increase in hydroxyzine.  Doing relatively well overall despite the medical significance.    She has failed Zoloft, Prozac, Lexapro, Effexor, Wellbutrin XL.    Psychotherapy:  · Target symptoms: depression, anxiety   · Why chosen therapy is appropriate versus another modality: relevant to diagnosis  · Outcome monitoring methods: self-report, observation  · Therapeutic intervention type: supportive psychotherapy  · Topics discussed/themes: difficulty managing affect in interpersonal relationships, building skills sets for symptom management, symptom recognition, legal stressors  · The patient's response to the intervention is accepting. The patient's progress toward treatment goals is limited.   · Duration of intervention: 15 minutes.    Review of Systems   · PSYCHIATRIC: Pertinant items are noted in the narrative.  · CONSTITUTIONAL: No weight gain or loss.   · MUSCULOSKELETAL: Positive for muscle stiffness/tightening and pain.  · NEUROLOGIC: No weakness, sensory changes, seizures, confusion,  "memory loss, tremor or other abnormal movements.  · RESPIRATORY: No shortness of breath.  · CARDIOVASCULAR: No tachycardia or chest pain.  · GASTROINTESTINAL: No nausea, vomiting, pain, constipation or diarrhea.    Past Medical, Family and Social History: The patient's past medical, family and social history have been reviewed and updated as appropriate within the electronic medical record - see encounter notes.    Compliance: no    Side effects: None    Risk Parameters:  Patient reports no suicidal ideation  Patient reports no homicidal ideation  Patient reports no self-injurious behavior  Patient reports no violent behavior    Exam (detailed: at least 9 elements; comprehensive: all 15 elements)   Constitutional  Vitals:  Most recent vital signs, dated less than 90 days prior to this appointment, were reviewed.   Vitals:    08/07/19 0854   BP: 104/62   Pulse: 60   Weight: 79.2 kg (174 lb 9.7 oz)   Height: 5' 6" (1.676 m)        General:  unremarkable, age appropriate     Musculoskeletal  Muscle Strength/Tone:  no tremor, no tic   Gait & Station:  non-ataxic     Psychiatric  Speech:  no latency; no press   Mood & Affect:  euthymic  guarded   Thought Process:  normal and logical   Associations:  intact, circumstantial   Thought Content:  normal, no suicidality, no homicidality, delusions, or paranoia   Insight:  limited awareness of illness   Judgement: behavior is adequate to circumstances   Orientation:  grossly intact, person, place, situation, time/date   Memory: intact for content of interview   Language: grossly intact   Attention Span & Concentration:  able to focus   Fund of Knowledge:  intact and appropriate to age and level of education     Assessment and Diagnosis   Status/Progress: Based on the examination today, the patient's problem(s) is/are adequately but not ideally controlled.  New problems have been presented today.   Co-morbidities and Lack of compliance are complicating management of the primary " condition.  There are no active rule-out diagnoses for this patient at this time.     General Impression: We will continue pharmacological management and adjunctive therapy.      ICD-10-CM ICD-9-CM   1. 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 Generalized anxiety disorder F41.1 300.02   3. Personality disorder F60.9 301.9       Intervention/Counseling/Treatment Plan   · Medication Management: Continue current medications. The risks and benefits of medication were discussed with the patient.  · Counseling provided with patient as follows: importance of compliance with chosen treatment options was emphasized, risks and benefits of treatment options, including medications, were discussed with the patient, risk factor reduction, prognosis, patient education, instructions for  management, treatment and follow-up were reviewed  1.  Continue Cymbalta 60mg daily targeting depression, anxiety, and chronic pains.  Warned of risk of sola, suicidality, serotonin syndrome.  2.  Strong underlying personality disorder (attention seeking) characteristics.  Would do best with therapy but does not want.  3.  Continue hydroxyzine 25mg PO BID PRN anxiety.  Warned of risk of oversedation.      Return to Clinic: 6 months, as needed

## 2019-08-07 NOTE — PATIENT INSTRUCTIONS
"        You have been provided with a certain amount of medication with a specified number of refills.  Please follow up within an adequate time before you run out of medications.    REFILLS FOR CONTROLLED SUBSTANCES WILL NOT BE GIVEN WITHOUT AN APPOINTMENT.  I will not honor or fill automated refill requests from pharmacies.  You must come in for an appointment to get refills.        Please book your next appointment for myself or therapist by phone by calling our office at [phone].        Note that these follow up appointments are 20 minutes long.  It is important that we focus on medication management.  Should you need therapy, please get set up with our therapist or call your insurance company to find out which therapists are available in your area.      PLEASE BE AT LEAST 15 MINUTES EARLY FOR YOUR NEXT APPOINTMENT.  Late arrivals WILL BE TURNED AWAY AND ASKED TO RESCHEDULE.  YOU MUST COME EARLY TO ALLOW TIME FOR CHECK-IN AS WELL AS GET YOUR VITAL SIGNS AND GO OVER YOUR MEDICATIONS.  Tardiness is not fair to the patients who present after you and are on time for their appointments.  It causes a delay in the appointments for patients and staff.  YOU MAY ALSO BE DISCHARGED FROM CLINIC with multiple late arrivals or "No Show" appointments.       -----------------------------------------------------------------------------------------------------------------  IF YOU FEEL SUICIDAL OR HAVING THOUGHTS OR PLANS TO HURT YOURSELF OR OTHERS, CALL 911 OR REPORT TO THE NEAREST EMERGENCY ROOM.  YOU CAN ALSO ACCESS THE FOLLOWING HOTLINE:    National Suicide Hotline Number 5-069-866-TALK (8852)                  "

## 2019-08-08 ENCOUNTER — OFFICE VISIT (OUTPATIENT)
Dept: HEMATOLOGY/ONCOLOGY | Facility: CLINIC | Age: 74
End: 2019-08-08
Payer: MEDICARE

## 2019-08-08 ENCOUNTER — LAB VISIT (OUTPATIENT)
Dept: LAB | Facility: HOSPITAL | Age: 74
End: 2019-08-08
Payer: MEDICARE

## 2019-08-08 VITALS
HEART RATE: 65 BPM | OXYGEN SATURATION: 97 % | WEIGHT: 176.13 LBS | BODY MASS INDEX: 28.31 KG/M2 | RESPIRATION RATE: 20 BRPM | DIASTOLIC BLOOD PRESSURE: 72 MMHG | SYSTOLIC BLOOD PRESSURE: 149 MMHG | TEMPERATURE: 99 F | HEIGHT: 66 IN

## 2019-08-08 DIAGNOSIS — T45.1X5A CHEMOTHERAPY INDUCED NAUSEA AND VOMITING: ICD-10-CM

## 2019-08-08 DIAGNOSIS — Z86.718 HISTORY OF DVT (DEEP VEIN THROMBOSIS): ICD-10-CM

## 2019-08-08 DIAGNOSIS — C45.0 MALIGNANT PLEURAL MESOTHELIOMA: Primary | ICD-10-CM

## 2019-08-08 DIAGNOSIS — D63.0 ANEMIA IN NEOPLASTIC DISEASE: ICD-10-CM

## 2019-08-08 DIAGNOSIS — R11.2 CHEMOTHERAPY INDUCED NAUSEA AND VOMITING: ICD-10-CM

## 2019-08-08 DIAGNOSIS — G89.3 NEOPLASM RELATED PAIN (ACUTE) (CHRONIC): ICD-10-CM

## 2019-08-08 DIAGNOSIS — C45.0 MALIGNANT PLEURAL MESOTHELIOMA: ICD-10-CM

## 2019-08-08 DIAGNOSIS — M54.32 SCIATICA OF LEFT SIDE: ICD-10-CM

## 2019-08-08 PROBLEM — D62 ACUTE BLOOD LOSS ANEMIA: Status: RESOLVED | Noted: 2019-07-02 | Resolved: 2019-08-08

## 2019-08-08 PROBLEM — R74.01 TRANSAMINITIS: Status: RESOLVED | Noted: 2019-07-02 | Resolved: 2019-08-08

## 2019-08-08 PROBLEM — D50.9 IRON DEFICIENCY ANEMIA: Status: RESOLVED | Noted: 2019-06-29 | Resolved: 2019-08-08

## 2019-08-08 LAB
ALBUMIN SERPL BCP-MCNC: 3.6 G/DL (ref 3.5–5.2)
ALP SERPL-CCNC: 107 U/L (ref 55–135)
ALT SERPL W/O P-5'-P-CCNC: 14 U/L (ref 10–44)
ANION GAP SERPL CALC-SCNC: 7 MMOL/L (ref 8–16)
AST SERPL-CCNC: 17 U/L (ref 10–40)
BILIRUB SERPL-MCNC: 0.3 MG/DL (ref 0.1–1)
BUN SERPL-MCNC: 21 MG/DL (ref 8–23)
CALCIUM SERPL-MCNC: 10.1 MG/DL (ref 8.7–10.5)
CHLORIDE SERPL-SCNC: 105 MMOL/L (ref 95–110)
CO2 SERPL-SCNC: 29 MMOL/L (ref 23–29)
CREAT SERPL-MCNC: 1.4 MG/DL (ref 0.5–1.4)
ERYTHROCYTE [DISTWIDTH] IN BLOOD BY AUTOMATED COUNT: 14.9 % (ref 11.5–14.5)
EST. GFR  (AFRICAN AMERICAN): 43.3 ML/MIN/1.73 M^2
EST. GFR  (NON AFRICAN AMERICAN): 37.6 ML/MIN/1.73 M^2
GLUCOSE SERPL-MCNC: 100 MG/DL (ref 70–110)
HCT VFR BLD AUTO: 34.9 % (ref 37–48.5)
HGB BLD-MCNC: 10.8 G/DL (ref 12–16)
IMM GRANULOCYTES # BLD AUTO: 0.02 K/UL (ref 0–0.04)
MCH RBC QN AUTO: 29.4 PG (ref 27–31)
MCHC RBC AUTO-ENTMCNC: 30.9 G/DL (ref 32–36)
MCV RBC AUTO: 95 FL (ref 82–98)
NEUTROPHILS # BLD AUTO: 5.2 K/UL (ref 1.8–7.7)
PLATELET # BLD AUTO: 321 K/UL (ref 150–350)
PMV BLD AUTO: 9.5 FL (ref 9.2–12.9)
POTASSIUM SERPL-SCNC: 4.1 MMOL/L (ref 3.5–5.1)
PROT SERPL-MCNC: 7.7 G/DL (ref 6–8.4)
RBC # BLD AUTO: 3.67 M/UL (ref 4–5.4)
SODIUM SERPL-SCNC: 141 MMOL/L (ref 136–145)
WBC # BLD AUTO: 7.95 K/UL (ref 3.9–12.7)

## 2019-08-08 PROCEDURE — 1101F PR PT FALLS ASSESS DOC 0-1 FALLS W/OUT INJ PAST YR: ICD-10-PCS | Mod: CPTII,S$GLB,, | Performed by: NURSE PRACTITIONER

## 2019-08-08 PROCEDURE — 3078F PR MOST RECENT DIASTOLIC BLOOD PRESSURE < 80 MM HG: ICD-10-PCS | Mod: CPTII,S$GLB,, | Performed by: NURSE PRACTITIONER

## 2019-08-08 PROCEDURE — 1101F PT FALLS ASSESS-DOCD LE1/YR: CPT | Mod: CPTII,S$GLB,, | Performed by: NURSE PRACTITIONER

## 2019-08-08 PROCEDURE — 99215 PR OFFICE/OUTPT VISIT, EST, LEVL V, 40-54 MIN: ICD-10-PCS | Mod: S$GLB,,, | Performed by: NURSE PRACTITIONER

## 2019-08-08 PROCEDURE — 3077F PR MOST RECENT SYSTOLIC BLOOD PRESSURE >= 140 MM HG: ICD-10-PCS | Mod: CPTII,S$GLB,, | Performed by: NURSE PRACTITIONER

## 2019-08-08 PROCEDURE — 3078F DIAST BP <80 MM HG: CPT | Mod: CPTII,S$GLB,, | Performed by: NURSE PRACTITIONER

## 2019-08-08 PROCEDURE — 99499 UNLISTED E&M SERVICE: CPT | Mod: S$GLB,,, | Performed by: NURSE PRACTITIONER

## 2019-08-08 PROCEDURE — 99999 PR PBB SHADOW E&M-EST. PATIENT-LVL IV: CPT | Mod: PBBFAC,,, | Performed by: NURSE PRACTITIONER

## 2019-08-08 PROCEDURE — 3077F SYST BP >= 140 MM HG: CPT | Mod: CPTII,S$GLB,, | Performed by: NURSE PRACTITIONER

## 2019-08-08 PROCEDURE — 80053 COMPREHEN METABOLIC PANEL: CPT

## 2019-08-08 PROCEDURE — 36415 COLL VENOUS BLD VENIPUNCTURE: CPT

## 2019-08-08 PROCEDURE — 85027 COMPLETE CBC AUTOMATED: CPT

## 2019-08-08 PROCEDURE — 99499 RISK ADDL DX/OHS AUDIT: ICD-10-PCS | Mod: S$GLB,,, | Performed by: NURSE PRACTITIONER

## 2019-08-08 PROCEDURE — 99999 PR PBB SHADOW E&M-EST. PATIENT-LVL IV: ICD-10-PCS | Mod: PBBFAC,,, | Performed by: NURSE PRACTITIONER

## 2019-08-08 PROCEDURE — 99215 OFFICE O/P EST HI 40 MIN: CPT | Mod: S$GLB,,, | Performed by: NURSE PRACTITIONER

## 2019-08-08 RX ORDER — FOLIC ACID 0.4 MG
400 TABLET ORAL DAILY
Qty: 30 TABLET | Refills: 11 | Status: SHIPPED | OUTPATIENT
Start: 2019-08-08 | End: 2019-08-09 | Stop reason: SDUPTHER

## 2019-08-08 RX ORDER — SODIUM CHLORIDE 0.9 % (FLUSH) 0.9 %
10 SYRINGE (ML) INJECTION
Status: CANCELLED | OUTPATIENT
Start: 2019-08-08

## 2019-08-08 RX ORDER — PROMETHAZINE HYDROCHLORIDE 25 MG/1
25 TABLET ORAL EVERY 6 HOURS PRN
Qty: 60 TABLET | Refills: 3 | Status: SHIPPED | OUTPATIENT
Start: 2019-08-08 | End: 2019-08-15

## 2019-08-08 RX ORDER — ENOXAPARIN SODIUM 150 MG/ML
120 INJECTION SUBCUTANEOUS DAILY
Qty: 30 SYRINGE | Refills: 6 | Status: SHIPPED | OUTPATIENT
Start: 2019-08-08 | End: 2019-08-08 | Stop reason: SDUPTHER

## 2019-08-08 RX ORDER — HEPARIN 100 UNIT/ML
500 SYRINGE INTRAVENOUS
Status: CANCELLED | OUTPATIENT
Start: 2019-08-08

## 2019-08-08 RX ORDER — DICYCLOMINE HYDROCHLORIDE 10 MG/1
10 CAPSULE ORAL 2 TIMES DAILY
Qty: 90 CAPSULE | Refills: 0 | Status: SHIPPED | OUTPATIENT
Start: 2019-08-08 | End: 2019-09-16 | Stop reason: SDUPTHER

## 2019-08-08 RX ORDER — SODIUM CHLORIDE 0.9 % (FLUSH) 0.9 %
10 SYRINGE (ML) INJECTION
Status: CANCELLED | OUTPATIENT
Start: 2019-08-10

## 2019-08-08 RX ORDER — ONDANSETRON 8 MG/1
8 TABLET, ORALLY DISINTEGRATING ORAL EVERY 8 HOURS PRN
Qty: 30 TABLET | Refills: 3 | Status: ON HOLD | OUTPATIENT
Start: 2019-08-08 | End: 2019-09-04 | Stop reason: HOSPADM

## 2019-08-08 RX ORDER — HEPARIN 100 UNIT/ML
500 SYRINGE INTRAVENOUS
Status: CANCELLED | OUTPATIENT
Start: 2019-08-10

## 2019-08-08 RX ORDER — ENOXAPARIN SODIUM 100 MG/ML
40 INJECTION SUBCUTANEOUS DAILY
Qty: 12 ML | Refills: 1 | Status: CANCELLED | OUTPATIENT
Start: 2019-08-08

## 2019-08-08 RX ORDER — LIDOCAINE AND PRILOCAINE 25; 25 MG/G; MG/G
CREAM TOPICAL ONCE
Qty: 30 G | Refills: 0 | Status: SHIPPED | OUTPATIENT
Start: 2019-08-08 | End: 2019-08-08

## 2019-08-08 RX ORDER — GABAPENTIN 100 MG/1
100 CAPSULE ORAL 2 TIMES DAILY
Qty: 60 CAPSULE | Refills: 3 | Status: SHIPPED | OUTPATIENT
Start: 2019-08-08 | End: 2019-10-16 | Stop reason: SDUPTHER

## 2019-08-08 RX ORDER — ENOXAPARIN SODIUM 150 MG/ML
120 INJECTION SUBCUTANEOUS DAILY
Qty: 24 ML | Refills: 6 | Status: SHIPPED | OUTPATIENT
Start: 2019-08-08 | End: 2019-09-13 | Stop reason: SDUPTHER

## 2019-08-08 NOTE — Clinical Note
RTC 3 weeks with labs day before on the [facility] (CBC,CMP,Mag), to see me, and cycle #2 of cisplatin and pemetrexed. Patient will require IVFs on day 2 on the [facility].

## 2019-08-09 ENCOUNTER — TELEPHONE (OUTPATIENT)
Dept: HEMATOLOGY/ONCOLOGY | Facility: CLINIC | Age: 74
End: 2019-08-09

## 2019-08-09 ENCOUNTER — INFUSION (OUTPATIENT)
Dept: INFUSION THERAPY | Facility: HOSPITAL | Age: 74
End: 2019-08-09
Attending: INTERNAL MEDICINE
Payer: MEDICARE

## 2019-08-09 VITALS
TEMPERATURE: 98 F | HEIGHT: 66 IN | WEIGHT: 176.13 LBS | HEART RATE: 77 BPM | RESPIRATION RATE: 18 BRPM | DIASTOLIC BLOOD PRESSURE: 69 MMHG | SYSTOLIC BLOOD PRESSURE: 150 MMHG | BODY MASS INDEX: 28.31 KG/M2

## 2019-08-09 DIAGNOSIS — C45.0 MALIGNANT PLEURAL MESOTHELIOMA: Primary | ICD-10-CM

## 2019-08-09 PROCEDURE — 96361 HYDRATE IV INFUSION ADD-ON: CPT

## 2019-08-09 PROCEDURE — A4216 STERILE WATER/SALINE, 10 ML: HCPCS | Performed by: INTERNAL MEDICINE

## 2019-08-09 PROCEDURE — 96367 TX/PROPH/DG ADDL SEQ IV INF: CPT

## 2019-08-09 PROCEDURE — 25000003 PHARM REV CODE 250: Performed by: INTERNAL MEDICINE

## 2019-08-09 PROCEDURE — 96413 CHEMO IV INFUSION 1 HR: CPT

## 2019-08-09 PROCEDURE — 63600175 PHARM REV CODE 636 W HCPCS: Performed by: NURSE PRACTITIONER

## 2019-08-09 PROCEDURE — 96411 CHEMO IV PUSH ADDL DRUG: CPT

## 2019-08-09 PROCEDURE — 63600175 PHARM REV CODE 636 W HCPCS: Mod: JG | Performed by: INTERNAL MEDICINE

## 2019-08-09 RX ORDER — FOLIC ACID 0.4 MG
400 TABLET ORAL DAILY
Qty: 30 TABLET | Refills: 11 | Status: ON HOLD | OUTPATIENT
Start: 2019-08-09 | End: 2021-01-01 | Stop reason: HOSPADM

## 2019-08-09 RX ORDER — SODIUM CHLORIDE 0.9 % (FLUSH) 0.9 %
10 SYRINGE (ML) INJECTION
Status: DISCONTINUED | OUTPATIENT
Start: 2019-08-09 | End: 2019-08-09 | Stop reason: HOSPADM

## 2019-08-09 RX ORDER — HEPARIN 100 UNIT/ML
500 SYRINGE INTRAVENOUS
Status: DISCONTINUED | OUTPATIENT
Start: 2019-08-09 | End: 2019-08-09 | Stop reason: HOSPADM

## 2019-08-09 RX ORDER — DEXAMETHASONE 4 MG/1
8 TABLET ORAL EVERY 12 HOURS
Qty: 60 TABLET | Refills: 0 | Status: SHIPPED | OUTPATIENT
Start: 2019-08-09 | End: 2019-11-27

## 2019-08-09 RX ADMIN — HEPARIN 500 UNITS: 100 SYRINGE at 01:08

## 2019-08-09 RX ADMIN — SODIUM CHLORIDE: 0.9 INJECTION, SOLUTION INTRAVENOUS at 08:08

## 2019-08-09 RX ADMIN — APREPITANT 130 MG: 130 INJECTION, EMULSION INTRAVENOUS at 09:08

## 2019-08-09 RX ADMIN — DEXAMETHASONE SODIUM PHOSPHATE: 4 INJECTION, SOLUTION INTRA-ARTICULAR; INTRALESIONAL; INTRAMUSCULAR; INTRAVENOUS; SOFT TISSUE at 08:08

## 2019-08-09 RX ADMIN — SODIUM CHLORIDE: 0.9 INJECTION, SOLUTION INTRAVENOUS at 12:08

## 2019-08-09 RX ADMIN — Medication 10 ML: at 01:08

## 2019-08-09 RX ADMIN — SODIUM CHLORIDE 950 MG: 9 INJECTION, SOLUTION INTRAVENOUS at 10:08

## 2019-08-09 RX ADMIN — CISPLATIN 143 MG: 1 INJECTION INTRAVENOUS at 10:08

## 2019-08-09 NOTE — PLAN OF CARE
Problem: Adult Inpatient Plan of Care  Goal: Patient-Specific Goal (Individualization)  Outcome: Ongoing (interventions implemented as appropriate)  0800-Labs , hx, and medications reviewed, patient was seen by NP yesterday.  NP contacted to report that patient was unable to obtain folic acid prescription yet and has not been taking folic acid for one week prior to alimta therapy as instructed in the treatment plan.  Also reported to NP that patient does not have prescription for PO steroid as instructed in treatment plan.  NP verbalized understanding, stated okay proceed with treatment and have patient begin folic acid and PO steroid today.  Patient updated, verbalized understanding of new medication to take.  Assessment completed. Discussed plan of care with patient. Patient in agreement. Chair reclined and warm blanket and snack offered.

## 2019-08-09 NOTE — TELEPHONE ENCOUNTER
----- Message from Antonella Goetz NP sent at 8/9/2019  9:36 AM CDT -----  RTC 3 weeks with labs day before on the [facility] (CBC,CMP,Mag), to see me, and cycle #2 of cisplatin and pemetrexed. Patient will require IVFs on day 2 on the [facility].

## 2019-08-09 NOTE — PLAN OF CARE
Problem: Adult Inpatient Plan of Care  Goal: Plan of Care Review  Outcome: Ongoing (interventions implemented as appropriate)  1316-Patient tolerated treatment well. Discharged without complaints or S/S of adverse event. AVS given.  Instructed to call provider for any questions or concerns.

## 2019-08-09 NOTE — TELEPHONE ENCOUNTER
----- Message from Antonella Goetz NP sent at 8/9/2019  9:36 AM CDT -----  RTC 3 weeks with labs day before on the [facility] (CBC,CMP,Mag), to see me, and cycle #2 of cisplatin and pemetrexed. Patient will require IVFs on day 2 on the [facility].

## 2019-08-10 ENCOUNTER — INFUSION (OUTPATIENT)
Dept: INFUSION THERAPY | Facility: HOSPITAL | Age: 74
End: 2019-08-10
Attending: INTERNAL MEDICINE
Payer: MEDICARE

## 2019-08-10 VITALS
DIASTOLIC BLOOD PRESSURE: 57 MMHG | BODY MASS INDEX: 28.31 KG/M2 | RESPIRATION RATE: 18 BRPM | HEART RATE: 68 BPM | TEMPERATURE: 98 F | HEIGHT: 66 IN | WEIGHT: 176.13 LBS | SYSTOLIC BLOOD PRESSURE: 114 MMHG

## 2019-08-10 DIAGNOSIS — C45.0 MALIGNANT PLEURAL MESOTHELIOMA: Primary | ICD-10-CM

## 2019-08-10 LAB
ACID FAST MOD KINY STN SPEC: NORMAL
MYCOBACTERIUM SPEC QL CULT: NORMAL

## 2019-08-10 PROCEDURE — 63600175 PHARM REV CODE 636 W HCPCS: Performed by: INTERNAL MEDICINE

## 2019-08-10 PROCEDURE — 25000003 PHARM REV CODE 250: Performed by: INTERNAL MEDICINE

## 2019-08-10 PROCEDURE — A4216 STERILE WATER/SALINE, 10 ML: HCPCS | Performed by: INTERNAL MEDICINE

## 2019-08-10 RX ORDER — SODIUM CHLORIDE 0.9 % (FLUSH) 0.9 %
10 SYRINGE (ML) INJECTION
Status: DISCONTINUED | OUTPATIENT
Start: 2019-08-10 | End: 2019-08-10 | Stop reason: HOSPADM

## 2019-08-10 RX ORDER — HEPARIN 100 UNIT/ML
500 SYRINGE INTRAVENOUS
Status: DISCONTINUED | OUTPATIENT
Start: 2019-08-10 | End: 2019-08-10 | Stop reason: HOSPADM

## 2019-08-10 RX ADMIN — SODIUM CHLORIDE 1000 ML: 0.9 INJECTION, SOLUTION INTRAVENOUS at 09:08

## 2019-08-10 RX ADMIN — HEPARIN 500 UNITS: 100 SYRINGE at 10:08

## 2019-08-10 RX ADMIN — Medication 10 ML: at 10:08

## 2019-08-10 NOTE — PLAN OF CARE
Problem: Adult Inpatient Plan of Care  Goal: Plan of Care Review  Did well with fluids today. Port open up at suture line

## 2019-08-12 ENCOUNTER — TELEPHONE (OUTPATIENT)
Dept: HEMATOLOGY/ONCOLOGY | Facility: CLINIC | Age: 74
End: 2019-08-12

## 2019-08-12 NOTE — TELEPHONE ENCOUNTER
Discussed patient's medications, she will try the bentyl that is prescribed. Patient having bowel movements, it is hard for her to go initially but once she does start it is okay. She will try a senna daily. Nausea is controlled with current medication. She will call if her mother does not experience relief with the bentyl.

## 2019-08-12 NOTE — TELEPHONE ENCOUNTER
----- Message from Jayla Schultz sent at 8/12/2019  2:58 PM CDT -----  Contact: Marly (daughter)  Who is calling:: Marly  Provider treating:: Dr Burt  Current symptom:: nausea and stomach cramps   Are you currently receiving treatment for your diagnosis:: Yes  When was your last treatment:: 08/09  How long have you had the symptom:: 3 days  Communication preference:: [phone]  Additional Info::

## 2019-08-14 ENCOUNTER — TELEPHONE (OUTPATIENT)
Dept: HEMATOLOGY/ONCOLOGY | Facility: CLINIC | Age: 74
End: 2019-08-14

## 2019-08-14 NOTE — TELEPHONE ENCOUNTER
Discussed with daughter about medications. Patient has not had linzess or any OTC stool softeners. Recommended trying imodium- 2 after first BM and one for every BM after for up to 8 a day. If this does not help to call in a day or two to let us know. Discussed small frequent meals, high calorie food and nutrient rich. Patient likes boost supplements, encouraged daughter to have her drink a couple a day between meals

## 2019-08-14 NOTE — TELEPHONE ENCOUNTER
----- Message from Letitia Spicer sent at 8/14/2019  8:53 AM CDT -----  Contact: pt aMrly (daughter)  Needs Advice    Reason for call: Calling to speak with nurse pertaining to loose stool and diet        Communication Preference: [phone]    Additional Information: Please call number provided.

## 2019-08-15 ENCOUNTER — PATIENT MESSAGE (OUTPATIENT)
Dept: HEMATOLOGY/ONCOLOGY | Facility: CLINIC | Age: 74
End: 2019-08-15

## 2019-08-16 DIAGNOSIS — B37.9 YEAST INFECTION: Primary | ICD-10-CM

## 2019-08-16 RX ORDER — FLUCONAZOLE 150 MG/1
150 TABLET ORAL DAILY
Qty: 1 TABLET | Refills: 0 | Status: SHIPPED | OUTPATIENT
Start: 2019-08-16 | End: 2019-08-17

## 2019-08-20 ENCOUNTER — TELEPHONE (OUTPATIENT)
Dept: FAMILY MEDICINE | Facility: CLINIC | Age: 74
End: 2019-08-20

## 2019-08-20 ENCOUNTER — TELEPHONE (OUTPATIENT)
Dept: HOME HEALTH SERVICES | Facility: HOSPITAL | Age: 74
End: 2019-08-20

## 2019-08-20 NOTE — TELEPHONE ENCOUNTER
----- Message from Ally Funez sent at 8/20/2019  3:35 PM CDT -----  Contact: Janeth ( [facility] )  Name of Who is Calling: Janeth ( [facility] )       What is the request in detail: Janeth ( [facility] ) is requesting a call from staff she states the patient would like to discontinue services she feels she is doing better .....Please contact to further discuss and advise.     Can the clinic reply by [facility]: no     What Number to Call Back if not in [facility]: [phone]

## 2019-08-20 NOTE — TELEPHONE ENCOUNTER
I called and spoke to Janeth at [place]. The pt is requesting to be discharged from their services. Pt and family feels that she is stable and her drain has been removed. Janeth is calling to notify the PCP.

## 2019-08-21 ENCOUNTER — PATIENT MESSAGE (OUTPATIENT)
Dept: HEMATOLOGY/ONCOLOGY | Facility: CLINIC | Age: 74
End: 2019-08-21

## 2019-08-21 DIAGNOSIS — C45.0 MALIGNANT PLEURAL MESOTHELIOMA: Primary | ICD-10-CM

## 2019-08-22 ENCOUNTER — INFUSION (OUTPATIENT)
Dept: INFUSION THERAPY | Facility: HOSPITAL | Age: 74
End: 2019-08-22
Attending: INTERNAL MEDICINE
Payer: MEDICARE

## 2019-08-22 ENCOUNTER — TELEPHONE (OUTPATIENT)
Dept: HEMATOLOGY/ONCOLOGY | Facility: CLINIC | Age: 74
End: 2019-08-22

## 2019-08-22 ENCOUNTER — PATIENT OUTREACH (OUTPATIENT)
Dept: ADMINISTRATIVE | Facility: HOSPITAL | Age: 74
End: 2019-08-22

## 2019-08-22 ENCOUNTER — PATIENT MESSAGE (OUTPATIENT)
Dept: HEMATOLOGY/ONCOLOGY | Facility: CLINIC | Age: 74
End: 2019-08-22

## 2019-08-22 ENCOUNTER — HOSPITAL ENCOUNTER (OUTPATIENT)
Dept: RADIOLOGY | Facility: HOSPITAL | Age: 74
Discharge: HOME | End: 2019-08-22
Attending: INTERNAL MEDICINE
Payer: MEDICARE

## 2019-08-22 ENCOUNTER — OFFICE VISIT (OUTPATIENT)
Dept: HEMATOLOGY/ONCOLOGY | Facility: CLINIC | Age: 74
End: 2019-08-22
Payer: MEDICARE

## 2019-08-22 VITALS
OXYGEN SATURATION: 98 % | BODY MASS INDEX: 27.32 KG/M2 | HEART RATE: 86 BPM | SYSTOLIC BLOOD PRESSURE: 140 MMHG | HEIGHT: 66 IN | WEIGHT: 170 LBS | TEMPERATURE: 98 F | DIASTOLIC BLOOD PRESSURE: 70 MMHG | RESPIRATION RATE: 18 BRPM

## 2019-08-22 DIAGNOSIS — C45.0 MALIGNANT PLEURAL MESOTHELIOMA: Primary | ICD-10-CM

## 2019-08-22 DIAGNOSIS — T45.1X5A CHEMOTHERAPY INDUCED NAUSEA AND VOMITING: Primary | ICD-10-CM

## 2019-08-22 DIAGNOSIS — M54.32 SCIATICA OF LEFT SIDE: ICD-10-CM

## 2019-08-22 DIAGNOSIS — T45.1X5A CHEMOTHERAPY INDUCED NAUSEA AND VOMITING: ICD-10-CM

## 2019-08-22 DIAGNOSIS — R11.2 CHEMOTHERAPY INDUCED NAUSEA AND VOMITING: Primary | ICD-10-CM

## 2019-08-22 DIAGNOSIS — D64.81 ANEMIA DUE TO ANTINEOPLASTIC CHEMOTHERAPY: ICD-10-CM

## 2019-08-22 DIAGNOSIS — C45.0 MALIGNANT PLEURAL MESOTHELIOMA: ICD-10-CM

## 2019-08-22 DIAGNOSIS — R06.09 DYSPNEA ON EXERTION: ICD-10-CM

## 2019-08-22 DIAGNOSIS — T45.1X5A CHEMOTHERAPY INDUCED NEUTROPENIA: ICD-10-CM

## 2019-08-22 DIAGNOSIS — R11.2 CHEMOTHERAPY INDUCED NAUSEA AND VOMITING: ICD-10-CM

## 2019-08-22 DIAGNOSIS — C45.9 MESOTHELIOMA: Primary | ICD-10-CM

## 2019-08-22 DIAGNOSIS — Z86.718 HISTORY OF DVT (DEEP VEIN THROMBOSIS): ICD-10-CM

## 2019-08-22 DIAGNOSIS — D70.1 CHEMOTHERAPY INDUCED NEUTROPENIA: ICD-10-CM

## 2019-08-22 DIAGNOSIS — R63.0 DECREASED APPETITE: ICD-10-CM

## 2019-08-22 DIAGNOSIS — R30.0 DYSURIA: ICD-10-CM

## 2019-08-22 DIAGNOSIS — G89.3 NEOPLASM RELATED PAIN (ACUTE) (CHRONIC): ICD-10-CM

## 2019-08-22 DIAGNOSIS — T45.1X5A ANEMIA DUE TO ANTINEOPLASTIC CHEMOTHERAPY: ICD-10-CM

## 2019-08-22 LAB
ALBUMIN SERPL BCP-MCNC: 2.6 G/DL (ref 3.5–5.2)
ALP SERPL-CCNC: 79 U/L (ref 55–135)
ALT SERPL W/O P-5'-P-CCNC: 16 U/L (ref 10–44)
ANION GAP SERPL CALC-SCNC: 6 MMOL/L (ref 8–16)
AST SERPL-CCNC: 11 U/L (ref 10–40)
BILIRUB SERPL-MCNC: 0.1 MG/DL (ref 0.1–1)
BILIRUB UR QL STRIP: NEGATIVE
BUN SERPL-MCNC: 23 MG/DL (ref 8–23)
CALCIUM SERPL-MCNC: 7.2 MG/DL (ref 8.7–10.5)
CHLORIDE SERPL-SCNC: 111 MMOL/L (ref 95–110)
CLARITY UR REFRACT.AUTO: CLEAR
CO2 SERPL-SCNC: 20 MMOL/L (ref 23–29)
COLOR UR AUTO: YELLOW
CREAT SERPL-MCNC: 0.9 MG/DL (ref 0.5–1.4)
ERYTHROCYTE [DISTWIDTH] IN BLOOD BY AUTOMATED COUNT: 14.1 % (ref 11.5–14.5)
EST. GFR  (AFRICAN AMERICAN): >60 ML/MIN/1.73 M^2
EST. GFR  (NON AFRICAN AMERICAN): >60 ML/MIN/1.73 M^2
GLUCOSE SERPL-MCNC: 128 MG/DL (ref 70–110)
GLUCOSE UR QL STRIP: NEGATIVE
HCT VFR BLD AUTO: 25.2 % (ref 37–48.5)
HGB BLD-MCNC: 8.2 G/DL (ref 12–16)
HGB UR QL STRIP: NEGATIVE
IMM GRANULOCYTES # BLD AUTO: 0 K/UL (ref 0–0.04)
KETONES UR QL STRIP: NEGATIVE
LEUKOCYTE ESTERASE UR QL STRIP: NEGATIVE
MCH RBC QN AUTO: 29.7 PG (ref 27–31)
MCHC RBC AUTO-ENTMCNC: 32.5 G/DL (ref 32–36)
MCV RBC AUTO: 91 FL (ref 82–98)
NEUTROPHILS # BLD AUTO: 0.8 K/UL (ref 1.8–7.7)
NITRITE UR QL STRIP: NEGATIVE
PH UR STRIP: 6 [PH] (ref 5–8)
PLATELET # BLD AUTO: 160 K/UL (ref 150–350)
PMV BLD AUTO: 9.4 FL (ref 9.2–12.9)
POTASSIUM SERPL-SCNC: 3.3 MMOL/L (ref 3.5–5.1)
PROT SERPL-MCNC: 5.3 G/DL (ref 6–8.4)
PROT UR QL STRIP: NEGATIVE
RBC # BLD AUTO: 2.76 M/UL (ref 4–5.4)
SODIUM SERPL-SCNC: 137 MMOL/L (ref 136–145)
SP GR UR STRIP: 1.01 (ref 1–1.03)
URN SPEC COLLECT METH UR: NORMAL
WBC # BLD AUTO: 2.42 K/UL (ref 3.9–12.7)

## 2019-08-22 PROCEDURE — 3077F SYST BP >= 140 MM HG: CPT | Mod: CPTII,S$GLB,, | Performed by: NURSE PRACTITIONER

## 2019-08-22 PROCEDURE — 1101F PR PT FALLS ASSESS DOC 0-1 FALLS W/OUT INJ PAST YR: ICD-10-PCS | Mod: CPTII,S$GLB,, | Performed by: NURSE PRACTITIONER

## 2019-08-22 PROCEDURE — 87086 URINE CULTURE/COLONY COUNT: CPT

## 2019-08-22 PROCEDURE — 99215 OFFICE O/P EST HI 40 MIN: CPT | Mod: S$GLB,,, | Performed by: NURSE PRACTITIONER

## 2019-08-22 PROCEDURE — 80053 COMPREHEN METABOLIC PANEL: CPT

## 2019-08-22 PROCEDURE — 3077F PR MOST RECENT SYSTOLIC BLOOD PRESSURE >= 140 MM HG: ICD-10-PCS | Mod: CPTII,S$GLB,, | Performed by: NURSE PRACTITIONER

## 2019-08-22 PROCEDURE — 96365 THER/PROPH/DIAG IV INF INIT: CPT

## 2019-08-22 PROCEDURE — 99499 UNLISTED E&M SERVICE: CPT | Mod: S$GLB,,, | Performed by: NURSE PRACTITIONER

## 2019-08-22 PROCEDURE — 99999 PR PBB SHADOW E&M-EST. PATIENT-LVL IV: ICD-10-PCS | Mod: PBBFAC,,, | Performed by: NURSE PRACTITIONER

## 2019-08-22 PROCEDURE — 99499 RISK ADDL DX/OHS AUDIT: ICD-10-PCS | Mod: S$GLB,,, | Performed by: NURSE PRACTITIONER

## 2019-08-22 PROCEDURE — 85027 COMPLETE CBC AUTOMATED: CPT

## 2019-08-22 PROCEDURE — 99215 PR OFFICE/OUTPT VISIT, EST, LEVL V, 40-54 MIN: ICD-10-PCS | Mod: S$GLB,,, | Performed by: NURSE PRACTITIONER

## 2019-08-22 PROCEDURE — 71046 XR CHEST PA AND LATERAL: ICD-10-PCS | Mod: 26,,, | Performed by: RADIOLOGY

## 2019-08-22 PROCEDURE — 3078F DIAST BP <80 MM HG: CPT | Mod: CPTII,S$GLB,, | Performed by: NURSE PRACTITIONER

## 2019-08-22 PROCEDURE — 1101F PT FALLS ASSESS-DOCD LE1/YR: CPT | Mod: CPTII,S$GLB,, | Performed by: NURSE PRACTITIONER

## 2019-08-22 PROCEDURE — 71046 X-RAY EXAM CHEST 2 VIEWS: CPT | Mod: TC,FY,PO

## 2019-08-22 PROCEDURE — 99999 PR PBB SHADOW E&M-EST. PATIENT-LVL IV: CPT | Mod: PBBFAC,,, | Performed by: NURSE PRACTITIONER

## 2019-08-22 PROCEDURE — 96361 HYDRATE IV INFUSION ADD-ON: CPT

## 2019-08-22 PROCEDURE — 3078F PR MOST RECENT DIASTOLIC BLOOD PRESSURE < 80 MM HG: ICD-10-PCS | Mod: CPTII,S$GLB,, | Performed by: NURSE PRACTITIONER

## 2019-08-22 PROCEDURE — 63600175 PHARM REV CODE 636 W HCPCS: Performed by: NURSE PRACTITIONER

## 2019-08-22 PROCEDURE — 71046 X-RAY EXAM CHEST 2 VIEWS: CPT | Mod: 26,,, | Performed by: RADIOLOGY

## 2019-08-22 PROCEDURE — 81003 URINALYSIS AUTO W/O SCOPE: CPT

## 2019-08-22 RX ORDER — SODIUM CHLORIDE 0.9 % (FLUSH) 0.9 %
10 SYRINGE (ML) INJECTION
Status: CANCELLED | OUTPATIENT
Start: 2019-08-22

## 2019-08-22 RX ORDER — HEPARIN 100 UNIT/ML
500 SYRINGE INTRAVENOUS
Status: CANCELLED | OUTPATIENT
Start: 2019-08-22

## 2019-08-22 RX ORDER — ONDANSETRON HCL IN 0.9 % NACL 8 MG/50 ML
8 INTRAVENOUS SOLUTION, PIGGYBACK (ML) INTRAVENOUS
Status: COMPLETED | OUTPATIENT
Start: 2019-08-22 | End: 2019-08-22

## 2019-08-22 RX ORDER — PROMETHAZINE HYDROCHLORIDE 25 MG/1
25 TABLET ORAL EVERY 6 HOURS PRN
Status: ON HOLD | COMMUNITY
End: 2019-09-04 | Stop reason: HOSPADM

## 2019-08-22 RX ORDER — HEPARIN 100 UNIT/ML
500 SYRINGE INTRAVENOUS
Status: DISCONTINUED | OUTPATIENT
Start: 2019-08-22 | End: 2019-08-22 | Stop reason: HOSPADM

## 2019-08-22 RX ORDER — SODIUM CHLORIDE 0.9 % (FLUSH) 0.9 %
10 SYRINGE (ML) INJECTION
Status: DISCONTINUED | OUTPATIENT
Start: 2019-08-22 | End: 2019-08-22 | Stop reason: HOSPADM

## 2019-08-22 RX ADMIN — SODIUM CHLORIDE 1000 ML: 0.9 INJECTION, SOLUTION INTRAVENOUS at 03:08

## 2019-08-22 RX ADMIN — HEPARIN 500 UNITS: 100 SYRINGE at 04:08

## 2019-08-22 RX ADMIN — ONDANSETRON 8 MG: 2 INJECTION INTRAMUSCULAR; INTRAVENOUS at 03:08

## 2019-08-22 NOTE — NURSING
Patient did well with 1 L of ivfs and zofran. CBC and CMP drawn. Results will be called by GRACE Goetz tomorrow to daughter Gely. Port flushed, hep locked and deaccessed. AVS given. Reviewed upcoming appts. Discharged home, escorted in wheelchair by daughter.

## 2019-08-22 NOTE — TELEPHONE ENCOUNTER
----- Message from Jayla Schultz sent at 8/22/2019  8:06 AM CDT -----  Contact: gely pichardo (daughter)  Staff Message     Caller name: Gely    Reason for call: While bathing her mother last night she discovered a large knot on the left side of her lung. Pt is starting to experiencing shortness of breath. She's worried and would like her mother to have an xray ASAP.        Communication Preference:  [phone] or [phone]    Additional Information: When returning the call, ask for her by name. State it's [facility] and urgent, no other info.

## 2019-08-22 NOTE — TELEPHONE ENCOUNTER
Spoke to patient's daughter. X-ray moved to 11am. Will call once resulted to let them know if she needs to have fluid removed from her lung or if other intervention is needed.
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
## 2019-08-22 NOTE — TELEPHONE ENCOUNTER
Spoke to daughter, let her know that x-ray showed that her pleural effusion was resolving. Patient will come to clinic today since she is SOB as well as new lump on left side.

## 2019-08-23 ENCOUNTER — TELEPHONE (OUTPATIENT)
Dept: HEMATOLOGY/ONCOLOGY | Facility: CLINIC | Age: 74
End: 2019-08-23

## 2019-08-23 ENCOUNTER — INFUSION (OUTPATIENT)
Dept: INFUSION THERAPY | Facility: HOSPITAL | Age: 74
End: 2019-08-23
Attending: INTERNAL MEDICINE
Payer: MEDICARE

## 2019-08-23 ENCOUNTER — PATIENT OUTREACH (OUTPATIENT)
Dept: ADMINISTRATIVE | Facility: HOSPITAL | Age: 74
End: 2019-08-23

## 2019-08-23 VITALS
DIASTOLIC BLOOD PRESSURE: 62 MMHG | RESPIRATION RATE: 18 BRPM | HEART RATE: 71 BPM | TEMPERATURE: 98 F | SYSTOLIC BLOOD PRESSURE: 124 MMHG

## 2019-08-23 DIAGNOSIS — D70.1 CHEMOTHERAPY INDUCED NEUTROPENIA: ICD-10-CM

## 2019-08-23 DIAGNOSIS — D64.81 ANEMIA DUE TO ANTINEOPLASTIC CHEMOTHERAPY: Primary | ICD-10-CM

## 2019-08-23 DIAGNOSIS — R11.2 CHEMOTHERAPY INDUCED NAUSEA AND VOMITING: ICD-10-CM

## 2019-08-23 DIAGNOSIS — T45.1X5A CHEMOTHERAPY INDUCED NEUTROPENIA: ICD-10-CM

## 2019-08-23 DIAGNOSIS — T45.1X5A ANEMIA DUE TO ANTINEOPLASTIC CHEMOTHERAPY: Primary | ICD-10-CM

## 2019-08-23 DIAGNOSIS — T45.1X5A CHEMOTHERAPY INDUCED NAUSEA AND VOMITING: ICD-10-CM

## 2019-08-23 LAB
BACTERIA UR CULT: NORMAL
BLD PROD TYP BPU: NORMAL
BLOOD UNIT EXPIRATION DATE: NORMAL
BLOOD UNIT TYPE CODE: 5100
BLOOD UNIT TYPE: NORMAL
CODING SYSTEM: NORMAL
DISPENSE STATUS: NORMAL
NUM UNITS TRANS PACKED RBC: NORMAL

## 2019-08-23 PROCEDURE — 96372 THER/PROPH/DIAG INJ SC/IM: CPT

## 2019-08-23 PROCEDURE — 63600175 PHARM REV CODE 636 W HCPCS: Mod: JG | Performed by: NURSE PRACTITIONER

## 2019-08-23 PROCEDURE — 25000003 PHARM REV CODE 250: Performed by: NURSE PRACTITIONER

## 2019-08-23 PROCEDURE — P9038 RBC IRRADIATED: HCPCS

## 2019-08-23 PROCEDURE — 27201040 HC RC 50 FILTER

## 2019-08-23 PROCEDURE — 86920 COMPATIBILITY TEST SPIN: CPT

## 2019-08-23 PROCEDURE — 36430 TRANSFUSION BLD/BLD COMPNT: CPT

## 2019-08-23 RX ORDER — HYDROCODONE BITARTRATE AND ACETAMINOPHEN 500; 5 MG/1; MG/1
TABLET ORAL ONCE
Status: CANCELLED | OUTPATIENT
Start: 2019-08-23 | End: 2019-08-23

## 2019-08-23 RX ORDER — ACETAMINOPHEN 325 MG/1
650 TABLET ORAL
Status: COMPLETED | OUTPATIENT
Start: 2019-08-23 | End: 2019-08-23

## 2019-08-23 RX ORDER — DIPHENHYDRAMINE HCL 25 MG
25 CAPSULE ORAL
Status: CANCELLED | OUTPATIENT
Start: 2019-08-23

## 2019-08-23 RX ORDER — HYDROCODONE BITARTRATE AND ACETAMINOPHEN 500; 5 MG/1; MG/1
TABLET ORAL ONCE
Status: DISCONTINUED | OUTPATIENT
Start: 2019-08-23 | End: 2019-08-23 | Stop reason: HOSPADM

## 2019-08-23 RX ORDER — DIPHENHYDRAMINE HCL 25 MG
25 CAPSULE ORAL
Status: COMPLETED | OUTPATIENT
Start: 2019-08-23 | End: 2019-08-23

## 2019-08-23 RX ORDER — ACETAMINOPHEN 325 MG/1
650 TABLET ORAL
Status: CANCELLED | OUTPATIENT
Start: 2019-08-23

## 2019-08-23 RX ADMIN — DIPHENHYDRAMINE HYDROCHLORIDE 25 MG: 25 CAPSULE ORAL at 01:08

## 2019-08-23 RX ADMIN — FILGRASTIM 480 MCG: 480 INJECTION, SOLUTION INTRAVENOUS; SUBCUTANEOUS at 03:08

## 2019-08-23 RX ADMIN — ACETAMINOPHEN 650 MG: 325 TABLET ORAL at 01:08

## 2019-08-23 NOTE — TELEPHONE ENCOUNTER
Spoke with patient's daughter. She is calling to state her mom is feeling weak and short of breath.  Spoke with sun. 1 unit PBRCs ordered for today and scheduled.  Daughter thanked nurse.

## 2019-08-23 NOTE — PLAN OF CARE
Problem: Adult Inpatient Plan of Care  Goal: Plan of Care Review  Outcome: Ongoing (interventions implemented as appropriate)  Pt tolerated 1 unit PRBC well.  No s/s of reaction. Vitals stable, NAD.  Neupogen 1 of 3 administered.  Will return tomorrow and Monday for remaining doses. Pt voiced understanding.

## 2019-08-23 NOTE — TELEPHONE ENCOUNTER
----- Message from Letitia Spicer sent at 8/23/2019  8:58 AM CDT -----  Contact: pt daughter  Tera Troy    [phone]       Daughter states she received a call on yesterday telling her to bring her mom back today but she does nit know what time? Please call to advise. Thanks    Communication: [phone]

## 2019-08-24 ENCOUNTER — INFUSION (OUTPATIENT)
Dept: INFUSION THERAPY | Facility: HOSPITAL | Age: 74
End: 2019-08-24
Attending: INTERNAL MEDICINE
Payer: MEDICARE

## 2019-08-24 VITALS — WEIGHT: 169 LBS | BODY MASS INDEX: 27.16 KG/M2 | HEIGHT: 66 IN

## 2019-08-24 DIAGNOSIS — R11.2 CHEMOTHERAPY INDUCED NAUSEA AND VOMITING: Primary | ICD-10-CM

## 2019-08-24 DIAGNOSIS — T45.1X5A CHEMOTHERAPY INDUCED NAUSEA AND VOMITING: Primary | ICD-10-CM

## 2019-08-24 DIAGNOSIS — T45.1X5A CHEMOTHERAPY INDUCED NEUTROPENIA: ICD-10-CM

## 2019-08-24 DIAGNOSIS — D70.1 CHEMOTHERAPY INDUCED NEUTROPENIA: ICD-10-CM

## 2019-08-24 PROCEDURE — 63600175 PHARM REV CODE 636 W HCPCS: Mod: JG | Performed by: NURSE PRACTITIONER

## 2019-08-24 PROCEDURE — 96372 THER/PROPH/DIAG INJ SC/IM: CPT

## 2019-08-24 RX ADMIN — FILGRASTIM 480 MCG: 480 INJECTION, SOLUTION INTRAVENOUS; SUBCUTANEOUS at 08:08

## 2019-08-24 NOTE — NURSING
0850 pt here for neupogen injection, tolerated well to abdomen, no distress noted upon d/c to home

## 2019-08-26 ENCOUNTER — PATIENT MESSAGE (OUTPATIENT)
Dept: HEMATOLOGY/ONCOLOGY | Facility: CLINIC | Age: 74
End: 2019-08-26

## 2019-08-26 ENCOUNTER — INFUSION (OUTPATIENT)
Dept: INFUSION THERAPY | Facility: HOSPITAL | Age: 74
End: 2019-08-26
Attending: INTERNAL MEDICINE
Payer: MEDICARE

## 2019-08-26 DIAGNOSIS — R11.2 CHEMOTHERAPY INDUCED NAUSEA AND VOMITING: Primary | ICD-10-CM

## 2019-08-26 DIAGNOSIS — D70.1 CHEMOTHERAPY INDUCED NEUTROPENIA: ICD-10-CM

## 2019-08-26 DIAGNOSIS — T45.1X5A CHEMOTHERAPY INDUCED NEUTROPENIA: ICD-10-CM

## 2019-08-26 DIAGNOSIS — T45.1X5A CHEMOTHERAPY INDUCED NAUSEA AND VOMITING: Primary | ICD-10-CM

## 2019-08-26 PROCEDURE — 63600175 PHARM REV CODE 636 W HCPCS: Mod: JG | Performed by: NURSE PRACTITIONER

## 2019-08-26 PROCEDURE — 96372 THER/PROPH/DIAG INJ SC/IM: CPT

## 2019-08-26 RX ADMIN — FILGRASTIM 480 MCG: 480 INJECTION, SOLUTION INTRAVENOUS; SUBCUTANEOUS at 09:08

## 2019-08-28 ENCOUNTER — LAB VISIT (OUTPATIENT)
Dept: LAB | Facility: HOSPITAL | Age: 74
End: 2019-08-28
Attending: NURSE PRACTITIONER
Payer: MEDICARE

## 2019-08-28 DIAGNOSIS — R06.09 DYSPNEA ON EXERTION: ICD-10-CM

## 2019-08-28 DIAGNOSIS — R63.0 DECREASED APPETITE: ICD-10-CM

## 2019-08-28 DIAGNOSIS — C45.0 MALIGNANT PLEURAL MESOTHELIOMA: ICD-10-CM

## 2019-08-28 DIAGNOSIS — T45.1X5A CHEMOTHERAPY INDUCED NAUSEA AND VOMITING: ICD-10-CM

## 2019-08-28 DIAGNOSIS — G89.3 NEOPLASM RELATED PAIN (ACUTE) (CHRONIC): ICD-10-CM

## 2019-08-28 DIAGNOSIS — M54.32 SCIATICA OF LEFT SIDE: ICD-10-CM

## 2019-08-28 DIAGNOSIS — R11.2 CHEMOTHERAPY INDUCED NAUSEA AND VOMITING: ICD-10-CM

## 2019-08-28 DIAGNOSIS — R30.0 DYSURIA: ICD-10-CM

## 2019-08-28 DIAGNOSIS — Z86.718 HISTORY OF DVT (DEEP VEIN THROMBOSIS): ICD-10-CM

## 2019-08-28 LAB
ABO + RH BLD: NORMAL
ALBUMIN SERPL BCP-MCNC: 3.8 G/DL (ref 3.5–5.2)
ALP SERPL-CCNC: 162 U/L (ref 55–135)
ALT SERPL W/O P-5'-P-CCNC: 50 U/L (ref 10–44)
ANION GAP SERPL CALC-SCNC: 8 MMOL/L (ref 8–16)
AST SERPL-CCNC: 38 U/L (ref 10–40)
BASOPHILS NFR BLD: 0 % (ref 0–1.9)
BILIRUB SERPL-MCNC: 0.2 MG/DL (ref 0.1–1)
BLD GP AB SCN CELLS X3 SERPL QL: NORMAL
BUN SERPL-MCNC: 24 MG/DL (ref 8–23)
CALCIUM SERPL-MCNC: 10 MG/DL (ref 8.7–10.5)
CHLORIDE SERPL-SCNC: 106 MMOL/L (ref 95–110)
CO2 SERPL-SCNC: 23 MMOL/L (ref 23–29)
CREAT SERPL-MCNC: 1.6 MG/DL (ref 0.5–1.4)
EOSINOPHIL NFR BLD: 2 % (ref 0–8)
ERYTHROCYTE [DISTWIDTH] IN BLOOD BY AUTOMATED COUNT: 15.7 % (ref 11.5–14.5)
EST. GFR  (AFRICAN AMERICAN): 37 ML/MIN/1.73 M^2
EST. GFR  (NON AFRICAN AMERICAN): 32 ML/MIN/1.73 M^2
GLUCOSE SERPL-MCNC: 168 MG/DL (ref 70–110)
HCT VFR BLD AUTO: 40 % (ref 37–48.5)
HGB BLD-MCNC: 12.9 G/DL (ref 12–16)
LYMPHOCYTES NFR BLD: 15 % (ref 18–48)
MAGNESIUM SERPL-MCNC: 1.6 MG/DL (ref 1.6–2.6)
MCH RBC QN AUTO: 29.7 PG (ref 27–31)
MCHC RBC AUTO-ENTMCNC: 32.3 G/DL (ref 32–36)
MCV RBC AUTO: 92 FL (ref 82–98)
METAMYELOCYTES NFR BLD MANUAL: 13 %
MONOCYTES NFR BLD: 5 % (ref 4–15)
MYELOCYTES NFR BLD MANUAL: 6 %
NEUTROPHILS # BLD AUTO: ABNORMAL K/UL (ref 1.8–7.7)
NEUTROPHILS NFR BLD: 41 % (ref 38–73)
NEUTS BAND NFR BLD MANUAL: 16 %
PLATELET # BLD AUTO: 575 K/UL (ref 150–350)
PMV BLD AUTO: 9 FL (ref 9.2–12.9)
POTASSIUM SERPL-SCNC: 4.5 MMOL/L (ref 3.5–5.1)
PROMYELOCYTES NFR BLD MANUAL: 2 %
PROT SERPL-MCNC: 7.6 G/DL (ref 6–8.4)
RBC # BLD AUTO: 4.34 M/UL (ref 4–5.4)
SODIUM SERPL-SCNC: 137 MMOL/L (ref 136–145)
WBC # BLD AUTO: 41.93 K/UL (ref 3.9–12.7)

## 2019-08-28 PROCEDURE — 85060 BLOOD SMEAR INTERPRETATION: CPT | Mod: ,,, | Performed by: PATHOLOGY

## 2019-08-28 PROCEDURE — 36415 COLL VENOUS BLD VENIPUNCTURE: CPT

## 2019-08-28 PROCEDURE — 86850 RBC ANTIBODY SCREEN: CPT

## 2019-08-28 PROCEDURE — 85027 COMPLETE CBC AUTOMATED: CPT

## 2019-08-28 PROCEDURE — 85060 PATHOLOGIST REVIEW: ICD-10-PCS | Mod: ,,, | Performed by: PATHOLOGY

## 2019-08-28 PROCEDURE — 80053 COMPREHEN METABOLIC PANEL: CPT

## 2019-08-28 PROCEDURE — 83735 ASSAY OF MAGNESIUM: CPT
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
## 2019-08-29 ENCOUNTER — INFUSION (OUTPATIENT)
Dept: INFUSION THERAPY | Facility: HOSPITAL | Age: 74
End: 2019-08-29
Attending: INTERNAL MEDICINE
Payer: MEDICARE

## 2019-08-29 ENCOUNTER — OFFICE VISIT (OUTPATIENT)
Dept: HEMATOLOGY/ONCOLOGY | Facility: CLINIC | Age: 74
End: 2019-08-29
Payer: MEDICARE

## 2019-08-29 ENCOUNTER — TELEPHONE (OUTPATIENT)
Dept: HEMATOLOGY/ONCOLOGY | Facility: CLINIC | Age: 74
End: 2019-08-29

## 2019-08-29 VITALS
OXYGEN SATURATION: 97 % | DIASTOLIC BLOOD PRESSURE: 67 MMHG | WEIGHT: 174.19 LBS | RESPIRATION RATE: 18 BRPM | TEMPERATURE: 98 F | SYSTOLIC BLOOD PRESSURE: 140 MMHG | HEIGHT: 66 IN | BODY MASS INDEX: 27.99 KG/M2 | HEART RATE: 75 BPM

## 2019-08-29 VITALS
DIASTOLIC BLOOD PRESSURE: 83 MMHG | WEIGHT: 174.19 LBS | HEART RATE: 75 BPM | RESPIRATION RATE: 18 BRPM | HEIGHT: 66 IN | TEMPERATURE: 98 F | SYSTOLIC BLOOD PRESSURE: 178 MMHG | OXYGEN SATURATION: 97 % | BODY MASS INDEX: 27.99 KG/M2

## 2019-08-29 DIAGNOSIS — M54.32 SCIATICA OF LEFT SIDE: ICD-10-CM

## 2019-08-29 DIAGNOSIS — T45.1X5A CHEMOTHERAPY INDUCED NAUSEA AND VOMITING: ICD-10-CM

## 2019-08-29 DIAGNOSIS — R11.2 CHEMOTHERAPY INDUCED NAUSEA AND VOMITING: ICD-10-CM

## 2019-08-29 DIAGNOSIS — D72.829 LEUKOCYTOSIS, UNSPECIFIED TYPE: ICD-10-CM

## 2019-08-29 DIAGNOSIS — C45.0 MALIGNANT PLEURAL MESOTHELIOMA: Primary | ICD-10-CM

## 2019-08-29 DIAGNOSIS — N28.9 RENAL INSUFFICIENCY: ICD-10-CM

## 2019-08-29 DIAGNOSIS — Z86.718 HISTORY OF DVT (DEEP VEIN THROMBOSIS): ICD-10-CM

## 2019-08-29 DIAGNOSIS — T45.1X5A ANTINEOPLASTIC CHEMOTHERAPY INDUCED ANEMIA: ICD-10-CM

## 2019-08-29 DIAGNOSIS — D64.81 ANTINEOPLASTIC CHEMOTHERAPY INDUCED ANEMIA: ICD-10-CM

## 2019-08-29 PROCEDURE — 3078F DIAST BP <80 MM HG: CPT | Mod: CPTII,S$GLB,, | Performed by: NURSE PRACTITIONER

## 2019-08-29 PROCEDURE — 96413 CHEMO IV INFUSION 1 HR: CPT

## 2019-08-29 PROCEDURE — 99214 PR OFFICE/OUTPT VISIT, EST, LEVL IV, 30-39 MIN: ICD-10-PCS | Mod: S$GLB,,, | Performed by: NURSE PRACTITIONER

## 2019-08-29 PROCEDURE — 1101F PT FALLS ASSESS-DOCD LE1/YR: CPT | Mod: CPTII,S$GLB,, | Performed by: NURSE PRACTITIONER

## 2019-08-29 PROCEDURE — 3077F PR MOST RECENT SYSTOLIC BLOOD PRESSURE >= 140 MM HG: ICD-10-PCS | Mod: CPTII,S$GLB,, | Performed by: NURSE PRACTITIONER

## 2019-08-29 PROCEDURE — 96367 TX/PROPH/DG ADDL SEQ IV INF: CPT

## 2019-08-29 PROCEDURE — 3077F SYST BP >= 140 MM HG: CPT | Mod: CPTII,S$GLB,, | Performed by: NURSE PRACTITIONER

## 2019-08-29 PROCEDURE — 99499 RISK ADDL DX/OHS AUDIT: ICD-10-PCS | Mod: S$GLB,,, | Performed by: NURSE PRACTITIONER

## 2019-08-29 PROCEDURE — 25000003 PHARM REV CODE 250: Performed by: INTERNAL MEDICINE

## 2019-08-29 PROCEDURE — A4216 STERILE WATER/SALINE, 10 ML: HCPCS | Performed by: INTERNAL MEDICINE

## 2019-08-29 PROCEDURE — 63600175 PHARM REV CODE 636 W HCPCS: Performed by: INTERNAL MEDICINE

## 2019-08-29 PROCEDURE — 96361 HYDRATE IV INFUSION ADD-ON: CPT

## 2019-08-29 PROCEDURE — 99499 UNLISTED E&M SERVICE: CPT | Mod: S$GLB,,, | Performed by: NURSE PRACTITIONER

## 2019-08-29 PROCEDURE — 99999 PR PBB SHADOW E&M-EST. PATIENT-LVL IV: ICD-10-PCS | Mod: PBBFAC,,, | Performed by: NURSE PRACTITIONER

## 2019-08-29 PROCEDURE — 3078F PR MOST RECENT DIASTOLIC BLOOD PRESSURE < 80 MM HG: ICD-10-PCS | Mod: CPTII,S$GLB,, | Performed by: NURSE PRACTITIONER

## 2019-08-29 PROCEDURE — 1101F PR PT FALLS ASSESS DOC 0-1 FALLS W/OUT INJ PAST YR: ICD-10-PCS | Mod: CPTII,S$GLB,, | Performed by: NURSE PRACTITIONER

## 2019-08-29 PROCEDURE — 99999 PR PBB SHADOW E&M-EST. PATIENT-LVL IV: CPT | Mod: PBBFAC,,, | Performed by: NURSE PRACTITIONER

## 2019-08-29 PROCEDURE — 96411 CHEMO IV PUSH ADDL DRUG: CPT

## 2019-08-29 PROCEDURE — 99214 OFFICE O/P EST MOD 30 MIN: CPT | Mod: S$GLB,,, | Performed by: NURSE PRACTITIONER

## 2019-08-29 RX ORDER — SODIUM CHLORIDE 0.9 % (FLUSH) 0.9 %
10 SYRINGE (ML) INJECTION
Status: CANCELLED | OUTPATIENT
Start: 2019-08-30

## 2019-08-29 RX ORDER — HEPARIN 100 UNIT/ML
500 SYRINGE INTRAVENOUS
Status: DISCONTINUED | OUTPATIENT
Start: 2019-08-29 | End: 2019-08-29 | Stop reason: HOSPADM

## 2019-08-29 RX ORDER — SODIUM CHLORIDE 0.9 % (FLUSH) 0.9 %
10 SYRINGE (ML) INJECTION
Status: DISCONTINUED | OUTPATIENT
Start: 2019-08-29 | End: 2019-08-29 | Stop reason: HOSPADM

## 2019-08-29 RX ORDER — HEPARIN 100 UNIT/ML
500 SYRINGE INTRAVENOUS
Status: CANCELLED | OUTPATIENT
Start: 2019-08-30

## 2019-08-29 RX ADMIN — SODIUM CHLORIDE: 0.9 INJECTION, SOLUTION INTRAVENOUS at 09:08

## 2019-08-29 RX ADMIN — HEPARIN SODIUM (PORCINE) LOCK FLUSH IV SOLN 100 UNIT/ML 500 UNITS: 100 SOLUTION at 01:08

## 2019-08-29 RX ADMIN — DEXAMETHASONE SODIUM PHOSPHATE: 4 INJECTION, SOLUTION INTRA-ARTICULAR; INTRALESIONAL; INTRAMUSCULAR; INTRAVENOUS; SOFT TISSUE at 09:08

## 2019-08-29 RX ADMIN — APREPITANT 130 MG: 130 INJECTION, EMULSION INTRAVENOUS at 09:08

## 2019-08-29 RX ADMIN — SODIUM CHLORIDE 950 MG: 9 INJECTION, SOLUTION INTRAVENOUS at 10:08

## 2019-08-29 RX ADMIN — Medication 10 ML: at 01:08

## 2019-08-29 RX ADMIN — SODIUM CHLORIDE: 0.9 INJECTION, SOLUTION INTRAVENOUS at 11:08

## 2019-08-29 RX ADMIN — CISPLATIN 143 MG: 1 INJECTION INTRAVENOUS at 11:08

## 2019-08-29 NOTE — TELEPHONE ENCOUNTER
----- Message from Antonella Goetz NP sent at 8/29/2019  9:52 AM CDT -----    RTC 3 weeks with labs day before on the [facility] (CBC,CMP,Mag), to see me, and cycle #3 of cisplatin and pemetrexed. Patient will require IVFs on day 2 on the [facility] with neulasta.    -neulasta will need auth

## 2019-08-29 NOTE — PLAN OF CARE
Problem: Neutropenia (Chemotherapy Effects)  Goal: Absence of Infection  Outcome: Ongoing (interventions implemented as appropriate)  Labs , hx, and medications reviewed. Assessment completed. Discussed plan of care with patient. Patient in agreement. VSS.  Chair reclined and warm blanket and snack offered. Will cont to monitor

## 2019-08-29 NOTE — PLAN OF CARE
Problem: Adult Inpatient Plan of Care  Goal: Plan of Care Review  Outcome: Ongoing (interventions implemented as appropriate)  Pt tolerated C2D1 alimta/carb/IVF without adverse effects. VSS. Provided AVS & verbalized understanding of RTC date tomorrow. Port maintained accessed for IVF tomorrow.  DC with family ambulating independently.

## 2019-08-29 NOTE — TELEPHONE ENCOUNTER
----- Message from Antonella Goetz NP sent at 8/29/2019  9:52 AM CDT -----    RTC 3 weeks with labs day before on the [facility] (CBC,CMP,Mag), to see me, and cycle #3 of cisplatin and pemetrexed. Patient will require IVFs on day 2 on the [facility] with neulasta.    -neulasta will need auth

## 2019-08-29 NOTE — TELEPHONE ENCOUNTER
----- Message from Antonella Goetz NP sent at 8/29/2019  9:52 AM CDT -----    RTC 3 weeks with labs day before on the [facility] (CBC,CMP,Mag), to see me, and cycle #3 of cisplatin and pemetrexed. Patient will require IVFs on day 2 on the [facility] with neulasta.    -neulasta will need auth

## 2019-08-30 ENCOUNTER — INFUSION (OUTPATIENT)
Dept: INFUSION THERAPY | Facility: HOSPITAL | Age: 74
End: 2019-08-30
Attending: INTERNAL MEDICINE
Payer: MEDICARE

## 2019-08-30 VITALS
HEART RATE: 68 BPM | OXYGEN SATURATION: 97 % | DIASTOLIC BLOOD PRESSURE: 64 MMHG | SYSTOLIC BLOOD PRESSURE: 130 MMHG | TEMPERATURE: 98 F | RESPIRATION RATE: 18 BRPM

## 2019-08-30 DIAGNOSIS — C45.0 MALIGNANT PLEURAL MESOTHELIOMA: Primary | ICD-10-CM

## 2019-08-30 LAB — PATH REV BLD -IMP: NORMAL

## 2019-08-30 PROCEDURE — 96360 HYDRATION IV INFUSION INIT: CPT

## 2019-08-30 PROCEDURE — 96361 HYDRATE IV INFUSION ADD-ON: CPT

## 2019-08-30 PROCEDURE — 63600175 PHARM REV CODE 636 W HCPCS: Performed by: INTERNAL MEDICINE

## 2019-08-30 RX ORDER — SODIUM CHLORIDE 0.9 % (FLUSH) 0.9 %
10 SYRINGE (ML) INJECTION
Status: DISCONTINUED | OUTPATIENT
Start: 2019-08-30 | End: 2019-08-30 | Stop reason: HOSPADM

## 2019-08-30 RX ORDER — HEPARIN 100 UNIT/ML
500 SYRINGE INTRAVENOUS
Status: DISCONTINUED | OUTPATIENT
Start: 2019-08-30 | End: 2019-08-30 | Stop reason: HOSPADM

## 2019-08-30 RX ADMIN — HEPARIN 500 UNITS: 100 SYRINGE at 02:08

## 2019-08-30 RX ADMIN — SODIUM CHLORIDE 1000 ML: 0.9 INJECTION, SOLUTION INTRAVENOUS at 12:08

## 2019-08-30 NOTE — PLAN OF CARE
Problem: Adult Inpatient Plan of Care  Goal: Patient-Specific Goal (Individualization)  Outcome: Ongoing (interventions implemented as appropriate)  Pt tolerated 1L NS without issue. VSS. AVS given. Pt d.c home in stable condition with instructions to return 9/18/19. In interim, pt knows to call clinic with any issues.

## 2019-08-31 ENCOUNTER — PATIENT MESSAGE (OUTPATIENT)
Dept: HEMATOLOGY/ONCOLOGY | Facility: CLINIC | Age: 74
End: 2019-08-31

## 2019-09-02 ENCOUNTER — HOSPITAL ENCOUNTER (OUTPATIENT)
Facility: HOSPITAL | Age: 74
Discharge: HOME | End: 2019-09-04
Attending: EMERGENCY MEDICINE | Admitting: EMERGENCY MEDICINE
Payer: MEDICARE

## 2019-09-02 DIAGNOSIS — R10.9 ABDOMINAL PAIN, UNSPECIFIED ABDOMINAL LOCATION: ICD-10-CM

## 2019-09-02 DIAGNOSIS — C45.0 MALIGNANT PLEURAL MESOTHELIOMA: ICD-10-CM

## 2019-09-02 DIAGNOSIS — T45.1X5A CHEMOTHERAPY ADVERSE REACTION, INITIAL ENCOUNTER: Primary | ICD-10-CM

## 2019-09-02 DIAGNOSIS — R11.2 INTRACTABLE VOMITING WITH NAUSEA, UNSPECIFIED VOMITING TYPE: ICD-10-CM

## 2019-09-02 DIAGNOSIS — G89.3 NEOPLASM RELATED PAIN (ACUTE) (CHRONIC): ICD-10-CM

## 2019-09-02 DIAGNOSIS — N18.9 CHRONIC KIDNEY DISEASE, UNSPECIFIED CKD STAGE: ICD-10-CM

## 2019-09-02 LAB
ALBUMIN SERPL BCP-MCNC: 3.9 G/DL (ref 3.5–5.2)
ALP SERPL-CCNC: 116 U/L (ref 55–135)
ALT SERPL W/O P-5'-P-CCNC: 60 U/L (ref 10–44)
ANION GAP SERPL CALC-SCNC: 15 MMOL/L (ref 8–16)
APTT BLDCRRT: 23.1 SEC (ref 21–32)
AST SERPL-CCNC: 37 U/L (ref 10–40)
BASOPHILS # BLD AUTO: 0.01 K/UL (ref 0–0.2)
BASOPHILS NFR BLD: 0.2 % (ref 0–1.9)
BILIRUB SERPL-MCNC: 0.3 MG/DL (ref 0.1–1)
BILIRUB UR QL STRIP: NEGATIVE
BUN SERPL-MCNC: 33 MG/DL (ref 8–23)
CALCIUM SERPL-MCNC: 9.7 MG/DL (ref 8.7–10.5)
CHLORIDE SERPL-SCNC: 98 MMOL/L (ref 95–110)
CLARITY UR REFRACT.AUTO: CLEAR
CO2 SERPL-SCNC: 21 MMOL/L (ref 23–29)
COLOR UR AUTO: YELLOW
CREAT SERPL-MCNC: 1.6 MG/DL (ref 0.5–1.4)
DIFFERENTIAL METHOD: ABNORMAL
EOSINOPHIL # BLD AUTO: 0 K/UL (ref 0–0.5)
EOSINOPHIL NFR BLD: 0.6 % (ref 0–8)
ERYTHROCYTE [DISTWIDTH] IN BLOOD BY AUTOMATED COUNT: 15.6 % (ref 11.5–14.5)
EST. GFR  (AFRICAN AMERICAN): 36.8 ML/MIN/1.73 M^2
EST. GFR  (NON AFRICAN AMERICAN): 32 ML/MIN/1.73 M^2
GLUCOSE SERPL-MCNC: 163 MG/DL (ref 70–110)
GLUCOSE UR QL STRIP: NEGATIVE
HCT VFR BLD AUTO: 44.3 % (ref 37–48.5)
HGB BLD-MCNC: 15 G/DL (ref 12–16)
HGB UR QL STRIP: NEGATIVE
IMM GRANULOCYTES # BLD AUTO: 0.03 K/UL (ref 0–0.04)
IMM GRANULOCYTES NFR BLD AUTO: 0.5 % (ref 0–0.5)
INR PPP: 1 (ref 0.8–1.2)
KETONES UR QL STRIP: NEGATIVE
LACTATE SERPL-SCNC: 1.3 MMOL/L (ref 0.5–2.2)
LACTATE SERPL-SCNC: 1.9 MMOL/L (ref 0.5–2.2)
LEUKOCYTE ESTERASE UR QL STRIP: NEGATIVE
LYMPHOCYTES # BLD AUTO: 1 K/UL (ref 1–4.8)
LYMPHOCYTES NFR BLD: 15.2 % (ref 18–48)
MAGNESIUM SERPL-MCNC: 1.3 MG/DL (ref 1.6–2.6)
MCH RBC QN AUTO: 29.7 PG (ref 27–31)
MCHC RBC AUTO-ENTMCNC: 33.9 G/DL (ref 32–36)
MCV RBC AUTO: 88 FL (ref 82–98)
MONOCYTES # BLD AUTO: 0.1 K/UL (ref 0.3–1)
MONOCYTES NFR BLD: 1.7 % (ref 4–15)
NEUTROPHILS # BLD AUTO: 5.4 K/UL (ref 1.8–7.7)
NEUTROPHILS NFR BLD: 81.8 % (ref 38–73)
NITRITE UR QL STRIP: NEGATIVE
NRBC BLD-RTO: 0 /100 WBC
PH UR STRIP: 5 [PH] (ref 5–8)
PHOSPHATE SERPL-MCNC: 5.1 MG/DL (ref 2.7–4.5)
PLATELET # BLD AUTO: 294 K/UL (ref 150–350)
PMV BLD AUTO: 9.5 FL (ref 9.2–12.9)
POTASSIUM SERPL-SCNC: 4.5 MMOL/L (ref 3.5–5.1)
PROT SERPL-MCNC: 7.9 G/DL (ref 6–8.4)
PROT UR QL STRIP: NEGATIVE
PROTHROMBIN TIME: 10 SEC (ref 9–12.5)
RBC # BLD AUTO: 5.05 M/UL (ref 4–5.4)
SODIUM SERPL-SCNC: 134 MMOL/L (ref 136–145)
SP GR UR STRIP: 1.01 (ref 1–1.03)
URN SPEC COLLECT METH UR: NORMAL
WBC # BLD AUTO: 6.56 K/UL (ref 3.9–12.7)

## 2019-09-02 PROCEDURE — 84100 ASSAY OF PHOSPHORUS: CPT

## 2019-09-02 PROCEDURE — 80053 COMPREHEN METABOLIC PANEL: CPT

## 2019-09-02 PROCEDURE — 83735 ASSAY OF MAGNESIUM: CPT

## 2019-09-02 PROCEDURE — 99285 PR EMERGENCY DEPT VISIT,LEVEL V: ICD-10-PCS | Mod: ,,, | Performed by: EMERGENCY MEDICINE

## 2019-09-02 PROCEDURE — 85025 COMPLETE CBC W/AUTO DIFF WBC: CPT

## 2019-09-02 PROCEDURE — 81003 URINALYSIS AUTO W/O SCOPE: CPT

## 2019-09-02 PROCEDURE — 96374 THER/PROPH/DIAG INJ IV PUSH: CPT

## 2019-09-02 PROCEDURE — 25500020 PHARM REV CODE 255: Performed by: EMERGENCY MEDICINE

## 2019-09-02 PROCEDURE — 99285 EMERGENCY DEPT VISIT HI MDM: CPT | Mod: ,,, | Performed by: EMERGENCY MEDICINE

## 2019-09-02 PROCEDURE — 96376 TX/PRO/DX INJ SAME DRUG ADON: CPT

## 2019-09-02 PROCEDURE — 96361 HYDRATE IV INFUSION ADD-ON: CPT

## 2019-09-02 PROCEDURE — 87040 BLOOD CULTURE FOR BACTERIA: CPT

## 2019-09-02 PROCEDURE — 85610 PROTHROMBIN TIME: CPT

## 2019-09-02 PROCEDURE — 99285 EMERGENCY DEPT VISIT HI MDM: CPT | Mod: 25

## 2019-09-02 PROCEDURE — 96375 TX/PRO/DX INJ NEW DRUG ADDON: CPT

## 2019-09-02 PROCEDURE — 63600175 PHARM REV CODE 636 W HCPCS: Performed by: EMERGENCY MEDICINE

## 2019-09-02 PROCEDURE — 85730 THROMBOPLASTIN TIME PARTIAL: CPT

## 2019-09-02 PROCEDURE — 83605 ASSAY OF LACTIC ACID: CPT

## 2019-09-02 RX ORDER — PROMETHAZINE HYDROCHLORIDE 25 MG/1
25 SUPPOSITORY RECTAL EVERY 6 HOURS PRN
Qty: 12 SUPPOSITORY | Refills: 0 | Status: SHIPPED | OUTPATIENT
Start: 2019-09-02 | End: 2019-09-07

## 2019-09-02 RX ORDER — ONDANSETRON 2 MG/ML
8 INJECTION INTRAMUSCULAR; INTRAVENOUS
Status: COMPLETED | OUTPATIENT
Start: 2019-09-02 | End: 2019-09-02

## 2019-09-02 RX ORDER — ONDANSETRON HYDROCHLORIDE 8 MG/1
8 TABLET, FILM COATED ORAL EVERY 8 HOURS PRN
Qty: 20 TABLET | Refills: 1 | Status: ON HOLD | OUTPATIENT
Start: 2019-09-02 | End: 2019-09-04 | Stop reason: HOSPADM

## 2019-09-02 RX ORDER — ONDANSETRON HYDROCHLORIDE 8 MG/1
8 TABLET, FILM COATED ORAL EVERY 8 HOURS PRN
Qty: 20 TABLET | Refills: 1 | Status: SHIPPED | OUTPATIENT
Start: 2019-09-02 | End: 2019-09-02 | Stop reason: SDUPTHER

## 2019-09-02 RX ORDER — MORPHINE SULFATE 2 MG/ML
6 INJECTION, SOLUTION INTRAMUSCULAR; INTRAVENOUS
Status: COMPLETED | OUTPATIENT
Start: 2019-09-02 | End: 2019-09-02

## 2019-09-02 RX ADMIN — MORPHINE SULFATE 6 MG: 2 INJECTION, SOLUTION INTRAMUSCULAR; INTRAVENOUS at 07:09

## 2019-09-02 RX ADMIN — ONDANSETRON 8 MG: 2 INJECTION INTRAMUSCULAR; INTRAVENOUS at 07:09

## 2019-09-02 RX ADMIN — IOHEXOL 75 ML: 350 INJECTION, SOLUTION INTRAVENOUS at 07:09

## 2019-09-02 RX ADMIN — MORPHINE SULFATE 6 MG: 2 INJECTION, SOLUTION INTRAMUSCULAR; INTRAVENOUS at 01:09

## 2019-09-02 RX ADMIN — SODIUM CHLORIDE 2178 ML: 0.9 INJECTION, SOLUTION INTRAVENOUS at 01:09

## 2019-09-02 RX ADMIN — ONDANSETRON 8 MG: 2 INJECTION INTRAMUSCULAR; INTRAVENOUS at 01:09
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
## 2019-09-02 NOTE — ED NOTES
Patient identifiers verified and correct for Isabell Preston.    LOC: The patient is awake and alert; oriented x 3 and speaking appropriately.  APPEARANCE: Patient resting comfortably, patient is clean and well groomed.  SKIN: warm and dry, normal skin turgor & moist mucus membranes, skin intact, no breakdown noted.  MUSCULOSKELETAL: Patient moving all extremities well, no obvious swelling or deformities noted.  RESPIRATORY: Airway is open and patent; respirations are spontaneous, normal effort and rate.  CARDIAC: Patient has a normal rate, no peripheral edema noted, capillary refill < 3 seconds; No complaints of chest pain.   ABDOMEN: pt reports nausea beginning after second chemo tx on Thursday and emesis and diarrhea beginning late last night.    Pt placed on cardiac monitor, continuous pulse ox, cycling blood pressures. Side rails up x2, call bell in reach, bed in low position with brake engaged. Daughter at bedside. Will continue to monitor.

## 2019-09-02 NOTE — ED TRIAGE NOTES
Pt's daughter reports pt felt nauseated immediately after second chemo tx on Thursday and began vomiting last last night. C/o abd cramping, nausea, vomiting, and watery diarrhea since last night. Denies chest pain/SOB.

## 2019-09-03 ENCOUNTER — PATIENT MESSAGE (OUTPATIENT)
Dept: HEMATOLOGY/ONCOLOGY | Facility: CLINIC | Age: 74
End: 2019-09-03

## 2019-09-03 PROBLEM — R11.2 INTRACTABLE VOMITING WITH NAUSEA: Status: ACTIVE | Noted: 2019-09-03

## 2019-09-03 PROBLEM — T45.1X5A CHEMOTHERAPY ADVERSE REACTION, INITIAL ENCOUNTER: Status: ACTIVE | Noted: 2019-09-03

## 2019-09-03 LAB
ALBUMIN SERPL BCP-MCNC: 3.2 G/DL (ref 3.5–5.2)
ALP SERPL-CCNC: 99 U/L (ref 55–135)
ALT SERPL W/O P-5'-P-CCNC: 82 U/L (ref 10–44)
ANION GAP SERPL CALC-SCNC: 10 MMOL/L (ref 8–16)
ANISOCYTOSIS BLD QL SMEAR: SLIGHT
AST SERPL-CCNC: 56 U/L (ref 10–40)
BASOPHILS # BLD AUTO: 0.02 K/UL (ref 0–0.2)
BASOPHILS NFR BLD: 0.3 % (ref 0–1.9)
BILIRUB SERPL-MCNC: 0.2 MG/DL (ref 0.1–1)
BUN SERPL-MCNC: 28 MG/DL (ref 8–23)
CALCIUM SERPL-MCNC: 8.1 MG/DL (ref 8.7–10.5)
CHLORIDE SERPL-SCNC: 103 MMOL/L (ref 95–110)
CO2 SERPL-SCNC: 21 MMOL/L (ref 23–29)
CREAT SERPL-MCNC: 1.2 MG/DL (ref 0.5–1.4)
DACRYOCYTES BLD QL SMEAR: ABNORMAL
DIFFERENTIAL METHOD: ABNORMAL
EOSINOPHIL # BLD AUTO: 0 K/UL (ref 0–0.5)
EOSINOPHIL NFR BLD: 0.6 % (ref 0–8)
ERYTHROCYTE [DISTWIDTH] IN BLOOD BY AUTOMATED COUNT: 15.4 % (ref 11.5–14.5)
EST. GFR  (AFRICAN AMERICAN): 52.2 ML/MIN/1.73 M^2
EST. GFR  (NON AFRICAN AMERICAN): 45.3 ML/MIN/1.73 M^2
GLUCOSE SERPL-MCNC: 127 MG/DL (ref 70–110)
HCT VFR BLD AUTO: 36.8 % (ref 37–48.5)
HGB BLD-MCNC: 12 G/DL (ref 12–16)
HYPOCHROMIA BLD QL SMEAR: ABNORMAL
IMM GRANULOCYTES # BLD AUTO: 0.02 K/UL (ref 0–0.04)
IMM GRANULOCYTES NFR BLD AUTO: 0.3 % (ref 0–0.5)
LYMPHOCYTES # BLD AUTO: 1 K/UL (ref 1–4.8)
LYMPHOCYTES NFR BLD: 15.1 % (ref 18–48)
MAGNESIUM SERPL-MCNC: 1.2 MG/DL (ref 1.6–2.6)
MCH RBC QN AUTO: 29.5 PG (ref 27–31)
MCHC RBC AUTO-ENTMCNC: 32.6 G/DL (ref 32–36)
MCV RBC AUTO: 90 FL (ref 82–98)
MONOCYTES # BLD AUTO: 0.1 K/UL (ref 0.3–1)
MONOCYTES NFR BLD: 1.5 % (ref 4–15)
NEUTROPHILS # BLD AUTO: 5.3 K/UL (ref 1.8–7.7)
NEUTROPHILS NFR BLD: 82.2 % (ref 38–73)
NRBC BLD-RTO: 0 /100 WBC
OVALOCYTES BLD QL SMEAR: ABNORMAL
PHOSPHATE SERPL-MCNC: 4.6 MG/DL (ref 2.7–4.5)
PLATELET # BLD AUTO: 184 K/UL (ref 150–350)
PMV BLD AUTO: 9.5 FL (ref 9.2–12.9)
POCT GLUCOSE: 151 MG/DL (ref 70–110)
POCT GLUCOSE: 170 MG/DL (ref 70–110)
POCT GLUCOSE: 174 MG/DL (ref 70–110)
POIKILOCYTOSIS BLD QL SMEAR: SLIGHT
POLYCHROMASIA BLD QL SMEAR: ABNORMAL
POTASSIUM SERPL-SCNC: 4.3 MMOL/L (ref 3.5–5.1)
PROT SERPL-MCNC: 6.2 G/DL (ref 6–8.4)
RBC # BLD AUTO: 4.07 M/UL (ref 4–5.4)
SODIUM SERPL-SCNC: 134 MMOL/L (ref 136–145)
WBC # BLD AUTO: 6.5 K/UL (ref 3.9–12.7)

## 2019-09-03 PROCEDURE — G0378 HOSPITAL OBSERVATION PER HR: HCPCS

## 2019-09-03 PROCEDURE — 96366 THER/PROPH/DIAG IV INF ADDON: CPT

## 2019-09-03 PROCEDURE — 99219 PR INITIAL OBSERVATION CARE,LEVL II: ICD-10-PCS | Mod: GC,,, | Performed by: INTERNAL MEDICINE

## 2019-09-03 PROCEDURE — 80053 COMPREHEN METABOLIC PANEL: CPT

## 2019-09-03 PROCEDURE — 63600175 PHARM REV CODE 636 W HCPCS: Performed by: STUDENT IN AN ORGANIZED HEALTH CARE EDUCATION/TRAINING PROGRAM

## 2019-09-03 PROCEDURE — 63600175 PHARM REV CODE 636 W HCPCS: Performed by: EMERGENCY MEDICINE

## 2019-09-03 PROCEDURE — 96365 THER/PROPH/DIAG IV INF INIT: CPT

## 2019-09-03 PROCEDURE — 83735 ASSAY OF MAGNESIUM: CPT

## 2019-09-03 PROCEDURE — 85025 COMPLETE CBC W/AUTO DIFF WBC: CPT

## 2019-09-03 PROCEDURE — 25000003 PHARM REV CODE 250: Performed by: STUDENT IN AN ORGANIZED HEALTH CARE EDUCATION/TRAINING PROGRAM

## 2019-09-03 PROCEDURE — 63600175 PHARM REV CODE 636 W HCPCS: Performed by: INTERNAL MEDICINE

## 2019-09-03 PROCEDURE — 97161 PT EVAL LOW COMPLEX 20 MIN: CPT

## 2019-09-03 PROCEDURE — 99219 PR INITIAL OBSERVATION CARE,LEVL II: CPT | Mod: GC,,, | Performed by: INTERNAL MEDICINE

## 2019-09-03 PROCEDURE — 96376 TX/PRO/DX INJ SAME DRUG ADON: CPT

## 2019-09-03 PROCEDURE — 84100 ASSAY OF PHOSPHORUS: CPT

## 2019-09-03 RX ORDER — ONDANSETRON 2 MG/ML
4 INJECTION INTRAMUSCULAR; INTRAVENOUS EVERY 8 HOURS PRN
Status: DISCONTINUED | OUTPATIENT
Start: 2019-09-03 | End: 2019-09-03

## 2019-09-03 RX ORDER — DEXTROSE MONOHYDRATE 100 MG/ML
25 INJECTION, SOLUTION INTRAVENOUS
Status: DISCONTINUED | OUTPATIENT
Start: 2019-09-03 | End: 2019-09-04 | Stop reason: HOSPADM

## 2019-09-03 RX ORDER — GLUCAGON 1 MG
1 KIT INJECTION
Status: DISCONTINUED | OUTPATIENT
Start: 2019-09-03 | End: 2019-09-04 | Stop reason: HOSPADM

## 2019-09-03 RX ORDER — GABAPENTIN 100 MG/1
100 CAPSULE ORAL 2 TIMES DAILY
Status: DISCONTINUED | OUTPATIENT
Start: 2019-09-03 | End: 2019-09-04 | Stop reason: HOSPADM

## 2019-09-03 RX ORDER — ACETAMINOPHEN 325 MG/1
650 TABLET ORAL EVERY 8 HOURS PRN
Status: DISCONTINUED | OUTPATIENT
Start: 2019-09-03 | End: 2019-09-04 | Stop reason: HOSPADM

## 2019-09-03 RX ORDER — RAMELTEON 8 MG/1
8 TABLET ORAL NIGHTLY PRN
Status: DISCONTINUED | OUTPATIENT
Start: 2019-09-03 | End: 2019-09-04 | Stop reason: HOSPADM

## 2019-09-03 RX ORDER — ENOXAPARIN SODIUM 150 MG/ML
120 INJECTION SUBCUTANEOUS DAILY
Status: DISCONTINUED | OUTPATIENT
Start: 2019-09-03 | End: 2019-09-04 | Stop reason: HOSPADM

## 2019-09-03 RX ORDER — PROMETHAZINE HYDROCHLORIDE 25 MG/1
25 SUPPOSITORY RECTAL EVERY 6 HOURS PRN
Status: DISCONTINUED | OUTPATIENT
Start: 2019-09-03 | End: 2019-09-04 | Stop reason: HOSPADM

## 2019-09-03 RX ORDER — DEXTROSE MONOHYDRATE 100 MG/ML
12.5 INJECTION, SOLUTION INTRAVENOUS
Status: DISCONTINUED | OUTPATIENT
Start: 2019-09-03 | End: 2019-09-04 | Stop reason: HOSPADM

## 2019-09-03 RX ORDER — ONDANSETRON 2 MG/ML
8 INJECTION INTRAMUSCULAR; INTRAVENOUS
Status: COMPLETED | OUTPATIENT
Start: 2019-09-03 | End: 2019-09-03

## 2019-09-03 RX ORDER — POLYETHYLENE GLYCOL 3350 17 G/17G
17 POWDER, FOR SOLUTION ORAL DAILY
Status: DISCONTINUED | OUTPATIENT
Start: 2019-09-03 | End: 2019-09-04 | Stop reason: HOSPADM

## 2019-09-03 RX ORDER — IBUPROFEN 200 MG
16 TABLET ORAL
Status: DISCONTINUED | OUTPATIENT
Start: 2019-09-03 | End: 2019-09-04 | Stop reason: HOSPADM

## 2019-09-03 RX ORDER — INSULIN ASPART 100 [IU]/ML
0-5 INJECTION, SOLUTION INTRAVENOUS; SUBCUTANEOUS
Status: DISCONTINUED | OUTPATIENT
Start: 2019-09-03 | End: 2019-09-04 | Stop reason: HOSPADM

## 2019-09-03 RX ORDER — DEXAMETHASONE 4 MG/1
8 TABLET ORAL EVERY 12 HOURS
Status: DISCONTINUED | OUTPATIENT
Start: 2019-09-03 | End: 2019-09-04

## 2019-09-03 RX ORDER — ONDANSETRON 2 MG/ML
8 INJECTION INTRAMUSCULAR; INTRAVENOUS EVERY 8 HOURS PRN
Status: DISCONTINUED | OUTPATIENT
Start: 2019-09-03 | End: 2019-09-03

## 2019-09-03 RX ORDER — MAGNESIUM SULFATE HEPTAHYDRATE 40 MG/ML
2 INJECTION, SOLUTION INTRAVENOUS ONCE
Status: COMPLETED | OUTPATIENT
Start: 2019-09-03 | End: 2019-09-03

## 2019-09-03 RX ORDER — ATORVASTATIN CALCIUM 10 MG/1
10 TABLET, FILM COATED ORAL DAILY
Status: DISCONTINUED | OUTPATIENT
Start: 2019-09-03 | End: 2019-09-04 | Stop reason: HOSPADM

## 2019-09-03 RX ORDER — HYDROCODONE BITARTRATE AND ACETAMINOPHEN 5; 325 MG/1; MG/1
1 TABLET ORAL EVERY 6 HOURS PRN
Status: DISCONTINUED | OUTPATIENT
Start: 2019-09-03 | End: 2019-09-04 | Stop reason: HOSPADM

## 2019-09-03 RX ORDER — FOLIC ACID 1 MG/1
1000 TABLET ORAL DAILY
Status: DISCONTINUED | OUTPATIENT
Start: 2019-09-03 | End: 2019-09-04 | Stop reason: HOSPADM

## 2019-09-03 RX ORDER — DULOXETIN HYDROCHLORIDE 60 MG/1
60 CAPSULE, DELAYED RELEASE ORAL DAILY
Status: DISCONTINUED | OUTPATIENT
Start: 2019-09-03 | End: 2019-09-04 | Stop reason: HOSPADM

## 2019-09-03 RX ORDER — IBUPROFEN 200 MG
24 TABLET ORAL
Status: DISCONTINUED | OUTPATIENT
Start: 2019-09-03 | End: 2019-09-04 | Stop reason: HOSPADM

## 2019-09-03 RX ORDER — FLUTICASONE PROPIONATE 50 MCG
1 SPRAY, SUSPENSION (ML) NASAL DAILY
Status: DISCONTINUED | OUTPATIENT
Start: 2019-09-03 | End: 2019-09-04 | Stop reason: HOSPADM

## 2019-09-03 RX ORDER — SODIUM CHLORIDE 9 MG/ML
INJECTION, SOLUTION INTRAVENOUS CONTINUOUS
Status: ACTIVE | OUTPATIENT
Start: 2019-09-03 | End: 2019-09-03

## 2019-09-03 RX ORDER — ONDANSETRON 2 MG/ML
4 INJECTION INTRAMUSCULAR; INTRAVENOUS EVERY 6 HOURS
Status: DISCONTINUED | OUTPATIENT
Start: 2019-09-03 | End: 2019-09-04

## 2019-09-03 RX ADMIN — Medication 10 ML: at 09:09

## 2019-09-03 RX ADMIN — RAMELTEON 8 MG: 8 TABLET ORAL at 09:09

## 2019-09-03 RX ADMIN — GABAPENTIN 100 MG: 100 CAPSULE ORAL at 09:09

## 2019-09-03 RX ADMIN — MAGNESIUM SULFATE IN WATER 2 G: 40 INJECTION, SOLUTION INTRAVENOUS at 09:09

## 2019-09-03 RX ADMIN — ENOXAPARIN SODIUM 120 MG: 150 INJECTION SUBCUTANEOUS at 09:09

## 2019-09-03 RX ADMIN — DEXAMETHASONE 8 MG: 4 TABLET ORAL at 09:09

## 2019-09-03 RX ADMIN — ATORVASTATIN CALCIUM 10 MG: 10 TABLET, FILM COATED ORAL at 09:09

## 2019-09-03 RX ADMIN — SODIUM CHLORIDE: 0.9 INJECTION, SOLUTION INTRAVENOUS at 03:09

## 2019-09-03 RX ADMIN — METOPROLOL SUCCINATE 75 MG: 25 TABLET, EXTENDED RELEASE ORAL at 09:09

## 2019-09-03 RX ADMIN — DULOXETINE 60 MG: 60 CAPSULE, DELAYED RELEASE ORAL at 09:09

## 2019-09-03 RX ADMIN — ONDANSETRON 8 MG: 2 INJECTION INTRAMUSCULAR; INTRAVENOUS at 01:09

## 2019-09-03 RX ADMIN — MAGNESIUM SULFATE IN WATER 2 G: 40 INJECTION, SOLUTION INTRAVENOUS at 05:09

## 2019-09-03 RX ADMIN — ONDANSETRON 4 MG: 2 INJECTION INTRAMUSCULAR; INTRAVENOUS at 11:09

## 2019-09-03 RX ADMIN — FOLIC ACID 1000 MCG: 1 TABLET ORAL at 09:09

## 2019-09-03 RX ADMIN — POLYETHYLENE GLYCOL 3350 17 G: 17 POWDER, FOR SOLUTION ORAL at 09:09

## 2019-09-03 RX ADMIN — DEXAMETHASONE 8 MG: 4 TABLET ORAL at 10:09

## 2019-09-03 RX ADMIN — ONDANSETRON 4 MG: 2 INJECTION INTRAMUSCULAR; INTRAVENOUS at 05:09

## 2019-09-03 RX ADMIN — Medication 10 ML: at 05:09

## 2019-09-03 NOTE — ED NOTES
Patient returned to the ED, was too shaky and nauseated on the way to the car, spoke to Dr. Palmer and she was placed back in for observation.

## 2019-09-03 NOTE — ASSESSMENT & PLAN NOTE
"Diagnosed after VATS 07/2019 for recurrent pleural effusion. Biphasic mesothelioma. Pt of Dr. Burt. S/p cycle 2 day 1 permetrexed/carboplatin 08/29 with subsequent n/v and diarrhea.    CT A/P 09/2019: "Symmetrically expanded.  Subsegmental opacities within the right middle and right lower lobe suggestive of subsegmental atelectasis or scarring.  Along the anteromedial aspect of the right lung base, specifically at the medial costophrenic angle and inferior aspect of the cardiophrenic recess there are 2 adjacent foci of homogeneous nodular pleural soft tissue attenuation measuring 1.8 cm and 2.5 cm respectively, this is in a region of previous pleural thickening/neoplasm, but overall smaller.  Residual/recurrent pleural malignancy versus nonspecific pleural thickening/scarring is in the differential.  No pleural fluid, pleural calcification, or pneumothorax"    Continue home gabapentin, folic acid  "
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
## 2019-09-03 NOTE — ASSESSMENT & PLAN NOTE
Unclear etiology - pt appears to have hx constipation but now has frequent, large-volume diarrhea x5 days, starting after chemotherapy.     Suspect chemotherapy-related but will order studies to r/o infectious etiology    Plan  - C diff toxin + antigen; hold antidiarrheals until returned negative  - Stool WBC  - Stool culture  - Hold home bentyl until infectious studies negative

## 2019-09-03 NOTE — PROVIDER PROGRESS NOTES - EMERGENCY DEPT.
Encounter Date: 9/2/2019    ED Physician Progress Notes             This is an assumption of care note.     Upon shift change, the patient was transferred to me from Dr. Lopez @ 9:00 PM in stable condition.     Patient presented to ED with chief complaint of abdominal pain, N/V.  Labs appear at baseline.  Pending CT A/P at time of shift change.    Update:   No acute events during shift.   CT A/P suggestive of non-specific diarrheal illness, no other acute process.  On reassessment, patient appears more comfortable and tolerated PO.    Patient attempted to ambulate to the car, but felt weak, shaky, and her nausea returned.  Will request observation placement for fluids, antiemetics, and further management of intractable nausea/vomiting related to chemotherapy.      IMAGING:  Imaging Results           CT Abdomen Pelvis With Contrast (Final result)  Result time 09/02/19 21:07:03    Final result by Dean Roldan MD (09/02/19 21:07:03)                 Impression:      Mild distension with liquid stool in the cecum, ascending colon and proximal half of the transverse colon suggesting a nonspecific diarrheal illness.  No evidence of bowel obstruction or inflammation..    Right lung base 2 foci of nodular soft tissue pleural thickening which could represent residual/recurrence pleural malignancy versus nonspecific fluoro thickening/scarring.  Further evaluation/follow-up as warranted.    Bilateral stable renal cysts.    Colonic diverticulosis without evidence for acute diverticulitis.    Few additional findings as above.    This report was flagged in Epic as abnormal.    Electronically signed by resident: Oral Copeland  Date:    09/02/2019  Time:    19:49    Electronically signed by: Dean Roldan MD  Date:    09/02/2019  Time:    21:07             Narrative:    EXAMINATION:  CT ABDOMEN PELVIS WITH CONTRAST    CLINICAL HISTORY:  Bowel obstruction, high-grade;    TECHNIQUE:  Low dose axial images, sagittal and coronal  reformations were obtained from the lung bases to the pubic symphysis following the IV administration of 75 mL of Omnipaque 350.  Oral contrast was not given.  Delayed images were obtained.    COMPARISON:  CT abdomen pelvis 07/24/2019, 04/27/2018, 01/16/2017.    FINDINGS:  Heart: No cardiomegaly or pericardial effusion.    Lung Bases: Symmetrically expanded.  Subsegmental opacities within the right middle and right lower lobe suggestive of subsegmental atelectasis or scarring.  Along the anteromedial aspect of the right lung base, specifically at the medial costophrenic angle and inferior aspect of the cardiophrenic recess there are 2 adjacent foci of homogeneous nodular pleural soft tissue attenuation measuring 1.8 cm and 2.5 cm respectively, this is in a region of previous pleural thickening/neoplasm, but overall smaller.  Residual/recurrent pleural malignancy versus nonspecific pleural thickening/scarring is in the differential.  No pleural fluid, pleural calcification, or pneumothorax.    Liver: Normal in size with no focal lesion identified.    Gallbladder: Surgically absent.    Bile Ducts: No intra or extrahepatic biliary ductal dilatation.    Pancreas: No pancreatic mass or peripancreatic inflammatory stranding.    Spleen: Unremarkable.    Adrenals: Unremarkable.    Kidneys/Ureters: Stable in size and location without nephrolithiasis or hydroureteronephrosis.  Multiple stable subcentimeter hypodensities within the right kidney, too small to characterize.  Stable simple renal cyst within the midpole of the left kidney.  The visualized ureters are unremarkable.  The bladder demonstrates smooth contours without bladder wall thickening.    Reproductive organs: The uterus is surgically absent.    GI tract/Mesentery: Mild distension with liquid stool of the cecum, ascending colon and proximal half of the transverse colon without adjacent inflammation or wall thickening.  The large and small bowel are normal in  course and caliber without evidence for obstruction or inflammation.  The appendix is not definitely visualized however no inflammation expected region.  Scattered colonic diverticula without evidence for acute diverticulitis.    Peritoneal Space: No abdominopelvic ascites, intraperitoneal free air, or significant adenopathy.    Vasculature: The abdominal aorta is normal in course and caliber without significant calcific atherosclerosis.    Abdominal wall/Extraperitoneal soft tissues: Multiple rounded soft tissue densities and foci of air within the subcutaneous tissue of the anterior abdominal wall, presumably related to injection sites.    Bones: Degenerative changes spine.  No acute fracture or osseous destructive lesion identified.                               X-Ray Chest AP Portable (Final result)  Result time 09/02/19 14:14:53    Final result by Sanket Irizarry MD (09/02/19 14:14:53)                 Impression:      1. No acute radiographic findings in the chest on this single view.      Electronically signed by: Sanket Irizarry MD  Date:    09/02/2019  Time:    14:14             Narrative:    EXAMINATION:  XR CHEST AP PORTABLE    CLINICAL HISTORY:  Sepsis;    TECHNIQUE:  Single frontal view of the chest was performed.    COMPARISON:  Radiograph 08/22/2019.    FINDINGS:  Left chest port with catheter tip projecting over the SVC.  Mediastinal structures are midline.  Hilar contours are unremarkable.  Cardiac silhouette is normal in size.  Lung volumes are normal and symmetric.  No consolidation.  No pneumothorax or pleural effusions.  Lucency projecting over the left upper quadrant favored to be within the stomach.  No definite free air beneath the diaphragm.  No acute osseous abnormalities.                                  LABS:  Labs Reviewed   CBC W/ AUTO DIFFERENTIAL - Abnormal; Notable for the following components:       Result Value    RDW 15.6 (*)     Mono # 0.1 (*)     Gran% 81.8 (*)     Lymph% 15.2  (*)     Mono% 1.7 (*)     All other components within normal limits   COMPREHENSIVE METABOLIC PANEL - Abnormal; Notable for the following components:    Sodium 134 (*)     CO2 21 (*)     Glucose 163 (*)     BUN, Bld 33 (*)     Creatinine 1.6 (*)     ALT 60 (*)     eGFR if  36.8 (*)     eGFR if non  32.0 (*)     All other components within normal limits   MAGNESIUM - Abnormal; Notable for the following components:    Magnesium 1.3 (*)     All other components within normal limits   PHOSPHORUS - Abnormal; Notable for the following components:    Phosphorus 5.1 (*)     All other components within normal limits   CULTURE, BLOOD    Narrative:     Aerobic and anaerobic   CULTURE, BLOOD   LACTIC ACID, PLASMA   URINALYSIS, REFLEX TO URINE CULTURE    Narrative:     Preferred Collection Type->Urine, Clean Catch   PROTIME-INR   APTT   LACTIC ACID, PLASMA         MEDICATIONS:  Medications   sodium chloride 0.9% bolus 2,178 mL (0 mL/kg × 72.6 kg Intravenous Stopped 9/2/19 1513)   ondansetron injection 8 mg (8 mg Intravenous Given 9/2/19 1317)   morphine injection 6 mg (6 mg Intravenous Given 9/2/19 1317)   morphine injection 6 mg (6 mg Intravenous Given 9/2/19 1916)   ondansetron injection 8 mg (8 mg Intravenous Given 9/2/19 1916)   iohexol (OMNIPAQUE 350) injection 75 mL (75 mLs Intravenous Given 9/2/19 1941)         IMPRESSION:  1. Chemotherapy adverse reaction, initial encounter    2. Abdominal pain, unspecified abdominal location    3. Chronic kidney disease, unspecified CKD stage    4. Intractable vomiting with nausea, unspecified vomiting type           DISCHARGE MEDICATIONS:  Current Discharge Medication List      START taking these medications    Details   ondansetron (ZOFRAN) 8 MG tablet Take 1 tablet (8 mg total) by mouth every 8 (eight) hours as needed for Nausea.  Qty: 20 tablet, Refills: 1               DISPOSITION:  Placed in observation.

## 2019-09-03 NOTE — PLAN OF CARE
Problem: Physical Therapy Goal  Goal: Physical Therapy Goal  Goals to be met by: 2019     Patient will increase functional independence with mobility by performin. Supine to sit with Modified [place]  2. Sit to supine with Modified [place]  3. Sit to stand transfer with Supervision  4. Bed to chair transfer with Supervision  5. Gait  x 150 feet with Supervision using LRAD.   6. Ascend/descend 3 stairs with no Handrails Contact Guard Assistance using HHA.   7. Lower extremity exercise program x15 reps per handout, with supervision    Outcome: Ongoing (interventions implemented as appropriate)  Goals set
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
## 2019-09-03 NOTE — ED NOTES
Report called to [facility]. Will set up for transport once she is closer to having a tele box delivered.

## 2019-09-03 NOTE — PLAN OF CARE
MDRs completed with Dr. Gilman and the team. Patient of Dr. 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 Patient admitted on 9/2/19 c/o 5 days of continuous nausea, vomiting, and diarrhea. Patient stated her symptoms started after chemotherapy. Patient's Crt on admit was 1.6 (baseline Crt 1.1). Patient received IV fluids and IV antiemetics in the ED and admitted for observation. VSS. Patient is currently afebrile. O2 sats WNL on room air. Crt 1.2 today, IV fluids discontinued. IV Zofran changed to 4 mg IV every 6 hours scheduled. MD ordered to start dexamethasone 8 mg PO every 12 hours. Electrolytes replaced as needed; IV magnesium ordered for Mg+ 1.2 today. Patient sitting up in bed eating during rounds. Patient states she feels better today. MD discussed the plan of care with the patient. White board updated. Discharge needs to be determined. CM to continue to follow with the team.    Erika Smith, RN, BSN, CM  [facility]  Nurse - Med Onc/Gyn Onc

## 2019-09-03 NOTE — DISCHARGE INSTRUCTIONS
Do not take both forms of zofran (ondansetron) that you have; use only the sublingual or pill form.   You may alternate with phenergan suppository as needed for nausea/vomiting.

## 2019-09-03 NOTE — NURSING
Patient admitted to unit. States she has falling x2. States she is forgetfully. AT this time, able to answer all questions appropriately.
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
## 2019-09-03 NOTE — HPI
73F with biphasic mesothelioma s/p C2D1 alimta/carboplatin 08/29 (pt of Dr. 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 She says that the symptoms began shortly after chemotherapy. She has vomited approx 8 times yesterday and reports that she is awoken by nausea at night and vomits with or without food. She feels hungry but cannot keep anything down PO. The diarrhea has been 4-5 loose, watery BMs with some blood streaks (similar to previous as she has hemorrhoids) with some mucus as well; mostly yellow in color. Denies any dark/tarry stools, chest pain, dyspnea, fevers, diffuse body aches / rigors but does report abdominal pain associated with the nausea and vomiting. Has tried zofran once but could not tolerate the metallic taste so she has only been using phenergan q6H which has not controlled her nausea adequately.    In the ED she was found to have BP in the 140s-160s/80s with pulse in the 70s-80s. Pt did not have any fevers and had a normal CBC. She was noted to have an GINA with a creatinine of 1.6 compared to her baseline of 1.1. She was given IV fluids and IV antiemetics and admitted to Med Onc.

## 2019-09-03 NOTE — ED NOTES
Pt resting in bed on continuous pulseox, cardiac monitor, and bp cuff. Call light within reach. Will continue to monitor.

## 2019-09-03 NOTE — ASSESSMENT & PLAN NOTE
On 1.5 mg/kg lovenox (120 mg) daily as pt has distant hx (>5 years ago) recurrent DVT (approx 4 or 5 episodes)

## 2019-09-03 NOTE — ASSESSMENT & PLAN NOTE
GINA    Completed C2D1 of permetrexed/carboplatin 08/29 and has since had intractible n/v. Was prescribed dissolving zofran tablet but could not tolerate metallic taste. Has been using phenergan to little effect. Baseline SCr 0.9-1; 1.6 on admit in the setting of volume depletion    Plan  - IV fluid: has gotten 30cc/kg in ED (approx 2.2L) NS. Will give additional 125 mL/hr x8 hours and reassess need for additional IV fluids  - Antiemetics: IV zofran 8mg PRN 1st line, phenergan rectal 2nd line. May consider olanzapine 5 mg PO if refractory  - Daily metabolic panels, renally dose meds, avoid NSAIDs and other nephrotoxins
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
## 2019-09-04 VITALS
HEIGHT: 67 IN | BODY MASS INDEX: 27.16 KG/M2 | TEMPERATURE: 98 F | OXYGEN SATURATION: 96 % | RESPIRATION RATE: 18 BRPM | HEART RATE: 77 BPM | WEIGHT: 173.06 LBS | SYSTOLIC BLOOD PRESSURE: 162 MMHG | DIASTOLIC BLOOD PRESSURE: 78 MMHG

## 2019-09-04 LAB
ALBUMIN SERPL BCP-MCNC: 3.3 G/DL (ref 3.5–5.2)
ALP SERPL-CCNC: 96 U/L (ref 55–135)
ALT SERPL W/O P-5'-P-CCNC: 62 U/L (ref 10–44)
ANION GAP SERPL CALC-SCNC: 8 MMOL/L (ref 8–16)
AST SERPL-CCNC: 29 U/L (ref 10–40)
BASOPHILS # BLD AUTO: 0 K/UL (ref 0–0.2)
BASOPHILS NFR BLD: 0 % (ref 0–1.9)
BILIRUB SERPL-MCNC: 0.2 MG/DL (ref 0.1–1)
BUN SERPL-MCNC: 27 MG/DL (ref 8–23)
CALCIUM SERPL-MCNC: 8.4 MG/DL (ref 8.7–10.5)
CHLORIDE SERPL-SCNC: 101 MMOL/L (ref 95–110)
CO2 SERPL-SCNC: 23 MMOL/L (ref 23–29)
CREAT SERPL-MCNC: 1.3 MG/DL (ref 0.5–1.4)
DIFFERENTIAL METHOD: ABNORMAL
EOSINOPHIL # BLD AUTO: 0 K/UL (ref 0–0.5)
EOSINOPHIL NFR BLD: 0 % (ref 0–8)
ERYTHROCYTE [DISTWIDTH] IN BLOOD BY AUTOMATED COUNT: 15 % (ref 11.5–14.5)
EST. GFR  (AFRICAN AMERICAN): 47.4 ML/MIN/1.73 M^2
EST. GFR  (NON AFRICAN AMERICAN): 41.1 ML/MIN/1.73 M^2
GLUCOSE SERPL-MCNC: 185 MG/DL (ref 70–110)
HCT VFR BLD AUTO: 33.8 % (ref 37–48.5)
HGB BLD-MCNC: 11.8 G/DL (ref 12–16)
IMM GRANULOCYTES # BLD AUTO: 0.01 K/UL (ref 0–0.04)
IMM GRANULOCYTES NFR BLD AUTO: 0.4 % (ref 0–0.5)
LYMPHOCYTES # BLD AUTO: 0.5 K/UL (ref 1–4.8)
LYMPHOCYTES NFR BLD: 21 % (ref 18–48)
MAGNESIUM SERPL-MCNC: 1.9 MG/DL (ref 1.6–2.6)
MCH RBC QN AUTO: 30.3 PG (ref 27–31)
MCHC RBC AUTO-ENTMCNC: 34.9 G/DL (ref 32–36)
MCV RBC AUTO: 87 FL (ref 82–98)
MONOCYTES # BLD AUTO: 0 K/UL (ref 0.3–1)
MONOCYTES NFR BLD: 0.4 % (ref 4–15)
NEUTROPHILS # BLD AUTO: 1.8 K/UL (ref 1.8–7.7)
NEUTROPHILS NFR BLD: 78.2 % (ref 38–73)
NRBC BLD-RTO: 0 /100 WBC
PHOSPHATE SERPL-MCNC: 3.1 MG/DL (ref 2.7–4.5)
PLATELET # BLD AUTO: 139 K/UL (ref 150–350)
PMV BLD AUTO: 10.4 FL (ref 9.2–12.9)
POCT GLUCOSE: 155 MG/DL (ref 70–110)
POTASSIUM SERPL-SCNC: 4.4 MMOL/L (ref 3.5–5.1)
PROT SERPL-MCNC: 6.6 G/DL (ref 6–8.4)
RBC # BLD AUTO: 3.89 M/UL (ref 4–5.4)
SODIUM SERPL-SCNC: 132 MMOL/L (ref 136–145)
WBC # BLD AUTO: 2.29 K/UL (ref 3.9–12.7)

## 2019-09-04 PROCEDURE — 25000242 PHARM REV CODE 250 ALT 637 W/ HCPCS: Performed by: STUDENT IN AN ORGANIZED HEALTH CARE EDUCATION/TRAINING PROGRAM

## 2019-09-04 PROCEDURE — 63600175 PHARM REV CODE 636 W HCPCS: Performed by: INTERNAL MEDICINE

## 2019-09-04 PROCEDURE — 97165 OT EVAL LOW COMPLEX 30 MIN: CPT

## 2019-09-04 PROCEDURE — 99217 PR OBSERVATION CARE DISCHARGE: ICD-10-PCS | Mod: GC,,, | Performed by: INTERNAL MEDICINE

## 2019-09-04 PROCEDURE — 84100 ASSAY OF PHOSPHORUS: CPT

## 2019-09-04 PROCEDURE — 63600175 PHARM REV CODE 636 W HCPCS: Performed by: STUDENT IN AN ORGANIZED HEALTH CARE EDUCATION/TRAINING PROGRAM

## 2019-09-04 PROCEDURE — 85025 COMPLETE CBC W/AUTO DIFF WBC: CPT

## 2019-09-04 PROCEDURE — 99217 PR OBSERVATION CARE DISCHARGE: CPT | Mod: GC,,, | Performed by: INTERNAL MEDICINE

## 2019-09-04 PROCEDURE — 25000003 PHARM REV CODE 250: Performed by: INTERNAL MEDICINE

## 2019-09-04 PROCEDURE — 25000003 PHARM REV CODE 250: Performed by: STUDENT IN AN ORGANIZED HEALTH CARE EDUCATION/TRAINING PROGRAM

## 2019-09-04 PROCEDURE — 80053 COMPREHEN METABOLIC PANEL: CPT

## 2019-09-04 PROCEDURE — 83735 ASSAY OF MAGNESIUM: CPT

## 2019-09-04 PROCEDURE — G0378 HOSPITAL OBSERVATION PER HR: HCPCS

## 2019-09-04 RX ORDER — HYDROCHLOROTHIAZIDE 12.5 MG/1
12.5 TABLET ORAL ONCE
Status: DISCONTINUED | OUTPATIENT
Start: 2019-09-04 | End: 2019-09-04

## 2019-09-04 RX ORDER — HYDRALAZINE HYDROCHLORIDE 25 MG/1
25 TABLET, FILM COATED ORAL ONCE
Status: COMPLETED | OUTPATIENT
Start: 2019-09-04 | End: 2019-09-04

## 2019-09-04 RX ORDER — PROCHLORPERAZINE MALEATE 10 MG
10 TABLET ORAL EVERY 6 HOURS PRN
Qty: 40 TABLET | Refills: 0 | Status: SHIPPED | OUTPATIENT
Start: 2019-09-04 | End: 2019-09-16 | Stop reason: SDUPTHER

## 2019-09-04 RX ORDER — ONDANSETRON 4 MG/1
4 TABLET, FILM COATED ORAL EVERY 6 HOURS
Status: DISCONTINUED | OUTPATIENT
Start: 2019-09-04 | End: 2019-09-04 | Stop reason: HOSPADM

## 2019-09-04 RX ORDER — ONDANSETRON 4 MG/1
4 TABLET, FILM COATED ORAL EVERY 6 HOURS
Qty: 40 TABLET | Refills: 0 | Status: SHIPPED | OUTPATIENT
Start: 2019-09-04 | End: 2019-09-16 | Stop reason: SDUPTHER

## 2019-09-04 RX ORDER — TRIAMTERENE AND HYDROCHLOROTHIAZIDE 37.5; 25 MG/1; MG/1
1 CAPSULE ORAL DAILY
Status: DISCONTINUED | OUTPATIENT
Start: 2019-09-04 | End: 2019-09-04 | Stop reason: HOSPADM

## 2019-09-04 RX ORDER — TRIAMTERENE AND HYDROCHLOROTHIAZIDE 37.5; 25 MG/1; MG/1
1 CAPSULE ORAL DAILY
Status: DISCONTINUED | OUTPATIENT
Start: 2019-09-04 | End: 2019-09-04

## 2019-09-04 RX ORDER — HEPARIN 100 UNIT/ML
300 SYRINGE INTRAVENOUS ONCE
Status: COMPLETED | OUTPATIENT
Start: 2019-09-04 | End: 2019-09-04

## 2019-09-04 RX ADMIN — HEPARIN 300 UNITS: 100 SYRINGE at 02:09

## 2019-09-04 RX ADMIN — TRIAMTERENE AND HYDROCHLOROTHIAZIDE 1 CAPSULE: 25; 37.5 CAPSULE ORAL at 03:09

## 2019-09-04 RX ADMIN — POLYETHYLENE GLYCOL 3350 17 G: 17 POWDER, FOR SOLUTION ORAL at 08:09

## 2019-09-04 RX ADMIN — DEXAMETHASONE 8 MG: 4 TABLET ORAL at 08:09

## 2019-09-04 RX ADMIN — FLUTICASONE PROPIONATE 50 MCG: 50 SPRAY, METERED NASAL at 08:09

## 2019-09-04 RX ADMIN — GABAPENTIN 100 MG: 100 CAPSULE ORAL at 08:09

## 2019-09-04 RX ADMIN — Medication 10 ML: at 08:09

## 2019-09-04 RX ADMIN — ENOXAPARIN SODIUM 120 MG: 150 INJECTION SUBCUTANEOUS at 08:09

## 2019-09-04 RX ADMIN — ATORVASTATIN CALCIUM 10 MG: 10 TABLET, FILM COATED ORAL at 08:09

## 2019-09-04 RX ADMIN — FOLIC ACID 1000 MCG: 1 TABLET ORAL at 08:09

## 2019-09-04 RX ADMIN — METOPROLOL SUCCINATE 75 MG: 25 TABLET, EXTENDED RELEASE ORAL at 08:09

## 2019-09-04 RX ADMIN — ONDANSETRON 4 MG: 2 INJECTION INTRAMUSCULAR; INTRAVENOUS at 05:09

## 2019-09-04 RX ADMIN — DULOXETINE 60 MG: 60 CAPSULE, DELAYED RELEASE ORAL at 08:09

## 2019-09-04 RX ADMIN — ONDANSETRON HYDROCHLORIDE 4 MG: 4 TABLET, FILM COATED ORAL at 12:09

## 2019-09-04 RX ADMIN — HYDRALAZINE HYDROCHLORIDE 25 MG: 25 TABLET, FILM COATED ORAL at 12:09
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
## 2019-09-04 NOTE — CONSULTS
"Received consult for "nauseated"    Spoke with pt today, nausea and vomiting related to chemotherapy. Pt states she no longer feels nauseated and has received proper medicine for nausea. To d/c home today. Discussed with pt home diet when not nauseous and reaching adequate intake of EEN and EPN. Pt states intake is fair and she drinks ONS at home multiple times/day.     No further questions and pt states she feels good.     Please re-consult if needed.    Thanks,  Nancy Nogueira, RD, LDN   "

## 2019-09-04 NOTE — PLAN OF CARE
Problem: Occupational Therapy Goal  Goal: Occupational Therapy Goal  Pt is not currently displaying a need for acute OT services. D/C acute OT services and recommend pt D/C home.   Outcome: Outcome(s) achieved Date Met: 09/04/19  D/C acute OT services     Comments: Peter Chan OTR/L  9/4/2019

## 2019-09-04 NOTE — CHAPLAIN
Facts: The patient has a clear understanding of the facts of her healtcare situation. She knows she has an aggressive condition that is terminal.    Feelings: The patient's feelings are very strong related to her understanding of her health care status. Expressed that she has not wept at all upon receiving her prognosis. Patient is at peace and realizes the brevity of time and has begun to make administrative preparations. She has deep concerns for one of her daughters acceptance of her condition and her anger towards God.    Family/Friends: There is a strong bonding within family and patient has a very intimate family support.     Mirna/Belief: Very strong mirna internally as well as extended to each of her children.     Follow up: Very encouraging person to be around and follow-up is recommended.  However that said she is looking forward to going home soon.

## 2019-09-04 NOTE — PLAN OF CARE
MDRs completed with Dr. Gilman and the team this morning. Plans for the patient to discharge home today. Medications needed for discharge scheduled to be delivered to the patient's bedside prior to discharge. CM met with the patient to discuss OBS status; all questions and concerns addressed. No discharge needs identified. Follow-up scheduled as listed below. Discharge and follow-up instructions to be completed by the bedside nurse.    Future Appointments   Date Time Provider Department Center   9/17/2019  8:00 AM [facility]   9/18/2019  8:00 AM Antonella Goetz NP [facility] HEM ONC [facility]   9/18/2019  8:30 AM [facility], CHEMO [facility] CHEMO [facility]   9/19/2019 11:00 AM CHAIR 01 [facility]      09/04/19 1247   Final Note   Assessment Type Final Discharge Note   Anticipated Discharge Disposition Home   What phone number can be called within the next 1-3 days to see how you are doing after discharge?   ([phone])   Hospital Follow Up  Appt(s) scheduled? Yes   Discharge plans and expectations educations in teach back method with documentation complete? Yes  (per the bedside nurse)

## 2019-09-04 NOTE — DISCHARGE SUMMARY
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 She says that the symptoms began shortly after chemotherapy. She has vomited approx 8 times yesterday and reports that she is awoken by nausea at night and vomits with or without food. She feels hungry but cannot keep anything down PO. The diarrhea has been 4-5 loose, watery BMs with some blood streaks (similar to previous as she has hemorrhoids) with some mucus as well; mostly yellow in color. Denies any dark/tarry stools, chest pain, dyspnea, fevers, diffuse body aches / rigors but does report abdominal pain associated with the nausea and vomiting. Has tried zofran once but could not tolerate the metallic taste so she has only been using phenergan q6H which has not controlled her nausea adequately.    In the ED she was found to have BP in the 140s-160s/80s with pulse in the 70s-80s. Pt did not have any fevers and had a normal CBC. She was noted to have an GINA with a creatinine of 1.6 compared to her baseline of 1.1. She was given IV fluids and IV antiemetics and admitted to Med Onc.    * No surgery found *     Hospital Course: Patient placed in observation on the Med-Onc service being treated for chemotherapy induced nausea, vomiting and diarrhea. Patient did not have a BM since 2 days before admission so C.diff was not tested for. Was started on miralax for constipation and  scheduled zofran for nausea vomiting. Patient symptom free for 2 days now and tolerating PO intake well. Dc home today with zofran 4mg q6 prn and compazine 10mg q6 prn for nausea (09/04).      Physical Exam   Constitutional: She is oriented to person, place, and time. She appears well-developed and well-nourished.   Eyes: Pupils are equal, round, and reactive to light. Conjunctivae are normal. No scleral icterus.   Neck: Normal range of motion. No thyromegaly present.   Cardiovascular: Normal rate, regular rhythm, normal heart sounds and intact distal pulses.   Pulmonary/Chest: Effort normal and breath sounds normal. No respiratory distress.   Abdominal: Soft. Bowel sounds are normal. She exhibits no distension. There is no tenderness.   Musculoskeletal: Normal range of motion. She exhibits no edema or tenderness.   Lymphadenopathy:     She has no cervical adenopathy.   Neurological: She is alert and oriented to person, place, and time.   Skin: Skin is warm and dry.   Psychiatric: She has a normal mood and affect. Her behavior is normal. Judgment and thought content normal.     Consults:   Consults (From admission, onward)        Status Ordering Provider     Inpatient consult to Registered Dietitian/Nutritionist  Once     Provider:  (Not yet assigned)    Acknowledged ALEJANDRINA LUCIANO          Significant Diagnostic Studies: Labs: All labs within the past 24 hours have been reviewed  Microbiology:   Blood Culture   Lab Results   Component Value Date    LABBLOO No Growth to date 09/02/2019    LABBLOO No Growth to date 09/02/2019       Pending Diagnostic Studies:     Procedure Component Value Units Date/Time    Phosphorus [[id number]] Collected:  09/03/19 0448    Order Status:  Sent Lab Status:  In process Updated:  09/03/19 0449    Specimen:  Blood         Final Active Diagnoses:    Diagnosis Date Noted POA    PRINCIPAL PROBLEM:  Chemotherapy induced nausea and vomiting [R11.2, T45.1X5A] 08/22/2019 Yes    Chemotherapy  adverse reaction, initial encounter [T45.1X5A] 09/03/2019 Yes    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] 07/18/2019 Yes    History of DVT of lower extremity [Z86.718] 07/03/2019 Not Applicable    GINA (acute kidney injury) [N17.9] 07/02/2019 Yes    Major depressive disorder, recurrent episode, mild [F33.0] 06/12/2019 Yes    Type 2 diabetes mellitus with diabetic cataract, without long-term current use of insulin [E11.36] 06/05/2019 Yes     Chronic    Iron deficiency anemia due to sideropenic dysphagia [D50.1] 12/28/2016 Yes    History of CVA (cerebrovascular accident) [Z86.73] 11/11/2016 Not Applicable     Chronic    Chronic deep vein thrombosis (DVT) of distal vein of lower extremity, unspecified laterality [I82.5Z9] 10/21/2016 Yes     Chronic    Hypertension, essential [I10] 07/31/2015 Yes     Chronic    Diarrhea [R19.7] 05/08/2013 Yes      Problems Resolved During this Admission:      Discharged Condition: good    Disposition: Home or Self Care    Follow Up:  Follow-up Information     Ayo Acrhuleta MD In 1 week.    Specialties:  Internal Medicine, Wound Care  Contact information:  [address] [facility] [id number] [phone]             Austin Burt MD.    Specialty:  Hematology and Oncology  Why:  Follow-Up Appointment as scheduled by the clinic  Contact information:  [address] [facility] [address] [phone]                 Patient Instructions:   No discharge procedures on file.  Medications:  Reconciled Home Medications:      Medication List      START taking these medications    DUKE'S SOLUTION (BENADRYL 30 ML, MYLANTA 30 ML, LIDOCAINE 30 ML, NYSTATIN 30 ML)  Take 10 mLs by mouth 4 (four) times daily.     ondansetron 4 MG tablet  Commonly known as:  ZOFRAN  Take 1 tablet (4 mg total) by mouth every 6 (six) hours.     prochlorperazine 10 MG tablet  Commonly known as:  COMPAZINE  Take 1 tablet (10 mg total) by mouth every 6 (six) hours as needed.        CHANGE how you take these  medications    dicyclomine 10 MG capsule  Commonly known as:  BENTYL  Take 1 capsule (10 mg total) by mouth 2 (two) times daily.  What changed:  Another medication with the same name was removed. Continue taking this medication, and follow the directions you see here.     lancets 28 gauge Misc  Commonly known as:  TRUEPLUS LANCETS  1 lancet by Misc.(Non-Drug; Combo Route) route once daily. Test blood sugar once daily, type 2 diabetes, controlled. E11.9  What changed:    · when to take this  · reasons to take this  · additional instructions     LINZESS 72 mcg Cap capsule  Generic drug:  linaCLOtide  TAKE 1 CAPSULE(72 MCG) BY MOUTH DAILY  What changed:  See the new instructions.     * metoprolol succinate 25 MG 24 hr tablet  Commonly known as:  TOPROL-XL  TAKE 1 TABLET(25 MG) BY MOUTH EVERY DAY. TOTAL DAILY DOSE 75 MG  What changed:    · when to take this  · additional instructions     * metoprolol succinate 50 MG 24 hr tablet  Commonly known as:  TOPROL-XL  TAKE 1 TABLET(50 MG) BY MOUTH EVERY DAY. TOTAL DAILY DOSE 75 MG  What changed:    · when to take this  · additional instructions     promethazine 25 MG suppository  Commonly known as:  PHENERGAN  Place 1 suppository (25 mg total) rectally every 6 (six) hours as needed for Nausea.  What changed:  Another medication with the same name was removed. Continue taking this medication, and follow the directions you see here.     triamterene-hydrochlorothiazide 37.5-25 mg 37.5-25 mg per tablet  Commonly known as:  MAXZIDE-25  Take 1 tablet by mouth once daily. Hold until resumed by PCP  What changed:  additional instructions         * This list has 2 medication(s) that are the same as other medications prescribed for you. Read the directions carefully, and ask your doctor or other care provider to review them with you.            CONTINUE taking these medications    atorvastatin 10 MG tablet  Commonly known as:  LIPITOR  TAKE 1 TABLET(10 MG) BY MOUTH EVERY DAY      dexAMETHasone 4 MG Tab  Commonly known as:  DECADRON  Take 2 tablets (8 mg total) by mouth every 12 (twelve) hours. Take the day before and for two days post chemotherapy.     DULoxetine 60 MG capsule  Commonly known as:  CYMBALTA  Take 1 capsule (60 mg total) by mouth once daily.     enoxaparin 120 mg/0.8 mL Syrg  Commonly known as:  LOVENOX  Inject 0.8 mLs (120 mg total) into the skin once daily.     fluticasone 27.5 mcg/actuation nasal spray  Commonly known as:  VERAMYST  2 sprays by Nasal route daily as needed for Rhinitis or Allergies.     folic acid 400 MCG tablet  Commonly known as:  FOLVITE  Take 1 tablet (400 mcg total) by mouth once daily.     gabapentin 100 MG capsule  Commonly known as:  NEURONTIN  Take 1 capsule (100 mg total) by mouth 2 (two) times daily.     HYDROcodone-acetaminophen 5-325 mg per tablet  Commonly known as:  NORCO  Take 1 tablet by mouth every 4 (four) hours as needed (severe pain).     hydrOXYzine HCl 25 MG tablet  Commonly known as:  ATARAX  Take 1-2 tablets (25-50 mg total) by mouth daily as needed (severe anxiety or itching).     meclizine 25 mg tablet  Commonly known as:  ANTIVERT  TAKE 1 TABLET(25 MG) BY MOUTH THREE TIMES DAILY AS NEEDED FOR DIZZINESS     mometasone 0.1% 0.1 % cream  Commonly known as:  ELOCON  BALWINDER TO DARK AREAS BID ON LEGS PRN     tiZANidine 4 MG tablet  Commonly known as:  ZANAFLEX  Take 4 mg by mouth daily as needed (muscle spasms).        STOP taking these medications    ondansetron 8 MG Moodl  Commonly known as:  CHASE Mcleod MD  Hematology/Oncology  [facility]

## 2019-09-04 NOTE — PLAN OF CARE
Problem: Adult Inpatient Plan of Care  Goal: Plan of Care Review  Outcome: Ongoing (interventions implemented as appropriate)  POC reviewed with patient, questions answered and concerns addressed. VS stable, no vomiting today. IVF administered as ordered. Tolerating diet without difficulty. Left power port accessed, good blood return present. IV Magnesium given for 1.2level. In no acute distress; rex continue to monitor

## 2019-09-04 NOTE — NURSING
ROADTEST  O-oxygen saturation 95-98& on room air.  A-ambulating in room independently, gait steady.  D-Left PAC heparinized and deaccessed, good blood return present.  T-Tolerating diet without difficulty.   E-voiding adequate amount CYU, LBM-9/3.  S-Able to do ADL'S with minimal assistance and family will help her at home.  T-AVS reviewed with patient using the teach-back method. Ready for discharge.

## 2019-09-04 NOTE — PLAN OF CARE
Problem: Adult Inpatient Plan of Care  Goal: Plan of Care Review  Outcome: Ongoing (interventions implemented as appropriate)  Reviewed plan of care with pt at the beginning of the shift. Pt complained of nausea. Scheduled zofran relieved the nausea. Pt reported no pain throughout the shift. Vital signs were stable throughout the shift.Fall precautions continued for pt. Bed locked and at lowest position. Call light within reach. Pt knows to call if assistance is needed.  BP elevated. MD order to given morning am BP medication early. Bp problem resolved.

## 2019-09-04 NOTE — HOSPITAL COURSE
Assessment/Plan:    No problem-specific Assessment & Plan notes found for this encounter  1  Routine adult health maintenance     2  Need for hepatitis C screening test  Hepatitis C antibody   3  Screening for prostate cancer  PSA, total and free   4  Essential hypertension  Lipid panel    TSH, 3rd generation with Free T4 reflex    Comprehensive metabolic panel    CBC   5  Impaired fasting glucose  Hemoglobin A1C   6  Encounter for immunization  PNEUMOCOCCAL CONJUGATE VACCINE 13-VALENT GREATER THAN 6 MONTHS    PREFERRED: influenza vaccine, [id number], high-dose, PF 0 5 mL, for patients 65 yr+ (FLUZONE HIGH-DOSE)     Subjective:      Patient ID: Yasmany Willett is a 73 y o  male  HPI   Yasmany Willett with HTN, proteinuria, prediabetes is here for yearly physical     Last dental visit goes routinely, next visit 2/2019  Last eye visit 2006    Immunization History   Administered Date(s) Administered    Influenza 12/07/2015, 12/12/2016, 01/04/2018    Influenza Quadrivalent, 6-35 Months IM 11/01/2014, 12/12/2016, 01/04/2018    Influenza TIV (IM) 12/04/2013, 11/01/2014, 12/07/2015    Influenza, high dose seasonal 0 5 mL 01/15/2019    Pneumococcal Conjugate 13-Valent 01/15/2019    Tdap 10/30/2013     Lifestyle:    Diet regular              Physical activity walks his dog daily about 1-2miles, also rides stationary bicycle    reports that he drinks alcohol  drinks 1-2glasses of wine 3-4days per week and beer sometimes    reports that he has never smoked  He has never used smokeless tobacco     has no drug history on file  Reproductive:     has no sexual activity history on file     Erectile dysfunction No    Cancer Screenings:   Last PSA Yes 7/6/18   Colonoscopy 5/2017         The following portions of the patient's history were reviewed and updated as appropriate: allergies, current medications, past family history, past medical history, past social history, past surgical history and problem list     Current Patient placed in observation on the Med-Onc service being treated for chemotherapy induced nausea, vomiting and diarrhea. Patient did not have a BM since 2 days before admission so C.diff was not tested for. Was started on miralax for constipation and scheduled zofran for nausea vomiting. Patient symptom free for 2 days now and tolerating PO intake well. Dc home today with zofran 4mg q6 prn and compazine 10mg q6 prn for nausea (09/04).    Outpatient Prescriptions:     lisinopril (ZESTRIL) 5 mg tablet, Take 2 tablets (10 mg total) by mouth daily, Disp: 90 tablet, Rfl: 0    rosuvastatin (CRESTOR) 10 MG tablet, TAKE 1 TABLET DAILY, Disp: 90 tablet, Rfl: 3    Review of Systems   Constitutional:        +weight gain   HENT: Positive for hearing loss  Negative for congestion  Respiratory: Negative  Cardiovascular: Negative  Gastrointestinal: Negative  Genitourinary: Negative  Musculoskeletal: Positive for arthralgias  Neurological: Negative  Hematological: Negative for adenopathy  Psychiatric/Behavioral: Negative  Objective:    /80 (BP Location: Right arm, Patient Position: Sitting, Cuff Size: Large)   Pulse 61   Temp (!) 96 6 °F (35 9 °C)   Resp 16   Ht 5' 8" (1 727 m)   Wt 94 8 kg (209 lb)   SpO2 97%   BMI 31 78 kg/m²      Physical Exam   Constitutional: He is oriented to person, place, and time  He appears well-developed and well-nourished  No distress  HENT:   Right Ear: External ear normal    Left Ear: External ear normal    Nose: Nose normal    Mouth/Throat: Oropharynx is clear and moist  No oropharyngeal exudate  Eyes: EOM are normal    Neck: Neck supple  No thyromegaly present  R neck scar   Cardiovascular: Normal rate, regular rhythm, normal heart sounds and intact distal pulses  Pulmonary/Chest: Effort normal and breath sounds normal    Abdominal: Soft  Bowel sounds are normal  He exhibits no distension  Lymphadenopathy:     He has no cervical adenopathy  Neurological: He is alert and oriented to person, place, and time  Skin: Skin is warm  Psychiatric: He has a normal mood and affect  Vitals reviewed        Recent Results (from the past 336 hour(s))   Lipid panel    Collection Time: 01/11/19 10:18 AM   Result Value Ref Range    Cholesterol 159 50 - 200 mg/dL    Triglycerides 118 <=150 mg/dL    HDL, Direct 49 40 - 60 mg/dL    LDL Calculated 86 0 - 100 mg/dL    Non-HDL-Chol (CHOL-HDL) 110 mg/dl   Comprehensive metabolic panel    Collection Time: 01/11/19 10:18 AM   Result Value Ref Range    Sodium 136 136 - 145 mmol/L    Potassium 4 5 3 5 - 5 3 mmol/L    Chloride 103 100 - 108 mmol/L    CO2 28 21 - 32 mmol/L    ANION GAP 5 4 - 13 mmol/L    BUN 24 5 - 25 mg/dL    Creatinine 0 92 0 60 - 1 30 mg/dL    Glucose, Fasting 108 (H) 65 - 99 mg/dL    Calcium 9 0 8 3 - 10 1 mg/dL    AST 28 5 - 45 U/L    ALT 64 12 - 78 U/L    Alkaline Phosphatase 69 46 - 116 U/L    Total Protein 7 4 6 4 - 8 2 g/dL    Albumin 4 0 3 5 - 5 0 g/dL    Total Bilirubin 0 80 0 20 - 1 00 mg/dL    eGFR 87 ml/min/1 73sq m

## 2019-09-07 ENCOUNTER — PATIENT MESSAGE (OUTPATIENT)
Dept: HEMATOLOGY/ONCOLOGY | Facility: CLINIC | Age: 74
End: 2019-09-07

## 2019-09-07 LAB
BACTERIA BLD CULT: NORMAL
BACTERIA BLD CULT: NORMAL

## 2019-09-08 DIAGNOSIS — I10 HTN, GOAL BELOW 140/90: ICD-10-CM

## 2019-09-08 DIAGNOSIS — E11.9 CONTROLLED TYPE 2 DIABETES MELLITUS WITHOUT COMPLICATION, WITHOUT LONG-TERM CURRENT USE OF INSULIN: Chronic | ICD-10-CM

## 2019-09-08 DIAGNOSIS — I10 HYPERTENSION, ESSENTIAL: ICD-10-CM

## 2019-09-09 RX ORDER — TRIAMTERENE/HYDROCHLOROTHIAZID 37.5-25 MG
TABLET ORAL
Qty: 90 TABLET | Refills: 0 | Status: SHIPPED | OUTPATIENT
Start: 2019-09-09 | End: 2019-09-16 | Stop reason: SDUPTHER

## 2019-09-09 RX ORDER — METOPROLOL SUCCINATE 25 MG/1
TABLET, EXTENDED RELEASE ORAL
Qty: 90 TABLET | Refills: 0 | Status: SHIPPED | OUTPATIENT
Start: 2019-09-09 | End: 2019-09-16 | Stop reason: SDUPTHER

## 2019-09-09 RX ORDER — ATORVASTATIN CALCIUM 10 MG/1
TABLET, FILM COATED ORAL
Qty: 90 TABLET | Refills: 0 | Status: SHIPPED | OUTPATIENT
Start: 2019-09-09 | End: 2019-09-16 | Stop reason: SDUPTHER

## 2019-09-09 RX ORDER — METOPROLOL SUCCINATE 50 MG/1
TABLET, EXTENDED RELEASE ORAL
Qty: 90 TABLET | Refills: 0 | Status: SHIPPED | OUTPATIENT
Start: 2019-09-09 | End: 2019-09-16 | Stop reason: SDUPTHER

## 2019-09-09 NOTE — TELEPHONE ENCOUNTER
Can I let her know her labs are OK?   Do you want to recheck a CBC to check her hgb and platelets with constant nosebleeds?  alesia

## 2019-09-13 ENCOUNTER — PATIENT MESSAGE (OUTPATIENT)
Dept: HEMATOLOGY/ONCOLOGY | Facility: CLINIC | Age: 74
End: 2019-09-13

## 2019-09-13 DIAGNOSIS — Z86.718 HISTORY OF DVT (DEEP VEIN THROMBOSIS): ICD-10-CM

## 2019-09-13 RX ORDER — ENOXAPARIN SODIUM 150 MG/ML
120 INJECTION SUBCUTANEOUS DAILY
Qty: 24 ML | Refills: 6 | Status: SHIPPED | OUTPATIENT
Start: 2019-09-13 | End: 2020-04-09

## 2019-09-13 RX ORDER — ENOXAPARIN SODIUM 150 MG/ML
120 INJECTION SUBCUTANEOUS DAILY
Qty: 24 ML | Refills: 6 | Status: SHIPPED | OUTPATIENT
Start: 2019-09-13 | End: 2019-09-13 | Stop reason: SDUPTHER

## 2019-09-13 NOTE — TELEPHONE ENCOUNTER
----- Message from Melba Grossamn sent at 9/13/2019 12:18 PM CDT -----  Contact: pt daughter   Pt is requesting a refill on her rx enoxaparin (LOVENOX) 120 mg/0.8 mL Syrg as soon as possible. Pt has not had her rx in three days.       Pt daughter (Onesimo) can be contacted at [phone]

## 2019-09-13 NOTE — TELEPHONE ENCOUNTER
----- Message from Jayla Schultz sent at 9/13/2019  3:47 PM CDT -----  Contact: [facility] ( [facility]IGUE )  Refill or New Rx: Refill    RX Name and Strength: enoxaparin (LOVENOX) 120 mg/0.8 mL Syrg    Preferred Pharmacy with phone number:  [facility] [id number] [facility] - [place] - [address]  [address]  [place] [address]  Phone: [phone] Fax: [phone]    Ordering Provider: Dr Javed Block Call Back Number:    Additional Information:

## 2019-09-15 ENCOUNTER — PATIENT MESSAGE (OUTPATIENT)
Dept: HEMATOLOGY/ONCOLOGY | Facility: CLINIC | Age: 74
End: 2019-09-15

## 2019-09-15 DIAGNOSIS — T45.1X5A CHEMOTHERAPY INDUCED NAUSEA AND VOMITING: Primary | ICD-10-CM

## 2019-09-15 DIAGNOSIS — R11.2 CHEMOTHERAPY INDUCED NAUSEA AND VOMITING: Primary | ICD-10-CM

## 2019-09-16 ENCOUNTER — PATIENT MESSAGE (OUTPATIENT)
Dept: HEMATOLOGY/ONCOLOGY | Facility: CLINIC | Age: 74
End: 2019-09-16

## 2019-09-16 DIAGNOSIS — I10 HYPERTENSION, ESSENTIAL: ICD-10-CM

## 2019-09-16 DIAGNOSIS — E11.9 CONTROLLED TYPE 2 DIABETES MELLITUS WITHOUT COMPLICATION, WITHOUT LONG-TERM CURRENT USE OF INSULIN: Chronic | ICD-10-CM

## 2019-09-16 DIAGNOSIS — I10 HTN, GOAL BELOW 140/90: ICD-10-CM

## 2019-09-16 RX ORDER — HYDROXYZINE HYDROCHLORIDE 25 MG/1
25-50 TABLET, FILM COATED ORAL DAILY PRN
Qty: 60 TABLET | Refills: 11 | Status: SHIPPED | OUTPATIENT
Start: 2019-09-16 | End: 2020-04-27

## 2019-09-16 RX ORDER — DICYCLOMINE HYDROCHLORIDE 10 MG/1
10 CAPSULE ORAL 2 TIMES DAILY
Qty: 90 CAPSULE | Refills: 0 | Status: SHIPPED | OUTPATIENT
Start: 2019-09-16 | End: 2019-10-27 | Stop reason: SDUPTHER

## 2019-09-16 RX ORDER — METOPROLOL SUCCINATE 50 MG/1
50 TABLET, EXTENDED RELEASE ORAL DAILY
Qty: 90 TABLET | Refills: 0 | Status: SHIPPED | OUTPATIENT
Start: 2019-09-16 | End: 2019-12-11 | Stop reason: SDUPTHER

## 2019-09-16 RX ORDER — PROCHLORPERAZINE MALEATE 10 MG
10 TABLET ORAL EVERY 6 HOURS PRN
Qty: 60 TABLET | Refills: 1 | Status: SHIPPED | OUTPATIENT
Start: 2019-09-16 | End: 2019-09-26

## 2019-09-16 RX ORDER — ONDANSETRON 4 MG/1
4 TABLET, FILM COATED ORAL EVERY 6 HOURS
Qty: 40 TABLET | Refills: 0 | Status: SHIPPED | OUTPATIENT
Start: 2019-09-16 | End: 2019-10-21 | Stop reason: SDUPTHER

## 2019-09-16 RX ORDER — ATORVASTATIN CALCIUM 10 MG/1
40 TABLET, FILM COATED ORAL DAILY
Qty: 90 TABLET | Refills: 0 | Status: SHIPPED | OUTPATIENT
Start: 2019-09-16 | End: 2019-09-20

## 2019-09-16 RX ORDER — TRIAMTERENE/HYDROCHLOROTHIAZID 37.5-25 MG
1 TABLET ORAL DAILY
Qty: 90 TABLET | Refills: 0 | Status: SHIPPED | OUTPATIENT
Start: 2019-09-16 | End: 2019-12-11 | Stop reason: SDUPTHER

## 2019-09-16 RX ORDER — METOPROLOL SUCCINATE 25 MG/1
25 TABLET, EXTENDED RELEASE ORAL DAILY
Qty: 90 TABLET | Refills: 0 | Status: ON HOLD | OUTPATIENT
Start: 2019-09-16 | End: 2019-11-11 | Stop reason: HOSPADM

## 2019-09-17 ENCOUNTER — LAB VISIT (OUTPATIENT)
Dept: LAB | Facility: HOSPITAL | Age: 74
End: 2019-09-17
Attending: NURSE PRACTITIONER
Payer: MEDICARE

## 2019-09-17 DIAGNOSIS — R30.0 DYSURIA: ICD-10-CM

## 2019-09-17 DIAGNOSIS — C45.0 MALIGNANT PLEURAL MESOTHELIOMA: ICD-10-CM

## 2019-09-17 LAB
ALBUMIN SERPL BCP-MCNC: 3.8 G/DL (ref 3.5–5.2)
ALP SERPL-CCNC: 82 U/L (ref 55–135)
ALT SERPL W/O P-5'-P-CCNC: 26 U/L (ref 10–44)
ANION GAP SERPL CALC-SCNC: 10 MMOL/L (ref 8–16)
AST SERPL-CCNC: 19 U/L (ref 10–40)
BILIRUB SERPL-MCNC: 0.2 MG/DL (ref 0.1–1)
BUN SERPL-MCNC: 23 MG/DL (ref 8–23)
CALCIUM SERPL-MCNC: 9.4 MG/DL (ref 8.7–10.5)
CHLORIDE SERPL-SCNC: 104 MMOL/L (ref 95–110)
CO2 SERPL-SCNC: 23 MMOL/L (ref 23–29)
CREAT SERPL-MCNC: 1.6 MG/DL (ref 0.5–1.4)
ERYTHROCYTE [DISTWIDTH] IN BLOOD BY AUTOMATED COUNT: 16.3 % (ref 11.5–14.5)
EST. GFR  (AFRICAN AMERICAN): 37 ML/MIN/1.73 M^2
EST. GFR  (NON AFRICAN AMERICAN): 32 ML/MIN/1.73 M^2
GLUCOSE SERPL-MCNC: 158 MG/DL (ref 70–110)
HCT VFR BLD AUTO: 36.4 % (ref 37–48.5)
HGB BLD-MCNC: 11.7 G/DL (ref 12–16)
MAGNESIUM SERPL-MCNC: 1.5 MG/DL (ref 1.6–2.6)
MCH RBC QN AUTO: 29.6 PG (ref 27–31)
MCHC RBC AUTO-ENTMCNC: 32.1 G/DL (ref 32–36)
MCV RBC AUTO: 92 FL (ref 82–98)
NEUTROPHILS # BLD AUTO: 1.5 K/UL (ref 1.8–7.7)
PLATELET # BLD AUTO: 621 K/UL (ref 150–350)
PMV BLD AUTO: 9 FL (ref 9.2–12.9)
POTASSIUM SERPL-SCNC: 4.2 MMOL/L (ref 3.5–5.1)
PROT SERPL-MCNC: 7.4 G/DL (ref 6–8.4)
RBC # BLD AUTO: 3.95 M/UL (ref 4–5.4)
SODIUM SERPL-SCNC: 137 MMOL/L (ref 136–145)
WBC # BLD AUTO: 3.9 K/UL (ref 3.9–12.7)

## 2019-09-17 PROCEDURE — 83735 ASSAY OF MAGNESIUM: CPT

## 2019-09-17 PROCEDURE — 36415 COLL VENOUS BLD VENIPUNCTURE: CPT

## 2019-09-17 PROCEDURE — 80053 COMPREHEN METABOLIC PANEL: CPT

## 2019-09-17 PROCEDURE — 85027 COMPLETE CBC AUTOMATED: CPT
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
## 2019-09-18 ENCOUNTER — TELEPHONE (OUTPATIENT)
Dept: HEMATOLOGY/ONCOLOGY | Facility: CLINIC | Age: 74
End: 2019-09-18

## 2019-09-18 ENCOUNTER — INFUSION (OUTPATIENT)
Dept: INFUSION THERAPY | Facility: HOSPITAL | Age: 74
End: 2019-09-18
Attending: INTERNAL MEDICINE
Payer: MEDICARE

## 2019-09-18 ENCOUNTER — OFFICE VISIT (OUTPATIENT)
Dept: HEMATOLOGY/ONCOLOGY | Facility: CLINIC | Age: 74
End: 2019-09-18
Payer: MEDICARE

## 2019-09-18 VITALS
HEIGHT: 66 IN | OXYGEN SATURATION: 98 % | HEART RATE: 76 BPM | TEMPERATURE: 99 F | BODY MASS INDEX: 27.6 KG/M2 | DIASTOLIC BLOOD PRESSURE: 77 MMHG | WEIGHT: 171.75 LBS | RESPIRATION RATE: 18 BRPM | SYSTOLIC BLOOD PRESSURE: 138 MMHG

## 2019-09-18 VITALS
WEIGHT: 171.75 LBS | DIASTOLIC BLOOD PRESSURE: 62 MMHG | SYSTOLIC BLOOD PRESSURE: 128 MMHG | BODY MASS INDEX: 27.6 KG/M2 | RESPIRATION RATE: 18 BRPM | HEIGHT: 66 IN | TEMPERATURE: 98 F | HEART RATE: 73 BPM

## 2019-09-18 DIAGNOSIS — D70.1 CHEMOTHERAPY INDUCED NEUTROPENIA: ICD-10-CM

## 2019-09-18 DIAGNOSIS — T45.1X5A ANTINEOPLASTIC CHEMOTHERAPY INDUCED ANEMIA: ICD-10-CM

## 2019-09-18 DIAGNOSIS — T45.1X5A CHEMOTHERAPY INDUCED NAUSEA AND VOMITING: ICD-10-CM

## 2019-09-18 DIAGNOSIS — Z86.718 HISTORY OF DVT (DEEP VEIN THROMBOSIS): ICD-10-CM

## 2019-09-18 DIAGNOSIS — C45.0 MALIGNANT PLEURAL MESOTHELIOMA: Primary | ICD-10-CM

## 2019-09-18 DIAGNOSIS — M54.32 SCIATICA OF LEFT SIDE: ICD-10-CM

## 2019-09-18 DIAGNOSIS — T45.1X5A CHEMOTHERAPY INDUCED NEUTROPENIA: ICD-10-CM

## 2019-09-18 DIAGNOSIS — T45.1X5A CHEMOTHERAPY INDUCED NAUSEA AND VOMITING: Primary | ICD-10-CM

## 2019-09-18 DIAGNOSIS — R11.2 CHEMOTHERAPY INDUCED NAUSEA AND VOMITING: ICD-10-CM

## 2019-09-18 DIAGNOSIS — D64.81 ANTINEOPLASTIC CHEMOTHERAPY INDUCED ANEMIA: ICD-10-CM

## 2019-09-18 DIAGNOSIS — N28.9 RENAL INSUFFICIENCY: ICD-10-CM

## 2019-09-18 DIAGNOSIS — E83.42 HYPOMAGNESEMIA: ICD-10-CM

## 2019-09-18 DIAGNOSIS — R11.2 CHEMOTHERAPY INDUCED NAUSEA AND VOMITING: Primary | ICD-10-CM

## 2019-09-18 PROCEDURE — 25000003 PHARM REV CODE 250: Performed by: NURSE PRACTITIONER

## 2019-09-18 PROCEDURE — 63600175 PHARM REV CODE 636 W HCPCS: Performed by: NURSE PRACTITIONER

## 2019-09-18 PROCEDURE — 99215 OFFICE O/P EST HI 40 MIN: CPT | Mod: S$GLB,,, | Performed by: NURSE PRACTITIONER

## 2019-09-18 PROCEDURE — 3078F PR MOST RECENT DIASTOLIC BLOOD PRESSURE < 80 MM HG: ICD-10-PCS | Mod: CPTII,S$GLB,, | Performed by: NURSE PRACTITIONER

## 2019-09-18 PROCEDURE — 3075F SYST BP GE 130 - 139MM HG: CPT | Mod: CPTII,S$GLB,, | Performed by: NURSE PRACTITIONER

## 2019-09-18 PROCEDURE — 1101F PT FALLS ASSESS-DOCD LE1/YR: CPT | Mod: CPTII,S$GLB,, | Performed by: NURSE PRACTITIONER

## 2019-09-18 PROCEDURE — 3078F DIAST BP <80 MM HG: CPT | Mod: CPTII,S$GLB,, | Performed by: NURSE PRACTITIONER

## 2019-09-18 PROCEDURE — 99499 RISK ADDL DX/OHS AUDIT: ICD-10-PCS | Mod: S$GLB,,, | Performed by: NURSE PRACTITIONER

## 2019-09-18 PROCEDURE — 99499 UNLISTED E&M SERVICE: CPT | Mod: S$GLB,,, | Performed by: NURSE PRACTITIONER

## 2019-09-18 PROCEDURE — 1101F PR PT FALLS ASSESS DOC 0-1 FALLS W/OUT INJ PAST YR: ICD-10-PCS | Mod: CPTII,S$GLB,, | Performed by: NURSE PRACTITIONER

## 2019-09-18 PROCEDURE — A4216 STERILE WATER/SALINE, 10 ML: HCPCS | Performed by: NURSE PRACTITIONER

## 2019-09-18 PROCEDURE — 99215 PR OFFICE/OUTPT VISIT, EST, LEVL V, 40-54 MIN: ICD-10-PCS | Mod: S$GLB,,, | Performed by: NURSE PRACTITIONER

## 2019-09-18 PROCEDURE — 3075F PR MOST RECENT SYSTOLIC BLOOD PRESS GE 130-139MM HG: ICD-10-PCS | Mod: CPTII,S$GLB,, | Performed by: NURSE PRACTITIONER

## 2019-09-18 PROCEDURE — 96366 THER/PROPH/DIAG IV INF ADDON: CPT

## 2019-09-18 PROCEDURE — 96365 THER/PROPH/DIAG IV INF INIT: CPT

## 2019-09-18 PROCEDURE — 99999 PR PBB SHADOW E&M-EST. PATIENT-LVL V: CPT | Mod: PBBFAC,,, | Performed by: NURSE PRACTITIONER

## 2019-09-18 PROCEDURE — 99999 PR PBB SHADOW E&M-EST. PATIENT-LVL V: ICD-10-PCS | Mod: PBBFAC,,, | Performed by: NURSE PRACTITIONER

## 2019-09-18 RX ORDER — SODIUM CHLORIDE 0.9 % (FLUSH) 0.9 %
10 SYRINGE (ML) INJECTION
Status: CANCELLED | OUTPATIENT
Start: 2019-09-18

## 2019-09-18 RX ORDER — HEPARIN 100 UNIT/ML
500 SYRINGE INTRAVENOUS
Status: DISCONTINUED | OUTPATIENT
Start: 2019-09-18 | End: 2019-09-18 | Stop reason: HOSPADM

## 2019-09-18 RX ORDER — HEPARIN 100 UNIT/ML
500 SYRINGE INTRAVENOUS
Status: CANCELLED | OUTPATIENT
Start: 2019-09-19

## 2019-09-18 RX ORDER — SODIUM CHLORIDE 0.9 % (FLUSH) 0.9 %
10 SYRINGE (ML) INJECTION
Status: CANCELLED | OUTPATIENT
Start: 2019-09-19

## 2019-09-18 RX ORDER — HEPARIN 100 UNIT/ML
500 SYRINGE INTRAVENOUS
Status: CANCELLED | OUTPATIENT
Start: 2019-09-18

## 2019-09-18 RX ORDER — SODIUM CHLORIDE 0.9 % (FLUSH) 0.9 %
10 SYRINGE (ML) INJECTION
Status: DISCONTINUED | OUTPATIENT
Start: 2019-09-18 | End: 2019-09-18 | Stop reason: HOSPADM

## 2019-09-18 RX ADMIN — HEPARIN SODIUM (PORCINE) LOCK FLUSH IV SOLN 100 UNIT/ML 500 UNITS: 100 SOLUTION at 10:09

## 2019-09-18 RX ADMIN — MAGNESIUM SULFATE: 500 INJECTION, SOLUTION INTRAMUSCULAR; INTRAVENOUS at 09:09

## 2019-09-18 RX ADMIN — Medication 10 ML: at 10:09

## 2019-09-18 NOTE — NURSING
0837  Pt here for Cisplatin, Alimta, IVF's, accompanied by daughter, reports bilateral, intermittent hand/foot tremors, none at present, pt reports discussed same with NP, instructed to monitor same and report any worsening condition; discussed treatment plan for today, all questions answered and pt agrees to proceed

## 2019-09-18 NOTE — PLAN OF CARE
Problem: Adult Inpatient Plan of Care  Goal: Plan of Care Review  Outcome: Ongoing (interventions implemented as appropriate)  Infusion completed, pt tolerated well; pt instructed to increase hydration w/ water-based fluid r/t labs; discussed when to contact MD, when to report to ER; pt declined AVS, pt and daughter verbalized understanding of all discussed and when to report next

## 2019-09-18 NOTE — NURSING
0910  Per GRACE Goetz NP, hold treatment today r/t CrCl per MD Patton review of same; pt to receive IVF's today, again tomorrow on WB; labs redrawn on Mon 9/23/19 and treatment will be rescheduled pending lab results; discussed all with pt and daughter who both verbalized understanding

## 2019-09-18 NOTE — TELEPHONE ENCOUNTER
----- Message from Antonella Goetz NP sent at 9/18/2019 11:07 AM CDT -----  Schedule labs on the [facility] (CBC,CMP,Mag) on 9/23. Schedule 9/24 cycle #3 of cisplatin and pemetrexed with B12. Patient will require IVFs on day 2 on the [facility]. If chemo received on 9/24, I will send follow up for after.

## 2019-09-18 NOTE — NURSING
0956  Per GRACE Goetz NP, OK to run 1L NS w/ 2g Mg+ over one hour, discussed same with pt and daughter, rate adjusted on pump, pt verbalized understanding

## 2019-09-18 NOTE — Clinical Note
Schedule labs on the [facility] (CBC,CMP,Mag) on 9/23. Schedule 9/24 cycle #3 of cisplatin and pemetrexed with B12. Patient will require IVFs on day 2 on the [facility]. If chemo received on 9/24, I will send follow up for after.

## 2019-09-19 ENCOUNTER — INFUSION (OUTPATIENT)
Dept: INFUSION THERAPY | Facility: HOSPITAL | Age: 74
End: 2019-09-19
Attending: INTERNAL MEDICINE
Payer: MEDICARE

## 2019-09-19 VITALS
SYSTOLIC BLOOD PRESSURE: 115 MMHG | TEMPERATURE: 98 F | DIASTOLIC BLOOD PRESSURE: 64 MMHG | RESPIRATION RATE: 17 BRPM | HEART RATE: 79 BPM | OXYGEN SATURATION: 97 %

## 2019-09-19 DIAGNOSIS — T45.1X5A CHEMOTHERAPY INDUCED NAUSEA AND VOMITING: Primary | ICD-10-CM

## 2019-09-19 DIAGNOSIS — R11.2 CHEMOTHERAPY INDUCED NAUSEA AND VOMITING: Primary | ICD-10-CM

## 2019-09-19 DIAGNOSIS — D70.1 CHEMOTHERAPY INDUCED NEUTROPENIA: ICD-10-CM

## 2019-09-19 DIAGNOSIS — T45.1X5A CHEMOTHERAPY INDUCED NEUTROPENIA: ICD-10-CM

## 2019-09-19 PROCEDURE — 25000003 PHARM REV CODE 250: Performed by: NURSE PRACTITIONER

## 2019-09-19 PROCEDURE — A4216 STERILE WATER/SALINE, 10 ML: HCPCS | Performed by: NURSE PRACTITIONER

## 2019-09-19 PROCEDURE — 96360 HYDRATION IV INFUSION INIT: CPT

## 2019-09-19 PROCEDURE — 63600175 PHARM REV CODE 636 W HCPCS: Performed by: NURSE PRACTITIONER

## 2019-09-19 RX ORDER — HEPARIN 100 UNIT/ML
500 SYRINGE INTRAVENOUS
Status: DISCONTINUED | OUTPATIENT
Start: 2019-09-19 | End: 2019-09-19 | Stop reason: HOSPADM

## 2019-09-19 RX ORDER — SODIUM CHLORIDE 0.9 % (FLUSH) 0.9 %
10 SYRINGE (ML) INJECTION
Status: DISCONTINUED | OUTPATIENT
Start: 2019-09-19 | End: 2019-09-19 | Stop reason: HOSPADM

## 2019-09-19 RX ADMIN — SODIUM CHLORIDE 1000 ML: 0.9 INJECTION, SOLUTION INTRAVENOUS at 11:09

## 2019-09-19 RX ADMIN — Medication 10 ML: at 12:09

## 2019-09-19 RX ADMIN — HEPARIN 500 UNITS: 100 SYRINGE at 12:09

## 2019-09-19 NOTE — PLAN OF CARE
Problem: Adult Inpatient Plan of Care  Goal: Plan of Care Review  Outcome: Ongoing (interventions implemented as appropriate)  Pt presented for 1L NS IVF. Pt arrived with port a cath accessed from [facility] yesterday. Dressing c/d/i, biopatch in place. Good blood return noted pior to connecting fluids. Tolerated fluids without issue. Good blood return noted upon completion. Port flushed and heparinized. Pt reported occasional nose bleeds (MD aware), weakness, fatigue, and constipation (relieved by Linzess). Lunch provided. Distress screening tool completed. Pt's daughter escorted pt into and out of unit via wheelchair. AVS provided and reviewed with pt and pt's daughter.

## 2019-09-20 DIAGNOSIS — E11.9 CONTROLLED TYPE 2 DIABETES MELLITUS WITHOUT COMPLICATION, WITHOUT LONG-TERM CURRENT USE OF INSULIN: Chronic | ICD-10-CM

## 2019-09-20 RX ORDER — ATORVASTATIN CALCIUM 40 MG/1
40 TABLET, FILM COATED ORAL DAILY
Qty: 90 TABLET | Refills: 3 | Status: SHIPPED | OUTPATIENT
Start: 2019-09-20 | End: 2019-09-20 | Stop reason: SDUPTHER

## 2019-09-20 RX ORDER — ATORVASTATIN CALCIUM 40 MG/1
40 TABLET, FILM COATED ORAL DAILY
Qty: 90 TABLET | Refills: 3 | Status: SHIPPED | OUTPATIENT
Start: 2019-09-20 | End: 2019-10-14

## 2019-09-23 ENCOUNTER — LAB VISIT (OUTPATIENT)
Dept: LAB | Facility: HOSPITAL | Age: 74
End: 2019-09-23
Attending: NURSE PRACTITIONER
Payer: MEDICARE

## 2019-09-23 ENCOUNTER — PATIENT MESSAGE (OUTPATIENT)
Dept: HEMATOLOGY/ONCOLOGY | Facility: CLINIC | Age: 74
End: 2019-09-23

## 2019-09-23 DIAGNOSIS — C45.0 MALIGNANT PLEURAL MESOTHELIOMA: ICD-10-CM

## 2019-09-23 LAB
ALBUMIN SERPL BCP-MCNC: 3.5 G/DL (ref 3.5–5.2)
ALP SERPL-CCNC: 82 U/L (ref 55–135)
ALT SERPL W/O P-5'-P-CCNC: 21 U/L (ref 10–44)
ANION GAP SERPL CALC-SCNC: 7 MMOL/L (ref 8–16)
AST SERPL-CCNC: 19 U/L (ref 10–40)
BILIRUB SERPL-MCNC: 0.2 MG/DL (ref 0.1–1)
BUN SERPL-MCNC: 19 MG/DL (ref 8–23)
CALCIUM SERPL-MCNC: 9.3 MG/DL (ref 8.7–10.5)
CHLORIDE SERPL-SCNC: 104 MMOL/L (ref 95–110)
CO2 SERPL-SCNC: 27 MMOL/L (ref 23–29)
CREAT SERPL-MCNC: 1.6 MG/DL (ref 0.5–1.4)
ERYTHROCYTE [DISTWIDTH] IN BLOOD BY AUTOMATED COUNT: 16.8 % (ref 11.5–14.5)
EST. GFR  (AFRICAN AMERICAN): 37 ML/MIN/1.73 M^2
EST. GFR  (NON AFRICAN AMERICAN): 32 ML/MIN/1.73 M^2
GLUCOSE SERPL-MCNC: 270 MG/DL (ref 70–110)
HCT VFR BLD AUTO: 35.1 % (ref 37–48.5)
HGB BLD-MCNC: 11.2 G/DL (ref 12–16)
MAGNESIUM SERPL-MCNC: 1.4 MG/DL (ref 1.6–2.6)
MCH RBC QN AUTO: 29.9 PG (ref 27–31)
MCHC RBC AUTO-ENTMCNC: 31.9 G/DL (ref 32–36)
MCV RBC AUTO: 94 FL (ref 82–98)
NEUTROPHILS # BLD AUTO: 3.1 K/UL (ref 1.8–7.7)
PLATELET # BLD AUTO: 376 K/UL (ref 150–350)
PMV BLD AUTO: 8.7 FL (ref 9.2–12.9)
POTASSIUM SERPL-SCNC: 4.1 MMOL/L (ref 3.5–5.1)
PROT SERPL-MCNC: 6.6 G/DL (ref 6–8.4)
RBC # BLD AUTO: 3.74 M/UL (ref 4–5.4)
SODIUM SERPL-SCNC: 138 MMOL/L (ref 136–145)
WBC # BLD AUTO: 5.13 K/UL (ref 3.9–12.7)

## 2019-09-23 PROCEDURE — 36415 COLL VENOUS BLD VENIPUNCTURE: CPT

## 2019-09-23 PROCEDURE — 83735 ASSAY OF MAGNESIUM: CPT

## 2019-09-23 PROCEDURE — 80053 COMPREHEN METABOLIC PANEL: CPT

## 2019-09-23 PROCEDURE — 85027 COMPLETE CBC AUTOMATED: CPT

## 2019-09-23 NOTE — TELEPHONE ENCOUNTER
Will you just give her chemo and extra fluids this week? Or hold chemo and give only fluids?  ~diana

## 2019-09-26 RX ORDER — TIZANIDINE 4 MG/1
TABLET ORAL
Qty: 90 TABLET | Refills: 0 | Status: SHIPPED | OUTPATIENT
Start: 2019-09-26 | End: 2020-03-17

## 2019-09-30 ENCOUNTER — HOSPITAL ENCOUNTER (EMERGENCY)
Facility: HOSPITAL | Age: 74
Discharge: HOME | End: 2019-09-30
Attending: EMERGENCY MEDICINE
Payer: MEDICARE

## 2019-09-30 VITALS
OXYGEN SATURATION: 95 % | DIASTOLIC BLOOD PRESSURE: 82 MMHG | HEART RATE: 96 BPM | SYSTOLIC BLOOD PRESSURE: 155 MMHG | RESPIRATION RATE: 17 BRPM | WEIGHT: 169 LBS | HEIGHT: 66 IN | BODY MASS INDEX: 27.16 KG/M2 | TEMPERATURE: 98 F

## 2019-09-30 DIAGNOSIS — R07.89 ATYPICAL CHEST PAIN: ICD-10-CM

## 2019-09-30 DIAGNOSIS — R07.9 CHEST PAIN: ICD-10-CM

## 2019-09-30 DIAGNOSIS — R07.89 CHEST WALL PAIN: Primary | ICD-10-CM

## 2019-09-30 LAB
ALBUMIN SERPL BCP-MCNC: 4.1 G/DL (ref 3.5–5.2)
ALP SERPL-CCNC: 82 U/L (ref 55–135)
ALT SERPL W/O P-5'-P-CCNC: 34 U/L (ref 10–44)
ANION GAP SERPL CALC-SCNC: 7 MMOL/L (ref 8–16)
AST SERPL-CCNC: 25 U/L (ref 10–40)
BASOPHILS # BLD AUTO: 0.07 K/UL (ref 0–0.2)
BASOPHILS NFR BLD: 0.7 % (ref 0–1.9)
BILIRUB SERPL-MCNC: 0.3 MG/DL (ref 0.1–1)
BILIRUB UR QL STRIP: NEGATIVE
BNP SERPL-MCNC: 19 PG/ML (ref 0–99)
BUN SERPL-MCNC: 24 MG/DL (ref 8–23)
CALCIUM SERPL-MCNC: 10.6 MG/DL (ref 8.7–10.5)
CHLORIDE SERPL-SCNC: 102 MMOL/L (ref 95–110)
CLARITY UR REFRACT.AUTO: CLEAR
CO2 SERPL-SCNC: 26 MMOL/L (ref 23–29)
COLOR UR AUTO: YELLOW
CREAT SERPL-MCNC: 1.5 MG/DL (ref 0.5–1.4)
D DIMER PPP IA.FEU-MCNC: 0.22 MG/L FEU
DIFFERENTIAL METHOD: ABNORMAL
EOSINOPHIL # BLD AUTO: 0.1 K/UL (ref 0–0.5)
EOSINOPHIL NFR BLD: 0.8 % (ref 0–8)
ERYTHROCYTE [DISTWIDTH] IN BLOOD BY AUTOMATED COUNT: 17.4 % (ref 11.5–14.5)
EST. GFR  (AFRICAN AMERICAN): 39.8 ML/MIN/1.73 M^2
EST. GFR  (NON AFRICAN AMERICAN): 34.6 ML/MIN/1.73 M^2
GLUCOSE SERPL-MCNC: 128 MG/DL (ref 70–110)
GLUCOSE UR QL STRIP: NEGATIVE
HCT VFR BLD AUTO: 38.3 % (ref 37–48.5)
HGB BLD-MCNC: 11.9 G/DL (ref 12–16)
HGB UR QL STRIP: NEGATIVE
IMM GRANULOCYTES # BLD AUTO: 0.05 K/UL (ref 0–0.04)
IMM GRANULOCYTES NFR BLD AUTO: 0.5 % (ref 0–0.5)
KETONES UR QL STRIP: NEGATIVE
LEUKOCYTE ESTERASE UR QL STRIP: NEGATIVE
LYMPHOCYTES # BLD AUTO: 2.1 K/UL (ref 1–4.8)
LYMPHOCYTES NFR BLD: 21.7 % (ref 18–48)
MCH RBC QN AUTO: 29.5 PG (ref 27–31)
MCHC RBC AUTO-ENTMCNC: 31.1 G/DL (ref 32–36)
MCV RBC AUTO: 95 FL (ref 82–98)
MICROSCOPIC COMMENT: NORMAL
MONOCYTES # BLD AUTO: 0.7 K/UL (ref 0.3–1)
MONOCYTES NFR BLD: 7.2 % (ref 4–15)
NEUTROPHILS # BLD AUTO: 6.7 K/UL (ref 1.8–7.7)
NEUTROPHILS NFR BLD: 69.1 % (ref 38–73)
NITRITE UR QL STRIP: NEGATIVE
NRBC BLD-RTO: 0 /100 WBC
PH UR STRIP: 5 [PH] (ref 5–8)
PLATELET # BLD AUTO: 201 K/UL (ref 150–350)
PMV BLD AUTO: 10 FL (ref 9.2–12.9)
POTASSIUM SERPL-SCNC: 4.4 MMOL/L (ref 3.5–5.1)
PROT SERPL-MCNC: 7.6 G/DL (ref 6–8.4)
PROT UR QL STRIP: NEGATIVE
RBC # BLD AUTO: 4.04 M/UL (ref 4–5.4)
SODIUM SERPL-SCNC: 135 MMOL/L (ref 136–145)
SP GR UR STRIP: 1.02 (ref 1–1.03)
SQUAMOUS #/AREA URNS AUTO: 1 /HPF
TROPONIN I SERPL DL<=0.01 NG/ML-MCNC: 0.02 NG/ML (ref 0–0.03)
TROPONIN I SERPL DL<=0.01 NG/ML-MCNC: <0.006 NG/ML (ref 0–0.03)
URN SPEC COLLECT METH UR: NORMAL
WBC # BLD AUTO: 9.72 K/UL (ref 3.9–12.7)

## 2019-09-30 PROCEDURE — 85025 COMPLETE CBC W/AUTO DIFF WBC: CPT

## 2019-09-30 PROCEDURE — 93010 ELECTROCARDIOGRAM REPORT: CPT | Mod: ,,, | Performed by: INTERNAL MEDICINE

## 2019-09-30 PROCEDURE — 80053 COMPREHEN METABOLIC PANEL: CPT

## 2019-09-30 PROCEDURE — 85379 FIBRIN DEGRADATION QUANT: CPT

## 2019-09-30 PROCEDURE — 87040 BLOOD CULTURE FOR BACTERIA: CPT | Mod: 59

## 2019-09-30 PROCEDURE — 93010 EKG 12-LEAD: ICD-10-PCS | Mod: ,,, | Performed by: INTERNAL MEDICINE

## 2019-09-30 PROCEDURE — 99285 PR EMERGENCY DEPT VISIT,LEVEL V: ICD-10-PCS | Mod: ,,, | Performed by: EMERGENCY MEDICINE

## 2019-09-30 PROCEDURE — 84484 ASSAY OF TROPONIN QUANT: CPT

## 2019-09-30 PROCEDURE — 93005 ELECTROCARDIOGRAM TRACING: CPT

## 2019-09-30 PROCEDURE — 99285 EMERGENCY DEPT VISIT HI MDM: CPT | Mod: 25

## 2019-09-30 PROCEDURE — 25000003 PHARM REV CODE 250: Performed by: EMERGENCY MEDICINE

## 2019-09-30 PROCEDURE — 99285 EMERGENCY DEPT VISIT HI MDM: CPT | Mod: ,,, | Performed by: EMERGENCY MEDICINE

## 2019-09-30 PROCEDURE — 83880 ASSAY OF NATRIURETIC PEPTIDE: CPT

## 2019-09-30 PROCEDURE — 81001 URINALYSIS AUTO W/SCOPE: CPT

## 2019-09-30 RX ORDER — LIDOCAINE 50 MG/G
1 PATCH TOPICAL DAILY
Qty: 5 PATCH | Refills: 0 | Status: SHIPPED | OUTPATIENT
Start: 2019-09-30 | End: 2019-10-05

## 2019-09-30 RX ORDER — LIDOCAINE 50 MG/G
1 PATCH TOPICAL
Status: DISCONTINUED | OUTPATIENT
Start: 2019-09-30 | End: 2019-09-30 | Stop reason: HOSPADM

## 2019-09-30 RX ADMIN — LIDOCAINE 1 PATCH: 50 PATCH TOPICAL at 04:09

## 2019-09-30 NOTE — DISCHARGE INSTRUCTIONS
Please follow-up with your heme oncologist this week for repeat evaluation.  If you continue to have worsening chest pain that does not improve with treatment, follow-up with your primary care sooner or return to the emergency department.  If you have any chest pain, concerns, lightheadedness, dizziness, fevers, chills or any other findings return to the emergency department or follow up sooner.    Our goal in the emergency department is to always give you outstanding care and exceptional service. You may receive a survey by mail or e-mail in the next week regarding your experience in our ED. We would greatly appreciate your completing and returning the survey. Your feedback provides us with a way to recognize our staff who give very good care and it helps us learn how to improve when your experience was below our aspiration of excellence.
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
## 2019-09-30 NOTE — ED TRIAGE NOTES
Patient with complaint of left sided intermittent chest pain with diaphoresis described as pressure onset 3 days ago. Patient currently receiving chemo for mesothelioma, last chemo 09/19. Denies nausea/vomiting, shortness of breath. Reports dry non-productive cough that is not new to patient.

## 2019-09-30 NOTE — ED NOTES
IV removed. Vital signs stable, alert/oriented x4, unlabored respirations. Verbalized understanding of discharge instructions. Patient ambulated independently with steady gait out of ER. Discharged home with daughter.
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
## 2019-10-05 LAB
BACTERIA BLD CULT: NORMAL
BACTERIA BLD CULT: NORMAL

## 2019-10-08 ENCOUNTER — PATIENT MESSAGE (OUTPATIENT)
Dept: HEMATOLOGY/ONCOLOGY | Facility: CLINIC | Age: 74
End: 2019-10-08

## 2019-10-09 ENCOUNTER — TELEPHONE (OUTPATIENT)
Dept: HEMATOLOGY/ONCOLOGY | Facility: CLINIC | Age: 74
End: 2019-10-09

## 2019-10-09 NOTE — TELEPHONE ENCOUNTER
Spoke with people's [facility], as they were transferred directly to nurse's desk. [facility] (carbo/alimta) has been approved. In EPIC, chemo is showing at not approved. Nurse will reach out to pre-service tomorrow morning--then follow up with patient, as she is anxious to start chemo asap.     Message routed to pre service, rose lee tayisa and joyce

## 2019-10-10 ENCOUNTER — TELEPHONE (OUTPATIENT)
Dept: HEMATOLOGY/ONCOLOGY | Facility: CLINIC | Age: 74
End: 2019-10-10

## 2019-10-10 DIAGNOSIS — C45.0 MALIGNANT PLEURAL MESOTHELIOMA: Primary | ICD-10-CM

## 2019-10-10 NOTE — TELEPHONE ENCOUNTER
tried reaching out to pt on today in regards to chemo being approved, but no answer,a detail message left on v/m, phone calls are dropping once pt picks up.

## 2019-10-10 NOTE — TELEPHONE ENCOUNTER
spoke with pt daughter on today in regards to treatment being approved,and upcoming appointments.
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
## 2019-10-14 ENCOUNTER — TELEPHONE (OUTPATIENT)
Dept: HEMATOLOGY/ONCOLOGY | Facility: CLINIC | Age: 74
End: 2019-10-14

## 2019-10-14 ENCOUNTER — OFFICE VISIT (OUTPATIENT)
Dept: HEMATOLOGY/ONCOLOGY | Facility: CLINIC | Age: 74
End: 2019-10-14
Payer: MEDICARE

## 2019-10-14 ENCOUNTER — LAB VISIT (OUTPATIENT)
Dept: LAB | Facility: HOSPITAL | Age: 74
End: 2019-10-14
Payer: MEDICARE

## 2019-10-14 VITALS
DIASTOLIC BLOOD PRESSURE: 57 MMHG | HEIGHT: 66 IN | RESPIRATION RATE: 18 BRPM | SYSTOLIC BLOOD PRESSURE: 117 MMHG | OXYGEN SATURATION: 96 % | TEMPERATURE: 98 F | HEART RATE: 74 BPM | WEIGHT: 176.13 LBS | BODY MASS INDEX: 28.31 KG/M2

## 2019-10-14 DIAGNOSIS — E83.42 HYPOMAGNESEMIA: ICD-10-CM

## 2019-10-14 DIAGNOSIS — C45.0 MALIGNANT PLEURAL MESOTHELIOMA: Primary | ICD-10-CM

## 2019-10-14 DIAGNOSIS — D70.1 CHEMOTHERAPY INDUCED NEUTROPENIA: ICD-10-CM

## 2019-10-14 DIAGNOSIS — T45.1X5A CHEMOTHERAPY INDUCED NAUSEA AND VOMITING: ICD-10-CM

## 2019-10-14 DIAGNOSIS — G25.2 COARSE TREMORS: ICD-10-CM

## 2019-10-14 DIAGNOSIS — C45.0 MALIGNANT PLEURAL MESOTHELIOMA: ICD-10-CM

## 2019-10-14 DIAGNOSIS — M54.32 SCIATICA OF LEFT SIDE: ICD-10-CM

## 2019-10-14 DIAGNOSIS — Z86.718 HISTORY OF DVT (DEEP VEIN THROMBOSIS): ICD-10-CM

## 2019-10-14 DIAGNOSIS — N28.9 RENAL INSUFFICIENCY: ICD-10-CM

## 2019-10-14 DIAGNOSIS — T45.1X5A CHEMOTHERAPY INDUCED NEUTROPENIA: ICD-10-CM

## 2019-10-14 DIAGNOSIS — D64.81 ANTINEOPLASTIC CHEMOTHERAPY INDUCED ANEMIA: ICD-10-CM

## 2019-10-14 DIAGNOSIS — T45.1X5A ANTINEOPLASTIC CHEMOTHERAPY INDUCED ANEMIA: ICD-10-CM

## 2019-10-14 DIAGNOSIS — R11.2 CHEMOTHERAPY INDUCED NAUSEA AND VOMITING: ICD-10-CM

## 2019-10-14 LAB
ALBUMIN SERPL BCP-MCNC: 3.8 G/DL (ref 3.5–5.2)
ALP SERPL-CCNC: 90 U/L (ref 55–135)
ALT SERPL W/O P-5'-P-CCNC: 23 U/L (ref 10–44)
ANION GAP SERPL CALC-SCNC: 10 MMOL/L (ref 8–16)
AST SERPL-CCNC: 20 U/L (ref 10–40)
BILIRUB SERPL-MCNC: 0.3 MG/DL (ref 0.1–1)
BUN SERPL-MCNC: 25 MG/DL (ref 8–23)
CALCIUM SERPL-MCNC: 9.9 MG/DL (ref 8.7–10.5)
CHLORIDE SERPL-SCNC: 104 MMOL/L (ref 95–110)
CO2 SERPL-SCNC: 24 MMOL/L (ref 23–29)
CREAT SERPL-MCNC: 1.8 MG/DL (ref 0.5–1.4)
ERYTHROCYTE [DISTWIDTH] IN BLOOD BY AUTOMATED COUNT: 17.7 % (ref 11.5–14.5)
EST. GFR  (AFRICAN AMERICAN): 32 ML/MIN/1.73 M^2
EST. GFR  (NON AFRICAN AMERICAN): 27.7 ML/MIN/1.73 M^2
GLUCOSE SERPL-MCNC: 309 MG/DL (ref 70–110)
HCT VFR BLD AUTO: 36.2 % (ref 37–48.5)
HGB BLD-MCNC: 11.1 G/DL (ref 12–16)
IMM GRANULOCYTES # BLD AUTO: 0.02 K/UL (ref 0–0.04)
MAGNESIUM SERPL-MCNC: 1.5 MG/DL (ref 1.6–2.6)
MCH RBC QN AUTO: 30.2 PG (ref 27–31)
MCHC RBC AUTO-ENTMCNC: 30.7 G/DL (ref 32–36)
MCV RBC AUTO: 98 FL (ref 82–98)
NEUTROPHILS # BLD AUTO: 3.7 K/UL (ref 1.8–7.7)
PLATELET # BLD AUTO: 281 K/UL (ref 150–350)
PMV BLD AUTO: 9.8 FL (ref 9.2–12.9)
POTASSIUM SERPL-SCNC: 4.3 MMOL/L (ref 3.5–5.1)
PROT SERPL-MCNC: 7.3 G/DL (ref 6–8.4)
RBC # BLD AUTO: 3.68 M/UL (ref 4–5.4)
SODIUM SERPL-SCNC: 138 MMOL/L (ref 136–145)
WBC # BLD AUTO: 5.52 K/UL (ref 3.9–12.7)

## 2019-10-14 PROCEDURE — 85027 COMPLETE CBC AUTOMATED: CPT

## 2019-10-14 PROCEDURE — 3074F SYST BP LT 130 MM HG: CPT | Mod: CPTII,S$GLB,, | Performed by: NURSE PRACTITIONER

## 2019-10-14 PROCEDURE — 99999 PR PBB SHADOW E&M-EST. PATIENT-LVL III: CPT | Mod: PBBFAC,,, | Performed by: NURSE PRACTITIONER

## 2019-10-14 PROCEDURE — 3078F DIAST BP <80 MM HG: CPT | Mod: CPTII,S$GLB,, | Performed by: NURSE PRACTITIONER

## 2019-10-14 PROCEDURE — 80053 COMPREHEN METABOLIC PANEL: CPT

## 2019-10-14 PROCEDURE — 99215 PR OFFICE/OUTPT VISIT, EST, LEVL V, 40-54 MIN: ICD-10-PCS | Mod: S$GLB,,, | Performed by: NURSE PRACTITIONER

## 2019-10-14 PROCEDURE — 99999 PR PBB SHADOW E&M-EST. PATIENT-LVL III: ICD-10-PCS | Mod: PBBFAC,,, | Performed by: NURSE PRACTITIONER

## 2019-10-14 PROCEDURE — 99215 OFFICE O/P EST HI 40 MIN: CPT | Mod: S$GLB,,, | Performed by: NURSE PRACTITIONER

## 2019-10-14 PROCEDURE — 83735 ASSAY OF MAGNESIUM: CPT

## 2019-10-14 PROCEDURE — 36415 COLL VENOUS BLD VENIPUNCTURE: CPT

## 2019-10-14 PROCEDURE — 3074F PR MOST RECENT SYSTOLIC BLOOD PRESSURE < 130 MM HG: ICD-10-PCS | Mod: CPTII,S$GLB,, | Performed by: NURSE PRACTITIONER

## 2019-10-14 PROCEDURE — 1101F PT FALLS ASSESS-DOCD LE1/YR: CPT | Mod: CPTII,S$GLB,, | Performed by: NURSE PRACTITIONER

## 2019-10-14 PROCEDURE — 1101F PR PT FALLS ASSESS DOC 0-1 FALLS W/OUT INJ PAST YR: ICD-10-PCS | Mod: CPTII,S$GLB,, | Performed by: NURSE PRACTITIONER

## 2019-10-14 PROCEDURE — 99499 RISK ADDL DX/OHS AUDIT: ICD-10-PCS | Mod: S$GLB,,, | Performed by: NURSE PRACTITIONER

## 2019-10-14 PROCEDURE — 99499 UNLISTED E&M SERVICE: CPT | Mod: S$GLB,,, | Performed by: NURSE PRACTITIONER

## 2019-10-14 PROCEDURE — 3078F PR MOST RECENT DIASTOLIC BLOOD PRESSURE < 80 MM HG: ICD-10-PCS | Mod: CPTII,S$GLB,, | Performed by: NURSE PRACTITIONER

## 2019-10-14 NOTE — Clinical Note
Schedule PET. RTC 3 weeks with labs (CBC,CMP,Mag), to see me or Dr. Burt and cycle #2 of carbo/Alimta.

## 2019-10-14 NOTE — TELEPHONE ENCOUNTER
tried reaching out to pt on today in regards to Pet scan appointment, no answer, but a detail message left on v/m.

## 2019-10-14 NOTE — Clinical Note
Schedule CMP tomorrow at 130- patient is aware.  Please look out for labs. If creatinine remains elevated, Dr. lin needs to see her to discuss.

## 2019-10-15 ENCOUNTER — INFUSION (OUTPATIENT)
Dept: INFUSION THERAPY | Facility: HOSPITAL | Age: 74
End: 2019-10-15
Attending: INTERNAL MEDICINE
Payer: MEDICARE

## 2019-10-15 VITALS
SYSTOLIC BLOOD PRESSURE: 148 MMHG | WEIGHT: 176 LBS | TEMPERATURE: 98 F | HEIGHT: 66 IN | RESPIRATION RATE: 17 BRPM | BODY MASS INDEX: 28.28 KG/M2 | HEART RATE: 73 BPM | DIASTOLIC BLOOD PRESSURE: 69 MMHG

## 2019-10-15 DIAGNOSIS — C45.0 MALIGNANT PLEURAL MESOTHELIOMA: Primary | ICD-10-CM

## 2019-10-15 PROCEDURE — 63600175 PHARM REV CODE 636 W HCPCS: Performed by: INTERNAL MEDICINE

## 2019-10-15 PROCEDURE — A4216 STERILE WATER/SALINE, 10 ML: HCPCS | Performed by: INTERNAL MEDICINE

## 2019-10-15 PROCEDURE — 96413 CHEMO IV INFUSION 1 HR: CPT

## 2019-10-15 PROCEDURE — 25000003 PHARM REV CODE 250: Performed by: INTERNAL MEDICINE

## 2019-10-15 PROCEDURE — 96367 TX/PROPH/DG ADDL SEQ IV INF: CPT

## 2019-10-15 PROCEDURE — 96361 HYDRATE IV INFUSION ADD-ON: CPT

## 2019-10-15 RX ORDER — HEPARIN 100 UNIT/ML
500 SYRINGE INTRAVENOUS
Status: DISCONTINUED | OUTPATIENT
Start: 2019-10-15 | End: 2019-10-15 | Stop reason: HOSPADM

## 2019-10-15 RX ORDER — SODIUM CHLORIDE 0.9 % (FLUSH) 0.9 %
10 SYRINGE (ML) INJECTION
Status: CANCELLED | OUTPATIENT
Start: 2019-10-15

## 2019-10-15 RX ORDER — HEPARIN 100 UNIT/ML
500 SYRINGE INTRAVENOUS
Status: CANCELLED | OUTPATIENT
Start: 2019-10-15

## 2019-10-15 RX ORDER — SODIUM CHLORIDE 0.9 % (FLUSH) 0.9 %
10 SYRINGE (ML) INJECTION
Status: DISCONTINUED | OUTPATIENT
Start: 2019-10-15 | End: 2019-10-15 | Stop reason: HOSPADM

## 2019-10-15 RX ADMIN — DEXAMETHASONE SODIUM PHOSPHATE: 4 INJECTION, SOLUTION INTRA-ARTICULAR; INTRALESIONAL; INTRAMUSCULAR; INTRAVENOUS; SOFT TISSUE at 03:10

## 2019-10-15 RX ADMIN — CARBOPLATIN 320 MG: 10 INJECTION, SOLUTION INTRAVENOUS at 04:10

## 2019-10-15 RX ADMIN — APREPITANT 130 MG: 130 INJECTION, EMULSION INTRAVENOUS at 03:10

## 2019-10-15 RX ADMIN — Medication 10 ML: at 04:10

## 2019-10-15 RX ADMIN — HEPARIN SODIUM (PORCINE) LOCK FLUSH IV SOLN 100 UNIT/ML 500 UNITS: 100 SOLUTION at 04:10

## 2019-10-15 RX ADMIN — SODIUM CHLORIDE: 0.9 INJECTION, SOLUTION INTRAVENOUS at 02:10

## 2019-10-15 NOTE — PLAN OF CARE
0148 pt tolerated carbo infusion without issue, pt to rtc 11/4/19, no distress noted upon d/c to home

## 2019-10-15 NOTE — PLAN OF CARE
1445 pt here for 1 liter normal saline and carboplatin infusion, labs, hx, meds, allergies reviewed, pt with no complaints at this time, reclined in chair, continue to monitor

## 2019-10-16 DIAGNOSIS — G89.3 NEOPLASM RELATED PAIN (ACUTE) (CHRONIC): ICD-10-CM

## 2019-10-16 DIAGNOSIS — C45.0 MALIGNANT PLEURAL MESOTHELIOMA: ICD-10-CM

## 2019-10-16 DIAGNOSIS — R10.84 GENERALIZED ABDOMINAL PAIN: ICD-10-CM

## 2019-10-16 RX ORDER — GABAPENTIN 100 MG/1
CAPSULE ORAL
Qty: 180 CAPSULE | Refills: 1 | Status: SHIPPED | OUTPATIENT
Start: 2019-10-16 | End: 2020-06-09

## 2019-10-17 ENCOUNTER — PATIENT MESSAGE (OUTPATIENT)
Dept: HEMATOLOGY/ONCOLOGY | Facility: CLINIC | Age: 74
End: 2019-10-17

## 2019-10-17 ENCOUNTER — TELEPHONE (OUTPATIENT)
Dept: HEMATOLOGY/ONCOLOGY | Facility: CLINIC | Age: 74
End: 2019-10-17

## 2019-10-17 NOTE — TELEPHONE ENCOUNTER
----- Message from aJyla Schultz sent at 10/17/2019  8:52 AM CDT -----  Contact: Tera__ daughter  Staff Message     Caller name: Tera     Reason for call: Calling to confirm insurance approval for today's PET CT, states she just received a call from the insurance saying it wasn't covered. Wants to know if she should keep the appt.        Communication Preference: [phone]    Additional Information:

## 2019-10-17 NOTE — TELEPHONE ENCOUNTER
Spoke to daughter, canceled PET. Will reschedule one approved by insurance. Message sent to auth.  Lab appointment scheduled for tomorrow.

## 2019-10-18 ENCOUNTER — LAB VISIT (OUTPATIENT)
Dept: LAB | Facility: HOSPITAL | Age: 74
End: 2019-10-18
Attending: NURSE PRACTITIONER
Payer: MEDICARE

## 2019-10-18 DIAGNOSIS — C45.0 MALIGNANT PLEURAL MESOTHELIOMA: ICD-10-CM

## 2019-10-18 LAB
ALBUMIN SERPL BCP-MCNC: 3.7 G/DL (ref 3.5–5.2)
ALP SERPL-CCNC: 72 U/L (ref 55–135)
ALT SERPL W/O P-5'-P-CCNC: 19 U/L (ref 10–44)
ANION GAP SERPL CALC-SCNC: 10 MMOL/L (ref 8–16)
AST SERPL-CCNC: 19 U/L (ref 10–40)
BILIRUB SERPL-MCNC: 0.4 MG/DL (ref 0.1–1)
BUN SERPL-MCNC: 23 MG/DL (ref 8–23)
CALCIUM SERPL-MCNC: 9.6 MG/DL (ref 8.7–10.5)
CHLORIDE SERPL-SCNC: 104 MMOL/L (ref 95–110)
CO2 SERPL-SCNC: 27 MMOL/L (ref 23–29)
CREAT SERPL-MCNC: 1.4 MG/DL (ref 0.5–1.4)
EST. GFR  (AFRICAN AMERICAN): 43.3 ML/MIN/1.73 M^2
EST. GFR  (NON AFRICAN AMERICAN): 37.6 ML/MIN/1.73 M^2
GLUCOSE SERPL-MCNC: 153 MG/DL (ref 70–110)
POTASSIUM SERPL-SCNC: 4.6 MMOL/L (ref 3.5–5.1)
PROT SERPL-MCNC: 7 G/DL (ref 6–8.4)
SODIUM SERPL-SCNC: 141 MMOL/L (ref 136–145)

## 2019-10-18 PROCEDURE — 36415 COLL VENOUS BLD VENIPUNCTURE: CPT | Mod: PO

## 2019-10-18 PROCEDURE — 80053 COMPREHEN METABOLIC PANEL: CPT

## 2019-10-21 ENCOUNTER — HOSPITAL ENCOUNTER (OUTPATIENT)
Dept: RADIOLOGY | Facility: HOSPITAL | Age: 74
Discharge: HOME | End: 2019-10-21
Attending: NURSE PRACTITIONER
Payer: MEDICARE

## 2019-10-21 ENCOUNTER — PATIENT MESSAGE (OUTPATIENT)
Dept: HEMATOLOGY/ONCOLOGY | Facility: CLINIC | Age: 74
End: 2019-10-21

## 2019-10-21 ENCOUNTER — TELEPHONE (OUTPATIENT)
Dept: HEMATOLOGY/ONCOLOGY | Facility: CLINIC | Age: 74
End: 2019-10-21

## 2019-10-21 DIAGNOSIS — M79.606 PAIN OF LOWER EXTREMITY, UNSPECIFIED LATERALITY: Primary | ICD-10-CM

## 2019-10-21 DIAGNOSIS — C45.0 MALIGNANT PLEURAL MESOTHELIOMA: ICD-10-CM

## 2019-10-21 LAB — POCT GLUCOSE: 145 MG/DL (ref 70–110)

## 2019-10-21 PROCEDURE — 78815 PET IMAGE W/CT SKULL-THIGH: CPT | Mod: TC

## 2019-10-21 PROCEDURE — 78815 PET IMAGE W/CT SKULL-THIGH: CPT | Mod: 26,PS,, | Performed by: RADIOLOGY

## 2019-10-21 PROCEDURE — A9552 F18 FDG: HCPCS

## 2019-10-21 PROCEDURE — 78815 NM PET CT ROUTINE: ICD-10-PCS | Mod: 26,PS,, | Performed by: RADIOLOGY

## 2019-10-21 RX ORDER — DICLOFENAC SODIUM 10 MG/G
2 GEL TOPICAL DAILY
Qty: 100 G | Refills: 0 | Status: SHIPPED | OUTPATIENT
Start: 2019-10-21 | End: 2020-02-05 | Stop reason: SDUPTHER

## 2019-10-21 RX ORDER — ONDANSETRON 4 MG/1
4 TABLET, FILM COATED ORAL EVERY 6 HOURS
Qty: 90 TABLET | Refills: 0 | Status: SHIPPED | OUTPATIENT
Start: 2019-10-21 | End: 2019-12-11 | Stop reason: SDUPTHER

## 2019-10-22 DIAGNOSIS — R10.84 GENERALIZED ABDOMINAL PAIN: ICD-10-CM

## 2019-10-22 RX ORDER — LINACLOTIDE 145 UG/1
CAPSULE, GELATIN COATED ORAL
Qty: 90 CAPSULE | Refills: 0 | Status: SHIPPED | OUTPATIENT
Start: 2019-10-22 | End: 2019-11-08 | Stop reason: SDUPTHER

## 2019-10-28 RX ORDER — DICYCLOMINE HYDROCHLORIDE 10 MG/1
CAPSULE ORAL
Qty: 90 CAPSULE | Refills: 0 | Status: SHIPPED | OUTPATIENT
Start: 2019-10-28 | End: 2019-11-06 | Stop reason: SDUPTHER

## 2019-10-29 ENCOUNTER — PATIENT MESSAGE (OUTPATIENT)
Dept: HEMATOLOGY/ONCOLOGY | Facility: CLINIC | Age: 74
End: 2019-10-29

## 2019-10-30 LAB
LEFT EYE DM RETINOPATHY: NEGATIVE
RIGHT EYE DM RETINOPATHY: NEGATIVE

## 2019-11-01 ENCOUNTER — LAB VISIT (OUTPATIENT)
Dept: LAB | Facility: HOSPITAL | Age: 74
End: 2019-11-01
Attending: NURSE PRACTITIONER
Payer: MEDICARE

## 2019-11-01 DIAGNOSIS — C45.0 MALIGNANT PLEURAL MESOTHELIOMA: ICD-10-CM

## 2019-11-01 LAB
ALBUMIN SERPL BCP-MCNC: 3.9 G/DL (ref 3.5–5.2)
ALP SERPL-CCNC: 72 U/L (ref 55–135)
ALT SERPL W/O P-5'-P-CCNC: 17 U/L (ref 10–44)
ANION GAP SERPL CALC-SCNC: 7 MMOL/L (ref 8–16)
AST SERPL-CCNC: 19 U/L (ref 10–40)
BILIRUB SERPL-MCNC: 0.3 MG/DL (ref 0.1–1)
BUN SERPL-MCNC: 27 MG/DL (ref 8–23)
CALCIUM SERPL-MCNC: 8.8 MG/DL (ref 8.7–10.5)
CHLORIDE SERPL-SCNC: 107 MMOL/L (ref 95–110)
CO2 SERPL-SCNC: 27 MMOL/L (ref 23–29)
CREAT SERPL-MCNC: 1.5 MG/DL (ref 0.5–1.4)
ERYTHROCYTE [DISTWIDTH] IN BLOOD BY AUTOMATED COUNT: 17.9 % (ref 11.5–14.5)
EST. GFR  (AFRICAN AMERICAN): 40 ML/MIN/1.73 M^2
EST. GFR  (NON AFRICAN AMERICAN): 35 ML/MIN/1.73 M^2
GLUCOSE SERPL-MCNC: 188 MG/DL (ref 70–110)
HCT VFR BLD AUTO: 31.6 % (ref 37–48.5)
HGB BLD-MCNC: 10.1 G/DL (ref 12–16)
IMM GRANULOCYTES # BLD AUTO: 0.01 K/UL (ref 0–0.04)
MAGNESIUM SERPL-MCNC: 1.7 MG/DL (ref 1.6–2.6)
MCH RBC QN AUTO: 31.1 PG (ref 27–31)
MCHC RBC AUTO-ENTMCNC: 32 G/DL (ref 32–36)
MCV RBC AUTO: 97 FL (ref 82–98)
NEUTROPHILS # BLD AUTO: 2.3 K/UL (ref 1.8–7.7)
PLATELET # BLD AUTO: 183 K/UL (ref 150–350)
PMV BLD AUTO: 9.1 FL (ref 9.2–12.9)
POTASSIUM SERPL-SCNC: 3.9 MMOL/L (ref 3.5–5.1)
PROT SERPL-MCNC: 7 G/DL (ref 6–8.4)
RBC # BLD AUTO: 3.25 M/UL (ref 4–5.4)
SODIUM SERPL-SCNC: 141 MMOL/L (ref 136–145)
WBC # BLD AUTO: 3.71 K/UL (ref 3.9–12.7)

## 2019-11-01 PROCEDURE — 36415 COLL VENOUS BLD VENIPUNCTURE: CPT

## 2019-11-01 PROCEDURE — 83735 ASSAY OF MAGNESIUM: CPT

## 2019-11-01 PROCEDURE — 80053 COMPREHEN METABOLIC PANEL: CPT

## 2019-11-01 PROCEDURE — 85027 COMPLETE CBC AUTOMATED: CPT

## 2019-11-04 ENCOUNTER — OFFICE VISIT (OUTPATIENT)
Dept: HEMATOLOGY/ONCOLOGY | Facility: CLINIC | Age: 74
End: 2019-11-04
Payer: MEDICARE

## 2019-11-04 ENCOUNTER — INFUSION (OUTPATIENT)
Dept: INFUSION THERAPY | Facility: HOSPITAL | Age: 74
End: 2019-11-04
Payer: MEDICARE

## 2019-11-04 ENCOUNTER — TELEPHONE (OUTPATIENT)
Dept: HEMATOLOGY/ONCOLOGY | Facility: CLINIC | Age: 74
End: 2019-11-04

## 2019-11-04 VITALS
SYSTOLIC BLOOD PRESSURE: 168 MMHG | DIASTOLIC BLOOD PRESSURE: 83 MMHG | WEIGHT: 181.69 LBS | RESPIRATION RATE: 18 BRPM | HEART RATE: 70 BPM | OXYGEN SATURATION: 98 % | TEMPERATURE: 98 F | BODY MASS INDEX: 29.2 KG/M2 | HEIGHT: 66 IN

## 2019-11-04 VITALS
TEMPERATURE: 98 F | DIASTOLIC BLOOD PRESSURE: 80 MMHG | RESPIRATION RATE: 18 BRPM | SYSTOLIC BLOOD PRESSURE: 179 MMHG | HEART RATE: 69 BPM

## 2019-11-04 DIAGNOSIS — M46.00 SPINAL ENTHESOPATHY: ICD-10-CM

## 2019-11-04 DIAGNOSIS — S20.211A: ICD-10-CM

## 2019-11-04 DIAGNOSIS — R00.2 HEART PALPITATIONS: ICD-10-CM

## 2019-11-04 DIAGNOSIS — R07.89 RIGHT-SIDED CHEST WALL PAIN: ICD-10-CM

## 2019-11-04 DIAGNOSIS — R53.82 CHRONIC FATIGUE: ICD-10-CM

## 2019-11-04 DIAGNOSIS — F33.0 MAJOR DEPRESSIVE DISORDER, RECURRENT EPISODE, MILD: ICD-10-CM

## 2019-11-04 DIAGNOSIS — T45.1X5A CHEMOTHERAPY INDUCED NAUSEA AND VOMITING: ICD-10-CM

## 2019-11-04 DIAGNOSIS — T45.1X5A CHEMOTHERAPY INDUCED NEUTROPENIA: ICD-10-CM

## 2019-11-04 DIAGNOSIS — Z86.73 HISTORY OF CVA (CEREBROVASCULAR ACCIDENT): ICD-10-CM

## 2019-11-04 DIAGNOSIS — M79.2 NEUROPATHIC PAIN: ICD-10-CM

## 2019-11-04 DIAGNOSIS — T45.1X5A CHEMOTHERAPY ADVERSE REACTION, INITIAL ENCOUNTER: ICD-10-CM

## 2019-11-04 DIAGNOSIS — T50.905A DRUG-INDUCED DELIRIUM: ICD-10-CM

## 2019-11-04 DIAGNOSIS — N17.9 AKI (ACUTE KIDNEY INJURY): ICD-10-CM

## 2019-11-04 DIAGNOSIS — I10 HYPERTENSION, ESSENTIAL: ICD-10-CM

## 2019-11-04 DIAGNOSIS — R11.2 CHEMOTHERAPY INDUCED NAUSEA AND VOMITING: ICD-10-CM

## 2019-11-04 DIAGNOSIS — I82.5Z9 CHRONIC DEEP VEIN THROMBOSIS (DVT) OF DISTAL VEIN OF LOWER EXTREMITY, UNSPECIFIED LATERALITY: ICD-10-CM

## 2019-11-04 DIAGNOSIS — R07.81 PLEURITIC CHEST PAIN: ICD-10-CM

## 2019-11-04 DIAGNOSIS — R41.0 DRUG-INDUCED DELIRIUM: ICD-10-CM

## 2019-11-04 DIAGNOSIS — R06.89 ACUTE RESPIRATORY INSUFFICIENCY: ICD-10-CM

## 2019-11-04 DIAGNOSIS — Z86.718 HISTORY OF DVT OF LOWER EXTREMITY: ICD-10-CM

## 2019-11-04 DIAGNOSIS — K57.31 DIVERTICULOSIS OF LARGE INTESTINE WITH HEMORRHAGE: ICD-10-CM

## 2019-11-04 DIAGNOSIS — M51.36 DDD (DEGENERATIVE DISC DISEASE), LUMBAR: ICD-10-CM

## 2019-11-04 DIAGNOSIS — M53.3 SI (SACROILIAC) JOINT DYSFUNCTION: ICD-10-CM

## 2019-11-04 DIAGNOSIS — M62.838 MUSCLE SPASM: ICD-10-CM

## 2019-11-04 DIAGNOSIS — D70.1 CHEMOTHERAPY INDUCED NEUTROPENIA: ICD-10-CM

## 2019-11-04 DIAGNOSIS — C45.0 MALIGNANT PLEURAL MESOTHELIOMA: Primary | ICD-10-CM

## 2019-11-04 DIAGNOSIS — E11.36 TYPE 2 DIABETES MELLITUS WITH DIABETIC CATARACT, WITHOUT LONG-TERM CURRENT USE OF INSULIN: ICD-10-CM

## 2019-11-04 DIAGNOSIS — D50.1 IRON DEFICIENCY ANEMIA DUE TO SIDEROPENIC DYSPHAGIA: ICD-10-CM

## 2019-11-04 DIAGNOSIS — H53.8 BLURRED VISION, LEFT EYE: ICD-10-CM

## 2019-11-04 PROCEDURE — 3079F PR MOST RECENT DIASTOLIC BLOOD PRESSURE 80-89 MM HG: ICD-10-PCS | Mod: CPTII,S$GLB,, | Performed by: INTERNAL MEDICINE

## 2019-11-04 PROCEDURE — 1101F PT FALLS ASSESS-DOCD LE1/YR: CPT | Mod: CPTII,S$GLB,, | Performed by: INTERNAL MEDICINE

## 2019-11-04 PROCEDURE — 99999 PR PBB SHADOW E&M-EST. PATIENT-LVL IV: ICD-10-PCS | Mod: PBBFAC,,, | Performed by: INTERNAL MEDICINE

## 2019-11-04 PROCEDURE — 3077F SYST BP >= 140 MM HG: CPT | Mod: CPTII,S$GLB,, | Performed by: INTERNAL MEDICINE

## 2019-11-04 PROCEDURE — 99214 PR OFFICE/OUTPT VISIT, EST, LEVL IV, 30-39 MIN: ICD-10-PCS | Mod: S$GLB,,, | Performed by: INTERNAL MEDICINE

## 2019-11-04 PROCEDURE — 96413 CHEMO IV INFUSION 1 HR: CPT

## 2019-11-04 PROCEDURE — 3077F PR MOST RECENT SYSTOLIC BLOOD PRESSURE >= 140 MM HG: ICD-10-PCS | Mod: CPTII,S$GLB,, | Performed by: INTERNAL MEDICINE

## 2019-11-04 PROCEDURE — 96411 CHEMO IV PUSH ADDL DRUG: CPT

## 2019-11-04 PROCEDURE — 96367 TX/PROPH/DG ADDL SEQ IV INF: CPT

## 2019-11-04 PROCEDURE — 63600175 PHARM REV CODE 636 W HCPCS: Performed by: NURSE PRACTITIONER

## 2019-11-04 PROCEDURE — 99214 OFFICE O/P EST MOD 30 MIN: CPT | Mod: S$GLB,,, | Performed by: INTERNAL MEDICINE

## 2019-11-04 PROCEDURE — 96375 TX/PRO/DX INJ NEW DRUG ADDON: CPT

## 2019-11-04 PROCEDURE — 99999 PR PBB SHADOW E&M-EST. PATIENT-LVL IV: CPT | Mod: PBBFAC,,, | Performed by: INTERNAL MEDICINE

## 2019-11-04 PROCEDURE — 96361 HYDRATE IV INFUSION ADD-ON: CPT

## 2019-11-04 PROCEDURE — 1101F PR PT FALLS ASSESS DOC 0-1 FALLS W/OUT INJ PAST YR: ICD-10-PCS | Mod: CPTII,S$GLB,, | Performed by: INTERNAL MEDICINE

## 2019-11-04 PROCEDURE — 96372 THER/PROPH/DIAG INJ SC/IM: CPT

## 2019-11-04 PROCEDURE — 63600175 PHARM REV CODE 636 W HCPCS: Performed by: INTERNAL MEDICINE

## 2019-11-04 PROCEDURE — 99499 UNLISTED E&M SERVICE: CPT | Mod: S$GLB,,, | Performed by: INTERNAL MEDICINE

## 2019-11-04 PROCEDURE — 99499 RISK ADDL DX/OHS AUDIT: ICD-10-PCS | Mod: S$GLB,,, | Performed by: INTERNAL MEDICINE

## 2019-11-04 PROCEDURE — 3079F DIAST BP 80-89 MM HG: CPT | Mod: CPTII,S$GLB,, | Performed by: INTERNAL MEDICINE

## 2019-11-04 RX ORDER — SODIUM CHLORIDE 0.9 % (FLUSH) 0.9 %
10 SYRINGE (ML) INJECTION
Status: DISCONTINUED | OUTPATIENT
Start: 2019-11-04 | End: 2019-11-04 | Stop reason: HOSPADM

## 2019-11-04 RX ORDER — HEPARIN 100 UNIT/ML
500 SYRINGE INTRAVENOUS
Status: DISCONTINUED | OUTPATIENT
Start: 2019-11-04 | End: 2019-11-04 | Stop reason: HOSPADM

## 2019-11-04 RX ORDER — SODIUM CHLORIDE 9 MG/ML
INJECTION, SOLUTION INTRAVENOUS CONTINUOUS
Status: DISCONTINUED | OUTPATIENT
Start: 2019-11-04 | End: 2019-11-04 | Stop reason: HOSPADM

## 2019-11-04 RX ORDER — HEPARIN 100 UNIT/ML
500 SYRINGE INTRAVENOUS
Status: CANCELLED | OUTPATIENT
Start: 2019-11-04

## 2019-11-04 RX ORDER — CYANOCOBALAMIN 1000 UG/ML
1000 INJECTION, SOLUTION INTRAMUSCULAR; SUBCUTANEOUS
Status: CANCELLED
Start: 2019-11-04

## 2019-11-04 RX ORDER — SODIUM CHLORIDE 9 MG/ML
INJECTION, SOLUTION INTRAVENOUS CONTINUOUS
Status: CANCELLED
Start: 2019-11-04

## 2019-11-04 RX ORDER — SODIUM CHLORIDE 0.9 % (FLUSH) 0.9 %
10 SYRINGE (ML) INJECTION
Status: CANCELLED | OUTPATIENT
Start: 2019-11-04

## 2019-11-04 RX ORDER — CYANOCOBALAMIN 1000 UG/ML
1000 INJECTION, SOLUTION INTRAMUSCULAR; SUBCUTANEOUS
Status: COMPLETED | OUTPATIENT
Start: 2019-11-04 | End: 2019-11-04

## 2019-11-04 RX ADMIN — SODIUM CHLORIDE: 0.9 INJECTION, SOLUTION INTRAVENOUS at 10:11

## 2019-11-04 RX ADMIN — CARBOPLATIN 400 MG: 10 INJECTION, SOLUTION INTRAVENOUS at 11:11

## 2019-11-04 RX ADMIN — SODIUM CHLORIDE 675 MG: 9 INJECTION, SOLUTION INTRAVENOUS at 11:11

## 2019-11-04 RX ADMIN — SODIUM CHLORIDE: 9 INJECTION, SOLUTION INTRAVENOUS at 11:11

## 2019-11-04 RX ADMIN — APREPITANT 130 MG: 130 INJECTION, EMULSION INTRAVENOUS at 11:11

## 2019-11-04 RX ADMIN — CYANOCOBALAMIN 1000 MCG: 1000 INJECTION, SOLUTION INTRAMUSCULAR at 10:11

## 2019-11-04 RX ADMIN — HEPARIN 500 UNITS: 100 SYRINGE at 12:11

## 2019-11-04 RX ADMIN — DEXAMETHASONE SODIUM PHOSPHATE: 4 INJECTION, SOLUTION INTRA-ARTICULAR; INTRALESIONAL; INTRAMUSCULAR; INTRAVENOUS; SOFT TISSUE at 11:11

## 2019-11-04 NOTE — PLAN OF CARE
Patient tolerated 1 L NS, Alimta, and Carboplatin with no complications. VSS. NAD. Pt instructed to call MD with any problems. Pt discharged home independently.

## 2019-11-04 NOTE — TELEPHONE ENCOUNTER
----- Message from Austin Burt MD sent at 11/4/2019 10:50 AM CST -----  RTC 3 wks with (CBC, CMP) to see me and chemo.
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
## 2019-11-06 RX ORDER — DICYCLOMINE HYDROCHLORIDE 10 MG/1
10 CAPSULE ORAL 2 TIMES DAILY
Qty: 180 CAPSULE | Refills: 0 | Status: SHIPPED | OUTPATIENT
Start: 2019-11-06 | End: 2019-12-10 | Stop reason: SDUPTHER

## 2019-11-08 DIAGNOSIS — R10.84 GENERALIZED ABDOMINAL PAIN: ICD-10-CM

## 2019-11-08 RX ORDER — LINACLOTIDE 145 UG/1
CAPSULE, GELATIN COATED ORAL
Qty: 90 CAPSULE | Refills: 0 | Status: SHIPPED | OUTPATIENT
Start: 2019-11-08 | End: 2020-01-01 | Stop reason: SDUPTHER

## 2019-11-10 ENCOUNTER — HOSPITAL ENCOUNTER (OUTPATIENT)
Facility: HOSPITAL | Age: 74
Discharge: HOME | End: 2019-11-11
Attending: EMERGENCY MEDICINE | Admitting: HOSPITALIST
Payer: MEDICARE

## 2019-11-10 DIAGNOSIS — R91.8 GROUND GLASS OPACITY PRESENT ON IMAGING OF LUNG: ICD-10-CM

## 2019-11-10 DIAGNOSIS — I10 HYPERTENSION, ESSENTIAL: Chronic | ICD-10-CM

## 2019-11-10 DIAGNOSIS — E78.2 MIXED HYPERLIPIDEMIA: Chronic | ICD-10-CM

## 2019-11-10 DIAGNOSIS — I82.5Z9 CHRONIC DEEP VEIN THROMBOSIS (DVT) OF DISTAL VEIN OF LOWER EXTREMITY, UNSPECIFIED LATERALITY: Chronic | ICD-10-CM

## 2019-11-10 DIAGNOSIS — Z79.899 ON ANTINEOPLASTIC CHEMOTHERAPY: ICD-10-CM

## 2019-11-10 DIAGNOSIS — R07.9 CHEST PAIN: Primary | ICD-10-CM

## 2019-11-10 DIAGNOSIS — N17.9 AKI (ACUTE KIDNEY INJURY): ICD-10-CM

## 2019-11-10 PROBLEM — C45.0 MALIGNANT PLEURAL MESOTHELIOMA: Chronic | Status: ACTIVE | Noted: 2019-07-18

## 2019-11-10 LAB
ALBUMIN SERPL BCP-MCNC: 3.8 G/DL (ref 3.5–5.2)
ALP SERPL-CCNC: 81 U/L (ref 55–135)
ALT SERPL W/O P-5'-P-CCNC: 21 U/L (ref 10–44)
ANION GAP SERPL CALC-SCNC: 6 MMOL/L (ref 8–16)
AST SERPL-CCNC: 25 U/L (ref 10–40)
BACTERIA #/AREA URNS HPF: ABNORMAL /HPF
BASOPHILS # BLD AUTO: ABNORMAL K/UL (ref 0–0.2)
BASOPHILS NFR BLD: 1 % (ref 0–1.9)
BILIRUB SERPL-MCNC: 0.4 MG/DL (ref 0.1–1)
BILIRUB UR QL STRIP: NEGATIVE
BNP SERPL-MCNC: 20 PG/ML (ref 0–99)
BUN SERPL-MCNC: 24 MG/DL (ref 8–23)
CALCIUM SERPL-MCNC: 9.4 MG/DL (ref 8.7–10.5)
CHLORIDE SERPL-SCNC: 106 MMOL/L (ref 95–110)
CLARITY UR: ABNORMAL
CO2 SERPL-SCNC: 26 MMOL/L (ref 23–29)
COLOR UR: YELLOW
CREAT SERPL-MCNC: 1.4 MG/DL (ref 0.5–1.4)
CTP QC/QA: YES
DIFFERENTIAL METHOD: ABNORMAL
EOSINOPHIL # BLD AUTO: ABNORMAL K/UL (ref 0–0.5)
EOSINOPHIL NFR BLD: 0 % (ref 0–8)
ERYTHROCYTE [DISTWIDTH] IN BLOOD BY AUTOMATED COUNT: 16.5 % (ref 11.5–14.5)
EST. GFR  (AFRICAN AMERICAN): 43 ML/MIN/1.73 M^2
EST. GFR  (NON AFRICAN AMERICAN): 38 ML/MIN/1.73 M^2
GLUCOSE SERPL-MCNC: 186 MG/DL (ref 70–110)
GLUCOSE UR QL STRIP: ABNORMAL
HCT VFR BLD AUTO: 32.6 % (ref 37–48.5)
HGB BLD-MCNC: 10.8 G/DL (ref 12–16)
HGB UR QL STRIP: NEGATIVE
IMM GRANULOCYTES # BLD AUTO: ABNORMAL K/UL (ref 0–0.04)
IMM GRANULOCYTES NFR BLD AUTO: ABNORMAL % (ref 0–0.5)
KETONES UR QL STRIP: NEGATIVE
LEUKOCYTE ESTERASE UR QL STRIP: NEGATIVE
LIPASE SERPL-CCNC: 66 U/L (ref 4–60)
LYMPHOCYTES # BLD AUTO: ABNORMAL K/UL (ref 1–4.8)
LYMPHOCYTES NFR BLD: 44 % (ref 18–48)
MCH RBC QN AUTO: 32.1 PG (ref 27–31)
MCHC RBC AUTO-ENTMCNC: 33.1 G/DL (ref 32–36)
MCV RBC AUTO: 97 FL (ref 82–98)
MICROSCOPIC COMMENT: ABNORMAL
MONOCYTES # BLD AUTO: ABNORMAL K/UL (ref 0.3–1)
MONOCYTES NFR BLD: 5 % (ref 4–15)
NEUTROPHILS NFR BLD: 50 % (ref 38–73)
NITRITE UR QL STRIP: NEGATIVE
NRBC BLD-RTO: 0 /100 WBC
PH UR STRIP: 5 [PH] (ref 5–8)
PLATELET # BLD AUTO: 161 K/UL (ref 150–350)
PMV BLD AUTO: 9.4 FL (ref 9.2–12.9)
POC MOLECULAR INFLUENZA A AGN: NEGATIVE
POC MOLECULAR INFLUENZA B AGN: NEGATIVE
POCT GLUCOSE: 133 MG/DL (ref 70–110)
POTASSIUM SERPL-SCNC: 3.9 MMOL/L (ref 3.5–5.1)
PROT SERPL-MCNC: 7.2 G/DL (ref 6–8.4)
PROT UR QL STRIP: NEGATIVE
RBC # BLD AUTO: 3.36 M/UL (ref 4–5.4)
SODIUM SERPL-SCNC: 138 MMOL/L (ref 136–145)
SP GR UR STRIP: 1.01 (ref 1–1.03)
SQUAMOUS #/AREA URNS HPF: 15 /HPF
TOXIC GRANULES BLD QL SMEAR: PRESENT
TROPONIN I SERPL DL<=0.01 NG/ML-MCNC: 0.01 NG/ML (ref 0–0.03)
TROPONIN I SERPL DL<=0.01 NG/ML-MCNC: 0.02 NG/ML (ref 0–0.03)
URN SPEC COLLECT METH UR: ABNORMAL
UROBILINOGEN UR STRIP-ACNC: NEGATIVE EU/DL
WBC # BLD AUTO: 2.96 K/UL (ref 3.9–12.7)
WBC #/AREA URNS HPF: 4 /HPF (ref 0–5)
WBC TOXIC VACUOLES BLD QL SMEAR: PRESENT
YEAST URNS QL MICRO: ABNORMAL

## 2019-11-10 PROCEDURE — 83690 ASSAY OF LIPASE: CPT

## 2019-11-10 PROCEDURE — 99285 EMERGENCY DEPT VISIT HI MDM: CPT | Mod: 25

## 2019-11-10 PROCEDURE — 83880 ASSAY OF NATRIURETIC PEPTIDE: CPT

## 2019-11-10 PROCEDURE — G0378 HOSPITAL OBSERVATION PER HR: HCPCS

## 2019-11-10 PROCEDURE — 85007 BL SMEAR W/DIFF WBC COUNT: CPT

## 2019-11-10 PROCEDURE — 80053 COMPREHEN METABOLIC PANEL: CPT

## 2019-11-10 PROCEDURE — 84484 ASSAY OF TROPONIN QUANT: CPT | Mod: 91

## 2019-11-10 PROCEDURE — 93005 ELECTROCARDIOGRAM TRACING: CPT

## 2019-11-10 PROCEDURE — 63600175 PHARM REV CODE 636 W HCPCS: Performed by: EMERGENCY MEDICINE

## 2019-11-10 PROCEDURE — 85027 COMPLETE CBC AUTOMATED: CPT

## 2019-11-10 PROCEDURE — 83036 HEMOGLOBIN GLYCOSYLATED A1C: CPT

## 2019-11-10 PROCEDURE — 96376 TX/PRO/DX INJ SAME DRUG ADON: CPT | Mod: 59

## 2019-11-10 PROCEDURE — 93010 EKG 12-LEAD: ICD-10-PCS | Mod: ,,, | Performed by: INTERNAL MEDICINE

## 2019-11-10 PROCEDURE — 63600175 PHARM REV CODE 636 W HCPCS: Performed by: HOSPITALIST

## 2019-11-10 PROCEDURE — 25000003 PHARM REV CODE 250: Performed by: EMERGENCY MEDICINE

## 2019-11-10 PROCEDURE — 81000 URINALYSIS NONAUTO W/SCOPE: CPT

## 2019-11-10 PROCEDURE — 96367 TX/PROPH/DG ADDL SEQ IV INF: CPT

## 2019-11-10 PROCEDURE — 96375 TX/PRO/DX INJ NEW DRUG ADDON: CPT | Mod: 59

## 2019-11-10 PROCEDURE — 96365 THER/PROPH/DIAG IV INF INIT: CPT

## 2019-11-10 PROCEDURE — 93010 ELECTROCARDIOGRAM REPORT: CPT | Mod: ,,, | Performed by: INTERNAL MEDICINE

## 2019-11-10 PROCEDURE — 25500020 PHARM REV CODE 255: Performed by: EMERGENCY MEDICINE

## 2019-11-10 PROCEDURE — 96366 THER/PROPH/DIAG IV INF ADDON: CPT

## 2019-11-10 RX ORDER — ENOXAPARIN SODIUM 100 MG/ML
80 INJECTION SUBCUTANEOUS DAILY
Status: DISCONTINUED | OUTPATIENT
Start: 2019-11-11 | End: 2019-11-11 | Stop reason: HOSPADM

## 2019-11-10 RX ORDER — METOPROLOL SUCCINATE 25 MG/1
25 TABLET, EXTENDED RELEASE ORAL DAILY
Status: DISCONTINUED | OUTPATIENT
Start: 2019-11-11 | End: 2019-11-11 | Stop reason: HOSPADM

## 2019-11-10 RX ORDER — GABAPENTIN 100 MG/1
100 CAPSULE ORAL 2 TIMES DAILY
Status: DISCONTINUED | OUTPATIENT
Start: 2019-11-10 | End: 2019-11-11 | Stop reason: HOSPADM

## 2019-11-10 RX ORDER — TIZANIDINE 4 MG/1
4 TABLET ORAL NIGHTLY PRN
Status: DISCONTINUED | OUTPATIENT
Start: 2019-11-10 | End: 2019-11-11 | Stop reason: HOSPADM

## 2019-11-10 RX ORDER — MORPHINE SULFATE ORAL SOLUTION 10 MG/5ML
5 SOLUTION ORAL EVERY 6 HOURS PRN
Status: DISCONTINUED | OUTPATIENT
Start: 2019-11-10 | End: 2019-11-11 | Stop reason: HOSPADM

## 2019-11-10 RX ORDER — MORPHINE SULFATE 10 MG/ML
4 INJECTION INTRAMUSCULAR; INTRAVENOUS; SUBCUTANEOUS EVERY 4 HOURS PRN
Status: DISCONTINUED | OUTPATIENT
Start: 2019-11-10 | End: 2019-11-11 | Stop reason: HOSPADM

## 2019-11-10 RX ORDER — METOPROLOL SUCCINATE 50 MG/1
50 TABLET, EXTENDED RELEASE ORAL DAILY
Status: DISCONTINUED | OUTPATIENT
Start: 2019-11-11 | End: 2019-11-11 | Stop reason: HOSPADM

## 2019-11-10 RX ORDER — ATORVASTATIN CALCIUM 10 MG/1
10 TABLET, FILM COATED ORAL DAILY
Status: DISCONTINUED | OUTPATIENT
Start: 2019-11-11 | End: 2019-11-11 | Stop reason: HOSPADM

## 2019-11-10 RX ORDER — HYDROMORPHONE HYDROCHLORIDE 2 MG/ML
0.5 INJECTION, SOLUTION INTRAMUSCULAR; INTRAVENOUS; SUBCUTANEOUS
Status: COMPLETED | OUTPATIENT
Start: 2019-11-10 | End: 2019-11-10

## 2019-11-10 RX ORDER — HYDROXYZINE PAMOATE 25 MG/1
25 CAPSULE ORAL
Status: COMPLETED | OUTPATIENT
Start: 2019-11-10 | End: 2019-11-10

## 2019-11-10 RX ORDER — HYDROXYZINE HYDROCHLORIDE 25 MG/1
25 TABLET, FILM COATED ORAL DAILY PRN
Status: DISCONTINUED | OUTPATIENT
Start: 2019-11-10 | End: 2019-11-11 | Stop reason: HOSPADM

## 2019-11-10 RX ORDER — DULOXETIN HYDROCHLORIDE 30 MG/1
60 CAPSULE, DELAYED RELEASE ORAL DAILY
Status: DISCONTINUED | OUTPATIENT
Start: 2019-11-11 | End: 2019-11-11 | Stop reason: HOSPADM

## 2019-11-10 RX ORDER — ASPIRIN 325 MG
325 TABLET ORAL
Status: COMPLETED | OUTPATIENT
Start: 2019-11-10 | End: 2019-11-10

## 2019-11-10 RX ORDER — INSULIN ASPART 100 [IU]/ML
1-10 INJECTION, SOLUTION INTRAVENOUS; SUBCUTANEOUS
Status: DISCONTINUED | OUTPATIENT
Start: 2019-11-10 | End: 2019-11-11 | Stop reason: HOSPADM

## 2019-11-10 RX ORDER — FOLIC ACID 0.4 MG
400 TABLET ORAL DAILY
Status: DISCONTINUED | OUTPATIENT
Start: 2019-11-11 | End: 2019-11-10

## 2019-11-10 RX ORDER — ONDANSETRON 2 MG/ML
8 INJECTION INTRAMUSCULAR; INTRAVENOUS EVERY 8 HOURS PRN
Status: DISCONTINUED | OUTPATIENT
Start: 2019-11-10 | End: 2019-11-11 | Stop reason: HOSPADM

## 2019-11-10 RX ORDER — ENOXAPARIN SODIUM 150 MG/ML
120 INJECTION SUBCUTANEOUS DAILY
Status: DISCONTINUED | OUTPATIENT
Start: 2019-11-11 | End: 2019-11-10

## 2019-11-10 RX ORDER — FOLIC ACID 1 MG/1
1 TABLET ORAL DAILY
Status: DISCONTINUED | OUTPATIENT
Start: 2019-11-11 | End: 2019-11-11 | Stop reason: HOSPADM

## 2019-11-10 RX ORDER — TRIAMTERENE AND HYDROCHLOROTHIAZIDE 37.5; 25 MG/1; MG/1
1 CAPSULE ORAL DAILY
Status: DISCONTINUED | OUTPATIENT
Start: 2019-11-11 | End: 2019-11-11 | Stop reason: HOSPADM

## 2019-11-10 RX ADMIN — HYDROMORPHONE HYDROCHLORIDE 0.5 MG: 2 INJECTION, SOLUTION INTRAMUSCULAR; INTRAVENOUS; SUBCUTANEOUS at 08:11

## 2019-11-10 RX ADMIN — ONDANSETRON HYDROCHLORIDE 8 MG: 2 SOLUTION INTRAMUSCULAR; INTRAVENOUS at 11:11

## 2019-11-10 RX ADMIN — IOHEXOL 70 ML: 350 INJECTION, SOLUTION INTRAVENOUS at 05:11

## 2019-11-10 RX ADMIN — HYDROXYZINE PAMOATE 25 MG: 25 CAPSULE ORAL at 10:11

## 2019-11-10 RX ADMIN — CEFTRIAXONE 1 G: 1 INJECTION, SOLUTION INTRAVENOUS at 08:11

## 2019-11-10 RX ADMIN — AZITHROMYCIN MONOHYDRATE 500 MG: 500 INJECTION, POWDER, LYOPHILIZED, FOR SOLUTION INTRAVENOUS at 10:11

## 2019-11-10 RX ADMIN — ASPIRIN 325 MG ORAL TABLET 325 MG: 325 PILL ORAL at 03:11

## 2019-11-10 RX ADMIN — GABAPENTIN 100 MG: 100 CAPSULE ORAL at 10:11

## 2019-11-10 RX ADMIN — HYDROMORPHONE HYDROCHLORIDE 0.5 MG: 2 INJECTION, SOLUTION INTRAMUSCULAR; INTRAVENOUS; SUBCUTANEOUS at 03:11

## 2019-11-10 NOTE — ED TRIAGE NOTES
Pt arrives with c/o chest pain every time she breathes in and out. She describes the pain as sticks in her chest and has had the pain intermittent for the last 5 hrs. Pt has h/o mesophyoma. Pt c/o SOB and states she becomes sweaty with her pain. Dr. Avina at bedside.
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
## 2019-11-11 VITALS
TEMPERATURE: 98 F | RESPIRATION RATE: 18 BRPM | DIASTOLIC BLOOD PRESSURE: 72 MMHG | OXYGEN SATURATION: 97 % | HEIGHT: 66 IN | SYSTOLIC BLOOD PRESSURE: 142 MMHG | WEIGHT: 180 LBS | HEART RATE: 71 BPM | BODY MASS INDEX: 28.93 KG/M2

## 2019-11-11 LAB
AORTIC ROOT ANNULUS: 3.25 CM
AORTIC VALVE CUSP SEPERATION: 2.37 CM
ASCENDING AORTA: 2.93 CM
AV INDEX (PROSTH): 0.8
AV MEAN GRADIENT: 5 MMHG
AV PEAK GRADIENT: 7 MMHG
AV VALVE AREA: 2.21 CM2
AV VELOCITY RATIO: 0.77
BSA FOR ECHO PROCEDURE: 1.95 M2
CV ECHO LV RWT: 0.74 CM
DOP CALC AO PEAK VEL: 1.31 M/S
DOP CALC AO VTI: 34.69 CM
DOP CALC LVOT AREA: 2.8 CM2
DOP CALC LVOT DIAMETER: 1.88 CM
DOP CALC LVOT PEAK VEL: 1.01 M/S
DOP CALC LVOT STROKE VOLUME: 76.63 CM3
DOP CALCLVOT PEAK VEL VTI: 27.62 CM
E WAVE DECELERATION TIME: 239.41 MSEC
E/A RATIO: 0.6
E/E' RATIO: 6.4 M/S
ECHO LV POSTERIOR WALL: 1.58 CM (ref 0.6–1.1)
ESTIMATED AVG GLUCOSE: 151 MG/DL (ref 68–131)
FRACTIONAL SHORTENING: 31 % (ref 28–44)
HBA1C MFR BLD HPLC: 6.9 % (ref 4–5.6)
INTERVENTRICULAR SEPTUM: 1.56 CM (ref 0.6–1.1)
IVRT: 0.18 MSEC
LA MAJOR: 5.02 CM
LA MINOR: 4.17 CM
LA WIDTH: 3.97 CM
LEFT ATRIUM SIZE: 3.91 CM
LEFT ATRIUM VOLUME INDEX: 31.4 ML/M2
LEFT ATRIUM VOLUME: 60.11 CM3
LEFT INTERNAL DIMENSION IN SYSTOLE: 2.92 CM (ref 2.1–4)
LEFT VENTRICLE DIASTOLIC VOLUME INDEX: 42.51 ML/M2
LEFT VENTRICLE DIASTOLIC VOLUME: 81.3 ML
LEFT VENTRICLE MASS INDEX: 143 G/M2
LEFT VENTRICLE SYSTOLIC VOLUME INDEX: 17.1 ML/M2
LEFT VENTRICLE SYSTOLIC VOLUME: 32.71 ML
LEFT VENTRICULAR INTERNAL DIMENSION IN DIASTOLE: 4.26 CM (ref 3.5–6)
LEFT VENTRICULAR MASS: 273.43 G
LV LATERAL E/E' RATIO: 5.33 M/S
LV SEPTAL E/E' RATIO: 8 M/S
MV PEAK A VEL: 0.53 M/S
MV PEAK E VEL: 0.32 M/S
PISA TR MAX VEL: 2.91 M/S
POCT GLUCOSE: 102 MG/DL (ref 70–110)
POCT GLUCOSE: 116 MG/DL (ref 70–110)
PULM VEIN S/D RATIO: 2.23
PV PEAK D VEL: 0.3 M/S
PV PEAK S VEL: 0.67 M/S
PV PEAK VELOCITY: 0.71 CM/S
RA MAJOR: 4.55 CM
RA WIDTH: 3.92 CM
RIGHT VENTRICULAR END-DIASTOLIC DIMENSION: 3.34 CM
RV TISSUE DOPPLER FREE WALL SYSTOLIC VELOCITY 1 (APICAL 4 CHAMBER VIEW): 7.72 CM/S
SINUS: 3.2 CM
STJ: 2.72 CM
TDI LATERAL: 0.06 M/S
TDI SEPTAL: 0.04 M/S
TDI: 0.05 M/S
TR MAX PG: 34 MMHG
TRICUSPID ANNULAR PLANE SYSTOLIC EXCURSION: 1.35 CM
TROPONIN I SERPL DL<=0.01 NG/ML-MCNC: 0.01 NG/ML (ref 0–0.03)
TROPONIN I SERPL DL<=0.01 NG/ML-MCNC: 0.02 NG/ML (ref 0–0.03)

## 2019-11-11 PROCEDURE — 36415 COLL VENOUS BLD VENIPUNCTURE: CPT

## 2019-11-11 PROCEDURE — 96372 THER/PROPH/DIAG INJ SC/IM: CPT

## 2019-11-11 PROCEDURE — 25000003 PHARM REV CODE 250: Performed by: EMERGENCY MEDICINE

## 2019-11-11 PROCEDURE — 96366 THER/PROPH/DIAG IV INF ADDON: CPT

## 2019-11-11 PROCEDURE — G0378 HOSPITAL OBSERVATION PER HR: HCPCS

## 2019-11-11 PROCEDURE — 99220 PR INITIAL OBSERVATION CARE,LEVL III: ICD-10-PCS | Mod: ,,, | Performed by: INTERNAL MEDICINE

## 2019-11-11 PROCEDURE — 99220 PR INITIAL OBSERVATION CARE,LEVL III: CPT | Mod: ,,, | Performed by: INTERNAL MEDICINE

## 2019-11-11 PROCEDURE — 63600175 PHARM REV CODE 636 W HCPCS: Performed by: HOSPITALIST

## 2019-11-11 PROCEDURE — 84484 ASSAY OF TROPONIN QUANT: CPT | Mod: 91

## 2019-11-11 RX ADMIN — DULOXETINE HYDROCHLORIDE 60 MG: 30 CAPSULE, DELAYED RELEASE ORAL at 09:11

## 2019-11-11 RX ADMIN — METOPROLOL SUCCINATE 25 MG: 25 TABLET, EXTENDED RELEASE ORAL at 09:11

## 2019-11-11 RX ADMIN — GABAPENTIN 100 MG: 100 CAPSULE ORAL at 09:11

## 2019-11-11 RX ADMIN — TRIAMTERENE AND HYDROCHLOROTHIAZIDE 1 CAPSULE: 25; 37.5 CAPSULE ORAL at 09:11

## 2019-11-11 RX ADMIN — FOLIC ACID 1 MG: 1 TABLET ORAL at 09:11

## 2019-11-11 RX ADMIN — ATORVASTATIN CALCIUM 10 MG: 10 TABLET, FILM COATED ORAL at 09:11

## 2019-11-11 RX ADMIN — METOPROLOL SUCCINATE 50 MG: 50 TABLET, FILM COATED, EXTENDED RELEASE ORAL at 09:11

## 2019-11-11 RX ADMIN — ENOXAPARIN SODIUM 80 MG: 80 INJECTION SUBCUTANEOUS at 09:11

## 2019-11-11 NOTE — NURSING
Patient escorted by transprtto family vehicle for discharge home. Patient accompanied by daughter. No apparent distress noted.

## 2019-11-11 NOTE — NURSING
Report rec'd from MAJO James. Patient transferring to Obs to rule out ACS. Patient c/o CP when breathes in. + diarrhea x 11 times last 2 days. Will monitor overnight on cardiac monitoring and trend troponins. AAOx4. Ambulates with cane. Await arrival to unit.

## 2019-11-11 NOTE — PLAN OF CARE
"   11/11/19 1229   Final Note   Assessment Type Final Discharge Note   Anticipated Discharge Disposition Home   Hospital Follow Up  Appt(s) scheduled? Yes   Discharge plans and expectations educations in teach back method with documentation complete? Yes   Right Care Referral Info   Post Acute Recommendation No Care   pts nurse Guerline notified that pt can d/c from CM standpoint.    EDUCATION:  provided with educational information on chest pain.  Information reviewed and placed in :My Healthcare Packet" to be brought home  to use as resource after discharge.  Information included:  signs and symptoms to look for and call the doctor if experiencing, and symptoms that may indicate a medical emergency: CALL 911.      All questions answered.  Teach back method used.    Patient stated, "she will go to follow up appointment and take medications as prescribed.  ".        "

## 2019-11-11 NOTE — HOSPITAL COURSE
Very pleasant  female 73 years old placed in observation for evaluation of acute onset of atypical chest pain that was worse with breathing in and resolved with Dilaudid IV.  She was found to be dehydrated at the time of presentation with acute on chronic GINA and worsening creatinine.  She was administered IV fluids and creatinine normalized.  Her GFR also improved from what it was about 4 months ago now = 43.  She was noted to have elevated lipase but in early morning she denied any new abdominal pain, no nausea vomiting noted and she was able to tolerate her meals.  Her serial troponin levels were normal inter BNP was normal.  Hemoglobin A1c was found to be 6.9.  The patient appeared nontoxic and was in no chest pain and without complaint in early morning.  She was seen and evaluated by Cardiology who has no plans for aggressive diagnostic workup.  A 2D echo was obtained by Cardiology who noted that the checking of the echo was to exclude LV dysfunction WMA/effusion.  Assuming no significant abnormalities on echo the patient was cleared for discharge by Cardiology.  Her other hospitalization was otherwise uneventful and she is clear for discharge. Follow-up with Dr. Archuleta in clinic and Cardiology as warranted if symptoms recur turn or worsen.

## 2019-11-11 NOTE — ASSESSMENT & PLAN NOTE
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  EKG without evidence of acute ischemia, initial troponin negative.  Stress test May 2019 without evidence of myocardial ischemia.  ED physician felt unsafe to discharge the patient without Cardiology evaluation.  -monitor on tele  -obtain serial markers  -cardiology consult  -NPO p MN

## 2019-11-11 NOTE — ASSESSMENT & PLAN NOTE
Atyp/prolonoged  Resolved with dilaudid  Trop neg x3, EKG unchanged  No effusion on CTA chest (nor PE)  Recent echo/MPI 5/2019 normal  Pt since dx with mesothelioma and has received cisplatin chemo  Check echo to exclude LV dysfxn/WMA/effusion (given recent hx of cisplatin chemo)  No plan for repeat isch eval/MPI/cath  Assuming no significant abnormality on echo, OK for discharge.
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
## 2019-11-11 NOTE — PROGRESS NOTES
WRITTEN HEALTHCARE DISCHARGE INFORMATION      Things that YOU are RESPONSIBLE for to Manage Your Care At Home:     1. Getting your prescriptions filled.  2. Taking you medications as directed. DO NOT MISS ANY DOSES!  3. Going to your follow-up doctor appointments. This is important because it allows the doctor to monitor your progress and to determine if any changes need to be made to your treatment plan.     If you are unable to make your follow up appointments, please call the number listed and reschedule this appointment.      ____________HELP AT HOME____________________     Experiencing any SIGNS or SYMPTOMS: YOU CAN     Schedule a same day appopintment with your Primary Care Doctor or  you can call [facility] On Call Nurse Care Line for 24/7 assistance at [phone]     If you are experience any signs or symptoms that have become severe, Call 911 and come to your nearest Emergency Room.     Thank you for choosing [facility] and allowing us to care for you.   From your care management team:      You should receive a call from [facility] Discharge Department within 48-72 hours to help manage your care after discharge. Please try to make sure that you answer your phone for this important phone call.    Follow-up Information     Ayo Archuleta MD On 11/29/2019.    Specialties:  Internal Medicine, Wound Care  Why:  appointment scheduled for November 29th at 9:20am  Contact information:  [address]  [place] [address]  [phone]
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
## 2019-11-11 NOTE — HPI
73 y.o. female with malignant pleural mesothelioma on chemo last treatment 11/04/2019, dm 2, hypertension, hyperlipidemia, CKD stage 3, depression, lumbar DDD, chronic DVT on full-dose enoxaparin presents with a complaint of chest pain. Pain acute onset this morning, located left chest, described as needles sticking her chest when she inhales, attempted self treatment with hydroxyzine with temporary relief.  Denies fever, chills, cough, palpitations, orthopnea, PND, dizziness, syncope, nausea, vomiting, bloody or black stool, dysuria, frequency, or urgency.  In the ED, EKG without evidence of acute ischemia, initial troponin negative.  Routine labs, urinalysis, chest x-ray, and CTA chest unremarkable for any acute abnormality.  Placed in observation for ACS rule out.

## 2019-11-11 NOTE — DISCHARGE SUMMARY
[facility] Medicine  Discharge Summary      Patient Name: Isabell Preston  MRN: [id number]  Admission Date: 11/10/2019  Hospital Length of Stay: 0 days  Discharge Date and Time:  11/11/2019 12:06 PM  Attending Physician: Eva Chaparro MD   Discharging Provider: MELINA Mercado  Primary Care Provider: Ayo Archuleta MD      HPI:   73 y.o. female with malignant pleural mesothelioma on chemo last treatment 11/04/2019, dm 2, hypertension, hyperlipidemia, CKD stage 3, depression, lumbar DDD, chronic DVT on full-dose enoxaparin presents with a complaint of chest pain. Pain acute onset this morning, located left chest, described as needles sticking her chest when she inhales, attempted self treatment with hydroxyzine with temporary relief.  Denies fever, chills, cough, palpitations, orthopnea, PND, dizziness, syncope, nausea, vomiting, bloody or black stool, dysuria, frequency, or urgency.  In the ED, EKG without evidence of acute ischemia, initial troponin negative.  Routine labs, urinalysis, chest x-ray, and CTA chest unremarkable for any acute abnormality.  Placed in observation for ACS rule out.    * No surgery found *      Hospital Course:   Very pleasant  female 73 years old placed in observation for evaluation of acute onset of atypical chest pain that was worse with breathing in and resolved with Dilaudid IV.  She was found to be dehydrated at the time of presentation with acute on chronic GINA and worsening creatinine.  She was administered IV fluids and creatinine normalized.  Her GFR also improved from what it was about 4 months ago now = 43.  She was noted to have elevated lipase but in early morning she denied any new abdominal pain, no nausea vomiting noted and she was able to tolerate her meals.  Her serial troponin levels were normal inter BNP was normal.  Hemoglobin A1c was found to be 6.9.  The patient appeared nontoxic and was in no chest pain  and without complaint in early morning.  She was seen and evaluated by Cardiology who has no plans for aggressive diagnostic workup.  A 2D echo was obtained by Cardiology who noted that the checking of the echo was to exclude LV dysfunction WMA/effusion.  Assuming no significant abnormalities on echo the patient was cleared for discharge by Cardiology.  Her other hospitalization was otherwise uneventful and she is clear for discharge. Follow-up with Dr. Archuleta in clinic and Cardiology as warranted if symptoms recur turn or worsen.     Consults:   Consults (From admission, onward)        Status Ordering Provider     Inpatient consult to Cardiology  Once     Provider:  Nabeel Duran MD    Completed GEORGE GARCIA JR     IP consult to case management  Once     Provider:  (Not yet assigned)    Acknowledged MARY STYLES          No new Assessment & Plan notes have been filed under this hospital service since the last note was generated.  Service: Hospital Medicine    Final Active Diagnoses:    Diagnosis Date Noted POA    PRINCIPAL PROBLEM:  Chest pain [R07.9] 11/10/2019 Yes    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] 07/18/2019 Yes     Chronic    Type 2 diabetes mellitus with diabetic cataract, without long-term current use of insulin [E11.36] 06/05/2019 Yes     Chronic    Hyperlipidemia [E78.5] 11/11/2016 Yes     Chronic    Chronic deep vein thrombosis (DVT) of distal vein of lower extremity, unspecified laterality [I82.5Z9] 10/21/2016 Yes     Chronic    Hypertension, essential [I10] 07/31/2015 Yes     Chronic      Problems Resolved During this Admission:       Discharged Condition: stable    Disposition: Home or Self Care    Follow Up:  Follow-up Information     Ayo Archuleta MD In 2 weeks.    Specialties:  Internal Medicine, Wound Care  Why:  Call the office to schedule appointment, For outpatient follow-up/post hospitalizaton  Contact information:  [facility]  [id number]  [phone]                 Patient Instructions:      Diet Cardiac     Activity as tolerated       Significant Diagnostic Studies: Labs:   CMP   Recent Labs   Lab 11/10/19  1520      K 3.9      CO2 26   *   BUN 24*   CREATININE 1.4   CALCIUM 9.4   PROT 7.2   ALBUMIN 3.8   BILITOT 0.4   ALKPHOS 81   AST 25   ALT 21   ANIONGAP 6*   ESTGFRAFRICA 43*   EGFRNONAA 38*   , CBC   Recent Labs   Lab 11/10/19  1520   WBC 2.96*   HGB 10.8*   HCT 32.6*      , INR   Lab Results   Component Value Date    INR 1.0 09/02/2019    INR 1.1 07/05/2019    INR 1.1 07/04/2019   , Lipid Panel   Lab Results   Component Value Date    CHOL 144 06/05/2019    HDL 49 06/05/2019    LDLCALC 72.4 06/05/2019    TRIG 113 06/05/2019    CHOLHDL 34.0 06/05/2019   , Troponin   Recent Labs   Lab 11/11/19  0620   TROPONINI 0.016    and A1C:   Recent Labs   Lab 06/05/19  1528 11/10/19  2257   HGBA1C 6.2* 6.9*       Pending Diagnostic Studies:     Procedure Component Value Units Date/Time    Echo [[id number]]  (Abnormal) Resulted:  11/11/19 1125    Order Status:  Sent Lab Status:  In process Updated:  11/11/19 1125     BSA 1.95 m2      TDI SEPTAL 0.04 m/s      LV LATERAL E/E' RATIO 5.33 m/s      LV SEPTAL E/E' RATIO 8.00 m/s      LA WIDTH 3.97 cm      AORTIC VALVE CUSP SEPERATION 2.37 cm      TDI LATERAL 0.06 m/s      PV PEAK VELOCITY 0.71 cm/s      LVIDD 4.26 cm      IVS 1.56 cm      PW 1.58 cm      Ao root annulus 3.25 cm      LVIDS 2.92 cm      FS 31 %      LA volume 60.11 cm3      Sinus 3.20 cm      STJ 2.72 cm      Ascending aorta 2.93 cm      LV mass 273.43 g      LA size 3.91 cm      RVDD 3.34 cm      TAPSE 1.35 cm      RV S' 7.72 cm/s      Left Ventricle Relative Wall Thickness 0.74 cm      AV mean gradient 5 mmHg      AV valve area 2.21 cm2      AV Velocity Ratio 0.77     AV index (prosthetic) 0.80     E/A ratio 0.60     Mean e' 0.05 m/s      E wave decelartion time 239.41 msec      IVRT 0.18 msec      Pulm vein S/D  ratio 2.23     LVOT diameter 1.88 cm      LVOT area 2.8 cm2      LVOT peak sunny 1.01 m/s      LVOT peak VTI 27.62 cm      Ao peak sunny 1.31 m/s      Ao VTI 34.69 cm      LVOT stroke volume 76.63 cm3      AV peak gradient 7 mmHg      E/E' ratio 6.40 m/s      MV Peak E Sunny 0.32 m/s      TR Max Sunny 2.91 m/s      MV Peak A Sunny 0.53 m/s      PV Peak S Sunny 0.67 m/s      PV Peak D Sunny 0.30 m/s      LV Systolic Volume 32.71 mL      LV Systolic Volume Index 17.1 mL/m2      LV Diastolic Volume 81.30 mL      LV Diastolic Volume Index 42.51 mL/m2      LA Volume Index 31.4 mL/m2      LV Mass Index 143 g/m2      RA Major Axis 4.55 cm      Left Atrium Minor Axis 4.17 cm      Left Atrium Major Axis 5.02 cm      Triscuspid Valve Regurgitation Peak Gradient 34 mmHg      RA Width 3.92 cm          Medications:  Reconciled Home Medications:      Medication List      CHANGE how you take these medications    lancets 28 gauge Misc  Commonly known as:  TRUEPLUS LANCETS  1 lancet by Misc.(Non-Drug; Combo Route) route once daily. Test blood sugar once daily, type 2 diabetes, controlled. E11.9  What changed:    · when to take this  · reasons to take this     metoprolol succinate 50 MG 24 hr tablet  Commonly known as:  TOPROL-XL  Take 1 tablet (50 mg total) by mouth once daily. Total daily dose 75mg  What changed:  Another medication with the same name was removed. Continue taking this medication, and follow the directions you see here.        CONTINUE taking these medications    atorvastatin 10 MG tablet  Commonly known as:  LIPITOR  Take 1 tablet (10 mg total) by mouth once daily.     dexAMETHasone 4 MG Tab  Commonly known as:  DECADRON  Take 2 tablets (8 mg total) by mouth every 12 (twelve) hours. Take the day before and for two days post chemotherapy.     diclofenac sodium 1 % Gel  Commonly known as:  VOLTAREN  Apply 2 g topically once daily.     dicyclomine 10 MG capsule  Commonly known as:  BENTYL  Take 1 capsule (10 mg total) by mouth 2  (two) times daily.     DULoxetine 60 MG capsule  Commonly known as:  CYMBALTA  Take 1 capsule (60 mg total) by mouth once daily.     enoxaparin 120 mg/0.8 mL Syrg  Commonly known as:  LOVENOX  Inject 0.8 mLs (120 mg total) into the skin once daily.     fluticasone 27.5 mcg/actuation nasal spray  Commonly known as:  VERAMYST  2 sprays by Nasal route daily as needed for Rhinitis or Allergies.     folic acid 400 MCG tablet  Commonly known as:  FOLVITE  Take 1 tablet (400 mcg total) by mouth once daily.     gabapentin 100 MG capsule  Commonly known as:  NEURONTIN  TAKE 1 CAPSULE BY MOUTH TWICE A DAY     hydrOXYzine HCl 25 MG tablet  Commonly known as:  ATARAX  Take 1-2 tablets (25-50 mg total) by mouth daily as needed (severe anxiety or itching).     LINZESS 145 mcg Cap capsule  Generic drug:  linaCLOtide  TAKE 1 CAPSULE BY MOUTH EVERY DAY     magic mouthwash diphen/antac/lidoc/nysta  Swish10 mLs 4 (four) times daily.     meclizine 25 mg tablet  Commonly known as:  ANTIVERT  TAKE 1 TABLET(25 MG) BY MOUTH THREE TIMES DAILY AS NEEDED FOR DIZZINESS     mometasone 0.1% 0.1 % cream  Commonly known as:  ELOCON  BALWINDER TO DARK AREAS BID ON LEGS PRN     ondansetron 4 MG tablet  Commonly known as:  ZOFRAN  Take 1 tablet (4 mg total) by mouth every 6 (six) hours.     tiZANidine 4 MG tablet  Commonly known as:  ZANAFLEX  TAKE 1 TABLETBY MOUTH NIGHTLY AT BEDTIME AS NEEDED     triamterene-hydrochlorothiazide 37.5-25 mg 37.5-25 mg per tablet  Commonly known as:  MAXZIDE-25  Take 1 tablet by mouth once daily.            Indwelling Lines/Drains at time of discharge:   Lines/Drains/Airways     Central Venous Catheter Line                 PowerPort A Cath Single Lumen 08/05/19 1212 left internal jugular 98 days                Time spent on the discharge of patient: 35 minutes  Patient was seen and examined on the date of discharge and determined to be suitable for discharge.         LATOYA Arriaga, FNP-C  Hospitalist - Department of  [facility] Medicine  [address] [place] [address]  Office [phone]; Pager [phone]

## 2019-11-11 NOTE — HPI
73 y.o. female with malignant pleural mesothelioma on chemo last treatment 11/04/2019, dm 2, hypertension, hyperlipidemia, CKD stage 3, depression, lumbar DDD, chronic DVT on full-dose enoxaparin presents with a complaint of chest pain. Pain acute onset this morning, located left chest, described as needles sticking her chest when she inhales, attempted self treatment with hydroxyzine with temporary relief.  Denies fever, chills, cough, palpitations, orthopnea, PND, dizziness, syncope, nausea, vomiting, bloody or black stool, dysuria, frequency, or urgency.  In the ED, EKG without evidence of acute ischemia, initial troponin negative.  Routine labs, urinalysis, chest x-ray, and CTA chest unremarkable for any acute abnormality.  Placed in observation for ACS rule out.    Previously followed by Dr. Ortiz, last seen May 2019.  Subsequent testing was normal.    The patient presented to the emergency room with complaints of circumscribed left inframammary pain.  She states he was ongoing for approximately 5-6 hours prior to presentation.  There are no associated symptoms.  Despite prolonged symptoms, her troponin was negative.  Her symptoms eventually resolved with Dilaudid injection.  She does have a history of malignant mesothelioma, and did receive prior cisplatin based chemotherapy but apparently this has been changed to a different agent as the cisplatin was causing her to have significant nausea.

## 2019-11-11 NOTE — PLAN OF CARE
Problem: Adult Inpatient Plan of Care  Goal: Plan of Care Review  Outcome: Ongoing, Progressing    Pt remained free of falls during current shift. Plan of care and fall precautions reviewed with patient. Verbalized understanding. No pain meds required during shift. Cards consulted for c/o CP. Troponins WNL. Bed locked and in lowest postion. SR up x 2, call light in reach.
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
## 2019-11-11 NOTE — PLAN OF CARE
11/11/19 1223   Discharge Assessment   Assessment Type Discharge Planning Assessment   Assessment information obtained from? Medical Record   Prior to hospitilization cognitive status: Alert/Oriented   Prior to hospitalization functional status: Independent;Assistive Equipment   Current cognitive status: Alert/Oriented   Current Functional Status: Independent;Assistive Equipment   Facility Arrived From: home   Lives With other (see comments)   Is patient able to care for self after discharge? Yes   Who are your caregiver(s) and their phone number(s)? [facility] [phone]   Patient's perception of discharge disposition home or selfcare   Readmission Within the Last 30 Days no previous admission in last 30 days   Patient currently being followed by outpatient case management? No   Patient currently receives any other outside agency services? No   Equipment Currently Used at Home walker, rolling;cane, straight;glucometer;oxygen   Do you have any problems affording any of your prescribed medications? No   Is the patient taking medications as prescribed? yes   Does the patient have transportation home? Yes   Transportation Anticipated family or friend will provide   Does the patient receive services at the [facility]? No   Discharge Plan A Home with family  (with follow up appointment)   DME Needed Upon Discharge  none   Patient/Family in Agreement with Plan yes     [facility]  [address] [address]  Phone: [phone] Fax: [phone]    [facility] [address] [facility] [employer] - [place] - [address]  [address]  [place] [address]  Phone: [phone] Fax: [phone]    [id number]  [place]  [address] [place] [address] [place] [address] [place] [address]  Phone: [phone] Fax: [phone]
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
## 2019-11-15 ENCOUNTER — PATIENT OUTREACH (OUTPATIENT)
Dept: ADMINISTRATIVE | Facility: HOSPITAL | Age: 74
End: 2019-11-15

## 2019-11-20 ENCOUNTER — OFFICE VISIT (OUTPATIENT)
Dept: PSYCHIATRY | Facility: CLINIC | Age: 74
End: 2019-11-20
Payer: MEDICARE

## 2019-11-20 ENCOUNTER — PATIENT MESSAGE (OUTPATIENT)
Dept: PSYCHIATRY | Facility: CLINIC | Age: 74
End: 2019-11-20

## 2019-11-20 VITALS
BODY MASS INDEX: 28.98 KG/M2 | SYSTOLIC BLOOD PRESSURE: 140 MMHG | WEIGHT: 180.31 LBS | HEIGHT: 66 IN | DIASTOLIC BLOOD PRESSURE: 82 MMHG | HEART RATE: 84 BPM

## 2019-11-20 DIAGNOSIS — F33.2 MAJOR DEPRESSIVE DISORDER, RECURRENT SEVERE WITHOUT PSYCHOTIC FEATURES: Primary | ICD-10-CM

## 2019-11-20 DIAGNOSIS — F41.1 GENERALIZED ANXIETY DISORDER: ICD-10-CM

## 2019-11-20 DIAGNOSIS — F60.9 PERSONALITY DISORDER: ICD-10-CM

## 2019-11-20 DIAGNOSIS — F43.23 ADJUSTMENT DISORDER WITH MIXED ANXIETY AND DEPRESSED MOOD: ICD-10-CM

## 2019-11-20 PROCEDURE — 99999 PR PBB SHADOW E&M-EST. PATIENT-LVL III: CPT | Mod: PBBFAC,,, | Performed by: PSYCHIATRY & NEUROLOGY

## 2019-11-20 PROCEDURE — 1159F PR MEDICATION LIST DOCUMENTED IN MEDICAL RECORD: ICD-10-PCS | Mod: S$GLB,,, | Performed by: PSYCHIATRY & NEUROLOGY

## 2019-11-20 PROCEDURE — 99499 UNLISTED E&M SERVICE: CPT | Mod: S$GLB,,, | Performed by: PSYCHIATRY & NEUROLOGY

## 2019-11-20 PROCEDURE — 1101F PT FALLS ASSESS-DOCD LE1/YR: CPT | Mod: CPTII,S$GLB,, | Performed by: PSYCHIATRY & NEUROLOGY

## 2019-11-20 PROCEDURE — 1125F PR PAIN SEVERITY QUANTIFIED, PAIN PRESENT: ICD-10-PCS | Mod: S$GLB,,, | Performed by: PSYCHIATRY & NEUROLOGY

## 2019-11-20 PROCEDURE — 99214 PR OFFICE/OUTPT VISIT, EST, LEVL IV, 30-39 MIN: ICD-10-PCS | Mod: S$GLB,,, | Performed by: PSYCHIATRY & NEUROLOGY

## 2019-11-20 PROCEDURE — 99214 OFFICE O/P EST MOD 30 MIN: CPT | Mod: S$GLB,,, | Performed by: PSYCHIATRY & NEUROLOGY

## 2019-11-20 PROCEDURE — 3077F SYST BP >= 140 MM HG: CPT | Mod: CPTII,S$GLB,, | Performed by: PSYCHIATRY & NEUROLOGY

## 2019-11-20 PROCEDURE — 3079F PR MOST RECENT DIASTOLIC BLOOD PRESSURE 80-89 MM HG: ICD-10-PCS | Mod: CPTII,S$GLB,, | Performed by: PSYCHIATRY & NEUROLOGY

## 2019-11-20 PROCEDURE — 99499 RISK ADDL DX/OHS AUDIT: ICD-10-PCS | Mod: S$GLB,,, | Performed by: PSYCHIATRY & NEUROLOGY

## 2019-11-20 PROCEDURE — 3077F PR MOST RECENT SYSTOLIC BLOOD PRESSURE >= 140 MM HG: ICD-10-PCS | Mod: CPTII,S$GLB,, | Performed by: PSYCHIATRY & NEUROLOGY

## 2019-11-20 PROCEDURE — 3079F DIAST BP 80-89 MM HG: CPT | Mod: CPTII,S$GLB,, | Performed by: PSYCHIATRY & NEUROLOGY

## 2019-11-20 PROCEDURE — 1125F AMNT PAIN NOTED PAIN PRSNT: CPT | Mod: S$GLB,,, | Performed by: PSYCHIATRY & NEUROLOGY

## 2019-11-20 PROCEDURE — 90833 PR PSYCHOTHERAPY W/PATIENT W/E&M, 30 MIN (ADD ON): ICD-10-PCS | Mod: S$GLB,,, | Performed by: PSYCHIATRY & NEUROLOGY

## 2019-11-20 PROCEDURE — 1159F MED LIST DOCD IN RCRD: CPT | Mod: S$GLB,,, | Performed by: PSYCHIATRY & NEUROLOGY

## 2019-11-20 PROCEDURE — 99999 PR PBB SHADOW E&M-EST. PATIENT-LVL III: ICD-10-PCS | Mod: PBBFAC,,, | Performed by: PSYCHIATRY & NEUROLOGY

## 2019-11-20 PROCEDURE — 1101F PR PT FALLS ASSESS DOC 0-1 FALLS W/OUT INJ PAST YR: ICD-10-PCS | Mod: CPTII,S$GLB,, | Performed by: PSYCHIATRY & NEUROLOGY

## 2019-11-20 PROCEDURE — 90833 PSYTX W PT W E/M 30 MIN: CPT | Mod: S$GLB,,, | Performed by: PSYCHIATRY & NEUROLOGY

## 2019-11-20 RX ORDER — AMLODIPINE BESYLATE 10 MG/1
10 TABLET ORAL DAILY
Refills: 0 | COMMUNITY
Start: 2019-09-16 | End: 2019-12-09 | Stop reason: SDUPTHER

## 2019-11-20 RX ORDER — DESOXIMETASONE 0.5 MG/G
CREAM TOPICAL
Status: ON HOLD | COMMUNITY
End: 2021-01-01 | Stop reason: HOSPADM

## 2019-11-20 RX ORDER — PROCHLORPERAZINE MALEATE 10 MG
10 TABLET ORAL EVERY 6 HOURS PRN
Refills: 1 | COMMUNITY
Start: 2019-10-12 | End: 2020-01-08

## 2019-11-20 RX ORDER — ARIPIPRAZOLE 2 MG/1
2 TABLET ORAL EVERY MORNING
Qty: 30 TABLET | Refills: 1 | Status: SHIPPED | OUTPATIENT
Start: 2019-11-20 | End: 2020-01-24 | Stop reason: SDUPTHER
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
## 2019-11-20 NOTE — PATIENT INSTRUCTIONS
"        You have been provided with a certain amount of medication with a specified number of refills.  Please follow up within an adequate time before you run out of medications.    REFILLS FOR CONTROLLED SUBSTANCES WILL NOT BE GIVEN WITHOUT AN APPOINTMENT.  I will not honor or fill automated refill requests from pharmacies.  You must come in for an appointment to get refills.        Please book your next appointment for myself or therapist by phone by calling our office at [phone].        Note that these follow up appointments are 20 minutes long.  It is important that we focus on medication management.  Should you need therapy, please get set up with our therapist or call your insurance company to find out which therapists are available in your area.      PLEASE BE AT LEAST 15 MINUTES EARLY FOR YOUR NEXT APPOINTMENT.  Late arrivals WILL BE TURNED AWAY AND ASKED TO RESCHEDULE.  YOU MUST COME EARLY TO ALLOW TIME FOR CHECK-IN AS WELL AS GET YOUR VITAL SIGNS AND GO OVER YOUR MEDICATIONS.  Tardiness is not fair to the patients who present after you and are on time for their appointments.  It causes a delay in the appointments for patients and staff.  YOU MAY ALSO BE DISCHARGED FROM CLINIC with multiple late arrivals or "No Show" appointments.       -----------------------------------------------------------------------------------------------------------------  IF YOU FEEL SUICIDAL OR HAVING THOUGHTS OR PLANS TO HURT YOURSELF OR OTHERS, CALL 911 OR REPORT TO THE NEAREST EMERGENCY ROOM.  YOU CAN ALSO ACCESS THE FOLLOWING HOTLINE:    National Suicide Hotline Number 7-168-264-TALK (0070)                  "

## 2019-11-21 ENCOUNTER — TELEPHONE (OUTPATIENT)
Dept: PSYCHIATRY | Facility: CLINIC | Age: 74
End: 2019-11-21

## 2019-11-21 ENCOUNTER — PATIENT MESSAGE (OUTPATIENT)
Dept: HEMATOLOGY/ONCOLOGY | Facility: CLINIC | Age: 74
End: 2019-11-21

## 2019-11-21 DIAGNOSIS — C45.0 MALIGNANT PLEURAL MESOTHELIOMA: Primary | ICD-10-CM

## 2019-11-21 NOTE — TELEPHONE ENCOUNTER
Called patient to schedule follow up appointment in 6 weeks. LVM to ask that she please call us back to schedule.

## 2019-11-22 RX ORDER — LIDOCAINE AND PRILOCAINE 25; 25 MG/G; MG/G
CREAM TOPICAL
Qty: 30 G | Refills: 1 | Status: ON HOLD | OUTPATIENT
Start: 2019-11-22 | End: 2020-01-01 | Stop reason: SDUPTHER

## 2019-11-26 ENCOUNTER — TELEPHONE (OUTPATIENT)
Dept: HEMATOLOGY/ONCOLOGY | Facility: CLINIC | Age: 74
End: 2019-11-26

## 2019-11-26 ENCOUNTER — LAB VISIT (OUTPATIENT)
Dept: LAB | Facility: HOSPITAL | Age: 74
End: 2019-11-26
Payer: MEDICARE

## 2019-11-26 DIAGNOSIS — C45.0 MALIGNANT PLEURAL MESOTHELIOMA: ICD-10-CM

## 2019-11-26 LAB
ALBUMIN SERPL BCP-MCNC: 4 G/DL (ref 3.5–5.2)
ALP SERPL-CCNC: 92 U/L (ref 55–135)
ALT SERPL W/O P-5'-P-CCNC: 24 U/L (ref 10–44)
ANION GAP SERPL CALC-SCNC: 14 MMOL/L (ref 8–16)
AST SERPL-CCNC: 22 U/L (ref 10–40)
BILIRUB SERPL-MCNC: 0.2 MG/DL (ref 0.1–1)
BUN SERPL-MCNC: 21 MG/DL (ref 8–23)
CALCIUM SERPL-MCNC: 9.4 MG/DL (ref 8.7–10.5)
CHLORIDE SERPL-SCNC: 106 MMOL/L (ref 95–110)
CO2 SERPL-SCNC: 18 MMOL/L (ref 23–29)
CREAT SERPL-MCNC: 1.5 MG/DL (ref 0.5–1.4)
ERYTHROCYTE [DISTWIDTH] IN BLOOD BY AUTOMATED COUNT: 17.1 % (ref 11.5–14.5)
EST. GFR  (AFRICAN AMERICAN): 40 ML/MIN/1.73 M^2
EST. GFR  (NON AFRICAN AMERICAN): 35 ML/MIN/1.73 M^2
GLUCOSE SERPL-MCNC: 182 MG/DL (ref 70–110)
HCT VFR BLD AUTO: 29.5 % (ref 37–48.5)
HGB BLD-MCNC: 9.7 G/DL (ref 12–16)
IMM GRANULOCYTES # BLD AUTO: 0.03 K/UL (ref 0–0.04)
MCH RBC QN AUTO: 33 PG (ref 27–31)
MCHC RBC AUTO-ENTMCNC: 32.9 G/DL (ref 32–36)
MCV RBC AUTO: 100 FL (ref 82–98)
NEUTROPHILS # BLD AUTO: 2.9 K/UL (ref 1.8–7.7)
PLATELET # BLD AUTO: ABNORMAL K/UL (ref 150–350)
PMV BLD AUTO: ABNORMAL FL (ref 9.2–12.9)
POTASSIUM SERPL-SCNC: 4.3 MMOL/L (ref 3.5–5.1)
PROT SERPL-MCNC: 7.8 G/DL (ref 6–8.4)
RBC # BLD AUTO: 2.94 M/UL (ref 4–5.4)
SODIUM SERPL-SCNC: 138 MMOL/L (ref 136–145)
WBC # BLD AUTO: 4.14 K/UL (ref 3.9–12.7)

## 2019-11-26 PROCEDURE — 36415 COLL VENOUS BLD VENIPUNCTURE: CPT

## 2019-11-26 PROCEDURE — 80053 COMPREHEN METABOLIC PANEL: CPT

## 2019-11-26 PROCEDURE — 85027 COMPLETE CBC AUTOMATED: CPT

## 2019-11-27 ENCOUNTER — OFFICE VISIT (OUTPATIENT)
Dept: HEMATOLOGY/ONCOLOGY | Facility: CLINIC | Age: 74
End: 2019-11-27
Payer: MEDICARE

## 2019-11-27 ENCOUNTER — INFUSION (OUTPATIENT)
Dept: INFUSION THERAPY | Facility: HOSPITAL | Age: 74
End: 2019-11-27
Payer: MEDICARE

## 2019-11-27 VITALS
HEART RATE: 65 BPM | RESPIRATION RATE: 18 BRPM | TEMPERATURE: 98 F | DIASTOLIC BLOOD PRESSURE: 77 MMHG | SYSTOLIC BLOOD PRESSURE: 173 MMHG

## 2019-11-27 VITALS
HEIGHT: 66 IN | OXYGEN SATURATION: 97 % | WEIGHT: 183.63 LBS | BODY MASS INDEX: 29.51 KG/M2 | RESPIRATION RATE: 18 BRPM | HEART RATE: 81 BPM | TEMPERATURE: 98 F | SYSTOLIC BLOOD PRESSURE: 189 MMHG | DIASTOLIC BLOOD PRESSURE: 91 MMHG

## 2019-11-27 DIAGNOSIS — C45.0 MALIGNANT PLEURAL MESOTHELIOMA: Primary | ICD-10-CM

## 2019-11-27 PROCEDURE — 99999 PR PBB SHADOW E&M-EST. PATIENT-LVL V: ICD-10-PCS | Mod: PBBFAC,,, | Performed by: INTERNAL MEDICINE

## 2019-11-27 PROCEDURE — 99999 PR PBB SHADOW E&M-EST. PATIENT-LVL V: CPT | Mod: PBBFAC,,, | Performed by: INTERNAL MEDICINE

## 2019-11-27 PROCEDURE — 99214 PR OFFICE/OUTPT VISIT, EST, LEVL IV, 30-39 MIN: ICD-10-PCS | Mod: S$GLB,,, | Performed by: INTERNAL MEDICINE

## 2019-11-27 PROCEDURE — 96411 CHEMO IV PUSH ADDL DRUG: CPT

## 2019-11-27 PROCEDURE — 96361 HYDRATE IV INFUSION ADD-ON: CPT

## 2019-11-27 PROCEDURE — 99499 RISK ADDL DX/OHS AUDIT: ICD-10-PCS | Mod: S$GLB,,, | Performed by: INTERNAL MEDICINE

## 2019-11-27 PROCEDURE — 1125F AMNT PAIN NOTED PAIN PRSNT: CPT | Mod: S$GLB,,, | Performed by: INTERNAL MEDICINE

## 2019-11-27 PROCEDURE — 1159F PR MEDICATION LIST DOCUMENTED IN MEDICAL RECORD: ICD-10-PCS | Mod: S$GLB,,, | Performed by: INTERNAL MEDICINE

## 2019-11-27 PROCEDURE — 3077F PR MOST RECENT SYSTOLIC BLOOD PRESSURE >= 140 MM HG: ICD-10-PCS | Mod: CPTII,S$GLB,, | Performed by: INTERNAL MEDICINE

## 2019-11-27 PROCEDURE — 3078F DIAST BP <80 MM HG: CPT | Mod: CPTII,S$GLB,, | Performed by: INTERNAL MEDICINE

## 2019-11-27 PROCEDURE — 1159F MED LIST DOCD IN RCRD: CPT | Mod: S$GLB,,, | Performed by: INTERNAL MEDICINE

## 2019-11-27 PROCEDURE — 1101F PT FALLS ASSESS-DOCD LE1/YR: CPT | Mod: CPTII,S$GLB,, | Performed by: INTERNAL MEDICINE

## 2019-11-27 PROCEDURE — 1125F PR PAIN SEVERITY QUANTIFIED, PAIN PRESENT: ICD-10-PCS | Mod: S$GLB,,, | Performed by: INTERNAL MEDICINE

## 2019-11-27 PROCEDURE — 96367 TX/PROPH/DG ADDL SEQ IV INF: CPT

## 2019-11-27 PROCEDURE — 63600175 PHARM REV CODE 636 W HCPCS: Performed by: INTERNAL MEDICINE

## 2019-11-27 PROCEDURE — 1101F PR PT FALLS ASSESS DOC 0-1 FALLS W/OUT INJ PAST YR: ICD-10-PCS | Mod: CPTII,S$GLB,, | Performed by: INTERNAL MEDICINE

## 2019-11-27 PROCEDURE — 96375 TX/PRO/DX INJ NEW DRUG ADDON: CPT

## 2019-11-27 PROCEDURE — 99499 UNLISTED E&M SERVICE: CPT | Mod: S$GLB,,, | Performed by: INTERNAL MEDICINE

## 2019-11-27 PROCEDURE — 3077F SYST BP >= 140 MM HG: CPT | Mod: CPTII,S$GLB,, | Performed by: INTERNAL MEDICINE

## 2019-11-27 PROCEDURE — 99214 OFFICE O/P EST MOD 30 MIN: CPT | Mod: S$GLB,,, | Performed by: INTERNAL MEDICINE

## 2019-11-27 PROCEDURE — 3078F PR MOST RECENT DIASTOLIC BLOOD PRESSURE < 80 MM HG: ICD-10-PCS | Mod: CPTII,S$GLB,, | Performed by: INTERNAL MEDICINE

## 2019-11-27 PROCEDURE — 96413 CHEMO IV INFUSION 1 HR: CPT

## 2019-11-27 RX ORDER — HEPARIN 100 UNIT/ML
500 SYRINGE INTRAVENOUS
Status: DISCONTINUED | OUTPATIENT
Start: 2019-11-27 | End: 2019-11-27 | Stop reason: HOSPADM

## 2019-11-27 RX ORDER — HEPARIN 100 UNIT/ML
500 SYRINGE INTRAVENOUS
Status: CANCELLED | OUTPATIENT
Start: 2019-11-27

## 2019-11-27 RX ORDER — PREDNISONE 10 MG/1
10 TABLET ORAL DAILY
Qty: 30 TABLET | Refills: 0 | Status: SHIPPED | OUTPATIENT
Start: 2019-11-27 | End: 2019-12-18

## 2019-11-27 RX ORDER — SODIUM CHLORIDE 0.9 % (FLUSH) 0.9 %
10 SYRINGE (ML) INJECTION
Status: DISCONTINUED | OUTPATIENT
Start: 2019-11-27 | End: 2019-11-27 | Stop reason: HOSPADM

## 2019-11-27 RX ORDER — SODIUM CHLORIDE 0.9 % (FLUSH) 0.9 %
10 SYRINGE (ML) INJECTION
Status: CANCELLED | OUTPATIENT
Start: 2019-11-27

## 2019-11-27 RX ORDER — SODIUM CHLORIDE 9 MG/ML
INJECTION, SOLUTION INTRAVENOUS CONTINUOUS
Status: DISCONTINUED | OUTPATIENT
Start: 2019-11-27 | End: 2019-11-27 | Stop reason: HOSPADM

## 2019-11-27 RX ORDER — SODIUM CHLORIDE 9 MG/ML
INJECTION, SOLUTION INTRAVENOUS CONTINUOUS
Status: CANCELLED
Start: 2019-11-27

## 2019-11-27 RX ADMIN — SODIUM CHLORIDE: 0.9 INJECTION, SOLUTION INTRAVENOUS at 01:11

## 2019-11-27 RX ADMIN — HEPARIN 500 UNITS: 100 SYRINGE at 03:11

## 2019-11-27 RX ADMIN — CARBOPLATIN 400 MG: 10 INJECTION, SOLUTION INTRAVENOUS at 02:11

## 2019-11-27 RX ADMIN — APREPITANT 130 MG: 130 INJECTION, EMULSION INTRAVENOUS at 01:11

## 2019-11-27 RX ADMIN — DEXAMETHASONE SODIUM PHOSPHATE: 4 INJECTION, SOLUTION INTRA-ARTICULAR; INTRALESIONAL; INTRAMUSCULAR; INTRAVENOUS; SOFT TISSUE at 01:11

## 2019-11-27 RX ADMIN — SODIUM CHLORIDE 675 MG: 9 INJECTION, SOLUTION INTRAVENOUS at 02:11
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
## 2019-11-27 NOTE — PLAN OF CARE
Pt received Aimta and carbo; tolerated well. VSS and NAD. Pt instructed to call MD with any concerns. Pt discharged home independently.

## 2019-12-02 ENCOUNTER — PATIENT MESSAGE (OUTPATIENT)
Dept: HEMATOLOGY/ONCOLOGY | Facility: CLINIC | Age: 74
End: 2019-12-02

## 2019-12-09 DIAGNOSIS — I10 HYPERTENSION, ESSENTIAL: Primary | ICD-10-CM

## 2019-12-09 RX ORDER — AMLODIPINE BESYLATE 10 MG/1
TABLET ORAL
Qty: 90 TABLET | Refills: 0 | Status: SHIPPED | OUTPATIENT
Start: 2019-12-09 | End: 2020-01-01 | Stop reason: SDUPTHER

## 2019-12-10 ENCOUNTER — PATIENT MESSAGE (OUTPATIENT)
Dept: FAMILY MEDICINE | Facility: CLINIC | Age: 74
End: 2019-12-10

## 2019-12-10 RX ORDER — DICYCLOMINE HYDROCHLORIDE 10 MG/1
CAPSULE ORAL
Qty: 90 CAPSULE | Refills: 0 | Status: SHIPPED | OUTPATIENT
Start: 2019-12-10 | End: 2020-01-24

## 2019-12-11 DIAGNOSIS — I10 HTN, GOAL BELOW 140/90: ICD-10-CM

## 2019-12-11 DIAGNOSIS — I10 HYPERTENSION, ESSENTIAL: ICD-10-CM

## 2019-12-11 RX ORDER — TRIAMTERENE/HYDROCHLOROTHIAZID 37.5-25 MG
TABLET ORAL
Qty: 90 TABLET | Refills: 0 | Status: ON HOLD | OUTPATIENT
Start: 2019-12-11 | End: 2020-05-13 | Stop reason: HOSPADM

## 2019-12-11 RX ORDER — METOPROLOL SUCCINATE 25 MG/1
25 TABLET, EXTENDED RELEASE ORAL DAILY
Qty: 90 TABLET | Refills: 0 | Status: CANCELLED | OUTPATIENT
Start: 2019-12-11

## 2019-12-11 RX ORDER — ONDANSETRON 4 MG/1
4 TABLET, FILM COATED ORAL EVERY 6 HOURS
Qty: 90 TABLET | Refills: 0 | Status: SHIPPED | OUTPATIENT
Start: 2019-12-11 | End: 2020-04-09 | Stop reason: SDUPTHER

## 2019-12-11 RX ORDER — METOPROLOL SUCCINATE 50 MG/1
50 TABLET, EXTENDED RELEASE ORAL DAILY
Qty: 90 TABLET | Refills: 0 | Status: SHIPPED | OUTPATIENT
Start: 2019-12-11 | End: 2020-01-20

## 2019-12-11 RX ORDER — ONDANSETRON 4 MG/1
4 TABLET, FILM COATED ORAL EVERY 6 HOURS
Qty: 90 TABLET | Refills: 0 | Status: SHIPPED | OUTPATIENT
Start: 2019-12-11 | End: 2019-12-11

## 2019-12-11 RX ORDER — METOPROLOL SUCCINATE 50 MG/1
50 TABLET, EXTENDED RELEASE ORAL DAILY
Qty: 90 TABLET | Refills: 0 | Status: CANCELLED | OUTPATIENT
Start: 2019-12-11

## 2019-12-11 RX ORDER — METOPROLOL SUCCINATE 25 MG/1
25 TABLET, EXTENDED RELEASE ORAL DAILY
Qty: 90 TABLET | Refills: 0 | Status: SHIPPED | OUTPATIENT
Start: 2019-12-11 | End: 2020-01-20

## 2019-12-13 ENCOUNTER — OFFICE VISIT (OUTPATIENT)
Dept: FAMILY MEDICINE | Facility: CLINIC | Age: 74
End: 2019-12-13
Payer: MEDICARE

## 2019-12-13 VITALS
DIASTOLIC BLOOD PRESSURE: 72 MMHG | OXYGEN SATURATION: 97 % | HEIGHT: 66 IN | BODY MASS INDEX: 29.51 KG/M2 | HEART RATE: 81 BPM | TEMPERATURE: 98 F | WEIGHT: 183.63 LBS | SYSTOLIC BLOOD PRESSURE: 146 MMHG | RESPIRATION RATE: 17 BRPM

## 2019-12-13 DIAGNOSIS — Z23 NEED FOR INFLUENZA VACCINATION: ICD-10-CM

## 2019-12-13 DIAGNOSIS — Z86.718 HISTORY OF DVT OF LOWER EXTREMITY: Primary | ICD-10-CM

## 2019-12-13 DIAGNOSIS — C45.0 MALIGNANT PLEURAL MESOTHELIOMA: Chronic | ICD-10-CM

## 2019-12-13 DIAGNOSIS — I10 HYPERTENSION, ESSENTIAL: Chronic | ICD-10-CM

## 2019-12-13 DIAGNOSIS — F33.2 MAJOR DEPRESSIVE DISORDER, RECURRENT SEVERE WITHOUT PSYCHOTIC FEATURES: ICD-10-CM

## 2019-12-13 PROCEDURE — 99999 PR PBB SHADOW E&M-EST. PATIENT-LVL III: CPT | Mod: PBBFAC,,, | Performed by: INTERNAL MEDICINE

## 2019-12-13 PROCEDURE — 99214 PR OFFICE/OUTPT VISIT, EST, LEVL IV, 30-39 MIN: ICD-10-PCS | Mod: S$GLB,,, | Performed by: INTERNAL MEDICINE

## 2019-12-13 PROCEDURE — 90662 FLU VACCINE - HIGH DOSE (65+) PRESERVATIVE FREE IM: ICD-10-PCS | Mod: S$GLB,,, | Performed by: INTERNAL MEDICINE

## 2019-12-13 PROCEDURE — G0008 ADMIN INFLUENZA VIRUS VAC: HCPCS | Mod: S$GLB,,, | Performed by: INTERNAL MEDICINE

## 2019-12-13 PROCEDURE — 99214 OFFICE O/P EST MOD 30 MIN: CPT | Mod: S$GLB,,, | Performed by: INTERNAL MEDICINE

## 2019-12-13 PROCEDURE — G0008 FLU VACCINE - HIGH DOSE (65+) PRESERVATIVE FREE IM: ICD-10-PCS | Mod: S$GLB,,, | Performed by: INTERNAL MEDICINE

## 2019-12-13 PROCEDURE — 90662 IIV NO PRSV INCREASED AG IM: CPT | Mod: S$GLB,,, | Performed by: INTERNAL MEDICINE

## 2019-12-13 PROCEDURE — 99999 PR PBB SHADOW E&M-EST. PATIENT-LVL III: ICD-10-PCS | Mod: PBBFAC,,, | Performed by: INTERNAL MEDICINE

## 2019-12-13 RX ORDER — ARIPIPRAZOLE 2 MG/1
TABLET ORAL
Qty: 30 TABLET | Refills: 1 | OUTPATIENT
Start: 2019-12-13
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
## 2019-12-13 NOTE — PROGRESS NOTES
"Subjective:       Patient ID: Isabell Preston is a 74 y.o. female.    Chief Complaint: Establish Care; Follow-up; and Flu Vaccine    Discuss shaking    HPI: 75 y/o w/ HTN malignant mesothelioma MDD w/ anxious features and recurrent DVT (on enoxaprin) presents with daughter, Marly, for follow up. Primary complaint is "shaking". Since starting chemotherapy she will have intermittent episodes of shaking of hands and legs. Can be so severe that she is unable to walk. No associatation with late in the day (fatigiablity) can occur upon first awakening in the morning. Lasting from few minutes to several hours. No associated pain with shaking. No falls. She was prescribed prednsione for fatigue three weeks ago but only took this for two days due to worsening fatigue and upset stomach. She does take zofran before meals about once per day if feeling nauseous. No compazine (previously prescribed for HA associated nausea). No change in bowel or bladder. Sleep "good"     Review of Systems   Constitutional: Negative for activity change, appetite change, fatigue, fever and unexpected weight change.   HENT: Negative for ear pain, rhinorrhea and sore throat.    Eyes: Negative for discharge and visual disturbance.   Respiratory: Negative for chest tightness, shortness of breath and wheezing.    Cardiovascular: Negative for chest pain, palpitations and leg swelling.   Gastrointestinal: Negative for abdominal pain, constipation and diarrhea.   Endocrine: Negative for cold intolerance and heat intolerance.   Genitourinary: Negative for dysuria and hematuria.   Musculoskeletal: Negative for joint swelling and neck stiffness.   Skin: Negative for rash.   Neurological: Positive for tremors. Negative for dizziness, syncope, weakness and headaches.   Psychiatric/Behavioral: Negative for suicidal ideas.       Objective:     Vitals:    12/13/19 0956   BP: (!) 146/72   BP Location: Right arm   Patient Position: Sitting   BP Method: Medium " "(Automatic)   Pulse: 81   Resp: 17   Temp: 97.9 °F (36.6 °C)   TempSrc: Oral   SpO2: 97%   Weight: 83.3 kg (183 lb 10.3 oz)   Height: 5' 6" (1.676 m)          Physical Exam   Constitutional: She is oriented to person, place, and time. She appears well-developed and well-nourished.   HENT:   Head: Normocephalic and atraumatic.   Eyes: Conjunctivae are normal. No scleral icterus.   Neck: Normal range of motion.   Cardiovascular: Normal rate and regular rhythm. Exam reveals no gallop and no friction rub.   No murmur heard.  Pulmonary/Chest: Effort normal and breath sounds normal. She has no wheezes. She has no rales.   Abdominal: Soft. Bowel sounds are normal. There is no tenderness. There is no rebound and no guarding.   Musculoskeletal: Normal range of motion. She exhibits no edema or tenderness.   Neurological: She is alert and oriented to person, place, and time. No cranial nerve deficit.   Smooth passive range of motion of bilateral wrists (no cogwheeling) no tremor, gait normal with full stride no foot drop or shuffling.   Skin: Skin is warm and dry.   Psychiatric: She has a normal mood and affect.       Assessment and Plan   1. History of DVT of lower extremity  Continue full dose lovenox in setting of malignancy    2. Hypertension, essential  continueb beta blocker CCB and thiazide    3. Need for influenza vaccination  Flu shot today  - Influenza - High Dose (65+) (PF) (IM)    4. Malignant pleural mesothelioma  Followed by oncology      Tremors: intermittent, no evidnece of parkinsonion features on exam given the intermittent nature of her symptoms I suspect some anxiety component medications reviewed no strongly associated medication no evidence of serotonin sydrome  will trial as needed hydroxyzine and daughter will message me if any improvement  "

## 2019-12-16 ENCOUNTER — PATIENT MESSAGE (OUTPATIENT)
Dept: PSYCHIATRY | Facility: CLINIC | Age: 74
End: 2019-12-16

## 2019-12-17 ENCOUNTER — LAB VISIT (OUTPATIENT)
Dept: LAB | Facility: HOSPITAL | Age: 74
End: 2019-12-17
Attending: NURSE PRACTITIONER
Payer: MEDICARE

## 2019-12-17 DIAGNOSIS — C45.0 MALIGNANT PLEURAL MESOTHELIOMA: ICD-10-CM

## 2019-12-17 LAB
ALBUMIN SERPL BCP-MCNC: 4 G/DL (ref 3.5–5.2)
ALP SERPL-CCNC: 81 U/L (ref 55–135)
ALT SERPL W/O P-5'-P-CCNC: 30 U/L (ref 10–44)
ANION GAP SERPL CALC-SCNC: 6 MMOL/L (ref 8–16)
AST SERPL-CCNC: 23 U/L (ref 10–40)
BILIRUB SERPL-MCNC: 0.3 MG/DL (ref 0.1–1)
BUN SERPL-MCNC: 16 MG/DL (ref 8–23)
CALCIUM SERPL-MCNC: 9.6 MG/DL (ref 8.7–10.5)
CHLORIDE SERPL-SCNC: 105 MMOL/L (ref 95–110)
CO2 SERPL-SCNC: 29 MMOL/L (ref 23–29)
CREAT SERPL-MCNC: 1.5 MG/DL (ref 0.5–1.4)
ERYTHROCYTE [DISTWIDTH] IN BLOOD BY AUTOMATED COUNT: 14.9 % (ref 11.5–14.5)
EST. GFR  (AFRICAN AMERICAN): 40 ML/MIN/1.73 M^2
EST. GFR  (NON AFRICAN AMERICAN): 34 ML/MIN/1.73 M^2
GLUCOSE SERPL-MCNC: 182 MG/DL (ref 70–110)
HCT VFR BLD AUTO: 31.4 % (ref 37–48.5)
HGB BLD-MCNC: 10.1 G/DL (ref 12–16)
IMM GRANULOCYTES # BLD AUTO: 0.01 K/UL (ref 0–0.04)
MCH RBC QN AUTO: 33.7 PG (ref 27–31)
MCHC RBC AUTO-ENTMCNC: 32.2 G/DL (ref 32–36)
MCV RBC AUTO: 105 FL (ref 82–98)
NEUTROPHILS # BLD AUTO: 1.9 K/UL (ref 1.8–7.7)
PLATELET # BLD AUTO: 302 K/UL (ref 150–350)
PMV BLD AUTO: 8.7 FL (ref 9.2–12.9)
POTASSIUM SERPL-SCNC: 4.4 MMOL/L (ref 3.5–5.1)
PROT SERPL-MCNC: 7.4 G/DL (ref 6–8.4)
RBC # BLD AUTO: 3 M/UL (ref 4–5.4)
SODIUM SERPL-SCNC: 140 MMOL/L (ref 136–145)
WBC # BLD AUTO: 3.51 K/UL (ref 3.9–12.7)

## 2019-12-17 PROCEDURE — 80053 COMPREHEN METABOLIC PANEL: CPT

## 2019-12-17 PROCEDURE — 85027 COMPLETE CBC AUTOMATED: CPT

## 2019-12-17 PROCEDURE — 36415 COLL VENOUS BLD VENIPUNCTURE: CPT

## 2019-12-18 ENCOUNTER — INFUSION (OUTPATIENT)
Dept: INFUSION THERAPY | Facility: HOSPITAL | Age: 74
End: 2019-12-18
Attending: INTERNAL MEDICINE
Payer: MEDICARE

## 2019-12-18 ENCOUNTER — OFFICE VISIT (OUTPATIENT)
Dept: HEMATOLOGY/ONCOLOGY | Facility: CLINIC | Age: 74
End: 2019-12-18
Payer: MEDICARE

## 2019-12-18 VITALS
HEART RATE: 66 BPM | RESPIRATION RATE: 15 BRPM | HEIGHT: 66 IN | TEMPERATURE: 99 F | SYSTOLIC BLOOD PRESSURE: 155 MMHG | BODY MASS INDEX: 29.69 KG/M2 | WEIGHT: 184.75 LBS | DIASTOLIC BLOOD PRESSURE: 77 MMHG | OXYGEN SATURATION: 99 %

## 2019-12-18 VITALS
DIASTOLIC BLOOD PRESSURE: 90 MMHG | RESPIRATION RATE: 20 BRPM | HEIGHT: 66 IN | TEMPERATURE: 98 F | BODY MASS INDEX: 29.76 KG/M2 | SYSTOLIC BLOOD PRESSURE: 180 MMHG | OXYGEN SATURATION: 98 % | HEART RATE: 78 BPM | WEIGHT: 185.19 LBS

## 2019-12-18 DIAGNOSIS — N28.9 RENAL INSUFFICIENCY: ICD-10-CM

## 2019-12-18 DIAGNOSIS — D70.1 CHEMOTHERAPY INDUCED NEUTROPENIA: ICD-10-CM

## 2019-12-18 DIAGNOSIS — D64.81 ANTINEOPLASTIC CHEMOTHERAPY INDUCED ANEMIA: ICD-10-CM

## 2019-12-18 DIAGNOSIS — C45.0 MALIGNANT PLEURAL MESOTHELIOMA: Primary | ICD-10-CM

## 2019-12-18 DIAGNOSIS — T45.1X5A CHEMOTHERAPY INDUCED NAUSEA AND VOMITING: ICD-10-CM

## 2019-12-18 DIAGNOSIS — T45.1X5A ANTINEOPLASTIC CHEMOTHERAPY INDUCED ANEMIA: ICD-10-CM

## 2019-12-18 DIAGNOSIS — R11.2 CHEMOTHERAPY INDUCED NAUSEA AND VOMITING: ICD-10-CM

## 2019-12-18 DIAGNOSIS — T45.1X5A CHEMOTHERAPY INDUCED NEUTROPENIA: ICD-10-CM

## 2019-12-18 DIAGNOSIS — Z86.718 HISTORY OF DVT (DEEP VEIN THROMBOSIS): ICD-10-CM

## 2019-12-18 PROCEDURE — 99214 PR OFFICE/OUTPT VISIT, EST, LEVL IV, 30-39 MIN: ICD-10-PCS | Mod: S$GLB,,, | Performed by: NURSE PRACTITIONER

## 2019-12-18 PROCEDURE — 99999 PR PBB SHADOW E&M-EST. PATIENT-LVL V: ICD-10-PCS | Mod: PBBFAC,,, | Performed by: NURSE PRACTITIONER

## 2019-12-18 PROCEDURE — 63600175 PHARM REV CODE 636 W HCPCS: Performed by: INTERNAL MEDICINE

## 2019-12-18 PROCEDURE — 25000003 PHARM REV CODE 250: Performed by: NURSE PRACTITIONER

## 2019-12-18 PROCEDURE — 96413 CHEMO IV INFUSION 1 HR: CPT

## 2019-12-18 PROCEDURE — 99499 UNLISTED E&M SERVICE: CPT | Mod: S$GLB,,, | Performed by: NURSE PRACTITIONER

## 2019-12-18 PROCEDURE — 96411 CHEMO IV PUSH ADDL DRUG: CPT

## 2019-12-18 PROCEDURE — 1125F AMNT PAIN NOTED PAIN PRSNT: CPT | Mod: S$GLB,,, | Performed by: NURSE PRACTITIONER

## 2019-12-18 PROCEDURE — 96361 HYDRATE IV INFUSION ADD-ON: CPT

## 2019-12-18 PROCEDURE — 3080F DIAST BP >= 90 MM HG: CPT | Mod: CPTII,S$GLB,, | Performed by: NURSE PRACTITIONER

## 2019-12-18 PROCEDURE — 99499 RISK ADDL DX/OHS AUDIT: ICD-10-PCS | Mod: S$GLB,,, | Performed by: NURSE PRACTITIONER

## 2019-12-18 PROCEDURE — 96375 TX/PRO/DX INJ NEW DRUG ADDON: CPT

## 2019-12-18 PROCEDURE — 96372 THER/PROPH/DIAG INJ SC/IM: CPT | Mod: 59

## 2019-12-18 PROCEDURE — 3080F PR MOST RECENT DIASTOLIC BLOOD PRESSURE >= 90 MM HG: ICD-10-PCS | Mod: CPTII,S$GLB,, | Performed by: NURSE PRACTITIONER

## 2019-12-18 PROCEDURE — 1159F MED LIST DOCD IN RCRD: CPT | Mod: S$GLB,,, | Performed by: NURSE PRACTITIONER

## 2019-12-18 PROCEDURE — 99214 OFFICE O/P EST MOD 30 MIN: CPT | Mod: S$GLB,,, | Performed by: NURSE PRACTITIONER

## 2019-12-18 PROCEDURE — 3077F SYST BP >= 140 MM HG: CPT | Mod: CPTII,S$GLB,, | Performed by: NURSE PRACTITIONER

## 2019-12-18 PROCEDURE — 1101F PR PT FALLS ASSESS DOC 0-1 FALLS W/OUT INJ PAST YR: ICD-10-PCS | Mod: CPTII,S$GLB,, | Performed by: NURSE PRACTITIONER

## 2019-12-18 PROCEDURE — 1101F PT FALLS ASSESS-DOCD LE1/YR: CPT | Mod: CPTII,S$GLB,, | Performed by: NURSE PRACTITIONER

## 2019-12-18 PROCEDURE — 63600175 PHARM REV CODE 636 W HCPCS: Performed by: NURSE PRACTITIONER

## 2019-12-18 PROCEDURE — 3077F PR MOST RECENT SYSTOLIC BLOOD PRESSURE >= 140 MM HG: ICD-10-PCS | Mod: CPTII,S$GLB,, | Performed by: NURSE PRACTITIONER

## 2019-12-18 PROCEDURE — 99999 PR PBB SHADOW E&M-EST. PATIENT-LVL V: CPT | Mod: PBBFAC,,, | Performed by: NURSE PRACTITIONER

## 2019-12-18 PROCEDURE — 1159F PR MEDICATION LIST DOCUMENTED IN MEDICAL RECORD: ICD-10-PCS | Mod: S$GLB,,, | Performed by: NURSE PRACTITIONER

## 2019-12-18 PROCEDURE — 1125F PR PAIN SEVERITY QUANTIFIED, PAIN PRESENT: ICD-10-PCS | Mod: S$GLB,,, | Performed by: NURSE PRACTITIONER

## 2019-12-18 PROCEDURE — 96367 TX/PROPH/DG ADDL SEQ IV INF: CPT

## 2019-12-18 RX ORDER — SODIUM CHLORIDE 0.9 % (FLUSH) 0.9 %
10 SYRINGE (ML) INJECTION
Status: DISCONTINUED | OUTPATIENT
Start: 2019-12-18 | End: 2019-12-18 | Stop reason: HOSPADM

## 2019-12-18 RX ORDER — SODIUM CHLORIDE 0.9 % (FLUSH) 0.9 %
10 SYRINGE (ML) INJECTION
Status: CANCELLED | OUTPATIENT
Start: 2019-12-18

## 2019-12-18 RX ORDER — SODIUM CHLORIDE 9 MG/ML
INJECTION, SOLUTION INTRAVENOUS CONTINUOUS
Status: CANCELLED
Start: 2019-12-18

## 2019-12-18 RX ORDER — HEPARIN 100 UNIT/ML
500 SYRINGE INTRAVENOUS
Status: CANCELLED | OUTPATIENT
Start: 2019-12-18

## 2019-12-18 RX ORDER — CYANOCOBALAMIN 1000 UG/ML
1000 INJECTION, SOLUTION INTRAMUSCULAR; SUBCUTANEOUS
Status: CANCELLED | OUTPATIENT
Start: 2019-12-18

## 2019-12-18 RX ORDER — CYANOCOBALAMIN 1000 UG/ML
1000 INJECTION, SOLUTION INTRAMUSCULAR; SUBCUTANEOUS
Status: COMPLETED | OUTPATIENT
Start: 2019-12-18 | End: 2019-12-18

## 2019-12-18 RX ORDER — HEPARIN 100 UNIT/ML
500 SYRINGE INTRAVENOUS
Status: DISCONTINUED | OUTPATIENT
Start: 2019-12-18 | End: 2019-12-18 | Stop reason: HOSPADM

## 2019-12-18 RX ORDER — CLONIDINE HYDROCHLORIDE 0.1 MG/1
0.1 TABLET ORAL
Status: COMPLETED | OUTPATIENT
Start: 2019-12-18 | End: 2019-12-18

## 2019-12-18 RX ORDER — SODIUM CHLORIDE 9 MG/ML
INJECTION, SOLUTION INTRAVENOUS CONTINUOUS
Status: DISCONTINUED | OUTPATIENT
Start: 2019-12-18 | End: 2019-12-18 | Stop reason: HOSPADM

## 2019-12-18 RX ADMIN — CLONIDINE HYDROCHLORIDE 0.1 MG: 0.1 TABLET ORAL at 09:12

## 2019-12-18 RX ADMIN — APREPITANT 130 MG: 130 INJECTION, EMULSION INTRAVENOUS at 09:12

## 2019-12-18 RX ADMIN — DEXAMETHASONE SODIUM PHOSPHATE: 4 INJECTION, SOLUTION INTRA-ARTICULAR; INTRALESIONAL; INTRAMUSCULAR; INTRAVENOUS; SOFT TISSUE at 09:12

## 2019-12-18 RX ADMIN — SODIUM CHLORIDE: 0.9 INJECTION, SOLUTION INTRAVENOUS at 08:12

## 2019-12-18 RX ADMIN — HEPARIN SODIUM (PORCINE) LOCK FLUSH IV SOLN 100 UNIT/ML 500 UNITS: 100 SOLUTION at 12:12

## 2019-12-18 RX ADMIN — CARBOPLATIN 395 MG: 10 INJECTION, SOLUTION INTRAVENOUS at 11:12

## 2019-12-18 RX ADMIN — CYANOCOBALAMIN 1000 MCG: 1000 INJECTION, SOLUTION INTRAMUSCULAR at 09:12

## 2019-12-18 RX ADMIN — SODIUM CHLORIDE: 0.9 INJECTION, SOLUTION INTRAVENOUS at 10:12

## 2019-12-18 RX ADMIN — SODIUM CHLORIDE 675 MG: 9 INJECTION, SOLUTION INTRAVENOUS at 10:12

## 2019-12-18 NOTE — PLAN OF CARE
"Pt ambulatory to clinic with daughter. Pt has no c/o other than feeling "a little tired" Pleasant and talkative. Port accessed to L chest wall without difficulty. Good blood return. NS X 1 liter given and B-12 injection. Pt tolerated alimta and Carboplatin infusion well. No adverse reaction noted. Port flushed with NS and Heparin. Ambulatory from clinic in NAD.   "

## 2019-12-26 ENCOUNTER — PATIENT MESSAGE (OUTPATIENT)
Dept: HEMATOLOGY/ONCOLOGY | Facility: CLINIC | Age: 74
End: 2019-12-26

## 2019-12-30 ENCOUNTER — PATIENT MESSAGE (OUTPATIENT)
Dept: HEMATOLOGY/ONCOLOGY | Facility: CLINIC | Age: 74
End: 2019-12-30

## 2020-01-01 ENCOUNTER — PATIENT MESSAGE (OUTPATIENT)
Dept: HEMATOLOGY/ONCOLOGY | Facility: CLINIC | Age: 75
End: 2020-01-01

## 2020-01-01 ENCOUNTER — TELEPHONE (OUTPATIENT)
Dept: SURGERY | Facility: CLINIC | Age: 75
End: 2020-01-01

## 2020-01-01 ENCOUNTER — HOSPITAL ENCOUNTER (OUTPATIENT)
Dept: RADIOLOGY | Facility: HOSPITAL | Age: 75
Discharge: HOME | DRG: 392 | End: 2020-08-18
Attending: NURSE PRACTITIONER
Payer: MEDICARE

## 2020-01-01 ENCOUNTER — OFFICE VISIT (OUTPATIENT)
Dept: HEMATOLOGY/ONCOLOGY | Facility: CLINIC | Age: 75
End: 2020-01-01
Payer: MEDICARE

## 2020-01-01 ENCOUNTER — HOSPITAL ENCOUNTER (INPATIENT)
Facility: HOSPITAL | Age: 75
LOS: 5 days | Discharge: HOME | DRG: 392 | End: 2020-08-24
Attending: EMERGENCY MEDICINE | Admitting: EMERGENCY MEDICINE
Payer: MEDICARE

## 2020-01-01 ENCOUNTER — EXTERNAL HOME HEALTH (OUTPATIENT)
Dept: HOME HEALTH SERVICES | Facility: HOSPITAL | Age: 75
End: 2020-01-01
Payer: MEDICARE

## 2020-01-01 ENCOUNTER — OFFICE VISIT (OUTPATIENT)
Dept: FAMILY MEDICINE | Facility: CLINIC | Age: 75
End: 2020-01-01
Payer: MEDICARE

## 2020-01-01 ENCOUNTER — HOSPITAL ENCOUNTER (OUTPATIENT)
Dept: RADIOLOGY | Facility: HOSPITAL | Age: 75
Discharge: HOME | End: 2020-12-29
Attending: PHYSICIAN ASSISTANT
Payer: MEDICARE

## 2020-01-01 ENCOUNTER — HOSPITAL ENCOUNTER (OUTPATIENT)
Dept: RADIOLOGY | Facility: HOSPITAL | Age: 75
Discharge: HOME | End: 2020-08-26
Attending: INTERNAL MEDICINE
Payer: MEDICARE

## 2020-01-01 ENCOUNTER — TELEPHONE (OUTPATIENT)
Dept: FAMILY MEDICINE | Facility: CLINIC | Age: 75
End: 2020-01-01

## 2020-01-01 ENCOUNTER — PATIENT MESSAGE (OUTPATIENT)
Dept: ADMINISTRATIVE | Facility: HOSPITAL | Age: 75
End: 2020-01-01

## 2020-01-01 ENCOUNTER — PES CALL (OUTPATIENT)
Dept: ADMINISTRATIVE | Facility: CLINIC | Age: 75
End: 2020-01-01

## 2020-01-01 ENCOUNTER — TELEPHONE (OUTPATIENT)
Dept: PSYCHIATRY | Facility: CLINIC | Age: 75
End: 2020-01-01

## 2020-01-01 ENCOUNTER — CLINICAL SUPPORT (OUTPATIENT)
Dept: REHABILITATION | Facility: HOSPITAL | Age: 75
End: 2020-01-01
Attending: PSYCHIATRY & NEUROLOGY
Payer: MEDICARE

## 2020-01-01 ENCOUNTER — OFFICE VISIT (OUTPATIENT)
Dept: SURGERY | Facility: CLINIC | Age: 75
End: 2020-01-01
Payer: MEDICARE

## 2020-01-01 ENCOUNTER — DOCUMENT SCAN (OUTPATIENT)
Dept: HOME HEALTH SERVICES | Facility: HOSPITAL | Age: 75
End: 2020-01-01
Payer: MEDICARE

## 2020-01-01 ENCOUNTER — ANESTHESIA (OUTPATIENT)
Dept: ENDOSCOPY | Facility: HOSPITAL | Age: 75
End: 2020-01-01
Payer: MEDICARE

## 2020-01-01 ENCOUNTER — TELEPHONE (OUTPATIENT)
Dept: HEMATOLOGY/ONCOLOGY | Facility: CLINIC | Age: 75
End: 2020-01-01

## 2020-01-01 ENCOUNTER — HOSPITAL ENCOUNTER (OUTPATIENT)
Dept: RADIOLOGY | Facility: HOSPITAL | Age: 75
Discharge: HOME | End: 2020-10-29
Attending: NURSE PRACTITIONER
Payer: MEDICARE

## 2020-01-01 ENCOUNTER — TELEPHONE (OUTPATIENT)
Dept: ENDOSCOPY | Facility: HOSPITAL | Age: 75
End: 2020-01-01

## 2020-01-01 ENCOUNTER — PATIENT OUTREACH (OUTPATIENT)
Dept: ADMINISTRATIVE | Facility: HOSPITAL | Age: 75
End: 2020-01-01

## 2020-01-01 ENCOUNTER — TELEPHONE (OUTPATIENT)
Dept: NEUROLOGY | Facility: CLINIC | Age: 75
End: 2020-01-01

## 2020-01-01 ENCOUNTER — ANESTHESIA EVENT (OUTPATIENT)
Dept: ENDOSCOPY | Facility: HOSPITAL | Age: 75
End: 2020-01-01
Payer: MEDICARE

## 2020-01-01 ENCOUNTER — LAB VISIT (OUTPATIENT)
Dept: LAB | Facility: HOSPITAL | Age: 75
End: 2020-01-01
Attending: PHYSICIAN ASSISTANT
Payer: MEDICARE

## 2020-01-01 ENCOUNTER — HOSPITAL ENCOUNTER (OUTPATIENT)
Facility: HOSPITAL | Age: 75
Discharge: HOME | End: 2020-12-04
Attending: COLON & RECTAL SURGERY | Admitting: COLON & RECTAL SURGERY
Payer: MEDICARE

## 2020-01-01 ENCOUNTER — LAB VISIT (OUTPATIENT)
Dept: LAB | Facility: HOSPITAL | Age: 75
End: 2020-01-01
Attending: NURSE PRACTITIONER
Payer: MEDICARE

## 2020-01-01 ENCOUNTER — PATIENT MESSAGE (OUTPATIENT)
Dept: PSYCHIATRY | Facility: CLINIC | Age: 75
End: 2020-01-01

## 2020-01-01 ENCOUNTER — PATIENT OUTREACH (OUTPATIENT)
Dept: ADMINISTRATIVE | Facility: CLINIC | Age: 75
End: 2020-01-01

## 2020-01-01 ENCOUNTER — PATIENT MESSAGE (OUTPATIENT)
Dept: ENDOSCOPY | Facility: HOSPITAL | Age: 75
End: 2020-01-01

## 2020-01-01 ENCOUNTER — OFFICE VISIT (OUTPATIENT)
Dept: NEUROLOGY | Facility: CLINIC | Age: 75
End: 2020-01-01
Payer: MEDICARE

## 2020-01-01 ENCOUNTER — OFFICE VISIT (OUTPATIENT)
Dept: CARDIOLOGY | Facility: CLINIC | Age: 75
End: 2020-01-01
Payer: MEDICARE

## 2020-01-01 ENCOUNTER — HOSPITAL ENCOUNTER (EMERGENCY)
Facility: HOSPITAL | Age: 75
Discharge: HOME | End: 2020-11-05
Attending: EMERGENCY MEDICINE
Payer: MEDICARE

## 2020-01-01 ENCOUNTER — TELEPHONE (OUTPATIENT)
Dept: OPHTHALMOLOGY | Facility: CLINIC | Age: 75
End: 2020-01-01

## 2020-01-01 ENCOUNTER — INFUSION (OUTPATIENT)
Dept: INFUSION THERAPY | Facility: HOSPITAL | Age: 75
End: 2020-01-01
Payer: MEDICARE

## 2020-01-01 ENCOUNTER — HOSPITAL ENCOUNTER (INPATIENT)
Facility: HOSPITAL | Age: 75
LOS: 3 days | Discharge: HOME HEALTH | DRG: 125 | End: 2020-08-04
Attending: EMERGENCY MEDICINE | Admitting: INTERNAL MEDICINE
Payer: MEDICARE

## 2020-01-01 ENCOUNTER — INITIAL CONSULT (OUTPATIENT)
Dept: NEUROLOGY | Facility: CLINIC | Age: 75
End: 2020-01-01
Payer: MEDICARE

## 2020-01-01 ENCOUNTER — PATIENT OUTREACH (OUTPATIENT)
Dept: ADMINISTRATIVE | Facility: OTHER | Age: 75
End: 2020-01-01

## 2020-01-01 ENCOUNTER — OFFICE VISIT (OUTPATIENT)
Dept: GASTROENTEROLOGY | Facility: CLINIC | Age: 75
End: 2020-01-01
Payer: MEDICARE

## 2020-01-01 ENCOUNTER — LAB VISIT (OUTPATIENT)
Dept: FAMILY MEDICINE | Facility: CLINIC | Age: 75
End: 2020-01-01
Payer: MEDICARE

## 2020-01-01 ENCOUNTER — CLINICAL SUPPORT (OUTPATIENT)
Dept: HEMATOLOGY/ONCOLOGY | Facility: CLINIC | Age: 75
End: 2020-01-01
Payer: MEDICARE

## 2020-01-01 ENCOUNTER — LAB VISIT (OUTPATIENT)
Dept: LAB | Facility: HOSPITAL | Age: 75
End: 2020-01-01
Payer: MEDICARE

## 2020-01-01 ENCOUNTER — OFFICE VISIT (OUTPATIENT)
Dept: NEPHROLOGY | Facility: CLINIC | Age: 75
End: 2020-01-01
Payer: MEDICARE

## 2020-01-01 ENCOUNTER — DOCUMENTATION ONLY (OUTPATIENT)
Dept: NEUROLOGY | Facility: CLINIC | Age: 75
End: 2020-01-01

## 2020-01-01 ENCOUNTER — TELEPHONE (OUTPATIENT)
Dept: GASTROENTEROLOGY | Facility: CLINIC | Age: 75
End: 2020-01-01

## 2020-01-01 ENCOUNTER — OFFICE VISIT (OUTPATIENT)
Dept: PSYCHIATRY | Facility: CLINIC | Age: 75
End: 2020-01-01
Payer: COMMERCIAL

## 2020-01-01 VITALS
SYSTOLIC BLOOD PRESSURE: 137 MMHG | WEIGHT: 158.94 LBS | RESPIRATION RATE: 18 BRPM | BODY MASS INDEX: 26.14 KG/M2 | TEMPERATURE: 98 F | WEIGHT: 162.69 LBS | TEMPERATURE: 99 F | HEART RATE: 86 BPM | HEIGHT: 66 IN | HEIGHT: 66 IN | SYSTOLIC BLOOD PRESSURE: 140 MMHG | RESPIRATION RATE: 18 BRPM | OXYGEN SATURATION: 95 % | BODY MASS INDEX: 25.54 KG/M2 | DIASTOLIC BLOOD PRESSURE: 67 MMHG | OXYGEN SATURATION: 99 % | DIASTOLIC BLOOD PRESSURE: 68 MMHG | HEART RATE: 79 BPM

## 2020-01-01 VITALS
BODY MASS INDEX: 25.79 KG/M2 | TEMPERATURE: 97 F | SYSTOLIC BLOOD PRESSURE: 122 MMHG | RESPIRATION RATE: 13 BRPM | WEIGHT: 160.5 LBS | OXYGEN SATURATION: 93 % | HEART RATE: 76 BPM | HEIGHT: 66 IN | DIASTOLIC BLOOD PRESSURE: 59 MMHG

## 2020-01-01 VITALS
HEIGHT: 66 IN | TEMPERATURE: 98 F | SYSTOLIC BLOOD PRESSURE: 132 MMHG | RESPIRATION RATE: 17 BRPM | BODY MASS INDEX: 24.94 KG/M2 | OXYGEN SATURATION: 96 % | WEIGHT: 155.19 LBS | HEART RATE: 85 BPM | DIASTOLIC BLOOD PRESSURE: 72 MMHG

## 2020-01-01 VITALS
TEMPERATURE: 98 F | HEART RATE: 77 BPM | OXYGEN SATURATION: 99 % | WEIGHT: 156.31 LBS | RESPIRATION RATE: 16 BRPM | HEIGHT: 66 IN | HEART RATE: 87 BPM | DIASTOLIC BLOOD PRESSURE: 68 MMHG | OXYGEN SATURATION: 99 % | TEMPERATURE: 97 F | BODY MASS INDEX: 24.43 KG/M2 | HEIGHT: 66 IN | BODY MASS INDEX: 25.12 KG/M2 | WEIGHT: 152 LBS | SYSTOLIC BLOOD PRESSURE: 141 MMHG | SYSTOLIC BLOOD PRESSURE: 136 MMHG | DIASTOLIC BLOOD PRESSURE: 68 MMHG | RESPIRATION RATE: 14 BRPM

## 2020-01-01 VITALS
HEART RATE: 75 BPM | BODY MASS INDEX: 25.33 KG/M2 | HEIGHT: 66 IN | WEIGHT: 158.06 LBS | OXYGEN SATURATION: 96 % | WEIGHT: 157.63 LBS | DIASTOLIC BLOOD PRESSURE: 70 MMHG | HEIGHT: 66 IN | SYSTOLIC BLOOD PRESSURE: 102 MMHG | SYSTOLIC BLOOD PRESSURE: 105 MMHG | WEIGHT: 150.13 LBS | OXYGEN SATURATION: 98 % | RESPIRATION RATE: 18 BRPM | SYSTOLIC BLOOD PRESSURE: 120 MMHG | BODY MASS INDEX: 24.13 KG/M2 | TEMPERATURE: 99 F | HEART RATE: 76 BPM | HEART RATE: 73 BPM | BODY MASS INDEX: 25.4 KG/M2 | HEIGHT: 66 IN | DIASTOLIC BLOOD PRESSURE: 61 MMHG | DIASTOLIC BLOOD PRESSURE: 57 MMHG

## 2020-01-01 VITALS
BODY MASS INDEX: 26.03 KG/M2 | SYSTOLIC BLOOD PRESSURE: 172 MMHG | HEIGHT: 66 IN | DIASTOLIC BLOOD PRESSURE: 79 MMHG | HEART RATE: 84 BPM | WEIGHT: 162 LBS | TEMPERATURE: 99 F | RESPIRATION RATE: 15 BRPM | OXYGEN SATURATION: 96 %

## 2020-01-01 VITALS
HEART RATE: 74 BPM | WEIGHT: 158.5 LBS | SYSTOLIC BLOOD PRESSURE: 128 MMHG | HEIGHT: 66 IN | BODY MASS INDEX: 25.47 KG/M2 | OXYGEN SATURATION: 98 % | DIASTOLIC BLOOD PRESSURE: 74 MMHG

## 2020-01-01 VITALS
DIASTOLIC BLOOD PRESSURE: 62 MMHG | BODY MASS INDEX: 25.15 KG/M2 | OXYGEN SATURATION: 97 % | SYSTOLIC BLOOD PRESSURE: 119 MMHG | TEMPERATURE: 99 F | HEIGHT: 66 IN | WEIGHT: 156.5 LBS | HEART RATE: 84 BPM

## 2020-01-01 VITALS
HEART RATE: 71 BPM | HEIGHT: 66 IN | BODY MASS INDEX: 27.25 KG/M2 | TEMPERATURE: 98 F | HEART RATE: 96 BPM | SYSTOLIC BLOOD PRESSURE: 126 MMHG | OXYGEN SATURATION: 97 % | BODY MASS INDEX: 25.75 KG/M2 | WEIGHT: 160.25 LBS | DIASTOLIC BLOOD PRESSURE: 74 MMHG | TEMPERATURE: 99 F | OXYGEN SATURATION: 98 % | WEIGHT: 169.56 LBS | RESPIRATION RATE: 18 BRPM | RESPIRATION RATE: 18 BRPM | HEIGHT: 66 IN | DIASTOLIC BLOOD PRESSURE: 68 MMHG | SYSTOLIC BLOOD PRESSURE: 138 MMHG

## 2020-01-01 VITALS — WEIGHT: 169.56 LBS | HEIGHT: 66 IN | BODY MASS INDEX: 27.25 KG/M2

## 2020-01-01 VITALS
HEIGHT: 66 IN | RESPIRATION RATE: 18 BRPM | BODY MASS INDEX: 25.12 KG/M2 | DIASTOLIC BLOOD PRESSURE: 68 MMHG | SYSTOLIC BLOOD PRESSURE: 141 MMHG | HEART RATE: 77 BPM | OXYGEN SATURATION: 99 % | TEMPERATURE: 98 F | WEIGHT: 156.31 LBS

## 2020-01-01 VITALS
WEIGHT: 156 LBS | SYSTOLIC BLOOD PRESSURE: 110 MMHG | DIASTOLIC BLOOD PRESSURE: 60 MMHG | HEART RATE: 90 BPM | HEIGHT: 66 IN | BODY MASS INDEX: 25.07 KG/M2

## 2020-01-01 VITALS
SYSTOLIC BLOOD PRESSURE: 146 MMHG | HEIGHT: 66 IN | BODY MASS INDEX: 25.28 KG/M2 | WEIGHT: 157.31 LBS | DIASTOLIC BLOOD PRESSURE: 80 MMHG

## 2020-01-01 VITALS
DIASTOLIC BLOOD PRESSURE: 68 MMHG | SYSTOLIC BLOOD PRESSURE: 133 MMHG | TEMPERATURE: 99 F | RESPIRATION RATE: 18 BRPM | HEART RATE: 75 BPM

## 2020-01-01 VITALS
HEART RATE: 80 BPM | DIASTOLIC BLOOD PRESSURE: 82 MMHG | HEIGHT: 66 IN | BODY MASS INDEX: 24.45 KG/M2 | TEMPERATURE: 98 F | RESPIRATION RATE: 18 BRPM | WEIGHT: 152.13 LBS | OXYGEN SATURATION: 98 % | SYSTOLIC BLOOD PRESSURE: 123 MMHG

## 2020-01-01 DIAGNOSIS — C45.9 MESOTHELIOMA, BIPHASIC, MALIGNANT: Primary | ICD-10-CM

## 2020-01-01 DIAGNOSIS — C45.0 MALIGNANT PLEURAL MESOTHELIOMA: Primary | ICD-10-CM

## 2020-01-01 DIAGNOSIS — R25.1 TREMORS OF NERVOUS SYSTEM: ICD-10-CM

## 2020-01-01 DIAGNOSIS — F33.41 RECURRENT MAJOR DEPRESSIVE DISORDER, IN PARTIAL REMISSION: ICD-10-CM

## 2020-01-01 DIAGNOSIS — R10.84 GENERALIZED ABDOMINAL PAIN: ICD-10-CM

## 2020-01-01 DIAGNOSIS — C45.9 MESOTHELIOMA, BIPHASIC, MALIGNANT: ICD-10-CM

## 2020-01-01 DIAGNOSIS — E11.9 TYPE 2 DIABETES MELLITUS WITHOUT COMPLICATION: ICD-10-CM

## 2020-01-01 DIAGNOSIS — K59.00 CONSTIPATION, UNSPECIFIED CONSTIPATION TYPE: ICD-10-CM

## 2020-01-01 DIAGNOSIS — Z23 NEED FOR PNEUMOCOCCAL VACCINE: ICD-10-CM

## 2020-01-01 DIAGNOSIS — R25.1 TREMOR: ICD-10-CM

## 2020-01-01 DIAGNOSIS — Z12.31 ENCOUNTER FOR SCREENING MAMMOGRAM FOR MALIGNANT NEOPLASM OF BREAST: ICD-10-CM

## 2020-01-01 DIAGNOSIS — Z12.39 SCREENING FOR BREAST CANCER: Primary | ICD-10-CM

## 2020-01-01 DIAGNOSIS — E11.9 TYPE 2 DIABETES MELLITUS WITHOUT COMPLICATION, WITHOUT LONG-TERM CURRENT USE OF INSULIN: ICD-10-CM

## 2020-01-01 DIAGNOSIS — R11.2 CHEMOTHERAPY INDUCED NAUSEA AND VOMITING: Primary | ICD-10-CM

## 2020-01-01 DIAGNOSIS — I10 HYPERTENSION, ESSENTIAL: Chronic | ICD-10-CM

## 2020-01-01 DIAGNOSIS — C45.0 MALIGNANT PLEURAL MESOTHELIOMA: Chronic | ICD-10-CM

## 2020-01-01 DIAGNOSIS — R00.0 TACHYCARDIA: ICD-10-CM

## 2020-01-01 DIAGNOSIS — T45.1X5A CHEMOTHERAPY INDUCED NAUSEA AND VOMITING: Primary | ICD-10-CM

## 2020-01-01 DIAGNOSIS — D63.0 ANEMIA IN NEOPLASTIC DISEASE: ICD-10-CM

## 2020-01-01 DIAGNOSIS — Z13.9 ENCOUNTER FOR SCREENING: ICD-10-CM

## 2020-01-01 DIAGNOSIS — R41.3 MEMORY LOSS: ICD-10-CM

## 2020-01-01 DIAGNOSIS — N18.9 CHRONIC KIDNEY DISEASE, UNSPECIFIED CKD STAGE: ICD-10-CM

## 2020-01-01 DIAGNOSIS — Z91.81 RISK FOR FALLS: ICD-10-CM

## 2020-01-01 DIAGNOSIS — K92.2 GASTROINTESTINAL HEMORRHAGE, UNSPECIFIED GASTROINTESTINAL HEMORRHAGE TYPE: Primary | ICD-10-CM

## 2020-01-01 DIAGNOSIS — F41.9 ANXIETY: ICD-10-CM

## 2020-01-01 DIAGNOSIS — N18.30 CKD (CHRONIC KIDNEY DISEASE) STAGE 3, GFR 30-59 ML/MIN: Primary | ICD-10-CM

## 2020-01-01 DIAGNOSIS — G89.3 NEOPLASM RELATED PAIN (ACUTE) (CHRONIC): ICD-10-CM

## 2020-01-01 DIAGNOSIS — K57.92 DIVERTICULITIS: ICD-10-CM

## 2020-01-01 DIAGNOSIS — E03.2 HYPOTHYROIDISM DUE TO MEDICATION: ICD-10-CM

## 2020-01-01 DIAGNOSIS — F41.1 GENERALIZED ANXIETY DISORDER: ICD-10-CM

## 2020-01-01 DIAGNOSIS — F33.1 MAJOR DEPRESSIVE DISORDER, RECURRENT, MODERATE: Primary | ICD-10-CM

## 2020-01-01 DIAGNOSIS — Z12.11 SPECIAL SCREENING FOR MALIGNANT NEOPLASMS, COLON: Primary | ICD-10-CM

## 2020-01-01 DIAGNOSIS — B02.30 HERPES ZOSTER WITH OPHTHALMIC COMPLICATION, UNSPECIFIED HERPES ZOSTER EYE DISEASE: ICD-10-CM

## 2020-01-01 DIAGNOSIS — R11.2 CHEMOTHERAPY INDUCED NAUSEA AND VOMITING: ICD-10-CM

## 2020-01-01 DIAGNOSIS — Z09 HOSPITAL DISCHARGE FOLLOW-UP: Primary | ICD-10-CM

## 2020-01-01 DIAGNOSIS — E78.2 MIXED HYPERLIPIDEMIA: Primary | Chronic | ICD-10-CM

## 2020-01-01 DIAGNOSIS — C45.0 MALIGNANT PLEURAL MESOTHELIOMA: ICD-10-CM

## 2020-01-01 DIAGNOSIS — Z74.09 IMPAIRED FUNCTIONAL MOBILITY, BALANCE, GAIT, AND ENDURANCE: ICD-10-CM

## 2020-01-01 DIAGNOSIS — N63.11 UNSPECIFIED LUMP IN THE RIGHT BREAST, UPPER OUTER QUADRANT: ICD-10-CM

## 2020-01-01 DIAGNOSIS — E78.2 MIXED HYPERLIPIDEMIA: Chronic | ICD-10-CM

## 2020-01-01 DIAGNOSIS — R25.1 TREMOR: Primary | ICD-10-CM

## 2020-01-01 DIAGNOSIS — R00.2 PALPITATIONS: ICD-10-CM

## 2020-01-01 DIAGNOSIS — R00.2 PALPITATIONS: Primary | ICD-10-CM

## 2020-01-01 DIAGNOSIS — G20.A1 PARKINSONS: ICD-10-CM

## 2020-01-01 DIAGNOSIS — K59.00 CONSTIPATION, UNSPECIFIED CONSTIPATION TYPE: Primary | ICD-10-CM

## 2020-01-01 DIAGNOSIS — N18.4 CKD (CHRONIC KIDNEY DISEASE) STAGE 4, GFR 15-29 ML/MIN: ICD-10-CM

## 2020-01-01 DIAGNOSIS — F33.9 RECURRENT MAJOR DEPRESSIVE DISORDER, REMISSION STATUS UNSPECIFIED: ICD-10-CM

## 2020-01-01 DIAGNOSIS — R07.9 CHEST PAIN, UNSPECIFIED TYPE: ICD-10-CM

## 2020-01-01 DIAGNOSIS — T42.8X5D: ICD-10-CM

## 2020-01-01 DIAGNOSIS — K56.41 FECAL IMPACTION: ICD-10-CM

## 2020-01-01 DIAGNOSIS — T88.7XXA MEDICATION SIDE EFFECT: ICD-10-CM

## 2020-01-01 DIAGNOSIS — E11.9 CONTROLLED TYPE 2 DIABETES MELLITUS WITHOUT COMPLICATION, WITHOUT LONG-TERM CURRENT USE OF INSULIN: Chronic | ICD-10-CM

## 2020-01-01 DIAGNOSIS — R10.9 ABDOMINAL PAIN, UNSPECIFIED ABDOMINAL LOCATION: ICD-10-CM

## 2020-01-01 DIAGNOSIS — B02.30 HERPES ZOSTER WITH OPHTHALMIC COMPLICATION, UNSPECIFIED HERPES ZOSTER EYE DISEASE: Primary | ICD-10-CM

## 2020-01-01 DIAGNOSIS — N18.5 CKD (CHRONIC KIDNEY DISEASE) STAGE 5, GFR LESS THAN 15 ML/MIN: ICD-10-CM

## 2020-01-01 DIAGNOSIS — H53.8 BLURRY VISION, LEFT EYE: ICD-10-CM

## 2020-01-01 DIAGNOSIS — Z23 NEED FOR INFLUENZA VACCINATION: Primary | ICD-10-CM

## 2020-01-01 DIAGNOSIS — R55 SYNCOPE, UNSPECIFIED SYNCOPE TYPE: ICD-10-CM

## 2020-01-01 DIAGNOSIS — Z01.812 PRE-PROCEDURE LAB EXAM: ICD-10-CM

## 2020-01-01 DIAGNOSIS — R53.1 WEAKNESS: ICD-10-CM

## 2020-01-01 DIAGNOSIS — C45.9 MESOTHELIOMA: ICD-10-CM

## 2020-01-01 DIAGNOSIS — F33.1 MODERATE EPISODE OF RECURRENT MAJOR DEPRESSIVE DISORDER: ICD-10-CM

## 2020-01-01 DIAGNOSIS — R41.3 MEMORY IMPAIRMENT: ICD-10-CM

## 2020-01-01 DIAGNOSIS — R41.3 MEMORY LOSS: Primary | ICD-10-CM

## 2020-01-01 DIAGNOSIS — R11.0 NAUSEA: ICD-10-CM

## 2020-01-01 DIAGNOSIS — R19.7 DIARRHEA, UNSPECIFIED TYPE: ICD-10-CM

## 2020-01-01 DIAGNOSIS — I82.5Z9 CHRONIC DEEP VEIN THROMBOSIS (DVT) OF DISTAL VEIN OF LOWER EXTREMITY, UNSPECIFIED LATERALITY: Primary | ICD-10-CM

## 2020-01-01 DIAGNOSIS — K57.92 DIVERTICULITIS: Primary | ICD-10-CM

## 2020-01-01 DIAGNOSIS — Z86.010 PERSONAL HISTORY OF COLONIC POLYPS: Primary | ICD-10-CM

## 2020-01-01 DIAGNOSIS — T45.1X5A CHEMOTHERAPY INDUCED NAUSEA AND VOMITING: ICD-10-CM

## 2020-01-01 DIAGNOSIS — N63.0 BREAST MASS IN FEMALE: ICD-10-CM

## 2020-01-01 DIAGNOSIS — N63.0 BREAST MASS IN FEMALE: Primary | ICD-10-CM

## 2020-01-01 DIAGNOSIS — Z01.812 PRE-PROCEDURE LAB EXAM: Primary | ICD-10-CM

## 2020-01-01 DIAGNOSIS — E83.42 HYPOMAGNESEMIA: ICD-10-CM

## 2020-01-01 DIAGNOSIS — I10 HYPERTENSION, ESSENTIAL: ICD-10-CM

## 2020-01-01 DIAGNOSIS — K92.1 MELENA: Primary | ICD-10-CM

## 2020-01-01 DIAGNOSIS — K92.1 MELENA: ICD-10-CM

## 2020-01-01 DIAGNOSIS — R41.82 ALTERED MENTAL STATUS: ICD-10-CM

## 2020-01-01 LAB
ALBUMIN SERPL BCP-MCNC: 2.7 G/DL (ref 3.5–5.2)
ALBUMIN SERPL BCP-MCNC: 2.8 G/DL (ref 3.5–5.2)
ALBUMIN SERPL BCP-MCNC: 2.9 G/DL (ref 3.5–5.2)
ALBUMIN SERPL BCP-MCNC: 3.1 G/DL (ref 3.5–5.2)
ALBUMIN SERPL BCP-MCNC: 3.4 G/DL (ref 3.5–5.2)
ALBUMIN SERPL BCP-MCNC: 3.5 G/DL (ref 3.5–5.2)
ALBUMIN SERPL BCP-MCNC: 3.5 G/DL (ref 3.5–5.2)
ALBUMIN SERPL BCP-MCNC: 3.6 G/DL (ref 3.5–5.2)
ALBUMIN SERPL BCP-MCNC: 3.8 G/DL (ref 3.5–5.2)
ALP SERPL-CCNC: 100 U/L (ref 55–135)
ALP SERPL-CCNC: 104 U/L (ref 55–135)
ALP SERPL-CCNC: 105 U/L (ref 55–135)
ALP SERPL-CCNC: 121 U/L (ref 55–135)
ALP SERPL-CCNC: 75 U/L (ref 55–135)
ALP SERPL-CCNC: 82 U/L (ref 55–135)
ALP SERPL-CCNC: 83 U/L (ref 55–135)
ALP SERPL-CCNC: 86 U/L (ref 55–135)
ALP SERPL-CCNC: 90 U/L (ref 55–135)
ALT SERPL W/O P-5'-P-CCNC: 115 U/L (ref 10–44)
ALT SERPL W/O P-5'-P-CCNC: 13 U/L (ref 10–44)
ALT SERPL W/O P-5'-P-CCNC: 19 U/L (ref 10–44)
ALT SERPL W/O P-5'-P-CCNC: 22 U/L (ref 10–44)
ALT SERPL W/O P-5'-P-CCNC: 28 U/L (ref 10–44)
ALT SERPL W/O P-5'-P-CCNC: 34 U/L (ref 10–44)
ALT SERPL W/O P-5'-P-CCNC: 46 U/L (ref 10–44)
ALT SERPL W/O P-5'-P-CCNC: 60 U/L (ref 10–44)
ALT SERPL W/O P-5'-P-CCNC: 9 U/L (ref 10–44)
ANION GAP SERPL CALC-SCNC: 10 MMOL/L (ref 8–16)
ANION GAP SERPL CALC-SCNC: 11 MMOL/L (ref 8–16)
ANION GAP SERPL CALC-SCNC: 12 MMOL/L (ref 8–16)
ANION GAP SERPL CALC-SCNC: 12 MMOL/L (ref 8–16)
ANION GAP SERPL CALC-SCNC: 13 MMOL/L (ref 8–16)
ANION GAP SERPL CALC-SCNC: 7 MMOL/L (ref 8–16)
ANION GAP SERPL CALC-SCNC: 8 MMOL/L (ref 8–16)
ANION GAP SERPL CALC-SCNC: 8 MMOL/L (ref 8–16)
ANION GAP SERPL CALC-SCNC: 9 MMOL/L (ref 8–16)
AST SERPL-CCNC: 14 U/L (ref 10–40)
AST SERPL-CCNC: 15 U/L (ref 10–40)
AST SERPL-CCNC: 16 U/L (ref 10–40)
AST SERPL-CCNC: 16 U/L (ref 10–40)
AST SERPL-CCNC: 19 U/L (ref 10–40)
AST SERPL-CCNC: 20 U/L (ref 10–40)
AST SERPL-CCNC: 22 U/L (ref 10–40)
AST SERPL-CCNC: 24 U/L (ref 10–40)
AST SERPL-CCNC: 34 U/L (ref 10–40)
BACTERIA STL CULT: NORMAL
BASOPHILS # BLD AUTO: 0.01 K/UL (ref 0–0.2)
BASOPHILS # BLD AUTO: 0.01 K/UL (ref 0–0.2)
BASOPHILS # BLD AUTO: 0.02 K/UL (ref 0–0.2)
BASOPHILS # BLD AUTO: 0.03 K/UL (ref 0–0.2)
BASOPHILS # BLD AUTO: 0.04 K/UL (ref 0–0.2)
BASOPHILS # BLD AUTO: 0.04 K/UL (ref 0–0.2)
BASOPHILS NFR BLD: 0.2 % (ref 0–1.9)
BASOPHILS NFR BLD: 0.3 % (ref 0–1.9)
BASOPHILS NFR BLD: 0.4 % (ref 0–1.9)
BASOPHILS NFR BLD: 0.6 % (ref 0–1.9)
BILIRUB SERPL-MCNC: 0.1 MG/DL (ref 0.1–1)
BILIRUB SERPL-MCNC: 0.2 MG/DL (ref 0.1–1)
BILIRUB SERPL-MCNC: 0.3 MG/DL (ref 0.1–1)
BILIRUB SERPL-MCNC: 0.3 MG/DL (ref 0.1–1)
BILIRUB SERPL-MCNC: 0.4 MG/DL (ref 0.1–1)
BILIRUB SERPL-MCNC: 0.4 MG/DL (ref 0.1–1)
BILIRUB SERPL-MCNC: 0.5 MG/DL (ref 0.1–1)
BILIRUB UR QL STRIP: NEGATIVE
BILIRUB UR QL STRIP: NEGATIVE
BUN SERPL-MCNC: 19 MG/DL (ref 8–23)
BUN SERPL-MCNC: 23 MG/DL (ref 8–23)
BUN SERPL-MCNC: 25 MG/DL (ref 8–23)
BUN SERPL-MCNC: 29 MG/DL (ref 8–23)
BUN SERPL-MCNC: 30 MG/DL (ref 6–30)
BUN SERPL-MCNC: 31 MG/DL (ref 8–23)
BUN SERPL-MCNC: 32 MG/DL (ref 8–23)
BUN SERPL-MCNC: 36 MG/DL (ref 8–23)
BUN SERPL-MCNC: 36 MG/DL (ref 8–23)
BUN SERPL-MCNC: 43 MG/DL (ref 8–23)
BUN SERPL-MCNC: 44 MG/DL (ref 8–23)
BUN SERPL-MCNC: 46 MG/DL (ref 8–23)
BUN SERPL-MCNC: 47 MG/DL (ref 8–23)
BUN SERPL-MCNC: 65 MG/DL (ref 8–23)
C DIFF GDH STL QL: NEGATIVE
C DIFF TOX A+B STL QL IA: NEGATIVE
CALCIUM SERPL-MCNC: 10.1 MG/DL (ref 8.7–10.5)
CALCIUM SERPL-MCNC: 8.2 MG/DL (ref 8.7–10.5)
CALCIUM SERPL-MCNC: 8.5 MG/DL (ref 8.7–10.5)
CALCIUM SERPL-MCNC: 8.7 MG/DL (ref 8.7–10.5)
CALCIUM SERPL-MCNC: 8.9 MG/DL (ref 8.7–10.5)
CALCIUM SERPL-MCNC: 8.9 MG/DL (ref 8.7–10.5)
CALCIUM SERPL-MCNC: 9 MG/DL (ref 8.7–10.5)
CALCIUM SERPL-MCNC: 9.3 MG/DL (ref 8.7–10.5)
CALCIUM SERPL-MCNC: 9.5 MG/DL (ref 8.7–10.5)
CALCIUM SERPL-MCNC: 9.7 MG/DL (ref 8.7–10.5)
CALCIUM SERPL-MCNC: 9.7 MG/DL (ref 8.7–10.5)
CHLORIDE SERPL-SCNC: 100 MMOL/L (ref 95–110)
CHLORIDE SERPL-SCNC: 102 MMOL/L (ref 95–110)
CHLORIDE SERPL-SCNC: 102 MMOL/L (ref 95–110)
CHLORIDE SERPL-SCNC: 103 MMOL/L (ref 95–110)
CHLORIDE SERPL-SCNC: 104 MMOL/L (ref 95–110)
CHLORIDE SERPL-SCNC: 104 MMOL/L (ref 95–110)
CHLORIDE SERPL-SCNC: 105 MMOL/L (ref 95–110)
CHLORIDE SERPL-SCNC: 105 MMOL/L (ref 95–110)
CHLORIDE SERPL-SCNC: 107 MMOL/L (ref 95–110)
CHLORIDE SERPL-SCNC: 108 MMOL/L (ref 95–110)
CHLORIDE SERPL-SCNC: 109 MMOL/L (ref 95–110)
CHLORIDE SERPL-SCNC: 109 MMOL/L (ref 95–110)
CHLORIDE SERPL-SCNC: 99 MMOL/L (ref 95–110)
CHLORIDE SERPL-SCNC: 99 MMOL/L (ref 95–110)
CHOLEST SERPL-MCNC: 218 MG/DL (ref 120–199)
CHOLEST/HDLC SERPL: 4.1 {RATIO} (ref 2–5)
CLARITY UR REFRACT.AUTO: ABNORMAL
CLARITY UR REFRACT.AUTO: CLEAR
CO2 SERPL-SCNC: 22 MMOL/L (ref 23–29)
CO2 SERPL-SCNC: 23 MMOL/L (ref 23–29)
CO2 SERPL-SCNC: 24 MMOL/L (ref 23–29)
CO2 SERPL-SCNC: 25 MMOL/L (ref 23–29)
CO2 SERPL-SCNC: 26 MMOL/L (ref 23–29)
COLOR UR AUTO: NORMAL
COLOR UR AUTO: YELLOW
CREAT SERPL-MCNC: 2.6 MG/DL (ref 0.5–1.4)
CREAT SERPL-MCNC: 2.7 MG/DL (ref 0.5–1.4)
CREAT SERPL-MCNC: 2.7 MG/DL (ref 0.5–1.4)
CREAT SERPL-MCNC: 2.8 MG/DL (ref 0.5–1.4)
CREAT SERPL-MCNC: 3 MG/DL (ref 0.5–1.4)
CREAT SERPL-MCNC: 3 MG/DL (ref 0.5–1.4)
CREAT SERPL-MCNC: 3.2 MG/DL (ref 0.5–1.4)
CREAT SERPL-MCNC: 3.3 MG/DL (ref 0.5–1.4)
CREAT SERPL-MCNC: 3.3 MG/DL (ref 0.5–1.4)
CREAT SERPL-MCNC: 3.5 MG/DL (ref 0.5–1.4)
CREAT SERPL-MCNC: 3.5 MG/DL (ref 0.5–1.4)
CREAT SERPL-MCNC: 3.7 MG/DL (ref 0.5–1.4)
DIFFERENTIAL METHOD: ABNORMAL
EOSINOPHIL # BLD AUTO: 0 K/UL (ref 0–0.5)
EOSINOPHIL # BLD AUTO: 0.1 K/UL (ref 0–0.5)
EOSINOPHIL NFR BLD: 0 % (ref 0–8)
EOSINOPHIL NFR BLD: 0 % (ref 0–8)
EOSINOPHIL NFR BLD: 0.6 % (ref 0–8)
EOSINOPHIL NFR BLD: 0.6 % (ref 0–8)
EOSINOPHIL NFR BLD: 0.7 % (ref 0–8)
EOSINOPHIL NFR BLD: 0.8 % (ref 0–8)
EOSINOPHIL NFR BLD: 1.1 % (ref 0–8)
EOSINOPHIL NFR BLD: 1.3 % (ref 0–8)
EOSINOPHIL NFR BLD: 1.3 % (ref 0–8)
EOSINOPHIL NFR BLD: 2 % (ref 0–8)
EOSINOPHIL NFR BLD: 2.5 % (ref 0–8)
ERYTHROCYTE [DISTWIDTH] IN BLOOD BY AUTOMATED COUNT: 12.2 % (ref 11.5–14.5)
ERYTHROCYTE [DISTWIDTH] IN BLOOD BY AUTOMATED COUNT: 12.3 % (ref 11.5–14.5)
ERYTHROCYTE [DISTWIDTH] IN BLOOD BY AUTOMATED COUNT: 12.4 % (ref 11.5–14.5)
ERYTHROCYTE [DISTWIDTH] IN BLOOD BY AUTOMATED COUNT: 12.4 % (ref 11.5–14.5)
ERYTHROCYTE [DISTWIDTH] IN BLOOD BY AUTOMATED COUNT: 12.6 % (ref 11.5–14.5)
ERYTHROCYTE [DISTWIDTH] IN BLOOD BY AUTOMATED COUNT: 12.8 % (ref 11.5–14.5)
ERYTHROCYTE [DISTWIDTH] IN BLOOD BY AUTOMATED COUNT: 12.9 % (ref 11.5–14.5)
ERYTHROCYTE [DISTWIDTH] IN BLOOD BY AUTOMATED COUNT: 13.1 % (ref 11.5–14.5)
ERYTHROCYTE [DISTWIDTH] IN BLOOD BY AUTOMATED COUNT: 13.2 % (ref 11.5–14.5)
ERYTHROCYTE [DISTWIDTH] IN BLOOD BY AUTOMATED COUNT: 13.2 % (ref 11.5–14.5)
ERYTHROCYTE [DISTWIDTH] IN BLOOD BY AUTOMATED COUNT: 13.3 % (ref 11.5–14.5)
EST. GFR  (AFRICAN AMERICAN): 13.3 ML/MIN/1.73 M^2
EST. GFR  (AFRICAN AMERICAN): 14.2 ML/MIN/1.73 M^2
EST. GFR  (AFRICAN AMERICAN): 14.2 ML/MIN/1.73 M^2
EST. GFR  (AFRICAN AMERICAN): 15.2 ML/MIN/1.73 M^2
EST. GFR  (AFRICAN AMERICAN): 15.2 ML/MIN/1.73 M^2
EST. GFR  (AFRICAN AMERICAN): 15.8 ML/MIN/1.73 M^2
EST. GFR  (AFRICAN AMERICAN): 17 ML/MIN/1.73 M^2
EST. GFR  (AFRICAN AMERICAN): 17.1 ML/MIN/1.73 M^2
EST. GFR  (AFRICAN AMERICAN): 18.6 ML/MIN/1.73 M^2
EST. GFR  (AFRICAN AMERICAN): 18.6 ML/MIN/1.73 M^2
EST. GFR  (AFRICAN AMERICAN): 19.4 ML/MIN/1.73 M^2
EST. GFR  (AFRICAN AMERICAN): 19.4 ML/MIN/1.73 M^2
EST. GFR  (AFRICAN AMERICAN): 20.3 ML/MIN/1.73 M^2
EST. GFR  (NON AFRICAN AMERICAN): 11.5 ML/MIN/1.73 M^2
EST. GFR  (NON AFRICAN AMERICAN): 12.3 ML/MIN/1.73 M^2
EST. GFR  (NON AFRICAN AMERICAN): 12.3 ML/MIN/1.73 M^2
EST. GFR  (NON AFRICAN AMERICAN): 13.2 ML/MIN/1.73 M^2
EST. GFR  (NON AFRICAN AMERICAN): 13.2 ML/MIN/1.73 M^2
EST. GFR  (NON AFRICAN AMERICAN): 13.7 ML/MIN/1.73 M^2
EST. GFR  (NON AFRICAN AMERICAN): 14.7 ML/MIN/1.73 M^2
EST. GFR  (NON AFRICAN AMERICAN): 14.8 ML/MIN/1.73 M^2
EST. GFR  (NON AFRICAN AMERICAN): 16.1 ML/MIN/1.73 M^2
EST. GFR  (NON AFRICAN AMERICAN): 16.1 ML/MIN/1.73 M^2
EST. GFR  (NON AFRICAN AMERICAN): 16.9 ML/MIN/1.73 M^2
EST. GFR  (NON AFRICAN AMERICAN): 16.9 ML/MIN/1.73 M^2
EST. GFR  (NON AFRICAN AMERICAN): 17.6 ML/MIN/1.73 M^2
ESTIMATED AVG GLUCOSE: 128 MG/DL (ref 68–131)
FERRITIN SERPL-MCNC: 290 NG/ML (ref 20–300)
FINAL PATHOLOGIC DIAGNOSIS: NORMAL
FOLATE SERPL-MCNC: 12.5 NG/ML (ref 4–24)
GLUCOSE SERPL-MCNC: 103 MG/DL (ref 70–110)
GLUCOSE SERPL-MCNC: 117 MG/DL (ref 70–110)
GLUCOSE SERPL-MCNC: 120 MG/DL (ref 70–110)
GLUCOSE SERPL-MCNC: 120 MG/DL (ref 70–110)
GLUCOSE SERPL-MCNC: 125 MG/DL (ref 70–110)
GLUCOSE SERPL-MCNC: 132 MG/DL (ref 70–110)
GLUCOSE SERPL-MCNC: 135 MG/DL (ref 70–110)
GLUCOSE SERPL-MCNC: 146 MG/DL (ref 70–110)
GLUCOSE SERPL-MCNC: 148 MG/DL (ref 70–110)
GLUCOSE SERPL-MCNC: 157 MG/DL (ref 70–110)
GLUCOSE SERPL-MCNC: 158 MG/DL (ref 70–110)
GLUCOSE SERPL-MCNC: 177 MG/DL (ref 70–110)
GLUCOSE SERPL-MCNC: 93 MG/DL (ref 70–110)
GLUCOSE SERPL-MCNC: 97 MG/DL (ref 70–110)
GLUCOSE UR QL STRIP: NEGATIVE
GLUCOSE UR QL STRIP: NEGATIVE
GROSS: NORMAL
HBA1C MFR BLD HPLC: 6.1 % (ref 4–5.6)
HCT VFR BLD AUTO: 27.1 % (ref 37–48.5)
HCT VFR BLD AUTO: 27.8 % (ref 37–48.5)
HCT VFR BLD AUTO: 28 % (ref 37–48.5)
HCT VFR BLD AUTO: 28 % (ref 37–48.5)
HCT VFR BLD AUTO: 28.3 % (ref 37–48.5)
HCT VFR BLD AUTO: 28.5 % (ref 37–48.5)
HCT VFR BLD AUTO: 29.1 % (ref 37–48.5)
HCT VFR BLD AUTO: 30.2 % (ref 37–48.5)
HCT VFR BLD AUTO: 31.2 % (ref 37–48.5)
HCT VFR BLD AUTO: 31.3 % (ref 37–48.5)
HCT VFR BLD AUTO: 31.4 % (ref 37–48.5)
HCT VFR BLD AUTO: 32.5 % (ref 37–48.5)
HCT VFR BLD AUTO: 32.6 % (ref 37–48.5)
HCT VFR BLD CALC: 31 %PCV (ref 36–54)
HDLC SERPL-MCNC: 53 MG/DL (ref 40–75)
HDLC SERPL: 24.3 % (ref 20–50)
HGB BLD-MCNC: 10.1 G/DL (ref 12–16)
HGB BLD-MCNC: 10.2 G/DL (ref 12–16)
HGB BLD-MCNC: 10.3 G/DL (ref 12–16)
HGB BLD-MCNC: 10.3 G/DL (ref 12–16)
HGB BLD-MCNC: 10.6 G/DL (ref 12–16)
HGB BLD-MCNC: 8.4 G/DL (ref 12–16)
HGB BLD-MCNC: 8.7 G/DL (ref 12–16)
HGB BLD-MCNC: 8.8 G/DL (ref 12–16)
HGB BLD-MCNC: 8.8 G/DL (ref 12–16)
HGB BLD-MCNC: 8.9 G/DL (ref 12–16)
HGB BLD-MCNC: 9 G/DL (ref 12–16)
HGB BLD-MCNC: 9.1 G/DL (ref 12–16)
HGB BLD-MCNC: 9.8 G/DL (ref 12–16)
HGB UR QL STRIP: NEGATIVE
HGB UR QL STRIP: NEGATIVE
IMM GRANULOCYTES # BLD AUTO: 0.01 K/UL (ref 0–0.04)
IMM GRANULOCYTES # BLD AUTO: 0.02 K/UL (ref 0–0.04)
IMM GRANULOCYTES # BLD AUTO: 0.03 K/UL (ref 0–0.04)
IMM GRANULOCYTES # BLD AUTO: 0.05 K/UL (ref 0–0.04)
IMM GRANULOCYTES # BLD AUTO: 0.05 K/UL (ref 0–0.04)
IMM GRANULOCYTES # BLD AUTO: 0.07 K/UL (ref 0–0.04)
IMM GRANULOCYTES # BLD AUTO: 0.09 K/UL (ref 0–0.04)
IMM GRANULOCYTES NFR BLD AUTO: 0.2 % (ref 0–0.5)
IMM GRANULOCYTES NFR BLD AUTO: 0.3 % (ref 0–0.5)
IMM GRANULOCYTES NFR BLD AUTO: 0.4 % (ref 0–0.5)
IMM GRANULOCYTES NFR BLD AUTO: 0.4 % (ref 0–0.5)
IMM GRANULOCYTES NFR BLD AUTO: 0.6 % (ref 0–0.5)
IMM GRANULOCYTES NFR BLD AUTO: 0.6 % (ref 0–0.5)
IMM GRANULOCYTES NFR BLD AUTO: 0.9 % (ref 0–0.5)
IRON SERPL-MCNC: 43 UG/DL (ref 30–160)
KETONES UR QL STRIP: NEGATIVE
KETONES UR QL STRIP: NEGATIVE
LACTATE SERPL-SCNC: 1.2 MMOL/L (ref 0.5–2.2)
LACTATE SERPL-SCNC: 1.3 MMOL/L (ref 0.5–2.2)
LDLC SERPL CALC-MCNC: 144 MG/DL (ref 63–159)
LEFT EYE DM RETINOPATHY: NEGATIVE
LEUKOCYTE ESTERASE UR QL STRIP: NEGATIVE
LEUKOCYTE ESTERASE UR QL STRIP: NEGATIVE
LIPASE SERPL-CCNC: 24 U/L (ref 4–60)
LIPASE SERPL-CCNC: 44 U/L (ref 4–60)
LYMPHOCYTES # BLD AUTO: 0.5 K/UL (ref 1–4.8)
LYMPHOCYTES # BLD AUTO: 0.9 K/UL (ref 1–4.8)
LYMPHOCYTES # BLD AUTO: 1.1 K/UL (ref 1–4.8)
LYMPHOCYTES # BLD AUTO: 1.2 K/UL (ref 1–4.8)
LYMPHOCYTES # BLD AUTO: 1.3 K/UL (ref 1–4.8)
LYMPHOCYTES # BLD AUTO: 1.4 K/UL (ref 1–4.8)
LYMPHOCYTES NFR BLD: 11.5 % (ref 18–48)
LYMPHOCYTES NFR BLD: 12.2 % (ref 18–48)
LYMPHOCYTES NFR BLD: 15.2 % (ref 18–48)
LYMPHOCYTES NFR BLD: 15.9 % (ref 18–48)
LYMPHOCYTES NFR BLD: 18 % (ref 18–48)
LYMPHOCYTES NFR BLD: 18.7 % (ref 18–48)
LYMPHOCYTES NFR BLD: 19.8 % (ref 18–48)
LYMPHOCYTES NFR BLD: 24.6 % (ref 18–48)
LYMPHOCYTES NFR BLD: 26.4 % (ref 18–48)
LYMPHOCYTES NFR BLD: 28 % (ref 18–48)
LYMPHOCYTES NFR BLD: 7 % (ref 18–48)
Lab: NORMAL
MAGNESIUM SERPL-MCNC: 1.4 MG/DL (ref 1.6–2.6)
MAGNESIUM SERPL-MCNC: 1.5 MG/DL (ref 1.6–2.6)
MAGNESIUM SERPL-MCNC: 1.7 MG/DL (ref 1.6–2.6)
MAGNESIUM SERPL-MCNC: 1.8 MG/DL (ref 1.6–2.6)
MAGNESIUM SERPL-MCNC: 2.1 MG/DL (ref 1.6–2.6)
MAGNESIUM SERPL-MCNC: 2.2 MG/DL (ref 1.6–2.6)
MCH RBC QN AUTO: 29.9 PG (ref 27–31)
MCH RBC QN AUTO: 32.2 PG (ref 27–31)
MCH RBC QN AUTO: 32.5 PG (ref 27–31)
MCH RBC QN AUTO: 33.1 PG (ref 27–31)
MCH RBC QN AUTO: 33.3 PG (ref 27–31)
MCH RBC QN AUTO: 33.5 PG (ref 27–31)
MCH RBC QN AUTO: 33.6 PG (ref 27–31)
MCH RBC QN AUTO: 33.7 PG (ref 27–31)
MCH RBC QN AUTO: 34 PG (ref 27–31)
MCH RBC QN AUTO: 34 PG (ref 27–31)
MCH RBC QN AUTO: 34.1 PG (ref 27–31)
MCH RBC QN AUTO: 34.2 PG (ref 27–31)
MCH RBC QN AUTO: 34.3 PG (ref 27–31)
MCHC RBC AUTO-ENTMCNC: 30.1 G/DL (ref 32–36)
MCHC RBC AUTO-ENTMCNC: 31 G/DL (ref 32–36)
MCHC RBC AUTO-ENTMCNC: 31.1 G/DL (ref 32–36)
MCHC RBC AUTO-ENTMCNC: 31.2 G/DL (ref 32–36)
MCHC RBC AUTO-ENTMCNC: 31.4 G/DL (ref 32–36)
MCHC RBC AUTO-ENTMCNC: 31.6 G/DL (ref 32–36)
MCHC RBC AUTO-ENTMCNC: 31.7 G/DL (ref 32–36)
MCHC RBC AUTO-ENTMCNC: 31.8 G/DL (ref 32–36)
MCHC RBC AUTO-ENTMCNC: 32.3 G/DL (ref 32–36)
MCHC RBC AUTO-ENTMCNC: 32.6 G/DL (ref 32–36)
MCHC RBC AUTO-ENTMCNC: 32.7 G/DL (ref 32–36)
MCHC RBC AUTO-ENTMCNC: 32.8 G/DL (ref 32–36)
MCHC RBC AUTO-ENTMCNC: 33.7 G/DL (ref 32–36)
MCV RBC AUTO: 101 FL (ref 82–98)
MCV RBC AUTO: 102 FL (ref 82–98)
MCV RBC AUTO: 104 FL (ref 82–98)
MCV RBC AUTO: 104 FL (ref 82–98)
MCV RBC AUTO: 105 FL (ref 82–98)
MCV RBC AUTO: 106 FL (ref 82–98)
MCV RBC AUTO: 106 FL (ref 82–98)
MCV RBC AUTO: 107 FL (ref 82–98)
MCV RBC AUTO: 108 FL (ref 82–98)
MCV RBC AUTO: 108 FL (ref 82–98)
MCV RBC AUTO: 109 FL (ref 82–98)
MCV RBC AUTO: 99 FL (ref 82–98)
MCV RBC AUTO: 99 FL (ref 82–98)
MONOCYTES # BLD AUTO: 0 K/UL (ref 0.3–1)
MONOCYTES # BLD AUTO: 0.4 K/UL (ref 0.3–1)
MONOCYTES # BLD AUTO: 0.5 K/UL (ref 0.3–1)
MONOCYTES # BLD AUTO: 0.6 K/UL (ref 0.3–1)
MONOCYTES # BLD AUTO: 0.7 K/UL (ref 0.3–1)
MONOCYTES NFR BLD: 1 % (ref 4–15)
MONOCYTES NFR BLD: 5.1 % (ref 4–15)
MONOCYTES NFR BLD: 5.4 % (ref 4–15)
MONOCYTES NFR BLD: 6.6 % (ref 4–15)
MONOCYTES NFR BLD: 7 % (ref 4–15)
MONOCYTES NFR BLD: 7 % (ref 4–15)
MONOCYTES NFR BLD: 7.6 % (ref 4–15)
MONOCYTES NFR BLD: 7.8 % (ref 4–15)
MONOCYTES NFR BLD: 8.1 % (ref 4–15)
MONOCYTES NFR BLD: 8.2 % (ref 4–15)
MONOCYTES NFR BLD: 9 % (ref 4–15)
NEUTROPHILS # BLD AUTO: 10.7 K/UL (ref 1.8–7.7)
NEUTROPHILS # BLD AUTO: 2.8 K/UL (ref 1.8–7.7)
NEUTROPHILS # BLD AUTO: 2.9 K/UL (ref 1.8–7.7)
NEUTROPHILS # BLD AUTO: 3.4 K/UL (ref 1.8–7.7)
NEUTROPHILS # BLD AUTO: 3.5 K/UL (ref 1.8–7.7)
NEUTROPHILS # BLD AUTO: 4.3 K/UL (ref 1.8–7.7)
NEUTROPHILS # BLD AUTO: 4.6 K/UL (ref 1.8–7.7)
NEUTROPHILS # BLD AUTO: 5 K/UL (ref 1.8–7.7)
NEUTROPHILS # BLD AUTO: 5.6 K/UL (ref 1.8–7.7)
NEUTROPHILS # BLD AUTO: 6.6 K/UL (ref 1.8–7.7)
NEUTROPHILS # BLD AUTO: 7 K/UL (ref 1.8–7.7)
NEUTROPHILS # BLD AUTO: 8 K/UL (ref 1.8–7.7)
NEUTROPHILS # BLD AUTO: 9 K/UL (ref 1.8–7.7)
NEUTROPHILS NFR BLD: 61.1 % (ref 38–73)
NEUTROPHILS NFR BLD: 62.8 % (ref 38–73)
NEUTROPHILS NFR BLD: 65 % (ref 38–73)
NEUTROPHILS NFR BLD: 71.3 % (ref 38–73)
NEUTROPHILS NFR BLD: 72.4 % (ref 38–73)
NEUTROPHILS NFR BLD: 72.4 % (ref 38–73)
NEUTROPHILS NFR BLD: 74.7 % (ref 38–73)
NEUTROPHILS NFR BLD: 77.8 % (ref 38–73)
NEUTROPHILS NFR BLD: 80 % (ref 38–73)
NEUTROPHILS NFR BLD: 86.4 % (ref 38–73)
NEUTROPHILS NFR BLD: 87.1 % (ref 38–73)
NITRITE UR QL STRIP: NEGATIVE
NITRITE UR QL STRIP: NEGATIVE
NONHDLC SERPL-MCNC: 165 MG/DL
NRBC BLD-RTO: 0 /100 WBC
PH UR STRIP: 5 [PH] (ref 5–8)
PH UR STRIP: 6 [PH] (ref 5–8)
PHOSPHATE SERPL-MCNC: 2.1 MG/DL (ref 2.7–4.5)
PHOSPHATE SERPL-MCNC: 3.1 MG/DL (ref 2.7–4.5)
PHOSPHATE SERPL-MCNC: 3.3 MG/DL (ref 2.7–4.5)
PHOSPHATE SERPL-MCNC: 4.2 MG/DL (ref 2.7–4.5)
PLATELET # BLD AUTO: 174 K/UL (ref 150–350)
PLATELET # BLD AUTO: 180 K/UL (ref 150–350)
PLATELET # BLD AUTO: 182 K/UL (ref 150–350)
PLATELET # BLD AUTO: 182 K/UL (ref 150–350)
PLATELET # BLD AUTO: 194 K/UL (ref 150–350)
PLATELET # BLD AUTO: 223 K/UL (ref 150–350)
PLATELET # BLD AUTO: 224 K/UL (ref 150–350)
PLATELET # BLD AUTO: 239 K/UL (ref 150–350)
PLATELET # BLD AUTO: 260 K/UL (ref 150–350)
PLATELET # BLD AUTO: 270 K/UL (ref 150–350)
PLATELET # BLD AUTO: 326 K/UL (ref 150–350)
PLATELET # BLD AUTO: 351 K/UL (ref 150–350)
PLATELET # BLD AUTO: 354 K/UL (ref 150–350)
PMV BLD AUTO: 10 FL (ref 9.2–12.9)
PMV BLD AUTO: 10 FL (ref 9.2–12.9)
PMV BLD AUTO: 9 FL (ref 9.2–12.9)
PMV BLD AUTO: 9.1 FL (ref 9.2–12.9)
PMV BLD AUTO: 9.4 FL (ref 9.2–12.9)
PMV BLD AUTO: 9.5 FL (ref 9.2–12.9)
PMV BLD AUTO: 9.5 FL (ref 9.2–12.9)
PMV BLD AUTO: 9.6 FL (ref 9.2–12.9)
PMV BLD AUTO: 9.6 FL (ref 9.2–12.9)
PMV BLD AUTO: 9.8 FL (ref 9.2–12.9)
PMV BLD AUTO: 9.8 FL (ref 9.2–12.9)
PMV BLD AUTO: 9.9 FL (ref 9.2–12.9)
PMV BLD AUTO: 9.9 FL (ref 9.2–12.9)
POC IONIZED CALCIUM: 1.22 MMOL/L (ref 1.06–1.42)
POC TCO2 (MEASURED): 23 MMOL/L (ref 23–29)
POCT GLUCOSE: 100 MG/DL (ref 70–110)
POCT GLUCOSE: 105 MG/DL (ref 70–110)
POCT GLUCOSE: 105 MG/DL (ref 70–110)
POCT GLUCOSE: 110 MG/DL (ref 70–110)
POCT GLUCOSE: 111 MG/DL (ref 70–110)
POCT GLUCOSE: 120 MG/DL (ref 70–110)
POCT GLUCOSE: 120 MG/DL (ref 70–110)
POCT GLUCOSE: 125 MG/DL (ref 70–110)
POCT GLUCOSE: 126 MG/DL (ref 70–110)
POCT GLUCOSE: 127 MG/DL (ref 70–110)
POCT GLUCOSE: 134 MG/DL (ref 70–110)
POCT GLUCOSE: 138 MG/DL (ref 70–110)
POCT GLUCOSE: 140 MG/DL (ref 70–110)
POCT GLUCOSE: 145 MG/DL (ref 70–110)
POCT GLUCOSE: 145 MG/DL (ref 70–110)
POCT GLUCOSE: 148 MG/DL (ref 70–110)
POCT GLUCOSE: 150 MG/DL (ref 70–110)
POCT GLUCOSE: 157 MG/DL (ref 70–110)
POCT GLUCOSE: 161 MG/DL (ref 70–110)
POCT GLUCOSE: 172 MG/DL (ref 70–110)
POCT GLUCOSE: 173 MG/DL (ref 70–110)
POCT GLUCOSE: 189 MG/DL (ref 70–110)
POCT GLUCOSE: 198 MG/DL (ref 70–110)
POCT GLUCOSE: 81 MG/DL (ref 70–110)
POCT GLUCOSE: 89 MG/DL (ref 70–110)
POCT GLUCOSE: 97 MG/DL (ref 70–110)
POTASSIUM BLD-SCNC: 3.9 MMOL/L (ref 3.5–5.1)
POTASSIUM SERPL-SCNC: 3.1 MMOL/L (ref 3.5–5.1)
POTASSIUM SERPL-SCNC: 3.2 MMOL/L (ref 3.5–5.1)
POTASSIUM SERPL-SCNC: 3.3 MMOL/L (ref 3.5–5.1)
POTASSIUM SERPL-SCNC: 3.4 MMOL/L (ref 3.5–5.1)
POTASSIUM SERPL-SCNC: 3.5 MMOL/L (ref 3.5–5.1)
POTASSIUM SERPL-SCNC: 3.6 MMOL/L (ref 3.5–5.1)
POTASSIUM SERPL-SCNC: 3.9 MMOL/L (ref 3.5–5.1)
POTASSIUM SERPL-SCNC: 4 MMOL/L (ref 3.5–5.1)
POTASSIUM SERPL-SCNC: 4.1 MMOL/L (ref 3.5–5.1)
POTASSIUM SERPL-SCNC: 4.3 MMOL/L (ref 3.5–5.1)
POTASSIUM SERPL-SCNC: 4.3 MMOL/L (ref 3.5–5.1)
PROT SERPL-MCNC: 5.7 G/DL (ref 6–8.4)
PROT SERPL-MCNC: 6 G/DL (ref 6–8.4)
PROT SERPL-MCNC: 6 G/DL (ref 6–8.4)
PROT SERPL-MCNC: 6.1 G/DL (ref 6–8.4)
PROT SERPL-MCNC: 7.3 G/DL (ref 6–8.4)
PROT SERPL-MCNC: 7.4 G/DL (ref 6–8.4)
PROT SERPL-MCNC: 7.7 G/DL (ref 6–8.4)
PROT SERPL-MCNC: 7.9 G/DL (ref 6–8.4)
PROT SERPL-MCNC: 8 G/DL (ref 6–8.4)
PROT UR QL STRIP: NEGATIVE
PROT UR QL STRIP: NEGATIVE
RBC # BLD AUTO: 2.49 M/UL (ref 4–5.4)
RBC # BLD AUTO: 2.57 M/UL (ref 4–5.4)
RBC # BLD AUTO: 2.6 M/UL (ref 4–5.4)
RBC # BLD AUTO: 2.64 M/UL (ref 4–5.4)
RBC # BLD AUTO: 2.64 M/UL (ref 4–5.4)
RBC # BLD AUTO: 2.69 M/UL (ref 4–5.4)
RBC # BLD AUTO: 2.88 M/UL (ref 4–5.4)
RBC # BLD AUTO: 3 M/UL (ref 4–5.4)
RBC # BLD AUTO: 3.01 M/UL (ref 4–5.4)
RBC # BLD AUTO: 3.04 M/UL (ref 4–5.4)
RBC # BLD AUTO: 3.09 M/UL (ref 4–5.4)
RBC # BLD AUTO: 3.17 M/UL (ref 4–5.4)
RBC # BLD AUTO: 3.2 M/UL (ref 4–5.4)
RETICS/RBC NFR AUTO: 1.7 % (ref 0.5–2.5)
RIGHT EYE DM RETINOPATHY: NEGATIVE
SAMPLE: ABNORMAL
SARS-COV-2 RDRP RESP QL NAA+PROBE: NEGATIVE
SARS-COV-2 RNA RESP QL NAA+PROBE: NOT DETECTED
SATURATED IRON: 17 % (ref 20–50)
SODIUM BLD-SCNC: 140 MMOL/L (ref 136–145)
SODIUM SERPL-SCNC: 132 MMOL/L (ref 136–145)
SODIUM SERPL-SCNC: 134 MMOL/L (ref 136–145)
SODIUM SERPL-SCNC: 137 MMOL/L (ref 136–145)
SODIUM SERPL-SCNC: 137 MMOL/L (ref 136–145)
SODIUM SERPL-SCNC: 138 MMOL/L (ref 136–145)
SODIUM SERPL-SCNC: 138 MMOL/L (ref 136–145)
SODIUM SERPL-SCNC: 139 MMOL/L (ref 136–145)
SODIUM SERPL-SCNC: 141 MMOL/L (ref 136–145)
SODIUM SERPL-SCNC: 142 MMOL/L (ref 136–145)
SP GR UR STRIP: 1.01 (ref 1–1.03)
SP GR UR STRIP: 1.01 (ref 1–1.03)
T4 FREE SERPL-MCNC: 0.82 NG/DL (ref 0.71–1.51)
T4 FREE SERPL-MCNC: 0.93 NG/DL (ref 0.71–1.51)
TOTAL IRON BINDING CAPACITY: 255 UG/DL (ref 250–450)
TRANSFERRIN SERPL-MCNC: 172 MG/DL (ref 200–375)
TRIGL SERPL-MCNC: 105 MG/DL (ref 30–150)
TSH SERPL DL<=0.005 MIU/L-ACNC: 1.48 UIU/ML (ref 0.4–4)
TSH SERPL DL<=0.005 MIU/L-ACNC: 2.37 UIU/ML (ref 0.4–4)
TSH SERPL DL<=0.005 MIU/L-ACNC: 3.02 UIU/ML (ref 0.4–4)
URN SPEC COLLECT METH UR: ABNORMAL
URN SPEC COLLECT METH UR: NORMAL
VIT B12 SERPL-MCNC: 642 PG/ML (ref 210–950)
WBC # BLD AUTO: 10.02 K/UL (ref 3.9–12.7)
WBC # BLD AUTO: 11.15 K/UL (ref 3.9–12.7)
WBC # BLD AUTO: 12.23 K/UL (ref 3.9–12.7)
WBC # BLD AUTO: 4.1 K/UL (ref 3.9–12.7)
WBC # BLD AUTO: 4.51 K/UL (ref 3.9–12.7)
WBC # BLD AUTO: 4.72 K/UL (ref 3.9–12.7)
WBC # BLD AUTO: 5.2 K/UL (ref 3.9–12.7)
WBC # BLD AUTO: 5.98 K/UL (ref 3.9–12.7)
WBC # BLD AUTO: 6.4 K/UL (ref 3.9–12.7)
WBC # BLD AUTO: 6.94 K/UL (ref 3.9–12.7)
WBC # BLD AUTO: 7.49 K/UL (ref 3.9–12.7)
WBC # BLD AUTO: 8.26 K/UL (ref 3.9–12.7)
WBC # BLD AUTO: 8.97 K/UL (ref 3.9–12.7)
WBC #/AREA STL HPF: NORMAL /[HPF]

## 2020-01-01 PROCEDURE — 36415 COLL VENOUS BLD VENIPUNCTURE: CPT | Mod: PO

## 2020-01-01 PROCEDURE — 77066 DX MAMMO INCL CAD BI: CPT | Mod: 26,,, | Performed by: RADIOLOGY

## 2020-01-01 PROCEDURE — 99999 PR PBB SHADOW E&M-EST. PATIENT-LVL V: CPT | Mod: PBBFAC,,, | Performed by: NURSE PRACTITIONER

## 2020-01-01 PROCEDURE — 99499 NO LOS: ICD-10-PCS | Mod: 95,,, | Performed by: CLINICAL NEUROPSYCHOLOGIST

## 2020-01-01 PROCEDURE — 99499 RISK ADDL DX/OHS AUDIT: ICD-10-PCS | Mod: S$GLB,,, | Performed by: INTERNAL MEDICINE

## 2020-01-01 PROCEDURE — 99232 SBSQ HOSP IP/OBS MODERATE 35: CPT | Mod: ,,, | Performed by: INTERNAL MEDICINE

## 2020-01-01 PROCEDURE — 84100 ASSAY OF PHOSPHORUS: CPT

## 2020-01-01 PROCEDURE — 1125F AMNT PAIN NOTED PAIN PRSNT: CPT | Mod: S$GLB,,, | Performed by: INTERNAL MEDICINE

## 2020-01-01 PROCEDURE — 99233 SBSQ HOSP IP/OBS HIGH 50: CPT | Mod: ,,, | Performed by: INTERNAL MEDICINE

## 2020-01-01 PROCEDURE — 83735 ASSAY OF MAGNESIUM: CPT

## 2020-01-01 PROCEDURE — 82746 ASSAY OF FOLIC ACID SERUM: CPT

## 2020-01-01 PROCEDURE — 1101F PR PT FALLS ASSESS DOC 0-1 FALLS W/OUT INJ PAST YR: ICD-10-PCS | Mod: CPTII,S$GLB,, | Performed by: INTERNAL MEDICINE

## 2020-01-01 PROCEDURE — U0002 COVID-19 LAB TEST NON-CDC: HCPCS

## 2020-01-01 PROCEDURE — 27200997: Performed by: COLON & RECTAL SURGERY

## 2020-01-01 PROCEDURE — 99214 OFFICE O/P EST MOD 30 MIN: CPT | Mod: 25,S$GLB,, | Performed by: INTERNAL MEDICINE

## 2020-01-01 PROCEDURE — 87324 CLOSTRIDIUM AG IA: CPT

## 2020-01-01 PROCEDURE — 96138 PSYCL/NRPSYC TECH 1ST: CPT | Mod: S$GLB,,, | Performed by: CLINICAL NEUROPSYCHOLOGIST

## 2020-01-01 PROCEDURE — 99222 1ST HOSP IP/OBS MODERATE 55: CPT | Mod: GC,,, | Performed by: INTERNAL MEDICINE

## 2020-01-01 PROCEDURE — 3074F SYST BP LT 130 MM HG: CPT | Mod: CPTII,S$GLB,, | Performed by: NURSE PRACTITIONER

## 2020-01-01 PROCEDURE — 25000003 PHARM REV CODE 250: Performed by: COLON & RECTAL SURGERY

## 2020-01-01 PROCEDURE — 97530 THERAPEUTIC ACTIVITIES: CPT

## 2020-01-01 PROCEDURE — 99999 PR PBB SHADOW E&M-EST. PATIENT-LVL V: CPT | Mod: PBBFAC,,, | Performed by: INTERNAL MEDICINE

## 2020-01-01 PROCEDURE — 99499 RISK ADDL DX/OHS AUDIT: ICD-10-PCS | Mod: S$GLB,,, | Performed by: PHYSICIAN ASSISTANT

## 2020-01-01 PROCEDURE — 25000003 PHARM REV CODE 250: Performed by: INTERNAL MEDICINE

## 2020-01-01 PROCEDURE — 85025 COMPLETE CBC W/AUTO DIFF WBC: CPT

## 2020-01-01 PROCEDURE — 80061 LIPID PANEL: CPT

## 2020-01-01 PROCEDURE — 99495 TCM SERVICES (MODERATE COMPLEXITY): ICD-10-PCS | Mod: S$GLB,,, | Performed by: INTERNAL MEDICINE

## 2020-01-01 PROCEDURE — 63600175 PHARM REV CODE 636 W HCPCS: Performed by: NURSE ANESTHETIST, CERTIFIED REGISTERED

## 2020-01-01 PROCEDURE — 1159F PR MEDICATION LIST DOCUMENTED IN MEDICAL RECORD: ICD-10-PCS | Mod: S$GLB,,, | Performed by: PSYCHIATRY & NEUROLOGY

## 2020-01-01 PROCEDURE — 63600175 PHARM REV CODE 636 W HCPCS: Mod: JG | Performed by: INTERNAL MEDICINE

## 2020-01-01 PROCEDURE — 93010 ELECTROCARDIOGRAM REPORT: CPT | Mod: ,,, | Performed by: INTERNAL MEDICINE

## 2020-01-01 PROCEDURE — 1159F PR MEDICATION LIST DOCUMENTED IN MEDICAL RECORD: ICD-10-PCS | Mod: S$GLB,,, | Performed by: NURSE PRACTITIONER

## 2020-01-01 PROCEDURE — 3078F DIAST BP <80 MM HG: CPT | Mod: CPTII,S$GLB,, | Performed by: INTERNAL MEDICINE

## 2020-01-01 PROCEDURE — 3008F BODY MASS INDEX DOCD: CPT | Mod: CPTII,S$GLB,, | Performed by: PSYCHIATRY & NEUROLOGY

## 2020-01-01 PROCEDURE — 97162 PT EVAL MOD COMPLEX 30 MIN: CPT

## 2020-01-01 PROCEDURE — 97165 OT EVAL LOW COMPLEX 30 MIN: CPT

## 2020-01-01 PROCEDURE — 99999 PR PBB SHADOW E&M-EST. PATIENT-LVL IV: ICD-10-PCS | Mod: PBBFAC,,, | Performed by: INTERNAL MEDICINE

## 2020-01-01 PROCEDURE — 1125F AMNT PAIN NOTED PAIN PRSNT: CPT | Mod: S$GLB,,, | Performed by: PSYCHIATRY & NEUROLOGY

## 2020-01-01 PROCEDURE — 3078F PR MOST RECENT DIASTOLIC BLOOD PRESSURE < 80 MM HG: ICD-10-PCS | Mod: CPTII,S$GLB,, | Performed by: INTERNAL MEDICINE

## 2020-01-01 PROCEDURE — 1125F AMNT PAIN NOTED PAIN PRSNT: CPT | Mod: S$GLB,,, | Performed by: PHYSICIAN ASSISTANT

## 2020-01-01 PROCEDURE — G0180 MD CERTIFICATION HHA PATIENT: HCPCS | Mod: ,,, | Performed by: INTERNAL MEDICINE

## 2020-01-01 PROCEDURE — 3008F BODY MASS INDEX DOCD: CPT | Mod: CPTII,S$GLB,, | Performed by: NURSE PRACTITIONER

## 2020-01-01 PROCEDURE — 80053 COMPREHEN METABOLIC PANEL: CPT

## 2020-01-01 PROCEDURE — 3075F SYST BP GE 130 - 139MM HG: CPT | Mod: CPTII,S$GLB,, | Performed by: INTERNAL MEDICINE

## 2020-01-01 PROCEDURE — 99499 UNLISTED E&M SERVICE: CPT | Mod: S$GLB,,, | Performed by: NURSE PRACTITIONER

## 2020-01-01 PROCEDURE — 85027 COMPLETE CBC AUTOMATED: CPT

## 2020-01-01 PROCEDURE — 63600175 PHARM REV CODE 636 W HCPCS: Performed by: HOSPITALIST

## 2020-01-01 PROCEDURE — 3074F SYST BP LT 130 MM HG: CPT | Mod: CPTII,GC,S$GLB, | Performed by: STUDENT IN AN ORGANIZED HEALTH CARE EDUCATION/TRAINING PROGRAM

## 2020-01-01 PROCEDURE — 99999 PR PBB SHADOW E&M-EST. PATIENT-LVL V: ICD-10-PCS | Mod: PBBFAC,,, | Performed by: NURSE PRACTITIONER

## 2020-01-01 PROCEDURE — 3077F PR MOST RECENT SYSTOLIC BLOOD PRESSURE >= 140 MM HG: ICD-10-PCS | Mod: CPTII,S$GLB,, | Performed by: INTERNAL MEDICINE

## 2020-01-01 PROCEDURE — 99233 PR SUBSEQUENT HOSPITAL CARE,LEVL III: ICD-10-PCS | Mod: ,,, | Performed by: INTERNAL MEDICINE

## 2020-01-01 PROCEDURE — 1101F PT FALLS ASSESS-DOCD LE1/YR: CPT | Mod: CPTII,S$GLB,, | Performed by: INTERNAL MEDICINE

## 2020-01-01 PROCEDURE — 11000001 HC ACUTE MED/SURG PRIVATE ROOM

## 2020-01-01 PROCEDURE — 63600175 PHARM REV CODE 636 W HCPCS: Performed by: INTERNAL MEDICINE

## 2020-01-01 PROCEDURE — 96374 THER/PROPH/DIAG INJ IV PUSH: CPT

## 2020-01-01 PROCEDURE — 78815 PET IMAGE W/CT SKULL-THIGH: CPT | Mod: TC

## 2020-01-01 PROCEDURE — 99222 PR INITIAL HOSPITAL CARE,LEVL II: ICD-10-PCS | Mod: GC,,, | Performed by: INTERNAL MEDICINE

## 2020-01-01 PROCEDURE — 99214 OFFICE O/P EST MOD 30 MIN: CPT | Mod: S$GLB,,, | Performed by: PHYSICIAN ASSISTANT

## 2020-01-01 PROCEDURE — 97116 GAIT TRAINING THERAPY: CPT

## 2020-01-01 PROCEDURE — 1125F PR PAIN SEVERITY QUANTIFIED, PAIN PRESENT: ICD-10-PCS | Mod: S$GLB,,, | Performed by: COLON & RECTAL SURGERY

## 2020-01-01 PROCEDURE — 37000008 HC ANESTHESIA 1ST 15 MINUTES: Performed by: COLON & RECTAL SURGERY

## 2020-01-01 PROCEDURE — 3008F BODY MASS INDEX DOCD: CPT | Mod: CPTII,S$GLB,, | Performed by: INTERNAL MEDICINE

## 2020-01-01 PROCEDURE — 99223 PR INITIAL HOSPITAL CARE,LEVL III: ICD-10-PCS | Mod: ,,, | Performed by: INTERNAL MEDICINE

## 2020-01-01 PROCEDURE — 99239 PR HOSPITAL DISCHARGE DAY,>30 MIN: ICD-10-PCS | Mod: ,,, | Performed by: INTERNAL MEDICINE

## 2020-01-01 PROCEDURE — 84443 ASSAY THYROID STIM HORMONE: CPT

## 2020-01-01 PROCEDURE — 36415 COLL VENOUS BLD VENIPUNCTURE: CPT

## 2020-01-01 PROCEDURE — 83690 ASSAY OF LIPASE: CPT

## 2020-01-01 PROCEDURE — 99214 PR OFFICE/OUTPT VISIT, EST, LEVL IV, 30-39 MIN: ICD-10-PCS | Mod: S$GLB,,, | Performed by: PSYCHIATRY & NEUROLOGY

## 2020-01-01 PROCEDURE — 3079F DIAST BP 80-89 MM HG: CPT | Mod: CPTII,S$GLB,, | Performed by: COLON & RECTAL SURGERY

## 2020-01-01 PROCEDURE — 83605 ASSAY OF LACTIC ACID: CPT

## 2020-01-01 PROCEDURE — 97535 SELF CARE MNGMENT TRAINING: CPT

## 2020-01-01 PROCEDURE — 81003 URINALYSIS AUTO W/O SCOPE: CPT

## 2020-01-01 PROCEDURE — 1101F PR PT FALLS ASSESS DOC 0-1 FALLS W/OUT INJ PAST YR: ICD-10-PCS | Mod: CPTII,S$GLB,, | Performed by: PSYCHIATRY & NEUROLOGY

## 2020-01-01 PROCEDURE — 80048 BASIC METABOLIC PNL TOTAL CA: CPT

## 2020-01-01 PROCEDURE — 99285 EMERGENCY DEPT VISIT HI MDM: CPT | Mod: 25

## 2020-01-01 PROCEDURE — 93010 EKG 12-LEAD: ICD-10-PCS | Mod: ,,, | Performed by: INTERNAL MEDICINE

## 2020-01-01 PROCEDURE — 1101F PT FALLS ASSESS-DOCD LE1/YR: CPT | Mod: CPTII,S$GLB,, | Performed by: NURSE PRACTITIONER

## 2020-01-01 PROCEDURE — 99499 UNLISTED E&M SERVICE: CPT | Mod: S$GLB,,, | Performed by: INTERNAL MEDICINE

## 2020-01-01 PROCEDURE — 96133 PR NEUROPSYCHOLOGIC TEST EVAL SVCS, EA ADDTL HR: ICD-10-PCS | Mod: S$GLB,,, | Performed by: CLINICAL NEUROPSYCHOLOGIST

## 2020-01-01 PROCEDURE — 1125F PR PAIN SEVERITY QUANTIFIED, PAIN PRESENT: ICD-10-PCS | Mod: GC,S$GLB,, | Performed by: STUDENT IN AN ORGANIZED HEALTH CARE EDUCATION/TRAINING PROGRAM

## 2020-01-01 PROCEDURE — 96375 TX/PRO/DX INJ NEW DRUG ADDON: CPT

## 2020-01-01 PROCEDURE — 78815 NM PET CT ROUTINE: ICD-10-PCS | Mod: 26,PS,, | Performed by: RADIOLOGY

## 2020-01-01 PROCEDURE — 96138 PR PSYCH/NEUROPSYCH TEST ADMIN/SCORING, BY TECH, 2+ TESTS, 1ST 30 MIN: ICD-10-PCS | Mod: S$GLB,,, | Performed by: CLINICAL NEUROPSYCHOLOGIST

## 2020-01-01 PROCEDURE — A9552 F18 FDG: HCPCS

## 2020-01-01 PROCEDURE — 3008F PR BODY MASS INDEX (BMI) DOCUMENTED: ICD-10-PCS | Mod: CPTII,S$GLB,, | Performed by: NURSE PRACTITIONER

## 2020-01-01 PROCEDURE — 87427 SHIGA-LIKE TOXIN AG IA: CPT

## 2020-01-01 PROCEDURE — 96413 CHEMO IV INFUSION 1 HR: CPT

## 2020-01-01 PROCEDURE — 3074F SYST BP LT 130 MM HG: CPT | Mod: CPTII,S$GLB,, | Performed by: PSYCHIATRY & NEUROLOGY

## 2020-01-01 PROCEDURE — 99214 PR OFFICE/OUTPT VISIT, EST, LEVL IV, 30-39 MIN: ICD-10-PCS | Mod: S$GLB,,, | Performed by: INTERNAL MEDICINE

## 2020-01-01 PROCEDURE — U0003 INFECTIOUS AGENT DETECTION BY NUCLEIC ACID (DNA OR RNA); SEVERE ACUTE RESPIRATORY SYNDROME CORONAVIRUS 2 (SARS-COV-2) (CORONAVIRUS DISEASE [COVID-19]), AMPLIFIED PROBE TECHNIQUE, MAKING USE OF HIGH THROUGHPUT TECHNOLOGIES AS DESCRIBED BY CMS-2020-01-R: HCPCS

## 2020-01-01 PROCEDURE — 3008F BODY MASS INDEX DOCD: CPT | Mod: CPTII,S$GLB,, | Performed by: COLON & RECTAL SURGERY

## 2020-01-01 PROCEDURE — 99213 OFFICE O/P EST LOW 20 MIN: CPT | Mod: GC,S$GLB,, | Performed by: HOSPITALIST

## 2020-01-01 PROCEDURE — G0008 FLU VACCINE - QUADRIVALENT - ADJUVANTED: ICD-10-PCS | Mod: S$GLB,,, | Performed by: INTERNAL MEDICINE

## 2020-01-01 PROCEDURE — 1159F MED LIST DOCD IN RCRD: CPT | Mod: GC,S$GLB,, | Performed by: STUDENT IN AN ORGANIZED HEALTH CARE EDUCATION/TRAINING PROGRAM

## 2020-01-01 PROCEDURE — 3075F PR MOST RECENT SYSTOLIC BLOOD PRESS GE 130-139MM HG: ICD-10-PCS | Mod: CPTII,S$GLB,, | Performed by: PHYSICIAN ASSISTANT

## 2020-01-01 PROCEDURE — G0009 PNEUMOCOCCAL POLYSACCHARIDE VACCINE 23-VALENT =>2YO SQ IM: ICD-10-PCS | Mod: S$GLB,,, | Performed by: INTERNAL MEDICINE

## 2020-01-01 PROCEDURE — 1159F PR MEDICATION LIST DOCUMENTED IN MEDICAL RECORD: ICD-10-PCS | Mod: S$GLB,,, | Performed by: INTERNAL MEDICINE

## 2020-01-01 PROCEDURE — 1101F PT FALLS ASSESS-DOCD LE1/YR: CPT | Mod: CPTII,GC,S$GLB, | Performed by: STUDENT IN AN ORGANIZED HEALTH CARE EDUCATION/TRAINING PROGRAM

## 2020-01-01 PROCEDURE — 1125F AMNT PAIN NOTED PAIN PRSNT: CPT | Mod: S$GLB,,, | Performed by: NURSE PRACTITIONER

## 2020-01-01 PROCEDURE — 99215 PR OFFICE/OUTPT VISIT, EST, LEVL V, 40-54 MIN: ICD-10-PCS | Mod: S$GLB,,, | Performed by: INTERNAL MEDICINE

## 2020-01-01 PROCEDURE — 96139 PR PSYCH/NEUROPSYCH TEST ADMIN/SCORING, BY TECH, 2+ TESTS, EA ADDTL 30 MIN: ICD-10-PCS | Mod: S$GLB,,, | Performed by: CLINICAL NEUROPSYCHOLOGIST

## 2020-01-01 PROCEDURE — 1125F PR PAIN SEVERITY QUANTIFIED, PAIN PRESENT: ICD-10-PCS | Mod: S$GLB,,, | Performed by: NURSE PRACTITIONER

## 2020-01-01 PROCEDURE — 87449 NOS EACH ORGANISM AG IA: CPT

## 2020-01-01 PROCEDURE — 82607 VITAMIN B-12: CPT

## 2020-01-01 PROCEDURE — 3074F PR MOST RECENT SYSTOLIC BLOOD PRESSURE < 130 MM HG: ICD-10-PCS | Mod: CPTII,S$GLB,, | Performed by: INTERNAL MEDICINE

## 2020-01-01 PROCEDURE — 99233 SBSQ HOSP IP/OBS HIGH 50: CPT | Mod: GC,,, | Performed by: INTERNAL MEDICINE

## 2020-01-01 PROCEDURE — G0180 PR HOME HEALTH MD CERTIFICATION: ICD-10-PCS | Mod: ,,, | Performed by: INTERNAL MEDICINE

## 2020-01-01 PROCEDURE — 99999 PR PBB SHADOW E&M-EST. PATIENT-LVL II: ICD-10-PCS | Mod: PBBFAC,,, | Performed by: CLINICAL NEUROPSYCHOLOGIST

## 2020-01-01 PROCEDURE — 3074F PR MOST RECENT SYSTOLIC BLOOD PRESSURE < 130 MM HG: ICD-10-PCS | Mod: CPTII,GC,S$GLB, | Performed by: STUDENT IN AN ORGANIZED HEALTH CARE EDUCATION/TRAINING PROGRAM

## 2020-01-01 PROCEDURE — 25000003 PHARM REV CODE 250: Performed by: NURSE PRACTITIONER

## 2020-01-01 PROCEDURE — 3078F PR MOST RECENT DIASTOLIC BLOOD PRESSURE < 80 MM HG: ICD-10-PCS | Mod: CPTII,S$GLB,, | Performed by: PSYCHIATRY & NEUROLOGY

## 2020-01-01 PROCEDURE — 3078F PR MOST RECENT DIASTOLIC BLOOD PRESSURE < 80 MM HG: ICD-10-PCS | Mod: CPTII,GC,S$GLB, | Performed by: STUDENT IN AN ORGANIZED HEALTH CARE EDUCATION/TRAINING PROGRAM

## 2020-01-01 PROCEDURE — 25000003 PHARM REV CODE 250: Performed by: EMERGENCY MEDICINE

## 2020-01-01 PROCEDURE — 1159F PR MEDICATION LIST DOCUMENTED IN MEDICAL RECORD: ICD-10-PCS | Mod: S$GLB,,, | Performed by: PHYSICIAN ASSISTANT

## 2020-01-01 PROCEDURE — 93005 ELECTROCARDIOGRAM TRACING: CPT

## 2020-01-01 PROCEDURE — 1125F PR PAIN SEVERITY QUANTIFIED, PAIN PRESENT: ICD-10-PCS | Mod: S$GLB,,, | Performed by: PHYSICIAN ASSISTANT

## 2020-01-01 PROCEDURE — 1101F PR PT FALLS ASSESS DOC 0-1 FALLS W/OUT INJ PAST YR: ICD-10-PCS | Mod: CPTII,GC,S$GLB, | Performed by: STUDENT IN AN ORGANIZED HEALTH CARE EDUCATION/TRAINING PROGRAM

## 2020-01-01 PROCEDURE — 99499 RISK ADDL DX/OHS AUDIT: ICD-10-PCS | Mod: S$GLB,,, | Performed by: NURSE PRACTITIONER

## 2020-01-01 PROCEDURE — 85045 AUTOMATED RETICULOCYTE COUNT: CPT

## 2020-01-01 PROCEDURE — 1126F PR PAIN SEVERITY QUANTIFIED, NO PAIN PRESENT: ICD-10-PCS | Mod: S$GLB,,, | Performed by: INTERNAL MEDICINE

## 2020-01-01 PROCEDURE — 1101F PR PT FALLS ASSESS DOC 0-1 FALLS W/OUT INJ PAST YR: ICD-10-PCS | Mod: CPTII,95,, | Performed by: PSYCHIATRY & NEUROLOGY

## 2020-01-01 PROCEDURE — 96361 HYDRATE IV INFUSION ADD-ON: CPT

## 2020-01-01 PROCEDURE — 87045 FECES CULTURE AEROBIC BACT: CPT

## 2020-01-01 PROCEDURE — 3075F SYST BP GE 130 - 139MM HG: CPT | Mod: CPTII,S$GLB,, | Performed by: PHYSICIAN ASSISTANT

## 2020-01-01 PROCEDURE — 3078F PR MOST RECENT DIASTOLIC BLOOD PRESSURE < 80 MM HG: ICD-10-PCS | Mod: CPTII,S$GLB,, | Performed by: NURSE PRACTITIONER

## 2020-01-01 PROCEDURE — 88305 TISSUE EXAM BY PATHOLOGIST: CPT | Mod: 26,,, | Performed by: PATHOLOGY

## 2020-01-01 PROCEDURE — 99239 HOSP IP/OBS DSCHRG MGMT >30: CPT | Mod: ,,, | Performed by: INTERNAL MEDICINE

## 2020-01-01 PROCEDURE — 90732 PNEUMOCOCCAL POLYSACCHARIDE VACCINE 23-VALENT =>2YO SQ IM: ICD-10-PCS | Mod: S$GLB,,, | Performed by: INTERNAL MEDICINE

## 2020-01-01 PROCEDURE — 3078F PR MOST RECENT DIASTOLIC BLOOD PRESSURE < 80 MM HG: ICD-10-PCS | Mod: CPTII,S$GLB,, | Performed by: PHYSICIAN ASSISTANT

## 2020-01-01 PROCEDURE — 77062 MAMMO DIGITAL DIAGNOSTIC BILAT WITH TOMOSYNTHESIS_CAD: ICD-10-PCS | Mod: 26,,, | Performed by: RADIOLOGY

## 2020-01-01 PROCEDURE — 3008F PR BODY MASS INDEX (BMI) DOCUMENTED: ICD-10-PCS | Mod: CPTII,GC,S$GLB, | Performed by: STUDENT IN AN ORGANIZED HEALTH CARE EDUCATION/TRAINING PROGRAM

## 2020-01-01 PROCEDURE — 71250 CT THORAX DX C-: CPT | Mod: 26,,, | Performed by: RADIOLOGY

## 2020-01-01 PROCEDURE — 83540 ASSAY OF IRON: CPT

## 2020-01-01 PROCEDURE — 99214 OFFICE O/P EST MOD 30 MIN: CPT | Mod: S$GLB,,, | Performed by: NURSE PRACTITIONER

## 2020-01-01 PROCEDURE — C9113 INJ PANTOPRAZOLE SODIUM, VIA: HCPCS | Performed by: INTERNAL MEDICINE

## 2020-01-01 PROCEDURE — G0008 ADMIN INFLUENZA VIRUS VAC: HCPCS | Mod: S$GLB,,, | Performed by: INTERNAL MEDICINE

## 2020-01-01 PROCEDURE — 99499 UNLISTED E&M SERVICE: CPT | Mod: S$GLB,,, | Performed by: PHYSICIAN ASSISTANT

## 2020-01-01 PROCEDURE — 77066 MAMMO DIGITAL DIAGNOSTIC BILAT WITH TOMOSYNTHESIS_CAD: ICD-10-PCS | Mod: 26,,, | Performed by: RADIOLOGY

## 2020-01-01 PROCEDURE — 99223 1ST HOSP IP/OBS HIGH 75: CPT | Mod: ,,, | Performed by: HOSPITALIST

## 2020-01-01 PROCEDURE — 3008F PR BODY MASS INDEX (BMI) DOCUMENTED: ICD-10-PCS | Mod: CPTII,S$GLB,, | Performed by: INTERNAL MEDICINE

## 2020-01-01 PROCEDURE — 99291 PR CRITICAL CARE, E/M 30-74 MINUTES: ICD-10-PCS | Mod: ,,, | Performed by: EMERGENCY MEDICINE

## 2020-01-01 PROCEDURE — 99999 PR PBB SHADOW E&M-EST. PATIENT-LVL V: ICD-10-PCS | Mod: PBBFAC,GC,, | Performed by: STUDENT IN AN ORGANIZED HEALTH CARE EDUCATION/TRAINING PROGRAM

## 2020-01-01 PROCEDURE — 87046 STOOL CULTR AEROBIC BACT EA: CPT

## 2020-01-01 PROCEDURE — S0030 INJECTION, METRONIDAZOLE: HCPCS | Performed by: INTERNAL MEDICINE

## 2020-01-01 PROCEDURE — A4216 STERILE WATER/SALINE, 10 ML: HCPCS | Performed by: INTERNAL MEDICINE

## 2020-01-01 PROCEDURE — 25000003 PHARM REV CODE 250: Performed by: HOSPITALIST

## 2020-01-01 PROCEDURE — 1159F MED LIST DOCD IN RCRD: CPT | Mod: S$GLB,,, | Performed by: NURSE PRACTITIONER

## 2020-01-01 PROCEDURE — 99999 PR PBB SHADOW E&M-EST. PATIENT-LVL V: CPT | Mod: PBBFAC,,, | Performed by: PHYSICIAN ASSISTANT

## 2020-01-01 PROCEDURE — 99499 UNLISTED E&M SERVICE: CPT | Mod: GT,95,, | Performed by: CLINICAL NEUROPSYCHOLOGIST

## 2020-01-01 PROCEDURE — 99999 PR PBB SHADOW E&M-EST. PATIENT-LVL V: ICD-10-PCS | Mod: PBBFAC,,, | Performed by: PHYSICIAN ASSISTANT

## 2020-01-01 PROCEDURE — 45385 PR COLONOSCOPY,REMV LESN,SNARE: ICD-10-PCS | Mod: PT,,, | Performed by: COLON & RECTAL SURGERY

## 2020-01-01 PROCEDURE — 99215 OFFICE O/P EST HI 40 MIN: CPT | Mod: 95,,, | Performed by: PSYCHIATRY & NEUROLOGY

## 2020-01-01 PROCEDURE — 90732 PPSV23 VACC 2 YRS+ SUBQ/IM: CPT | Mod: S$GLB,,, | Performed by: INTERNAL MEDICINE

## 2020-01-01 PROCEDURE — 3078F DIAST BP <80 MM HG: CPT | Mod: CPTII,S$GLB,, | Performed by: PHYSICIAN ASSISTANT

## 2020-01-01 PROCEDURE — 99285 EMERGENCY DEPT VISIT HI MDM: CPT | Mod: ,,, | Performed by: EMERGENCY MEDICINE

## 2020-01-01 PROCEDURE — 3074F PR MOST RECENT SYSTOLIC BLOOD PRESSURE < 130 MM HG: ICD-10-PCS | Mod: CPTII,S$GLB,, | Performed by: PSYCHIATRY & NEUROLOGY

## 2020-01-01 PROCEDURE — 1159F PR MEDICATION LIST DOCUMENTED IN MEDICAL RECORD: ICD-10-PCS | Mod: 95,,, | Performed by: PSYCHIATRY & NEUROLOGY

## 2020-01-01 PROCEDURE — 1159F PR MEDICATION LIST DOCUMENTED IN MEDICAL RECORD: ICD-10-PCS | Mod: S$GLB,,, | Performed by: COLON & RECTAL SURGERY

## 2020-01-01 PROCEDURE — 3077F SYST BP >= 140 MM HG: CPT | Mod: CPTII,S$GLB,, | Performed by: COLON & RECTAL SURGERY

## 2020-01-01 PROCEDURE — 3074F PR MOST RECENT SYSTOLIC BLOOD PRESSURE < 130 MM HG: ICD-10-PCS | Mod: CPTII,S$GLB,, | Performed by: NURSE PRACTITIONER

## 2020-01-01 PROCEDURE — 99214 OFFICE O/P EST MOD 30 MIN: CPT | Mod: S$GLB,,, | Performed by: INTERNAL MEDICINE

## 2020-01-01 PROCEDURE — E9220 PRA ENDO ANESTHESIA: ICD-10-PCS | Mod: PT,,, | Performed by: NURSE ANESTHETIST, CERTIFIED REGISTERED

## 2020-01-01 PROCEDURE — 1159F MED LIST DOCD IN RCRD: CPT | Mod: 95,,, | Performed by: PSYCHIATRY & NEUROLOGY

## 2020-01-01 PROCEDURE — 99214 PR OFFICE/OUTPT VISIT, EST, LEVL IV, 30-39 MIN: ICD-10-PCS | Mod: S$GLB,,, | Performed by: NURSE PRACTITIONER

## 2020-01-01 PROCEDURE — 1159F MED LIST DOCD IN RCRD: CPT | Mod: S$GLB,,, | Performed by: INTERNAL MEDICINE

## 2020-01-01 PROCEDURE — 94761 N-INVAS EAR/PLS OXIMETRY MLT: CPT

## 2020-01-01 PROCEDURE — 99233 PR SUBSEQUENT HOSPITAL CARE,LEVL III: ICD-10-PCS | Mod: GC,,, | Performed by: INTERNAL MEDICINE

## 2020-01-01 PROCEDURE — 99999 PR PBB SHADOW E&M-EST. PATIENT-LVL IV: CPT | Mod: PBBFAC,,, | Performed by: INTERNAL MEDICINE

## 2020-01-01 PROCEDURE — 99284 PR EMERGENCY DEPT VISIT,LEVEL IV: ICD-10-PCS | Mod: ,,, | Performed by: EMERGENCY MEDICINE

## 2020-01-01 PROCEDURE — 25000003 PHARM REV CODE 250: Performed by: PHYSICIAN ASSISTANT

## 2020-01-01 PROCEDURE — 3074F SYST BP LT 130 MM HG: CPT | Mod: CPTII,S$GLB,, | Performed by: INTERNAL MEDICINE

## 2020-01-01 PROCEDURE — 45385 COLONOSCOPY W/LESION REMOVAL: CPT | Mod: PT,,, | Performed by: COLON & RECTAL SURGERY

## 2020-01-01 PROCEDURE — 78815 PET IMAGE W/CT SKULL-THIGH: CPT | Mod: 26,PS,, | Performed by: RADIOLOGY

## 2020-01-01 PROCEDURE — 3075F PR MOST RECENT SYSTOLIC BLOOD PRESS GE 130-139MM HG: ICD-10-PCS | Mod: CPTII,S$GLB,, | Performed by: INTERNAL MEDICINE

## 2020-01-01 PROCEDURE — 99999 PR PBB SHADOW E&M-EST. PATIENT-LVL V: ICD-10-PCS | Mod: PBBFAC,,, | Performed by: INTERNAL MEDICINE

## 2020-01-01 PROCEDURE — 37000009 HC ANESTHESIA EA ADD 15 MINS: Performed by: COLON & RECTAL SURGERY

## 2020-01-01 PROCEDURE — 1125F PR PAIN SEVERITY QUANTIFIED, PAIN PRESENT: ICD-10-PCS | Mod: S$GLB,,, | Performed by: PSYCHIATRY & NEUROLOGY

## 2020-01-01 PROCEDURE — 96133 NRPSYC TST EVAL PHYS/QHP EA: CPT | Mod: S$GLB,,, | Performed by: CLINICAL NEUROPSYCHOLOGIST

## 2020-01-01 PROCEDURE — C9113 INJ PANTOPRAZOLE SODIUM, VIA: HCPCS | Performed by: EMERGENCY MEDICINE

## 2020-01-01 PROCEDURE — 99999 PR PBB SHADOW E&M-EST. PATIENT-LVL II: CPT | Mod: PBBFAC,,, | Performed by: CLINICAL NEUROPSYCHOLOGIST

## 2020-01-01 PROCEDURE — 90694 FLU VACCINE - QUADRIVALENT - ADJUVANTED: ICD-10-PCS | Mod: S$GLB,,, | Performed by: INTERNAL MEDICINE

## 2020-01-01 PROCEDURE — 1159F MED LIST DOCD IN RCRD: CPT | Mod: S$GLB,,, | Performed by: PSYCHIATRY & NEUROLOGY

## 2020-01-01 PROCEDURE — 99213 OFFICE O/P EST LOW 20 MIN: CPT | Mod: GC,S$GLB,, | Performed by: STUDENT IN AN ORGANIZED HEALTH CARE EDUCATION/TRAINING PROGRAM

## 2020-01-01 PROCEDURE — 1101F PR PT FALLS ASSESS DOC 0-1 FALLS W/OUT INJ PAST YR: ICD-10-PCS | Mod: CPTII,S$GLB,, | Performed by: NURSE PRACTITIONER

## 2020-01-01 PROCEDURE — 3078F DIAST BP <80 MM HG: CPT | Mod: CPTII,S$GLB,, | Performed by: NURSE PRACTITIONER

## 2020-01-01 PROCEDURE — 63600175 PHARM REV CODE 636 W HCPCS: Performed by: EMERGENCY MEDICINE

## 2020-01-01 PROCEDURE — 1126F AMNT PAIN NOTED NONE PRSNT: CPT | Mod: S$GLB,,, | Performed by: INTERNAL MEDICINE

## 2020-01-01 PROCEDURE — 12000002 HC ACUTE/MED SURGE SEMI-PRIVATE ROOM

## 2020-01-01 PROCEDURE — 99285 PR EMERGENCY DEPT VISIT,LEVEL V: ICD-10-PCS | Mod: ,,, | Performed by: EMERGENCY MEDICINE

## 2020-01-01 PROCEDURE — 97162 PT EVAL MOD COMPLEX 30 MIN: CPT | Mod: PN

## 2020-01-01 PROCEDURE — 96132 NRPSYC TST EVAL PHYS/QHP 1ST: CPT | Mod: S$GLB,,, | Performed by: CLINICAL NEUROPSYCHOLOGIST

## 2020-01-01 PROCEDURE — 3008F BODY MASS INDEX DOCD: CPT | Mod: CPTII,GC,S$GLB, | Performed by: STUDENT IN AN ORGANIZED HEALTH CARE EDUCATION/TRAINING PROGRAM

## 2020-01-01 PROCEDURE — 99223 PR INITIAL HOSPITAL CARE,LEVL III: ICD-10-PCS | Mod: ,,, | Performed by: HOSPITALIST

## 2020-01-01 PROCEDURE — 71250 CT THORAX DX C-: CPT | Mod: TC

## 2020-01-01 PROCEDURE — 25000003 PHARM REV CODE 250: Performed by: NURSE ANESTHETIST, CERTIFIED REGISTERED

## 2020-01-01 PROCEDURE — 99214 OFFICE O/P EST MOD 30 MIN: CPT | Mod: S$GLB,,, | Performed by: PSYCHIATRY & NEUROLOGY

## 2020-01-01 PROCEDURE — 99499 RISK ADDL DX/OHS AUDIT: ICD-10-PCS | Mod: 95,,, | Performed by: PSYCHIATRY & NEUROLOGY

## 2020-01-01 PROCEDURE — 1101F PT FALLS ASSESS-DOCD LE1/YR: CPT | Mod: CPTII,S$GLB,, | Performed by: PHYSICIAN ASSISTANT

## 2020-01-01 PROCEDURE — 3078F DIAST BP <80 MM HG: CPT | Mod: CPTII,GC,S$GLB, | Performed by: STUDENT IN AN ORGANIZED HEALTH CARE EDUCATION/TRAINING PROGRAM

## 2020-01-01 PROCEDURE — 99999 PR PBB SHADOW E&M-EST. PATIENT-LVL V: CPT | Mod: PBBFAC,GC,, | Performed by: STUDENT IN AN ORGANIZED HEALTH CARE EDUCATION/TRAINING PROGRAM

## 2020-01-01 PROCEDURE — 99284 EMERGENCY DEPT VISIT MOD MDM: CPT | Mod: ,,, | Performed by: EMERGENCY MEDICINE

## 2020-01-01 PROCEDURE — 3077F PR MOST RECENT SYSTOLIC BLOOD PRESSURE >= 140 MM HG: ICD-10-PCS | Mod: CPTII,S$GLB,, | Performed by: COLON & RECTAL SURGERY

## 2020-01-01 PROCEDURE — 99214 PR OFFICE/OUTPT VISIT, EST, LEVL IV, 30-39 MIN: ICD-10-PCS | Mod: S$GLB,,, | Performed by: PHYSICIAN ASSISTANT

## 2020-01-01 PROCEDURE — 99999 PR PBB SHADOW E&M-EST. PATIENT-LVL V: ICD-10-PCS | Mod: PBBFAC,,, | Performed by: PSYCHIATRY & NEUROLOGY

## 2020-01-01 PROCEDURE — 3079F PR MOST RECENT DIASTOLIC BLOOD PRESSURE 80-89 MM HG: ICD-10-PCS | Mod: CPTII,S$GLB,, | Performed by: COLON & RECTAL SURGERY

## 2020-01-01 PROCEDURE — 99232 PR SUBSEQUENT HOSPITAL CARE,LEVL II: ICD-10-PCS | Mod: ,,, | Performed by: INTERNAL MEDICINE

## 2020-01-01 PROCEDURE — 99999 PR PBB SHADOW E&M-EST. PATIENT-LVL V: CPT | Mod: PBBFAC,GC,, | Performed by: HOSPITALIST

## 2020-01-01 PROCEDURE — 84439 ASSAY OF FREE THYROXINE: CPT

## 2020-01-01 PROCEDURE — 3077F PR MOST RECENT SYSTOLIC BLOOD PRESSURE >= 140 MM HG: ICD-10-PCS | Mod: CPTII,S$GLB,, | Performed by: PHYSICIAN ASSISTANT

## 2020-01-01 PROCEDURE — 1101F PT FALLS ASSESS-DOCD LE1/YR: CPT | Mod: CPTII,S$GLB,, | Performed by: PSYCHIATRY & NEUROLOGY

## 2020-01-01 PROCEDURE — 3008F PR BODY MASS INDEX (BMI) DOCUMENTED: ICD-10-PCS | Mod: CPTII,S$GLB,, | Performed by: PHYSICIAN ASSISTANT

## 2020-01-01 PROCEDURE — 27201089 HC SNARE, DISP (ANY): Performed by: COLON & RECTAL SURGERY

## 2020-01-01 PROCEDURE — 99999 PR PBB SHADOW E&M-EST. PATIENT-LVL V: ICD-10-PCS | Mod: PBBFAC,GC,, | Performed by: HOSPITALIST

## 2020-01-01 PROCEDURE — 99999 PR PBB SHADOW E&M-EST. PATIENT-LVL V: ICD-10-PCS | Mod: PBBFAC,,, | Performed by: COLON & RECTAL SURGERY

## 2020-01-01 PROCEDURE — 97110 THERAPEUTIC EXERCISES: CPT | Mod: PN

## 2020-01-01 PROCEDURE — 1101F PT FALLS ASSESS-DOCD LE1/YR: CPT | Mod: CPTII,95,, | Performed by: PSYCHIATRY & NEUROLOGY

## 2020-01-01 PROCEDURE — 99499 NO LOS: ICD-10-PCS | Mod: S$GLB,,, | Performed by: CLINICAL NEUROPSYCHOLOGIST

## 2020-01-01 PROCEDURE — 88305 TISSUE EXAM BY PATHOLOGIST: ICD-10-PCS | Mod: 26,,, | Performed by: PATHOLOGY

## 2020-01-01 PROCEDURE — 3077F SYST BP >= 140 MM HG: CPT | Mod: CPTII,S$GLB,, | Performed by: PHYSICIAN ASSISTANT

## 2020-01-01 PROCEDURE — 99215 PR OFFICE/OUTPT VISIT, EST, LEVL V, 40-54 MIN: ICD-10-PCS | Mod: 95,,, | Performed by: PSYCHIATRY & NEUROLOGY

## 2020-01-01 PROCEDURE — 99499 UNLISTED E&M SERVICE: CPT | Mod: S$GLB,,, | Performed by: CLINICAL NEUROPSYCHOLOGIST

## 2020-01-01 PROCEDURE — E9220 PRA ENDO ANESTHESIA: HCPCS | Mod: PT,,, | Performed by: NURSE ANESTHETIST, CERTIFIED REGISTERED

## 2020-01-01 PROCEDURE — 1125F PR PAIN SEVERITY QUANTIFIED, PAIN PRESENT: ICD-10-PCS | Mod: S$GLB,,, | Performed by: INTERNAL MEDICINE

## 2020-01-01 PROCEDURE — G0009 ADMIN PNEUMOCOCCAL VACCINE: HCPCS | Mod: S$GLB,,, | Performed by: INTERNAL MEDICINE

## 2020-01-01 PROCEDURE — 99495 TRANSJ CARE MGMT MOD F2F 14D: CPT | Mod: S$GLB,,, | Performed by: INTERNAL MEDICINE

## 2020-01-01 PROCEDURE — 1125F AMNT PAIN NOTED PAIN PRSNT: CPT | Mod: GC,S$GLB,, | Performed by: STUDENT IN AN ORGANIZED HEALTH CARE EDUCATION/TRAINING PROGRAM

## 2020-01-01 PROCEDURE — 3078F DIAST BP <80 MM HG: CPT | Mod: CPTII,S$GLB,, | Performed by: PSYCHIATRY & NEUROLOGY

## 2020-01-01 PROCEDURE — 3008F PR BODY MASS INDEX (BMI) DOCUMENTED: ICD-10-PCS | Mod: CPTII,S$GLB,, | Performed by: PSYCHIATRY & NEUROLOGY

## 2020-01-01 PROCEDURE — 77062 BREAST TOMOSYNTHESIS BI: CPT | Mod: 26,,, | Performed by: RADIOLOGY

## 2020-01-01 PROCEDURE — 82728 ASSAY OF FERRITIN: CPT

## 2020-01-01 PROCEDURE — 45385 COLONOSCOPY W/LESION REMOVAL: CPT | Performed by: COLON & RECTAL SURGERY

## 2020-01-01 PROCEDURE — 99499 UNLISTED E&M SERVICE: CPT | Mod: 95,,, | Performed by: PSYCHIATRY & NEUROLOGY

## 2020-01-01 PROCEDURE — 99204 OFFICE O/P NEW MOD 45 MIN: CPT | Mod: S$GLB,,, | Performed by: COLON & RECTAL SURGERY

## 2020-01-01 PROCEDURE — 89055 LEUKOCYTE ASSESSMENT FECAL: CPT

## 2020-01-01 PROCEDURE — 1101F PT FALLS ASSESS-DOCD LE1/YR: CPT | Mod: CPTII,S$GLB,, | Performed by: COLON & RECTAL SURGERY

## 2020-01-01 PROCEDURE — 99999 PR PBB SHADOW E&M-EST. PATIENT-LVL V: CPT | Mod: PBBFAC,,, | Performed by: PSYCHIATRY & NEUROLOGY

## 2020-01-01 PROCEDURE — 1101F PR PT FALLS ASSESS DOC 0-1 FALLS W/OUT INJ PAST YR: ICD-10-PCS | Mod: CPTII,S$GLB,, | Performed by: COLON & RECTAL SURGERY

## 2020-01-01 PROCEDURE — 1159F PR MEDICATION LIST DOCUMENTED IN MEDICAL RECORD: ICD-10-PCS | Mod: GC,S$GLB,, | Performed by: STUDENT IN AN ORGANIZED HEALTH CARE EDUCATION/TRAINING PROGRAM

## 2020-01-01 PROCEDURE — 82962 GLUCOSE BLOOD TEST: CPT | Performed by: COLON & RECTAL SURGERY

## 2020-01-01 PROCEDURE — 97161 PT EVAL LOW COMPLEX 20 MIN: CPT

## 2020-01-01 PROCEDURE — 99284 EMERGENCY DEPT VISIT MOD MDM: CPT | Mod: 25

## 2020-01-01 PROCEDURE — 83036 HEMOGLOBIN GLYCOSYLATED A1C: CPT

## 2020-01-01 PROCEDURE — 3077F SYST BP >= 140 MM HG: CPT | Mod: CPTII,S$GLB,, | Performed by: INTERNAL MEDICINE

## 2020-01-01 PROCEDURE — 90694 VACC AIIV4 NO PRSRV 0.5ML IM: CPT | Mod: S$GLB,,, | Performed by: INTERNAL MEDICINE

## 2020-01-01 PROCEDURE — 99213 PR OFFICE/OUTPT VISIT, EST, LEVL III, 20-29 MIN: ICD-10-PCS | Mod: GC,S$GLB,, | Performed by: HOSPITALIST

## 2020-01-01 PROCEDURE — 3008F BODY MASS INDEX DOCD: CPT | Mod: CPTII,S$GLB,, | Performed by: PHYSICIAN ASSISTANT

## 2020-01-01 PROCEDURE — 63600175 PHARM REV CODE 636 W HCPCS: Performed by: PHYSICIAN ASSISTANT

## 2020-01-01 PROCEDURE — 96139 PSYCL/NRPSYC TST TECH EA: CPT | Mod: S$GLB,,, | Performed by: CLINICAL NEUROPSYCHOLOGIST

## 2020-01-01 PROCEDURE — 1159F MED LIST DOCD IN RCRD: CPT | Mod: S$GLB,,, | Performed by: COLON & RECTAL SURGERY

## 2020-01-01 PROCEDURE — 77062 BREAST TOMOSYNTHESIS BI: CPT | Mod: TC

## 2020-01-01 PROCEDURE — 88305 TISSUE EXAM BY PATHOLOGIST: CPT | Performed by: PATHOLOGY

## 2020-01-01 PROCEDURE — 99213 PR OFFICE/OUTPT VISIT, EST, LEVL III, 20-29 MIN: ICD-10-PCS | Mod: GC,S$GLB,, | Performed by: STUDENT IN AN ORGANIZED HEALTH CARE EDUCATION/TRAINING PROGRAM

## 2020-01-01 PROCEDURE — 99223 1ST HOSP IP/OBS HIGH 75: CPT | Mod: ,,, | Performed by: INTERNAL MEDICINE

## 2020-01-01 PROCEDURE — 96132 PR NEUROPSYCHOLOGIC TEST EVAL SVCS, 1ST HR: ICD-10-PCS | Mod: S$GLB,,, | Performed by: CLINICAL NEUROPSYCHOLOGIST

## 2020-01-01 PROCEDURE — 1125F AMNT PAIN NOTED PAIN PRSNT: CPT | Mod: S$GLB,,, | Performed by: COLON & RECTAL SURGERY

## 2020-01-01 PROCEDURE — 71250 CT CHEST WITHOUT CONTRAST: ICD-10-PCS | Mod: 26,,, | Performed by: RADIOLOGY

## 2020-01-01 PROCEDURE — 99204 PR OFFICE/OUTPT VISIT, NEW, LEVL IV, 45-59 MIN: ICD-10-PCS | Mod: S$GLB,,, | Performed by: COLON & RECTAL SURGERY

## 2020-01-01 PROCEDURE — 99291 CRITICAL CARE FIRST HOUR: CPT | Mod: ,,, | Performed by: EMERGENCY MEDICINE

## 2020-01-01 PROCEDURE — 1159F MED LIST DOCD IN RCRD: CPT | Mod: S$GLB,,, | Performed by: PHYSICIAN ASSISTANT

## 2020-01-01 PROCEDURE — 3008F PR BODY MASS INDEX (BMI) DOCUMENTED: ICD-10-PCS | Mod: CPTII,S$GLB,, | Performed by: COLON & RECTAL SURGERY

## 2020-01-01 PROCEDURE — 99214 PR OFFICE/OUTPT VISIT, EST, LEVL IV, 30-39 MIN: ICD-10-PCS | Mod: 25,S$GLB,, | Performed by: INTERNAL MEDICINE

## 2020-01-01 PROCEDURE — 1101F PR PT FALLS ASSESS DOC 0-1 FALLS W/OUT INJ PAST YR: ICD-10-PCS | Mod: CPTII,S$GLB,, | Performed by: PHYSICIAN ASSISTANT

## 2020-01-01 PROCEDURE — 78815 PET IMAGE W/CT SKULL-THIGH: CPT | Mod: TC,PS

## 2020-01-01 PROCEDURE — 99999 PR PBB SHADOW E&M-EST. PATIENT-LVL V: CPT | Mod: PBBFAC,,, | Performed by: COLON & RECTAL SURGERY

## 2020-01-01 PROCEDURE — 82962 GLUCOSE BLOOD TEST: CPT

## 2020-01-01 PROCEDURE — 99215 OFFICE O/P EST HI 40 MIN: CPT | Mod: S$GLB,,, | Performed by: INTERNAL MEDICINE

## 2020-01-01 RX ORDER — PROPOFOL 10 MG/ML
VIAL (ML) INTRAVENOUS CONTINUOUS PRN
Status: DISCONTINUED | OUTPATIENT
Start: 2020-01-01 | End: 2020-01-01

## 2020-01-01 RX ORDER — MORPHINE SULFATE 2 MG/ML
6 INJECTION, SOLUTION INTRAMUSCULAR; INTRAVENOUS
Status: COMPLETED | OUTPATIENT
Start: 2020-01-01 | End: 2020-01-01

## 2020-01-01 RX ORDER — CEFTRIAXONE 1 G/1
1 INJECTION, POWDER, FOR SOLUTION INTRAMUSCULAR; INTRAVENOUS
Status: DISCONTINUED | OUTPATIENT
Start: 2020-01-01 | End: 2020-01-01

## 2020-01-01 RX ORDER — INSULIN ASPART 100 [IU]/ML
1-10 INJECTION, SOLUTION INTRAVENOUS; SUBCUTANEOUS
Status: DISCONTINUED | OUTPATIENT
Start: 2020-01-01 | End: 2020-01-01 | Stop reason: HOSPADM

## 2020-01-01 RX ORDER — IPRATROPIUM BROMIDE AND ALBUTEROL SULFATE 2.5; .5 MG/3ML; MG/3ML
3 SOLUTION RESPIRATORY (INHALATION) EVERY 6 HOURS PRN
Status: DISCONTINUED | OUTPATIENT
Start: 2020-01-01 | End: 2020-01-01 | Stop reason: HOSPADM

## 2020-01-01 RX ORDER — POTASSIUM CHLORIDE 1.5 G/1.58G
40 POWDER, FOR SOLUTION ORAL EVERY 4 HOURS
Status: DISPENSED | OUTPATIENT
Start: 2020-01-01 | End: 2020-01-01

## 2020-01-01 RX ORDER — HYDROMORPHONE HYDROCHLORIDE 1 MG/ML
0.5 INJECTION, SOLUTION INTRAMUSCULAR; INTRAVENOUS; SUBCUTANEOUS EVERY 6 HOURS PRN
Status: DISCONTINUED | OUTPATIENT
Start: 2020-01-01 | End: 2020-01-01 | Stop reason: HOSPADM

## 2020-01-01 RX ORDER — MORPHINE SULFATE ORAL SOLUTION 10 MG/5ML
10 SOLUTION ORAL EVERY 4 HOURS PRN
Status: DISCONTINUED | OUTPATIENT
Start: 2020-01-01 | End: 2020-01-01 | Stop reason: HOSPADM

## 2020-01-01 RX ORDER — LINACLOTIDE 145 UG/1
CAPSULE, GELATIN COATED ORAL
Qty: 90 CAPSULE | Refills: 0 | Status: SHIPPED | OUTPATIENT
Start: 2020-01-01 | End: 2021-01-01

## 2020-01-01 RX ORDER — DICYCLOMINE HYDROCHLORIDE 10 MG/1
10 CAPSULE ORAL 2 TIMES DAILY
Qty: 90 CAPSULE | Refills: 0 | Status: SHIPPED | OUTPATIENT
Start: 2020-01-01 | End: 2021-01-01

## 2020-01-01 RX ORDER — MAGNESIUM SULFATE HEPTAHYDRATE 40 MG/ML
2 INJECTION, SOLUTION INTRAVENOUS ONCE
Status: COMPLETED | OUTPATIENT
Start: 2020-01-01 | End: 2020-01-01

## 2020-01-01 RX ORDER — POTASSIUM CHLORIDE 20 MEQ/1
40 TABLET, EXTENDED RELEASE ORAL ONCE
Status: COMPLETED | OUTPATIENT
Start: 2020-01-01 | End: 2020-01-01

## 2020-01-01 RX ORDER — TALC
6 POWDER (GRAM) TOPICAL NIGHTLY PRN
Refills: 0 | COMMUNITY
Start: 2020-01-01 | End: 2021-01-01

## 2020-01-01 RX ORDER — DONEPEZIL HYDROCHLORIDE 10 MG/1
10 TABLET, FILM COATED ORAL NIGHTLY
Status: DISCONTINUED | OUTPATIENT
Start: 2020-01-01 | End: 2020-01-01 | Stop reason: HOSPADM

## 2020-01-01 RX ORDER — TRAMADOL HYDROCHLORIDE 50 MG/1
50 TABLET ORAL EVERY 8 HOURS PRN
Qty: 60 TABLET | Refills: 0 | Status: SHIPPED | OUTPATIENT
Start: 2020-01-01 | End: 2020-01-01 | Stop reason: SDUPTHER

## 2020-01-01 RX ORDER — SODIUM CHLORIDE 0.9 % (FLUSH) 0.9 %
10 SYRINGE (ML) INJECTION
Status: CANCELLED | OUTPATIENT
Start: 2020-01-01

## 2020-01-01 RX ORDER — CEFTRIAXONE 1 G/1
1 INJECTION, POWDER, FOR SOLUTION INTRAMUSCULAR; INTRAVENOUS
Status: COMPLETED | OUTPATIENT
Start: 2020-01-01 | End: 2020-01-01

## 2020-01-01 RX ORDER — AMOXICILLIN AND CLAVULANATE POTASSIUM 500; 125 MG/1; MG/1
1 TABLET, FILM COATED ORAL 2 TIMES DAILY
Qty: 10 TABLET | Refills: 0 | Status: SHIPPED | OUTPATIENT
Start: 2020-01-01 | End: 2020-01-01

## 2020-01-01 RX ORDER — ONDANSETRON 2 MG/ML
4 INJECTION INTRAMUSCULAR; INTRAVENOUS EVERY 8 HOURS
Status: DISCONTINUED | OUTPATIENT
Start: 2020-01-01 | End: 2020-01-01

## 2020-01-01 RX ORDER — MORPHINE SULFATE 2 MG/ML
2 INJECTION, SOLUTION INTRAMUSCULAR; INTRAVENOUS ONCE
Status: COMPLETED | OUTPATIENT
Start: 2020-01-01 | End: 2020-01-01

## 2020-01-01 RX ORDER — CARVEDILOL 3.12 MG/1
3.12 TABLET ORAL 2 TIMES DAILY WITH MEALS
Status: DISCONTINUED | OUTPATIENT
Start: 2020-01-01 | End: 2020-01-01 | Stop reason: HOSPADM

## 2020-01-01 RX ORDER — HYDROMORPHONE HYDROCHLORIDE 1 MG/ML
1 INJECTION, SOLUTION INTRAMUSCULAR; INTRAVENOUS; SUBCUTANEOUS ONCE AS NEEDED
Status: COMPLETED | OUTPATIENT
Start: 2020-01-01 | End: 2020-01-01

## 2020-01-01 RX ORDER — SODIUM BICARBONATE 650 MG/1
650 TABLET ORAL 2 TIMES DAILY
Status: DISCONTINUED | OUTPATIENT
Start: 2020-01-01 | End: 2020-01-01 | Stop reason: HOSPADM

## 2020-01-01 RX ORDER — SODIUM CHLORIDE 9 MG/ML
INJECTION, SOLUTION INTRAVENOUS CONTINUOUS
Status: DISCONTINUED | OUTPATIENT
Start: 2020-01-01 | End: 2020-01-01

## 2020-01-01 RX ORDER — SODIUM CHLORIDE 0.9 % (FLUSH) 0.9 %
10 SYRINGE (ML) INJECTION
Status: DISCONTINUED | OUTPATIENT
Start: 2020-01-01 | End: 2020-01-01 | Stop reason: HOSPADM

## 2020-01-01 RX ORDER — ACETAMINOPHEN 500 MG
1000 TABLET ORAL EVERY 6 HOURS PRN
Status: DISCONTINUED | OUTPATIENT
Start: 2020-01-01 | End: 2020-01-01 | Stop reason: HOSPADM

## 2020-01-01 RX ORDER — PHENYLEPHRINE HCL IN 0.9% NACL 1 MG/10 ML
SYRINGE (ML) INTRAVENOUS
Status: DISCONTINUED | OUTPATIENT
Start: 2020-01-01 | End: 2020-01-01

## 2020-01-01 RX ORDER — METRONIDAZOLE 500 MG/100ML
500 INJECTION, SOLUTION INTRAVENOUS
Status: COMPLETED | OUTPATIENT
Start: 2020-01-01 | End: 2020-01-01

## 2020-01-01 RX ORDER — POTASSIUM CHLORIDE 750 MG/1
10 CAPSULE, EXTENDED RELEASE ORAL DAILY
Status: DISCONTINUED | OUTPATIENT
Start: 2020-01-01 | End: 2020-01-01

## 2020-01-01 RX ORDER — DULOXETIN HYDROCHLORIDE 30 MG/1
30 CAPSULE, DELAYED RELEASE ORAL DAILY
Status: DISCONTINUED | OUTPATIENT
Start: 2020-01-01 | End: 2020-01-01 | Stop reason: HOSPADM

## 2020-01-01 RX ORDER — GABAPENTIN 100 MG/1
100 CAPSULE ORAL 2 TIMES DAILY
Status: DISCONTINUED | OUTPATIENT
Start: 2020-01-01 | End: 2020-01-01

## 2020-01-01 RX ORDER — HEPARIN 100 UNIT/ML
500 SYRINGE INTRAVENOUS
Status: DISCONTINUED | OUTPATIENT
Start: 2020-01-01 | End: 2020-01-01 | Stop reason: HOSPADM

## 2020-01-01 RX ORDER — LIDOCAINE AND PRILOCAINE 25; 25 MG/G; MG/G
CREAM TOPICAL
Qty: 30 G | Refills: 1 | Status: SHIPPED | OUTPATIENT
Start: 2020-01-01 | End: 2021-01-01

## 2020-01-01 RX ORDER — ONDANSETRON 2 MG/ML
8 INJECTION INTRAMUSCULAR; INTRAVENOUS
Status: COMPLETED | OUTPATIENT
Start: 2020-01-01 | End: 2020-01-01

## 2020-01-01 RX ORDER — PREDNISONE 20 MG/1
40 TABLET ORAL DAILY
Qty: 14 TABLET | Refills: 0 | Status: SHIPPED | OUTPATIENT
Start: 2020-01-01 | End: 2020-01-01

## 2020-01-01 RX ORDER — TRAMADOL HYDROCHLORIDE 50 MG/1
50 TABLET ORAL EVERY 8 HOURS PRN
Qty: 60 TABLET | Refills: 0 | Status: SHIPPED | OUTPATIENT
Start: 2020-01-01 | End: 2021-01-01 | Stop reason: SINTOL

## 2020-01-01 RX ORDER — DULOXETIN HYDROCHLORIDE 30 MG/1
CAPSULE, DELAYED RELEASE ORAL
Qty: 30 CAPSULE | Refills: 1 | OUTPATIENT
Start: 2020-01-01

## 2020-01-01 RX ORDER — DICYCLOMINE HYDROCHLORIDE 10 MG/1
10 CAPSULE ORAL 2 TIMES DAILY
Status: DISCONTINUED | OUTPATIENT
Start: 2020-01-01 | End: 2020-01-01 | Stop reason: HOSPADM

## 2020-01-01 RX ORDER — PANTOPRAZOLE SODIUM 40 MG/1
40 TABLET, DELAYED RELEASE ORAL DAILY
Qty: 30 TABLET | Refills: 11 | Status: SHIPPED | OUTPATIENT
Start: 2020-01-01 | End: 2020-01-01

## 2020-01-01 RX ORDER — PSEUDOEPHEDRINE/ACETAMINOPHEN 30MG-500MG
100 TABLET ORAL
Status: COMPLETED | OUTPATIENT
Start: 2020-01-01 | End: 2020-01-01

## 2020-01-01 RX ORDER — PREDNISONE 20 MG/1
60 TABLET ORAL DAILY
Status: DISCONTINUED | OUTPATIENT
Start: 2020-01-01 | End: 2020-01-01 | Stop reason: HOSPADM

## 2020-01-01 RX ORDER — HEPARIN 100 UNIT/ML
500 SYRINGE INTRAVENOUS
Status: CANCELLED | OUTPATIENT
Start: 2020-01-01

## 2020-01-01 RX ORDER — IBUPROFEN 200 MG
16 TABLET ORAL
Status: DISCONTINUED | OUTPATIENT
Start: 2020-01-01 | End: 2020-01-01 | Stop reason: HOSPADM

## 2020-01-01 RX ORDER — OXYCODONE AND ACETAMINOPHEN 5; 325 MG/1; MG/1
1 TABLET ORAL EVERY 6 HOURS PRN
Qty: 30 EACH | Refills: 0 | Status: SHIPPED | OUTPATIENT
Start: 2020-01-01 | End: 2021-01-01 | Stop reason: SDUPTHER

## 2020-01-01 RX ORDER — CARBIDOPA AND LEVODOPA 25; 100 MG/1; MG/1
1 TABLET ORAL 3 TIMES DAILY
Qty: 90 TABLET | Refills: 11 | Status: ON HOLD | OUTPATIENT
Start: 2020-01-01 | End: 2020-01-01 | Stop reason: HOSPADM

## 2020-01-01 RX ORDER — LIDOCAINE HYDROCHLORIDE 20 MG/ML
JELLY TOPICAL
Status: DISCONTINUED | OUTPATIENT
Start: 2020-01-01 | End: 2020-01-01

## 2020-01-01 RX ORDER — PROCHLORPERAZINE MALEATE 10 MG
10 TABLET ORAL EVERY 6 HOURS PRN
Qty: 30 TABLET | Refills: 0 | Status: SHIPPED | OUTPATIENT
Start: 2020-01-01 | End: 2020-01-01

## 2020-01-01 RX ORDER — DOCUSATE SODIUM 100 MG/1
100 CAPSULE, LIQUID FILLED ORAL 2 TIMES DAILY
Qty: 60 CAPSULE | Refills: 0 | Status: SHIPPED | OUTPATIENT
Start: 2020-01-01 | End: 2021-01-01

## 2020-01-01 RX ORDER — DICLOFENAC SODIUM 10 MG/G
2 GEL TOPICAL DAILY
Qty: 100 G | Refills: 0 | Status: SHIPPED | OUTPATIENT
Start: 2020-01-01 | End: 2020-01-01

## 2020-01-01 RX ORDER — ACETAMINOPHEN 325 MG/1
650 TABLET ORAL EVERY 4 HOURS PRN
Status: DISCONTINUED | OUTPATIENT
Start: 2020-01-01 | End: 2020-01-01 | Stop reason: HOSPADM

## 2020-01-01 RX ORDER — ONDANSETRON 2 MG/ML
4 INJECTION INTRAMUSCULAR; INTRAVENOUS
Status: DISCONTINUED | OUTPATIENT
Start: 2020-01-01 | End: 2020-01-01

## 2020-01-01 RX ORDER — HYDROCODONE BITARTRATE AND ACETAMINOPHEN 5; 325 MG/1; MG/1
1 TABLET ORAL EVERY 6 HOURS PRN
Status: DISCONTINUED | OUTPATIENT
Start: 2020-01-01 | End: 2020-01-01 | Stop reason: HOSPADM

## 2020-01-01 RX ORDER — LANOLIN ALCOHOL/MO/W.PET/CERES
400 CREAM (GRAM) TOPICAL ONCE
Status: COMPLETED | OUTPATIENT
Start: 2020-01-01 | End: 2020-01-01

## 2020-01-01 RX ORDER — ONDANSETRON 2 MG/ML
4 INJECTION INTRAMUSCULAR; INTRAVENOUS EVERY 8 HOURS PRN
Status: DISCONTINUED | OUTPATIENT
Start: 2020-01-01 | End: 2020-01-01 | Stop reason: HOSPADM

## 2020-01-01 RX ORDER — DULOXETIN HYDROCHLORIDE 30 MG/1
CAPSULE, DELAYED RELEASE ORAL
Qty: 30 CAPSULE | Refills: 1 | Status: SHIPPED | OUTPATIENT
Start: 2020-01-01 | End: 2020-01-01

## 2020-01-01 RX ORDER — GLUCAGON 1 MG
1 KIT INJECTION
Status: DISCONTINUED | OUTPATIENT
Start: 2020-01-01 | End: 2020-01-01 | Stop reason: HOSPADM

## 2020-01-01 RX ORDER — DEXMEDETOMIDINE HYDROCHLORIDE 100 UG/ML
INJECTION, SOLUTION INTRAVENOUS
Status: DISCONTINUED | OUTPATIENT
Start: 2020-01-01 | End: 2020-01-01

## 2020-01-01 RX ORDER — OXYCODONE HYDROCHLORIDE 10 MG/1
10 TABLET ORAL EVERY 6 HOURS PRN
Status: DISCONTINUED | OUTPATIENT
Start: 2020-01-01 | End: 2020-01-01 | Stop reason: HOSPADM

## 2020-01-01 RX ORDER — AMLODIPINE BESYLATE 10 MG/1
TABLET ORAL
Qty: 90 TABLET | Refills: 0 | Status: SHIPPED | OUTPATIENT
Start: 2020-01-01 | End: 2021-01-01

## 2020-01-01 RX ORDER — CHLORTHALIDONE 25 MG/1
50 TABLET ORAL DAILY
Status: DISCONTINUED | OUTPATIENT
Start: 2020-01-01 | End: 2020-01-01 | Stop reason: HOSPADM

## 2020-01-01 RX ORDER — LIDOCAINE AND PRILOCAINE 25; 25 MG/G; MG/G
CREAM TOPICAL 2 TIMES DAILY
Status: DISCONTINUED | OUTPATIENT
Start: 2020-01-01 | End: 2020-01-01 | Stop reason: HOSPADM

## 2020-01-01 RX ORDER — LIDOCAINE AND PRILOCAINE 25; 25 MG/G; MG/G
CREAM TOPICAL
Qty: 30 G | Refills: 2 | Status: SHIPPED | OUTPATIENT
Start: 2020-01-01 | End: 2021-01-01

## 2020-01-01 RX ORDER — AMLODIPINE BESYLATE 10 MG/1
10 TABLET ORAL DAILY
Status: DISCONTINUED | OUTPATIENT
Start: 2020-01-01 | End: 2020-01-01 | Stop reason: HOSPADM

## 2020-01-01 RX ORDER — PANTOPRAZOLE SODIUM 40 MG/10ML
80 INJECTION, POWDER, LYOPHILIZED, FOR SOLUTION INTRAVENOUS
Status: COMPLETED | OUTPATIENT
Start: 2020-01-01 | End: 2020-01-01

## 2020-01-01 RX ORDER — AMLODIPINE BESYLATE 10 MG/1
10 TABLET ORAL DAILY
Qty: 90 TABLET | Refills: 0 | Status: SHIPPED | OUTPATIENT
Start: 2020-01-01 | End: 2020-01-01

## 2020-01-01 RX ORDER — TALC
6 POWDER (GRAM) TOPICAL NIGHTLY PRN
Status: DISCONTINUED | OUTPATIENT
Start: 2020-01-01 | End: 2020-01-01 | Stop reason: HOSPADM

## 2020-01-01 RX ORDER — PROCHLORPERAZINE MALEATE 10 MG
10 TABLET ORAL EVERY 6 HOURS PRN
COMMUNITY
Start: 2020-01-01 | End: 2020-01-01 | Stop reason: SDUPTHER

## 2020-01-01 RX ORDER — LANOLIN ALCOHOL/MO/W.PET/CERES
400 CREAM (GRAM) TOPICAL DAILY
Refills: 0 | COMMUNITY
Start: 2020-01-01 | End: 2021-01-01

## 2020-01-01 RX ORDER — PANTOPRAZOLE SODIUM 40 MG/1
40 TABLET, DELAYED RELEASE ORAL DAILY
Status: DISCONTINUED | OUTPATIENT
Start: 2020-01-01 | End: 2020-01-01 | Stop reason: HOSPADM

## 2020-01-01 RX ORDER — ONDANSETRON 4 MG/1
4 TABLET, ORALLY DISINTEGRATING ORAL EVERY 6 HOURS PRN
Qty: 40 TABLET | Refills: 2 | OUTPATIENT
Start: 2020-01-01 | End: 2020-01-01 | Stop reason: SDUPTHER

## 2020-01-01 RX ORDER — POLYETHYLENE GLYCOL 3350, SODIUM SULFATE ANHYDROUS, SODIUM BICARBONATE, SODIUM CHLORIDE, POTASSIUM CHLORIDE 236; 22.74; 6.74; 5.86; 2.97 G/4L; G/4L; G/4L; G/4L; G/4L
4 POWDER, FOR SOLUTION ORAL ONCE
Qty: 4000 ML | Refills: 0 | Status: SHIPPED | OUTPATIENT
Start: 2020-01-01 | End: 2020-01-01

## 2020-01-01 RX ORDER — LANOLIN ALCOHOL/MO/W.PET/CERES
400 CREAM (GRAM) TOPICAL DAILY
Status: DISCONTINUED | OUTPATIENT
Start: 2020-01-01 | End: 2020-01-01 | Stop reason: HOSPADM

## 2020-01-01 RX ORDER — LISINOPRIL 2.5 MG/1
2.5 TABLET ORAL DAILY
Status: DISCONTINUED | OUTPATIENT
Start: 2020-01-01 | End: 2020-01-01 | Stop reason: HOSPADM

## 2020-01-01 RX ORDER — ONDANSETRON HYDROCHLORIDE 8 MG/1
8 TABLET, FILM COATED ORAL EVERY 8 HOURS PRN
Qty: 30 TABLET | Refills: 0 | Status: SHIPPED | OUTPATIENT
Start: 2020-01-01 | End: 2020-01-01 | Stop reason: SDUPTHER

## 2020-01-01 RX ORDER — AMOXICILLIN AND CLAVULANATE POTASSIUM 500; 125 MG/1; MG/1
1 TABLET, FILM COATED ORAL 2 TIMES DAILY
Status: DISCONTINUED | OUTPATIENT
Start: 2020-01-01 | End: 2020-01-01 | Stop reason: HOSPADM

## 2020-01-01 RX ORDER — IBUPROFEN 200 MG
24 TABLET ORAL
Status: DISCONTINUED | OUTPATIENT
Start: 2020-01-01 | End: 2020-01-01 | Stop reason: HOSPADM

## 2020-01-01 RX ORDER — POLYETHYLENE GLYCOL 3350 17 G/17G
17 POWDER, FOR SOLUTION ORAL DAILY
Qty: 30 EACH | Refills: 0 | Status: SHIPPED | OUTPATIENT
Start: 2020-01-01 | End: 2021-01-01

## 2020-01-01 RX ORDER — METRONIDAZOLE 500 MG/100ML
500 INJECTION, SOLUTION INTRAVENOUS
Status: DISCONTINUED | OUTPATIENT
Start: 2020-01-01 | End: 2020-01-01

## 2020-01-01 RX ORDER — HEPARIN SODIUM 5000 [USP'U]/ML
5000 INJECTION, SOLUTION INTRAVENOUS; SUBCUTANEOUS EVERY 12 HOURS
Status: DISCONTINUED | OUTPATIENT
Start: 2020-01-01 | End: 2020-01-01 | Stop reason: HOSPADM

## 2020-01-01 RX ORDER — LIDOCAINE HYDROCHLORIDE 20 MG/ML
INJECTION, SOLUTION EPIDURAL; INFILTRATION; INTRACAUDAL; PERINEURAL
Status: DISCONTINUED | OUTPATIENT
Start: 2020-01-01 | End: 2020-01-01

## 2020-01-01 RX ORDER — HEPARIN SODIUM 5000 [USP'U]/ML
5000 INJECTION, SOLUTION INTRAVENOUS; SUBCUTANEOUS EVERY 8 HOURS
Status: DISCONTINUED | OUTPATIENT
Start: 2020-01-01 | End: 2020-01-01 | Stop reason: HOSPADM

## 2020-01-01 RX ORDER — HEPARIN SODIUM 5000 [USP'U]/ML
5000 INJECTION, SOLUTION INTRAVENOUS; SUBCUTANEOUS EVERY 12 HOURS
Status: CANCELLED | OUTPATIENT
Start: 2020-01-01

## 2020-01-01 RX ORDER — PROCHLORPERAZINE EDISYLATE 5 MG/ML
5 INJECTION INTRAMUSCULAR; INTRAVENOUS EVERY 6 HOURS PRN
Status: DISCONTINUED | OUTPATIENT
Start: 2020-01-01 | End: 2020-01-01 | Stop reason: HOSPADM

## 2020-01-01 RX ORDER — CARBIDOPA AND LEVODOPA 25; 100 MG/1; MG/1
1 TABLET ORAL 3 TIMES DAILY
Status: DISCONTINUED | OUTPATIENT
Start: 2020-01-01 | End: 2020-01-01

## 2020-01-01 RX ORDER — PANTOPRAZOLE SODIUM 40 MG/10ML
40 INJECTION, POWDER, LYOPHILIZED, FOR SOLUTION INTRAVENOUS 2 TIMES DAILY
Status: DISCONTINUED | OUTPATIENT
Start: 2020-01-01 | End: 2020-01-01

## 2020-01-01 RX ORDER — SODIUM CHLORIDE 9 MG/ML
INJECTION, SOLUTION INTRAVENOUS CONTINUOUS
Status: DISCONTINUED | OUTPATIENT
Start: 2020-01-01 | End: 2020-01-01 | Stop reason: HOSPADM

## 2020-01-01 RX ORDER — POTASSIUM CHLORIDE 20 MEQ/1
40 TABLET, EXTENDED RELEASE ORAL EVERY 4 HOURS
Status: DISCONTINUED | OUTPATIENT
Start: 2020-01-01 | End: 2020-01-01

## 2020-01-01 RX ORDER — ONDANSETRON 4 MG/1
8 TABLET, FILM COATED ORAL EVERY 6 HOURS PRN
Status: DISCONTINUED | OUTPATIENT
Start: 2020-01-01 | End: 2020-01-01

## 2020-01-01 RX ORDER — GABAPENTIN 100 MG/1
100 CAPSULE ORAL 3 TIMES DAILY
Status: DISCONTINUED | OUTPATIENT
Start: 2020-01-01 | End: 2020-01-01 | Stop reason: HOSPADM

## 2020-01-01 RX ORDER — LIDOCAINE 50 MG/G
1 PATCH TOPICAL DAILY
Qty: 30 PATCH | Refills: 0 | Status: SHIPPED | OUTPATIENT
Start: 2020-01-01 | End: 2021-01-01

## 2020-01-01 RX ORDER — HYDROCODONE BITARTRATE AND ACETAMINOPHEN 5; 325 MG/1; MG/1
1 TABLET ORAL EVERY 6 HOURS PRN
Status: DISCONTINUED | OUTPATIENT
Start: 2020-01-01 | End: 2020-01-01

## 2020-01-01 RX ORDER — ACETAMINOPHEN 325 MG/1
650 TABLET ORAL
Status: COMPLETED | OUTPATIENT
Start: 2020-01-01 | End: 2020-01-01

## 2020-01-01 RX ORDER — SODIUM BICARBONATE 650 MG/1
650 TABLET ORAL 2 TIMES DAILY
Qty: 120 TABLET | Refills: 11 | COMMUNITY
Start: 2020-01-01 | End: 2021-01-01

## 2020-01-01 RX ORDER — VALACYCLOVIR HYDROCHLORIDE 1 G/1
1000 TABLET, FILM COATED ORAL DAILY
Qty: 7 TABLET | Refills: 0 | Status: SHIPPED | OUTPATIENT
Start: 2020-01-01 | End: 2020-01-01

## 2020-01-01 RX ORDER — ONDANSETRON 2 MG/ML
8 INJECTION INTRAMUSCULAR; INTRAVENOUS EVERY 8 HOURS
Status: COMPLETED | OUTPATIENT
Start: 2020-01-01 | End: 2020-01-01

## 2020-01-01 RX ORDER — ONDANSETRON 4 MG/1
4 TABLET, ORALLY DISINTEGRATING ORAL EVERY 8 HOURS PRN
Qty: 20 TABLET | Refills: 2 | Status: SHIPPED | OUTPATIENT
Start: 2020-01-01 | End: 2020-01-01 | Stop reason: SDUPTHER

## 2020-01-01 RX ORDER — ONDANSETRON 4 MG/1
4 TABLET, ORALLY DISINTEGRATING ORAL EVERY 6 HOURS PRN
Qty: 40 TABLET | Refills: 2 | Status: SHIPPED | OUTPATIENT
Start: 2020-01-01 | End: 2020-01-01

## 2020-01-01 RX ORDER — FERROUS SULFATE 325(65) MG
325 TABLET ORAL
Qty: 30 TABLET | Refills: 4 | Status: SHIPPED | OUTPATIENT
Start: 2020-01-01

## 2020-01-01 RX ORDER — SYRING-NEEDL,DISP,INSUL,0.3 ML 29 G X1/2"
296 SYRINGE, EMPTY DISPOSABLE MISCELLANEOUS
Status: COMPLETED | OUTPATIENT
Start: 2020-01-01 | End: 2020-01-01

## 2020-01-01 RX ORDER — AMOXICILLIN AND CLAVULANATE POTASSIUM 500; 125 MG/1; MG/1
1 TABLET, FILM COATED ORAL 2 TIMES DAILY
Status: DISCONTINUED | OUTPATIENT
Start: 2020-01-01 | End: 2020-01-01

## 2020-01-01 RX ORDER — PROPOFOL 10 MG/ML
VIAL (ML) INTRAVENOUS
Status: DISCONTINUED | OUTPATIENT
Start: 2020-01-01 | End: 2020-01-01

## 2020-01-01 RX ORDER — HEPARIN SODIUM 5000 [USP'U]/ML
5000 INJECTION, SOLUTION INTRAVENOUS; SUBCUTANEOUS EVERY 8 HOURS
Status: DISCONTINUED | OUTPATIENT
Start: 2020-01-01 | End: 2020-01-01

## 2020-01-01 RX ORDER — HYOSCYAMINE SULFATE 0.125 MG
125 TABLET ORAL EVERY 4 HOURS PRN
Qty: 120 TABLET | Refills: 4 | Status: SHIPPED | OUTPATIENT
Start: 2020-01-01 | End: 2021-01-01

## 2020-01-01 RX ORDER — HYDRALAZINE HYDROCHLORIDE 20 MG/ML
10 INJECTION INTRAMUSCULAR; INTRAVENOUS EVERY 6 HOURS PRN
Status: DISCONTINUED | OUTPATIENT
Start: 2020-01-01 | End: 2020-01-01 | Stop reason: HOSPADM

## 2020-01-01 RX ORDER — ENOXAPARIN SODIUM 100 MG/ML
40 INJECTION SUBCUTANEOUS EVERY 24 HOURS
Status: DISCONTINUED | OUTPATIENT
Start: 2020-01-01 | End: 2020-01-01

## 2020-01-01 RX ORDER — DONEPEZIL HYDROCHLORIDE 5 MG/1
10 TABLET, FILM COATED ORAL NIGHTLY
Status: DISCONTINUED | OUTPATIENT
Start: 2020-01-01 | End: 2020-01-01

## 2020-01-01 RX ORDER — ONDANSETRON HYDROCHLORIDE 8 MG/1
8 TABLET, FILM COATED ORAL EVERY 8 HOURS PRN
Qty: 30 TABLET | Refills: 0 | Status: SHIPPED | OUTPATIENT
Start: 2020-01-01 | End: 2021-01-01

## 2020-01-01 RX ORDER — GABAPENTIN 100 MG/1
100 CAPSULE ORAL 2 TIMES DAILY
Status: DISCONTINUED | OUTPATIENT
Start: 2020-01-01 | End: 2020-01-01 | Stop reason: HOSPADM

## 2020-01-01 RX ORDER — ATORVASTATIN CALCIUM 40 MG/1
TABLET, FILM COATED ORAL
Qty: 90 TABLET | Refills: 3 | Status: SHIPPED | OUTPATIENT
Start: 2020-01-01

## 2020-01-01 RX ADMIN — HYPROMELLOSE 2910 1 DROP: 5 SOLUTION OPHTHALMIC at 10:08

## 2020-01-01 RX ADMIN — CHLORTHALIDONE 50 MG: 25 TABLET ORAL at 10:08

## 2020-01-01 RX ADMIN — HYDROMORPHONE HYDROCHLORIDE 1 MG: 1 INJECTION, SOLUTION INTRAMUSCULAR; INTRAVENOUS; SUBCUTANEOUS at 04:08

## 2020-01-01 RX ADMIN — DIPHENHYDRAMINE HYDROCHLORIDE 10 ML: 25 SOLUTION ORAL at 06:08

## 2020-01-01 RX ADMIN — DULOXETINE 30 MG: 30 CAPSULE, DELAYED RELEASE ORAL at 09:08

## 2020-01-01 RX ADMIN — HYPROMELLOSE 2910 1 DROP: 5 SOLUTION OPHTHALMIC at 08:08

## 2020-01-01 RX ADMIN — CHLORTHALIDONE 50 MG: 25 TABLET ORAL at 08:08

## 2020-01-01 RX ADMIN — MELATONIN TAB 3 MG 6 MG: 3 TAB at 09:08

## 2020-01-01 RX ADMIN — SODIUM CHLORIDE 400 MG: 9 INJECTION, SOLUTION INTRAVENOUS at 11:12

## 2020-01-01 RX ADMIN — DICYCLOMINE HYDROCHLORIDE 10 MG: 10 CAPSULE ORAL at 08:08

## 2020-01-01 RX ADMIN — PROPOFOL 10 MG: 10 INJECTION, EMULSION INTRAVENOUS at 09:12

## 2020-01-01 RX ADMIN — ACETAMINOPHEN 650 MG: 325 TABLET ORAL at 08:08

## 2020-01-01 RX ADMIN — SODIUM CHLORIDE 1000 ML: 0.9 INJECTION, SOLUTION INTRAVENOUS at 11:11

## 2020-01-01 RX ADMIN — HYPROMELLOSE 2910 1 DROP: 5 SOLUTION OPHTHALMIC at 12:08

## 2020-01-01 RX ADMIN — CEFTRIAXONE SODIUM 1 G: 1 INJECTION, POWDER, FOR SOLUTION INTRAMUSCULAR; INTRAVENOUS at 06:08

## 2020-01-01 RX ADMIN — SODIUM BICARBONATE 650 MG TABLET 650 MG: at 08:08

## 2020-01-01 RX ADMIN — Medication 100 ML: at 04:11

## 2020-01-01 RX ADMIN — AMPICILLIN AND SULBACTAM 1.5 G: 1; .5 INJECTION, POWDER, FOR SOLUTION INTRAVENOUS at 11:08

## 2020-01-01 RX ADMIN — DIPHENHYDRAMINE HYDROCHLORIDE 10 ML: 25 SOLUTION ORAL at 04:08

## 2020-01-01 RX ADMIN — METRONIDAZOLE 500 MG: 500 SOLUTION INTRAVENOUS at 05:08

## 2020-01-01 RX ADMIN — PROMETHAZINE HYDROCHLORIDE 6.25 MG: 25 INJECTION INTRAMUSCULAR; INTRAVENOUS at 04:08

## 2020-01-01 RX ADMIN — AMLODIPINE BESYLATE 10 MG: 10 TABLET ORAL at 10:08

## 2020-01-01 RX ADMIN — ONDANSETRON HYDROCHLORIDE 8 MG: 4 TABLET, FILM COATED ORAL at 09:08

## 2020-01-01 RX ADMIN — SODIUM CHLORIDE: 0.9 INJECTION, SOLUTION INTRAVENOUS at 09:12

## 2020-01-01 RX ADMIN — HYDROCODONE BITARTRATE AND ACETAMINOPHEN 1 TABLET: 5; 325 TABLET ORAL at 02:08

## 2020-01-01 RX ADMIN — PANTOPRAZOLE SODIUM 40 MG: 40 TABLET, DELAYED RELEASE ORAL at 08:08

## 2020-01-01 RX ADMIN — GABAPENTIN 100 MG: 100 CAPSULE ORAL at 10:08

## 2020-01-01 RX ADMIN — OXYCODONE HYDROCHLORIDE 10 MG: 10 TABLET ORAL at 04:08

## 2020-01-01 RX ADMIN — PANTOPRAZOLE SODIUM 40 MG: 40 TABLET, DELAYED RELEASE ORAL at 10:08

## 2020-01-01 RX ADMIN — DONEPEZIL HYDROCHLORIDE 10 MG: 10 TABLET ORAL at 08:08

## 2020-01-01 RX ADMIN — CARVEDILOL 3.12 MG: 3.12 TABLET, FILM COATED ORAL at 05:08

## 2020-01-01 RX ADMIN — PREDNISONE 60 MG: 20 TABLET ORAL at 02:08

## 2020-01-01 RX ADMIN — DICYCLOMINE HYDROCHLORIDE 10 MG: 10 CAPSULE ORAL at 10:08

## 2020-01-01 RX ADMIN — GABAPENTIN 100 MG: 100 CAPSULE ORAL at 09:08

## 2020-01-01 RX ADMIN — HYPROMELLOSE 2910 1 DROP: 5 SOLUTION OPHTHALMIC at 05:08

## 2020-01-01 RX ADMIN — MORPHINE SULFATE 2 MG: 2 INJECTION, SOLUTION INTRAMUSCULAR; INTRAVENOUS at 12:08

## 2020-01-01 RX ADMIN — OXYCODONE HYDROCHLORIDE 10 MG: 10 TABLET ORAL at 01:08

## 2020-01-01 RX ADMIN — SODIUM BICARBONATE 650 MG TABLET 650 MG: at 09:08

## 2020-01-01 RX ADMIN — OXYCODONE HYDROCHLORIDE 10 MG: 10 TABLET ORAL at 07:08

## 2020-01-01 RX ADMIN — HEPARIN SODIUM 5000 UNITS: 5000 INJECTION INTRAVENOUS; SUBCUTANEOUS at 08:08

## 2020-01-01 RX ADMIN — OXYCODONE HYDROCHLORIDE 10 MG: 10 TABLET ORAL at 10:08

## 2020-01-01 RX ADMIN — AMLODIPINE BESYLATE 10 MG: 10 TABLET ORAL at 08:08

## 2020-01-01 RX ADMIN — Medication 100 MCG: at 09:12

## 2020-01-01 RX ADMIN — METRONIDAZOLE 500 MG: 500 INJECTION, SOLUTION INTRAVENOUS at 01:08

## 2020-01-01 RX ADMIN — ONDANSETRON 4 MG: 2 INJECTION INTRAMUSCULAR; INTRAVENOUS at 09:08

## 2020-01-01 RX ADMIN — AMOXICILLIN AND CLAVULANATE POTASSIUM 500 MG: 500; 125 TABLET, FILM COATED ORAL at 08:08

## 2020-01-01 RX ADMIN — DICYCLOMINE HYDROCHLORIDE 10 MG: 10 CAPSULE ORAL at 09:08

## 2020-01-01 RX ADMIN — DONEPEZIL HYDROCHLORIDE 10 MG: 10 TABLET ORAL at 10:08

## 2020-01-01 RX ADMIN — OXYCODONE HYDROCHLORIDE 10 MG: 10 TABLET ORAL at 06:08

## 2020-01-01 RX ADMIN — MAGNESIUM OXIDE 400 MG (241.3 MG MAGNESIUM) TABLET 400 MG: TABLET at 06:08

## 2020-01-01 RX ADMIN — CARVEDILOL 3.12 MG: 3.12 TABLET, FILM COATED ORAL at 10:08

## 2020-01-01 RX ADMIN — HYPROMELLOSE 2910 1 DROP: 5 SOLUTION OPHTHALMIC at 09:08

## 2020-01-01 RX ADMIN — OXYCODONE HYDROCHLORIDE 10 MG: 10 TABLET ORAL at 09:08

## 2020-01-01 RX ADMIN — PANTOPRAZOLE SODIUM 80 MG: 40 INJECTION, POWDER, LYOPHILIZED, FOR SOLUTION INTRAVENOUS at 03:08

## 2020-01-01 RX ADMIN — HEPARIN 500 UNITS: 100 SYRINGE at 12:12

## 2020-01-01 RX ADMIN — CARVEDILOL 3.12 MG: 3.12 TABLET, FILM COATED ORAL at 09:08

## 2020-01-01 RX ADMIN — MORPHINE SULFATE 10 MG: 10 SOLUTION ORAL at 08:08

## 2020-01-01 RX ADMIN — HYDROCODONE BITARTRATE AND ACETAMINOPHEN 1 TABLET: 5; 325 TABLET ORAL at 11:08

## 2020-01-01 RX ADMIN — HEPARIN SODIUM 5000 UNITS: 5000 INJECTION INTRAVENOUS; SUBCUTANEOUS at 09:08

## 2020-01-01 RX ADMIN — DULOXETINE HYDROCHLORIDE 30 MG: 30 CAPSULE, DELAYED RELEASE ORAL at 08:08

## 2020-01-01 RX ADMIN — DONEPEZIL HYDROCHLORIDE 10 MG: 10 TABLET ORAL at 09:08

## 2020-01-01 RX ADMIN — DIPHENHYDRAMINE HYDROCHLORIDE 10 ML: 25 SOLUTION ORAL at 08:08

## 2020-01-01 RX ADMIN — PROPOFOL 125 MCG/KG/MIN: 10 INJECTION, EMULSION INTRAVENOUS at 09:12

## 2020-01-01 RX ADMIN — HEPARIN SODIUM 5000 UNITS: 5000 INJECTION INTRAVENOUS; SUBCUTANEOUS at 10:08

## 2020-01-01 RX ADMIN — SODIUM CHLORIDE: 9 INJECTION, SOLUTION INTRAVENOUS at 03:11

## 2020-01-01 RX ADMIN — CHLORTHALIDONE 50 MG: 25 TABLET ORAL at 09:08

## 2020-01-01 RX ADMIN — GABAPENTIN 100 MG: 100 CAPSULE ORAL at 08:08

## 2020-01-01 RX ADMIN — MORPHINE SULFATE 6 MG: 2 INJECTION, SOLUTION INTRAMUSCULAR; INTRAVENOUS at 02:08

## 2020-01-01 RX ADMIN — Medication 150 MCG: at 09:12

## 2020-01-01 RX ADMIN — DEXMEDETOMIDINE HYDROCHLORIDE 5 MCG: 100 INJECTION, SOLUTION INTRAVENOUS at 09:12

## 2020-01-01 RX ADMIN — ACETAMINOPHEN 1000 MG: 500 TABLET ORAL at 05:08

## 2020-01-01 RX ADMIN — POTASSIUM CHLORIDE 40 MEQ: 1.5 POWDER, FOR SOLUTION ORAL at 10:08

## 2020-01-01 RX ADMIN — LISINOPRIL 2.5 MG: 2.5 TABLET ORAL at 11:08

## 2020-01-01 RX ADMIN — CHLORTHALIDONE 50 MG: 25 TABLET ORAL at 11:08

## 2020-01-01 RX ADMIN — POTASSIUM CHLORIDE 40 MEQ: 1500 TABLET, EXTENDED RELEASE ORAL at 08:08

## 2020-01-01 RX ADMIN — DULOXETINE 30 MG: 30 CAPSULE, DELAYED RELEASE ORAL at 08:08

## 2020-01-01 RX ADMIN — DULOXETINE 30 MG: 30 CAPSULE, DELAYED RELEASE ORAL at 10:08

## 2020-01-01 RX ADMIN — CARVEDILOL 3.12 MG: 3.12 TABLET, FILM COATED ORAL at 08:08

## 2020-01-01 RX ADMIN — PREDNISONE 60 MG: 20 TABLET ORAL at 11:08

## 2020-01-01 RX ADMIN — LISINOPRIL 2.5 MG: 2.5 TABLET ORAL at 09:08

## 2020-01-01 RX ADMIN — LISINOPRIL 2.5 MG: 2.5 TABLET ORAL at 08:08

## 2020-01-01 RX ADMIN — HYDROCODONE BITARTRATE AND ACETAMINOPHEN 1 TABLET: 5; 325 TABLET ORAL at 09:08

## 2020-01-01 RX ADMIN — HYPROMELLOSE 2910 1 DROP: 5 SOLUTION OPHTHALMIC at 01:08

## 2020-01-01 RX ADMIN — SODIUM CHLORIDE: 0.9 INJECTION, SOLUTION INTRAVENOUS at 06:08

## 2020-01-01 RX ADMIN — GABAPENTIN 100 MG: 100 CAPSULE ORAL at 11:08

## 2020-01-01 RX ADMIN — DULOXETINE HYDROCHLORIDE 30 MG: 30 CAPSULE, DELAYED RELEASE ORAL at 11:08

## 2020-01-01 RX ADMIN — SODIUM CHLORIDE 500 ML: 0.9 INJECTION, SOLUTION INTRAVENOUS at 02:11

## 2020-01-01 RX ADMIN — ONDANSETRON 8 MG: 2 INJECTION INTRAMUSCULAR; INTRAVENOUS at 05:08

## 2020-01-01 RX ADMIN — CARVEDILOL 3.12 MG: 3.12 TABLET, FILM COATED ORAL at 11:08

## 2020-01-01 RX ADMIN — LIDOCAINE AND PRILOCAINE: 25; 25 CREAM TOPICAL at 10:08

## 2020-01-01 RX ADMIN — DIPHENHYDRAMINE HYDROCHLORIDE 10 ML: 25 SOLUTION ORAL at 11:08

## 2020-01-01 RX ADMIN — POTASSIUM CHLORIDE 10 MEQ: 750 CAPSULE, EXTENDED RELEASE ORAL at 08:08

## 2020-01-01 RX ADMIN — Medication 10 ML: at 12:12

## 2020-01-01 RX ADMIN — AMPICILLIN AND SULBACTAM 1.5 G: 1; .5 INJECTION, POWDER, FOR SOLUTION INTRAVENOUS at 10:08

## 2020-01-01 RX ADMIN — ENOXAPARIN SODIUM 40 MG: 100 INJECTION SUBCUTANEOUS at 09:08

## 2020-01-01 RX ADMIN — METRONIDAZOLE 500 MG: 500 INJECTION, SOLUTION INTRAVENOUS at 09:08

## 2020-01-01 RX ADMIN — MAGNESIUM OXIDE 400 MG (241.3 MG MAGNESIUM) TABLET 400 MG: TABLET at 09:08

## 2020-01-01 RX ADMIN — Medication 10 ML: at 04:11

## 2020-01-01 RX ADMIN — HEPARIN 500 UNITS: 100 SYRINGE at 04:11

## 2020-01-01 RX ADMIN — CARVEDILOL 3.12 MG: 3.12 TABLET, FILM COATED ORAL at 01:08

## 2020-01-01 RX ADMIN — LISINOPRIL 2.5 MG: 2.5 TABLET ORAL at 10:08

## 2020-01-01 RX ADMIN — ONDANSETRON 8 MG: 2 INJECTION INTRAMUSCULAR; INTRAVENOUS at 10:08

## 2020-01-01 RX ADMIN — ONDANSETRON 8 MG: 2 INJECTION INTRAMUSCULAR; INTRAVENOUS at 02:08

## 2020-01-01 RX ADMIN — AMLODIPINE BESYLATE 10 MG: 10 TABLET ORAL at 09:08

## 2020-01-01 RX ADMIN — PROMETHAZINE HYDROCHLORIDE 6.25 MG: 25 INJECTION INTRAMUSCULAR; INTRAVENOUS at 10:08

## 2020-01-01 RX ADMIN — SODIUM BICARBONATE 650 MG TABLET 650 MG: at 10:08

## 2020-01-01 RX ADMIN — HEPARIN SODIUM 5000 UNITS: 5000 INJECTION INTRAVENOUS; SUBCUTANEOUS at 06:08

## 2020-01-01 RX ADMIN — DICYCLOMINE HYDROCHLORIDE 10 MG: 10 CAPSULE ORAL at 11:08

## 2020-01-01 RX ADMIN — METRONIDAZOLE 500 MG: 500 INJECTION, SOLUTION INTRAVENOUS at 05:08

## 2020-01-01 RX ADMIN — GABAPENTIN 100 MG: 100 CAPSULE ORAL at 02:08

## 2020-01-01 RX ADMIN — MAGNESIUM SULFATE IN WATER 2 G: 40 INJECTION, SOLUTION INTRAVENOUS at 10:08

## 2020-01-01 RX ADMIN — PANTOPRAZOLE SODIUM 40 MG: 40 INJECTION, POWDER, LYOPHILIZED, FOR SOLUTION INTRAVENOUS at 06:08

## 2020-01-01 RX ADMIN — AMOXICILLIN AND CLAVULANATE POTASSIUM 500 MG: 500; 125 TABLET, FILM COATED ORAL at 09:08

## 2020-01-01 RX ADMIN — HYDROMORPHONE HYDROCHLORIDE 0.5 MG: 1 INJECTION, SOLUTION INTRAMUSCULAR; INTRAVENOUS; SUBCUTANEOUS at 07:08

## 2020-01-01 RX ADMIN — MAGNESIUM SULFATE HEPTAHYDRATE 2 G: 40 INJECTION, SOLUTION INTRAVENOUS at 08:08

## 2020-01-01 RX ADMIN — DIBASIC SODIUM PHOSPHATE, MONOBASIC POTASSIUM PHOSPHATE AND MONOBASIC SODIUM PHOSPHATE 2 TABLET: 852; 155; 130 TABLET ORAL at 10:08

## 2020-01-01 RX ADMIN — OXYCODONE HYDROCHLORIDE 10 MG: 10 TABLET ORAL at 12:08

## 2020-01-01 RX ADMIN — DIPHENHYDRAMINE HYDROCHLORIDE 10 ML: 25 SOLUTION ORAL at 09:08

## 2020-01-01 RX ADMIN — DULOXETINE HYDROCHLORIDE 30 MG: 30 CAPSULE, DELAYED RELEASE ORAL at 09:08

## 2020-01-01 RX ADMIN — SODIUM CHLORIDE 500 ML: 0.9 INJECTION, SOLUTION INTRAVENOUS at 04:11

## 2020-01-01 RX ADMIN — PROMETHAZINE HYDROCHLORIDE 6.25 MG: 25 INJECTION INTRAMUSCULAR; INTRAVENOUS at 12:08

## 2020-01-01 RX ADMIN — SODIUM CHLORIDE 1000 ML: 0.9 INJECTION, SOLUTION INTRAVENOUS at 02:08

## 2020-01-01 RX ADMIN — HYPROMELLOSE 2910 1 DROP: 5 SOLUTION OPHTHALMIC at 04:08

## 2020-01-01 RX ADMIN — SODIUM CHLORIDE: 0.9 INJECTION, SOLUTION INTRAVENOUS at 02:08

## 2020-01-01 RX ADMIN — AMOXICILLIN AND CLAVULANATE POTASSIUM 500 MG: 500; 125 TABLET, FILM COATED ORAL at 05:08

## 2020-01-01 RX ADMIN — AMLODIPINE BESYLATE 10 MG: 10 TABLET ORAL at 11:08

## 2020-01-01 RX ADMIN — HYDROCODONE BITARTRATE AND ACETAMINOPHEN 1 TABLET: 5; 325 TABLET ORAL at 10:08

## 2020-01-01 RX ADMIN — PANTOPRAZOLE SODIUM 40 MG: 40 TABLET, DELAYED RELEASE ORAL at 12:08

## 2020-01-01 RX ADMIN — MAGNESIUM SULFATE HEPTAHYDRATE 1 G: 500 INJECTION, SOLUTION INTRAMUSCULAR; INTRAVENOUS at 09:08

## 2020-01-01 RX ADMIN — GLYCOPYRROLATE 0.2 MG: 0.2 INJECTION INTRAMUSCULAR; INTRAVENOUS at 09:12

## 2020-01-01 RX ADMIN — GABAPENTIN 100 MG: 100 CAPSULE ORAL at 06:08

## 2020-01-01 RX ADMIN — PANTOPRAZOLE SODIUM 40 MG: 40 TABLET, DELAYED RELEASE ORAL at 09:08

## 2020-01-01 RX ADMIN — ONDANSETRON 4 MG: 2 INJECTION, SOLUTION INTRAMUSCULAR; INTRAVENOUS at 08:08

## 2020-01-01 RX ADMIN — POTASSIUM CHLORIDE 40 MEQ: 1500 TABLET, EXTENDED RELEASE ORAL at 10:08

## 2020-01-01 RX ADMIN — LIDOCAINE AND PRILOCAINE: 25; 25 CREAM TOPICAL at 01:08

## 2020-01-01 RX ADMIN — SODIUM CHLORIDE 1000 ML: 0.9 INJECTION, SOLUTION INTRAVENOUS at 10:12

## 2020-01-01 RX ADMIN — ONDANSETRON 4 MG: 2 INJECTION INTRAMUSCULAR; INTRAVENOUS at 12:08

## 2020-01-01 RX ADMIN — SODIUM CHLORIDE 10 ML/HR: 0.9 INJECTION, SOLUTION INTRAVENOUS at 09:12

## 2020-01-01 RX ADMIN — PREDNISONE 60 MG: 20 TABLET ORAL at 09:08

## 2020-01-01 RX ADMIN — CARVEDILOL 3.12 MG: 3.12 TABLET, FILM COATED ORAL at 07:08

## 2020-01-01 RX ADMIN — ONDANSETRON 8 MG: 2 INJECTION INTRAMUSCULAR; INTRAVENOUS at 11:11

## 2020-01-01 RX ADMIN — METRONIDAZOLE 500 MG: 500 INJECTION, SOLUTION INTRAVENOUS at 03:08

## 2020-01-01 RX ADMIN — ONDANSETRON 4 MG: 2 INJECTION INTRAMUSCULAR; INTRAVENOUS at 06:08

## 2020-01-01 RX ADMIN — HYDROCODONE BITARTRATE AND ACETAMINOPHEN 1 TABLET: 5; 325 TABLET ORAL at 04:08

## 2020-01-01 RX ADMIN — SODIUM CHLORIDE 400 MG: 9 INJECTION, SOLUTION INTRAVENOUS at 03:11

## 2020-01-01 RX ADMIN — ACYCLOVIR SODIUM 590 MG: 1000 INJECTION, SOLUTION INTRAVENOUS at 08:08

## 2020-01-01 RX ADMIN — Medication 6 MG: at 01:08

## 2020-01-01 RX ADMIN — CARVEDILOL 3.12 MG: 3.12 TABLET, FILM COATED ORAL at 04:08

## 2020-01-01 RX ADMIN — MORPHINE SULFATE 6 MG: 2 INJECTION, SOLUTION INTRAMUSCULAR; INTRAVENOUS at 11:11

## 2020-01-01 RX ADMIN — DEXMEDETOMIDINE HYDROCHLORIDE 10 MCG: 100 INJECTION, SOLUTION INTRAVENOUS at 09:12

## 2020-01-01 RX ADMIN — GABAPENTIN 100 MG: 100 CAPSULE ORAL at 05:08

## 2020-01-01 RX ADMIN — MAGNESIUM CITRATE 296 ML: 1.75 LIQUID ORAL at 04:11

## 2020-01-01 RX ADMIN — MAGNESIUM CITRATE 296 ML: 1.75 LIQUID ORAL at 03:11

## 2020-01-01 RX ADMIN — LIDOCAINE HYDROCHLORIDE 50 MG: 20 INJECTION, SOLUTION EPIDURAL; INFILTRATION; INTRACAUDAL at 09:12

## 2020-01-01 RX ADMIN — OXYCODONE HYDROCHLORIDE 10 MG: 10 TABLET ORAL at 05:08

## 2020-01-01 RX ADMIN — ACYCLOVIR SODIUM 590 MG: 1000 INJECTION, SOLUTION INTRAVENOUS at 01:08

## 2020-01-01 RX ADMIN — Medication 100 ML: at 03:11

## 2020-01-01 RX ADMIN — PROCHLORPERAZINE EDISYLATE 5 MG: 5 INJECTION INTRAMUSCULAR; INTRAVENOUS at 02:08

## 2020-01-01 RX ADMIN — CEFTRIAXONE SODIUM 1 G: 1 INJECTION, POWDER, FOR SOLUTION INTRAMUSCULAR; INTRAVENOUS at 05:08

## 2020-01-01 RX ADMIN — MAGNESIUM SULFATE IN WATER 2 G: 40 INJECTION, SOLUTION INTRAVENOUS at 09:08

## 2020-01-01 RX ADMIN — LIDOCAINE AND PRILOCAINE: 25; 25 CREAM TOPICAL at 08:08

## 2020-01-01 RX ADMIN — HEPARIN SODIUM 5000 UNITS: 5000 INJECTION INTRAVENOUS; SUBCUTANEOUS at 02:08

## 2020-01-01 RX ADMIN — ACYCLOVIR SODIUM 590 MG: 1000 INJECTION, SOLUTION INTRAVENOUS at 09:08

## 2020-01-01 RX ADMIN — MORPHINE SULFATE 10 MG: 10 SOLUTION ORAL at 01:08

## 2020-01-01 RX ADMIN — HEPARIN SODIUM 5000 UNITS: 5000 INJECTION INTRAVENOUS; SUBCUTANEOUS at 11:08

## 2020-01-01 RX ADMIN — HYPROMELLOSE 2910 1 DROP: 5 SOLUTION OPHTHALMIC at 11:08

## 2020-01-08 DIAGNOSIS — R11.2 CHEMOTHERAPY INDUCED NAUSEA AND VOMITING: Primary | ICD-10-CM

## 2020-01-08 DIAGNOSIS — T45.1X5A CHEMOTHERAPY INDUCED NAUSEA AND VOMITING: Primary | ICD-10-CM

## 2020-01-08 DIAGNOSIS — F33.2 MAJOR DEPRESSIVE DISORDER, RECURRENT SEVERE WITHOUT PSYCHOTIC FEATURES: ICD-10-CM

## 2020-01-08 RX ORDER — ARIPIPRAZOLE 2 MG/1
TABLET ORAL
Qty: 30 TABLET | Refills: 1 | OUTPATIENT
Start: 2020-01-08

## 2020-01-08 RX ORDER — PROCHLORPERAZINE MALEATE 10 MG
TABLET ORAL
Qty: 60 TABLET | Refills: 1 | Status: SHIPPED | OUTPATIENT
Start: 2020-01-08 | End: 2020-04-09 | Stop reason: SDUPTHER

## 2020-01-09 ENCOUNTER — HOSPITAL ENCOUNTER (OUTPATIENT)
Dept: RADIOLOGY | Facility: HOSPITAL | Age: 75
Discharge: HOME | End: 2020-01-09
Attending: NURSE PRACTITIONER
Payer: MEDICARE

## 2020-01-09 DIAGNOSIS — C45.0 MALIGNANT PLEURAL MESOTHELIOMA: ICD-10-CM

## 2020-01-09 DIAGNOSIS — D64.81 ANTINEOPLASTIC CHEMOTHERAPY INDUCED ANEMIA: ICD-10-CM

## 2020-01-09 DIAGNOSIS — D70.1 CHEMOTHERAPY INDUCED NEUTROPENIA: ICD-10-CM

## 2020-01-09 DIAGNOSIS — T45.1X5A ANTINEOPLASTIC CHEMOTHERAPY INDUCED ANEMIA: ICD-10-CM

## 2020-01-09 DIAGNOSIS — T45.1X5A CHEMOTHERAPY INDUCED NEUTROPENIA: ICD-10-CM

## 2020-01-09 PROCEDURE — 78815 NM PET CT ROUTINE: ICD-10-PCS | Mod: 26,PS,, | Performed by: RADIOLOGY

## 2020-01-09 PROCEDURE — 78815 PET IMAGE W/CT SKULL-THIGH: CPT | Mod: TC

## 2020-01-09 PROCEDURE — A9552 F18 FDG: HCPCS

## 2020-01-09 PROCEDURE — 78815 PET IMAGE W/CT SKULL-THIGH: CPT | Mod: 26,PS,, | Performed by: RADIOLOGY

## 2020-01-15 ENCOUNTER — OFFICE VISIT (OUTPATIENT)
Dept: HEMATOLOGY/ONCOLOGY | Facility: CLINIC | Age: 75
End: 2020-01-15
Payer: MEDICARE

## 2020-01-15 ENCOUNTER — INFUSION (OUTPATIENT)
Dept: INFUSION THERAPY | Facility: HOSPITAL | Age: 75
End: 2020-01-15
Attending: INTERNAL MEDICINE
Payer: MEDICARE

## 2020-01-15 VITALS
HEART RATE: 65 BPM | DIASTOLIC BLOOD PRESSURE: 82 MMHG | TEMPERATURE: 98 F | RESPIRATION RATE: 18 BRPM | SYSTOLIC BLOOD PRESSURE: 160 MMHG

## 2020-01-15 VITALS
HEIGHT: 66 IN | RESPIRATION RATE: 20 BRPM | HEART RATE: 82 BPM | WEIGHT: 184.5 LBS | DIASTOLIC BLOOD PRESSURE: 86 MMHG | BODY MASS INDEX: 29.65 KG/M2 | SYSTOLIC BLOOD PRESSURE: 188 MMHG | TEMPERATURE: 98 F | OXYGEN SATURATION: 96 %

## 2020-01-15 DIAGNOSIS — N28.9 RENAL INSUFFICIENCY: ICD-10-CM

## 2020-01-15 DIAGNOSIS — R11.2 CHEMOTHERAPY INDUCED NAUSEA AND VOMITING: ICD-10-CM

## 2020-01-15 DIAGNOSIS — C45.0 MALIGNANT PLEURAL MESOTHELIOMA: Primary | ICD-10-CM

## 2020-01-15 DIAGNOSIS — T45.1X5A CHEMOTHERAPY INDUCED NAUSEA AND VOMITING: ICD-10-CM

## 2020-01-15 DIAGNOSIS — D70.1 CHEMOTHERAPY INDUCED NEUTROPENIA: ICD-10-CM

## 2020-01-15 DIAGNOSIS — Z86.718 HISTORY OF DVT (DEEP VEIN THROMBOSIS): ICD-10-CM

## 2020-01-15 DIAGNOSIS — T45.1X5A CHEMOTHERAPY INDUCED NEUTROPENIA: ICD-10-CM

## 2020-01-15 DIAGNOSIS — T45.1X5A ANTINEOPLASTIC CHEMOTHERAPY INDUCED ANEMIA: ICD-10-CM

## 2020-01-15 DIAGNOSIS — D64.81 ANTINEOPLASTIC CHEMOTHERAPY INDUCED ANEMIA: ICD-10-CM

## 2020-01-15 PROCEDURE — 99999 PR PBB SHADOW E&M-EST. PATIENT-LVL V: ICD-10-PCS | Mod: PBBFAC,,, | Performed by: INTERNAL MEDICINE

## 2020-01-15 PROCEDURE — 99499 RISK ADDL DX/OHS AUDIT: ICD-10-PCS | Mod: S$GLB,,, | Performed by: INTERNAL MEDICINE

## 2020-01-15 PROCEDURE — 63600175 PHARM REV CODE 636 W HCPCS: Mod: TB | Performed by: INTERNAL MEDICINE

## 2020-01-15 PROCEDURE — 3077F SYST BP >= 140 MM HG: CPT | Mod: CPTII,S$GLB,, | Performed by: INTERNAL MEDICINE

## 2020-01-15 PROCEDURE — 3079F PR MOST RECENT DIASTOLIC BLOOD PRESSURE 80-89 MM HG: ICD-10-PCS | Mod: CPTII,S$GLB,, | Performed by: INTERNAL MEDICINE

## 2020-01-15 PROCEDURE — 3079F DIAST BP 80-89 MM HG: CPT | Mod: CPTII,S$GLB,, | Performed by: INTERNAL MEDICINE

## 2020-01-15 PROCEDURE — 96361 HYDRATE IV INFUSION ADD-ON: CPT

## 2020-01-15 PROCEDURE — 99215 OFFICE O/P EST HI 40 MIN: CPT | Mod: S$GLB,,, | Performed by: INTERNAL MEDICINE

## 2020-01-15 PROCEDURE — A4216 STERILE WATER/SALINE, 10 ML: HCPCS | Performed by: INTERNAL MEDICINE

## 2020-01-15 PROCEDURE — 1101F PR PT FALLS ASSESS DOC 0-1 FALLS W/OUT INJ PAST YR: ICD-10-PCS | Mod: CPTII,S$GLB,, | Performed by: INTERNAL MEDICINE

## 2020-01-15 PROCEDURE — 96375 TX/PRO/DX INJ NEW DRUG ADDON: CPT

## 2020-01-15 PROCEDURE — 25000003 PHARM REV CODE 250: Performed by: INTERNAL MEDICINE

## 2020-01-15 PROCEDURE — 99215 PR OFFICE/OUTPT VISIT, EST, LEVL V, 40-54 MIN: ICD-10-PCS | Mod: S$GLB,,, | Performed by: INTERNAL MEDICINE

## 2020-01-15 PROCEDURE — 1101F PT FALLS ASSESS-DOCD LE1/YR: CPT | Mod: CPTII,S$GLB,, | Performed by: INTERNAL MEDICINE

## 2020-01-15 PROCEDURE — 1159F PR MEDICATION LIST DOCUMENTED IN MEDICAL RECORD: ICD-10-PCS | Mod: S$GLB,,, | Performed by: INTERNAL MEDICINE

## 2020-01-15 PROCEDURE — 99499 UNLISTED E&M SERVICE: CPT | Mod: S$GLB,,, | Performed by: INTERNAL MEDICINE

## 2020-01-15 PROCEDURE — 96367 TX/PROPH/DG ADDL SEQ IV INF: CPT

## 2020-01-15 PROCEDURE — 3077F PR MOST RECENT SYSTOLIC BLOOD PRESSURE >= 140 MM HG: ICD-10-PCS | Mod: CPTII,S$GLB,, | Performed by: INTERNAL MEDICINE

## 2020-01-15 PROCEDURE — 99999 PR PBB SHADOW E&M-EST. PATIENT-LVL V: CPT | Mod: PBBFAC,,, | Performed by: INTERNAL MEDICINE

## 2020-01-15 PROCEDURE — 96413 CHEMO IV INFUSION 1 HR: CPT

## 2020-01-15 PROCEDURE — 96411 CHEMO IV PUSH ADDL DRUG: CPT

## 2020-01-15 PROCEDURE — 1125F AMNT PAIN NOTED PAIN PRSNT: CPT | Mod: S$GLB,,, | Performed by: INTERNAL MEDICINE

## 2020-01-15 PROCEDURE — 1125F PR PAIN SEVERITY QUANTIFIED, PAIN PRESENT: ICD-10-PCS | Mod: S$GLB,,, | Performed by: INTERNAL MEDICINE

## 2020-01-15 PROCEDURE — 1159F MED LIST DOCD IN RCRD: CPT | Mod: S$GLB,,, | Performed by: INTERNAL MEDICINE

## 2020-01-15 RX ORDER — SODIUM CHLORIDE 0.9 % (FLUSH) 0.9 %
10 SYRINGE (ML) INJECTION
Status: DISCONTINUED | OUTPATIENT
Start: 2020-01-15 | End: 2020-01-15 | Stop reason: HOSPADM

## 2020-01-15 RX ORDER — CLONIDINE HYDROCHLORIDE 0.1 MG/1
0.1 TABLET ORAL
Status: COMPLETED | OUTPATIENT
Start: 2020-01-15 | End: 2020-01-15

## 2020-01-15 RX ORDER — HEPARIN 100 UNIT/ML
500 SYRINGE INTRAVENOUS
Status: CANCELLED | OUTPATIENT
Start: 2020-01-15

## 2020-01-15 RX ORDER — SODIUM CHLORIDE 0.9 % (FLUSH) 0.9 %
10 SYRINGE (ML) INJECTION
Status: CANCELLED | OUTPATIENT
Start: 2020-01-15

## 2020-01-15 RX ORDER — HEPARIN 100 UNIT/ML
500 SYRINGE INTRAVENOUS
Status: DISCONTINUED | OUTPATIENT
Start: 2020-01-15 | End: 2020-01-15 | Stop reason: HOSPADM

## 2020-01-15 RX ADMIN — CLONIDINE HYDROCHLORIDE 0.1 MG: 0.1 TABLET ORAL at 09:01

## 2020-01-15 RX ADMIN — Medication 10 ML: at 12:01

## 2020-01-15 RX ADMIN — APREPITANT 130 MG: 130 INJECTION, EMULSION INTRAVENOUS at 10:01

## 2020-01-15 RX ADMIN — SODIUM CHLORIDE: 0.9 INJECTION, SOLUTION INTRAVENOUS at 09:01

## 2020-01-15 RX ADMIN — DEXAMETHASONE SODIUM PHOSPHATE: 4 INJECTION, SOLUTION INTRA-ARTICULAR; INTRALESIONAL; INTRAMUSCULAR; INTRAVENOUS; SOFT TISSUE at 10:01

## 2020-01-15 RX ADMIN — SODIUM CHLORIDE 675 MG: 9 INJECTION, SOLUTION INTRAVENOUS at 11:01

## 2020-01-15 RX ADMIN — SODIUM CHLORIDE: 0.9 INJECTION, SOLUTION INTRAVENOUS at 10:01

## 2020-01-15 RX ADMIN — HEPARIN 500 UNITS: 100 SYRINGE at 12:01

## 2020-01-15 RX ADMIN — CARBOPLATIN 395 MG: 10 INJECTION, SOLUTION INTRAVENOUS at 11:01
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
## 2020-01-15 NOTE — PLAN OF CARE
1202 patient completed and tolerated treatment well, pt voiced no new complaints or concerns at this time. Pt d/c home, NAD

## 2020-01-16 DIAGNOSIS — C45.0 MALIGNANT PLEURAL MESOTHELIOMA: Primary | Chronic | ICD-10-CM

## 2020-01-20 DIAGNOSIS — I10 HTN, GOAL BELOW 140/90: ICD-10-CM

## 2020-01-20 RX ORDER — METOPROLOL SUCCINATE 25 MG/1
25 TABLET, EXTENDED RELEASE ORAL DAILY
Qty: 30 TABLET | Refills: 0 | Status: SHIPPED | OUTPATIENT
Start: 2020-01-20 | End: 2020-03-16

## 2020-01-20 RX ORDER — METOPROLOL SUCCINATE 50 MG/1
50 TABLET, EXTENDED RELEASE ORAL DAILY
Qty: 30 TABLET | Refills: 0 | Status: SHIPPED | OUTPATIENT
Start: 2020-01-20 | End: 2020-03-16

## 2020-01-24 ENCOUNTER — TELEPHONE (OUTPATIENT)
Dept: PSYCHIATRY | Facility: HOSPITAL | Age: 75
End: 2020-01-24

## 2020-01-24 DIAGNOSIS — F33.2 MAJOR DEPRESSIVE DISORDER, RECURRENT SEVERE WITHOUT PSYCHOTIC FEATURES: ICD-10-CM

## 2020-01-24 RX ORDER — DICYCLOMINE HYDROCHLORIDE 10 MG/1
CAPSULE ORAL
Qty: 90 CAPSULE | Refills: 0 | Status: ON HOLD | OUTPATIENT
Start: 2020-01-24 | End: 2020-01-01 | Stop reason: SDUPTHER

## 2020-01-24 RX ORDER — ARIPIPRAZOLE 2 MG/1
2 TABLET ORAL EVERY MORNING
Qty: 90 TABLET | Refills: 1 | Status: SHIPPED | OUTPATIENT
Start: 2020-01-24 | End: 2020-04-27 | Stop reason: ALTCHOICE

## 2020-01-24 NOTE — TELEPHONE ENCOUNTER
----- Message from Snoam Correia MA sent at 1/24/2020  1:05 PM CST -----  Contact: Patient's Daughter  Patient's daughter/caregiver called to say that pharmacy was supposed to send refill request for Abilify and patient has been out since Jan 20th.  She said due to cancer treatment her Mom does not remember anything. I explained the refill process to her so going forward she knows that she should call our clinic for medication refills. Please send refill for Abilify to [facility].

## 2020-01-30 ENCOUNTER — TELEPHONE (OUTPATIENT)
Dept: HEMATOLOGY/ONCOLOGY | Facility: CLINIC | Age: 75
End: 2020-01-30

## 2020-01-30 ENCOUNTER — PATIENT MESSAGE (OUTPATIENT)
Dept: HEMATOLOGY/ONCOLOGY | Facility: CLINIC | Age: 75
End: 2020-01-30

## 2020-01-30 DIAGNOSIS — R25.1 TREMORS OF NERVOUS SYSTEM: Primary | ICD-10-CM

## 2020-01-30 NOTE — TELEPHONE ENCOUNTER
----- Message from Amanda Louis sent at 1/30/2020  4:26 PM CST -----  Contact: [facility] [phone]  Og need to schedule a deposition regarding Pt care    Please advise gO at [phone]

## 2020-02-04 ENCOUNTER — LAB VISIT (OUTPATIENT)
Dept: LAB | Facility: HOSPITAL | Age: 75
End: 2020-02-04
Attending: NURSE PRACTITIONER
Payer: MEDICARE

## 2020-02-04 DIAGNOSIS — C45.0 MALIGNANT PLEURAL MESOTHELIOMA: ICD-10-CM

## 2020-02-04 LAB
ALBUMIN SERPL BCP-MCNC: 3.9 G/DL (ref 3.5–5.2)
ALP SERPL-CCNC: 81 U/L (ref 55–135)
ALT SERPL W/O P-5'-P-CCNC: 35 U/L (ref 10–44)
ANION GAP SERPL CALC-SCNC: 7 MMOL/L (ref 8–16)
AST SERPL-CCNC: 29 U/L (ref 10–40)
BILIRUB SERPL-MCNC: 0.2 MG/DL (ref 0.1–1)
BUN SERPL-MCNC: 19 MG/DL (ref 8–23)
CALCIUM SERPL-MCNC: 9.5 MG/DL (ref 8.7–10.5)
CHLORIDE SERPL-SCNC: 106 MMOL/L (ref 95–110)
CO2 SERPL-SCNC: 30 MMOL/L (ref 23–29)
CREAT SERPL-MCNC: 1.6 MG/DL (ref 0.5–1.4)
ERYTHROCYTE [DISTWIDTH] IN BLOOD BY AUTOMATED COUNT: 13.4 % (ref 11.5–14.5)
EST. GFR  (AFRICAN AMERICAN): 37 ML/MIN/1.73 M^2
EST. GFR  (NON AFRICAN AMERICAN): 32 ML/MIN/1.73 M^2
GLUCOSE SERPL-MCNC: 107 MG/DL (ref 70–110)
HCT VFR BLD AUTO: 32.4 % (ref 37–48.5)
HGB BLD-MCNC: 10.4 G/DL (ref 12–16)
IMM GRANULOCYTES # BLD AUTO: 0.01 K/UL (ref 0–0.04)
MAGNESIUM SERPL-MCNC: 1.6 MG/DL (ref 1.6–2.6)
MCH RBC QN AUTO: 33.5 PG (ref 27–31)
MCHC RBC AUTO-ENTMCNC: 32.1 G/DL (ref 32–36)
MCV RBC AUTO: 105 FL (ref 82–98)
NEUTROPHILS # BLD AUTO: 1.4 K/UL (ref 1.8–7.7)
PLATELET # BLD AUTO: 177 K/UL (ref 150–350)
PMV BLD AUTO: 8.9 FL (ref 9.2–12.9)
POTASSIUM SERPL-SCNC: 4 MMOL/L (ref 3.5–5.1)
PROT SERPL-MCNC: 7.3 G/DL (ref 6–8.4)
RBC # BLD AUTO: 3.1 M/UL (ref 4–5.4)
SODIUM SERPL-SCNC: 143 MMOL/L (ref 136–145)
WBC # BLD AUTO: 2.87 K/UL (ref 3.9–12.7)

## 2020-02-04 PROCEDURE — 36415 COLL VENOUS BLD VENIPUNCTURE: CPT

## 2020-02-04 PROCEDURE — 85027 COMPLETE CBC AUTOMATED: CPT

## 2020-02-04 PROCEDURE — 80053 COMPREHEN METABOLIC PANEL: CPT

## 2020-02-04 PROCEDURE — 83735 ASSAY OF MAGNESIUM: CPT
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
## 2020-02-05 ENCOUNTER — INFUSION (OUTPATIENT)
Dept: INFUSION THERAPY | Facility: HOSPITAL | Age: 75
End: 2020-02-05
Attending: INTERNAL MEDICINE
Payer: MEDICARE

## 2020-02-05 ENCOUNTER — PATIENT MESSAGE (OUTPATIENT)
Dept: HEMATOLOGY/ONCOLOGY | Facility: CLINIC | Age: 75
End: 2020-02-05

## 2020-02-05 ENCOUNTER — OFFICE VISIT (OUTPATIENT)
Dept: HEMATOLOGY/ONCOLOGY | Facility: CLINIC | Age: 75
End: 2020-02-05
Payer: MEDICARE

## 2020-02-05 VITALS
TEMPERATURE: 98 F | WEIGHT: 183 LBS | HEIGHT: 66 IN | BODY MASS INDEX: 29.41 KG/M2 | DIASTOLIC BLOOD PRESSURE: 89 MMHG | HEART RATE: 76 BPM | SYSTOLIC BLOOD PRESSURE: 166 MMHG | RESPIRATION RATE: 18 BRPM

## 2020-02-05 VITALS
SYSTOLIC BLOOD PRESSURE: 148 MMHG | DIASTOLIC BLOOD PRESSURE: 80 MMHG | TEMPERATURE: 98 F | OXYGEN SATURATION: 96 % | BODY MASS INDEX: 29.41 KG/M2 | HEART RATE: 70 BPM | HEIGHT: 66 IN | RESPIRATION RATE: 16 BRPM | WEIGHT: 183 LBS

## 2020-02-05 DIAGNOSIS — R25.1 TREMORS OF NERVOUS SYSTEM: ICD-10-CM

## 2020-02-05 DIAGNOSIS — Z86.718 HISTORY OF DVT (DEEP VEIN THROMBOSIS): ICD-10-CM

## 2020-02-05 DIAGNOSIS — N28.9 RENAL INSUFFICIENCY: ICD-10-CM

## 2020-02-05 DIAGNOSIS — D70.1 CHEMOTHERAPY INDUCED NEUTROPENIA: ICD-10-CM

## 2020-02-05 DIAGNOSIS — R11.2 CHEMOTHERAPY INDUCED NAUSEA AND VOMITING: ICD-10-CM

## 2020-02-05 DIAGNOSIS — C45.0 MALIGNANT PLEURAL MESOTHELIOMA: Primary | ICD-10-CM

## 2020-02-05 DIAGNOSIS — M54.32 SCIATICA OF LEFT SIDE: ICD-10-CM

## 2020-02-05 DIAGNOSIS — T45.1X5A CHEMOTHERAPY INDUCED NAUSEA AND VOMITING: ICD-10-CM

## 2020-02-05 DIAGNOSIS — D64.81 ANTINEOPLASTIC CHEMOTHERAPY INDUCED ANEMIA: ICD-10-CM

## 2020-02-05 DIAGNOSIS — T45.1X5A ANTINEOPLASTIC CHEMOTHERAPY INDUCED ANEMIA: ICD-10-CM

## 2020-02-05 DIAGNOSIS — T45.1X5A CHEMOTHERAPY INDUCED NEUTROPENIA: ICD-10-CM

## 2020-02-05 DIAGNOSIS — M79.606 PAIN OF LOWER EXTREMITY, UNSPECIFIED LATERALITY: ICD-10-CM

## 2020-02-05 LAB
BILIRUB UR QL STRIP: NEGATIVE
CLARITY UR REFRACT.AUTO: CLEAR
COLOR UR AUTO: NORMAL
GLUCOSE UR QL STRIP: NEGATIVE
HGB UR QL STRIP: NEGATIVE
KETONES UR QL STRIP: NEGATIVE
LEUKOCYTE ESTERASE UR QL STRIP: NEGATIVE
NITRITE UR QL STRIP: NEGATIVE
PH UR STRIP: 6 [PH] (ref 5–8)
PROT UR QL STRIP: NEGATIVE
SP GR UR STRIP: 1 (ref 1–1.03)
URN SPEC COLLECT METH UR: NORMAL

## 2020-02-05 PROCEDURE — 96361 HYDRATE IV INFUSION ADD-ON: CPT

## 2020-02-05 PROCEDURE — 1125F AMNT PAIN NOTED PAIN PRSNT: CPT | Mod: S$GLB,,, | Performed by: NURSE PRACTITIONER

## 2020-02-05 PROCEDURE — 3079F DIAST BP 80-89 MM HG: CPT | Mod: CPTII,S$GLB,, | Performed by: NURSE PRACTITIONER

## 2020-02-05 PROCEDURE — 81003 URINALYSIS AUTO W/O SCOPE: CPT

## 2020-02-05 PROCEDURE — 3077F SYST BP >= 140 MM HG: CPT | Mod: CPTII,S$GLB,, | Performed by: NURSE PRACTITIONER

## 2020-02-05 PROCEDURE — 99214 PR OFFICE/OUTPT VISIT, EST, LEVL IV, 30-39 MIN: ICD-10-PCS | Mod: S$GLB,,, | Performed by: NURSE PRACTITIONER

## 2020-02-05 PROCEDURE — 99499 UNLISTED E&M SERVICE: CPT | Mod: S$GLB,,, | Performed by: NURSE PRACTITIONER

## 2020-02-05 PROCEDURE — 63600175 PHARM REV CODE 636 W HCPCS: Performed by: INTERNAL MEDICINE

## 2020-02-05 PROCEDURE — 99999 PR PBB SHADOW E&M-EST. PATIENT-LVL V: CPT | Mod: PBBFAC,,, | Performed by: NURSE PRACTITIONER

## 2020-02-05 PROCEDURE — 1101F PR PT FALLS ASSESS DOC 0-1 FALLS W/OUT INJ PAST YR: ICD-10-PCS | Mod: CPTII,S$GLB,, | Performed by: NURSE PRACTITIONER

## 2020-02-05 PROCEDURE — 96367 TX/PROPH/DG ADDL SEQ IV INF: CPT

## 2020-02-05 PROCEDURE — 99999 PR PBB SHADOW E&M-EST. PATIENT-LVL V: ICD-10-PCS | Mod: PBBFAC,,, | Performed by: NURSE PRACTITIONER

## 2020-02-05 PROCEDURE — 99499 RISK ADDL DX/OHS AUDIT: ICD-10-PCS | Mod: S$GLB,,, | Performed by: NURSE PRACTITIONER

## 2020-02-05 PROCEDURE — 1125F PR PAIN SEVERITY QUANTIFIED, PAIN PRESENT: ICD-10-PCS | Mod: S$GLB,,, | Performed by: NURSE PRACTITIONER

## 2020-02-05 PROCEDURE — 25000003 PHARM REV CODE 250: Performed by: INTERNAL MEDICINE

## 2020-02-05 PROCEDURE — 96375 TX/PRO/DX INJ NEW DRUG ADDON: CPT

## 2020-02-05 PROCEDURE — 3079F PR MOST RECENT DIASTOLIC BLOOD PRESSURE 80-89 MM HG: ICD-10-PCS | Mod: CPTII,S$GLB,, | Performed by: NURSE PRACTITIONER

## 2020-02-05 PROCEDURE — 99214 OFFICE O/P EST MOD 30 MIN: CPT | Mod: S$GLB,,, | Performed by: NURSE PRACTITIONER

## 2020-02-05 PROCEDURE — A4216 STERILE WATER/SALINE, 10 ML: HCPCS | Performed by: INTERNAL MEDICINE

## 2020-02-05 PROCEDURE — 1159F MED LIST DOCD IN RCRD: CPT | Mod: S$GLB,,, | Performed by: NURSE PRACTITIONER

## 2020-02-05 PROCEDURE — 96413 CHEMO IV INFUSION 1 HR: CPT

## 2020-02-05 PROCEDURE — 96411 CHEMO IV PUSH ADDL DRUG: CPT

## 2020-02-05 PROCEDURE — 3077F PR MOST RECENT SYSTOLIC BLOOD PRESSURE >= 140 MM HG: ICD-10-PCS | Mod: CPTII,S$GLB,, | Performed by: NURSE PRACTITIONER

## 2020-02-05 PROCEDURE — 1159F PR MEDICATION LIST DOCUMENTED IN MEDICAL RECORD: ICD-10-PCS | Mod: S$GLB,,, | Performed by: NURSE PRACTITIONER

## 2020-02-05 PROCEDURE — 1101F PT FALLS ASSESS-DOCD LE1/YR: CPT | Mod: CPTII,S$GLB,, | Performed by: NURSE PRACTITIONER

## 2020-02-05 RX ORDER — HEPARIN 100 UNIT/ML
500 SYRINGE INTRAVENOUS
Status: CANCELLED | OUTPATIENT
Start: 2020-02-05

## 2020-02-05 RX ORDER — DICLOFENAC SODIUM 10 MG/G
2 GEL TOPICAL DAILY
Qty: 100 G | Refills: 0 | Status: SHIPPED | OUTPATIENT
Start: 2020-02-05 | End: 2020-02-26

## 2020-02-05 RX ORDER — HEPARIN 100 UNIT/ML
500 SYRINGE INTRAVENOUS
Status: DISCONTINUED | OUTPATIENT
Start: 2020-02-05 | End: 2020-02-05 | Stop reason: HOSPADM

## 2020-02-05 RX ORDER — SODIUM CHLORIDE 0.9 % (FLUSH) 0.9 %
10 SYRINGE (ML) INJECTION
Status: CANCELLED | OUTPATIENT
Start: 2020-02-05

## 2020-02-05 RX ORDER — SODIUM CHLORIDE 0.9 % (FLUSH) 0.9 %
10 SYRINGE (ML) INJECTION
Status: DISCONTINUED | OUTPATIENT
Start: 2020-02-05 | End: 2020-02-05 | Stop reason: HOSPADM

## 2020-02-05 RX ADMIN — APREPITANT 130 MG: 130 INJECTION, EMULSION INTRAVENOUS at 02:02

## 2020-02-05 RX ADMIN — Medication 10 ML: at 03:02

## 2020-02-05 RX ADMIN — DEXAMETHASONE SODIUM PHOSPHATE: 4 INJECTION, SOLUTION INTRA-ARTICULAR; INTRALESIONAL; INTRAMUSCULAR; INTRAVENOUS; SOFT TISSUE at 01:02

## 2020-02-05 RX ADMIN — HEPARIN 500 UNITS: 100 SYRINGE at 03:02

## 2020-02-05 RX ADMIN — CARBOPLATIN 380 MG: 10 INJECTION, SOLUTION INTRAVENOUS at 02:02

## 2020-02-05 RX ADMIN — SODIUM CHLORIDE 675 MG: 9 INJECTION, SOLUTION INTRAVENOUS at 02:02

## 2020-02-05 RX ADMIN — SODIUM CHLORIDE: 0.9 INJECTION, SOLUTION INTRAVENOUS at 12:02

## 2020-02-05 NOTE — LETTER
February 5, 2020        No Recipients             [facility]  [address]  [facility] [address]  Phone: [phone]   Patient: Isabell Preston   MR Number: [id number]   YOB: 1945   Date of Visit: 2/5/2020     To whom it may concern,      The above mentioned patient is currently under my care for chemotherapy. She was initially diagnosed in June 2019 with biphasic mesothelioma and continues treatment currently. If you have any questions or concerns, please to not hesitate to contact me.    Sincerely,      Antonella Goetz, NP            CC  No Recipients    Enclosure

## 2020-02-06 ENCOUNTER — PATIENT MESSAGE (OUTPATIENT)
Dept: HEMATOLOGY/ONCOLOGY | Facility: CLINIC | Age: 75
End: 2020-02-06

## 2020-02-11 ENCOUNTER — PATIENT MESSAGE (OUTPATIENT)
Dept: HEMATOLOGY/ONCOLOGY | Facility: CLINIC | Age: 75
End: 2020-02-11

## 2020-02-19 ENCOUNTER — PATIENT OUTREACH (OUTPATIENT)
Dept: ADMINISTRATIVE | Facility: OTHER | Age: 75
End: 2020-02-19

## 2020-02-19 ENCOUNTER — HOSPITAL ENCOUNTER (EMERGENCY)
Facility: HOSPITAL | Age: 75
Discharge: HOME | End: 2020-02-19
Attending: EMERGENCY MEDICINE
Payer: MEDICARE

## 2020-02-19 VITALS
SYSTOLIC BLOOD PRESSURE: 155 MMHG | TEMPERATURE: 98 F | BODY MASS INDEX: 28.28 KG/M2 | HEART RATE: 80 BPM | RESPIRATION RATE: 20 BRPM | HEIGHT: 66 IN | OXYGEN SATURATION: 98 % | DIASTOLIC BLOOD PRESSURE: 68 MMHG | WEIGHT: 176 LBS

## 2020-02-19 DIAGNOSIS — R42 DIZZINESS: ICD-10-CM

## 2020-02-19 DIAGNOSIS — R51.9 ACUTE NONINTRACTABLE HEADACHE, UNSPECIFIED HEADACHE TYPE: Primary | ICD-10-CM

## 2020-02-19 DIAGNOSIS — I10 HYPERTENSION: ICD-10-CM

## 2020-02-19 LAB
ALBUMIN SERPL-MCNC: 3.7 G/DL (ref 3.3–5.5)
ALP SERPL-CCNC: 80 U/L (ref 42–141)
BILIRUB SERPL-MCNC: 0.5 MG/DL (ref 0.2–1.6)
BUN SERPL-MCNC: 17 MG/DL (ref 7–22)
CALCIUM SERPL-MCNC: 9.7 MG/DL (ref 8–10.3)
CHLORIDE SERPL-SCNC: 101 MMOL/L (ref 98–108)
CREAT SERPL-MCNC: 1.4 MG/DL (ref 0.6–1.2)
GLUCOSE SERPL-MCNC: 135 MG/DL (ref 73–118)
POC ALT (SGPT): 25 U/L (ref 10–47)
POC AST (SGOT): 29 U/L (ref 11–38)
POC CARDIAC TROPONIN I: 0 NG/ML
POC PTINR: 1.1 (ref 0.9–1.2)
POC PTWBT: 13.2 SEC (ref 9.7–14.3)
POC TCO2: 31 MMOL/L (ref 18–33)
POCT GLUCOSE: 138 MG/DL (ref 70–110)
POTASSIUM BLD-SCNC: 3.9 MMOL/L (ref 3.6–5.1)
PROTEIN, POC: 7.1 G/DL (ref 6.4–8.1)
SAMPLE: NORMAL
SAMPLE: NORMAL
SODIUM BLD-SCNC: 142 MMOL/L (ref 128–145)

## 2020-02-19 PROCEDURE — 81003 URINALYSIS AUTO W/O SCOPE: CPT | Mod: ER

## 2020-02-19 PROCEDURE — 80053 COMPREHEN METABOLIC PANEL: CPT | Mod: ER

## 2020-02-19 PROCEDURE — 85610 PROTHROMBIN TIME: CPT | Mod: ER

## 2020-02-19 PROCEDURE — 63600175 PHARM REV CODE 636 W HCPCS: Mod: ER | Performed by: EMERGENCY MEDICINE

## 2020-02-19 PROCEDURE — 25000003 PHARM REV CODE 250: Mod: ER | Performed by: EMERGENCY MEDICINE

## 2020-02-19 PROCEDURE — 99285 EMERGENCY DEPT VISIT HI MDM: CPT | Mod: 25,ER

## 2020-02-19 PROCEDURE — 85025 COMPLETE CBC W/AUTO DIFF WBC: CPT | Mod: ER

## 2020-02-19 PROCEDURE — 93010 ELECTROCARDIOGRAM REPORT: CPT | Mod: ,,, | Performed by: INTERNAL MEDICINE

## 2020-02-19 PROCEDURE — 96375 TX/PRO/DX INJ NEW DRUG ADDON: CPT | Mod: ER

## 2020-02-19 PROCEDURE — 82962 GLUCOSE BLOOD TEST: CPT | Mod: ER

## 2020-02-19 PROCEDURE — 93010 EKG 12-LEAD: ICD-10-PCS | Mod: ,,, | Performed by: INTERNAL MEDICINE

## 2020-02-19 PROCEDURE — 84484 ASSAY OF TROPONIN QUANT: CPT | Mod: ER

## 2020-02-19 PROCEDURE — 93005 ELECTROCARDIOGRAM TRACING: CPT | Mod: ER

## 2020-02-19 PROCEDURE — 96374 THER/PROPH/DIAG INJ IV PUSH: CPT | Mod: ER

## 2020-02-19 RX ORDER — ACETAMINOPHEN 325 MG/1
650 TABLET ORAL
Status: COMPLETED | OUTPATIENT
Start: 2020-02-19 | End: 2020-02-19

## 2020-02-19 RX ORDER — MECLIZINE HYDROCHLORIDE 25 MG/1
25 TABLET ORAL 3 TIMES DAILY PRN
Qty: 20 TABLET | Refills: 0 | Status: SHIPPED | OUTPATIENT
Start: 2020-02-19 | End: 2020-04-27

## 2020-02-19 RX ORDER — HYDRALAZINE HYDROCHLORIDE 20 MG/ML
10 INJECTION INTRAMUSCULAR; INTRAVENOUS
Status: COMPLETED | OUTPATIENT
Start: 2020-02-19 | End: 2020-02-19

## 2020-02-19 RX ORDER — ACETAMINOPHEN 500 MG
1000 TABLET ORAL EVERY 6 HOURS PRN
Qty: 30 TABLET | Refills: 0 | Status: SHIPPED | OUTPATIENT
Start: 2020-02-19

## 2020-02-19 RX ORDER — PROMETHAZINE HYDROCHLORIDE 25 MG/1
25 TABLET ORAL EVERY 6 HOURS PRN
Qty: 15 TABLET | Refills: 0 | Status: ON HOLD | OUTPATIENT
Start: 2020-02-19 | End: 2020-05-13 | Stop reason: SDUPTHER

## 2020-02-19 RX ORDER — DIPHENHYDRAMINE HYDROCHLORIDE 50 MG/ML
25 INJECTION INTRAMUSCULAR; INTRAVENOUS
Status: COMPLETED | OUTPATIENT
Start: 2020-02-19 | End: 2020-02-19

## 2020-02-19 RX ORDER — DIPHENHYDRAMINE HCL 25 MG
25 CAPSULE ORAL EVERY 6 HOURS PRN
Qty: 20 CAPSULE | Refills: 0 | Status: SHIPPED | OUTPATIENT
Start: 2020-02-19 | End: 2020-04-27

## 2020-02-19 RX ADMIN — ACETAMINOPHEN 650 MG: 325 TABLET ORAL at 06:02

## 2020-02-19 RX ADMIN — DIPHENHYDRAMINE HYDROCHLORIDE 25 MG: 50 INJECTION INTRAMUSCULAR; INTRAVENOUS at 06:02

## 2020-02-19 RX ADMIN — HYDRALAZINE HYDROCHLORIDE 10 MG: 20 INJECTION INTRAMUSCULAR; INTRAVENOUS at 06:02
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
## 2020-02-20 NOTE — ED NOTES
Pt to ED reporting headache and dizziness today with elevated BP. Reports recent chemo 2 weeks ago for mesothelioma. No more planned treatments at this time. Daughter at bedside.

## 2020-02-21 ENCOUNTER — TELEPHONE (OUTPATIENT)
Dept: HEMATOLOGY/ONCOLOGY | Facility: CLINIC | Age: 75
End: 2020-02-21

## 2020-02-24 ENCOUNTER — TELEPHONE (OUTPATIENT)
Dept: HEMATOLOGY/ONCOLOGY | Facility: CLINIC | Age: 75
End: 2020-02-24
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
## 2020-02-26 ENCOUNTER — OFFICE VISIT (OUTPATIENT)
Dept: HEMATOLOGY/ONCOLOGY | Facility: CLINIC | Age: 75
End: 2020-02-26
Payer: MEDICARE

## 2020-02-26 ENCOUNTER — LAB VISIT (OUTPATIENT)
Dept: LAB | Facility: HOSPITAL | Age: 75
End: 2020-02-26
Payer: MEDICARE

## 2020-02-26 VITALS
HEART RATE: 72 BPM | SYSTOLIC BLOOD PRESSURE: 120 MMHG | RESPIRATION RATE: 16 BRPM | OXYGEN SATURATION: 96 % | HEIGHT: 66 IN | DIASTOLIC BLOOD PRESSURE: 60 MMHG | BODY MASS INDEX: 29.2 KG/M2 | TEMPERATURE: 98 F | WEIGHT: 181.69 LBS

## 2020-02-26 DIAGNOSIS — C45.0 MALIGNANT PLEURAL MESOTHELIOMA: Primary | ICD-10-CM

## 2020-02-26 DIAGNOSIS — M54.32 SCIATICA OF LEFT SIDE: ICD-10-CM

## 2020-02-26 DIAGNOSIS — R11.2 CHEMOTHERAPY INDUCED NAUSEA AND VOMITING: ICD-10-CM

## 2020-02-26 DIAGNOSIS — D64.81 ANTINEOPLASTIC CHEMOTHERAPY INDUCED ANEMIA: ICD-10-CM

## 2020-02-26 DIAGNOSIS — T45.1X5A CHEMOTHERAPY INDUCED NEUTROPENIA: ICD-10-CM

## 2020-02-26 DIAGNOSIS — R25.1 TREMORS OF NERVOUS SYSTEM: ICD-10-CM

## 2020-02-26 DIAGNOSIS — C45.0 MALIGNANT PLEURAL MESOTHELIOMA: ICD-10-CM

## 2020-02-26 DIAGNOSIS — D70.1 CHEMOTHERAPY INDUCED NEUTROPENIA: ICD-10-CM

## 2020-02-26 DIAGNOSIS — N28.9 RENAL INSUFFICIENCY: ICD-10-CM

## 2020-02-26 DIAGNOSIS — T45.1X5A CHEMOTHERAPY INDUCED NAUSEA AND VOMITING: ICD-10-CM

## 2020-02-26 DIAGNOSIS — Z86.718 HISTORY OF DVT (DEEP VEIN THROMBOSIS): ICD-10-CM

## 2020-02-26 DIAGNOSIS — T45.1X5A ANTINEOPLASTIC CHEMOTHERAPY INDUCED ANEMIA: ICD-10-CM

## 2020-02-26 LAB
ALBUMIN SERPL BCP-MCNC: 3.8 G/DL (ref 3.5–5.2)
ALP SERPL-CCNC: 88 U/L (ref 55–135)
ALT SERPL W/O P-5'-P-CCNC: 24 U/L (ref 10–44)
ANION GAP SERPL CALC-SCNC: 9 MMOL/L (ref 8–16)
AST SERPL-CCNC: 23 U/L (ref 10–40)
BILIRUB SERPL-MCNC: 0.2 MG/DL (ref 0.1–1)
BUN SERPL-MCNC: 19 MG/DL (ref 8–23)
CALCIUM SERPL-MCNC: 9.5 MG/DL (ref 8.7–10.5)
CHLORIDE SERPL-SCNC: 106 MMOL/L (ref 95–110)
CO2 SERPL-SCNC: 26 MMOL/L (ref 23–29)
CREAT SERPL-MCNC: 1.7 MG/DL (ref 0.5–1.4)
ERYTHROCYTE [DISTWIDTH] IN BLOOD BY AUTOMATED COUNT: 13.5 % (ref 11.5–14.5)
EST. GFR  (AFRICAN AMERICAN): 34 ML/MIN/1.73 M^2
EST. GFR  (NON AFRICAN AMERICAN): 29.5 ML/MIN/1.73 M^2
GLUCOSE SERPL-MCNC: 122 MG/DL (ref 70–110)
HCT VFR BLD AUTO: 31.9 % (ref 37–48.5)
HGB BLD-MCNC: 9.9 G/DL (ref 12–16)
IMM GRANULOCYTES # BLD AUTO: 0.02 K/UL (ref 0–0.04)
MCH RBC QN AUTO: 33.3 PG (ref 27–31)
MCHC RBC AUTO-ENTMCNC: 31 G/DL (ref 32–36)
MCV RBC AUTO: 107 FL (ref 82–98)
NEUTROPHILS # BLD AUTO: 2 K/UL (ref 1.8–7.7)
PLATELET # BLD AUTO: 251 K/UL (ref 150–350)
PMV BLD AUTO: 9.6 FL (ref 9.2–12.9)
POTASSIUM SERPL-SCNC: 4.1 MMOL/L (ref 3.5–5.1)
PROT SERPL-MCNC: 7.6 G/DL (ref 6–8.4)
RBC # BLD AUTO: 2.97 M/UL (ref 4–5.4)
SODIUM SERPL-SCNC: 141 MMOL/L (ref 136–145)
WBC # BLD AUTO: 4.08 K/UL (ref 3.9–12.7)

## 2020-02-26 PROCEDURE — 99214 OFFICE O/P EST MOD 30 MIN: CPT | Mod: S$GLB,,, | Performed by: NURSE PRACTITIONER

## 2020-02-26 PROCEDURE — 99499 UNLISTED E&M SERVICE: CPT | Mod: S$GLB,,, | Performed by: NURSE PRACTITIONER

## 2020-02-26 PROCEDURE — 1125F AMNT PAIN NOTED PAIN PRSNT: CPT | Mod: S$GLB,,, | Performed by: NURSE PRACTITIONER

## 2020-02-26 PROCEDURE — 99999 PR PBB SHADOW E&M-EST. PATIENT-LVL V: CPT | Mod: PBBFAC,,, | Performed by: NURSE PRACTITIONER

## 2020-02-26 PROCEDURE — 3074F SYST BP LT 130 MM HG: CPT | Mod: CPTII,S$GLB,, | Performed by: NURSE PRACTITIONER

## 2020-02-26 PROCEDURE — 1159F MED LIST DOCD IN RCRD: CPT | Mod: S$GLB,,, | Performed by: NURSE PRACTITIONER

## 2020-02-26 PROCEDURE — 99214 PR OFFICE/OUTPT VISIT, EST, LEVL IV, 30-39 MIN: ICD-10-PCS | Mod: S$GLB,,, | Performed by: NURSE PRACTITIONER

## 2020-02-26 PROCEDURE — 3078F DIAST BP <80 MM HG: CPT | Mod: CPTII,S$GLB,, | Performed by: NURSE PRACTITIONER

## 2020-02-26 PROCEDURE — 99499 RISK ADDL DX/OHS AUDIT: ICD-10-PCS | Mod: S$GLB,,, | Performed by: NURSE PRACTITIONER

## 2020-02-26 PROCEDURE — 3078F PR MOST RECENT DIASTOLIC BLOOD PRESSURE < 80 MM HG: ICD-10-PCS | Mod: CPTII,S$GLB,, | Performed by: NURSE PRACTITIONER

## 2020-02-26 PROCEDURE — 85027 COMPLETE CBC AUTOMATED: CPT

## 2020-02-26 PROCEDURE — 80053 COMPREHEN METABOLIC PANEL: CPT

## 2020-02-26 PROCEDURE — 36415 COLL VENOUS BLD VENIPUNCTURE: CPT

## 2020-02-26 PROCEDURE — 1101F PR PT FALLS ASSESS DOC 0-1 FALLS W/OUT INJ PAST YR: ICD-10-PCS | Mod: CPTII,S$GLB,, | Performed by: NURSE PRACTITIONER

## 2020-02-26 PROCEDURE — 1125F PR PAIN SEVERITY QUANTIFIED, PAIN PRESENT: ICD-10-PCS | Mod: S$GLB,,, | Performed by: NURSE PRACTITIONER

## 2020-02-26 PROCEDURE — 1101F PT FALLS ASSESS-DOCD LE1/YR: CPT | Mod: CPTII,S$GLB,, | Performed by: NURSE PRACTITIONER

## 2020-02-26 PROCEDURE — 1159F PR MEDICATION LIST DOCUMENTED IN MEDICAL RECORD: ICD-10-PCS | Mod: S$GLB,,, | Performed by: NURSE PRACTITIONER

## 2020-02-26 PROCEDURE — 99999 PR PBB SHADOW E&M-EST. PATIENT-LVL V: ICD-10-PCS | Mod: PBBFAC,,, | Performed by: NURSE PRACTITIONER

## 2020-02-26 PROCEDURE — 3074F PR MOST RECENT SYSTOLIC BLOOD PRESSURE < 130 MM HG: ICD-10-PCS | Mod: CPTII,S$GLB,, | Performed by: NURSE PRACTITIONER

## 2020-02-26 RX ORDER — ONDANSETRON 4 MG/1
8 TABLET, FILM COATED ORAL
Status: CANCELLED
Start: 2020-02-26

## 2020-02-26 RX ORDER — CYANOCOBALAMIN 1000 UG/ML
1000 INJECTION, SOLUTION INTRAMUSCULAR; SUBCUTANEOUS ONCE
Status: CANCELLED
Start: 2020-02-26

## 2020-02-26 RX ORDER — SODIUM CHLORIDE 0.9 % (FLUSH) 0.9 %
10 SYRINGE (ML) INJECTION
Status: CANCELLED | OUTPATIENT
Start: 2020-02-26

## 2020-02-26 RX ORDER — HEPARIN 100 UNIT/ML
500 SYRINGE INTRAVENOUS
Status: CANCELLED | OUTPATIENT
Start: 2020-02-26

## 2020-02-26 NOTE — Clinical Note
RTC 3 weeks with labs (CBC,CMP,Mag), to see me and alimta.Needs reschedule neurology appt. Will take any location.

## 2020-02-27 ENCOUNTER — INFUSION (OUTPATIENT)
Dept: INFUSION THERAPY | Facility: HOSPITAL | Age: 75
End: 2020-02-27
Attending: INTERNAL MEDICINE
Payer: MEDICARE

## 2020-02-27 VITALS
HEART RATE: 76 BPM | SYSTOLIC BLOOD PRESSURE: 144 MMHG | DIASTOLIC BLOOD PRESSURE: 78 MMHG | RESPIRATION RATE: 18 BRPM | TEMPERATURE: 99 F

## 2020-02-27 DIAGNOSIS — C45.0 MALIGNANT PLEURAL MESOTHELIOMA: Primary | ICD-10-CM

## 2020-02-27 PROCEDURE — 63600175 PHARM REV CODE 636 W HCPCS: Performed by: NURSE PRACTITIONER

## 2020-02-27 PROCEDURE — 96372 THER/PROPH/DIAG INJ SC/IM: CPT | Mod: 59

## 2020-02-27 PROCEDURE — 96361 HYDRATE IV INFUSION ADD-ON: CPT

## 2020-02-27 PROCEDURE — 96409 CHEMO IV PUSH SNGL DRUG: CPT

## 2020-02-27 PROCEDURE — 63600175 PHARM REV CODE 636 W HCPCS: Performed by: INTERNAL MEDICINE

## 2020-02-27 PROCEDURE — A4216 STERILE WATER/SALINE, 10 ML: HCPCS | Performed by: INTERNAL MEDICINE

## 2020-02-27 PROCEDURE — 25000003 PHARM REV CODE 250: Performed by: INTERNAL MEDICINE

## 2020-02-27 RX ORDER — ONDANSETRON 4 MG/1
8 TABLET, FILM COATED ORAL
Status: COMPLETED | OUTPATIENT
Start: 2020-02-27 | End: 2020-02-27

## 2020-02-27 RX ORDER — HEPARIN 100 UNIT/ML
500 SYRINGE INTRAVENOUS
Status: DISCONTINUED | OUTPATIENT
Start: 2020-02-27 | End: 2020-02-27 | Stop reason: HOSPADM

## 2020-02-27 RX ORDER — CYANOCOBALAMIN 1000 UG/ML
1000 INJECTION, SOLUTION INTRAMUSCULAR; SUBCUTANEOUS ONCE
Status: COMPLETED | OUTPATIENT
Start: 2020-02-27 | End: 2020-02-27

## 2020-02-27 RX ORDER — SODIUM CHLORIDE 0.9 % (FLUSH) 0.9 %
10 SYRINGE (ML) INJECTION
Status: DISCONTINUED | OUTPATIENT
Start: 2020-02-27 | End: 2020-02-27 | Stop reason: HOSPADM

## 2020-02-27 RX ADMIN — Medication 10 ML: at 02:02

## 2020-02-27 RX ADMIN — ONDANSETRON HYDROCHLORIDE 8 MG: 4 TABLET, FILM COATED ORAL at 01:02

## 2020-02-27 RX ADMIN — SODIUM CHLORIDE 675 MG: 9 INJECTION, SOLUTION INTRAVENOUS at 02:02

## 2020-02-27 RX ADMIN — HEPARIN 500 UNITS: 100 SYRINGE at 02:02

## 2020-02-27 RX ADMIN — SODIUM CHLORIDE: 0.9 INJECTION, SOLUTION INTRAVENOUS at 01:02

## 2020-02-27 RX ADMIN — CYANOCOBALAMIN 1000 MCG: 1000 INJECTION, SOLUTION INTRAMUSCULAR at 02:02

## 2020-02-27 NOTE — PLAN OF CARE
1357-Labs , hx, and medications reviewed, patient was seen by Md yesterday. Assessment completed. Discussed plan of care with patient. Patient in agreement. Chair reclined and warm blanket and snack offered.

## 2020-02-27 NOTE — PLAN OF CARE
1500-Patient tolerated treatment well. Discharged without complaints or S/S of adverse event. AVS given.  Instructed to call provider for any questions or concerns.

## 2020-03-04 ENCOUNTER — PATIENT OUTREACH (OUTPATIENT)
Dept: ADMINISTRATIVE | Facility: OTHER | Age: 75
End: 2020-03-04

## 2020-03-05 ENCOUNTER — TELEPHONE (OUTPATIENT)
Dept: PSYCHIATRY | Facility: HOSPITAL | Age: 75
End: 2020-03-05

## 2020-03-05 ENCOUNTER — OFFICE VISIT (OUTPATIENT)
Dept: NEUROLOGY | Facility: CLINIC | Age: 75
End: 2020-03-05
Payer: MEDICARE

## 2020-03-05 VITALS
DIASTOLIC BLOOD PRESSURE: 64 MMHG | SYSTOLIC BLOOD PRESSURE: 126 MMHG | HEIGHT: 66 IN | BODY MASS INDEX: 29.32 KG/M2 | HEART RATE: 81 BPM

## 2020-03-05 DIAGNOSIS — R41.3 MEMORY IMPAIRMENT: Primary | ICD-10-CM

## 2020-03-05 DIAGNOSIS — R41.3 MEMORY CHANGE: ICD-10-CM

## 2020-03-05 DIAGNOSIS — R25.1 TREMOR: ICD-10-CM

## 2020-03-05 DIAGNOSIS — G20.C PARKINSONISM, UNSPECIFIED PARKINSONISM TYPE: ICD-10-CM

## 2020-03-05 PROCEDURE — 99205 PR OFFICE/OUTPT VISIT, NEW, LEVL V, 60-74 MIN: ICD-10-PCS | Mod: S$GLB,,, | Performed by: PSYCHIATRY & NEUROLOGY

## 2020-03-05 PROCEDURE — 3074F SYST BP LT 130 MM HG: CPT | Mod: CPTII,S$GLB,, | Performed by: PSYCHIATRY & NEUROLOGY

## 2020-03-05 PROCEDURE — 1159F PR MEDICATION LIST DOCUMENTED IN MEDICAL RECORD: ICD-10-PCS | Mod: S$GLB,,, | Performed by: PSYCHIATRY & NEUROLOGY

## 2020-03-05 PROCEDURE — 1159F MED LIST DOCD IN RCRD: CPT | Mod: S$GLB,,, | Performed by: PSYCHIATRY & NEUROLOGY

## 2020-03-05 PROCEDURE — 3074F PR MOST RECENT SYSTOLIC BLOOD PRESSURE < 130 MM HG: ICD-10-PCS | Mod: CPTII,S$GLB,, | Performed by: PSYCHIATRY & NEUROLOGY

## 2020-03-05 PROCEDURE — 1100F PTFALLS ASSESS-DOCD GE2>/YR: CPT | Mod: CPTII,S$GLB,, | Performed by: PSYCHIATRY & NEUROLOGY

## 2020-03-05 PROCEDURE — 99999 PR PBB SHADOW E&M-EST. PATIENT-LVL V: CPT | Mod: PBBFAC,,, | Performed by: PSYCHIATRY & NEUROLOGY

## 2020-03-05 PROCEDURE — 3078F PR MOST RECENT DIASTOLIC BLOOD PRESSURE < 80 MM HG: ICD-10-PCS | Mod: CPTII,S$GLB,, | Performed by: PSYCHIATRY & NEUROLOGY

## 2020-03-05 PROCEDURE — 99999 PR PBB SHADOW E&M-EST. PATIENT-LVL V: ICD-10-PCS | Mod: PBBFAC,,, | Performed by: PSYCHIATRY & NEUROLOGY

## 2020-03-05 PROCEDURE — 3288F FALL RISK ASSESSMENT DOCD: CPT | Mod: CPTII,S$GLB,, | Performed by: PSYCHIATRY & NEUROLOGY

## 2020-03-05 PROCEDURE — 99499 RISK ADDL DX/OHS AUDIT: ICD-10-PCS | Mod: S$GLB,,, | Performed by: PSYCHIATRY & NEUROLOGY

## 2020-03-05 PROCEDURE — 99205 OFFICE O/P NEW HI 60 MIN: CPT | Mod: S$GLB,,, | Performed by: PSYCHIATRY & NEUROLOGY

## 2020-03-05 PROCEDURE — 1125F PR PAIN SEVERITY QUANTIFIED, PAIN PRESENT: ICD-10-PCS | Mod: S$GLB,,, | Performed by: PSYCHIATRY & NEUROLOGY

## 2020-03-05 PROCEDURE — 3288F PR FALLS RISK ASSESSMENT DOCUMENTED: ICD-10-PCS | Mod: CPTII,S$GLB,, | Performed by: PSYCHIATRY & NEUROLOGY

## 2020-03-05 PROCEDURE — 3078F DIAST BP <80 MM HG: CPT | Mod: CPTII,S$GLB,, | Performed by: PSYCHIATRY & NEUROLOGY

## 2020-03-05 PROCEDURE — 1125F AMNT PAIN NOTED PAIN PRSNT: CPT | Mod: S$GLB,,, | Performed by: PSYCHIATRY & NEUROLOGY

## 2020-03-05 PROCEDURE — 1100F PR PT FALLS ASSESS DOC 2+ FALLS/FALL W/INJURY/YR: ICD-10-PCS | Mod: CPTII,S$GLB,, | Performed by: PSYCHIATRY & NEUROLOGY

## 2020-03-05 PROCEDURE — 99499 UNLISTED E&M SERVICE: CPT | Mod: S$GLB,,, | Performed by: PSYCHIATRY & NEUROLOGY

## 2020-03-05 NOTE — LETTER
March 5, 2020      Antonella Goetz, NP  [address] [facility] [address]           [facility] - Neurology  [address]  [place] [address]  Phone: [phone]  Fax: [phone]          Patient: Isabell Preston   MR Number: [id number]   YOB: 1945   Date of Visit: 3/5/2020       Dear Antonella Goetz:    Thank you for referring Isabell Preston to me for evaluation. Attached you will find relevant portions of my assessment and plan of care.    If you have questions, please do not hesitate to call me. I look forward to following Isabell Preston along with you.    Sincerely,    Cassandra Mancia MD    Enclosure  CC:  No Recipients    If you would like to receive this communication electronically, please contact [email or web address] or [phone] to request more information on [employer] Link access.    For providers and/or their staff who would like to refer a patient to [facility], please contact us through our one-stop-shop provider referral line, [facility], at [phone].    If you feel you have received this communication in error or would no longer like to receive these types of communications, please e-mail [email or web address]

## 2020-03-05 NOTE — PROGRESS NOTES
MOVEMENT DISORDERS CLINIC NEW CONSULT NOTE    PCP/Referring Provider: Antonella Goetz, NP  [id number] [place] [address]  Date of Service: 3/5/2020    Chief Complaint: tremors, gait imbalance    HPI: Isabell Preston is a R HANDED 74 y.o. 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   She notes a resting and tremor arms and legs since 1 year. When she hold a glass of water, her hands shake and food spills. Tremor can come and go. Struggles to put on makeup. Struggles to put in her earrings. Started Abilify since at least 2 years.  Issues walking since 1 year. Uses a cane or walker. Falls seldomly. Can walk 100  Feet before she tired. No feet shuffling. Walking continues to decline.    No fam hx tremors or PD.  MRI brain showed no atrophy 2019    Neuroleptic exposure:  Abilify, compazine    PD Review of Symptoms:  Anosmia: poor smell x 6 months  Dysarthria/Hypophonia: none  Dysphagia/Sialorrhea:none  Depression: yes  Cognitive slowing: prior to chemo , short term memory issues, and steadily decreased - forgets date, where here family is, events and tasks  Hallucinations: yes during hospitalization  Sleep issues:  -RBD: talks in her sleep    Review of Systems:   Review of Systems   Constitutional: Negative for fever.   HENT: Negative for congestion.    Eyes: Negative for double vision.   Respiratory: Negative for cough and shortness of breath.    Cardiovascular: Negative for chest pain and leg swelling.   Gastrointestinal: Negative for nausea.   Genitourinary: Negative for dysuria.   Musculoskeletal: Positive for falls.   Skin: Negative for rash.   Neurological: Positive for tremors. Negative for speech change and headaches.   Psychiatric/Behavioral: Positive for depression, hallucinations and memory loss.         Current Medications:  Outpatient  Encounter Medications as of 3/5/2020   Medication Sig Dispense Refill    acetaminophen (TYLENOL) 500 MG tablet Take 2 tablets (1,000 mg total) by mouth every 6 (six) hours as needed for Pain. 30 tablet 0    amLODIPine (NORVASC) 10 MG tablet TAKE 1 TABLET BY MOUTH EVERY DAY 90 tablet 0    ARIPiprazole (ABILIFY) 2 MG Tab Take 1 tablet (2 mg total) by mouth every morning. 90 tablet 1    atorvastatin (LIPITOR) 10 MG tablet Take 1 tablet (10 mg total) by mouth once daily. 90 tablet 1    desoximetasone (TOPICORT) 0.05 % cream Apply topically.      dicyclomine (BENTYL) 10 MG capsule TAKE 1 CAPSULE BY MOUTH TWICE A DAY 90 capsule 0    diphenhydrAMINE (BENADRYL) 25 mg capsule Take 1 capsule (25 mg total) by mouth every 6 (six) hours as needed for Itching or Allergies (Take if headache does not resolve). 20 capsule 0    DULoxetine (CYMBALTA) 60 MG capsule Take 1 capsule (60 mg total) by mouth once daily. 90 capsule 3    enoxaparin (LOVENOX) 120 mg/0.8 mL Syrg Inject 0.8 mLs (120 mg total) into the skin once daily. 24 mL 6    fluticasone (VERAMYST) 27.5 mcg/actuation nasal spray 2 sprays by Nasal route daily as needed for Rhinitis or Allergies.       folic acid (FOLVITE) 400 MCG tablet Take 1 tablet (400 mcg total) by mouth once daily. 30 tablet 11    gabapentin (NEURONTIN) 100 MG capsule TAKE 1 CAPSULE BY MOUTH TWICE A  capsule 1    hydrOXYzine HCl (ATARAX) 25 MG tablet Take 1-2 tablets (25-50 mg total) by mouth daily as needed (severe anxiety or itching). 60 tablet 11    lancets (TRUEPLUS LANCETS) 28 gauge Misc 1 lancet by Misc.(Non-Drug; Combo Route) route once daily. Test blood sugar once daily, type 2 diabetes, controlled. E11.9 100 each 3    lidocaine-prilocaine (EMLA) cream Apply topically as needed. 30 g 1    LINZESS 145 mcg Cap capsule TAKE 1 CAPSULE BY MOUTH EVERY DAY 90 capsule 0    magic mouthwash diphen/antac/lidoc/nysta Swish10 mLs 4 (four) times daily. 120 mL 0    meclizine (ANTIVERT) 25  mg tablet Take 1 tablet (25 mg total) by mouth 3 (three) times daily as needed for Dizziness. 20 tablet 0    metoprolol succinate (TOPROL-XL) 25 MG 24 hr tablet TAKE 1 TABLET (25 MG TOTAL) BY MOUTH ONCE DAILY. TOTAL DAILY DOSE 75MG 30 tablet 0    metoprolol succinate (TOPROL-XL) 50 MG 24 hr tablet TAKE 1 TABLET (50 MG TOTAL) BY MOUTH ONCE DAILY. TOTAL DAILY DOSE 75MG 30 tablet 0    mometasone 0.1% (ELOCON) 0.1 % cream BALWINDER TO DARK AREAS BID ON LEGS PRN 15 g 1    ondansetron (ZOFRAN) 4 MG tablet Take 1 tablet (4 mg total) by mouth every 6 (six) hours. 90 tablet 0    prochlorperazine (COMPAZINE) 10 MG tablet TAKE 1 TABLET BY MOUTH EVERY 6 HOURS AS NEEDED 60 tablet 1    promethazine (PHENERGAN) 25 MG tablet Take 1 tablet (25 mg total) by mouth every 6 (six) hours as needed for Nausea (Taken headache does not resolve). 15 tablet 0    tiZANidine (ZANAFLEX) 4 MG tablet TAKE 1 TABLETBY MOUTH NIGHTLY AT BEDTIME AS NEEDED 90 tablet 0    triamterene-hydrochlorothiazide 37.5-25 mg (MAXZIDE-25) 37.5-25 mg per tablet TAKE 1 TABLET BY MOUTH EVERY DAY 90 tablet 0     No facility-administered encounter medications on file as of 3/5/2020.        Past Medical History:  Patient Active Problem List   Diagnosis    Diarrhea    Hypertension, essential    Blurred vision, left eye    SI (sacroiliac) joint dysfunction    Muscle spasm    Spinal enthesopathy    DDD (degenerative disc disease), lumbar    Chronic deep vein thrombosis (DVT) of distal vein of lower extremity, unspecified laterality    Hyperlipidemia    History of CVA (cerebrovascular accident)    Iron deficiency anemia due to sideropenic dysphagia    Diverticulosis of large intestine with hemorrhage    Chronic fatigue    Neuropathic pain    Type 2 diabetes mellitus with diabetic cataract, without long-term current use of insulin    Acute renal failure superimposed on stage 3 chronic kidney disease    Acute respiratory insufficiency    Pain from hematoma  evacuation    Hematoma of chest wall, right, initial encounter    GINA (acute kidney injury)    History of DVT of lower extremity    Heart palpitations    Right-sided chest wall pain    Constipation    Malignant pleural mesothelioma    Chemotherapy induced nausea and vomiting    Chemotherapy induced neutropenia    Chemotherapy adverse reaction, initial encounter    Chest pain    Tremor    Memory change       Past Surgical History:  Past Surgical History:   Procedure Laterality Date    ANKLE FRACTURE SURGERY      left ankle    APENDIX AND GALL BLADDER REMOVED      APPENDECTOMY      BREAST SURGERY  1998    lumpectomy right side - benign    CHOLECYSTECTOMY      colon resection for diverticulitis x 2      HEMORRHOID SURGERY      HERNIA REPAIR  2000    umbilical hernia repair    HYSTERECTOMY      INSERTION OF TUNNELED CENTRAL VENOUS CATHETER (CVC) WITH SUBCUTANEOUS PORT Left 8/5/2019    Procedure: INSERTION, PORT-A-CATH;  Surgeon: Sebastian Prasad MD;  Location: [facility] CATH LAB;  Service: Radiology;  Laterality: Left;    PLEURODESIS WITH VIDEO-ASSISTED THORACOSCOPIC SURGERY (VATS) Right 7/3/2019    Procedure: VATS, WITH PLEURODESIS;  Surgeon: Ben Smith MD;  Location: [address];  Service: Thoracic;  Laterality: Right;    THORACOSCOPIC BIOPSY OF PLEURA Right 7/3/2019    Procedure: VATS, WITH PLEURA BIOPSY;  Surgeon: Ben Smith MD;  Location[facility] OR [address] [place] [address];  Service: Thoracic;  Laterality: Right;  RIGHT VATS, DRAINAGE, PLEURAL BIOPSY  possible  THORACOTOMY  PLEURODESIS  possible   PLEURX    TONSILLECTOMY      TOTAL ABDOMINAL HYSTERECTOMY W/ BILATERAL SALPINGOOPHORECTOMY      UMBILICAL HERNIA REPAIR         Current Living Situation: home    Social:  Social History     Socioeconomic History    Marital status:      Spouse name: Not on file    Number of children: 3    Years of education: Not on file    Highest education level: Not on file   Occupational History     Occupation: retired -    Social Needs    Financial resource strain: Not on file    Food insecurity:     Worry: Not on file     Inability: Not on file    Transportation needs:     Medical: Not on file     Non-medical: Not on file   Tobacco Use    Smoking status: Former Smoker     Types: Cigarettes     Last attempt to quit: 1970     Years since quittin.6    Smokeless tobacco: Never Used   Substance and Sexual Activity    Alcohol use: No    Drug use: No    Sexual activity: Not on file   Lifestyle    Physical activity:     Days per week: Not on file     Minutes per session: Not on file    Stress: Not on file   Relationships    Social connections:     Talks on phone: Not on file     Gets together: Not on file     Attends Hinduism service: Not on file     Active member of club or organization: Not on file     Attends meetings of clubs or organizations: Not on file     Relationship status: Not on file   Other Topics Concern    Patient feels they ought to cut down on drinking/drug use Not Asked    Patient annoyed by others criticizing their drinking/drug use Not Asked    Patient has felt bad or guilty about drinking/drug use Not Asked    Patient has had a drink/used drugs as an eye opener in the AM Not Asked   Social History Narrative    Not on file       Family History:  Family History   Problem Relation Age of Onset    Glaucoma Mother     Stroke Mother     Stroke Paternal Uncle     Early death Paternal Uncle          from stroke in 40s    Cancer Father         multiple myeloma    Arthritis Father     Cataracts Sister     Diabetes Sister     Arthritis Sister     Alcohol abuse Brother     Depression Brother     Clotting disorder Maternal Aunt         DVT    Birth defects Daughter         bilateral ear defects    Heart disease Daughter         Sinus tachycardia    Cataracts Paternal Grandmother     Arthritis Paternal Grandmother     Diabetes Paternal  "Grandmother     Glaucoma Paternal Grandmother     Breast cancer Maternal Aunt     Ovarian cancer Daughter     Schizophrenia Neg Hx     Suicide Neg Hx        PHYSICAL:  /64 (BP Location: Right arm, Patient Position: Sitting)   Pulse 81   Ht 5' 6" (1.676 m)   BMI 29.32 kg/m²     General Medical Examination:  General: Good hygiene, appropriate appearance.  HEENT: Normocephalic, atraumatic.   Neck: Supple.   Chest: Unlabored breathing.   CV: Symmetric pulses.   Ext: No clubbing, cyanosis, or edema.     Mental Status:  Mood/Affect: Appropriate/congruent.  Level of consciousness: Awake, alert.  Orientation: Oriented to person, place, time and situation.  Remember 1 out of 3 at 10 minutes  Language: No Dysarthria    Cranial nerves:  I: Not tested  II: PERRL, VFF to counting  III, IV, VI: EOMI with conjugate gaze and no nystagmus on end gaze  V: Facial sensation intact and symmetric over the bilateral V1-V3  VII: Facial muscle activation intact and symmetric over the bilateral upper and lower face  VIII: Hearing intact in the b/l ears and symmetrical to finger rub  IX, X, XII: TUP midline - no atrophy or fasiculations  X: SCMs and shoulder shrug full strength b/l and symmetric    No hypomimia or hypophonia    Motor:   -UE: 5/5 deltoids; 5/5 biceps, triceps; 5/5 wrist flexors, extensors; 5/5 interosseous; 5/5   -LEs: 5/5 hip flexion, extension; 5/5 knee flexion, extension; 5/5 ankle flexion, extension      DTRs:  ? Biceps Triceps Brachioradialis Knee Ankle   Left 2+ 2+ 2+ 1+ 0   Right 2+ 2+ 2+ 1+ 0       ? Finger taps Finger flicks BRETT Heel taps   Left 2+ 2+ 1+ 1+   Right 2+ 2+ 1+ 1+     Neck tone: 0  ? Arm Leg   Left 0 0   Right 0 0     Severe pillrolling tremor bilat hands    Sensation:   -Light touch: Intact and symmetric in the bilateral upper and lower extremities.  -Temp: Intact and symmetric in the bilateral upper and lower extremities.  -Vibration: decreased to  Knees bilaterally     Coordination: "   -Finger to nose: tremor    Gait:  -Arises from chair with use of hands.  -Stoop is mild  -Casual gait is: nl based  -Stride length: moderate shuffle  -Arm Swing: decreased bilat  -Turnin step  -Tandem gait: cannot  -FOG: neg    Laboratory Data:  NA    Imaging:  MRI brain w/o atrophy 2019      Assessment//Plan:   Problem List Items Addressed This Visit        Neuro    Tremor    Current Assessment & Plan     Debilitating pillrolling tremor, shuffling gait, symmetrical. Suggestive of medication-induced PD. Suggested to psychiatrist to wean off Abilify if possible onto a non dopamine depleting drug.   If PDism remains, will consider typical iPD unmasked by abilify. Her memory decline is suggestive of a neurodegenerative dz.         Memory change    Current Assessment & Plan     Progressive memory decline. Will f/u brain MRI, reversible labs, neuropsych testing.           Other Visit Diagnoses     Memory impairment    -  Primary    Relevant Orders    Ambulatory consult to Neuropsychology    Vitamin B12 Deficiency Panel    VITAMIN B1    TSH (Completed)    RPR    Parkinsonism, unspecified Parkinsonism type        Relevant Orders    Ambulatory consult to Physical Therapy    Ambulatory consult to Occupational Medicine          Follow-up: 2mos  Discussed the importance of ongoing exercise in efforts to improve mobility and balance.    Cassandra Mancia MD, MS [facility]  Department of Neurology  Movement Disorders

## 2020-03-05 NOTE — TELEPHONE ENCOUNTER
----- Message from Cassandra Mancia MD sent at 3/5/2020  1:05 PM CST -----  Our mutual patient has severe PD-like tremor affecting gait, balance. Likely due to abilify.  Are you able to switch to a non dopaminergic antidepressant?

## 2020-03-05 NOTE — ASSESSMENT & PLAN NOTE
Debilitating pillrolling tremor, shuffling gait, symmetrical. Suggestive of medication-induced PD. Suggested to psychiatrist to wean off Abilify if possible onto a non dopamine depleting drug.   If PDism remains, will consider typical iPD unmasked by abilify. Her memory decline is suggestive of a neurodegenerative dz.

## 2020-03-06 ENCOUNTER — TELEPHONE (OUTPATIENT)
Dept: PSYCHIATRY | Facility: HOSPITAL | Age: 75
End: 2020-03-06

## 2020-03-06 NOTE — TELEPHONE ENCOUNTER
----- Message from Cassandra Mancia MD sent at 3/6/2020  4:22 PM CST -----  Will you please relay that to her?    ----- Message -----  From: Luke Vasquez MD  Sent: 3/5/2020   3:27 PM CST  To: Cassandra Mancia MD    I would be OK if you want to stop the Abilify.  She has a long history of failed medications for depression.  She is currently on the Cymbalta and I was using the Abilify as an augmentation agent.  She can just be on the Cymbalta for now and see how she does.      Luke      ----- Message -----  From: Cassandra Mancia MD  Sent: 3/5/2020   1:05 PM CST  To: Luke Vasquez MD    Our mutual patient has severe PD-like tremor affecting gait, balance. Likely due to abilify.  Are you able to switch to a non dopaminergic antidepressant?

## 2020-03-09 ENCOUNTER — PATIENT MESSAGE (OUTPATIENT)
Dept: NEUROLOGY | Facility: CLINIC | Age: 75
End: 2020-03-09

## 2020-03-09 DIAGNOSIS — Z86.73 HISTORY OF CVA (CEREBROVASCULAR ACCIDENT): Primary | Chronic | ICD-10-CM

## 2020-03-09 RX ORDER — LANOLIN ALCOHOL/MO/W.PET/CERES
100 CREAM (GRAM) TOPICAL DAILY
Qty: 30 TABLET | Refills: 12 | Status: ON HOLD | OUTPATIENT
Start: 2020-03-09 | End: 2020-05-13 | Stop reason: HOSPADM

## 2020-03-10 ENCOUNTER — PATIENT MESSAGE (OUTPATIENT)
Dept: PSYCHIATRY | Facility: CLINIC | Age: 75
End: 2020-03-10

## 2020-03-10 ENCOUNTER — PATIENT MESSAGE (OUTPATIENT)
Dept: HEMATOLOGY/ONCOLOGY | Facility: CLINIC | Age: 75
End: 2020-03-10

## 2020-03-10 ENCOUNTER — TELEPHONE (OUTPATIENT)
Dept: HEMATOLOGY/ONCOLOGY | Facility: CLINIC | Age: 75
End: 2020-03-10

## 2020-03-10 ENCOUNTER — PATIENT MESSAGE (OUTPATIENT)
Dept: PSYCHIATRY | Facility: HOSPITAL | Age: 75
End: 2020-03-10

## 2020-03-10 NOTE — NURSING
INDIVIDUAL FOLLOW-UP ONCOLOGY NAVIGATOR    DIAGNOSIS--Biphasic Mesothelioma    TREATMENT-): 7/3/19-  I&D of Right Chest Wall Hematoma, Right VATS Pleural Biopsy and Chemical (Doxycycline) Pleurodesis, PleurX placement   CHEMO- 8/8/2019--Cisplantin/Alimta 2/6 cycles                    10/15/19-3/18/2020--Pemetrexed/Carboplatin    TREATMENT -Currently not taking chemo      FOLLOW-UP--3 WEEKS  March 19, 2020    NOTE----Spoke with pts daughter who states that pt's mental status (memory and gait) has changed in the last week.  Was told that her symptoms could be do to her taking Abilify and was told to stop taking per SHOSHANA Goetz.  Daughter feels that her condition is more than just a medication reaction.  Encouraged to contact the office for any additional concerns.  Verbalized understanding and has requested a wheelchair for pt.   Message sent through [facility] to GRACE Goetz per daughter.

## 2020-03-12 ENCOUNTER — PATIENT MESSAGE (OUTPATIENT)
Dept: HEMATOLOGY/ONCOLOGY | Facility: CLINIC | Age: 75
End: 2020-03-12

## 2020-03-12 DIAGNOSIS — R06.02 SOB (SHORTNESS OF BREATH): ICD-10-CM

## 2020-03-12 DIAGNOSIS — R07.9 CHEST PAIN, UNSPECIFIED TYPE: Primary | ICD-10-CM

## 2020-03-14 DIAGNOSIS — I10 HTN, GOAL BELOW 140/90: ICD-10-CM

## 2020-03-16 ENCOUNTER — PATIENT MESSAGE (OUTPATIENT)
Dept: HEMATOLOGY/ONCOLOGY | Facility: CLINIC | Age: 75
End: 2020-03-16

## 2020-03-16 RX ORDER — METOPROLOL SUCCINATE 25 MG/1
TABLET, EXTENDED RELEASE ORAL
Qty: 90 TABLET | Refills: 0 | Status: SHIPPED | OUTPATIENT
Start: 2020-03-16 | End: 2020-06-09

## 2020-03-16 RX ORDER — METOPROLOL SUCCINATE 50 MG/1
TABLET, EXTENDED RELEASE ORAL
Qty: 90 TABLET | Refills: 0 | Status: SHIPPED | OUTPATIENT
Start: 2020-03-16 | End: 2020-06-09

## 2020-03-17 ENCOUNTER — TELEPHONE (OUTPATIENT)
Dept: HEMATOLOGY/ONCOLOGY | Facility: CLINIC | Age: 75
End: 2020-03-17

## 2020-03-17 ENCOUNTER — HOSPITAL ENCOUNTER (OUTPATIENT)
Dept: RADIOLOGY | Facility: HOSPITAL | Age: 75
Discharge: HOME | End: 2020-03-17
Attending: NURSE PRACTITIONER
Payer: MEDICARE

## 2020-03-17 ENCOUNTER — PATIENT MESSAGE (OUTPATIENT)
Dept: NEUROLOGY | Facility: CLINIC | Age: 75
End: 2020-03-17

## 2020-03-17 DIAGNOSIS — R06.02 SOB (SHORTNESS OF BREATH): ICD-10-CM

## 2020-03-17 DIAGNOSIS — R07.9 CHEST PAIN, UNSPECIFIED TYPE: ICD-10-CM

## 2020-03-17 PROCEDURE — 71275 CT ANGIOGRAPHY CHEST: CPT | Mod: 26,,, | Performed by: RADIOLOGY

## 2020-03-17 PROCEDURE — 71275 CT ANGIOGRAPHY CHEST: CPT | Mod: TC

## 2020-03-17 PROCEDURE — 71275 CTA CHEST NON CORONARY: ICD-10-PCS | Mod: 26,,, | Performed by: RADIOLOGY

## 2020-03-17 PROCEDURE — 25500020 PHARM REV CODE 255: Performed by: NURSE PRACTITIONER

## 2020-03-17 RX ORDER — TIZANIDINE 4 MG/1
TABLET ORAL
Qty: 90 TABLET | Refills: 0 | Status: SHIPPED | OUTPATIENT
Start: 2020-03-17 | End: 2020-06-09

## 2020-03-17 RX ADMIN — IOHEXOL 75 ML: 350 INJECTION, SOLUTION INTRAVENOUS at 03:03

## 2020-03-17 NOTE — TELEPHONE ENCOUNTER
Neuro thinks she has developed Parkinson's. Patient has been forgetful as well. Daughter aware of rules and pre check of the cancer center at this time. Middle sister is bringing her mother on Thursday. Will attend visit via phone.

## 2020-03-17 NOTE — TELEPHONE ENCOUNTER
----- Message from Jayla Schultz sent at 3/17/2020 10:32 AM CDT -----  Contact: Gely (daughter)  Patient Advice/Staff Message     Caller name: Gely     Reason for call: Have questions concerning pt keeping today's appt due to the virus. Please advise.    Do you feel you need to be seen today::        Communication Preference: [phone]    Additional Information:

## 2020-03-18 ENCOUNTER — PATIENT MESSAGE (OUTPATIENT)
Dept: HEMATOLOGY/ONCOLOGY | Facility: CLINIC | Age: 75
End: 2020-03-18

## 2020-03-19 ENCOUNTER — TELEPHONE (OUTPATIENT)
Dept: HEMATOLOGY/ONCOLOGY | Facility: CLINIC | Age: 75
End: 2020-03-19

## 2020-03-19 ENCOUNTER — OFFICE VISIT (OUTPATIENT)
Dept: HEMATOLOGY/ONCOLOGY | Facility: CLINIC | Age: 75
End: 2020-03-19
Payer: MEDICARE

## 2020-03-19 ENCOUNTER — PATIENT MESSAGE (OUTPATIENT)
Dept: HEMATOLOGY/ONCOLOGY | Facility: CLINIC | Age: 75
End: 2020-03-19

## 2020-03-19 ENCOUNTER — PATIENT MESSAGE (OUTPATIENT)
Dept: NEUROLOGY | Facility: CLINIC | Age: 75
End: 2020-03-19

## 2020-03-19 ENCOUNTER — INFUSION (OUTPATIENT)
Dept: INFUSION THERAPY | Facility: HOSPITAL | Age: 75
End: 2020-03-19
Attending: INTERNAL MEDICINE
Payer: MEDICARE

## 2020-03-19 VITALS
OXYGEN SATURATION: 98 % | BODY MASS INDEX: 29.09 KG/M2 | SYSTOLIC BLOOD PRESSURE: 140 MMHG | TEMPERATURE: 98 F | HEIGHT: 66 IN | WEIGHT: 181 LBS | RESPIRATION RATE: 18 BRPM | DIASTOLIC BLOOD PRESSURE: 96 MMHG | HEART RATE: 65 BPM

## 2020-03-19 VITALS
BODY MASS INDEX: 29.09 KG/M2 | RESPIRATION RATE: 18 BRPM | WEIGHT: 181 LBS | SYSTOLIC BLOOD PRESSURE: 166 MMHG | TEMPERATURE: 99 F | DIASTOLIC BLOOD PRESSURE: 78 MMHG | HEART RATE: 68 BPM | HEIGHT: 66 IN

## 2020-03-19 DIAGNOSIS — C45.0 MALIGNANT PLEURAL MESOTHELIOMA: ICD-10-CM

## 2020-03-19 DIAGNOSIS — C45.0 MALIGNANT PLEURAL MESOTHELIOMA: Primary | ICD-10-CM

## 2020-03-19 DIAGNOSIS — R07.9 CHEST PAIN, UNSPECIFIED TYPE: Primary | ICD-10-CM

## 2020-03-19 PROCEDURE — 96360 HYDRATION IV INFUSION INIT: CPT

## 2020-03-19 PROCEDURE — 3078F PR MOST RECENT DIASTOLIC BLOOD PRESSURE < 80 MM HG: ICD-10-PCS | Mod: CPTII,S$GLB,, | Performed by: INTERNAL MEDICINE

## 2020-03-19 PROCEDURE — 96409 CHEMO IV PUSH SNGL DRUG: CPT

## 2020-03-19 PROCEDURE — 1159F PR MEDICATION LIST DOCUMENTED IN MEDICAL RECORD: ICD-10-PCS | Mod: S$GLB,,, | Performed by: INTERNAL MEDICINE

## 2020-03-19 PROCEDURE — 3077F SYST BP >= 140 MM HG: CPT | Mod: CPTII,S$GLB,, | Performed by: INTERNAL MEDICINE

## 2020-03-19 PROCEDURE — A4216 STERILE WATER/SALINE, 10 ML: HCPCS | Performed by: INTERNAL MEDICINE

## 2020-03-19 PROCEDURE — 3077F PR MOST RECENT SYSTOLIC BLOOD PRESSURE >= 140 MM HG: ICD-10-PCS | Mod: CPTII,S$GLB,, | Performed by: INTERNAL MEDICINE

## 2020-03-19 PROCEDURE — 99999 PR PBB SHADOW E&M-EST. PATIENT-LVL V: ICD-10-PCS | Mod: PBBFAC,,, | Performed by: INTERNAL MEDICINE

## 2020-03-19 PROCEDURE — 25000003 PHARM REV CODE 250: Performed by: INTERNAL MEDICINE

## 2020-03-19 PROCEDURE — 1101F PT FALLS ASSESS-DOCD LE1/YR: CPT | Mod: CPTII,S$GLB,, | Performed by: INTERNAL MEDICINE

## 2020-03-19 PROCEDURE — 96361 HYDRATE IV INFUSION ADD-ON: CPT

## 2020-03-19 PROCEDURE — 1101F PR PT FALLS ASSESS DOC 0-1 FALLS W/OUT INJ PAST YR: ICD-10-PCS | Mod: CPTII,S$GLB,, | Performed by: INTERNAL MEDICINE

## 2020-03-19 PROCEDURE — 99999 PR PBB SHADOW E&M-EST. PATIENT-LVL V: CPT | Mod: PBBFAC,,, | Performed by: INTERNAL MEDICINE

## 2020-03-19 PROCEDURE — 1126F AMNT PAIN NOTED NONE PRSNT: CPT | Mod: S$GLB,,, | Performed by: INTERNAL MEDICINE

## 2020-03-19 PROCEDURE — 96372 THER/PROPH/DIAG INJ SC/IM: CPT

## 2020-03-19 PROCEDURE — 1159F MED LIST DOCD IN RCRD: CPT | Mod: S$GLB,,, | Performed by: INTERNAL MEDICINE

## 2020-03-19 PROCEDURE — 99214 PR OFFICE/OUTPT VISIT, EST, LEVL IV, 30-39 MIN: ICD-10-PCS | Mod: S$GLB,,, | Performed by: INTERNAL MEDICINE

## 2020-03-19 PROCEDURE — 1126F PR PAIN SEVERITY QUANTIFIED, NO PAIN PRESENT: ICD-10-PCS | Mod: S$GLB,,, | Performed by: INTERNAL MEDICINE

## 2020-03-19 PROCEDURE — 63600175 PHARM REV CODE 636 W HCPCS: Performed by: INTERNAL MEDICINE

## 2020-03-19 PROCEDURE — 99214 OFFICE O/P EST MOD 30 MIN: CPT | Mod: S$GLB,,, | Performed by: INTERNAL MEDICINE

## 2020-03-19 PROCEDURE — 3078F DIAST BP <80 MM HG: CPT | Mod: CPTII,S$GLB,, | Performed by: INTERNAL MEDICINE

## 2020-03-19 RX ORDER — ONDANSETRON 4 MG/1
8 TABLET, FILM COATED ORAL
Status: CANCELLED
Start: 2020-03-19

## 2020-03-19 RX ORDER — SODIUM CHLORIDE 0.9 % (FLUSH) 0.9 %
10 SYRINGE (ML) INJECTION
Status: CANCELLED | OUTPATIENT
Start: 2020-03-19

## 2020-03-19 RX ORDER — HEPARIN 100 UNIT/ML
500 SYRINGE INTRAVENOUS
Status: DISCONTINUED | OUTPATIENT
Start: 2020-03-19 | End: 2020-03-19 | Stop reason: HOSPADM

## 2020-03-19 RX ORDER — SODIUM CHLORIDE 0.9 % (FLUSH) 0.9 %
10 SYRINGE (ML) INJECTION
Status: DISCONTINUED | OUTPATIENT
Start: 2020-03-19 | End: 2020-03-19 | Stop reason: HOSPADM

## 2020-03-19 RX ORDER — ONDANSETRON 4 MG/1
8 TABLET, FILM COATED ORAL
Status: COMPLETED | OUTPATIENT
Start: 2020-03-19 | End: 2020-03-19

## 2020-03-19 RX ORDER — HEPARIN 100 UNIT/ML
500 SYRINGE INTRAVENOUS
Status: CANCELLED | OUTPATIENT
Start: 2020-03-19

## 2020-03-19 RX ORDER — CYANOCOBALAMIN 1000 UG/ML
1000 INJECTION, SOLUTION INTRAMUSCULAR; SUBCUTANEOUS ONCE
Status: CANCELLED
Start: 2020-03-19

## 2020-03-19 RX ORDER — CYANOCOBALAMIN 1000 UG/ML
1000 INJECTION, SOLUTION INTRAMUSCULAR; SUBCUTANEOUS ONCE
Status: COMPLETED | OUTPATIENT
Start: 2020-03-19 | End: 2020-03-19

## 2020-03-19 RX ADMIN — ONDANSETRON HYDROCHLORIDE 8 MG: 4 TABLET, FILM COATED ORAL at 03:03

## 2020-03-19 RX ADMIN — Medication 10 ML: at 04:03

## 2020-03-19 RX ADMIN — HEPARIN 500 UNITS: 100 SYRINGE at 04:03

## 2020-03-19 RX ADMIN — SODIUM CHLORIDE: 0.9 INJECTION, SOLUTION INTRAVENOUS at 02:03

## 2020-03-19 RX ADMIN — SODIUM CHLORIDE 675 MG: 9 INJECTION, SOLUTION INTRAVENOUS at 03:03

## 2020-03-19 RX ADMIN — CYANOCOBALAMIN 1000 MCG: 1000 INJECTION, SOLUTION INTRAMUSCULAR at 03:03

## 2020-03-19 NOTE — TELEPHONE ENCOUNTER
----- Message from Jayla Schultz sent at 3/19/2020  2:49 PM CDT -----  Contact: Katia (daughter)  Patient Advice/Staff Message     Caller name: Katia     Reason for call: Calling to speak with Esa, wants to go over what was discussed at today's visit. Please advise.    Do you feel you need to be seen today:: No        Communication Preference: [phone]    Additional Information:
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
## 2020-03-19 NOTE — NURSING
1420 (appt schedule for 1400)  Pt here for Alimta, IVF infusion, also B12 injection, no new complaints or concerns, reports fall at home approx three weeks prior, tripped, no serious injury (bruise to right upper leg); pt reports taking folic acid oral at home as prescribed; discussed treatment plan for today, all questions answered and pt agrees to proceed

## 2020-03-19 NOTE — PLAN OF CARE
1604 Infusion, injection completed, pt tolerated well; pt instructed to remain well hydrated; dicussed good hand hygiene and infection prevention; pt instructed to contact MD for any needs or concerns; AVS declined, next appt not yet scheduled, pt verbalized understanding of all discussed

## 2020-03-19 NOTE — TELEPHONE ENCOUNTER
----- Message from Pattie Houser sent at 3/19/2020  9:21 AM CDT -----  Contact: PT   PT is returning a missed call. Wants to know if she still has an appointment today. I told her I still see one on her chart so as far as I know, she does. Please call back     Callback: [phone]

## 2020-03-20 NOTE — TELEPHONE ENCOUNTER
Medical necessity form filled out for home health PT,OT sent to [facility] with the Medical team listed as provider.    Notified daughter Gely by voicemail.

## 2020-03-23 ENCOUNTER — PATIENT MESSAGE (OUTPATIENT)
Dept: HEMATOLOGY/ONCOLOGY | Facility: CLINIC | Age: 75
End: 2020-03-23

## 2020-03-23 ENCOUNTER — PATIENT MESSAGE (OUTPATIENT)
Dept: FAMILY MEDICINE | Facility: CLINIC | Age: 75
End: 2020-03-23

## 2020-03-23 ENCOUNTER — TELEPHONE (OUTPATIENT)
Dept: NEUROLOGY | Facility: CLINIC | Age: 75
End: 2020-03-23

## 2020-03-23 ENCOUNTER — HOSPITAL ENCOUNTER (EMERGENCY)
Facility: HOSPITAL | Age: 75
Discharge: HOME | End: 2020-03-24
Attending: EMERGENCY MEDICINE
Payer: MEDICARE

## 2020-03-23 DIAGNOSIS — R25.1 TREMOR: Primary | ICD-10-CM

## 2020-03-23 DIAGNOSIS — Z20.822 SUSPECTED COVID-19 VIRUS INFECTION: ICD-10-CM

## 2020-03-23 DIAGNOSIS — R53.1 WEAKNESS: Primary | ICD-10-CM

## 2020-03-23 PROCEDURE — 87502 INFLUENZA DNA AMP PROBE: CPT

## 2020-03-23 PROCEDURE — 99285 EMERGENCY DEPT VISIT HI MDM: CPT | Mod: ,,, | Performed by: PHYSICIAN ASSISTANT

## 2020-03-23 PROCEDURE — 99285 EMERGENCY DEPT VISIT HI MDM: CPT | Mod: 25

## 2020-03-23 PROCEDURE — 99285 PR EMERGENCY DEPT VISIT,LEVEL V: ICD-10-PCS | Mod: ,,, | Performed by: PHYSICIAN ASSISTANT

## 2020-03-23 RX ORDER — MORPHINE SULFATE 2 MG/ML
2 INJECTION, SOLUTION INTRAMUSCULAR; INTRAVENOUS
Status: COMPLETED | OUTPATIENT
Start: 2020-03-24 | End: 2020-03-24

## 2020-03-23 RX ORDER — FAMOTIDINE 10 MG/ML
20 INJECTION INTRAVENOUS
Status: COMPLETED | OUTPATIENT
Start: 2020-03-24 | End: 2020-03-24

## 2020-03-23 RX ORDER — ONDANSETRON 2 MG/ML
4 INJECTION INTRAMUSCULAR; INTRAVENOUS
Status: COMPLETED | OUTPATIENT
Start: 2020-03-24 | End: 2020-03-24

## 2020-03-23 NOTE — TELEPHONE ENCOUNTER
----- Message from Gregoria Tsang sent at 3/23/2020  8:14 AM CDT -----  Contact: Linda COLON  Reason: Calling to get clarification on pt order for Physical Therapy, HH, and Occupational Therapy.    Communication: [phone] or Fax # [phone] Att: Linda

## 2020-03-23 NOTE — TELEPHONE ENCOUNTER
Spoke with Linda, confirmation is needed on Ms. Preston receiving physcial therapy, occupational, or home health. Linda informed a message will be forward for clarification. Understanding voiced

## 2020-03-24 VITALS
OXYGEN SATURATION: 98 % | HEART RATE: 78 BPM | SYSTOLIC BLOOD PRESSURE: 154 MMHG | RESPIRATION RATE: 22 BRPM | DIASTOLIC BLOOD PRESSURE: 72 MMHG | TEMPERATURE: 99 F

## 2020-03-24 DIAGNOSIS — C45.0 MALIGNANT PLEURAL MESOTHELIOMA: Primary | Chronic | ICD-10-CM

## 2020-03-24 LAB
ALBUMIN SERPL BCP-MCNC: 3 G/DL (ref 3.5–5.2)
ALP SERPL-CCNC: 159 U/L (ref 55–135)
ALT SERPL W/O P-5'-P-CCNC: 65 U/L (ref 10–44)
ANION GAP SERPL CALC-SCNC: 10 MMOL/L (ref 8–16)
ANISOCYTOSIS BLD QL SMEAR: SLIGHT
AST SERPL-CCNC: 55 U/L (ref 10–40)
BACTERIA #/AREA URNS AUTO: NORMAL /HPF
BASOPHILS # BLD AUTO: 0.02 K/UL (ref 0–0.2)
BASOPHILS NFR BLD: 0.2 % (ref 0–1.9)
BILIRUB SERPL-MCNC: 0.8 MG/DL (ref 0.1–1)
BILIRUB UR QL STRIP: NEGATIVE
BUN SERPL-MCNC: 19 MG/DL (ref 8–23)
CALCIUM SERPL-MCNC: 9.4 MG/DL (ref 8.7–10.5)
CHLORIDE SERPL-SCNC: 99 MMOL/L (ref 95–110)
CLARITY UR REFRACT.AUTO: ABNORMAL
CO2 SERPL-SCNC: 25 MMOL/L (ref 23–29)
COLOR UR AUTO: YELLOW
CREAT SERPL-MCNC: 1.6 MG/DL (ref 0.5–1.4)
CRP SERPL-MCNC: 133.6 MG/L (ref 0–8.2)
DIFFERENTIAL METHOD: ABNORMAL
EOSINOPHIL # BLD AUTO: 0 K/UL (ref 0–0.5)
EOSINOPHIL NFR BLD: 0 % (ref 0–8)
ERYTHROCYTE [DISTWIDTH] IN BLOOD BY AUTOMATED COUNT: 13.4 % (ref 11.5–14.5)
ERYTHROCYTE [SEDIMENTATION RATE] IN BLOOD BY WESTERGREN METHOD: 101 MM/HR (ref 0–36)
EST. GFR  (AFRICAN AMERICAN): 36.6 ML/MIN/1.73 M^2
EST. GFR  (NON AFRICAN AMERICAN): 31.7 ML/MIN/1.73 M^2
FERRITIN SERPL-MCNC: 2704 NG/ML (ref 20–300)
GLUCOSE SERPL-MCNC: 145 MG/DL (ref 70–110)
GLUCOSE UR QL STRIP: NEGATIVE
HCT VFR BLD AUTO: 28.5 % (ref 37–48.5)
HGB BLD-MCNC: 9.1 G/DL (ref 12–16)
HGB UR QL STRIP: NEGATIVE
HYALINE CASTS UR QL AUTO: 0 /LPF
HYPOCHROMIA BLD QL SMEAR: ABNORMAL
IMM GRANULOCYTES # BLD AUTO: 0.11 K/UL (ref 0–0.04)
IMM GRANULOCYTES NFR BLD AUTO: 0.9 % (ref 0–0.5)
INFLUENZA A, MOLECULAR: NEGATIVE
INFLUENZA B, MOLECULAR: NEGATIVE
INR PPP: 1.1 (ref 0.8–1.2)
KETONES UR QL STRIP: NEGATIVE
LACTATE SERPL-SCNC: 0.9 MMOL/L (ref 0.5–2.2)
LDH SERPL L TO P-CCNC: 366 U/L (ref 110–260)
LEUKOCYTE ESTERASE UR QL STRIP: NEGATIVE
LIPASE SERPL-CCNC: 16 U/L (ref 4–60)
LYMPHOCYTES # BLD AUTO: 0.6 K/UL (ref 1–4.8)
LYMPHOCYTES NFR BLD: 5.1 % (ref 18–48)
MAGNESIUM SERPL-MCNC: 1.9 MG/DL (ref 1.6–2.6)
MCH RBC QN AUTO: 33.5 PG (ref 27–31)
MCHC RBC AUTO-ENTMCNC: 31.9 G/DL (ref 32–36)
MCV RBC AUTO: 105 FL (ref 82–98)
MICROSCOPIC COMMENT: NORMAL
MONOCYTES # BLD AUTO: 0.2 K/UL (ref 0.3–1)
MONOCYTES NFR BLD: 1.3 % (ref 4–15)
NEUTROPHILS # BLD AUTO: 10.8 K/UL (ref 1.8–7.7)
NEUTROPHILS NFR BLD: 92.5 % (ref 38–73)
NITRITE UR QL STRIP: NEGATIVE
NRBC BLD-RTO: 0 /100 WBC
PH UR STRIP: 5 [PH] (ref 5–8)
PLATELET # BLD AUTO: 233 K/UL (ref 150–350)
PLATELET BLD QL SMEAR: ABNORMAL
PMV BLD AUTO: 9.5 FL (ref 9.2–12.9)
POTASSIUM SERPL-SCNC: 3.7 MMOL/L (ref 3.5–5.1)
PROCALCITONIN SERPL IA-MCNC: 0.37 NG/ML
PROT SERPL-MCNC: 7.7 G/DL (ref 6–8.4)
PROT UR QL STRIP: ABNORMAL
PROTHROMBIN TIME: 11.4 SEC (ref 9–12.5)
RBC # BLD AUTO: 2.72 M/UL (ref 4–5.4)
RBC #/AREA URNS AUTO: 1 /HPF (ref 0–4)
SODIUM SERPL-SCNC: 134 MMOL/L (ref 136–145)
SP GR UR STRIP: 1.01 (ref 1–1.03)
SPECIMEN SOURCE: NORMAL
SQUAMOUS #/AREA URNS AUTO: 15 /HPF
TROPONIN I SERPL DL<=0.01 NG/ML-MCNC: 0.01 NG/ML (ref 0–0.03)
URN SPEC COLLECT METH UR: ABNORMAL
WBC # BLD AUTO: 11.66 K/UL (ref 3.9–12.7)
WBC #/AREA URNS AUTO: 4 /HPF (ref 0–5)

## 2020-03-24 PROCEDURE — 63600175 PHARM REV CODE 636 W HCPCS: Performed by: PHYSICIAN ASSISTANT

## 2020-03-24 PROCEDURE — 84484 ASSAY OF TROPONIN QUANT: CPT

## 2020-03-24 PROCEDURE — 87502 INFLUENZA DNA AMP PROBE: CPT

## 2020-03-24 PROCEDURE — 84145 PROCALCITONIN (PCT): CPT

## 2020-03-24 PROCEDURE — 25500020 PHARM REV CODE 255: Performed by: EMERGENCY MEDICINE

## 2020-03-24 PROCEDURE — 93010 ELECTROCARDIOGRAM REPORT: CPT | Mod: ,,, | Performed by: INTERNAL MEDICINE

## 2020-03-24 PROCEDURE — 81001 URINALYSIS AUTO W/SCOPE: CPT

## 2020-03-24 PROCEDURE — 87040 BLOOD CULTURE FOR BACTERIA: CPT | Mod: 59

## 2020-03-24 PROCEDURE — 83605 ASSAY OF LACTIC ACID: CPT

## 2020-03-24 PROCEDURE — 82728 ASSAY OF FERRITIN: CPT

## 2020-03-24 PROCEDURE — 93010 EKG 12-LEAD: ICD-10-PCS | Mod: ,,, | Performed by: INTERNAL MEDICINE

## 2020-03-24 PROCEDURE — 86140 C-REACTIVE PROTEIN: CPT

## 2020-03-24 PROCEDURE — 80053 COMPREHEN METABOLIC PANEL: CPT

## 2020-03-24 PROCEDURE — 85025 COMPLETE CBC W/AUTO DIFF WBC: CPT

## 2020-03-24 PROCEDURE — S0028 INJECTION, FAMOTIDINE, 20 MG: HCPCS | Performed by: PHYSICIAN ASSISTANT

## 2020-03-24 PROCEDURE — 25000003 PHARM REV CODE 250: Performed by: PHYSICIAN ASSISTANT

## 2020-03-24 PROCEDURE — 96375 TX/PRO/DX INJ NEW DRUG ADDON: CPT | Mod: 59

## 2020-03-24 PROCEDURE — 85610 PROTHROMBIN TIME: CPT

## 2020-03-24 PROCEDURE — 83735 ASSAY OF MAGNESIUM: CPT

## 2020-03-24 PROCEDURE — 85652 RBC SED RATE AUTOMATED: CPT

## 2020-03-24 PROCEDURE — 93005 ELECTROCARDIOGRAM TRACING: CPT

## 2020-03-24 PROCEDURE — 83615 LACTATE (LD) (LDH) ENZYME: CPT

## 2020-03-24 PROCEDURE — 96374 THER/PROPH/DIAG INJ IV PUSH: CPT

## 2020-03-24 PROCEDURE — 83690 ASSAY OF LIPASE: CPT

## 2020-03-24 PROCEDURE — 96376 TX/PRO/DX INJ SAME DRUG ADON: CPT

## 2020-03-24 PROCEDURE — U0002 COVID-19 LAB TEST NON-CDC: HCPCS

## 2020-03-24 RX ORDER — MORPHINE SULFATE 2 MG/ML
2 INJECTION, SOLUTION INTRAMUSCULAR; INTRAVENOUS
Status: COMPLETED | OUTPATIENT
Start: 2020-03-24 | End: 2020-03-24

## 2020-03-24 RX ADMIN — FAMOTIDINE 20 MG: 10 INJECTION INTRAVENOUS at 12:03

## 2020-03-24 RX ADMIN — IOHEXOL 75 ML: 350 INJECTION, SOLUTION INTRAVENOUS at 02:03

## 2020-03-24 RX ADMIN — ONDANSETRON 4 MG: 2 INJECTION INTRAMUSCULAR; INTRAVENOUS at 12:03

## 2020-03-24 RX ADMIN — MORPHINE SULFATE 2 MG: 2 INJECTION, SOLUTION INTRAMUSCULAR; INTRAVENOUS at 12:03

## 2020-03-24 RX ADMIN — MORPHINE SULFATE 2 MG: 2 INJECTION, SOLUTION INTRAMUSCULAR; INTRAVENOUS at 03:03

## 2020-03-24 NOTE — DISCHARGE INSTRUCTIONS
At this time, we tested you for coronavirus in the Emergency Department.    If you are sick with fevers, cough, or shortness of breath, you should self quarantine for at least 14 days.  You should wear a mask when going out in public and avoid contact with people who are immunocompromised or elderly.    Please pay attention to WHO, CDC, and local health authority guidelines and announcements, which are rapidly changing.    Wash your hands frequently.  Do not attend public events until health authorities  otherwise.    For fever, cough, shortness of breath, or other viral respiratory symptoms: stay well hydrated, get plenty of sleep, take Tylenol or Ibuprofen for fevers or pain.     So far from what we know about coronavirus, the people who get very sick tend to do so around day 8 of the illness.  If you rapidly worsen, you should return to the emergency department for reassessment.    Return to the Emergency Department for symptoms including but not limited to: worsening symptoms, shortness of breath or chest pain, vomiting with inability to hold down fluids, fevers greater than 100.4°F that do not improve with Tylenol or Ibuprofen, passing out/fainting/unconsciousness, or other concerning symptoms.    Our goal in the emergency department is to always give you outstanding care and exceptional service. You may receive a survey by mail or e-mail in the next week regarding your experience in our ED. We would greatly appreciate your completing and returning the survey. Your feedback provides us with a way to recognize our staff who give very good care and it helps us learn how to improve when your experience was below our aspiration of excellence.
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
## 2020-03-25 LAB — SARS-COV-2 RNA RESP QL NAA+PROBE: NOT DETECTED

## 2020-03-26 ENCOUNTER — PATIENT MESSAGE (OUTPATIENT)
Dept: FAMILY MEDICINE | Facility: CLINIC | Age: 75
End: 2020-03-26

## 2020-03-27 ENCOUNTER — PATIENT MESSAGE (OUTPATIENT)
Dept: FAMILY MEDICINE | Facility: CLINIC | Age: 75
End: 2020-03-27

## 2020-03-27 ENCOUNTER — HOSPITAL ENCOUNTER (EMERGENCY)
Facility: HOSPITAL | Age: 75
Discharge: HOME | End: 2020-03-27
Attending: INTERNAL MEDICINE
Payer: MEDICARE

## 2020-03-27 VITALS
OXYGEN SATURATION: 98 % | RESPIRATION RATE: 18 BRPM | HEIGHT: 67 IN | HEART RATE: 98 BPM | DIASTOLIC BLOOD PRESSURE: 76 MMHG | WEIGHT: 176 LBS | TEMPERATURE: 99 F | BODY MASS INDEX: 27.62 KG/M2 | SYSTOLIC BLOOD PRESSURE: 166 MMHG

## 2020-03-27 DIAGNOSIS — B34.9 ACUTE VIRAL SYNDROME: Primary | ICD-10-CM

## 2020-03-27 DIAGNOSIS — R07.89 CHEST WALL PAIN: ICD-10-CM

## 2020-03-27 PROBLEM — U07.1 UPPER RESPIRATORY TRACT INFECTION DUE TO COVID-19 VIRUS: Status: ACTIVE | Noted: 2020-03-27

## 2020-03-27 PROBLEM — J06.9 UPPER RESPIRATORY TRACT INFECTION DUE TO COVID-19 VIRUS: Status: ACTIVE | Noted: 2020-03-27

## 2020-03-27 PROBLEM — Z20.822 SUSPECTED COVID-19 VIRUS INFECTION: Status: ACTIVE | Noted: 2020-03-27

## 2020-03-27 PROCEDURE — 93010 EKG 12-LEAD: ICD-10-PCS | Mod: ,,, | Performed by: INTERNAL MEDICINE

## 2020-03-27 PROCEDURE — 80053 COMPREHEN METABOLIC PANEL: CPT | Mod: ER

## 2020-03-27 PROCEDURE — 93010 ELECTROCARDIOGRAM REPORT: CPT | Mod: ,,, | Performed by: INTERNAL MEDICINE

## 2020-03-27 PROCEDURE — 85025 COMPLETE CBC W/AUTO DIFF WBC: CPT | Mod: ER

## 2020-03-27 PROCEDURE — 93005 ELECTROCARDIOGRAM TRACING: CPT | Mod: ER

## 2020-03-27 PROCEDURE — 99283 EMERGENCY DEPT VISIT LOW MDM: CPT | Mod: 25,ER

## 2020-03-27 RX ORDER — SODIUM CHLORIDE 9 MG/ML
500 INJECTION, SOLUTION INTRAVENOUS ONCE
Status: DISCONTINUED | OUTPATIENT
Start: 2020-03-27 | End: 2020-03-27 | Stop reason: HOSPADM
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
## 2020-03-28 LAB
BACTERIA BLD CULT: NORMAL
BACTERIA BLD CULT: NORMAL

## 2020-03-31 ENCOUNTER — TELEPHONE (OUTPATIENT)
Dept: NEUROLOGY | Facility: CLINIC | Age: 75
End: 2020-03-31

## 2020-04-01 ENCOUNTER — PATIENT MESSAGE (OUTPATIENT)
Dept: HEMATOLOGY/ONCOLOGY | Facility: CLINIC | Age: 75
End: 2020-04-01

## 2020-04-02 ENCOUNTER — NURSE TRIAGE (OUTPATIENT)
Dept: ADMINISTRATIVE | Facility: CLINIC | Age: 75
End: 2020-04-02

## 2020-04-02 ENCOUNTER — PATIENT MESSAGE (OUTPATIENT)
Dept: FAMILY MEDICINE | Facility: CLINIC | Age: 75
End: 2020-04-02

## 2020-04-02 NOTE — TELEPHONE ENCOUNTER
Daughter states patient has been having diarrhea x 5 episodes and vomiting x 4 episode yesterday. This has been ongoing since 3/23. She has been seen in the ER twice. Imaging has been negative. CRP and sed rate elevated. Informed them that this is not related to her maintenance chemotherapy, which she has been tolerating wonderfully.  Instructed to alternate zofran and phenergan every 6 hours. Discussed use of Imodium for diarrhea. As for her elevated blood pressures, it is likely due to a combination of pain and vomiting. Daughter inquiring about need for ER visit. I states that if the above mention did not work and she was unable to tolerate po for 24 hours, then I would bring her back to the ER. Explained COVID concerns with going to the ER. I have asked her to schedule a virtual visit with her PCP to discuss further.

## 2020-04-02 NOTE — TELEPHONE ENCOUNTER
Spoke with patient's daughter Gely she stated that patient's B/P has been running as high 201/101.  States patient has been having diarrhea x 5 episodes today and vomiting x 4 episode today.  States they bought patient in to be seen and was told she has a viral syndrome. Patient has Mesothelioma and Parkinson Disease.   Daughter reports oncologist told them to get in contact with PCP.  Daughter reports that patient has not had anything to eat or drink since 830 pm last night and has not been able to get out of the bed.   Her current B/P 165/107 and temp at  97.9.  Advised daughter to bring patient to ER and if unable to call 911 for immediate medical assistance. Daughter verbalized understanding.        Reason for Disposition   Nursing judgment or information in reference    Protocols used: NO GUIDELINE [id number]

## 2020-04-02 NOTE — TELEPHONE ENCOUNTER
Contacted patient's daughter jeff. Less diarrhea today then yesterday. Is tolerating small sips of pedilyte no change in breathing. Recommend focus on good oral hydration daily monitoring of respiratory status (encourage ambulation) all questions answered

## 2020-04-03 ENCOUNTER — OFFICE VISIT (OUTPATIENT)
Dept: NEUROLOGY | Facility: CLINIC | Age: 75
End: 2020-04-03
Payer: MEDICARE

## 2020-04-03 DIAGNOSIS — D70.1 CHEMOTHERAPY INDUCED NEUTROPENIA: ICD-10-CM

## 2020-04-03 DIAGNOSIS — I10 HYPERTENSION, ESSENTIAL: Chronic | ICD-10-CM

## 2020-04-03 DIAGNOSIS — D50.1 IRON DEFICIENCY ANEMIA DUE TO SIDEROPENIC DYSPHAGIA: ICD-10-CM

## 2020-04-03 DIAGNOSIS — N18.3 ACUTE RENAL FAILURE SUPERIMPOSED ON STAGE 3 CHRONIC KIDNEY DISEASE, UNSPECIFIED ACUTE RENAL FAILURE TYPE: ICD-10-CM

## 2020-04-03 DIAGNOSIS — E78.2 MIXED HYPERLIPIDEMIA: Chronic | ICD-10-CM

## 2020-04-03 DIAGNOSIS — C45.0 MALIGNANT PLEURAL MESOTHELIOMA: Chronic | ICD-10-CM

## 2020-04-03 DIAGNOSIS — N17.9 ACUTE RENAL FAILURE SUPERIMPOSED ON STAGE 3 CHRONIC KIDNEY DISEASE, UNSPECIFIED ACUTE RENAL FAILURE TYPE: ICD-10-CM

## 2020-04-03 DIAGNOSIS — R25.1 TREMOR: ICD-10-CM

## 2020-04-03 DIAGNOSIS — R41.3 MEMORY CHANGE: Primary | ICD-10-CM

## 2020-04-03 DIAGNOSIS — T45.1X5A CHEMOTHERAPY INDUCED NEUTROPENIA: ICD-10-CM

## 2020-04-03 DIAGNOSIS — E11.36 TYPE 2 DIABETES MELLITUS WITH DIABETIC CATARACT, WITHOUT LONG-TERM CURRENT USE OF INSULIN: Chronic | ICD-10-CM

## 2020-04-03 PROCEDURE — 96116 NUBHVL XM PHYS/QHP 1ST HR: CPT | Mod: 95,,, | Performed by: CLINICAL NEUROPSYCHOLOGIST

## 2020-04-03 PROCEDURE — 96121 PR NEUROBEHAVIORAL STAT EXAM, EA ADDTL HR: ICD-10-PCS | Mod: 95,,, | Performed by: CLINICAL NEUROPSYCHOLOGIST

## 2020-04-03 PROCEDURE — 99499 UNLISTED E&M SERVICE: CPT | Mod: 95,,, | Performed by: CLINICAL NEUROPSYCHOLOGIST

## 2020-04-03 PROCEDURE — 99499 NO LOS: ICD-10-PCS | Mod: 95,,, | Performed by: CLINICAL NEUROPSYCHOLOGIST

## 2020-04-03 PROCEDURE — 96116 PR NEUROBEHAVIORAL STATUS EXAM BY PSYCH/PHYS: ICD-10-PCS | Mod: 95,,, | Performed by: CLINICAL NEUROPSYCHOLOGIST

## 2020-04-03 PROCEDURE — 96121 NUBHVL XM PHY/QHP EA ADDL HR: CPT | Mod: 95,,, | Performed by: CLINICAL NEUROPSYCHOLOGIST

## 2020-04-03 NOTE — PROGRESS NOTES
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 to strangers as if she/he knows them, or saying things that may hurt people's feelings? Yes   Dis-inhibition Severity 2   Dis-inhibition Distress 3   Is the patient impatient and cranky? Does he/she have difficulty coping with delays or waiting for planned activities? Yes   Irritability/Liability Severity 2   Irritability/Liability Distress 4   Does the patient engage in repetitive activities such as pacing around the house, handling buttons, wrapping string, or doing other things repeatedly? Yes   Motor Disturbance Severity 1   Motor Disturbance Distress 2   Does this patient awaken you during the night, rise too early in the morning, or take excessive naps during the day? Yes   Nightime Behavior Severity 3   Nightime Behavior Distress 4   Has the patient lost or gained weight, or had a change in the type of food he/she likes? Yes   Apetitie/Eating Severity 2   Apetite/Eating Distress 4   NPI Total Severity Score 21   NPI Total Distress Score 34

## 2020-04-06 ENCOUNTER — PATIENT MESSAGE (OUTPATIENT)
Dept: NEUROLOGY | Facility: CLINIC | Age: 75
End: 2020-04-06

## 2020-04-06 ENCOUNTER — PATIENT MESSAGE (OUTPATIENT)
Dept: HEMATOLOGY/ONCOLOGY | Facility: CLINIC | Age: 75
End: 2020-04-06

## 2020-04-06 NOTE — ASSESSMENT & PLAN NOTE
"This patient has had fluctuations in alertness with hallucinations that first occurred while she was hospitalized for two weeks in July 2019. Since, these have continued to occur. Her daughter states that 70% of the fluctuations are occurring at night right before or during sleep; other 30% when she is taking a nap. Example, other night daughter work her up. She was holding daughter's hand and then started squeezing it saying, "I got to cut this damn chicken breast." Will pick at things in the air or on the comforter while sleeping. Will have full conversations when she sleeps which sometimes wake her up.     This patient is being worked up for potential medication-induced PD. Dr. Mancia suggested to the patient's psychiatrist that she be weaned off Abilify if possible onto a non-dopamine depleting drug to help narrow possible etiologies.     This patient is on a significant amount of medications and I am curious if this, combined with chemotherapy, explains some of her current cognitive dysfunction. She also has several vascular risk factors (HTN, HLD, DM2) and neuroimaging revealing chronic microvascular ischemic changes. It is also possible that she has an emerging neurodegenerative condition (given cognitive dysfunction, symmetrical PDism, REM sleep behaviors, potential fluctuations in alertness), or some combination of all of these factors, which deserve continued monitoring over time. Testing will be helpful to objectively measure her cognitive functioning, as reliance of report of IAD[facility] management is somewhat unhelpful in this case (the patient's daughters took over due to her illness, not due to making errors).     The patient was encouraged to continue participating in behaviors that promote brain health (to the degree that is possible and safe), including maintaining a physical exercise regimen, eating a healthy diet such as the Mediterranean diet, maintaining cognitive and social activity, and getting " plenty of good restorative sleep.      Continue to take medications as prescribed and maintain good control over vascular risk factors.     Safety precautions for COVID-19 were also discussed.     The patient and her daughter were encouraged to reach out via [employer] or by phone if they had questions/concerns. Otherwise, we will be in touch to schedule testing as soon as possible.
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
## 2020-04-07 ENCOUNTER — TELEPHONE (OUTPATIENT)
Dept: HEMATOLOGY/ONCOLOGY | Facility: CLINIC | Age: 75
End: 2020-04-07

## 2020-04-09 ENCOUNTER — OFFICE VISIT (OUTPATIENT)
Dept: HEMATOLOGY/ONCOLOGY | Facility: CLINIC | Age: 75
End: 2020-04-09
Payer: MEDICARE

## 2020-04-09 DIAGNOSIS — T45.1X5A CHEMOTHERAPY INDUCED NAUSEA AND VOMITING: ICD-10-CM

## 2020-04-09 DIAGNOSIS — Z86.718 HISTORY OF DVT (DEEP VEIN THROMBOSIS): ICD-10-CM

## 2020-04-09 DIAGNOSIS — T45.1X5A ANTINEOPLASTIC CHEMOTHERAPY INDUCED ANEMIA: ICD-10-CM

## 2020-04-09 DIAGNOSIS — D70.1 CHEMOTHERAPY INDUCED NEUTROPENIA: ICD-10-CM

## 2020-04-09 DIAGNOSIS — N28.9 RENAL INSUFFICIENCY: ICD-10-CM

## 2020-04-09 DIAGNOSIS — C45.0 MALIGNANT PLEURAL MESOTHELIOMA: Primary | ICD-10-CM

## 2020-04-09 DIAGNOSIS — D64.81 ANTINEOPLASTIC CHEMOTHERAPY INDUCED ANEMIA: ICD-10-CM

## 2020-04-09 DIAGNOSIS — R11.2 CHEMOTHERAPY INDUCED NAUSEA AND VOMITING: ICD-10-CM

## 2020-04-09 DIAGNOSIS — T45.1X5A CHEMOTHERAPY INDUCED NEUTROPENIA: ICD-10-CM

## 2020-04-09 PROCEDURE — 1101F PT FALLS ASSESS-DOCD LE1/YR: CPT | Mod: CPTII,,, | Performed by: NURSE PRACTITIONER

## 2020-04-09 PROCEDURE — 1101F PR PT FALLS ASSESS DOC 0-1 FALLS W/OUT INJ PAST YR: ICD-10-PCS | Mod: CPTII,,, | Performed by: NURSE PRACTITIONER

## 2020-04-09 PROCEDURE — 1159F PR MEDICATION LIST DOCUMENTED IN MEDICAL RECORD: ICD-10-PCS | Mod: ,,, | Performed by: NURSE PRACTITIONER

## 2020-04-09 PROCEDURE — 99499 UNLISTED E&M SERVICE: CPT | Mod: 95,,, | Performed by: NURSE PRACTITIONER

## 2020-04-09 PROCEDURE — 99214 PR OFFICE/OUTPT VISIT, EST, LEVL IV, 30-39 MIN: ICD-10-PCS | Mod: 95,,, | Performed by: NURSE PRACTITIONER

## 2020-04-09 PROCEDURE — 99499 RISK ADDL DX/OHS AUDIT: ICD-10-PCS | Mod: 95,,, | Performed by: NURSE PRACTITIONER

## 2020-04-09 PROCEDURE — 99214 OFFICE O/P EST MOD 30 MIN: CPT | Mod: 95,,, | Performed by: NURSE PRACTITIONER

## 2020-04-09 PROCEDURE — 1159F MED LIST DOCD IN RCRD: CPT | Mod: ,,, | Performed by: NURSE PRACTITIONER

## 2020-04-09 RX ORDER — ENOXAPARIN SODIUM 150 MG/ML
120 INJECTION SUBCUTANEOUS DAILY
Qty: 30 SYRINGE | Refills: 4 | Status: ON HOLD | OUTPATIENT
Start: 2020-04-09 | End: 2020-05-13 | Stop reason: HOSPADM

## 2020-04-09 RX ORDER — ONDANSETRON 4 MG/1
4 TABLET, FILM COATED ORAL EVERY 6 HOURS
Qty: 90 TABLET | Refills: 0 | Status: ON HOLD | OUTPATIENT
Start: 2020-04-09 | End: 2020-05-13 | Stop reason: SDUPTHER

## 2020-04-09 RX ORDER — PROCHLORPERAZINE MALEATE 10 MG
10 TABLET ORAL EVERY 6 HOURS PRN
Qty: 60 TABLET | Refills: 1 | Status: SHIPPED | OUTPATIENT
Start: 2020-04-09 | End: 2020-07-21

## 2020-04-17 ENCOUNTER — PATIENT MESSAGE (OUTPATIENT)
Dept: NEUROLOGY | Facility: CLINIC | Age: 75
End: 2020-04-17

## 2020-04-17 ENCOUNTER — TELEPHONE (OUTPATIENT)
Dept: NEUROLOGY | Facility: CLINIC | Age: 75
End: 2020-04-17

## 2020-04-17 ENCOUNTER — OFFICE VISIT (OUTPATIENT)
Dept: NEUROLOGY | Facility: CLINIC | Age: 75
End: 2020-04-17
Payer: MEDICARE

## 2020-04-17 ENCOUNTER — HOSPITAL ENCOUNTER (EMERGENCY)
Facility: HOSPITAL | Age: 75
Discharge: HOME | End: 2020-04-17
Attending: EMERGENCY MEDICINE
Payer: MEDICARE

## 2020-04-17 VITALS
HEART RATE: 70 BPM | OXYGEN SATURATION: 99 % | SYSTOLIC BLOOD PRESSURE: 181 MMHG | WEIGHT: 171.06 LBS | HEIGHT: 68 IN | DIASTOLIC BLOOD PRESSURE: 78 MMHG | HEIGHT: 68 IN | WEIGHT: 171 LBS | BODY MASS INDEX: 25.92 KG/M2 | TEMPERATURE: 98 F | BODY MASS INDEX: 25.91 KG/M2 | RESPIRATION RATE: 18 BRPM

## 2020-04-17 DIAGNOSIS — G44.53 HEADACHE, PRIMARY THUNDERCLAP: ICD-10-CM

## 2020-04-17 DIAGNOSIS — R41.0 CONFUSION: Primary | ICD-10-CM

## 2020-04-17 DIAGNOSIS — R46.89 ALTERED BEHAVIOR: Primary | ICD-10-CM

## 2020-04-17 LAB
ALBUMIN SERPL-MCNC: 3.1 G/DL (ref 3.3–5.5)
ALP SERPL-CCNC: 105 U/L (ref 42–141)
BILIRUB SERPL-MCNC: 0.4 MG/DL (ref 0.2–1.6)
BILIRUBIN, POC UA: NEGATIVE
BLOOD, POC UA: ABNORMAL
BUN SERPL-MCNC: 16 MG/DL (ref 7–22)
CALCIUM SERPL-MCNC: 9.4 MG/DL (ref 8–10.3)
CHLORIDE SERPL-SCNC: 103 MMOL/L (ref 98–108)
CLARITY, POC UA: ABNORMAL
COLOR, POC UA: YELLOW
CREAT SERPL-MCNC: 1.7 MG/DL (ref 0.6–1.2)
GLUCOSE SERPL-MCNC: 134 MG/DL (ref 73–118)
GLUCOSE, POC UA: NEGATIVE
KETONES, POC UA: NEGATIVE
LEUKOCYTE EST, POC UA: NEGATIVE
NITRITE, POC UA: NEGATIVE
PH UR STRIP: 6 [PH]
POC ALT (SGPT): 19 U/L (ref 10–47)
POC AST (SGOT): 31 U/L (ref 11–38)
POC CARDIAC TROPONIN I: 0 NG/ML
POC PTINR: 1.3 (ref 0.9–1.2)
POC PTWBT: 15.9 SEC (ref 9.7–14.3)
POC TCO2: 29 MMOL/L (ref 18–33)
POCT GLUCOSE: 130 MG/DL (ref 70–110)
POTASSIUM BLD-SCNC: 3.9 MMOL/L (ref 3.6–5.1)
PROTEIN, POC UA: ABNORMAL
PROTEIN, POC: 7 G/DL (ref 6.4–8.1)
SAMPLE: ABNORMAL
SAMPLE: NORMAL
SARS-COV-2 RDRP RESP QL NAA+PROBE: NEGATIVE
SODIUM BLD-SCNC: 140 MMOL/L (ref 128–145)
SPECIFIC GRAVITY, POC UA: 1.02
UROBILINOGEN, POC UA: 0.2 E.U./DL

## 2020-04-17 PROCEDURE — 3078F DIAST BP <80 MM HG: CPT | Mod: CPTII,,, | Performed by: NEUROLOGICAL SURGERY

## 2020-04-17 PROCEDURE — 1159F PR MEDICATION LIST DOCUMENTED IN MEDICAL RECORD: ICD-10-PCS | Mod: ,,, | Performed by: NEUROLOGICAL SURGERY

## 2020-04-17 PROCEDURE — 80053 COMPREHEN METABOLIC PANEL: CPT | Mod: ER

## 2020-04-17 PROCEDURE — 3074F SYST BP LT 130 MM HG: CPT | Mod: CPTII,,, | Performed by: NEUROLOGICAL SURGERY

## 2020-04-17 PROCEDURE — 87040 BLOOD CULTURE FOR BACTERIA: CPT

## 2020-04-17 PROCEDURE — 63600175 PHARM REV CODE 636 W HCPCS: Mod: ER | Performed by: EMERGENCY MEDICINE

## 2020-04-17 PROCEDURE — 85025 COMPLETE CBC W/AUTO DIFF WBC: CPT | Mod: ER

## 2020-04-17 PROCEDURE — 85610 PROTHROMBIN TIME: CPT | Mod: ER

## 2020-04-17 PROCEDURE — 99214 OFFICE O/P EST MOD 30 MIN: CPT | Mod: 95,,, | Performed by: NEUROLOGICAL SURGERY

## 2020-04-17 PROCEDURE — 99285 EMERGENCY DEPT VISIT HI MDM: CPT | Mod: 25,ER

## 2020-04-17 PROCEDURE — 84484 ASSAY OF TROPONIN QUANT: CPT | Mod: ER

## 2020-04-17 PROCEDURE — 96372 THER/PROPH/DIAG INJ SC/IM: CPT | Mod: 59,ER

## 2020-04-17 PROCEDURE — 3078F PR MOST RECENT DIASTOLIC BLOOD PRESSURE < 80 MM HG: ICD-10-PCS | Mod: CPTII,,, | Performed by: NEUROLOGICAL SURGERY

## 2020-04-17 PROCEDURE — 1101F PT FALLS ASSESS-DOCD LE1/YR: CPT | Mod: CPTII,,, | Performed by: NEUROLOGICAL SURGERY

## 2020-04-17 PROCEDURE — 1159F MED LIST DOCD IN RCRD: CPT | Mod: ,,, | Performed by: NEUROLOGICAL SURGERY

## 2020-04-17 PROCEDURE — 25000003 PHARM REV CODE 250: Mod: ER | Performed by: EMERGENCY MEDICINE

## 2020-04-17 PROCEDURE — 82962 GLUCOSE BLOOD TEST: CPT | Mod: ER

## 2020-04-17 PROCEDURE — U0002 COVID-19 LAB TEST NON-CDC: HCPCS

## 2020-04-17 PROCEDURE — 3074F PR MOST RECENT SYSTOLIC BLOOD PRESSURE < 130 MM HG: ICD-10-PCS | Mod: CPTII,,, | Performed by: NEUROLOGICAL SURGERY

## 2020-04-17 PROCEDURE — 96360 HYDRATION IV INFUSION INIT: CPT | Mod: 59,ER

## 2020-04-17 PROCEDURE — 99214 PR OFFICE/OUTPT VISIT, EST, LEVL IV, 30-39 MIN: ICD-10-PCS | Mod: 95,,, | Performed by: NEUROLOGICAL SURGERY

## 2020-04-17 PROCEDURE — 81003 URINALYSIS AUTO W/O SCOPE: CPT | Mod: ER

## 2020-04-17 PROCEDURE — 1101F PR PT FALLS ASSESS DOC 0-1 FALLS W/OUT INJ PAST YR: ICD-10-PCS | Mod: CPTII,,, | Performed by: NEUROLOGICAL SURGERY

## 2020-04-17 RX ORDER — THIAMINE HYDROCHLORIDE 100 MG/ML
100 INJECTION, SOLUTION INTRAMUSCULAR; INTRAVENOUS
Status: COMPLETED | OUTPATIENT
Start: 2020-04-17 | End: 2020-04-17

## 2020-04-17 RX ORDER — ATORVASTATIN CALCIUM 40 MG/1
40 TABLET, FILM COATED ORAL DAILY
Status: ON HOLD | COMMUNITY
Start: 2020-02-29 | End: 2020-05-05

## 2020-04-17 RX ADMIN — SODIUM CHLORIDE 1000 ML: 0.9 INJECTION, SOLUTION INTRAVENOUS at 01:04

## 2020-04-17 RX ADMIN — THIAMINE HYDROCHLORIDE 100 MG: 100 INJECTION, SOLUTION INTRAMUSCULAR; INTRAVENOUS at 01:04
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
## 2020-04-17 NOTE — DISCHARGE INSTRUCTIONS
Thank you for coming to our Emergency Department today. It is important to remember that some problems are difficult to diagnose and may not be found during your first visit. Be sure to follow up with your primary care doctor and review any labs/imaging that was performed with them. If you do not have a primary care doctor, you may contact the one listed on your discharge paperwork or you may also call the [facility] Appointment Desk at [phone] to schedule an appointment with one.     All medications may potentially have side effects and it is impossible to predict which medications may give you side effects. If you feel that you are having a negative effect of any medication you should immediately stop taking them and seek medical attention.    Return to the ER with any questions/concerns, new/concerning symptoms, worsening or failure to improve. Do not drive or make any important decisions for 24 hours if you have received any pain medications, sedatives or mood altering drugs during your ER visit.
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
## 2020-04-17 NOTE — TELEPHONE ENCOUNTER
Made a virtual visit for today.        ----- Message from Jennifer Grossman sent at 4/17/2020 11:59 AM CDT -----  Type: Patient Call Back    Who called: Daughter Tina Morelos    What is the request in detail:patient's daughter states she was seen the in ED and told to follow up with Dr. Quintanilla because of possible seizures. Please call     Can the clinic reply by [facility]? No     Would the patient rather a call back or a response via [facility]?  Call   Best call back number:[phone]

## 2020-04-18 ENCOUNTER — TELEPHONE (OUTPATIENT)
Dept: EMERGENCY MEDICINE | Facility: HOSPITAL | Age: 75
End: 2020-04-18

## 2020-04-19 NOTE — TELEPHONE ENCOUNTER
This patient was called to notify of the following:    Your test was NEGATIVE for COVID-19 (coronavirus).  If you still have symptoms, treat with rest, fluids, and over-the-counter medications.  Continue to stay home and practice proper handwashing.     If your symptoms worsen or if you have any other concerns, please contact [facility] at [phone].     Sincerely,    Toussaint Battley III, FNP    There was no answer, therefore, certified letter was mailed to the patient.

## 2020-04-20 ENCOUNTER — HOSPITAL ENCOUNTER (OUTPATIENT)
Dept: RADIOLOGY | Facility: HOSPITAL | Age: 75
Discharge: HOME | End: 2020-04-20
Attending: NEUROLOGICAL SURGERY
Payer: MEDICARE

## 2020-04-20 DIAGNOSIS — G44.53 HEADACHE, PRIMARY THUNDERCLAP: ICD-10-CM

## 2020-04-20 DIAGNOSIS — R41.0 CONFUSION: ICD-10-CM

## 2020-04-20 PROCEDURE — 70551 MRI BRAIN WITHOUT CONTRAST: ICD-10-PCS | Mod: 26,,, | Performed by: RADIOLOGY

## 2020-04-20 PROCEDURE — 70551 MRI BRAIN STEM W/O DYE: CPT | Mod: 26,,, | Performed by: RADIOLOGY

## 2020-04-20 PROCEDURE — 70551 MRI BRAIN STEM W/O DYE: CPT | Mod: TC

## 2020-04-21 ENCOUNTER — OFFICE VISIT (OUTPATIENT)
Dept: NEUROLOGY | Facility: CLINIC | Age: 75
End: 2020-04-21
Payer: MEDICARE

## 2020-04-21 ENCOUNTER — PATIENT OUTREACH (OUTPATIENT)
Dept: ADMINISTRATIVE | Facility: OTHER | Age: 75
End: 2020-04-21

## 2020-04-21 DIAGNOSIS — R25.1 TREMOR: ICD-10-CM

## 2020-04-21 DIAGNOSIS — R41.3 MEMORY CHANGE: ICD-10-CM

## 2020-04-21 LAB
BACTERIA BLD CULT: NORMAL
BACTERIA BLD CULT: NORMAL

## 2020-04-21 PROCEDURE — 99499 UNLISTED E&M SERVICE: CPT | Mod: 95,,, | Performed by: PSYCHIATRY & NEUROLOGY

## 2020-04-21 PROCEDURE — 99499 RISK ADDL DX/OHS AUDIT: ICD-10-PCS | Mod: 95,,, | Performed by: PSYCHIATRY & NEUROLOGY

## 2020-04-21 PROCEDURE — 99215 PR OFFICE/OUTPT VISIT, EST, LEVL V, 40-54 MIN: ICD-10-PCS | Mod: 95,,, | Performed by: PSYCHIATRY & NEUROLOGY

## 2020-04-21 PROCEDURE — 1159F PR MEDICATION LIST DOCUMENTED IN MEDICAL RECORD: ICD-10-PCS | Mod: 95,,, | Performed by: PSYCHIATRY & NEUROLOGY

## 2020-04-21 PROCEDURE — 99215 OFFICE O/P EST HI 40 MIN: CPT | Mod: 95,,, | Performed by: PSYCHIATRY & NEUROLOGY

## 2020-04-21 PROCEDURE — 1101F PR PT FALLS ASSESS DOC 0-1 FALLS W/OUT INJ PAST YR: ICD-10-PCS | Mod: CPTII,95,, | Performed by: PSYCHIATRY & NEUROLOGY

## 2020-04-21 PROCEDURE — 1101F PT FALLS ASSESS-DOCD LE1/YR: CPT | Mod: CPTII,95,, | Performed by: PSYCHIATRY & NEUROLOGY

## 2020-04-21 PROCEDURE — 1159F MED LIST DOCD IN RCRD: CPT | Mod: 95,,, | Performed by: PSYCHIATRY & NEUROLOGY

## 2020-04-21 NOTE — PROGRESS NOTES
"The patient location is: home  The chief complaint leading to consultation is: tremor  Visit type: audiovisual  Total time spent with patient: 20 mins  Each patient to whom he or she provides medical services by telemedicine is:  (1) informed of the relationship between the physician and patient and the respective role of any other health care provider with respect to management of the patient; and (2) notified that he or she may decline to receive medical services by telemedicine and may withdraw from such care at any time.    Notes: below      MOVEMENT DISORDERS CLINIC NEW CONSULT NOTE    PCP/Referring Provider: No referring provider defined for this encounter.  Date of Service: 4/21/2020    Chief Complaint: tremors, gait imbalance    Interval Hx    Since last visit,   Stopped abilify since 1 month  No reported depression  Tremor now worse per daughter- waxing and waning bilateral hand pillrolling tremors (went from a "2 to a 9")  Gait not appreciably better  No lip smacking movements or dyskinesias noted    Separately, last week  Had a headache and a confusional spell x 1 hour  MRI brent was nomral  EEG is pending  No fevers  Almost at cognitive baseline but daughter feels she gets confused at times  Sodium low-normal in ER  No seizure-like events  CRP was elevated according to ER labs      "PriorHPI: Isabell Preston is a R HANDED 74 y.o. 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   She notes a resting and tremor arms and legs since 1 year. When she hold a glass of water, her hands shake and food spills. Tremor can come and go. Struggles to put on makeup. Struggles to put in her earrings. Started Abilify since at least 2 years.  Issues walking since 1 year. Uses a cane or walker. Falls seldomly. Can walk 100  Feet before she tired. No feet shuffling. " "Walking continues to decline.    No fam hx tremors or PD.  MRI brain showed no atrophy 2019    Neuroleptic exposure:  Abilify, compazine"    PD Review of Symptoms:  Anosmia: poor smell x 6 months  Dysarthria/Hypophonia: none  Dysphagia/Sialorrhea:none  Depression: yes  Cognitive slowing: prior to chemo , short term memory issues, and steadily decreased - forgets date, where here family is, events and tasks  Hallucinations: yes during hospitalization  Sleep issues:  -RBD: talks in her sleep    Review of Systems:   Review of Systems   Constitutional: Negative for fever.   HENT: Negative for congestion.    Eyes: Negative for double vision.   Respiratory: Negative for cough and shortness of breath.    Cardiovascular: Negative for chest pain and leg swelling.   Gastrointestinal: Negative for nausea.   Genitourinary: Negative for dysuria.   Musculoskeletal: Positive for falls.   Skin: Negative for rash.   Neurological: Positive for tremors. Negative for speech change and headaches.   Psychiatric/Behavioral: Positive for depression, hallucinations and memory loss.         Current Medications:  Outpatient Encounter Medications as of 4/21/2020   Medication Sig Dispense Refill    acetaminophen (TYLENOL) 500 MG tablet Take 2 tablets (1,000 mg total) by mouth every 6 (six) hours as needed for Pain. 30 tablet 0    amLODIPine (NORVASC) 10 MG tablet TAKE 1 TABLET BY MOUTH EVERY DAY 90 tablet 0    ARIPiprazole (ABILIFY) 2 MG Tab Take 1 tablet (2 mg total) by mouth every morning. (Patient not taking: Reported on 3/19/2020) 90 tablet 1    atorvastatin (LIPITOR) 10 MG tablet Take 1 tablet (10 mg total) by mouth once daily. 90 tablet 1    atorvastatin (LIPITOR) 40 MG tablet Take 40 mg by mouth once daily.      desoximetasone (TOPICORT) 0.05 % cream Apply topically.      dicyclomine (BENTYL) 10 MG capsule TAKE 1 CAPSULE BY MOUTH TWICE A DAY 90 capsule 0    diphenhydrAMINE (BENADRYL) 25 mg capsule Take 1 capsule (25 mg total) by " mouth every 6 (six) hours as needed for Itching or Allergies (Take if headache does not resolve). 20 capsule 0    DULoxetine (CYMBALTA) 60 MG capsule Take 1 capsule (60 mg total) by mouth once daily. 90 capsule 3    enoxaparin (LOVENOX) 120 mg/0.8 mL Syrg INJECT 0.8 MLS (120 MG TOTAL) INTO THE SKIN ONCE DAILY. 30 Syringe 4    fluticasone (VERAMYST) 27.5 mcg/actuation nasal spray 2 sprays by Nasal route daily as needed for Rhinitis or Allergies.       folic acid (FOLVITE) 400 MCG tablet Take 1 tablet (400 mcg total) by mouth once daily. 30 tablet 11    gabapentin (NEURONTIN) 100 MG capsule TAKE 1 CAPSULE BY MOUTH TWICE A  capsule 1    hydrOXYzine HCl (ATARAX) 25 MG tablet Take 1-2 tablets (25-50 mg total) by mouth daily as needed (severe anxiety or itching). 60 tablet 11    lancets (TRUEPLUS LANCETS) 28 gauge Misc 1 lancet by Misc.(Non-Drug; Combo Route) route once daily. Test blood sugar once daily, type 2 diabetes, controlled. E11.9 100 each 3    lidocaine-prilocaine (EMLA) cream Apply topically as needed. 30 g 1    LINZESS 145 mcg Cap capsule TAKE 1 CAPSULE BY MOUTH EVERY DAY 90 capsule 0    magic mouthwash diphen/antac/lidoc/nysta Swish10 mLs 4 (four) times daily. 120 mL 0    meclizine (ANTIVERT) 25 mg tablet Take 1 tablet (25 mg total) by mouth 3 (three) times daily as needed for Dizziness. 20 tablet 0    metoprolol succinate (TOPROL-XL) 25 MG 24 hr tablet TAKE 1 TABLET BY MOUTH ONCE DAILY. TOTAL DAILY DOSE 75MG 90 tablet 0    metoprolol succinate (TOPROL-XL) 50 MG 24 hr tablet TAKE 1 TABLET BY MOUTH ONCE DAILY. TOTAL DAILY DOSE 75MG 90 tablet 0    mometasone 0.1% (ELOCON) 0.1 % cream BALWINDER TO DARK AREAS BID ON LEGS PRN 15 g 1    ondansetron (ZOFRAN) 4 MG tablet Take 1 tablet (4 mg total) by mouth every 6 (six) hours. 90 tablet 0    prochlorperazine (COMPAZINE) 10 MG tablet Take 1 tablet (10 mg total) by mouth every 6 (six) hours as needed. 60 tablet 1    promethazine (PHENERGAN) 25 MG  tablet Take 1 tablet (25 mg total) by mouth every 6 (six) hours as needed for Nausea (Taken headache does not resolve). 15 tablet 0    thiamine 100 MG tablet Take 1 tablet (100 mg total) by mouth once daily. 30 tablet 12    tiZANidine (ZANAFLEX) 4 MG tablet TAKE 1 TABLETBY MOUTH NIGHTLY AT BEDTIME AS NEEDED 90 tablet 0    triamterene-hydrochlorothiazide 37.5-25 mg (MAXZIDE-25) 37.5-25 mg per tablet TAKE 1 TABLET BY MOUTH EVERY DAY 90 tablet 0     No facility-administered encounter medications on file as of 4/21/2020.        Past Medical History:  Patient Active Problem List   Diagnosis    Diarrhea    Hypertension, essential    Blurred vision, left eye    SI (sacroiliac) joint dysfunction    Muscle spasm    Spinal enthesopathy    DDD (degenerative disc disease), lumbar    Chronic deep vein thrombosis (DVT) of distal vein of lower extremity, unspecified laterality    Hyperlipidemia    History of CVA (cerebrovascular accident)    Iron deficiency anemia due to sideropenic dysphagia    Diverticulosis of large intestine with hemorrhage    Chronic fatigue    Neuropathic pain    Type 2 diabetes mellitus with diabetic cataract, without long-term current use of insulin    Acute renal failure superimposed on stage 3 chronic kidney disease    Acute respiratory insufficiency    Pain from hematoma evacuation    Hematoma of chest wall, right, initial encounter    GINA (acute kidney injury)    History of DVT of lower extremity    Heart palpitations    Chest wall pain    Constipation    Malignant pleural mesothelioma    Chemotherapy induced nausea and vomiting    Chemotherapy induced neutropenia    Chemotherapy adverse reaction, initial encounter    Chest pain    Tremor    Memory change    Upper respiratory tract infection due to COVID-19 virus    Suspected Covid-19 Virus Infection    Acute viral syndrome       Past Surgical History:  Past Surgical History:   Procedure Laterality Date    ANKLE  FRACTURE SURGERY      left ankle    APENDIX AND GALL BLADDER REMOVED      APPENDECTOMY      BREAST SURGERY  1998    lumpectomy right side - benign    CHOLECYSTECTOMY      colon resection for diverticulitis x 2      HEMORRHOID SURGERY      HERNIA REPAIR      umbilical hernia repair    HYSTERECTOMY      INSERTION OF TUNNELED CENTRAL VENOUS CATHETER (CVC) WITH SUBCUTANEOUS PORT Left 2019    Procedure: INSERTION, PORT-A-CATH;  Surgeon: Sebastian Prasad MD;  Location: [facility] CATH LAB;  Service: Radiology;  Laterality: Left;    PLEURODESIS WITH VIDEO-ASSISTED THORACOSCOPIC SURGERY (VATS) Right 7/3/2019    Procedure: VATS, WITH PLEURODESIS;  Surgeon: Ben Smith MD;  Location: [address];  Service: Thoracic;  Laterality: Right;    THORACOSCOPIC BIOPSY OF PLEURA Right 7/3/2019    Procedure: VATS, WITH PLEURA BIOPSY;  Surgeon: Ben Smith MD;  Location: [facility] OR [address] [place] [address];  Service: Thoracic;  Laterality: Right;  RIGHT VATS, DRAINAGE, PLEURAL BIOPSY  possible  THORACOTOMY  PLEURODESIS  possible   PLEURX    TONSILLECTOMY      TOTAL ABDOMINAL HYSTERECTOMY W/ BILATERAL SALPINGOOPHORECTOMY      UMBILICAL HERNIA REPAIR         Current Living Situation: home    Social:  Social History     Socioeconomic History    Marital status:      Spouse name: Not on file    Number of children: 3    Years of education: Not on file    Highest education level: Not on file   Occupational History    Occupation: retired -    Social Needs    Financial resource strain: Not on file    Food insecurity:     Worry: Not on file     Inability: Not on file    Transportation needs:     Medical: Not on file     Non-medical: Not on file   Tobacco Use    Smoking status: Former Smoker     Types: Cigarettes     Last attempt to quit: 1970     Years since quittin.7    Smokeless tobacco: Never Used   Substance and Sexual Activity    Alcohol use: No    Drug use: No    Sexual activity:  Not on file   Lifestyle    Physical activity:     Days per week: Not on file     Minutes per session: Not on file    Stress: Not on file   Relationships    Social connections:     Talks on phone: Not on file     Gets together: Not on file     Attends Scientologist service: Not on file     Active member of club or organization: Not on file     Attends meetings of clubs or organizations: Not on file     Relationship status: Not on file   Other Topics Concern    Patient feels they ought to cut down on drinking/drug use Not Asked    Patient annoyed by others criticizing their drinking/drug use Not Asked    Patient has felt bad or guilty about drinking/drug use Not Asked    Patient has had a drink/used drugs as an eye opener in the AM Not Asked   Social History Narrative    Not on file       Family History:  Family History   Problem Relation Age of Onset    Glaucoma Mother     Stroke Mother     Stroke Paternal Uncle     Early death Paternal Uncle          from stroke in 40s    Cancer Father         multiple myeloma    Arthritis Father     Cataracts Sister     Diabetes Sister     Arthritis Sister     Alcohol abuse Brother     Depression Brother     Clotting disorder Maternal Aunt         DVT    Birth defects Daughter         bilateral ear defects    Heart disease Daughter         Sinus tachycardia    Cataracts Paternal Grandmother     Arthritis Paternal Grandmother     Diabetes Paternal Grandmother     Glaucoma Paternal Grandmother     Breast cancer Maternal Aunt     Ovarian cancer Daughter     Schizophrenia Neg Hx     Suicide Neg Hx        PHYSICAL:  There were no vitals taken for this visit.  Physical Exam  Constitutional: Well-developed, well-nourished, appears stated age  Eyes: No scleral icterus  ENT: Moist oral mucosa  Cardiovascular: No lower extremity edema   Respiratory: No labored breathing   Skin: No rash   Hematologic: No bruising  Psychiatric: No depression  Other: GI/ deferred    · Mental status: Alert and oriented to person, place, time, and situation;   · Speech: normal (not dysarthric), no aphasia  · Cranial nerves:            · CN II: Pupils mid-position and equal, not tested light or accommodation  · CN III, IV, VI: Extraocular movements full, no nystagmus visualized  · CN V: Not tested   · CN VII: Face strong and symmetric bilaterally   · CN VIII: Hearing intact to voice and conversation   · CN IX, X: Palate raises midline and symmetric   · CN XI: Strong shoulder shrug B   · CN XII: Tongue appears midline   · Motor: Normal bulk by appearance, no drift   · Sensory: Not tested    · Gait: Not tested  · Deep tendon reflexes: Not tested  · Movement/Coordination                    No hypophonic speech.                     Mild facial masking.                   No tremor with rest, posture, kinesis, or intention.                     No other dystonia, chorea, athetosis, myoclonus, or tics visualized.  No lip smacking or dyskinesias    Bradykinesia  ? Finger taps Finger flicks BRETT Heel taps   Left 2+ 2+ - -   Right 2+ 2+ - -           Laboratory Data:  NA    Imaging:  MRI brain w/o atrophy 2019      Assessment//Plan:   Problem List Items Addressed This Visit        Neuro    Tremor    Current Assessment & Plan     Debilitating pillrolling tremor, shuffling gait, symmetrical. Suggestive of medication-induced PD. Suggested to psychiatrist to wean off Abilify if possible onto a non dopamine depleting drug however after doing this the tremor increased. Ether a tardive phenomenon or an unmasking of PDism.     Suggested when back to mental baseline, start carbidopa/levodopa 25/100mg 1 tab PO TID.         Memory change    Overview     Insidious onset and progressive worsening of cognition over the past 2 years (before starting chemotherapy) with her physical symptoms (debilitating pill-rolling tremor, shuffling gait, symmetrical) reportedly starting after chemotherapy began (approximately 5-6  months ago). Since the patient received her diagnosis of mesothelioma, her daughters took over all IADL management, with the exception of cooking, which the patient continues to participate in and do well with.              Current Assessment & Plan     Acute confusion on chronic. Event of confusion plus headache recently  - EEG and imaging normal - CRP elevated and sodium mlidly low. No fevers, cough, dysuria to suggest infection. Suggested followup with neurologist and PCP for monitoring. Will avid adding medications at this time to cloud improvement.               Follow-up: 2mos  Discussed the importance of ongoing exercise in efforts to improve mobility and balance.    Cassandra Mancia MD, MS  [facility]  Department of Neurology  Movement Disorders

## 2020-04-22 ENCOUNTER — TELEPHONE (OUTPATIENT)
Dept: FAMILY MEDICINE | Facility: CLINIC | Age: 75
End: 2020-04-22

## 2020-04-22 ENCOUNTER — TELEPHONE (OUTPATIENT)
Dept: HEMATOLOGY/ONCOLOGY | Facility: CLINIC | Age: 75
End: 2020-04-22

## 2020-04-22 ENCOUNTER — LAB VISIT (OUTPATIENT)
Dept: LAB | Facility: HOSPITAL | Age: 75
End: 2020-04-22
Attending: INTERNAL MEDICINE
Payer: MEDICARE

## 2020-04-22 ENCOUNTER — PATIENT MESSAGE (OUTPATIENT)
Dept: NEUROLOGY | Facility: CLINIC | Age: 75
End: 2020-04-22

## 2020-04-22 DIAGNOSIS — C45.0 MALIGNANT PLEURAL MESOTHELIOMA: ICD-10-CM

## 2020-04-22 LAB
ALBUMIN SERPL BCP-MCNC: 3.3 G/DL (ref 3.5–5.2)
ALP SERPL-CCNC: 115 U/L (ref 55–135)
ALT SERPL W/O P-5'-P-CCNC: 14 U/L (ref 10–44)
ANION GAP SERPL CALC-SCNC: 11 MMOL/L (ref 8–16)
AST SERPL-CCNC: 26 U/L (ref 10–40)
BILIRUB SERPL-MCNC: 0.2 MG/DL (ref 0.1–1)
BUN SERPL-MCNC: 25 MG/DL (ref 8–23)
CALCIUM SERPL-MCNC: 9.5 MG/DL (ref 8.7–10.5)
CHLORIDE SERPL-SCNC: 106 MMOL/L (ref 95–110)
CO2 SERPL-SCNC: 24 MMOL/L (ref 23–29)
CREAT SERPL-MCNC: 2.2 MG/DL (ref 0.5–1.4)
ERYTHROCYTE [DISTWIDTH] IN BLOOD BY AUTOMATED COUNT: 15.6 % (ref 11.5–14.5)
EST. GFR  (AFRICAN AMERICAN): 25 ML/MIN/1.73 M^2
EST. GFR  (NON AFRICAN AMERICAN): 22 ML/MIN/1.73 M^2
GLUCOSE SERPL-MCNC: 149 MG/DL (ref 70–110)
HCT VFR BLD AUTO: 29.1 % (ref 37–48.5)
HGB BLD-MCNC: 9.1 G/DL (ref 12–16)
IMM GRANULOCYTES # BLD AUTO: 0.04 K/UL (ref 0–0.04)
MAGNESIUM SERPL-MCNC: 1.8 MG/DL (ref 1.6–2.6)
MCH RBC QN AUTO: 34.5 PG (ref 27–31)
MCHC RBC AUTO-ENTMCNC: 31.3 G/DL (ref 32–36)
MCV RBC AUTO: 110 FL (ref 82–98)
NEUTROPHILS # BLD AUTO: 3.6 K/UL (ref 1.8–7.7)
PLATELET # BLD AUTO: 315 K/UL (ref 150–350)
PMV BLD AUTO: 10.1 FL (ref 9.2–12.9)
POTASSIUM SERPL-SCNC: 4.3 MMOL/L (ref 3.5–5.1)
PROT SERPL-MCNC: 7.5 G/DL (ref 6–8.4)
RBC # BLD AUTO: 2.64 M/UL (ref 4–5.4)
SODIUM SERPL-SCNC: 141 MMOL/L (ref 136–145)
WBC # BLD AUTO: 5.9 K/UL (ref 3.9–12.7)

## 2020-04-22 PROCEDURE — 85027 COMPLETE CBC AUTOMATED: CPT

## 2020-04-22 PROCEDURE — 83735 ASSAY OF MAGNESIUM: CPT

## 2020-04-22 PROCEDURE — 36415 COLL VENOUS BLD VENIPUNCTURE: CPT | Mod: PO

## 2020-04-22 PROCEDURE — 80053 COMPREHEN METABOLIC PANEL: CPT

## 2020-04-22 NOTE — ASSESSMENT & PLAN NOTE
Acute confusion on chronic. Event of confusion plus headache recently  - EEG and imaging normal - CRP elevated and sodium mlidly low. No fevers, cough, dysuria to suggest infection. Suggested followup with neurologist and PCP for monitoring. Will avid adding medications at this time to cloud improvement.

## 2020-04-22 NOTE — ASSESSMENT & PLAN NOTE
Debilitating pillrolling tremor, shuffling gait, symmetrical. Suggestive of medication-induced PD. Suggested to psychiatrist to wean off Abilify if possible onto a non dopamine depleting drug however after doing this the tremor increased. Ether a tardive phenomenon or an unmasking of PDism.     Suggested when back to mental baseline, start carbidopa/levodopa 25/100mg 1 tab PO TID.
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
## 2020-04-22 NOTE — TELEPHONE ENCOUNTER
----- Message from Cassandra Mancia MD sent at 4/21/2020  7:07 PM CDT -----  Our mutual patient had a transient confusional spell - visited the ER. CRP was elevated, and no followup noted. Do you mind seeing her soon to monitor? She is near baseline (mild dementia) but I have no explanation for CRP and daughter feel she's still mildly confused at times.

## 2020-04-23 ENCOUNTER — INFUSION (OUTPATIENT)
Dept: INFUSION THERAPY | Facility: HOSPITAL | Age: 75
End: 2020-04-23
Attending: INTERNAL MEDICINE
Payer: MEDICARE

## 2020-04-23 ENCOUNTER — OFFICE VISIT (OUTPATIENT)
Dept: HEMATOLOGY/ONCOLOGY | Facility: CLINIC | Age: 75
End: 2020-04-23
Payer: MEDICARE

## 2020-04-23 VITALS
SYSTOLIC BLOOD PRESSURE: 178 MMHG | DIASTOLIC BLOOD PRESSURE: 94 MMHG | HEART RATE: 76 BPM | TEMPERATURE: 98 F | RESPIRATION RATE: 16 BRPM

## 2020-04-23 DIAGNOSIS — R11.2 CHEMOTHERAPY INDUCED NAUSEA AND VOMITING: Primary | ICD-10-CM

## 2020-04-23 DIAGNOSIS — C45.0 MALIGNANT PLEURAL MESOTHELIOMA: Primary | ICD-10-CM

## 2020-04-23 DIAGNOSIS — Z86.718 HISTORY OF DVT (DEEP VEIN THROMBOSIS): ICD-10-CM

## 2020-04-23 DIAGNOSIS — N17.9 AKI (ACUTE KIDNEY INJURY): ICD-10-CM

## 2020-04-23 DIAGNOSIS — Z79.01 ANTICOAGULATED: ICD-10-CM

## 2020-04-23 DIAGNOSIS — T45.1X5A CHEMOTHERAPY INDUCED NEUTROPENIA: ICD-10-CM

## 2020-04-23 DIAGNOSIS — R25.1 TREMORS OF NERVOUS SYSTEM: ICD-10-CM

## 2020-04-23 DIAGNOSIS — D64.81 ANTINEOPLASTIC CHEMOTHERAPY INDUCED ANEMIA: ICD-10-CM

## 2020-04-23 DIAGNOSIS — T45.1X5A ANTINEOPLASTIC CHEMOTHERAPY INDUCED ANEMIA: ICD-10-CM

## 2020-04-23 DIAGNOSIS — D70.1 CHEMOTHERAPY INDUCED NEUTROPENIA: ICD-10-CM

## 2020-04-23 DIAGNOSIS — T45.1X5A CHEMOTHERAPY INDUCED NAUSEA AND VOMITING: Primary | ICD-10-CM

## 2020-04-23 DIAGNOSIS — C45.0 MALIGNANT PLEURAL MESOTHELIOMA: Primary | Chronic | ICD-10-CM

## 2020-04-23 PROCEDURE — 1159F MED LIST DOCD IN RCRD: CPT | Mod: 95,,, | Performed by: NURSE PRACTITIONER

## 2020-04-23 PROCEDURE — 25000003 PHARM REV CODE 250: Performed by: NURSE PRACTITIONER

## 2020-04-23 PROCEDURE — 63600175 PHARM REV CODE 636 W HCPCS: Performed by: NURSE PRACTITIONER

## 2020-04-23 PROCEDURE — 99499 RISK ADDL DX/OHS AUDIT: ICD-10-PCS | Mod: 95,,, | Performed by: NURSE PRACTITIONER

## 2020-04-23 PROCEDURE — 99214 PR OFFICE/OUTPT VISIT, EST, LEVL IV, 30-39 MIN: ICD-10-PCS | Mod: 95,,, | Performed by: NURSE PRACTITIONER

## 2020-04-23 PROCEDURE — 99499 UNLISTED E&M SERVICE: CPT | Mod: 95,,, | Performed by: NURSE PRACTITIONER

## 2020-04-23 PROCEDURE — 1159F PR MEDICATION LIST DOCUMENTED IN MEDICAL RECORD: ICD-10-PCS | Mod: 95,,, | Performed by: NURSE PRACTITIONER

## 2020-04-23 PROCEDURE — 1101F PR PT FALLS ASSESS DOC 0-1 FALLS W/OUT INJ PAST YR: ICD-10-PCS | Mod: CPTII,95,, | Performed by: NURSE PRACTITIONER

## 2020-04-23 PROCEDURE — 99214 OFFICE O/P EST MOD 30 MIN: CPT | Mod: 95,,, | Performed by: NURSE PRACTITIONER

## 2020-04-23 PROCEDURE — 96360 HYDRATION IV INFUSION INIT: CPT

## 2020-04-23 PROCEDURE — 1101F PT FALLS ASSESS-DOCD LE1/YR: CPT | Mod: CPTII,95,, | Performed by: NURSE PRACTITIONER

## 2020-04-23 RX ORDER — HEPARIN 100 UNIT/ML
500 SYRINGE INTRAVENOUS
Status: CANCELLED | OUTPATIENT
Start: 2020-04-23

## 2020-04-23 RX ORDER — SODIUM CHLORIDE 0.9 % (FLUSH) 0.9 %
10 SYRINGE (ML) INJECTION
Status: DISCONTINUED | OUTPATIENT
Start: 2020-04-23 | End: 2020-04-23 | Stop reason: HOSPADM

## 2020-04-23 RX ORDER — SODIUM CHLORIDE 0.9 % (FLUSH) 0.9 %
10 SYRINGE (ML) INJECTION
Status: CANCELLED | OUTPATIENT
Start: 2020-04-23

## 2020-04-23 RX ORDER — HEPARIN 100 UNIT/ML
500 SYRINGE INTRAVENOUS
Status: DISCONTINUED | OUTPATIENT
Start: 2020-04-23 | End: 2020-04-23 | Stop reason: HOSPADM

## 2020-04-23 RX ADMIN — HEPARIN 500 UNITS: 100 SYRINGE at 12:04

## 2020-04-23 RX ADMIN — SODIUM CHLORIDE 1000 ML: 0.9 INJECTION, SOLUTION INTRAVENOUS at 11:04

## 2020-04-23 NOTE — PLAN OF CARE
Patient tolerated 1L of IVFs without complications. VS stable - BP elevated; patient reports forgetting to take home BP meds. Patient instructed to take scheduled medications once home and continue to monitor BP at home. Labs reviewed - Cr elevated. Patient encouraged to drink more fluids to help kidney function. Port flushed, heparin locked, and de-accessed prior to discharge. AVS provided. Discharged home.

## 2020-04-27 ENCOUNTER — OFFICE VISIT (OUTPATIENT)
Dept: FAMILY MEDICINE | Facility: CLINIC | Age: 75
End: 2020-04-27
Payer: MEDICARE

## 2020-04-27 ENCOUNTER — TELEPHONE (OUTPATIENT)
Dept: INFUSION THERAPY | Facility: HOSPITAL | Age: 75
End: 2020-04-27

## 2020-04-27 ENCOUNTER — LAB VISIT (OUTPATIENT)
Dept: LAB | Facility: HOSPITAL | Age: 75
End: 2020-04-27
Attending: NURSE PRACTITIONER
Payer: MEDICARE

## 2020-04-27 DIAGNOSIS — C45.0 MALIGNANT PLEURAL MESOTHELIOMA: Primary | Chronic | ICD-10-CM

## 2020-04-27 DIAGNOSIS — Z86.718 HISTORY OF DVT OF LOWER EXTREMITY: ICD-10-CM

## 2020-04-27 DIAGNOSIS — R25.1 TREMOR: ICD-10-CM

## 2020-04-27 DIAGNOSIS — C45.0 MALIGNANT PLEURAL MESOTHELIOMA: Chronic | ICD-10-CM

## 2020-04-27 PROBLEM — I27.20 PULMONARY HYPERTENSION: Status: ACTIVE | Noted: 2020-04-27

## 2020-04-27 LAB
ALBUMIN SERPL BCP-MCNC: 3.2 G/DL (ref 3.5–5.2)
ALP SERPL-CCNC: 119 U/L (ref 55–135)
ALT SERPL W/O P-5'-P-CCNC: 17 U/L (ref 10–44)
ANION GAP SERPL CALC-SCNC: 9 MMOL/L (ref 8–16)
AST SERPL-CCNC: 25 U/L (ref 10–40)
BILIRUB SERPL-MCNC: 0.2 MG/DL (ref 0.1–1)
BUN SERPL-MCNC: 17 MG/DL (ref 8–23)
CALCIUM SERPL-MCNC: 9.2 MG/DL (ref 8.7–10.5)
CHLORIDE SERPL-SCNC: 103 MMOL/L (ref 95–110)
CO2 SERPL-SCNC: 25 MMOL/L (ref 23–29)
CREAT SERPL-MCNC: 2 MG/DL (ref 0.5–1.4)
ERYTHROCYTE [DISTWIDTH] IN BLOOD BY AUTOMATED COUNT: 14.8 % (ref 11.5–14.5)
EST. GFR  (AFRICAN AMERICAN): 28 ML/MIN/1.73 M^2
EST. GFR  (NON AFRICAN AMERICAN): 24 ML/MIN/1.73 M^2
GLUCOSE SERPL-MCNC: 198 MG/DL (ref 70–110)
HCT VFR BLD AUTO: 29.2 % (ref 37–48.5)
HGB BLD-MCNC: 9.3 G/DL (ref 12–16)
IMM GRANULOCYTES # BLD AUTO: 0.05 K/UL (ref 0–0.04)
MCH RBC QN AUTO: 33.7 PG (ref 27–31)
MCHC RBC AUTO-ENTMCNC: 31.8 G/DL (ref 32–36)
MCV RBC AUTO: 106 FL (ref 82–98)
NEUTROPHILS # BLD AUTO: 5.4 K/UL (ref 1.8–7.7)
PLATELET # BLD AUTO: 279 K/UL (ref 150–350)
PMV BLD AUTO: 9.1 FL (ref 9.2–12.9)
POTASSIUM SERPL-SCNC: 4.1 MMOL/L (ref 3.5–5.1)
PROT SERPL-MCNC: 7.3 G/DL (ref 6–8.4)
RBC # BLD AUTO: 2.76 M/UL (ref 4–5.4)
SODIUM SERPL-SCNC: 137 MMOL/L (ref 136–145)
WBC # BLD AUTO: 7.42 K/UL (ref 3.9–12.7)

## 2020-04-27 PROCEDURE — 99499 RISK ADDL DX/OHS AUDIT: ICD-10-PCS | Mod: 95,,, | Performed by: INTERNAL MEDICINE

## 2020-04-27 PROCEDURE — 85027 COMPLETE CBC AUTOMATED: CPT

## 2020-04-27 PROCEDURE — 36415 COLL VENOUS BLD VENIPUNCTURE: CPT

## 2020-04-27 PROCEDURE — 99213 OFFICE O/P EST LOW 20 MIN: CPT | Mod: 95,,, | Performed by: INTERNAL MEDICINE

## 2020-04-27 PROCEDURE — 1101F PT FALLS ASSESS-DOCD LE1/YR: CPT | Mod: CPTII,95,, | Performed by: INTERNAL MEDICINE

## 2020-04-27 PROCEDURE — 80053 COMPREHEN METABOLIC PANEL: CPT

## 2020-04-27 PROCEDURE — 99213 PR OFFICE/OUTPT VISIT, EST, LEVL III, 20-29 MIN: ICD-10-PCS | Mod: 95,,, | Performed by: INTERNAL MEDICINE

## 2020-04-27 PROCEDURE — 1159F MED LIST DOCD IN RCRD: CPT | Mod: 95,,, | Performed by: INTERNAL MEDICINE

## 2020-04-27 PROCEDURE — 1101F PR PT FALLS ASSESS DOC 0-1 FALLS W/OUT INJ PAST YR: ICD-10-PCS | Mod: CPTII,95,, | Performed by: INTERNAL MEDICINE

## 2020-04-27 PROCEDURE — 99499 UNLISTED E&M SERVICE: CPT | Mod: 95,,, | Performed by: INTERNAL MEDICINE

## 2020-04-27 PROCEDURE — 1159F PR MEDICATION LIST DOCUMENTED IN MEDICAL RECORD: ICD-10-PCS | Mod: 95,,, | Performed by: INTERNAL MEDICINE

## 2020-04-27 RX ORDER — ONDANSETRON 4 MG/1
8 TABLET, FILM COATED ORAL
Status: CANCELLED | OUTPATIENT
Start: 2020-04-28

## 2020-04-27 RX ORDER — SODIUM CHLORIDE 0.9 % (FLUSH) 0.9 %
10 SYRINGE (ML) INJECTION
Status: CANCELLED | OUTPATIENT
Start: 2020-04-28

## 2020-04-27 RX ORDER — HEPARIN 100 UNIT/ML
500 SYRINGE INTRAVENOUS
Status: CANCELLED | OUTPATIENT
Start: 2020-04-28

## 2020-04-27 NOTE — PROGRESS NOTES
Subjective:       Patient ID: Isabell Preston is a 74 y.o. female.    Chief Complaint: No chief complaint on file.  The patient location is: in her daughter's home in [place]  The chief complaint leading to consultation is: follow up ED visit  Visit type: audiovisual  Total time spent with patient: 14minutes  Each patient to whom he or she provides medical services by telemedicine is:  (1) informed of the relationship between the physician and patient and the respective role of any other health care provider with respect to management of the patient; and (2) notified that he or she may decline to receive medical services by telemedicine and may withdraw from such care at any time.    Notes: see below    73 y/o w/ HTN MDD chronic consitpation and mesothelioma has had two episodes of headache one associated with more confusion (thinking she was in someone else's home when she was in daughter's home). Had evaluation in ED x two with negative COVID nasal swab. Labs consistent at that time with pro inflammatory state with elevated CRP, ESR and ferritin. No longer confused daughter feels she is back to baseline. But still occasionally with repetitive questions. Feels tremor is worse since stopping abilify. Still taking hydroxyzine once daily scheduled. Moving bowels every other day. Not taking linzess regularlly no fiber supplement or pro motility age (miralax previously was helpful)    Review of Systems   Constitutional: Negative for activity change, appetite change, fatigue, fever and unexpected weight change.   HENT: Negative for ear pain, rhinorrhea and sore throat.    Eyes: Negative for discharge and visual disturbance.   Respiratory: Negative for chest tightness, shortness of breath and wheezing.    Cardiovascular: Negative for chest pain, palpitations and leg swelling.   Gastrointestinal: Positive for constipation. Negative for abdominal pain and diarrhea.   Endocrine: Negative for cold intolerance and heat  intolerance.   Genitourinary: Negative for dysuria and hematuria.   Musculoskeletal: Negative for joint swelling and neck stiffness.   Skin: Negative for rash.   Neurological: Positive for tremors and headaches. Negative for dizziness, syncope and weakness.   Psychiatric/Behavioral: Positive for dysphoric mood. Negative for suicidal ideas. The patient is nervous/anxious.        Objective:   There were no vitals filed for this visit.       Physical Exam   Constitutional: She appears well-developed and well-nourished. No distress.   HENT:   Head: Normocephalic and atraumatic.   Eyes: No scleral icterus.   Pulmonary/Chest: Effort normal. No respiratory distress.   Skin: She is not diaphoretic.   Psychiatric:   Answers direct questions appropriately daughter assists with recent history in regards to ED visits. No lip smacking or repetitive facial movements noted during our video exam today       Assessment and Plan   1. Confusion: stop atarax as anthihistamine could be confounding mental status. Add daily miralax to prevent constipation     2. hypercoaguable state with multiple DVT: continue lovemnox given current malignency    3. Mesothelioma: chemo per oncologist.

## 2020-04-27 NOTE — TELEPHONE ENCOUNTER
Called dtr (Gely Morris) to notify that pt appt was rescheduled to 4/28/2020 at 10 am for alimta & IVF.  Dtr verbalized understanding of new appt time & date.  Esa KASPER notified of schedule change.

## 2020-04-28 ENCOUNTER — INFUSION (OUTPATIENT)
Dept: INFUSION THERAPY | Facility: HOSPITAL | Age: 75
End: 2020-04-28
Attending: INTERNAL MEDICINE
Payer: MEDICARE

## 2020-04-28 ENCOUNTER — TELEPHONE (OUTPATIENT)
Dept: HEMATOLOGY/ONCOLOGY | Facility: CLINIC | Age: 75
End: 2020-04-28

## 2020-04-28 VITALS
TEMPERATURE: 98 F | DIASTOLIC BLOOD PRESSURE: 65 MMHG | WEIGHT: 175.06 LBS | HEART RATE: 64 BPM | HEIGHT: 66 IN | SYSTOLIC BLOOD PRESSURE: 142 MMHG | OXYGEN SATURATION: 97 % | RESPIRATION RATE: 19 BRPM | BODY MASS INDEX: 28.14 KG/M2

## 2020-04-28 DIAGNOSIS — Z13.9 ENCOUNTER FOR SCREENING: ICD-10-CM

## 2020-04-28 DIAGNOSIS — Z51.11 ENCOUNTER FOR ANTINEOPLASTIC CHEMOTHERAPY: ICD-10-CM

## 2020-04-28 DIAGNOSIS — E03.2 HYPOTHYROIDISM DUE TO MEDICATION: ICD-10-CM

## 2020-04-28 DIAGNOSIS — C45.0 MALIGNANT PLEURAL MESOTHELIOMA: Primary | ICD-10-CM

## 2020-04-28 DIAGNOSIS — C45.0 MALIGNANT PLEURAL MESOTHELIOMA: Primary | Chronic | ICD-10-CM

## 2020-04-28 PROCEDURE — 63600175 PHARM REV CODE 636 W HCPCS: Performed by: INTERNAL MEDICINE

## 2020-04-28 PROCEDURE — 96409 CHEMO IV PUSH SNGL DRUG: CPT

## 2020-04-28 PROCEDURE — 96361 HYDRATE IV INFUSION ADD-ON: CPT

## 2020-04-28 PROCEDURE — 25000003 PHARM REV CODE 250: Performed by: INTERNAL MEDICINE

## 2020-04-28 RX ORDER — SODIUM CHLORIDE 0.9 % (FLUSH) 0.9 %
10 SYRINGE (ML) INJECTION
Status: DISCONTINUED | OUTPATIENT
Start: 2020-04-28 | End: 2020-04-28 | Stop reason: HOSPADM

## 2020-04-28 RX ORDER — HEPARIN 100 UNIT/ML
500 SYRINGE INTRAVENOUS
Status: DISCONTINUED | OUTPATIENT
Start: 2020-04-28 | End: 2020-04-28 | Stop reason: HOSPADM

## 2020-04-28 RX ORDER — ONDANSETRON 4 MG/1
8 TABLET, FILM COATED ORAL
Status: COMPLETED | OUTPATIENT
Start: 2020-04-28 | End: 2020-04-28

## 2020-04-28 RX ADMIN — HEPARIN SODIUM (PORCINE) LOCK FLUSH IV SOLN 100 UNIT/ML 500 UNITS: 100 SOLUTION at 11:04

## 2020-04-28 RX ADMIN — SODIUM CHLORIDE: 0.9 INJECTION, SOLUTION INTRAVENOUS at 10:04

## 2020-04-28 RX ADMIN — ONDANSETRON HYDROCHLORIDE 8 MG: 4 TABLET, FILM COATED ORAL at 11:04

## 2020-04-28 RX ADMIN — SODIUM CHLORIDE 675 MG: 9 INJECTION, SOLUTION INTRAVENOUS at 11:04

## 2020-04-28 NOTE — PLAN OF CARE
Pt ambulatory to clinic for 1 liter of NS and Alimta infusion. Port accessed to R chest wall without difficulty, good blood return. Pt tolerated well. NS and Alimta infused without difficulty. Port flushed with NS and heparin prior to deaccess, saldivar needle intact. Pt assisted to clinic in c in NAD. Daughter waiting for pt downstairs.

## 2020-04-28 NOTE — TELEPHONE ENCOUNTER
----- Message from Austin Burt MD sent at 4/28/2020  8:57 AM CDT -----  I looked at this carefully yesterday, and inn this case, I believe the benefits of the therapy outweigh the risks.  There is no evidence to my knowledge that Alimta is determinantal at a CrCl<45, just not much data one way or the other.  I'm okay with moving forward with treatment as planned and keeping a close eye on her kidney function. Thanks.      -MM      ----- Message -----  From: Antonella Goetz NP  Sent: 4/28/2020   8:29 AM CDT  To: Austin Burt MD, Esa Lennon, RN, #    Please advise what you would like to do based on María's recommendations.   ----- Message -----  From: María Grossman PharmD  Sent: 4/28/2020   8:13 AM CDT  To: Antonella Goetz NP, Esa Lennon RN    Hi everyone,     Ms. Preston's creatinine clearance today is 30 mL/min which is still below the threshold for treatment with Alimta (need CrCl > 45).      Based on her current weight and age her creatinine will need to be 1.3-1.4 for her to qualify for Alimta.      Thanks!   ----- Message -----  From: Jennifer Barcenas PharmD  Sent: 4/28/2020   7:18 AM CDT  To: Antonella Goetz NP, Esa Lennon, RN, #    María is on clinic this week, so she can let you know!  ----- Message -----  From: Esa Lennon RN  Sent: 4/27/2020   4:51 PM CDT  To: Antonella Goetz NP, Jennifer Barcenas, PharmD    Is patient's cr of 2 okay for Alimta tomorrow?

## 2020-05-03 ENCOUNTER — PATIENT MESSAGE (OUTPATIENT)
Dept: NEUROLOGY | Facility: CLINIC | Age: 75
End: 2020-05-03

## 2020-05-04 PROCEDURE — 96375 TX/PRO/DX INJ NEW DRUG ADDON: CPT

## 2020-05-04 PROCEDURE — 96376 TX/PRO/DX INJ SAME DRUG ADON: CPT

## 2020-05-04 PROCEDURE — 99285 EMERGENCY DEPT VISIT HI MDM: CPT | Mod: ,,, | Performed by: EMERGENCY MEDICINE

## 2020-05-04 PROCEDURE — 96366 THER/PROPH/DIAG IV INF ADDON: CPT

## 2020-05-04 PROCEDURE — 99285 PR EMERGENCY DEPT VISIT,LEVEL V: ICD-10-PCS | Mod: ,,, | Performed by: EMERGENCY MEDICINE

## 2020-05-04 PROCEDURE — 96365 THER/PROPH/DIAG IV INF INIT: CPT

## 2020-05-04 PROCEDURE — 99285 EMERGENCY DEPT VISIT HI MDM: CPT | Mod: 25

## 2020-05-05 ENCOUNTER — HOSPITAL ENCOUNTER (INPATIENT)
Facility: HOSPITAL | Age: 75
LOS: 8 days | Discharge: HOME HEALTH | DRG: 391 | End: 2020-05-13
Attending: EMERGENCY MEDICINE | Admitting: EMERGENCY MEDICINE
Payer: MEDICARE

## 2020-05-05 ENCOUNTER — PATIENT MESSAGE (OUTPATIENT)
Dept: HEMATOLOGY/ONCOLOGY | Facility: CLINIC | Age: 75
End: 2020-05-05

## 2020-05-05 DIAGNOSIS — S20.211A: ICD-10-CM

## 2020-05-05 DIAGNOSIS — R07.2 PRECORDIAL PAIN: ICD-10-CM

## 2020-05-05 DIAGNOSIS — T45.1X5A CHEMOTHERAPY INDUCED NAUSEA AND VOMITING: ICD-10-CM

## 2020-05-05 DIAGNOSIS — R06.89 ACUTE RESPIRATORY INSUFFICIENCY: ICD-10-CM

## 2020-05-05 DIAGNOSIS — N18.30 ACUTE RENAL FAILURE WITH ACUTE TUBULAR NECROSIS SUPERIMPOSED ON STAGE 3 CHRONIC KIDNEY DISEASE: ICD-10-CM

## 2020-05-05 DIAGNOSIS — Z86.718 HISTORY OF DVT (DEEP VEIN THROMBOSIS): ICD-10-CM

## 2020-05-05 DIAGNOSIS — T45.1X5A CHEMOTHERAPY ADVERSE REACTION, INITIAL ENCOUNTER: ICD-10-CM

## 2020-05-05 DIAGNOSIS — D70.1 CHEMOTHERAPY INDUCED NEUTROPENIA: ICD-10-CM

## 2020-05-05 DIAGNOSIS — E78.2 MIXED HYPERLIPIDEMIA: Chronic | ICD-10-CM

## 2020-05-05 DIAGNOSIS — D53.9 MACROCYTIC ANEMIA: ICD-10-CM

## 2020-05-05 DIAGNOSIS — R19.7 DIARRHEA, UNSPECIFIED TYPE: ICD-10-CM

## 2020-05-05 DIAGNOSIS — G93.40 ENCEPHALOPATHY ACUTE: ICD-10-CM

## 2020-05-05 DIAGNOSIS — N17.0 ACUTE RENAL FAILURE WITH ACUTE TUBULAR NECROSIS SUPERIMPOSED ON STAGE 3 CHRONIC KIDNEY DISEASE: ICD-10-CM

## 2020-05-05 DIAGNOSIS — M62.838 MUSCLE SPASM: ICD-10-CM

## 2020-05-05 DIAGNOSIS — C45.0 MALIGNANT PLEURAL MESOTHELIOMA: Chronic | ICD-10-CM

## 2020-05-05 DIAGNOSIS — M53.3 SI (SACROILIAC) JOINT DYSFUNCTION: ICD-10-CM

## 2020-05-05 DIAGNOSIS — I82.5Z9 CHRONIC DEEP VEIN THROMBOSIS (DVT) OF DISTAL VEIN OF LOWER EXTREMITY, UNSPECIFIED LATERALITY: Chronic | ICD-10-CM

## 2020-05-05 DIAGNOSIS — R53.82 CHRONIC FATIGUE: ICD-10-CM

## 2020-05-05 DIAGNOSIS — R11.2 NON-INTRACTABLE VOMITING WITH NAUSEA, UNSPECIFIED VOMITING TYPE: ICD-10-CM

## 2020-05-05 DIAGNOSIS — B34.9 ACUTE VIRAL SYNDROME: ICD-10-CM

## 2020-05-05 DIAGNOSIS — H53.8 BLURRED VISION, LEFT EYE: ICD-10-CM

## 2020-05-05 DIAGNOSIS — R00.2 HEART PALPITATIONS: ICD-10-CM

## 2020-05-05 DIAGNOSIS — Z51.11 ENCOUNTER FOR ANTINEOPLASTIC CHEMOTHERAPY: ICD-10-CM

## 2020-05-05 DIAGNOSIS — K57.31 DIVERTICULOSIS OF LARGE INTESTINE WITH HEMORRHAGE: ICD-10-CM

## 2020-05-05 DIAGNOSIS — M46.00 SPINAL ENTHESOPATHY: ICD-10-CM

## 2020-05-05 DIAGNOSIS — D50.1 IRON DEFICIENCY ANEMIA DUE TO SIDEROPENIC DYSPHAGIA: ICD-10-CM

## 2020-05-05 DIAGNOSIS — Z86.73 HISTORY OF CVA (CEREBROVASCULAR ACCIDENT): Chronic | ICD-10-CM

## 2020-05-05 DIAGNOSIS — T45.1X5A CHEMOTHERAPY INDUCED NEUTROPENIA: ICD-10-CM

## 2020-05-05 DIAGNOSIS — I27.20 PULMONARY HYPERTENSION: ICD-10-CM

## 2020-05-05 DIAGNOSIS — Z86.718 HISTORY OF DVT OF LOWER EXTREMITY: ICD-10-CM

## 2020-05-05 DIAGNOSIS — N18.3 ACUTE RENAL FAILURE SUPERIMPOSED ON STAGE 3 CHRONIC KIDNEY DISEASE, UNSPECIFIED ACUTE RENAL FAILURE TYPE: ICD-10-CM

## 2020-05-05 DIAGNOSIS — M51.36 DDD (DEGENERATIVE DISC DISEASE), LUMBAR: ICD-10-CM

## 2020-05-05 DIAGNOSIS — R10.32 LEFT LOWER QUADRANT ABDOMINAL PAIN: ICD-10-CM

## 2020-05-05 DIAGNOSIS — Z20.822 SUSPECTED COVID-19 VIRUS INFECTION: ICD-10-CM

## 2020-05-05 DIAGNOSIS — N17.9 AKI (ACUTE KIDNEY INJURY): ICD-10-CM

## 2020-05-05 DIAGNOSIS — R07.81 PLEURITIC CHEST PAIN: ICD-10-CM

## 2020-05-05 DIAGNOSIS — R10.9 ABDOMINAL PAIN: Primary | ICD-10-CM

## 2020-05-05 DIAGNOSIS — E11.9 TYPE 2 DIABETES MELLITUS WITHOUT COMPLICATION, WITHOUT LONG-TERM CURRENT USE OF INSULIN: ICD-10-CM

## 2020-05-05 DIAGNOSIS — M79.2 NEUROPATHIC PAIN: ICD-10-CM

## 2020-05-05 DIAGNOSIS — R11.2 CHEMOTHERAPY INDUCED NAUSEA AND VOMITING: ICD-10-CM

## 2020-05-05 DIAGNOSIS — R41.3 MEMORY CHANGE: ICD-10-CM

## 2020-05-05 DIAGNOSIS — N17.9 ACUTE RENAL FAILURE SUPERIMPOSED ON STAGE 3 CHRONIC KIDNEY DISEASE, UNSPECIFIED ACUTE RENAL FAILURE TYPE: ICD-10-CM

## 2020-05-05 DIAGNOSIS — R07.89 CHEST WALL PAIN: ICD-10-CM

## 2020-05-05 DIAGNOSIS — I10 HYPERTENSION, ESSENTIAL: Chronic | ICD-10-CM

## 2020-05-05 DIAGNOSIS — K59.00 CONSTIPATION, UNSPECIFIED CONSTIPATION TYPE: ICD-10-CM

## 2020-05-05 DIAGNOSIS — R25.1 TREMOR: ICD-10-CM

## 2020-05-05 DIAGNOSIS — K57.90 DIVERTICULOSIS: ICD-10-CM

## 2020-05-05 PROBLEM — U07.1 UPPER RESPIRATORY TRACT INFECTION DUE TO COVID-19 VIRUS: Status: RESOLVED | Noted: 2020-03-27 | Resolved: 2020-05-05

## 2020-05-05 PROBLEM — J06.9 UPPER RESPIRATORY TRACT INFECTION DUE TO COVID-19 VIRUS: Status: RESOLVED | Noted: 2020-03-27 | Resolved: 2020-05-05

## 2020-05-05 LAB
ALBUMIN SERPL BCP-MCNC: 2.7 G/DL (ref 3.5–5.2)
ALP SERPL-CCNC: 141 U/L (ref 55–135)
ALT SERPL W/O P-5'-P-CCNC: 35 U/L (ref 10–44)
ANION GAP SERPL CALC-SCNC: 13 MMOL/L (ref 8–16)
AST SERPL-CCNC: 42 U/L (ref 10–40)
BACTERIA #/AREA URNS AUTO: NORMAL /HPF
BASOPHILS # BLD AUTO: 0.02 K/UL (ref 0–0.2)
BASOPHILS NFR BLD: 0.4 % (ref 0–1.9)
BILIRUB SERPL-MCNC: 0.7 MG/DL (ref 0.1–1)
BILIRUB UR QL STRIP: NEGATIVE
BUN SERPL-MCNC: 29 MG/DL (ref 8–23)
CALCIUM SERPL-MCNC: 9.2 MG/DL (ref 8.7–10.5)
CHLORIDE SERPL-SCNC: 101 MMOL/L (ref 95–110)
CK SERPL-CCNC: 132 U/L (ref 20–180)
CLARITY UR REFRACT.AUTO: ABNORMAL
CO2 SERPL-SCNC: 19 MMOL/L (ref 23–29)
COLOR UR AUTO: YELLOW
CREAT SERPL-MCNC: 2.6 MG/DL (ref 0.5–1.4)
CRP SERPL-MCNC: 214.3 MG/L (ref 0–8.2)
DIFFERENTIAL METHOD: ABNORMAL
EOSINOPHIL # BLD AUTO: 0 K/UL (ref 0–0.5)
EOSINOPHIL NFR BLD: 0.2 % (ref 0–8)
ERYTHROCYTE [DISTWIDTH] IN BLOOD BY AUTOMATED COUNT: 13.8 % (ref 11.5–14.5)
EST. GFR  (AFRICAN AMERICAN): 20.3 ML/MIN/1.73 M^2
EST. GFR  (NON AFRICAN AMERICAN): 17.6 ML/MIN/1.73 M^2
ESTIMATED AVG GLUCOSE: 128 MG/DL (ref 68–131)
FERRITIN SERPL-MCNC: 4439 NG/ML (ref 20–300)
GLUCOSE SERPL-MCNC: 127 MG/DL (ref 70–110)
GLUCOSE UR QL STRIP: NEGATIVE
HBA1C MFR BLD HPLC: 6.1 % (ref 4–5.6)
HCT VFR BLD AUTO: 24.8 % (ref 37–48.5)
HGB BLD-MCNC: 7.8 G/DL (ref 12–16)
HGB UR QL STRIP: ABNORMAL
HYALINE CASTS UR QL AUTO: 0 /LPF
IMM GRANULOCYTES # BLD AUTO: 0.08 K/UL (ref 0–0.04)
IMM GRANULOCYTES NFR BLD AUTO: 1.4 % (ref 0–0.5)
KETONES UR QL STRIP: NEGATIVE
LACTATE SERPL-SCNC: 0.7 MMOL/L (ref 0.5–2.2)
LDH SERPL L TO P-CCNC: 329 U/L (ref 110–260)
LEUKOCYTE ESTERASE UR QL STRIP: NEGATIVE
LIPASE SERPL-CCNC: 31 U/L (ref 4–60)
LYMPHOCYTES # BLD AUTO: 0.6 K/UL (ref 1–4.8)
LYMPHOCYTES NFR BLD: 10.7 % (ref 18–48)
MCH RBC QN AUTO: 33.8 PG (ref 27–31)
MCHC RBC AUTO-ENTMCNC: 31.5 G/DL (ref 32–36)
MCV RBC AUTO: 107 FL (ref 82–98)
MICROSCOPIC COMMENT: NORMAL
MONOCYTES # BLD AUTO: 0.1 K/UL (ref 0.3–1)
MONOCYTES NFR BLD: 1.8 % (ref 4–15)
NEUTROPHILS # BLD AUTO: 4.8 K/UL (ref 1.8–7.7)
NEUTROPHILS NFR BLD: 85.5 % (ref 38–73)
NITRITE UR QL STRIP: NEGATIVE
NRBC BLD-RTO: 0 /100 WBC
OVALOCYTES BLD QL SMEAR: ABNORMAL
PH UR STRIP: 6 [PH] (ref 5–8)
PLATELET # BLD AUTO: 165 K/UL (ref 150–350)
PLATELET BLD QL SMEAR: ABNORMAL
PMV BLD AUTO: 10.1 FL (ref 9.2–12.9)
POCT GLUCOSE: 133 MG/DL (ref 70–110)
POCT GLUCOSE: 168 MG/DL (ref 70–110)
POCT GLUCOSE: 193 MG/DL (ref 70–110)
POIKILOCYTOSIS BLD QL SMEAR: SLIGHT
POTASSIUM SERPL-SCNC: 4 MMOL/L (ref 3.5–5.1)
PROCALCITONIN SERPL IA-MCNC: 1.38 NG/ML
PROT SERPL-MCNC: 7.6 G/DL (ref 6–8.4)
PROT UR QL STRIP: ABNORMAL
RBC # BLD AUTO: 2.31 M/UL (ref 4–5.4)
RBC #/AREA URNS AUTO: 0 /HPF (ref 0–4)
SARS-COV-2 RDRP RESP QL NAA+PROBE: NEGATIVE
SODIUM SERPL-SCNC: 133 MMOL/L (ref 136–145)
SP GR UR STRIP: 1.01 (ref 1–1.03)
SQUAMOUS #/AREA URNS AUTO: 2 /HPF
TROPONIN I SERPL DL<=0.01 NG/ML-MCNC: 0.02 NG/ML (ref 0–0.03)
URN SPEC COLLECT METH UR: ABNORMAL
WBC # BLD AUTO: 5.6 K/UL (ref 3.9–12.7)
WBC #/AREA URNS AUTO: 2 /HPF (ref 0–5)

## 2020-05-05 PROCEDURE — 85025 COMPLETE CBC W/AUTO DIFF WBC: CPT

## 2020-05-05 PROCEDURE — 96372 THER/PROPH/DIAG INJ SC/IM: CPT | Mod: 59

## 2020-05-05 PROCEDURE — 81001 URINALYSIS AUTO W/SCOPE: CPT

## 2020-05-05 PROCEDURE — 63600175 PHARM REV CODE 636 W HCPCS: Performed by: EMERGENCY MEDICINE

## 2020-05-05 PROCEDURE — 82550 ASSAY OF CK (CPK): CPT

## 2020-05-05 PROCEDURE — 93010 ELECTROCARDIOGRAM REPORT: CPT | Mod: ,,, | Performed by: INTERNAL MEDICINE

## 2020-05-05 PROCEDURE — 83690 ASSAY OF LIPASE: CPT

## 2020-05-05 PROCEDURE — 96374 THER/PROPH/DIAG INJ IV PUSH: CPT | Mod: 59

## 2020-05-05 PROCEDURE — 83615 LACTATE (LD) (LDH) ENZYME: CPT

## 2020-05-05 PROCEDURE — 84145 PROCALCITONIN (PCT): CPT

## 2020-05-05 PROCEDURE — 25000003 PHARM REV CODE 250: Performed by: HOSPITALIST

## 2020-05-05 PROCEDURE — 80053 COMPREHEN METABOLIC PANEL: CPT

## 2020-05-05 PROCEDURE — 83036 HEMOGLOBIN GLYCOSYLATED A1C: CPT

## 2020-05-05 PROCEDURE — 93010 EKG 12-LEAD: ICD-10-PCS | Mod: ,,, | Performed by: INTERNAL MEDICINE

## 2020-05-05 PROCEDURE — 20600001 HC STEP DOWN PRIVATE ROOM

## 2020-05-05 PROCEDURE — 99233 PR SUBSEQUENT HOSPITAL CARE,LEVL III: ICD-10-PCS | Mod: ,,, | Performed by: HOSPITALIST

## 2020-05-05 PROCEDURE — 83605 ASSAY OF LACTIC ACID: CPT

## 2020-05-05 PROCEDURE — G0378 HOSPITAL OBSERVATION PER HR: HCPCS

## 2020-05-05 PROCEDURE — 99233 SBSQ HOSP IP/OBS HIGH 50: CPT | Mod: ,,, | Performed by: HOSPITALIST

## 2020-05-05 PROCEDURE — 25000003 PHARM REV CODE 250: Performed by: STUDENT IN AN ORGANIZED HEALTH CARE EDUCATION/TRAINING PROGRAM

## 2020-05-05 PROCEDURE — 84484 ASSAY OF TROPONIN QUANT: CPT

## 2020-05-05 PROCEDURE — 96376 TX/PRO/DX INJ SAME DRUG ADON: CPT

## 2020-05-05 PROCEDURE — 93005 ELECTROCARDIOGRAM TRACING: CPT

## 2020-05-05 PROCEDURE — 63600175 PHARM REV CODE 636 W HCPCS: Performed by: HOSPITALIST

## 2020-05-05 PROCEDURE — 86140 C-REACTIVE PROTEIN: CPT

## 2020-05-05 PROCEDURE — 82728 ASSAY OF FERRITIN: CPT

## 2020-05-05 PROCEDURE — 63600175 PHARM REV CODE 636 W HCPCS: Performed by: STUDENT IN AN ORGANIZED HEALTH CARE EDUCATION/TRAINING PROGRAM

## 2020-05-05 PROCEDURE — U0002 COVID-19 LAB TEST NON-CDC: HCPCS

## 2020-05-05 RX ORDER — POLYETHYLENE GLYCOL 3350 17 G/17G
17 POWDER, FOR SOLUTION ORAL 2 TIMES DAILY PRN
Status: DISCONTINUED | OUTPATIENT
Start: 2020-05-05 | End: 2020-05-13 | Stop reason: HOSPADM

## 2020-05-05 RX ORDER — ENOXAPARIN SODIUM 100 MG/ML
80 INJECTION SUBCUTANEOUS
Status: DISCONTINUED | OUTPATIENT
Start: 2020-05-05 | End: 2020-05-09

## 2020-05-05 RX ORDER — ADHESIVE BANDAGE
30 BANDAGE TOPICAL DAILY PRN
Status: DISCONTINUED | OUTPATIENT
Start: 2020-05-05 | End: 2020-05-13 | Stop reason: HOSPADM

## 2020-05-05 RX ORDER — THIAMINE HCL 100 MG
100 TABLET ORAL DAILY
Status: DISCONTINUED | OUTPATIENT
Start: 2020-05-05 | End: 2020-05-08

## 2020-05-05 RX ORDER — IBUPROFEN 200 MG
24 TABLET ORAL
Status: DISCONTINUED | OUTPATIENT
Start: 2020-05-05 | End: 2020-05-13 | Stop reason: HOSPADM

## 2020-05-05 RX ORDER — ACETAMINOPHEN 325 MG/1
650 TABLET ORAL EVERY 8 HOURS PRN
Status: DISCONTINUED | OUTPATIENT
Start: 2020-05-05 | End: 2020-05-07

## 2020-05-05 RX ORDER — AMLODIPINE BESYLATE 10 MG/1
10 TABLET ORAL DAILY
Status: DISCONTINUED | OUTPATIENT
Start: 2020-05-05 | End: 2020-05-13 | Stop reason: HOSPADM

## 2020-05-05 RX ORDER — GLUCAGON 1 MG
1 KIT INJECTION
Status: DISCONTINUED | OUTPATIENT
Start: 2020-05-05 | End: 2020-05-13 | Stop reason: HOSPADM

## 2020-05-05 RX ORDER — FOLIC ACID 1 MG/1
1 TABLET ORAL DAILY
Status: DISCONTINUED | OUTPATIENT
Start: 2020-05-05 | End: 2020-05-13 | Stop reason: HOSPADM

## 2020-05-05 RX ORDER — ENOXAPARIN SODIUM 150 MG/ML
120 INJECTION SUBCUTANEOUS
Status: DISCONTINUED | OUTPATIENT
Start: 2020-05-05 | End: 2020-05-05

## 2020-05-05 RX ORDER — POLYETHYLENE GLYCOL 3350 17 G/17G
17 POWDER, FOR SOLUTION ORAL DAILY
Status: DISCONTINUED | OUTPATIENT
Start: 2020-05-05 | End: 2020-05-13 | Stop reason: HOSPADM

## 2020-05-05 RX ORDER — SODIUM CHLORIDE 0.9 % (FLUSH) 0.9 %
5 SYRINGE (ML) INJECTION
Status: DISCONTINUED | OUTPATIENT
Start: 2020-05-05 | End: 2020-05-13 | Stop reason: HOSPADM

## 2020-05-05 RX ORDER — MORPHINE SULFATE 2 MG/ML
2 INJECTION, SOLUTION INTRAMUSCULAR; INTRAVENOUS
Status: COMPLETED | OUTPATIENT
Start: 2020-05-05 | End: 2020-05-05

## 2020-05-05 RX ORDER — ONDANSETRON 2 MG/ML
8 INJECTION INTRAMUSCULAR; INTRAVENOUS EVERY 8 HOURS PRN
Status: DISCONTINUED | OUTPATIENT
Start: 2020-05-05 | End: 2020-05-07

## 2020-05-05 RX ORDER — TIZANIDINE 2 MG/1
4 TABLET ORAL NIGHTLY PRN
Status: DISCONTINUED | OUTPATIENT
Start: 2020-05-05 | End: 2020-05-13 | Stop reason: HOSPADM

## 2020-05-05 RX ORDER — DULOXETIN HYDROCHLORIDE 60 MG/1
60 CAPSULE, DELAYED RELEASE ORAL DAILY
Status: DISCONTINUED | OUTPATIENT
Start: 2020-05-05 | End: 2020-05-13 | Stop reason: HOSPADM

## 2020-05-05 RX ORDER — MORPHINE SULFATE 2 MG/ML
2 INJECTION, SOLUTION INTRAMUSCULAR; INTRAVENOUS EVERY 6 HOURS PRN
Status: DISCONTINUED | OUTPATIENT
Start: 2020-05-05 | End: 2020-05-06

## 2020-05-05 RX ORDER — MORPHINE SULFATE 4 MG/ML
4 INJECTION, SOLUTION INTRAMUSCULAR; INTRAVENOUS
Status: COMPLETED | OUTPATIENT
Start: 2020-05-05 | End: 2020-05-05

## 2020-05-05 RX ORDER — FOLIC ACID 0.4 MG
400 TABLET ORAL DAILY
Status: DISCONTINUED | OUTPATIENT
Start: 2020-05-05 | End: 2020-05-05

## 2020-05-05 RX ORDER — AMOXICILLIN 250 MG
1 CAPSULE ORAL DAILY PRN
Status: DISCONTINUED | OUTPATIENT
Start: 2020-05-05 | End: 2020-05-13 | Stop reason: HOSPADM

## 2020-05-05 RX ORDER — ATORVASTATIN CALCIUM 10 MG/1
10 TABLET, FILM COATED ORAL DAILY
Status: DISCONTINUED | OUTPATIENT
Start: 2020-05-05 | End: 2020-05-08

## 2020-05-05 RX ORDER — OXYCODONE HYDROCHLORIDE 5 MG/1
5 TABLET ORAL EVERY 6 HOURS PRN
Status: DISCONTINUED | OUTPATIENT
Start: 2020-05-05 | End: 2020-05-06

## 2020-05-05 RX ORDER — IBUPROFEN 200 MG
16 TABLET ORAL
Status: DISCONTINUED | OUTPATIENT
Start: 2020-05-05 | End: 2020-05-13 | Stop reason: HOSPADM

## 2020-05-05 RX ADMIN — POLYETHYLENE GLYCOL 3350 17 G: 17 POWDER, FOR SOLUTION ORAL at 04:05

## 2020-05-05 RX ADMIN — SODIUM CHLORIDE 1000 ML: 0.9 INJECTION, SOLUTION INTRAVENOUS at 11:05

## 2020-05-05 RX ADMIN — AMLODIPINE BESYLATE 10 MG: 10 TABLET ORAL at 11:05

## 2020-05-05 RX ADMIN — AMPICILLIN SODIUM AND SULBACTAM SODIUM 3 G: 2; 1 INJECTION, POWDER, FOR SOLUTION INTRAMUSCULAR; INTRAVENOUS at 02:05

## 2020-05-05 RX ADMIN — MORPHINE SULFATE 2 MG: 2 INJECTION, SOLUTION INTRAMUSCULAR; INTRAVENOUS at 08:05

## 2020-05-05 RX ADMIN — MORPHINE SULFATE 2 MG: 2 INJECTION, SOLUTION INTRAMUSCULAR; INTRAVENOUS at 01:05

## 2020-05-05 RX ADMIN — MORPHINE SULFATE 4 MG: 4 INJECTION, SOLUTION INTRAMUSCULAR; INTRAVENOUS at 03:05

## 2020-05-05 RX ADMIN — ENOXAPARIN SODIUM 80 MG: 80 INJECTION SUBCUTANEOUS at 11:05

## 2020-05-05 RX ADMIN — Medication 100 MG: at 11:05

## 2020-05-05 RX ADMIN — PROMETHAZINE HYDROCHLORIDE 12.5 MG: 25 INJECTION INTRAMUSCULAR; INTRAVENOUS at 01:05

## 2020-05-05 RX ADMIN — DULOXETINE 60 MG: 60 CAPSULE, DELAYED RELEASE ORAL at 11:05

## 2020-05-05 RX ADMIN — FOLIC ACID 1 MG: 1 TABLET ORAL at 11:05

## 2020-05-05 RX ADMIN — ATORVASTATIN CALCIUM 10 MG: 10 TABLET, FILM COATED ORAL at 11:05

## 2020-05-05 NOTE — CONSULTS
"  [facility]  Adult Nutrition  Consult Note    SUMMARY     Recommendations    Recommendation:   1. Continue bland diet, encourage good PO intake as tolerable; ADAT to regular diet   2. Continue boost plus to increase intake BID   3. Suggest MVI   4. If pt unable to tolerate diet and TF warranted, please re-consult RD for recs      RD to monitor and follow up     Goals: pt to tolerate >85% of EEN/EPN by RD follow up   Nutrition Goal Status: new  Communication of RD Recs: other (comment)(POC)    Reason for Assessment    Diagnosis: (left lower quadrant abdominal pain)  Relevant Medical History: DVT; HTN; HLD; diverticulitis; cancer; DM  Interdisciplinary Rounds: did not attend  General Information Comments: Spoke with pt over phone, reported decreased intake over past week, with vomiting after eating. States prior to past week, good PO intake at home and drinking 2 ONS per day. Pt able to tolerate some of her lunch with no emesis after eating. Wt loss reported of 5 lbs over past week. UBW ~175 lbs per pt. Encouraged intake as tolerable and pt receiving ONS to increase intake. Pt meeting criteria for acute malnutrition at this time due to decreased appetite and wt loss. Due to recent hospital wide restrictions to limit the transfer of (COVID-19), we are not performing any physical exams at this time. All S/S will be observational; NFPE to be performed at a future date.  Nutrition Discharge Planning: adequate PO intake     Nutrition Risk Screen    Nutrition Risk Screen: other (see comments)    Nutrition/Diet History    Food Allergies: other (see comments)(gluten protein; fructose; lactate)  Factors Affecting Nutritional Intake: decreased appetite, nausea/vomiting    Anthropometrics    Temp: 97.5 °F (36.4 °C)  Height Method: Stated  Height: 5' 6" (167.6 cm)  Height (inches): 66 in  Weight Method: Bed Scale  Weight: 77.1 kg (170 lb)  Weight (lb): 170 lb  Ideal Body Weight (IBW), Female: 130 lb  % Ideal " Body Weight, Female (lb): 130.77 %  BMI (Calculated): 27.5  BMI Grade: 25 - 29.9 - overweight    Lab/Procedures/Meds    Pertinent Labs Reviewed: reviewed  Pertinent Labs Comments: Na 133; BUN 29; Cr 2.6; Glucose 127  Pertinent Medications Reviewed: reviewed  Pertinent Medications Comments: thiamine; folic acid; statin; morphine; polyethylene glycol     Estimated/Assessed Needs    Weight Used For Calorie Calculations: 77.1 kg (169 lb 15.6 oz)  Energy Calorie Requirements (kcal): 5756-8026 kcal/d  Energy Need Method: Kcal/kg  Protein Requirements: 77-92 g/day   Weight Used For Protein Calculations: 77.1 kg (169 lb 15.6 oz)  Fluid Requirements (mL): 1 mL/kcal or per MD   RDA Method (mL): 1927  CHO Requirement: 240    Nutrition Prescription Ordered    Current Diet Order: Dane diet   Oral Nutrition Supplement: boost plus     Evaluation of Received Nutrient/Fluid Intake    I/O: +.5 L since admit  Energy Calories Required: not meeting needs  Protein Required: not meeting needs  Fluid Required: other (see comments)  Comments: LBM 5/4  Tolerance: tolerating  % Intake of Estimated Energy Needs: 0 - 25 %  % Meal Intake: 0 - 25 %    Nutrition Risk    Level of Risk/Frequency of Follow-up: high     Assessment and Plan     Nutrition Problem:  Moderate Protein-Calorie Malnutrition  Malnutrition in the context of Acute Illness/Injury    Related to (etiology):  Decreased intake 2/2 abdominal pain     Signs and Symptoms (as evidenced by):  Energy Intake: <50% of estimated energy requirement for x 1 week  Body Fat Depletion: MARLENA  Muscle Mass Depletion: MARLENA  Weight Loss: 2% x 1 week     Interventions(treatment strategy):  Collaboration of care with other providers  Commercial beverage  Modified diet- bland     Nutrition Diagnosis Status:  New    Monitor and Evaluation    Food and Nutrient Intake: energy intake, food and beverage intake  Food and Nutrient Adminstration: diet order  Anthropometric Measurements: weight, weight  change  Biochemical Data, Medical Tests and Procedures: electrolyte and renal panel, lipid profile, gastrointestinal profile, glucose/endocrine profile, inflammatory profile  Nutrition-Focused Physical Findings: overall appearance     Nutrition Follow-Up    RD Follow-up?: Yes

## 2020-05-05 NOTE — PLAN OF CARE
Problem: Malnutrition  Goal: Improved Nutritional Intake  Outcome: Ongoing, Progressing   Recommendations    Recommendation:   1. Continue bland diet, encourage good PO intake as tolerable; ADAT to regular diet   2. Continue boost plus to increase intake BID   3. Suggest MVI   4. If pt unable to tolerate diet and TF warranted, please re-consult RD for recs      RD to monitor and follow up     Goals: pt to tolerate >85% of EEN/EPN by RD follow up   Nutrition Goal Status: new  Communication of RD Recs: other (comment)(POC)

## 2020-05-05 NOTE — ED TRIAGE NOTES
Pt reports L sided abdominal pain that began about 4 days ago with N/V/D, fevers at home highest of 103.8, headache. Chemo patient, last chemo was 2 weeks ago.    Patient Identifiers for Isabell Preston checked and correct  LOC: The patient is awake, alert and aware of environment with an appropriate affect, the patient is oriented x 3 and speaking appropriate.  APPEARANCE: Patient resting comfortably and in no acute distress, patient is clean and well groomed, patient's clothing is properly fastened.  SKIN: The skin is warm and dry, patient has normal skin turgor and moist mucus membranes,no rashes or lesions.Skin Intact , No Breakdown Noted. Midline scar to abdomen  Musculoskeletal :  Normal range of motion noted. Moves all extremeties well, No swelling or tenderness noted  RESPIRATORY: Airway is open and patent, respirations are spontaneous, patient has a normal effort and rate.  CARDIAC: Patient has a normal rate and rhythm, no periphreal edema noted, capillary refill < 3 seconds.   ABDOMEN: Soft and tender to palpation, no distention noted. Rebound tenderness to LLQ.   PULSES: 2+  And symmetrical in all extremeties  NEUROLOGIC: PERRL. facial expression is symmetrical, patient moving all extremities, normal sensation in all extremities when touched with a finger.The patient is awake, alert and cooperative with a calm affect, patient is aware of environment.    Will continue to monitor

## 2020-05-05 NOTE — PLAN OF CARE
Spoke with patient to complete d/c planning assessment. Patient reports she lives 2 weeks at a time with each of her three daughters. Daughters provide transportation to appointments and will be patient's ride home at discharge. PCP and Pharmacy verified. Will continue to monitor for discharge needs.     05/05/20 1502   Discharge Assessment   Assessment Type Discharge Planning Assessment   Confirmed/corrected address and phone number on facesheet? Yes   Assessment information obtained from? Patient   Expected Length of Stay (days)   (3)   Communicated expected length of stay with patient/caregiver yes   Prior to hospitilization cognitive status: Alert/Oriented   Prior to hospitalization functional status: Independent   Current cognitive status: Alert/Oriented   Current Functional Status: Independent   Facility Arrived From: Home   Lives With child([place]), adult   Able to Return to Prior Arrangements yes   Is patient able to care for self after discharge? Yes   Who are your caregiver(s) and their phone number(s)?   (Tiffanie Preston (Daughter) [phone])   Patient's perception of discharge disposition home or selfcare   Readmission Within the Last 30 Days no previous admission in last 30 days   Patient currently being followed by outpatient case management? No   Patient currently receives any other outside agency services? No   Equipment Currently Used at Home walker, rolling;cane, straight;glucometer   Do you have any problems affording any of your prescribed medications? No   Is the patient taking medications as prescribed? yes   Does the patient have transportation home? Yes   Transportation Anticipated family or friend will provide   Does the patient receive services at the [facility]? No   Discharge Plan A Home with family   Discharge Plan B Home with family   DME Needed Upon Discharge  none   Patient/Family in Agreement with Plan yes
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
## 2020-05-05 NOTE — PLAN OF CARE
Side rails up x2; call bell in place; bed in lowest, locked position; skid proof socks on; no evidence of skin breakdown; care plan explained to patient; pt remains free of injury.  Tolerated a small amount of po, voids, no BM today, ambulates to BR with assistance x1. C/o pain to abdomen morphine 2 mg iv given pt stated she had relief. Denied n/v or diarrhea. NS bolus given, ampicillin/sulbactam given as scheduled. VSS and afebrile. Pt instructed to call for any concerns or needs, bed alarm on.

## 2020-05-06 LAB
ALBUMIN SERPL BCP-MCNC: 2.4 G/DL (ref 3.5–5.2)
ALP SERPL-CCNC: 176 U/L (ref 55–135)
ALT SERPL W/O P-5'-P-CCNC: 50 U/L (ref 10–44)
ANION GAP SERPL CALC-SCNC: 12 MMOL/L (ref 8–16)
ANISOCYTOSIS BLD QL SMEAR: SLIGHT
AST SERPL-CCNC: 58 U/L (ref 10–40)
BASOPHILS NFR BLD: 0 % (ref 0–1.9)
BILIRUB SERPL-MCNC: 0.8 MG/DL (ref 0.1–1)
BUN SERPL-MCNC: 23 MG/DL (ref 8–23)
CALCIUM SERPL-MCNC: 9.2 MG/DL (ref 8.7–10.5)
CHLORIDE SERPL-SCNC: 104 MMOL/L (ref 95–110)
CO2 SERPL-SCNC: 19 MMOL/L (ref 23–29)
CREAT SERPL-MCNC: 2.2 MG/DL (ref 0.5–1.4)
DIFFERENTIAL METHOD: ABNORMAL
EOSINOPHIL NFR BLD: 0 % (ref 0–8)
ERYTHROCYTE [DISTWIDTH] IN BLOOD BY AUTOMATED COUNT: 13.9 % (ref 11.5–14.5)
EST. GFR  (AFRICAN AMERICAN): 24.9 ML/MIN/1.73 M^2
EST. GFR  (NON AFRICAN AMERICAN): 21.6 ML/MIN/1.73 M^2
GLUCOSE SERPL-MCNC: 130 MG/DL (ref 70–110)
HCT VFR BLD AUTO: 23.2 % (ref 37–48.5)
HGB BLD-MCNC: 7.5 G/DL (ref 12–16)
IMM GRANULOCYTES # BLD AUTO: ABNORMAL K/UL (ref 0–0.04)
IMM GRANULOCYTES NFR BLD AUTO: ABNORMAL % (ref 0–0.5)
LACTATE SERPL-SCNC: 0.9 MMOL/L (ref 0.5–2.2)
LYMPHOCYTES NFR BLD: 20 % (ref 18–48)
MAGNESIUM SERPL-MCNC: 1.6 MG/DL (ref 1.6–2.6)
MCH RBC QN AUTO: 34.7 PG (ref 27–31)
MCHC RBC AUTO-ENTMCNC: 32.3 G/DL (ref 32–36)
MCV RBC AUTO: 107 FL (ref 82–98)
MONOCYTES NFR BLD: 4 % (ref 4–15)
MYELOCYTES NFR BLD MANUAL: 1 %
NEUTROPHILS NFR BLD: 75 % (ref 38–73)
NRBC BLD-RTO: 0 /100 WBC
PHOSPHATE SERPL-MCNC: 3.6 MG/DL (ref 2.7–4.5)
PLATELET # BLD AUTO: 115 K/UL (ref 150–350)
PLATELET BLD QL SMEAR: ABNORMAL
PMV BLD AUTO: 10 FL (ref 9.2–12.9)
POCT GLUCOSE: 123 MG/DL (ref 70–110)
POCT GLUCOSE: 137 MG/DL (ref 70–110)
POCT GLUCOSE: 177 MG/DL (ref 70–110)
POCT GLUCOSE: 192 MG/DL (ref 70–110)
POCT GLUCOSE: 222 MG/DL (ref 70–110)
POIKILOCYTOSIS BLD QL SMEAR: SLIGHT
POTASSIUM SERPL-SCNC: 4.3 MMOL/L (ref 3.5–5.1)
PROT SERPL-MCNC: 7.2 G/DL (ref 6–8.4)
RBC # BLD AUTO: 2.16 M/UL (ref 4–5.4)
SODIUM SERPL-SCNC: 135 MMOL/L (ref 136–145)
WBC # BLD AUTO: 2.11 K/UL (ref 3.9–12.7)

## 2020-05-06 PROCEDURE — 97161 PT EVAL LOW COMPLEX 20 MIN: CPT

## 2020-05-06 PROCEDURE — 96376 TX/PRO/DX INJ SAME DRUG ADON: CPT

## 2020-05-06 PROCEDURE — 97530 THERAPEUTIC ACTIVITIES: CPT

## 2020-05-06 PROCEDURE — 99233 PR SUBSEQUENT HOSPITAL CARE,LEVL III: ICD-10-PCS | Mod: ,,, | Performed by: HOSPITALIST

## 2020-05-06 PROCEDURE — 99233 SBSQ HOSP IP/OBS HIGH 50: CPT | Mod: ,,, | Performed by: HOSPITALIST

## 2020-05-06 PROCEDURE — 63600175 PHARM REV CODE 636 W HCPCS: Performed by: HOSPITALIST

## 2020-05-06 PROCEDURE — 80053 COMPREHEN METABOLIC PANEL: CPT

## 2020-05-06 PROCEDURE — 25000003 PHARM REV CODE 250: Performed by: HOSPITALIST

## 2020-05-06 PROCEDURE — 85007 BL SMEAR W/DIFF WBC COUNT: CPT | Mod: NCS

## 2020-05-06 PROCEDURE — 85027 COMPLETE CBC AUTOMATED: CPT

## 2020-05-06 PROCEDURE — 20600001 HC STEP DOWN PRIVATE ROOM

## 2020-05-06 PROCEDURE — 97116 GAIT TRAINING THERAPY: CPT

## 2020-05-06 PROCEDURE — 97165 OT EVAL LOW COMPLEX 30 MIN: CPT

## 2020-05-06 PROCEDURE — 83605 ASSAY OF LACTIC ACID: CPT

## 2020-05-06 PROCEDURE — 96372 THER/PROPH/DIAG INJ SC/IM: CPT

## 2020-05-06 PROCEDURE — 36415 COLL VENOUS BLD VENIPUNCTURE: CPT

## 2020-05-06 PROCEDURE — 87040 BLOOD CULTURE FOR BACTERIA: CPT

## 2020-05-06 PROCEDURE — 96375 TX/PRO/DX INJ NEW DRUG ADDON: CPT

## 2020-05-06 PROCEDURE — 83735 ASSAY OF MAGNESIUM: CPT

## 2020-05-06 PROCEDURE — 84100 ASSAY OF PHOSPHORUS: CPT

## 2020-05-06 PROCEDURE — G0378 HOSPITAL OBSERVATION PER HR: HCPCS

## 2020-05-06 RX ORDER — OXYCODONE HYDROCHLORIDE 10 MG/1
10 TABLET ORAL EVERY 6 HOURS PRN
Status: DISCONTINUED | OUTPATIENT
Start: 2020-05-06 | End: 2020-05-13 | Stop reason: HOSPADM

## 2020-05-06 RX ORDER — HYDROMORPHONE HYDROCHLORIDE 1 MG/ML
1 INJECTION, SOLUTION INTRAMUSCULAR; INTRAVENOUS; SUBCUTANEOUS EVERY 6 HOURS PRN
Status: DISCONTINUED | OUTPATIENT
Start: 2020-05-06 | End: 2020-05-11

## 2020-05-06 RX ORDER — SODIUM CHLORIDE 9 MG/ML
INJECTION, SOLUTION INTRAVENOUS CONTINUOUS
Status: ACTIVE | OUTPATIENT
Start: 2020-05-06 | End: 2020-05-06

## 2020-05-06 RX ORDER — GLUCAGON 1 MG
1 KIT INJECTION
Status: DISCONTINUED | OUTPATIENT
Start: 2020-05-06 | End: 2020-05-06

## 2020-05-06 RX ORDER — IBUPROFEN 200 MG
16 TABLET ORAL
Status: DISCONTINUED | OUTPATIENT
Start: 2020-05-06 | End: 2020-05-06

## 2020-05-06 RX ORDER — INSULIN ASPART 100 [IU]/ML
0-5 INJECTION, SOLUTION INTRAVENOUS; SUBCUTANEOUS
Status: DISCONTINUED | OUTPATIENT
Start: 2020-05-06 | End: 2020-05-13 | Stop reason: HOSPADM

## 2020-05-06 RX ORDER — IBUPROFEN 200 MG
24 TABLET ORAL
Status: DISCONTINUED | OUTPATIENT
Start: 2020-05-06 | End: 2020-05-06

## 2020-05-06 RX ADMIN — PIPERACILLIN AND TAZOBACTAM 4.5 G: 4; .5 INJECTION, POWDER, FOR SOLUTION INTRAVENOUS at 04:05

## 2020-05-06 RX ADMIN — ATORVASTATIN CALCIUM 10 MG: 10 TABLET, FILM COATED ORAL at 09:05

## 2020-05-06 RX ADMIN — OXYCODONE HYDROCHLORIDE 5 MG: 5 TABLET ORAL at 12:05

## 2020-05-06 RX ADMIN — MORPHINE SULFATE 2 MG: 2 INJECTION, SOLUTION INTRAMUSCULAR; INTRAVENOUS at 02:05

## 2020-05-06 RX ADMIN — SODIUM CHLORIDE: 0.9 INJECTION, SOLUTION INTRAVENOUS at 04:05

## 2020-05-06 RX ADMIN — SODIUM CHLORIDE 500 ML: 0.9 INJECTION, SOLUTION INTRAVENOUS at 12:05

## 2020-05-06 RX ADMIN — ENOXAPARIN SODIUM 80 MG: 80 INJECTION SUBCUTANEOUS at 12:05

## 2020-05-06 RX ADMIN — FOLIC ACID 1 MG: 1 TABLET ORAL at 09:05

## 2020-05-06 RX ADMIN — ACETAMINOPHEN 650 MG: 325 TABLET ORAL at 08:05

## 2020-05-06 RX ADMIN — AMPICILLIN SODIUM AND SULBACTAM SODIUM 3 G: 2; 1 INJECTION, POWDER, FOR SOLUTION INTRAMUSCULAR; INTRAVENOUS at 02:05

## 2020-05-06 RX ADMIN — OXYCODONE HYDROCHLORIDE 10 MG: 10 TABLET ORAL at 04:05

## 2020-05-06 RX ADMIN — AMPICILLIN SODIUM AND SULBACTAM SODIUM 3 G: 2; 1 INJECTION, POWDER, FOR SOLUTION INTRAMUSCULAR; INTRAVENOUS at 01:05

## 2020-05-06 RX ADMIN — AMLODIPINE BESYLATE 10 MG: 10 TABLET ORAL at 09:05

## 2020-05-06 RX ADMIN — INSULIN ASPART 2 UNITS: 100 INJECTION, SOLUTION INTRAVENOUS; SUBCUTANEOUS at 12:05

## 2020-05-06 RX ADMIN — Medication 100 MG: at 09:05

## 2020-05-06 RX ADMIN — DULOXETINE 60 MG: 60 CAPSULE, DELAYED RELEASE ORAL at 09:05

## 2020-05-06 RX ADMIN — POLYETHYLENE GLYCOL 3350 17 G: 17 POWDER, FOR SOLUTION ORAL at 09:05

## 2020-05-06 NOTE — ASSESSMENT & PLAN NOTE
Hx of diagnosed reticulocytes and diverticulitis.  Current etiology for abdominal pain is unclear  - CT AP without contrast in the ED demonstrating no acute abnormality; diverticulosis without evidence to suggest diverticulitis; nonspecific bilateral perinephric fat stranding.  -- given the fact that CT AP was performed without contrast (due to GINA), wonder if diverticulitis was not appreciated due to lack of contrast.  Will treat currently for possible diverticulitis vs other abdominal source    - IV Unasyn started on 5/5- changed to zosyn on 5/6  - febrile to Tmax 100.9 overnight 5/6; infectious workup so far negative - CXR unremarkable, UA negative for UTI, BCx NGTD; however procal elevated 1.38  - low threshold to add vanc for MRSA coverage if becomes febrile again  - continue to f/u blood cultures  - pain control with oxy 10mg q6h prn and IV dilaudid 1mg for breakthrough pain  - nausea control with scheduled IV zofran 8mg q8h and prn IV phenergan 25mg q6h

## 2020-05-06 NOTE — PLAN OF CARE
CM placed call to patient's daughter Tiffanie Preston regarding therapy recommendations for Skilled Nursing care at discharge. Daughter said she would discuss with her siblings and call CM back. Received a call from Patient's oldest daughter Tera Troy. Daughter reports that family does not wish to pursue skilled nursing placement at discharge. Daughter reports patient has periods of confusion which seem to exacerbate when not with her family. Daughter also stated that she works for the [email or web address] and that [facility] are not required to disclose the amount of Covid positive patients in their facilities and due to family's fear of exposure they would not allow her to admit to a nursing Home. Discussed possible admission to [facility]. Family feels it would be better for patient's over all well-being to return Home with HH. Patient's Grand daughter is a physical therapist as well as family friends who will be able to assist on days when HH PT/OT is not available. Referral sent to [facility] per family request. Will update team on discharge plan.

## 2020-05-06 NOTE — HOSPITAL COURSE
Patient admitted to hospital medicine (5/5) for further evaluation and treatment. Patient afebrile and HDS. Has GINA with sCr 2.6 (baseline ~1.6). Continues to have significant left-sided abdominal pain, not improved with prn morphine.  Pain medications changed to Dilaudid. Overall, nausea improved and no further episodes of diarrhea or loose stools.  Antibiotics changed from Unasyn to Zosyn. Giving IVF and Cr trended down to 2.2. Etiology of abdominal pain unclear at this time, treating for presumed diverticulitis.    Transferred to med onc service (5/7). Patient had fever overnight (5/6) Tmax 100.9, resolved with tylenol. Infectious workup thus far negative, however procal elevated 1.38; lactate wnl; CXR unremarkable; UA negative for UTI, BCx NGTD. Continuing with zosyn to cover for potential abdominal source; patient with persistent nausea/vomiting. Also with Hb drop from 7.5 to 6.5. S/p 1U PRBC (5/7). Serum Cr trending back upward, given additional fluids. 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 Neurology consulted for assistance with tremors as well as reported incidents of episodic confusion/hallucinations. Per neuro recs, checked ammonia level (wnl) and thiamine level (pending). Previously low thiamine level, not taking supplements at home. Started on IV thiamine 500mg TID x 5 days (end date: 5/13), plan to transition to po pending thiamine level result. Neuro also recommending 24hr EEG to evaluate for possible epileptiform activity contributing to confusions--> no seizure activity noted. Labs with progressing leukopenia/thrombocytopenia and uptrending sCr/LFTs, possibly 2/2 recent chemo (though now outside of expected window); continuing IVF. Ordered urine studies and RP ultrasound for GINA. RP ultrasound showing evidence of medical renal disease; no hydronephrosis. Urine studies significant  for proteinuria with elevated protein/creatinine ratio, suggestive of intra-renal cause of GINA; nephrology consulted. Home lovenox for DVT ppx held (5/9) due to downtrending platelets < 50 and increased bleed risk. Patient also noted to be intermittently confused on 5/9 and 5/10, saying she is seeing people come into her room that are not actually there. No notable electrolyte abnormalities or fevers to suggest new infectious process. Episodes of confusion/hallucinations may be more likely due to acute delirium +/- component of vascular dementia based on prior MRI reading 4/17. Patient completed 5 days of zosyn since admission, discontinued 5/11 as less suspicious of infectious etiology and could be contributing to GINA.    Patient with stable sCr ~3.5 and elevated LFTs of unknown cause. Workup unrevealing of acute abdominal etiologies contributing to pain. However, patient reports abdo pain somewhat improved since admission. WBC wnl and platelets trending back up. IV thiamine discontinued as B12 level resulted mildly elevated at 131 prior to supplementation. Stable for discharge home with home health 5/13. Discussed with daughters plan of care and need for outpatient follow-up to trend kidney and liver function. Labs to be collected Friday 5/15 and virtual visit with onc scheduled for Mon 5/18, also with neuro follow-up 5/22. Patient will need to be restarted on home therapeutic lovenox (for hx of DVT) when platelets normalized. Home triamterene-HCTZ and atorvastatin also held 2/2 GINA and elevated LFTs, resume as appropriate outpatient.

## 2020-05-06 NOTE — CLINICAL REVIEW
Discussed with primary attending regarding plan of care going forward.     74 year old with malignant meso, on active therapy, admitted for GINA and abdominal pain.     COVID negative x2 with questionable previous history. Clinical course does not appear to be obviously COVID in origin.     Has ongoing GINA and abdominal pain.     Will assume care for round on 5/7/2020 on the oncology team with Dr. Burt, who is the patient's primary oncologist.     Nabeel Shanks MD  Hematology/Medical Oncology Fellow  [phone] (pager)

## 2020-05-06 NOTE — ASSESSMENT & PLAN NOTE
Receiving outpatient chemotherapy - NSCLC PEMETREXED + CARBOPLATIN (AUC) Q3W and IVF .   Last treatment on 4/28/20, next scheduled or 5/19/20)  - on RA, satting well  - continue to monitor

## 2020-05-06 NOTE — PLAN OF CARE
Problem: Physical Therapy Goal  Goal: Physical Therapy Goal  Description  Goals to be met by: 20     Patient will increase functional independence with mobility by performin. Supine to sit with Stand-by Assistance  2. Sit to stand transfer with Stand-by Assistance  3. Bed to chair transfer with Stand-by Assistance using Rolling Walker  4. Gait  x 50 feet with Stand-by Assistance using Rolling Walker.   5. Ascend/Descend 4 inch curb step with Moderate Assistance using Rolling Walker.  6. Lower extremity exercise program x15 reps per handout, with assistance as needed     PT bo completed. Rolanda Trotter, PT  2020
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
## 2020-05-06 NOTE — ASSESSMENT & PLAN NOTE
sCr on admission 2.6 (baseline ~1.6). GINA likely pre-renal. Concern for ischemic ATN due to GI losses  s/p IVF with sCr initially trending down    - additional IVF with NS  - daily BMP  - continue to monitor

## 2020-05-06 NOTE — HPI
"Ms. Isabell Preston is a 74 y.o. 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 Last treatment on 4/28/20, next scheduled or 5/19/20) , DVT on daily Lovenox, depression, diabetes mellitus, diverticulosis, hypertension, hyperlipidemia, multiple abdominal surgeries who presents to [facility] ED with abdominal pain, headache, fever (subjective, greater than 103 at home), vomiting (non-bloody or not coffee-ground), and 1 bout of loose bowel movements, however normally has constipation..  Her abdominal pain localizes to the left upper and lower quadrants and is worse with palpation and movement.  She denies any alleviating factors, cough, and shortness of breath.       Patient states she was tested for COVID-19 approximately 2 months ago.  The results of these tests were both negative however she was treated as presumptive positive given her immunocompromised state.  Since that time, the patient has not left the house or had social contact with any COVID infected individuals.  The patient last received chemotherapy 2 weeks ago.  The patient states that the symptoms she is experiencing today are identical to those she experienced 2 months ago when she was presumed positive for COVID-19.      Per documentation , "tested negative for covid x 2 however based on labs PCP felt that pt had covid and got false neg result. "  COVID -19 testing on 5/5 negative .  Patient stated that her PCP culture until her that her initial COVID testing was presumed to be positive, however patient was unable to elaborate on this did not no additional information.  Chart review shows all negative COVID test.     In the ED CT scan of the abdomen and pelvis without contrast was obtained.  Showing.   1. No acute abnormality identified on today's examination.  2. Diverticulosis without evidence to suggest diverticulitis.  3. Nonspecific bilateral " perinephric fat stranding.  Correlation with urinalysis advised.  4. Cholecystectomy, hysterectomy and additional incidental findings as above.

## 2020-05-06 NOTE — PLAN OF CARE
Plan of care reviewed with patient. Afebrile. Free from falls or injury. Oxy IR po and Morphine IV given prn for abdominal pain. Ampicillin given as scheduled. Up with assistance to bathroom. SCD's on. Bed locked in lowest position, non skid socks on, call light within reach. Pt instructed to call if any assistance is needed. Bed alarm on. Vitals stable. Will cont to corinne pt.

## 2020-05-06 NOTE — PLAN OF CARE
Side rails up x2; call bell in place; bed in lowest, locked position; skid proof socks on; no evidence of skin breakdown; care plan explained to patient; pt remains free of injury.  Tolerated a small amount of po, voids, BM today,sat up in chair, worked with PT/OT, ambulates to BR with assistance x1. C/o pain to abdomen oxy IR  and morphine 2 mg iv given pt stated she had relief. Denied n/v or diarrhea. NS bolus given, ampicillin/sulbactam given as scheduled. VSS and afebrile. Pt instructed to call for any concerns or needs, bed alarm on. New orders for NS continuous infusion and abx changed to zosyn.CBG monitored insulin given as needed.

## 2020-05-06 NOTE — PLAN OF CARE
Evaluation complete and goals set.  Cont with POC  Marialuisa Cortes, OT  5/6/2020    Problem: Occupational Therapy Goal  Goal: Occupational Therapy Goal  Description  Goals to be met by: 6/5    Patient will increase functional independence with ADLs by performing:    UE Dressing with Set-up Assistance.  LE Dressing with Stand-by Assistance.  Grooming while seated with Set-up Assistance.  Toileting from bedside commode with Contact Guard Assistance for hygiene and clothing management.      Outcome: Ongoing, Progressing

## 2020-05-06 NOTE — PROGRESS NOTES
Progress Note  Hospital Medicine  [facility]       Patient Name: Isabell Preston  MRN:  [id number]  Hospital Medicine Team: [facility] D Valerie Greene MD  Date of Admission:  5/5/2020     Length of Stay:  LOS: 0 days   Expected Discharge Date: 5/8/2020  Principal Problem:  Left lower quadrant abdominal pain     Subjective:     Interval History/Overnight Events:    Patient afebrile and hemodynamically stable overnight.  Continues to have significant left-sided abdominal pain, that is not getting better after morphine p.r.n..  Pain medications changed to Dilaudid.  Overall nausea has improved.  Not having diarrhea or loose stools, in fact patient usually has constipation and has had a normal bowel movement today.  Antibiotics changed from Unasyn to Zosyn.  Etiology of abdominal pain is unclear at this time, treating presumed diverticulitis.  Giving more IV fluids today.  Creatinine is trending down but not yet at her baseline  Oncology is consulted for assistance with management    Marly Preston [phone] updated on the phone today  All questions were answered        amLODIPine  10 mg Oral Daily    atorvastatin  10 mg Oral Daily    DULoxetine  60 mg Oral Daily    enoxaparin  80 mg Subcutaneous Q24H    folic acid  1 mg Oral Daily    piperacillin-tazobactam (ZOSYN) IVPB  4.5 g Intravenous Q8H    polyethylene glycol  17 g Oral Daily    thiamine  100 mg Oral Daily        sodium chloride 0.9%         acetaminophen, Dextrose 10% Bolus, Dextrose 10% Bolus, glucagon (human recombinant), glucose, glucose, HYDROmorphone, insulin aspart U-100, magnesium hydroxide 400 mg/5 ml, melatonin, ondansetron, oxyCODONE, polyethylene glycol, promethazine (PHENERGAN) IVPB, senna-docusate 8.6-50 mg, sodium chloride 0.9%, tiZANidine    Review of Systems   Constitutional: Negative for chills, fatigue, fever.   HENT: Negative for sore throat, trouble swallowing.    Eyes: Negative for  photophobia, visual disturbance.   Respiratory: Negative for cough, shortness of breath.    Cardiovascular: Negative for chest pain, palpitations, leg swelling.   Gastrointestinal: Negative for, diarrhea, nausea, vomiting. + abdominal pain, constipation  Endocrine: Negative for cold intolerance, heat intolerance.   Genitourinary: Negative for dysuria, frequency.   Musculoskeletal: Negative for arthralgias, myalgias.   Skin: Negative for rash, wound, erythema   Neurological: Negative for dizziness, syncope, weakness, light-headedness.   Psychiatric/Behavioral: Negative for confusion, hallucinations, anxiety  All other systems reviewed and are negative.    Objective:     Temp:  [96.7 °F (35.9 °C)-99.9 °F (37.7 °C)]   Pulse:  [102-128]   Resp:  [15-18]   BP: (128-165)/(66-79)   SpO2:  [93 %-96 %]         Weight change: 0 kg (0 lb)   Body mass index is 27.44 kg/m².       Physical Exam:  Constitutional: Well-developed, well-nourished, distress detail some pain  HENT: NC/AT, external ears normal, oropharynx clear, MMM w/o exudates.   Eyes: PERRL, EOMI, conjunctiva normal,   Neck: normal ROM, supple,   CV: RRR, no m/r/g, no carotid bruits, +2 peripheral pulses.  Pulmonary/Chest wall: Breathing comfortably w/o distress, CTAB, no w/r/r, no crackles.  GI:  Multiple well healed scars, some reducible hernias from scarring.  Diffusely tender on the left side.  Present bowel sounds  Musculoskeletal: Normal ROM, no edema, no atrophy, no tenderness throughout   Neurological: AAO x 4, CN II-XI in tact, nl sensation, nl strength/tone   Skin: warm, dry, (-) erythema, (-) rash, (-)pallor  Psych: normal mood and affect, normal behavior, thought content and judgement.  Vitals reviewed.    Labs:    Chemistries:   Recent Labs   Lab 05/05/20  0101 05/06/20  0424   * 135*   K 4.0 4.3    104   CO2 19* 19*   BUN 29* 23   CREATININE 2.6* 2.2*   CALCIUM 9.2 9.2   PROT 7.6 7.2   BILITOT 0.7 0.8   ALKPHOS 141* 176*   ALT 35 50*   AST  42* 58*   MG  --  1.6   PHOS  --  3.6        WBC:   Recent Labs   Lab 05/05/20  0101 05/06/20  0424   WBC 5.60 2.11*     Bands:     CBC/Anemia Labs: Coags:    Recent Labs   Lab 05/05/20  0101 05/06/20  0424   WBC 5.60 2.11*   HGB 7.8* 7.5*   HCT 24.8* 23.2*    115*   * 107*   RDW 13.8 13.9   FERRITIN 4,439*  --     No results for input(s): PT, INR, APTT in the last 168 hours.       Assessment and Plan     Hospital Course:    Ms. Isabell Preston was admitted to Hospital Medicine for management of  Left lower quadrant abdominal pain     Active Hospital Problems    Diagnosis  POA    *Left lower quadrant abdominal pain [R10.32]  Yes    Acute renal failure with acute tubular necrosis superimposed on stage 3 chronic kidney disease [N17.0, N18.3]  Yes    Macrocytic anemia [D53.9]  Yes    Diverticulosis [K57.90]  Yes    Memory change [R41.3]  Yes     Insidious onset and progressive worsening of cognition over the past 2 years (before starting chemotherapy) with her physical symptoms (debilitating pill-rolling tremor, shuffling gait, symmetrical) reportedly starting after chemotherapy began (approximately 5-6 months ago). Since the patient received her diagnosis of mesothelioma, her daughters took over all IADL management, with the exception of cooking, which the patient continues to participate in and do well with.          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       Type 2 diabetes mellitus without complication, without long-term current use of insulin [E11.9]  Yes    Hypertension, essential [I10]  Yes     Chronic      Resolved Hospital Problems   No resolved problems to display.         Left lower quadrant abdominal pain  Chemotherapy induced nausea and vomiting  - history of diagnosed  reticulocytes and diverticulitis.  Current etiology for abdominal pain is unclear  - CT AP without cont in the ED showing -  No acute abnormality identified on today's examination.. Diverticulosis without evidence to suggest diverticulitis. Nonspecific bilateral perinephric fat stranding.  Correlation with urinalysis advised.  - given the fact that CT abdomen pelvis was performed without contrast, wonder whether not diverticulitis was not appreciated due to lack of contrast.  Will treat currently for presumed diverticulitis.  - IV Unasyn started on 5/5- changed to zosyn on 5/6  - follow-up cultures  - pain and nausea control with IV medications as above     Acute renal failure with acute tubular necrosis superimposed on stage 3 chronic kidney disease  - baseline creatinine around 1.6.  Admit creatinine 2.6.  Concern for ischemic ATN due to GI losses.  - given IV fluids.  - monitor for response     Malignant pleural mesothelioma  - Receiving outpatient chemotherapy - NSCLC PEMETREXED + CARBOPLATIN (AUC) Q3W and IVF . Last treatment on 4/28/20, next scheduled or 5/19/20)  - Transferring to Oncology service as primary on 5/7/20 7 AM     History of DVT of lower extremity  - continue home Lovenox        Macrocytic anemia  - stable monitor     Type 2 diabetes mellitus without complication, without long-term current use of insulin  - a1c 6.1  well controlled  - SSI while inpt     HTN  - cont home meds     Diet:    diabetic    GI PPx:    DVT PPx:  Home lovenox dose  Goals of Care:  Full discussed on 5/5    Transferring to Oncology service as primary on 5/7/20 7 AM     Dispo- pending clinical improvement    Per case management, patient only meets inpatient criteria at this time.  Personally believe patient fits inpatient criteria    Signing Physician:     Valerie Greene MD  Department of Hospital Medicine   [facility]  Pager [phone]  Spectra [phone]  5/6/2020

## 2020-05-06 NOTE — PLAN OF CARE
Referral [facility] via [facility]. Patient will d/c home to daughter Gely Morris @ [address] [place], [address]. Geyl's number is [phone].   05/06/20 1547   Post-Acute Status   Post-Acute Authorization Home Health   Home Health Status Referrals Sent

## 2020-05-06 NOTE — ASSESSMENT & PLAN NOTE
Hb 7.8 on admission with   - Hb down trended to 6.5 on 5/7 - s/p 1U PRBC  - continue to monitor  - transfuse as needed for Hb > 7
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
## 2020-05-07 ENCOUNTER — PATIENT MESSAGE (OUTPATIENT)
Dept: HEMATOLOGY/ONCOLOGY | Facility: CLINIC | Age: 75
End: 2020-05-07

## 2020-05-07 LAB
ABO + RH BLD: NORMAL
ALBUMIN SERPL BCP-MCNC: 2 G/DL (ref 3.5–5.2)
ALP SERPL-CCNC: 164 U/L (ref 55–135)
ALT SERPL W/O P-5'-P-CCNC: 55 U/L (ref 10–44)
ANION GAP SERPL CALC-SCNC: 11 MMOL/L (ref 8–16)
ANISOCYTOSIS BLD QL SMEAR: SLIGHT
AST SERPL-CCNC: 63 U/L (ref 10–40)
BASOPHILS NFR BLD: 0 % (ref 0–1.9)
BILIRUB SERPL-MCNC: 0.8 MG/DL (ref 0.1–1)
BLD GP AB SCN CELLS X3 SERPL QL: NORMAL
BLD PROD TYP BPU: NORMAL
BLOOD UNIT EXPIRATION DATE: NORMAL
BLOOD UNIT TYPE CODE: 5100
BLOOD UNIT TYPE: NORMAL
BUN SERPL-MCNC: 27 MG/DL (ref 8–23)
CALCIUM SERPL-MCNC: 8.8 MG/DL (ref 8.7–10.5)
CHLORIDE SERPL-SCNC: 105 MMOL/L (ref 95–110)
CO2 SERPL-SCNC: 19 MMOL/L (ref 23–29)
CODING SYSTEM: NORMAL
CREAT SERPL-MCNC: 2.4 MG/DL (ref 0.5–1.4)
DIFFERENTIAL METHOD: ABNORMAL
DISPENSE STATUS: NORMAL
EOSINOPHIL NFR BLD: 1 % (ref 0–8)
ERYTHROCYTE [DISTWIDTH] IN BLOOD BY AUTOMATED COUNT: 14 % (ref 11.5–14.5)
EST. GFR  (AFRICAN AMERICAN): 22.4 ML/MIN/1.73 M^2
EST. GFR  (NON AFRICAN AMERICAN): 19.4 ML/MIN/1.73 M^2
GLUCOSE SERPL-MCNC: 150 MG/DL (ref 70–110)
HCT VFR BLD AUTO: 21.2 % (ref 37–48.5)
HGB BLD-MCNC: 6.5 G/DL (ref 12–16)
IMM GRANULOCYTES # BLD AUTO: ABNORMAL K/UL (ref 0–0.04)
IMM GRANULOCYTES NFR BLD AUTO: ABNORMAL % (ref 0–0.5)
LYMPHOCYTES NFR BLD: 21 % (ref 18–48)
MAGNESIUM SERPL-MCNC: 1.6 MG/DL (ref 1.6–2.6)
MCH RBC QN AUTO: 33.5 PG (ref 27–31)
MCHC RBC AUTO-ENTMCNC: 30.7 G/DL (ref 32–36)
MCV RBC AUTO: 109 FL (ref 82–98)
MONOCYTES NFR BLD: 8 % (ref 4–15)
NEUTROPHILS NFR BLD: 70 % (ref 38–73)
NRBC BLD-RTO: 0 /100 WBC
NUM UNITS TRANS PACKED RBC: NORMAL
PHOSPHATE SERPL-MCNC: 3.2 MG/DL (ref 2.7–4.5)
PLATELET # BLD AUTO: 87 K/UL (ref 150–350)
PLATELET BLD QL SMEAR: ABNORMAL
PMV BLD AUTO: 10.6 FL (ref 9.2–12.9)
POCT GLUCOSE: 132 MG/DL (ref 70–110)
POCT GLUCOSE: 137 MG/DL (ref 70–110)
POCT GLUCOSE: 148 MG/DL (ref 70–110)
POCT GLUCOSE: 162 MG/DL (ref 70–110)
POTASSIUM SERPL-SCNC: 4.2 MMOL/L (ref 3.5–5.1)
PROT SERPL-MCNC: 6.5 G/DL (ref 6–8.4)
RBC # BLD AUTO: 1.94 M/UL (ref 4–5.4)
SODIUM SERPL-SCNC: 135 MMOL/L (ref 136–145)
WBC # BLD AUTO: 1.76 K/UL (ref 3.9–12.7)

## 2020-05-07 PROCEDURE — 85027 COMPLETE CBC AUTOMATED: CPT

## 2020-05-07 PROCEDURE — 96372 THER/PROPH/DIAG INJ SC/IM: CPT | Mod: 59

## 2020-05-07 PROCEDURE — 25000003 PHARM REV CODE 250: Performed by: STUDENT IN AN ORGANIZED HEALTH CARE EDUCATION/TRAINING PROGRAM

## 2020-05-07 PROCEDURE — 83735 ASSAY OF MAGNESIUM: CPT

## 2020-05-07 PROCEDURE — 63600175 PHARM REV CODE 636 W HCPCS: Performed by: STUDENT IN AN ORGANIZED HEALTH CARE EDUCATION/TRAINING PROGRAM

## 2020-05-07 PROCEDURE — 85007 BL SMEAR W/DIFF WBC COUNT: CPT

## 2020-05-07 PROCEDURE — 96361 HYDRATE IV INFUSION ADD-ON: CPT

## 2020-05-07 PROCEDURE — 84100 ASSAY OF PHOSPHORUS: CPT

## 2020-05-07 PROCEDURE — 96376 TX/PRO/DX INJ SAME DRUG ADON: CPT

## 2020-05-07 PROCEDURE — 99215 PR OFFICE/OUTPT VISIT, EST, LEVL V, 40-54 MIN: ICD-10-PCS | Mod: ,,, | Performed by: INTERNAL MEDICINE

## 2020-05-07 PROCEDURE — 86850 RBC ANTIBODY SCREEN: CPT

## 2020-05-07 PROCEDURE — 99215 OFFICE O/P EST HI 40 MIN: CPT | Mod: ,,, | Performed by: INTERNAL MEDICINE

## 2020-05-07 PROCEDURE — 20600001 HC STEP DOWN PRIVATE ROOM

## 2020-05-07 PROCEDURE — P9040 RBC LEUKOREDUCED IRRADIATED: HCPCS

## 2020-05-07 PROCEDURE — 97530 THERAPEUTIC ACTIVITIES: CPT

## 2020-05-07 PROCEDURE — 80053 COMPREHEN METABOLIC PANEL: CPT

## 2020-05-07 PROCEDURE — 97530 THERAPEUTIC ACTIVITIES: CPT | Mod: CQ

## 2020-05-07 PROCEDURE — 36415 COLL VENOUS BLD VENIPUNCTURE: CPT

## 2020-05-07 PROCEDURE — 97535 SELF CARE MNGMENT TRAINING: CPT

## 2020-05-07 PROCEDURE — 96360 HYDRATION IV INFUSION INIT: CPT | Mod: 59

## 2020-05-07 PROCEDURE — 63600175 PHARM REV CODE 636 W HCPCS: Performed by: HOSPITALIST

## 2020-05-07 PROCEDURE — 96375 TX/PRO/DX INJ NEW DRUG ADDON: CPT

## 2020-05-07 PROCEDURE — 36430 TRANSFUSION BLD/BLD COMPNT: CPT

## 2020-05-07 PROCEDURE — 25000003 PHARM REV CODE 250: Performed by: HOSPITALIST

## 2020-05-07 PROCEDURE — 97116 GAIT TRAINING THERAPY: CPT | Mod: CQ

## 2020-05-07 PROCEDURE — 86920 COMPATIBILITY TEST SPIN: CPT

## 2020-05-07 RX ORDER — ACETAMINOPHEN 325 MG/1
650 TABLET ORAL EVERY 8 HOURS PRN
Status: DISCONTINUED | OUTPATIENT
Start: 2020-05-07 | End: 2020-05-07

## 2020-05-07 RX ORDER — SODIUM CHLORIDE 9 MG/ML
INJECTION, SOLUTION INTRAVENOUS CONTINUOUS
Status: ACTIVE | OUTPATIENT
Start: 2020-05-07 | End: 2020-05-07

## 2020-05-07 RX ORDER — HYDROCODONE BITARTRATE AND ACETAMINOPHEN 500; 5 MG/1; MG/1
TABLET ORAL
Status: DISCONTINUED | OUTPATIENT
Start: 2020-05-07 | End: 2020-05-13 | Stop reason: HOSPADM

## 2020-05-07 RX ORDER — ONDANSETRON 2 MG/ML
8 INJECTION INTRAMUSCULAR; INTRAVENOUS EVERY 8 HOURS
Status: DISCONTINUED | OUTPATIENT
Start: 2020-05-07 | End: 2020-05-13 | Stop reason: HOSPADM

## 2020-05-07 RX ORDER — LIDOCAINE AND PRILOCAINE 25; 25 MG/G; MG/G
CREAM TOPICAL
Status: DISCONTINUED | OUTPATIENT
Start: 2020-05-07 | End: 2020-05-13 | Stop reason: HOSPADM

## 2020-05-07 RX ORDER — METOPROLOL SUCCINATE 25 MG/1
25 TABLET, EXTENDED RELEASE ORAL DAILY
Status: DISCONTINUED | OUTPATIENT
Start: 2020-05-08 | End: 2020-05-11

## 2020-05-07 RX ORDER — ONDANSETRON 2 MG/ML
8 INJECTION INTRAMUSCULAR; INTRAVENOUS EVERY 6 HOURS
Status: DISCONTINUED | OUTPATIENT
Start: 2020-05-07 | End: 2020-05-07

## 2020-05-07 RX ADMIN — ONDANSETRON 8 MG: 2 INJECTION, SOLUTION INTRAMUSCULAR; INTRAVENOUS at 08:05

## 2020-05-07 RX ADMIN — SODIUM CHLORIDE: 0.9 INJECTION, SOLUTION INTRAVENOUS at 07:05

## 2020-05-07 RX ADMIN — HYDROMORPHONE HYDROCHLORIDE 1 MG: 1 INJECTION, SOLUTION INTRAMUSCULAR; INTRAVENOUS; SUBCUTANEOUS at 12:05

## 2020-05-07 RX ADMIN — FOLIC ACID 1 MG: 1 TABLET ORAL at 08:05

## 2020-05-07 RX ADMIN — ONDANSETRON 8 MG: 2 INJECTION, SOLUTION INTRAMUSCULAR; INTRAVENOUS at 02:05

## 2020-05-07 RX ADMIN — SODIUM CHLORIDE: 0.9 INJECTION, SOLUTION INTRAVENOUS at 02:05

## 2020-05-07 RX ADMIN — ENOXAPARIN SODIUM 80 MG: 80 INJECTION SUBCUTANEOUS at 11:05

## 2020-05-07 RX ADMIN — OXYCODONE HYDROCHLORIDE 10 MG: 10 TABLET ORAL at 10:05

## 2020-05-07 RX ADMIN — Medication 100 MG: at 08:05

## 2020-05-07 RX ADMIN — PIPERACILLIN AND TAZOBACTAM 4.5 G: 4; .5 INJECTION, POWDER, FOR SOLUTION INTRAVENOUS at 12:05

## 2020-05-07 RX ADMIN — LIDOCAINE AND PRILOCAINE: 25; 25 CREAM TOPICAL at 11:05

## 2020-05-07 RX ADMIN — OXYCODONE HYDROCHLORIDE 10 MG: 10 TABLET ORAL at 08:05

## 2020-05-07 RX ADMIN — OXYCODONE HYDROCHLORIDE 10 MG: 10 TABLET ORAL at 12:05

## 2020-05-07 RX ADMIN — PIPERACILLIN AND TAZOBACTAM 4.5 G: 4; .5 INJECTION, POWDER, FOR SOLUTION INTRAVENOUS at 04:05

## 2020-05-07 RX ADMIN — OXYCODONE HYDROCHLORIDE 10 MG: 10 TABLET ORAL at 04:05

## 2020-05-07 RX ADMIN — PIPERACILLIN AND TAZOBACTAM 4.5 G: 4; .5 INJECTION, POWDER, FOR SOLUTION INTRAVENOUS at 08:05

## 2020-05-07 RX ADMIN — ONDANSETRON 8 MG: 2 INJECTION, SOLUTION INTRAMUSCULAR; INTRAVENOUS at 09:05

## 2020-05-07 RX ADMIN — ATORVASTATIN CALCIUM 10 MG: 10 TABLET, FILM COATED ORAL at 08:05

## 2020-05-07 RX ADMIN — DULOXETINE 60 MG: 60 CAPSULE, DELAYED RELEASE ORAL at 08:05

## 2020-05-07 RX ADMIN — AMLODIPINE BESYLATE 10 MG: 10 TABLET ORAL at 08:05

## 2020-05-07 NOTE — PLAN OF CARE
Goals addressed and unmet.  Cont with POC  Marialuisa Cortes OT  5/7/2020    Problem: Occupational Therapy Goal  Goal: Occupational Therapy Goal  Description  Goals to be met by: 6/5    Patient will increase functional independence with ADLs by performing:    UE Dressing with Set-up Assistance.  LE Dressing with Stand-by Assistance.  Grooming while seated with Set-up Assistance.  Toileting from bedside commode with Contact Guard Assistance for hygiene and clothing management.      Outcome: Ongoing, Progressing

## 2020-05-07 NOTE — PLAN OF CARE
Problem: Physical Therapy Goal  Goal: Physical Therapy Goal  Description  Goals to be met by: 20     Patient will increase functional independence with mobility by performin. Supine to sit with Stand-by Assistance  2. Sit to stand transfer with Stand-by Assistance  3. Bed to chair transfer with Stand-by Assistance using Rolling Walker  4. Gait  x 50 feet with Stand-by Assistance using Rolling Walker.   5. Ascend/Descend 4 inch curb step with Moderate Assistance using Rolling Walker.  6. Lower extremity exercise program x15 reps per handout, with assistance as needed     Outcome: Ongoing, Progressing

## 2020-05-07 NOTE — PROGRESS NOTES
"[facility]  Hematology/Oncology  Progress Note    Patient Name: Isabell Preston  Admission Date: 5/5/2020  Hospital Length of Stay: 0 days  Code Status: Full Code     Subjective:     HPI:  Ms. Isabell Preston is a 74 y.o. 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 Last treatment on 4/28/20, next scheduled or 5/19/20) , DVT on daily Lovenox, depression, diabetes mellitus, diverticulosis, hypertension, hyperlipidemia, multiple abdominal surgeries who presents to [facility] ED with abdominal pain, headache, fever (subjective, greater than 103 at home), vomiting (non-bloody or not coffee-ground), and 1 bout of loose bowel movements, however normally has constipation..  Her abdominal pain localizes to the left upper and lower quadrants and is worse with palpation and movement.  She denies any alleviating factors, cough, and shortness of breath.       Patient states she was tested for COVID-19 approximately 2 months ago.  The results of these tests were both negative however she was treated as presumptive positive given her immunocompromised state.  Since that time, the patient has not left the house or had social contact with any COVID infected individuals.  The patient last received chemotherapy 2 weeks ago.  The patient states that the symptoms she is experiencing today are identical to those she experienced 2 months ago when she was presumed positive for COVID-19.      Per documentation , "tested negative for covid x 2 however based on labs PCP felt that pt had covid and got false neg result. "  COVID -19 testing on 5/5 negative .  Patient stated that her PCP culture until her that her initial COVID testing was presumed to be positive, however patient was unable to elaborate on this did not no additional information.  Chart review shows all negative COVID test.     In the ED CT scan of the " abdomen and pelvis without contrast was obtained.  Showing.   1. No acute abnormality identified on today's examination.  2. Diverticulosis without evidence to suggest diverticulitis.  3. Nonspecific bilateral perinephric fat stranding.  Correlation with urinalysis advised.  4. Cholecystectomy, hysterectomy and additional incidental findings as above.    Interval History: Patient febrile overnight with Tmax 100.9, resolved with tylenol. Infectious workup negative so far. Continuing with zosyn to cover for potential abdominal source; low threshold to broaden abx with vanc. Active nausea/vomiting on exam this morning, moderately relieved with prn antiemetics. Will schedule IV zofran for symptom control. Also with Hb drop from 7.5 to 6.5. Transfuse 1U PRBC and give additional fluids for GINA. Otherwise, hemodynamically stable.     Oncology Treatment Plan:   OP NSCLC PEMETREXED + CARBOPLATIN (AUC) Q3W    Medications:  Continuous Infusions:   sodium chloride 0.9% 150 mL/hr at 05/07/20 1405     Scheduled Meds:   amLODIPine  10 mg Oral Daily    atorvastatin  10 mg Oral Daily    DULoxetine  60 mg Oral Daily    enoxaparin  80 mg Subcutaneous Q24H    folic acid  1 mg Oral Daily    ondansetron  8 mg Intravenous Q8H    piperacillin-tazobactam (ZOSYN) IVPB  4.5 g Intravenous Q8H    polyethylene glycol  17 g Oral Daily    thiamine  100 mg Oral Daily     PRN Meds:sodium chloride, Dextrose 10% Bolus, Dextrose 10% Bolus, glucagon (human recombinant), glucose, glucose, HYDROmorphone, insulin aspart U-100, lidocaine-prilocaine, magnesium hydroxide 400 mg/5 ml, melatonin, oxyCODONE, polyethylene glycol, promethazine (PHENERGAN) IVPB, senna-docusate 8.6-50 mg, sodium chloride 0.9%, tiZANidine     Review of Systems   Constitutional: Positive for activity change, appetite change (decreased) and fever. Negative for chills.   HENT: Negative for congestion, rhinorrhea, sore throat and trouble swallowing.    Eyes: Negative for  photophobia and visual disturbance.   Respiratory: Negative for cough, chest tightness and shortness of breath.    Cardiovascular: Negative for chest pain and leg swelling.   Gastrointestinal: Positive for abdominal pain, constipation, nausea and vomiting. Negative for diarrhea.   Genitourinary: Negative for dysuria, flank pain and hematuria.   Musculoskeletal: Negative for myalgias and neck pain.   Skin: Negative for rash and wound.   Neurological: Positive for tremors, weakness and headaches. Negative for syncope.   Psychiatric/Behavioral: Negative for agitation and confusion.     Objective:     Vital Signs (Most Recent):  Temp: 97.1 °F (36.2 °C) (05/07/20 1055)  Pulse: 108 (05/07/20 1055)  Resp: 18 (05/07/20 1055)  BP: 117/66 (05/07/20 1055)  SpO2: (!) 94 % (05/07/20 1055) Vital Signs (24h Range):  Temp:  [97.1 °F (36.2 °C)-100.9 °F (38.3 °C)] 97.1 °F (36.2 °C)  Pulse:  [] 108  Resp:  [15-18] 18  SpO2:  [93 %-96 %] 94 %  BP: (117-147)/(58-74) 117/66     Weight: 77.1 kg (170 lb)  Body mass index is 27.44 kg/m².  Body surface area is 1.89 meters squared.      Intake/Output Summary (Last 24 hours) at 5/7/2020 1433  Last data filed at 5/7/2020 1405  Gross per 24 hour   Intake 2189.16 ml   Output 100 ml   Net 2089.16 ml       Physical Exam   Constitutional: She is oriented to person, place, and time. She appears well-developed and well-nourished. She appears distressed (mildly).   HENT:   Head: Normocephalic and atraumatic.   Mouth/Throat: Oropharynx is clear and moist.   Eyes: Conjunctivae are normal. No scleral icterus.   Neck: Normal range of motion. Neck supple.   Cardiovascular: Regular rhythm, normal heart sounds and intact distal pulses.   No murmur heard.  Tachycardic   Pulmonary/Chest: Effort normal. No respiratory distress. She has no wheezes.   Diffuse course crackles bilaterally   Abdominal: Soft. Bowel sounds are normal. She exhibits no distension. There is tenderness (diffuse, more prominent  LUQ/LLQ). There is guarding (voluntary).   Musculoskeletal: She exhibits edema (trace) and tenderness (L ankle, chronic from previous fracture). She exhibits no deformity.   Neurological: She is alert and oriented to person, place, and time.   Bilateral UE/LE tremors noted   Skin: Skin is warm and dry. She is not diaphoretic.   Psychiatric: She has a normal mood and affect. Her behavior is normal. Judgment and thought content normal.       Significant Labs:   CBC:   Recent Labs   Lab 05/06/20 0424 05/07/20 0316   WBC 2.11* 1.76*   HGB 7.5* 6.5*   HCT 23.2* 21.2*   * 87*   , CMP:   Recent Labs   Lab 05/06/20 0424 05/07/20 0316   * 135*   K 4.3 4.2    105   CO2 19* 19*   * 150*   BUN 23 27*   CREATININE 2.2* 2.4*   CALCIUM 9.2 8.8   PROT 7.2 6.5   ALBUMIN 2.4* 2.0*   BILITOT 0.8 0.8   ALKPHOS 176* 164*   AST 58* 63*   ALT 50* 55*   ANIONGAP 12 11   EGFRNONAA 21.6* 19.4*    and All pertinent labs from the last 24 hours have been reviewed.    Diagnostic Results:  I have reviewed and interpreted all pertinent imaging results/findings within the past 24 hours.    Assessment/Plan:     * Left lower quadrant abdominal pain  Hx of diagnosed reticulocytes and diverticulitis.  Current etiology for abdominal pain is unclear  - CT AP without contrast in the ED demonstrating no acute abnormality; diverticulosis without evidence to suggest diverticulitis; nonspecific bilateral perinephric fat stranding.  -- given the fact that CT AP was performed without contrast (due to GINA), wonder if diverticulitis was not appreciated due to lack of contrast.  Will treat currently for possible diverticulitis vs other abdominal source    - IV Unasyn started on 5/5- changed to zosyn on 5/6  - febrile to Tmax 100.9 overnight 5/6; infectious workup so far negative - CXR unremarkable, UA negative for UTI, BCx NGTD; however procal elevated 1.38  - low threshold to add vanc for MRSA coverage if becomes febrile  again  - continue to f/u blood cultures  - pain control with oxy 10mg q6h prn and IV dilaudid 1mg for breakthrough pain  - nausea control with scheduled IV zofran 8mg q8h and prn IV phenergan 25mg q6h    Macrocytic anemia  Hb 7.8 on admission with   - Hb down trended to 6.5 on 5/7 - s/p 1U PRBC  - continue to monitor  - transfuse as needed for Hb > 7    Acute renal failure with acute tubular necrosis superimposed on stage 3 chronic kidney disease  sCr on admission 2.6 (baseline ~1.6). GINA likely pre-renal. Concern for ischemic ATN due to GI losses  s/p IVF with sCr initially trending down    - additional IVF with NS  - daily BMP  - continue to monitor    Chemotherapy induced nausea and vomiting  - uncontrolled nausea/vomiting  - scheduled IV zofran 8mg q8h  - prn IV phenergan 25mg q6h    Malignant pleural mesothelioma  Receiving outpatient chemotherapy - NSCLC PEMETREXED + CARBOPLATIN (AUC) Q3W and IVF .   Last treatment on 4/28/20, next scheduled or 5/19/20)  - on RA, satting well  - continue to monitor    History of DVT of lower extremity  - continue home Lovenox 80mg daily    Type 2 diabetes mellitus without complication, without long-term current use of insulin  HbA1c 6.1, well controlled  - POCT glucose AC QHS  - LDSSI  - diabetic diet    Hypertension, essential  - cont home meds amlodipine 10mg daily  - unclear why home metoprolol was held on admission (75mg daily at home)  - remains normotensive on amlodipine, will consider restarting metoprolol at lower dose  - continue to monitor             Piero Chavez MD  Hematology/Oncology  [facility]        I have reviewed the notes, assessments, and/or procedures performed by the housestaff, as above.  I have personally interviewed and examined the patient at the beside, and rounded with the housestaff. I concur with her/his assessment and plan and the documentation of Isabell Preston.  I, Dr. Austin Burt, personally spent more than 35  mins during this encounter, greater than 50% was spent in direct counseling and/or coordination of care.     Austin Burt M.D., M.S., F.A.C.P.  Hematology/Oncology Attending  [facility]

## 2020-05-07 NOTE — PT/OT/SLP PROGRESS
Physical Therapy Treatment    Patient Name:  Isabell Preston   MRN:  [id number]    Recommendations:     Discharge Recommendations:  nursing facility, skilled   Discharge Equipment Recommendations: none   Barriers to discharge: Inaccessible home    Assessment:     Isabell Preston is a 74 y.o. female admitted with a medical diagnosis of Left lower quadrant abdominal pain.  She presents with the following impairments/functional limitations:  weakness, impaired endurance, impaired functional mobilty, gait instability.    Rehab Prognosis: Good; patient would benefit from acute skilled PT services to address these deficits and reach maximum level of function.    Recent Surgery: * No surgery found *      Plan:     During this hospitalization, patient to be seen 5 x/week to address the identified rehab impairments via gait training, therapeutic activities, therapeutic exercises and progress toward the following goals:    · Plan of Care Expires:  06/05/20    Subjective     Chief Complaint: abdominal pain  Patient/Family Comments/goals: To have no pain  Pain/Comfort:  · Pain Rating 1: 6/10  · Location 1: abdomen  · Pain Addressed 1: Distraction      Objective:     Communicated with nurse prior to session.  Patient found supine with SCD, bed alarm upon PT entry to room.     General Precautions: Standard, fall   Orthopedic Precautions:N/A   Braces:       Functional Mobility:  · Bed Mobility:     · Supine to Sit: contact guard assistance, minimum assistance and HOB elevated slightly  · Transfers:     · Sit to Stand:  contact guard assistance and vcs for tech for safety with rolling walker  · Gait: ambulated 100 ft with RW with chair f/u and CGA, Slow pace, small step length.       AM-PAC 6 CLICK MOBILITY  Turning over in bed (including adjusting bedclothes, sheets and blankets)?: 3  Sitting down on and standing up from a chair with arms (e.g., wheelchair, bedside commode, etc.): 3  Moving from lying on back to sitting on the  side of the bed?: 3  Moving to and from a bed to a chair (including a wheelchair)?: 3  Need to walk in hospital room?: 3  Climbing 3-5 steps with a railing?: 2  Basic Mobility Total Score: 17       Therapeutic Activities and Exercises:   educ patient with importance of remaining OOB as much as possible to decrease weakness and improve energy level. Good understanding. LLE tremulous with activity as times.     Patient left up in chair with all lines intact, call button in reach and nurse notified..    GOALS:   Multidisciplinary Problems     Physical Therapy Goals        Problem: Physical Therapy Goal    Goal Priority Disciplines Outcome Goal Variances Interventions   Physical Therapy Goal     PT, PT/OT Ongoing, Progressing     Description:  Goals to be met by: 20     Patient will increase functional independence with mobility by performin. Supine to sit with Stand-by Assistance  2. Sit to stand transfer with Stand-by Assistance  3. Bed to chair transfer with Stand-by Assistance using Rolling Walker  4. Gait  x 50 feet with Stand-by Assistance using Rolling Walker.   5. Ascend/Descend 4 inch curb step with Moderate Assistance using Rolling Walker.  6. Lower extremity exercise program x15 reps per handout, with assistance as needed                      Time Tracking:     PT Received On: 20  PT Start Time: 903     PT Stop Time: 929  PT Total Time (min): 26 min     Billable Minutes: Gait Training 13 and Therapeutic Activity 10    Treatment Type: Treatment  PT/PTA: PTA     PTA Visit Number: 1     Chrissy Fonseca, TAN  2020

## 2020-05-07 NOTE — PLAN OF CARE
Patient accepted by [facility]. TO 5/8 at this time. Patient to be followed by Oncology service as of this AM.   05/07/20 0756   Post-Acute Status   Post-Acute Authorization Home Health   Home Health Status Awaiting Internal Medical Clearance   Discharge Plan   Discharge Plan A Home with family;Home Health   Discharge Plan B Home with family;Home Health

## 2020-05-07 NOTE — PLAN OF CARE
Plan of care reviewed with patient. Afebrile. Free from falls or injury. Oxy IR po and dilaudid given prn for abdominal pain. Ampicillin given as scheduled. Up with assistance to bathroom. Bed locked in lowest position, non skid socks on, call light within reach. Pt instructed to call if any assistance is needed. Bed alarm on. Vitals stable. Will cont to corinne pt.

## 2020-05-07 NOTE — PLAN OF CARE
Problem: Adult Inpatient Plan of Care  Goal: Plan of Care Review  Outcome: Ongoing, Progressing  Flowsheets (Taken 5/7/2020 1416)  Plan of Care Reviewed With: patient  Patient remains free from falls and injury this shift. Bed in low, locked position with call light in reach. Plan of care reviewed with pt.  VSS, afebrile thru shift.  1u RBC transfused.  Left chest power PAC accessed, +blood return. X1 episode of emesis, Zofran given.  C/o abd pain relieved with Oxy 10 IR.  Blood sugar monitored AC/HS. Patient encouraged to call for assistance when getting out of bed.  Patient verbalized understanding. All belongings within reach.  Will continue to monitor.

## 2020-05-07 NOTE — SUBJECTIVE & OBJECTIVE
Interval History: Patient febrile overnight with Tmax 100.9, resolved with tylenol. Infectious workup negative so far. Continuing with zosyn to cover for potential abdominal source; low threshold to broaden abx with vanc. Active nausea/vomiting on exam this morning, moderately relieved with prn antiemetics. Will schedule IV zofran for symptom control. Also with Hb drop from 7.5 to 6.5. Transfuse 1U PRBC and give additional fluids for GINA. Otherwise, hemodynamically stable.     Oncology Treatment Plan:   OP NSCLC PEMETREXED + CARBOPLATIN (AUC) Q3W    Medications:  Continuous Infusions:   sodium chloride 0.9% 150 mL/hr at 05/07/20 1405     Scheduled Meds:   amLODIPine  10 mg Oral Daily    atorvastatin  10 mg Oral Daily    DULoxetine  60 mg Oral Daily    enoxaparin  80 mg Subcutaneous Q24H    folic acid  1 mg Oral Daily    ondansetron  8 mg Intravenous Q8H    piperacillin-tazobactam (ZOSYN) IVPB  4.5 g Intravenous Q8H    polyethylene glycol  17 g Oral Daily    thiamine  100 mg Oral Daily     PRN Meds:sodium chloride, Dextrose 10% Bolus, Dextrose 10% Bolus, glucagon (human recombinant), glucose, glucose, HYDROmorphone, insulin aspart U-100, lidocaine-prilocaine, magnesium hydroxide 400 mg/5 ml, melatonin, oxyCODONE, polyethylene glycol, promethazine (PHENERGAN) IVPB, senna-docusate 8.6-50 mg, sodium chloride 0.9%, tiZANidine     Review of Systems   Constitutional: Positive for activity change, appetite change (decreased) and fever. Negative for chills.   HENT: Negative for congestion, rhinorrhea, sore throat and trouble swallowing.    Eyes: Negative for photophobia and visual disturbance.   Respiratory: Negative for cough, chest tightness and shortness of breath.    Cardiovascular: Negative for chest pain and leg swelling.   Gastrointestinal: Positive for abdominal pain, constipation, nausea and vomiting. Negative for diarrhea.   Genitourinary: Negative for dysuria, flank pain and hematuria.   Musculoskeletal:  Negative for myalgias and neck pain.   Skin: Negative for rash and wound.   Neurological: Positive for tremors, weakness and headaches. Negative for syncope.   Psychiatric/Behavioral: Negative for agitation and confusion.     Objective:     Vital Signs (Most Recent):  Temp: 97.1 °F (36.2 °C) (05/07/20 1055)  Pulse: 108 (05/07/20 1055)  Resp: 18 (05/07/20 1055)  BP: 117/66 (05/07/20 1055)  SpO2: (!) 94 % (05/07/20 1055) Vital Signs (24h Range):  Temp:  [97.1 °F (36.2 °C)-100.9 °F (38.3 °C)] 97.1 °F (36.2 °C)  Pulse:  [] 108  Resp:  [15-18] 18  SpO2:  [93 %-96 %] 94 %  BP: (117-147)/(58-74) 117/66     Weight: 77.1 kg (170 lb)  Body mass index is 27.44 kg/m².  Body surface area is 1.89 meters squared.      Intake/Output Summary (Last 24 hours) at 5/7/2020 1433  Last data filed at 5/7/2020 1405  Gross per 24 hour   Intake 2189.16 ml   Output 100 ml   Net 2089.16 ml       Physical Exam   Constitutional: She is oriented to person, place, and time. She appears well-developed and well-nourished. She appears distressed (mildly).   HENT:   Head: Normocephalic and atraumatic.   Mouth/Throat: Oropharynx is clear and moist.   Eyes: Conjunctivae are normal. No scleral icterus.   Neck: Normal range of motion. Neck supple.   Cardiovascular: Regular rhythm, normal heart sounds and intact distal pulses.   No murmur heard.  Tachycardic   Pulmonary/Chest: Effort normal. No respiratory distress. She has no wheezes.   Diffuse course crackles bilaterally   Abdominal: Soft. Bowel sounds are normal. She exhibits no distension. There is tenderness (diffuse, more prominent LUQ/LLQ). There is guarding (voluntary).   Musculoskeletal: She exhibits edema (trace) and tenderness (L ankle, chronic from previous fracture). She exhibits no deformity.   Neurological: She is alert and oriented to person, place, and time.   Bilateral UE/LE tremors noted   Skin: Skin is warm and dry. She is not diaphoretic.   Psychiatric: She has a normal mood and  affect. Her behavior is normal. Judgment and thought content normal.       Significant Labs:   CBC:   Recent Labs   Lab 05/06/20  0424 05/07/20  0316   WBC 2.11* 1.76*   HGB 7.5* 6.5*   HCT 23.2* 21.2*   * 87*   , CMP:   Recent Labs   Lab 05/06/20 0424 05/07/20 0316   * 135*   K 4.3 4.2    105   CO2 19* 19*   * 150*   BUN 23 27*   CREATININE 2.2* 2.4*   CALCIUM 9.2 8.8   PROT 7.2 6.5   ALBUMIN 2.4* 2.0*   BILITOT 0.8 0.8   ALKPHOS 176* 164*   AST 58* 63*   ALT 50* 55*   ANIONGAP 12 11   EGFRNONAA 21.6* 19.4*    and All pertinent labs from the last 24 hours have been reviewed.    Diagnostic Results:  I have reviewed and interpreted all pertinent imaging results/findings within the past 24 hours.

## 2020-05-07 NOTE — PT/OT/SLP PROGRESS
Occupational Therapy   Treatment    Name: Isabell Preston  MRN: [id number]  Admitting Diagnosis:  Left lower quadrant abdominal pain       Recommendations:     Discharge Recommendations: nursing facility, skilled  Discharge Equipment Recommendations:  none  Barriers to discharge:  None    Assessment:     Isabell Preston is a 74 y.o. female with a medical diagnosis of Left lower quadrant abdominal pain.  She presents supine and willing to participate.  Pt with much improved activity tolerance and mobility this date with continued decreased toielting and toilet transfer.  Performance deficits affecting function are weakness, impaired endurance, impaired self care skills, impaired functional mobilty.     Rehab Prognosis:  Good; patient would benefit from acute skilled OT services to address these deficits and reach maximum level of function.       Plan:     Patient to be seen 4 x/week to address the above listed problems via self-care/home management, therapeutic exercises, therapeutic activities  · Plan of Care Expires:    · Plan of Care Reviewed with: patient    Subjective     Pain/Comfort:  · Pain Rating 1: 6/10  · Location 1: abdomen  · Pain Addressed 1: Distraction    Objective:     Communicated with: RN prior to session.  Patient found supine with SCD, bed alarm upon OT entry to room.    General Precautions: Standard, fall   Orthopedic Precautions:N/A   Braces: N/A     Occupational Performance:     Bed Mobility:    · Pt CGA for supine to sit edge of bed     Functional Mobility/Transfers:  · Functional Mobility: Pt with min A for safe in room mobility with rolling walker     Activities of Daily Living:  · Toileting: moderate assistance        [facility] 6 Click ADL: 19    Treatment & Education:  Pt educated on safety, role of OT, importance of increased participation in self care for gains , expectations for participation, expectations for gains, POC, energy conservation, caregiver strain. White board updated.   -  ADL training - transfer training     Patient left supine with all lines intactEducation:      GOALS:   Multidisciplinary Problems     Occupational Therapy Goals        Problem: Occupational Therapy Goal    Goal Priority Disciplines Outcome Interventions   Occupational Therapy Goal     OT, PT/OT Ongoing, Progressing    Description:  Goals to be met by: 6/5    Patient will increase functional independence with ADLs by performing:    UE Dressing with Set-up Assistance.  LE Dressing with Stand-by Assistance.  Grooming while seated with Set-up Assistance.  Toileting from bedside commode with Contact Guard Assistance for hygiene and clothing management.                       Time Tracking:     OT Date of Treatment: 05/07/20  OT Start Time: 0900  OT Stop Time: 0925  OT Total Time (min): 25 min    Billable Minutes:Self Care/Home Management 15  Therapeutic Activity 10    Marialuisa Cortes, OT  5/7/2020

## 2020-05-08 ENCOUNTER — PATIENT MESSAGE (OUTPATIENT)
Dept: NEUROLOGY | Facility: CLINIC | Age: 75
End: 2020-05-08

## 2020-05-08 PROBLEM — E51.9 THIAMINE DEFICIENCY: Status: ACTIVE | Noted: 2020-05-08

## 2020-05-08 LAB
ALBUMIN SERPL BCP-MCNC: 2.1 G/DL (ref 3.5–5.2)
ALBUMIN SERPL BCP-MCNC: 2.3 G/DL (ref 3.5–5.2)
ALP SERPL-CCNC: 174 U/L (ref 55–135)
ALP SERPL-CCNC: 204 U/L (ref 55–135)
ALT SERPL W/O P-5'-P-CCNC: 64 U/L (ref 10–44)
ALT SERPL W/O P-5'-P-CCNC: 84 U/L (ref 10–44)
AMMONIA PLAS-SCNC: 28 UMOL/L (ref 10–50)
ANION GAP SERPL CALC-SCNC: 12 MMOL/L (ref 8–16)
ANION GAP SERPL CALC-SCNC: 13 MMOL/L (ref 8–16)
ANISOCYTOSIS BLD QL SMEAR: SLIGHT
AST SERPL-CCNC: 106 U/L (ref 10–40)
AST SERPL-CCNC: 77 U/L (ref 10–40)
BASOPHILS # BLD AUTO: 0.01 K/UL (ref 0–0.2)
BASOPHILS NFR BLD: 0.6 % (ref 0–1.9)
BILIRUB SERPL-MCNC: 1 MG/DL (ref 0.1–1)
BILIRUB SERPL-MCNC: 1.3 MG/DL (ref 0.1–1)
BUN SERPL-MCNC: 34 MG/DL (ref 8–23)
BUN SERPL-MCNC: 37 MG/DL (ref 8–23)
CALCIUM SERPL-MCNC: 9.1 MG/DL (ref 8.7–10.5)
CALCIUM SERPL-MCNC: 9.2 MG/DL (ref 8.7–10.5)
CHLORIDE SERPL-SCNC: 103 MMOL/L (ref 95–110)
CHLORIDE SERPL-SCNC: 105 MMOL/L (ref 95–110)
CO2 SERPL-SCNC: 18 MMOL/L (ref 23–29)
CO2 SERPL-SCNC: 19 MMOL/L (ref 23–29)
CREAT SERPL-MCNC: 3 MG/DL (ref 0.5–1.4)
CREAT SERPL-MCNC: 3.3 MG/DL (ref 0.5–1.4)
DIFFERENTIAL METHOD: ABNORMAL
EOSINOPHIL # BLD AUTO: 0 K/UL (ref 0–0.5)
EOSINOPHIL NFR BLD: 0.6 % (ref 0–8)
ERYTHROCYTE [DISTWIDTH] IN BLOOD BY AUTOMATED COUNT: 17.2 % (ref 11.5–14.5)
EST. GFR  (AFRICAN AMERICAN): 15.2 ML/MIN/1.73 M^2
EST. GFR  (AFRICAN AMERICAN): 17.1 ML/MIN/1.73 M^2
EST. GFR  (NON AFRICAN AMERICAN): 13.2 ML/MIN/1.73 M^2
EST. GFR  (NON AFRICAN AMERICAN): 14.8 ML/MIN/1.73 M^2
GLUCOSE SERPL-MCNC: 135 MG/DL (ref 70–110)
GLUCOSE SERPL-MCNC: 138 MG/DL (ref 70–110)
HCT VFR BLD AUTO: 23.7 % (ref 37–48.5)
HGB BLD-MCNC: 7.5 G/DL (ref 12–16)
HYPOCHROMIA BLD QL SMEAR: ABNORMAL
IMM GRANULOCYTES # BLD AUTO: 0.08 K/UL (ref 0–0.04)
IMM GRANULOCYTES NFR BLD AUTO: 4.7 % (ref 0–0.5)
LYMPHOCYTES # BLD AUTO: 0.4 K/UL (ref 1–4.8)
LYMPHOCYTES NFR BLD: 24.7 % (ref 18–48)
MAGNESIUM SERPL-MCNC: 1.6 MG/DL (ref 1.6–2.6)
MCH RBC QN AUTO: 32.9 PG (ref 27–31)
MCHC RBC AUTO-ENTMCNC: 31.6 G/DL (ref 32–36)
MCV RBC AUTO: 104 FL (ref 82–98)
MONOCYTES # BLD AUTO: 0.2 K/UL (ref 0.3–1)
MONOCYTES NFR BLD: 8.8 % (ref 4–15)
NEUTROPHILS # BLD AUTO: 1 K/UL (ref 1.8–7.7)
NEUTROPHILS NFR BLD: 60.6 % (ref 38–73)
NRBC BLD-RTO: 0 /100 WBC
PHOSPHATE SERPL-MCNC: 3.8 MG/DL (ref 2.7–4.5)
PLATELET # BLD AUTO: 64 K/UL (ref 150–350)
PLATELET BLD QL SMEAR: ABNORMAL
PMV BLD AUTO: 10.6 FL (ref 9.2–12.9)
POCT GLUCOSE: 140 MG/DL (ref 70–110)
POCT GLUCOSE: 145 MG/DL (ref 70–110)
POCT GLUCOSE: 162 MG/DL (ref 70–110)
POTASSIUM SERPL-SCNC: 4 MMOL/L (ref 3.5–5.1)
POTASSIUM SERPL-SCNC: 4.2 MMOL/L (ref 3.5–5.1)
PROT SERPL-MCNC: 6.8 G/DL (ref 6–8.4)
PROT SERPL-MCNC: 7.8 G/DL (ref 6–8.4)
RBC # BLD AUTO: 2.28 M/UL (ref 4–5.4)
SODIUM SERPL-SCNC: 134 MMOL/L (ref 136–145)
SODIUM SERPL-SCNC: 136 MMOL/L (ref 136–145)
WBC # BLD AUTO: 1.7 K/UL (ref 3.9–12.7)

## 2020-05-08 PROCEDURE — 95714 VEEG EA 12-26 HR UNMNTR: CPT

## 2020-05-08 PROCEDURE — 84100 ASSAY OF PHOSPHORUS: CPT

## 2020-05-08 PROCEDURE — 83735 ASSAY OF MAGNESIUM: CPT

## 2020-05-08 PROCEDURE — 85025 COMPLETE CBC W/AUTO DIFF WBC: CPT

## 2020-05-08 PROCEDURE — 95720 EEG PHY/QHP EA INCR W/VEEG: CPT | Mod: ,,, | Performed by: PSYCHIATRY & NEUROLOGY

## 2020-05-08 PROCEDURE — 97116 GAIT TRAINING THERAPY: CPT

## 2020-05-08 PROCEDURE — 63600175 PHARM REV CODE 636 W HCPCS: Performed by: STUDENT IN AN ORGANIZED HEALTH CARE EDUCATION/TRAINING PROGRAM

## 2020-05-08 PROCEDURE — 97530 THERAPEUTIC ACTIVITIES: CPT

## 2020-05-08 PROCEDURE — 99223 PR INITIAL HOSPITAL CARE,LEVL III: ICD-10-PCS | Mod: GC,,, | Performed by: PSYCHIATRY & NEUROLOGY

## 2020-05-08 PROCEDURE — 95720 PR EEG, W/VIDEO, CONT RECORD, I&R, >12<26 HRS: ICD-10-PCS | Mod: ,,, | Performed by: PSYCHIATRY & NEUROLOGY

## 2020-05-08 PROCEDURE — 63600175 PHARM REV CODE 636 W HCPCS: Performed by: INTERNAL MEDICINE

## 2020-05-08 PROCEDURE — 25000003 PHARM REV CODE 250

## 2020-05-08 PROCEDURE — 63600175 PHARM REV CODE 636 W HCPCS: Performed by: HOSPITALIST

## 2020-05-08 PROCEDURE — 25000003 PHARM REV CODE 250: Performed by: HOSPITALIST

## 2020-05-08 PROCEDURE — 80053 COMPREHEN METABOLIC PANEL: CPT

## 2020-05-08 PROCEDURE — 80053 COMPREHEN METABOLIC PANEL: CPT | Mod: 91

## 2020-05-08 PROCEDURE — 99233 PR SUBSEQUENT HOSPITAL CARE,LEVL III: ICD-10-PCS | Mod: GC,,, | Performed by: INTERNAL MEDICINE

## 2020-05-08 PROCEDURE — 82140 ASSAY OF AMMONIA: CPT

## 2020-05-08 PROCEDURE — 84425 ASSAY OF VITAMIN B-1: CPT

## 2020-05-08 PROCEDURE — 99233 SBSQ HOSP IP/OBS HIGH 50: CPT | Mod: GC,,, | Performed by: INTERNAL MEDICINE

## 2020-05-08 PROCEDURE — 99223 1ST HOSP IP/OBS HIGH 75: CPT | Mod: GC,,, | Performed by: PSYCHIATRY & NEUROLOGY

## 2020-05-08 PROCEDURE — 95700 EEG CONT REC W/VID EEG TECH: CPT

## 2020-05-08 PROCEDURE — 25000003 PHARM REV CODE 250: Performed by: STUDENT IN AN ORGANIZED HEALTH CARE EDUCATION/TRAINING PROGRAM

## 2020-05-08 PROCEDURE — 97535 SELF CARE MNGMENT TRAINING: CPT

## 2020-05-08 PROCEDURE — 20600001 HC STEP DOWN PRIVATE ROOM

## 2020-05-08 RX ORDER — SODIUM CHLORIDE 9 MG/ML
INJECTION, SOLUTION INTRAVENOUS CONTINUOUS
Status: ACTIVE | OUTPATIENT
Start: 2020-05-08 | End: 2020-05-08

## 2020-05-08 RX ADMIN — PIPERACILLIN AND TAZOBACTAM 4.5 G: 4; .5 INJECTION, POWDER, FOR SOLUTION INTRAVENOUS at 08:05

## 2020-05-08 RX ADMIN — METOPROLOL SUCCINATE 25 MG: 25 TABLET, EXTENDED RELEASE ORAL at 08:05

## 2020-05-08 RX ADMIN — ONDANSETRON 8 MG: 2 INJECTION, SOLUTION INTRAMUSCULAR; INTRAVENOUS at 09:05

## 2020-05-08 RX ADMIN — ATORVASTATIN CALCIUM 10 MG: 10 TABLET, FILM COATED ORAL at 08:05

## 2020-05-08 RX ADMIN — PIPERACILLIN SODIUM,TAZOBACTAM SODIUM 4.5 G: 4; .5 INJECTION, POWDER, FOR SOLUTION INTRAVENOUS at 08:05

## 2020-05-08 RX ADMIN — FOLIC ACID 1 MG: 1 TABLET ORAL at 08:05

## 2020-05-08 RX ADMIN — PROMETHAZINE HYDROCHLORIDE 25 MG: 25 INJECTION INTRAMUSCULAR; INTRAVENOUS at 06:05

## 2020-05-08 RX ADMIN — MAGNESIUM HYDROXIDE 2400 MG: 400 SUSPENSION ORAL at 06:05

## 2020-05-08 RX ADMIN — THIAMINE HYDROCHLORIDE 500 MG: 100 INJECTION, SOLUTION INTRAMUSCULAR; INTRAVENOUS at 03:05

## 2020-05-08 RX ADMIN — OXYCODONE HYDROCHLORIDE 10 MG: 10 TABLET ORAL at 03:05

## 2020-05-08 RX ADMIN — THIAMINE HYDROCHLORIDE 500 MG: 100 INJECTION, SOLUTION INTRAMUSCULAR; INTRAVENOUS at 08:05

## 2020-05-08 RX ADMIN — SODIUM CHLORIDE: 0.9 INJECTION, SOLUTION INTRAVENOUS at 08:05

## 2020-05-08 RX ADMIN — PROMETHAZINE HYDROCHLORIDE 25 MG: 25 INJECTION INTRAMUSCULAR; INTRAVENOUS at 02:05

## 2020-05-08 RX ADMIN — PIPERACILLIN AND TAZOBACTAM 4.5 G: 4; .5 INJECTION, POWDER, FOR SOLUTION INTRAVENOUS at 01:05

## 2020-05-08 RX ADMIN — AMLODIPINE BESYLATE 10 MG: 10 TABLET ORAL at 08:05

## 2020-05-08 RX ADMIN — POLYETHYLENE GLYCOL 3350 17 G: 17 POWDER, FOR SOLUTION ORAL at 08:05

## 2020-05-08 RX ADMIN — OXYCODONE HYDROCHLORIDE 10 MG: 10 TABLET ORAL at 08:05

## 2020-05-08 RX ADMIN — ONDANSETRON 8 MG: 2 INJECTION, SOLUTION INTRAMUSCULAR; INTRAVENOUS at 06:05

## 2020-05-08 RX ADMIN — ONDANSETRON 8 MG: 2 INJECTION, SOLUTION INTRAMUSCULAR; INTRAVENOUS at 03:05

## 2020-05-08 RX ADMIN — DULOXETINE 60 MG: 60 CAPSULE, DELAYED RELEASE ORAL at 08:05

## 2020-05-08 RX ADMIN — Medication 100 MG: at 08:05

## 2020-05-08 NOTE — PT/OT/SLP PROGRESS
Occupational Therapy   Treatment    Name: Isabell Preston  MRN: [id number]  Admitting Diagnosis:  Left lower quadrant abdominal pain       Recommendations:     Discharge Recommendations: nursing facility, skilled  Discharge Equipment Recommendations:  none  Barriers to discharge:  None    Assessment:     Isabell Preston is a 74 y.o. female with a medical diagnosis of Left lower quadrant abdominal pain.  She presents up in chair and willing to participate.  Pt with ADL training for toileting and discussed safety and ensured proper hygiene.  Pt with improved functional performance and will continue to benefic from skilled OT for max gains . Performance deficits affecting function are weakness, impaired endurance, impaired sensation, impaired self care skills, gait instability.     Rehab Prognosis:  Good; patient would benefit from acute skilled OT services to address these deficits and reach maximum level of function.       Plan:     Patient to be seen 4 x/week to address the above listed problems via self-care/home management, therapeutic activities, therapeutic exercises  · Plan of Care Expires:    · Plan of Care Reviewed with: patient    Subjective     Pain/Comfort:  Pain Rating 1: 5/10  Location 1: abdomen  Pain Addressed 1: Distraction    Objective:     Communicated with: RN prior to session.  Patient found supine with SCD, bed alarm upon OT entry to room.    General Precautions: Standard, fall   Orthopedic Precautions:N/A   Braces: N/A     Occupational Performance:     Bed Mobility:    · Patient completed Rolling/Turning to Right with contact guard assistance  · Patient completed Scooting/Bridging with contact guard assistance  · Patient completed Supine to Sit with contact guard assistance     Functional Mobility/Transfers:  · Patient completed Sit <> Stand Transfer with contact guard assistance  with  rolling walker   · Patient completed Bed <> Chair Transfer using Step Transfer technique with contact guard  assistance with rolling walker  · Patient completed Toilet Transfer Step Transfer technique with minimum assistance with  rolling walker      Activities of Daily Living:  · Grooming: contact guard assistance    · Upper Body Dressing: contact guard assistance    · Lower Body Dressing: minimum assistance    · Toileting: minimum assistance        [facility] 6 Click ADL: 20    Treatment & Education:  Pt educated on safety, role of OT, importance of increased participation in self care for gains , expectations for participation, expectations for gains, POC, energy conservation, caregiver strain. White board updated.   - ADL training for toileting   -Transfer training for safe ADL     Patient left up in chair with all lines intactEducation:      GOALS:   Multidisciplinary Problems     Occupational Therapy Goals        Problem: Occupational Therapy Goal    Goal Priority Disciplines Outcome Interventions   Occupational Therapy Goal     OT, PT/OT Ongoing, Progressing    Description:  Goals to be met by: 6/5    Patient will increase functional independence with ADLs by performing:    UE Dressing with Set-up Assistance.  LE Dressing with Stand-by Assistance.  Grooming while seated with Set-up Assistance.  Toileting from bedside commode with Contact Guard Assistance for hygiene and clothing management.                       Time Tracking:     OT Date of Treatment: 05/08/20  OT Start Time: 1105  OT Stop Time: 1130  OT Total Time (min): 25 min    Billable Minutes:Self Care/Home Management 15  Therapeutic Activity 10    Marialuisa Cortes, OT  5/8/2020

## 2020-05-08 NOTE — ASSESSMENT & PLAN NOTE
Ms. Preston is a 73 yo female w/ PMH significant for HTN, HLD, mesothelioma, DVT on lovenox, MDD, DM, who presented to [facility] with abdominal pain.  Neurology consulted for concern of PD.      Overall, her course is complicated by cancer/chemo, poor history, prior use of abilify, and thiamine deficiency.  At this time, I am less suspicious that this is idiopathic PD.  Consider drug-induced (but worse after discontinuing abilify), vascular PD, essential tremor, somatoform disorder or a motor manifestation of her depression/mood disorder.  Additionally, she reports recent hallucinations and abdominal pain, both of which have the potential to be worsened by sinemet.    Further adding to her picture, is the reports of episodic confusion.  MRI did not reveal infarcts, consider epileptic etiology.    Recommendations  -24 hr overnight EEG   -Check ammonia, thiamine  -Begin thiamine repletion as under thiamine deficiency section  -delirium precautions; minimize sedating medications as able  -continue to treat abd pain per primary

## 2020-05-08 NOTE — PROGRESS NOTES
"[facility]  Adult Nutrition  Progress Note    SUMMARY       Recommendations    Recommendation:   1. Continue diabetic diet, encourage good PO intake as tolerable    - liberalize diet to regular diet to increase intake   2. Continue boost plus TID to increase intake   3. Suggest MVI   4. If pt remains with poor PO, suggest enteral nutrition, please re-consult for recs   RD to monitor and follow up    Goals: pt to tolerate >85% of EEN/EPN by RD follow up   Nutrition Goal Status: progressing towards goal, goal not met  Communication of RD Recs: other (comment)(POC)    Reason for Assessment    Reason For Assessment: RD follow-up  Diagnosis: (left lower quadrant abdominal pain)  Relevant Medical History: DVT; HTN; HLD; diverticulitis; cancer; DM  Interdisciplinary Rounds: did not attend  General Information Comments: Spoke with pt over phone, reported appetite remains poor. PO ~25% of meals and drinking boost daily. Pt with episode of N/V yesterday but has been good a few days prior. Wt stable since admit. Encouraged intake as tolerable. Pt with UBW ~175 lbs, 5 lbs wt loss over past week. Note pt with lower extremity edema +2. Pt meets criteria for acute malnutrition due to decreased appetite and wt loss. Due to recent hospital wide restrictions to limit the transfer of (COVID-19), we are not performing any physical exams at this time. All S/S will be observational; NFPE to be performed at a future date.  Nutrition Discharge Planning: adequate PO intake     Nutrition Risk Screen    Nutrition Risk Screen: no indicators present    Nutrition/Diet History    Spiritual, Cultural Beliefs, Christianity Practices, Values that Affect Care: no  Food Allergies: other (see comments)(gluten protein; fructose; lactate)  Factors Affecting Nutritional Intake: decreased appetite, nausea/vomiting    Anthropometrics    Temp: 98 °F (36.7 °C)  Height Method: Stated  Height: 5' 6" (167.6 cm)  Height (inches): 66 in  Weight " Method: Bed Scale  Weight: 77.1 kg (170 lb)  Weight (lb): 170 lb  Ideal Body Weight (IBW), Female: 130 lb  % Ideal Body Weight, Female (lb): 130.77 %  BMI (Calculated): 27.5  BMI Grade: 25 - 29.9 - overweight       Lab/Procedures/Meds    Pertinent Labs Reviewed: reviewed  Pertinent Labs Comments: Na 134; BUN 34; Cr 3.0; Glucose 135  Pertinent Medications Reviewed: reviewed  Pertinent Medications Comments: thiamine; folic acid; enoxaparin; polyethylene glycol     Estimated Assessed Needs    Weight Used For Calorie Calculations: 77.1 kg (169 lb 15.6 oz)  Energy Calorie Requirements (kcal): 9647-6222 kcal/d  Energy Need Method: Kcal/kg  Protein Requirements: 77-92 g/day   Weight Used For Protein Calculations: 77.1 kg (169 lb 15.6 oz)  Fluid Requirements (mL): 1 mL/kcal or per MD   RDA Method (mL): 1927  CHO Requirement: 240    Nutrition Prescription Ordered    Current Diet Order: diabetic diet   Oral Nutrition Supplement: boost plus     Evaluation of Received Nutrient/Fluid Intake    I/O: +5.7 L since admit   Energy Calories Required: not meeting needs  Protein Required: not meeting needs  Fluid Required: other (see comments)  Comments: LBM 5/6  Tolerance: tolerating  % Intake of Estimated Energy Needs: 25 - 50 %  % Meal Intake: 25 - 50 %    Nutrition Risk    Level of Risk/Frequency of Follow-up: high     Assessment and Plan   Nutrition Problem:  Moderate Protein-Calorie Malnutrition  Malnutrition in the context of Acute Illness/Injury     Related to (etiology):  Decreased intake 2/2 abdominal pain      Signs and Symptoms (as evidenced by):  Energy Intake: <50% of estimated energy requirement for x 1 week  Body Fat Depletion: MARLENA  Muscle Mass Depletion: MARLENA  Weight Loss: 2% x 1 week      Interventions(treatment strategy):  Collaboration of care with other providers  Commercial beverage  Modified diet- bland      Nutrition Diagnosis Status:  Continues     Monitor and Evaluation    Food and Nutrient Intake: energy  intake, food and beverage intake  Food and Nutrient Adminstration: diet order  Anthropometric Measurements: weight, weight change  Biochemical Data, Medical Tests and Procedures: electrolyte and renal panel, lipid profile, gastrointestinal profile, glucose/endocrine profile, inflammatory profile  Nutrition-Focused Physical Findings: overall appearance     Nutrition Follow-Up    RD Follow-up?: Yes

## 2020-05-08 NOTE — ASSESSMENT & PLAN NOTE
Hx of diagnosed reticulocytes and diverticulitis.  Current etiology for abdominal pain is unclear  - CT AP without contrast in the ED demonstrating no acute abnormality; diverticulosis without evidence to suggest diverticulitis; nonspecific bilateral perinephric fat stranding.  -- given the fact that CT AP was performed without contrast (due to GINA), wonder if diverticulitis was not appreciated due to lack of contrast.  Will treat currently for possible diverticulitis vs other abdominal source    - IV Unasyn started on 5/5- changed to zosyn on 5/6  - febrile to 100.9 overnight 5/6; infectious w/u negative to date (CXR unremarkable, UA negative for UTI, BCx NGTD; however procal elevated 1.38)  - low threshold to add vanc for MRSA coverage if becomes febrile again  - has remained afebrile >24hrs, HDS  - pain control with oxy 10mg q6h prn and IV dilaudid 1mg for breakthrough pain  - nausea control with scheduled IV zofran 8mg q8h and prn IV phenergan 25mg q6h  - continue empiric abx with zosyn for now

## 2020-05-08 NOTE — SUBJECTIVE & OBJECTIVE
Interval History: Patient remains afebrile >24hrs and HDS, currently on zosyn. Worsening leukopenia/thrombocytopenia and uptrending LFTs, likely 2/2 recent chemo however will continue to monitor. sCr remains elevated, obtain urine lytes to confirm suspected pre-renal cause; giving additional fluids. Neurology consulted for resting tremor and reported incidents of episodic confusion/hallucinations.     Oncology Treatment Plan:   OP NSCLC PEMETREXED + CARBOPLATIN (AUC) Q3W    Medications:  Continuous Infusions:   sodium chloride 0.9% 150 mL/hr at 05/08/20 0852     Scheduled Meds:   amLODIPine  10 mg Oral Daily    DULoxetine  60 mg Oral Daily    enoxaparin  80 mg Subcutaneous Q24H    folic acid  1 mg Oral Daily    metoprolol succinate  25 mg Oral Daily    ondansetron  8 mg Intravenous Q8H    piperacillin-tazobactam (ZOSYN) IVPB  4.5 g Intravenous Q12H    polyethylene glycol  17 g Oral Daily    thiamine (VITAMIN B1) IVPB  500 mg Intravenous TID     PRN Meds:sodium chloride, Dextrose 10% Bolus, Dextrose 10% Bolus, glucagon (human recombinant), glucose, glucose, HYDROmorphone, insulin aspart U-100, lidocaine-prilocaine, magnesium hydroxide 400 mg/5 ml, melatonin, oxyCODONE, polyethylene glycol, promethazine (PHENERGAN) IVPB, senna-docusate 8.6-50 mg, sodium chloride 0.9%, tiZANidine     Review of Systems   Constitutional: Positive for activity change and appetite change (decreased). Negative for chills and fever.   HENT: Negative for congestion, rhinorrhea, sore throat and trouble swallowing.    Eyes: Negative for photophobia and visual disturbance.   Respiratory: Negative for cough, chest tightness and shortness of breath.    Cardiovascular: Negative for chest pain and leg swelling.   Gastrointestinal: Positive for abdominal pain, constipation, nausea and vomiting. Negative for diarrhea.   Genitourinary: Negative for dysuria, flank pain and hematuria.   Musculoskeletal: Negative for myalgias and neck pain.    Skin: Negative for rash and wound.   Neurological: Positive for tremors, weakness and headaches. Negative for syncope.   Psychiatric/Behavioral: Positive for confusion and hallucinations. Negative for agitation.     Objective:     Vital Signs (Most Recent):  Temp: 97.4 °F (36.3 °C) (05/08/20 1116)  Pulse: 88 (05/08/20 1116)  Resp: 15 (05/08/20 1116)  BP: 118/84 (05/08/20 1116)  SpO2: (!) 93 % (05/08/20 1116) Vital Signs (24h Range):  Temp:  [97.3 °F (36.3 °C)-98.4 °F (36.9 °C)] 97.4 °F (36.3 °C)  Pulse:  [] 88  Resp:  [15-19] 15  SpO2:  [93 %-98 %] 93 %  BP: (118-150)/(61-84) 118/84     Weight: 77.1 kg (170 lb)  Body mass index is 27.44 kg/m².  Body surface area is 1.89 meters squared.      Intake/Output Summary (Last 24 hours) at 5/8/2020 1415  Last data filed at 5/8/2020 1116  Gross per 24 hour   Intake 835 ml   Output 1000 ml   Net -165 ml       Physical Exam   Constitutional: She is oriented to person, place, and time. She appears well-developed and well-nourished. No distress.   HENT:   Head: Normocephalic and atraumatic.   Mouth/Throat: Oropharynx is clear and moist.   Eyes: Conjunctivae are normal. No scleral icterus.   Neck: Normal range of motion. Neck supple.   Cardiovascular: Regular rhythm, normal heart sounds and intact distal pulses.   No murmur heard.  Pulmonary/Chest: Effort normal. No respiratory distress. She has no wheezes.   Diffuse coarse crackles bilaterally   Abdominal: Soft. Bowel sounds are normal. She exhibits no distension. There is tenderness (diffuse, more prominent LUQ/LLQ). There is guarding (voluntary).   Musculoskeletal: She exhibits edema (trace) and tenderness (L ankle, chronic from previous fracture). She exhibits no deformity.   Neurological: She is alert and oriented to person, place, and time.   Bilateral UE/LE tremors noted   Skin: Skin is warm and dry. She is not diaphoretic.   Psychiatric: She has a normal mood and affect. Her behavior is normal. Judgment and thought  content normal.       Significant Labs:   CBC:   Recent Labs   Lab 05/07/20 0316 05/08/20  0347   WBC 1.76* 1.70*   HGB 6.5* 7.5*   HCT 21.2* 23.7*   PLT 87* 64*   , CMP:   Recent Labs   Lab 05/07/20 0316 05/08/20  0347   * 134*   K 4.2 4.0    103   CO2 19* 19*   * 135*   BUN 27* 34*   CREATININE 2.4* 3.0*   CALCIUM 8.8 9.2   PROT 6.5 6.8   ALBUMIN 2.0* 2.1*   BILITOT 0.8 1.3*   ALKPHOS 164* 174*   AST 63* 77*   ALT 55* 64*   ANIONGAP 11 12   EGFRNONAA 19.4* 14.8*    and All pertinent labs from the last 24 hours have been reviewed.    Diagnostic Results:  I have reviewed and interpreted all pertinent imaging results/findings within the past 24 hours.

## 2020-05-08 NOTE — PROGRESS NOTES
Attended rounds on patient this morning with Dr. Deal and the Medical Oncology team. Patient's care was transferred from Internal Medicine to Medical Oncology yesterday. Patient was alert, oriented, cooperative, and conversant. Reviewed the 's assessment and discharging planning update notes. Patient to go home with her daughters. Spoke with [facility] liason nurse Kenia (ext. [phone]) this afternoon and she is aware of patient's transfer to Medical Oncology Service; patient's home health referral is accepted, pending readiness for discharge from the hospital. Will continue to follow.

## 2020-05-08 NOTE — ASSESSMENT & PLAN NOTE
sCr on admission 2.6 (baseline ~1.6). GINA likely pre-renal. Concern for ischemic ATN due to GI losses    -s/p IVF with sCr initially trending down, now trending back upward  - give additional IVF with NS  - daily BMP  - continue to monitor
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
## 2020-05-08 NOTE — PLAN OF CARE
Problem: Malnutrition  Goal: Improved Nutritional Intake  Outcome: Ongoing, Progressing   Recommendations    Recommendation:   1. Continue diabetic diet, encourage good PO intake as tolerable    - liberalize diet to regular diet to increase intake   2. Continue boost plus TID to increase intake   3. Suggest MVI   4. If pt remains with poor PO, suggest enteral nutrition, please re-consult for recs   RD to monitor and follow up    Goals: pt to tolerate >85% of EEN/EPN by RD follow up   Nutrition Goal Status: progressing towards goal, goal not met  Communication of RD Recs: other (comment)(POC)

## 2020-05-08 NOTE — PROGRESS NOTES
"[facility]  Hematology/Oncology  Progress Note    Patient Name: Isabell Preston  Admission Date: 5/5/2020  Hospital Length of Stay: 1 days  Code Status: Full Code     Subjective:     HPI:  Ms. Isabell Preston is a 74 y.o. 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 Last treatment on 4/28/20, next scheduled or 5/19/20) , DVT on daily Lovenox, depression, diabetes mellitus, diverticulosis, hypertension, hyperlipidemia, multiple abdominal surgeries who presents to [facility] ED with abdominal pain, headache, fever (subjective, greater than 103 at home), vomiting (non-bloody or not coffee-ground), and 1 bout of loose bowel movements, however normally has constipation..  Her abdominal pain localizes to the left upper and lower quadrants and is worse with palpation and movement.  She denies any alleviating factors, cough, and shortness of breath.       Patient states she was tested for COVID-19 approximately 2 months ago.  The results of these tests were both negative however she was treated as presumptive positive given her immunocompromised state.  Since that time, the patient has not left the house or had social contact with any COVID infected individuals.  The patient last received chemotherapy 2 weeks ago.  The patient states that the symptoms she is experiencing today are identical to those she experienced 2 months ago when she was presumed positive for COVID-19.      Per documentation , "tested negative for covid x 2 however based on labs PCP felt that pt had covid and got false neg result. "  COVID -19 testing on 5/5 negative .  Patient stated that her PCP culture until her that her initial COVID testing was presumed to be positive, however patient was unable to elaborate on this did not no additional information.  Chart review shows all negative COVID test.     In the ED CT scan of the " abdomen and pelvis without contrast was obtained.  Showing.   1. No acute abnormality identified on today's examination.  2. Diverticulosis without evidence to suggest diverticulitis.  3. Nonspecific bilateral perinephric fat stranding.  Correlation with urinalysis advised.  4. Cholecystectomy, hysterectomy and additional incidental findings as above.    Interval History: Patient remains afebrile >24hrs and HDS, currently on zosyn. Worsening leukopenia/thrombocytopenia and uptrending LFTs, likely 2/2 recent chemo however will continue to monitor. sCr remains elevated, obtain urine lytes to confirm suspected pre-renal cause; giving additional fluids. Neurology consulted for resting tremor and reported incidents of episodic confusion/hallucinations.     Oncology Treatment Plan:   OP NSCLC PEMETREXED + CARBOPLATIN (AUC) Q3W    Medications:  Continuous Infusions:   sodium chloride 0.9% 150 mL/hr at 05/08/20 0852     Scheduled Meds:   amLODIPine  10 mg Oral Daily    DULoxetine  60 mg Oral Daily    enoxaparin  80 mg Subcutaneous Q24H    folic acid  1 mg Oral Daily    metoprolol succinate  25 mg Oral Daily    ondansetron  8 mg Intravenous Q8H    piperacillin-tazobactam (ZOSYN) IVPB  4.5 g Intravenous Q12H    polyethylene glycol  17 g Oral Daily    thiamine (VITAMIN B1) IVPB  500 mg Intravenous TID     PRN Meds:sodium chloride, Dextrose 10% Bolus, Dextrose 10% Bolus, glucagon (human recombinant), glucose, glucose, HYDROmorphone, insulin aspart U-100, lidocaine-prilocaine, magnesium hydroxide 400 mg/5 ml, melatonin, oxyCODONE, polyethylene glycol, promethazine (PHENERGAN) IVPB, senna-docusate 8.6-50 mg, sodium chloride 0.9%, tiZANidine     Review of Systems   Constitutional: Positive for activity change and appetite change (decreased). Negative for chills and fever.   HENT: Negative for congestion, rhinorrhea, sore throat and trouble swallowing.    Eyes: Negative for photophobia and visual disturbance.    Respiratory: Negative for cough, chest tightness and shortness of breath.    Cardiovascular: Negative for chest pain and leg swelling.   Gastrointestinal: Positive for abdominal pain, constipation, nausea and vomiting. Negative for diarrhea.   Genitourinary: Negative for dysuria, flank pain and hematuria.   Musculoskeletal: Negative for myalgias and neck pain.   Skin: Negative for rash and wound.   Neurological: Positive for tremors, weakness and headaches. Negative for syncope.   Psychiatric/Behavioral: Positive for confusion and hallucinations. Negative for agitation.     Objective:     Vital Signs (Most Recent):  Temp: 97.4 °F (36.3 °C) (05/08/20 1116)  Pulse: 88 (05/08/20 1116)  Resp: 15 (05/08/20 1116)  BP: 118/84 (05/08/20 1116)  SpO2: (!) 93 % (05/08/20 1116) Vital Signs (24h Range):  Temp:  [97.3 °F (36.3 °C)-98.4 °F (36.9 °C)] 97.4 °F (36.3 °C)  Pulse:  [] 88  Resp:  [15-19] 15  SpO2:  [93 %-98 %] 93 %  BP: (118-150)/(61-84) 118/84     Weight: 77.1 kg (170 lb)  Body mass index is 27.44 kg/m².  Body surface area is 1.89 meters squared.      Intake/Output Summary (Last 24 hours) at 5/8/2020 1415  Last data filed at 5/8/2020 1116  Gross per 24 hour   Intake 835 ml   Output 1000 ml   Net -165 ml       Physical Exam   Constitutional: She is oriented to person, place, and time. She appears well-developed and well-nourished. No distress.   HENT:   Head: Normocephalic and atraumatic.   Mouth/Throat: Oropharynx is clear and moist.   Eyes: Conjunctivae are normal. No scleral icterus.   Neck: Normal range of motion. Neck supple.   Cardiovascular: Regular rhythm, normal heart sounds and intact distal pulses.   No murmur heard.  Pulmonary/Chest: Effort normal. No respiratory distress. She has no wheezes.   Diffuse coarse crackles bilaterally   Abdominal: Soft. Bowel sounds are normal. She exhibits no distension. There is tenderness (diffuse, more prominent LUQ/LLQ). There is guarding (voluntary).    Musculoskeletal: She exhibits edema (trace) and tenderness (L ankle, chronic from previous fracture). She exhibits no deformity.   Neurological: She is alert and oriented to person, place, and time.   Bilateral UE/LE tremors noted   Skin: Skin is warm and dry. She is not diaphoretic.   Psychiatric: She has a normal mood and affect. Her behavior is normal. Judgment and thought content normal.       Significant Labs:   CBC:   Recent Labs   Lab 05/07/20 0316 05/08/20  0347   WBC 1.76* 1.70*   HGB 6.5* 7.5*   HCT 21.2* 23.7*   PLT 87* 64*   , CMP:   Recent Labs   Lab 05/07/20 0316 05/08/20  0347   * 134*   K 4.2 4.0    103   CO2 19* 19*   * 135*   BUN 27* 34*   CREATININE 2.4* 3.0*   CALCIUM 8.8 9.2   PROT 6.5 6.8   ALBUMIN 2.0* 2.1*   BILITOT 0.8 1.3*   ALKPHOS 164* 174*   AST 63* 77*   ALT 55* 64*   ANIONGAP 11 12   EGFRNONAA 19.4* 14.8*    and All pertinent labs from the last 24 hours have been reviewed.    Diagnostic Results:  I have reviewed and interpreted all pertinent imaging results/findings within the past 24 hours.    Assessment/Plan:     * Left lower quadrant abdominal pain  Hx of diagnosed reticulocytes and diverticulitis.  Current etiology for abdominal pain is unclear  - CT AP without contrast in the ED demonstrating no acute abnormality; diverticulosis without evidence to suggest diverticulitis; nonspecific bilateral perinephric fat stranding.  -- given the fact that CT AP was performed without contrast (due to GINA), wonder if diverticulitis was not appreciated due to lack of contrast.  Will treat currently for possible diverticulitis vs other abdominal source    - IV Unasyn started on 5/5- changed to zosyn on 5/6  - febrile to 100.9 overnight 5/6; infectious w/u negative to date (CXR unremarkable, UA negative for UTI, BCx NGTD; however procal elevated 1.38)  - low threshold to add vanc for MRSA coverage if becomes febrile again  - has remained afebrile >24hrs, HDS  - pain control  with oxy 10mg q6h prn and IV dilaudid 1mg for breakthrough pain  - nausea control with scheduled IV zofran 8mg q8h and prn IV phenergan 25mg q6h  - continue empiric abx with zosyn for now    Macrocytic anemia  Hb 7.8 on admission with   - Hb down trended to 6.5 on 5/7 - s/p 1U PRBC  - continue to monitor  - transfuse as needed for Hb > 7    Acute renal failure with acute tubular necrosis superimposed on stage 3 chronic kidney disease  sCr on admission 2.6 (baseline ~1.6). GINA likely pre-renal. Concern for ischemic ATN due to GI losses    -s/p IVF with sCr initially trending down, now trending back upward  - give additional IVF with NS  - daily BMP  - continue to monitor    Tremor  - Patient with debilitating b/l UE and LE tremor, followed by neurology outpatient. 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 Was considering sinemet outpatient.   - Patient also with reported incidents of episodic confusion/hallucinations. MRI without infarcts.    Plan  - Neurology consulted, apprec recs  - Ammonia level (wnl); thiamine level (pending)  - Patient with prior low levels of thiamine, not taking supplements at home  - Start IV thiamine 500mg TID x 5 days (end date: 5/13), then transition to po pending result of thiamine level  - No sinamet at this time, as has potential to worsen hallucinations and current abdominal pain  - 24 hr EEG to evaluate for possible epileptiform activity  - delirium precautions; minimize sedating medications     Chemotherapy induced nausea and vomiting  - persistent nausea/vomiting  - scheduled IV zofran 8mg q8h  - prn IV phenergan 25mg q6h    Malignant pleural mesothelioma  Receiving outpatient chemotherapy - NSCLC PEMETREXED + CARBOPLATIN (AUC) Q3W and IVF .   Last treatment on 4/28/20, next scheduled or 5/19/20)  - on RA, satting well  - continue to monitor    History of DVT of lower extremity  - continue home Lovenox 80mg daily    Type 2 diabetes  mellitus without complication, without long-term current use of insulin  HbA1c 6.1, well controlled  - POCT glucose AC QHS  - LDSSI  - diabetic diet    Hypertension, essential  - cont home med amlodipine 10mg daily  - unclear why home metoprolol was held on admission (75mg daily at home)  - remains normotensive on amlodipine, restarted metoprolol at low dose 25mg daily  - continue to monitor             Piero Chavez MD  Hematology/Oncology  [facility]

## 2020-05-08 NOTE — PLAN OF CARE
Plan of care reviewed with patient. Patient alert and oriented x4. Reports having tremors. Mild tremors visualized. MD aware. Patient complains of being very tired today and requesting to be given time to rest. Activity clustered as much as possible. Patient instructed to call before getting out of bed. Patient agrees. Worked with PT and walked up and down the hallway with walker. Vital signs stable and afebrile. Fall precautions reviewed with patient and maintained. Patient peed with PT so I was unable to collect that sample, but have instructed patient to call when ready so I can collect the next one. Patient resting comfortably at this time.

## 2020-05-08 NOTE — PLAN OF CARE
Problem: Physical Therapy Goal  Goal: Physical Therapy Goal  Description  Goals to be met by: 20     Patient will increase functional independence with mobility by performin. Supine to sit with Stand-by Assistance. - GOAL MET  REVISED: Mod I  2. Sit to stand transfer with Stand-by Assistance.  3. Bed to chair transfer with Stand-by Assistance using Rolling Walker. - GOAL MET  REVISED: S with use of RW  4. Gait  x 50 feet with Stand-by Assistance using Rolling Walker. - GOAL MET  Pt to amb 150', S, with RW  5. Ascend/Descend 4 inch curb step with Moderate Assistance using Rolling Walker.  6. Lower extremity exercise program x 20 reps per handout, with assistance as needed.      Outcome: Ongoing, Progressing    Pt achieved 3 goals, all goals revised.

## 2020-05-08 NOTE — HPI
"Ms. Preston is a 75 yo female w/ PMH significant for HTN, HLD, mesothelioma, DVT on lovenox, MDD, DM, who presented to [facility] with abdominal pain.  Neurology consulted for concern of PD.      Per pt's daughter Marly pt began having occasional hand twitches shortly after starting carboplatin.  It has gradually spread to include both hands, legs.  Daughter states that it is random in onset and duration.  States that it is very exhausting, and she will fall asleep afterwards (because she is worn out), limits ability to mobilize.    Daughter reports hallucinations - began in July 2019.  That people were stealing from her, putting stuff in cabinets.  At the time of onset, suspected these hallucinations related to pain medications and benadryl.  Daughter reports these were switched/stopped and hallucinations improved.  Beginning with the events of confusion below, daughter suspects the auditory and visual hallucinations have returned.      Reports pt may twitch, "yank on things," talk in her sleep. These behaviors have been ongoing since July.  Good sense of smell, per daughter.  Longstanding constipation and multiple abdominal surgeries.    Reviewed medications with daughter Marly, notable meds below:  -gabapentin 100 BID  -vistaril 10 mg PRN for anxiety (takes daily)  -hydrocodone takes approximately Qmonthly  -promethazine daily for nausea    3-4 weeks ago, she had an episode of confusion (didn't know where she was; had been staying with daughter for 2 months).  Developed paranoia, diaphoresis, then went "loopy."  Had a severe headache in the middle of her head at the time.  Approximately a week prior to this presentation, she had a similar episode of longer duration (total 30-60 minutes), and then she returned to her baseline.  No prior events like this.  Unable to identify any triggering factors.  Over the course of her chemo, she has been getting more forgetful.      Was previously on abilify for depression, has now been " "off for about 2 months (per daughter).  Noted that tremor worsened after discontinuing abilify.  Her symptoms decsribed above have worsened.  Began abilify likely in September 2019.    Prior history of smoking ( a few years more than 50 years ago).  Was a "social drinker" (1 drink/week), but not recently.      Pt's mother has dementia.    "

## 2020-05-08 NOTE — PLAN OF CARE
Goals addressed and unmet.  Cont with POC  Marialuisa Cortes, CAMERON  5/8/2020    Problem: Occupational Therapy Goal  Goal: Occupational Therapy Goal  Description  Goals to be met by: 6/5    Patient will increase functional independence with ADLs by performing:    UE Dressing with Set-up Assistance.  LE Dressing with Stand-by Assistance.  Grooming while seated with Set-up Assistance.  Toileting from bedside commode with Contact Guard Assistance for hygiene and clothing management.      Outcome: Ongoing, Progressing

## 2020-05-08 NOTE — ASSESSMENT & PLAN NOTE
- cont home med amlodipine 10mg daily  - unclear why home metoprolol was held on admission (75mg daily at home)  - remains normotensive on amlodipine, restarted metoprolol at low dose 25mg daily  - continue to monitor

## 2020-05-08 NOTE — ASSESSMENT & PLAN NOTE
- Patient with debilitating b/l UE and LE tremor, followed by neurology outpatient. 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 Was considering sinemet outpatient.   - Patient also with reported incidents of episodic confusion/hallucinations. MRI without infarcts.    Plan  - Neurology consulted, apprec recs  - Ammonia level (wnl); thiamine level (pending)  - Patient with prior low levels of thiamine, not taking supplements at home  - Start IV thiamine 500mg TID x 5 days (end date: 5/13), then transition to po pending result of thiamine level  - No sinamet at this time, as has potential to worsen hallucinations and current abdominal pain  - 24 hr EEG to evaluate for possible epileptiform activity  - delirium precautions; minimize sedating medications

## 2020-05-08 NOTE — PROGRESS NOTES
Pharmacist Renal Dose Adjustment Note    Isabell Preston is a 74 y.o. female being treated with the medication Zosyn.    Patient Data:    Vital Signs (Most Recent):  Temp: 98 °F (36.7 °C) (05/08/20 0345)  Pulse: 96 (05/08/20 0850)  Resp: 16 (05/08/20 0345)  BP: (!) 147/69 (05/08/20 0850)  SpO2: (!) 93 % (05/08/20 0345)   Vital Signs (72h Range):  Temp:  [96.7 °F (35.9 °C)-100.9 °F (38.3 °C)]   Pulse:  []   Resp:  [15-20]   BP: (117-182)/(58-82)   SpO2:  [93 %-98 %]      Recent Labs   Lab 05/06/20  0424 05/07/20  0316 05/08/20  0347   CREATININE 2.2* 2.4* 3.0*     Serum creatinine: 3 mg/dL (H) 05/08/20 0347  Estimated creatinine clearance: 17.5 mL/min (A)    Medication:Zosyn dose: 4.5 g frequency q8 hours will be changed to medication:Zosyn dose:4.5 g frequency:q12 hours    Pharmacist's Name: María Grossman  Pharmacist's Extension: [id number]

## 2020-05-08 NOTE — PLAN OF CARE
"POC reviewed with patient; understanding verbalized. Pt complaining of nausea and pain to legs that have +2 edema, PRN meds administered. Pt states that "legs look worse" and is concerned. Pt up to bedside commode with assistance. Pt received zosyn this shift. Pt. with nonskid footwear on, bed in lowest position, and locked with bed rails up x2.  Pt. instructed to call prior to getting OOB.  Pt. has call light and personal items within reach. VSS and afebrile this shift. All questions and concerns addressed at this time. Will continue to monitor.       "

## 2020-05-08 NOTE — PT/OT/SLP PROGRESS
"Physical Therapy Treatment    Patient Name:  Isabell Preston   MRN:  [id number]    Recommendations:     Discharge Recommendations:  home health PT   Discharge Equipment Recommendations: none   Barriers to discharge: None    Assessment:     Isabell Preston is a 74 y.o. female admitted with a medical diagnosis of Left lower quadrant abdominal pain.  She presents with the following impairments/functional limitations:  weakness, impaired endurance, impaired self care skills, impaired functional mobilty, gait instability, impaired balance, decreased safety awareness, decreased lower extremity function, impaired sensation.    Pt currently requires SBA of 1 person to amb long distances in the hallway, with use of RW for support.  No episodes of LOB noted during gait training.  Gait speed is extremely slow, decreased step width, increased WBing through UEs.  Complaints of SOB, which resolved with B shoulder depression and ed on deep and pursed lip breathing.    Rehab Prognosis: Good; patient would benefit from acute skilled PT services to address these deficits and reach maximum level of function.    Recent Surgery: * No surgery found *      Plan:     During this hospitalization, patient to be seen 4 x/week to address the identified rehab impairments via gait training, therapeutic activities, therapeutic exercises, neuromuscular re-education and progress toward the following goals:    · Plan of Care Expires:  06/05/20    Subjective     Chief Complaint: Fatigue  Patient/Family Comments/goals: "I need something to go to sleep right now."  Pain/Comfort:  · Pain Rating 1: 0/10      Objective:     Communicated with nursing prior to session.  Patient found supine with SCD, telemetry, pulse ox (continuous), peripheral IV(port) upon PT entry to room.     General Precautions: Standard, fall, neutropenic   Orthopedic Precautions:N/A   Braces: N/A     Functional Mobility:  · Bed Mobility:     · Scooting: stand by " assistance  · Supine to Sit: stand by assistance  · Transfers:     · Sit to Stand:  contact guard assistance with no AD  · Bed to Chair: stand by assistance with  rolling walker  using  Stand Pivot  · Gait: Pt amb 188', SBA, RW, no episodes of LOB, slow gait speed, decreased step width, increased UE WBing, tactile cuing and verbal cuing to faciltiate B shoulder depression, demo and cuing for deep and pursed lip breathing, demo and ed on toe off to facilitate forward propulsion  · Balance: SBA: dynamic standing balance with AD      AM-PAC 6 CLICK MOBILITY  Turning over in bed (including adjusting bedclothes, sheets and blankets)?: 3  Sitting down on and standing up from a chair with arms (e.g., wheelchair, bedside commode, etc.): 3  Moving from lying on back to sitting on the side of the bed?: 3  Moving to and from a bed to a chair (including a wheelchair)?: 3  Need to walk in hospital room?: 3  Climbing 3-5 steps with a railing?: 1  Basic Mobility Total Score: 16       Therapeutic Activities and Exercises:   Whiteboard updated    Patient left up in chair with all lines intact, call button in reach and nursing notified.    GOALS:   Multidisciplinary Problems     Physical Therapy Goals        Problem: Physical Therapy Goal    Goal Priority Disciplines Outcome Goal Variances Interventions   Physical Therapy Goal     PT, PT/OT Ongoing, Progressing     Description:  Goals to be met by: 20     Patient will increase functional independence with mobility by performin. Supine to sit with Stand-by Assistance. - GOAL MET  REVISED: Mod I  2. Sit to stand transfer with Stand-by Assistance.  3. Bed to chair transfer with Stand-by Assistance using Rolling Walker. - GOAL MET  REVISED: S with use of RW  4. Gait  x 50 feet with Stand-by Assistance using Rolling Walker. - GOAL MET  Pt to amb 150', S, with RW  5. Ascend/Descend 4 inch curb step with Moderate Assistance using Rolling Walker.  6. Lower extremity exercise  program x 20 reps per handout, with assistance as needed.                       Time Tracking:     PT Received On: 05/08/20  PT Start Time: 1201     PT Stop Time: 1251  PT Total Time (min): 50 min     Billable Minutes: Gait Training 45    Treatment Type: Treatment  PT/PTA: PT     PTA Visit Number: 1     Janeth Barnes, PT  05/08/2020

## 2020-05-08 NOTE — ASSESSMENT & PLAN NOTE
3/5/20 Thiamine resulted low at 26  Could contribute to cognitive difficulties.  Daughter reports pt has not been receiving thiamine at home.    -recheck level then START thiamine 500 mg IV TID x5 days, with plan to transition to lower dose/PO afterwards pending level (may take several days to result).
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
## 2020-05-09 LAB
ALBUMIN SERPL BCP-MCNC: 2.1 G/DL (ref 3.5–5.2)
ALBUMIN/CREAT UR: 269.2 UG/MG (ref 0–30)
ALLENS TEST: ABNORMAL
ALP SERPL-CCNC: 220 U/L (ref 55–135)
ALT SERPL W/O P-5'-P-CCNC: 100 U/L (ref 10–44)
ANION GAP SERPL CALC-SCNC: 13 MMOL/L (ref 8–16)
ANISOCYTOSIS BLD QL SMEAR: SLIGHT
AST SERPL-CCNC: 132 U/L (ref 10–40)
BACTERIA #/AREA URNS AUTO: NORMAL /HPF
BASOPHILS # BLD AUTO: 0.01 K/UL (ref 0–0.2)
BASOPHILS NFR BLD: 0.5 % (ref 0–1.9)
BILIRUB SERPL-MCNC: 0.8 MG/DL (ref 0.1–1)
BILIRUB UR QL STRIP: NEGATIVE
BUN SERPL-MCNC: 40 MG/DL (ref 8–23)
CALCIUM SERPL-MCNC: 9.2 MG/DL (ref 8.7–10.5)
CHLORIDE SERPL-SCNC: 104 MMOL/L (ref 95–110)
CLARITY UR REFRACT.AUTO: CLEAR
CO2 SERPL-SCNC: 18 MMOL/L (ref 23–29)
COLOR UR AUTO: ABNORMAL
CREAT SERPL-MCNC: 3.5 MG/DL (ref 0.5–1.4)
CREAT UR-MCNC: 26 MG/DL (ref 15–325)
DIFFERENTIAL METHOD: ABNORMAL
EOSINOPHIL # BLD AUTO: 0 K/UL (ref 0–0.5)
EOSINOPHIL NFR BLD: 1.5 % (ref 0–8)
ERYTHROCYTE [DISTWIDTH] IN BLOOD BY AUTOMATED COUNT: 16.5 % (ref 11.5–14.5)
EST. GFR  (AFRICAN AMERICAN): 14.2 ML/MIN/1.73 M^2
EST. GFR  (NON AFRICAN AMERICAN): 12.3 ML/MIN/1.73 M^2
GLUCOSE SERPL-MCNC: 114 MG/DL (ref 70–110)
GLUCOSE UR QL STRIP: NEGATIVE
HCO3 UR-SCNC: 18.9 MMOL/L (ref 24–28)
HCT VFR BLD AUTO: 25.9 % (ref 37–48.5)
HGB BLD-MCNC: 8.4 G/DL (ref 12–16)
HGB UR QL STRIP: ABNORMAL
IMM GRANULOCYTES # BLD AUTO: 0.02 K/UL (ref 0–0.04)
IMM GRANULOCYTES NFR BLD AUTO: 1 % (ref 0–0.5)
KETONES UR QL STRIP: NEGATIVE
LACTATE SERPL-SCNC: 1.3 MMOL/L (ref 0.5–2.2)
LEUKOCYTE ESTERASE UR QL STRIP: NEGATIVE
LYMPHOCYTES # BLD AUTO: 0.5 K/UL (ref 1–4.8)
LYMPHOCYTES NFR BLD: 24.1 % (ref 18–48)
MAGNESIUM SERPL-MCNC: 1.9 MG/DL (ref 1.6–2.6)
MCH RBC QN AUTO: 33.6 PG (ref 27–31)
MCHC RBC AUTO-ENTMCNC: 32.4 G/DL (ref 32–36)
MCV RBC AUTO: 104 FL (ref 82–98)
MICROALBUMIN UR DL<=1MG/L-MCNC: 70 UG/ML
MICROSCOPIC COMMENT: NORMAL
MONOCYTES # BLD AUTO: 0.1 K/UL (ref 0.3–1)
MONOCYTES NFR BLD: 6.9 % (ref 4–15)
NEUTROPHILS # BLD AUTO: 1.3 K/UL (ref 1.8–7.7)
NEUTROPHILS NFR BLD: 66 % (ref 38–73)
NITRITE UR QL STRIP: NEGATIVE
NRBC BLD-RTO: 0 /100 WBC
OSMOLALITY SERPL: 293 MOSM/KG (ref 275–295)
OSMOLALITY UR: 215 MOSM/KG (ref 50–1200)
OVALOCYTES BLD QL SMEAR: ABNORMAL
PCO2 BLDA: 37 MMHG (ref 35–45)
PH SMN: 7.32 [PH] (ref 7.35–7.45)
PH UR STRIP: 5 [PH] (ref 5–8)
PHOSPHATE SERPL-MCNC: 4.3 MG/DL (ref 2.7–4.5)
PLATELET # BLD AUTO: 53 K/UL (ref 150–350)
PLATELET BLD QL SMEAR: ABNORMAL
PMV BLD AUTO: 10.3 FL (ref 9.2–12.9)
PO2 BLDA: 40 MMHG (ref 40–60)
POC BE: -7 MMOL/L
POC SATURATED O2: 71 % (ref 95–100)
POC TCO2: 20 MMOL/L (ref 24–29)
POCT GLUCOSE: 117 MG/DL (ref 70–110)
POCT GLUCOSE: 124 MG/DL (ref 70–110)
POCT GLUCOSE: 146 MG/DL (ref 70–110)
POCT GLUCOSE: 160 MG/DL (ref 70–110)
POIKILOCYTOSIS BLD QL SMEAR: SLIGHT
POTASSIUM SERPL-SCNC: 4.2 MMOL/L (ref 3.5–5.1)
PROT SERPL-MCNC: 7.1 G/DL (ref 6–8.4)
PROT UR QL STRIP: NEGATIVE
PROT UR-MCNC: 35 MG/DL (ref 0–15)
PROT/CREAT UR: 1.35 MG/G{CREAT} (ref 0–0.2)
RBC # BLD AUTO: 2.5 M/UL (ref 4–5.4)
RBC #/AREA URNS AUTO: 2 /HPF (ref 0–4)
SAMPLE: ABNORMAL
SITE: ABNORMAL
SODIUM SERPL-SCNC: 135 MMOL/L (ref 136–145)
SODIUM UR-SCNC: 51 MMOL/L (ref 20–250)
SP GR UR STRIP: 1.01 (ref 1–1.03)
SQUAMOUS #/AREA URNS AUTO: 2 /HPF
URN SPEC COLLECT METH UR: ABNORMAL
UUN UR-MCNC: 189 MG/DL (ref 140–1050)
WBC # BLD AUTO: 2.03 K/UL (ref 3.9–12.7)
WBC #/AREA URNS AUTO: 1 /HPF (ref 0–5)

## 2020-05-09 PROCEDURE — 25000003 PHARM REV CODE 250: Performed by: STUDENT IN AN ORGANIZED HEALTH CARE EDUCATION/TRAINING PROGRAM

## 2020-05-09 PROCEDURE — 82570 ASSAY OF URINE CREATININE: CPT

## 2020-05-09 PROCEDURE — 83935 ASSAY OF URINE OSMOLALITY: CPT

## 2020-05-09 PROCEDURE — 25000003 PHARM REV CODE 250: Performed by: INTERNAL MEDICINE

## 2020-05-09 PROCEDURE — 83735 ASSAY OF MAGNESIUM: CPT

## 2020-05-09 PROCEDURE — 63600175 PHARM REV CODE 636 W HCPCS: Performed by: STUDENT IN AN ORGANIZED HEALTH CARE EDUCATION/TRAINING PROGRAM

## 2020-05-09 PROCEDURE — 80053 COMPREHEN METABOLIC PANEL: CPT

## 2020-05-09 PROCEDURE — 99233 SBSQ HOSP IP/OBS HIGH 50: CPT | Mod: GC,,, | Performed by: INTERNAL MEDICINE

## 2020-05-09 PROCEDURE — 82043 UR ALBUMIN QUANTITATIVE: CPT

## 2020-05-09 PROCEDURE — 99233 PR SUBSEQUENT HOSPITAL CARE,LEVL III: ICD-10-PCS | Mod: GC,,, | Performed by: PSYCHIATRY & NEUROLOGY

## 2020-05-09 PROCEDURE — 99233 SBSQ HOSP IP/OBS HIGH 50: CPT | Mod: GC,,, | Performed by: PSYCHIATRY & NEUROLOGY

## 2020-05-09 PROCEDURE — 84540 ASSAY OF URINE/UREA-N: CPT

## 2020-05-09 PROCEDURE — 36415 COLL VENOUS BLD VENIPUNCTURE: CPT

## 2020-05-09 PROCEDURE — 99233 PR SUBSEQUENT HOSPITAL CARE,LEVL III: ICD-10-PCS | Mod: GC,,, | Performed by: INTERNAL MEDICINE

## 2020-05-09 PROCEDURE — 84300 ASSAY OF URINE SODIUM: CPT

## 2020-05-09 PROCEDURE — 63600175 PHARM REV CODE 636 W HCPCS: Performed by: INTERNAL MEDICINE

## 2020-05-09 PROCEDURE — 83605 ASSAY OF LACTIC ACID: CPT

## 2020-05-09 PROCEDURE — 83930 ASSAY OF BLOOD OSMOLALITY: CPT

## 2020-05-09 PROCEDURE — 85025 COMPLETE CBC W/AUTO DIFF WBC: CPT

## 2020-05-09 PROCEDURE — 81001 URINALYSIS AUTO W/SCOPE: CPT

## 2020-05-09 PROCEDURE — 20600001 HC STEP DOWN PRIVATE ROOM

## 2020-05-09 PROCEDURE — 25000003 PHARM REV CODE 250: Performed by: HOSPITALIST

## 2020-05-09 PROCEDURE — 84100 ASSAY OF PHOSPHORUS: CPT

## 2020-05-09 RX ORDER — SODIUM CHLORIDE 9 MG/ML
INJECTION, SOLUTION INTRAVENOUS CONTINUOUS
Status: ACTIVE | OUTPATIENT
Start: 2020-05-09 | End: 2020-05-10

## 2020-05-09 RX ADMIN — THIAMINE HYDROCHLORIDE 500 MG: 100 INJECTION, SOLUTION INTRAMUSCULAR; INTRAVENOUS at 12:05

## 2020-05-09 RX ADMIN — FOLIC ACID 1 MG: 1 TABLET ORAL at 09:05

## 2020-05-09 RX ADMIN — ONDANSETRON 8 MG: 2 INJECTION, SOLUTION INTRAMUSCULAR; INTRAVENOUS at 02:05

## 2020-05-09 RX ADMIN — THIAMINE HYDROCHLORIDE 500 MG: 100 INJECTION, SOLUTION INTRAMUSCULAR; INTRAVENOUS at 10:05

## 2020-05-09 RX ADMIN — PIPERACILLIN SODIUM,TAZOBACTAM SODIUM 4.5 G: 4; .5 INJECTION, POWDER, FOR SOLUTION INTRAVENOUS at 08:05

## 2020-05-09 RX ADMIN — METOPROLOL SUCCINATE 25 MG: 25 TABLET, EXTENDED RELEASE ORAL at 09:05

## 2020-05-09 RX ADMIN — PROMETHAZINE HYDROCHLORIDE 25 MG: 25 INJECTION INTRAMUSCULAR; INTRAVENOUS at 06:05

## 2020-05-09 RX ADMIN — POLYETHYLENE GLYCOL 3350 17 G: 17 POWDER, FOR SOLUTION ORAL at 09:05

## 2020-05-09 RX ADMIN — AMLODIPINE BESYLATE 10 MG: 10 TABLET ORAL at 09:05

## 2020-05-09 RX ADMIN — SODIUM CHLORIDE: 0.9 INJECTION, SOLUTION INTRAVENOUS at 12:05

## 2020-05-09 RX ADMIN — ONDANSETRON 8 MG: 2 INJECTION, SOLUTION INTRAMUSCULAR; INTRAVENOUS at 10:05

## 2020-05-09 RX ADMIN — DULOXETINE 60 MG: 60 CAPSULE, DELAYED RELEASE ORAL at 09:05

## 2020-05-09 RX ADMIN — ONDANSETRON 8 MG: 2 INJECTION, SOLUTION INTRAMUSCULAR; INTRAVENOUS at 05:05

## 2020-05-09 RX ADMIN — OXYCODONE HYDROCHLORIDE 10 MG: 10 TABLET ORAL at 06:05

## 2020-05-09 RX ADMIN — OXYCODONE HYDROCHLORIDE 10 MG: 10 TABLET ORAL at 05:05

## 2020-05-09 RX ADMIN — PIPERACILLIN SODIUM,TAZOBACTAM SODIUM 4.5 G: 4; .5 INJECTION, POWDER, FOR SOLUTION INTRAVENOUS at 09:05

## 2020-05-09 NOTE — ASSESSMENT & PLAN NOTE
- continue home Lovenox 80mg daily  - platelet count has been downtrending, may need to hold lovenox if platelet count drops below 50 (bleed risk)

## 2020-05-09 NOTE — PLAN OF CARE
Reviewed plan of care with patient. Patient alert, but has episodes of intermittent confusion. Patient having hallucinations of people coming into her room that are not. Her memory is also more impaired today than yesterday. MD notified. Patient pending a urine collection, but had an episodes of incontinence and peed on the floor so was unable to collect. Will try again later. All items needed for collection at bedside. Transport came to take patient to [facility], but could not transport her d/t the 24hr EEG monitoring equipment. They will check back later when equipment removed. Bed alarm on and fall risks reviewed with patient, but she forgets to call. Charge nurse informed of fall risk. Patient appetite is fair today averaging about 25-50% consumption of meals.

## 2020-05-09 NOTE — PLAN OF CARE
Plan of care reviewed with patient. Afebrile. Free from falls or injury. Zosyn and Thiamine IVPB given as scheduled. Up with assistance to bedside commode. 24 hour EEG in progress. Bed locked in lowest position, non skid socks on, call light within reach. Pt instructed to call if any assistance is needed. Bed alarm on. Vitals stable. Will cont to corinne pt.

## 2020-05-09 NOTE — ASSESSMENT & PLAN NOTE
3/5/20 Thiamine resulted low at 26  Could contribute to cognitive difficulties.  Daughter reports pt has not been receiving thiamine at home.  -f/u repeat level, replete as under tremor section

## 2020-05-09 NOTE — PROGRESS NOTES
[facility]  Neurology  Progress Note    Patient Name: Isabell Preston  MRN: [id number]  Admission Date: 5/5/2020  Hospital Length of Stay: 2 days  Code Status: Full Code   Attending Provider: JOSE Deal MD  Primary Care Physician: Ayo Archuleta MD   Principal Problem:Left lower quadrant abdominal pain      Subjective:     Interval History:   No acute events overnight per EMR.  Pt appears confused this morning, reporting that she had gone into other rooms to check their temperature this morning.  Improved on attending rounds to prior baseline.    Tremor improved.    Current Neurological Medications:   Thiamine      Current Facility-Administered Medications   Medication Dose Route Frequency Provider Last Rate Last Dose    0.9%  NaCl infusion (for blood administration)   Intravenous Q24H PRN Piero Chavez MD        0.9%  NaCl infusion   Intravenous Continuous Pernellafkai Morales  mL/hr at 05/09/20 1233      amLODIPine tablet 10 mg  10 mg Oral Daily Valerie Greene MD   10 mg at 05/09/20 0939    dextrose 10% (D10W) Bolus  12.5 g Intravenous PRN Valerie Greene MD        dextrose 10% (D10W) Bolus  25 g Intravenous PRN Valerie Greene MD        DULoxetine DR capsule 60 mg  60 mg Oral Daily Valerie Greene MD   60 mg at 05/09/20 0939    folic acid tablet 1 mg  1 mg Oral Daily Valerie Greene MD   1 mg at 05/09/20 0939    glucagon (human recombinant) injection 1 mg  1 mg Intramuscular PRN Valerie Greene MD        glucose chewable tablet 16 g  16 g Oral PRN Valerie Greene MD        glucose chewable tablet 24 g  24 g Oral PRN Valerie Greene MD        HYDROmorphone injection 1 mg  1 mg Intravenous Q6H PRN Valerie Greene MD   1 mg at 05/07/20 0000    insulin aspart U-100 pen 0-5 Units  0-5 Units Subcutaneous QID (AC + HS) PRN Valerie Greene MD   2 Units at 05/06/20 1243    lidocaine-prilocaine cream   Topical (Top) PRN Piero Chavez MD         magnesium hydroxide 400 mg/5 ml suspension 2,400 mg  30 mL Oral Daily PRN Valerie Greene MD   2,400 mg at 05/08/20 1814    melatonin tablet 8 mg  8 mg Oral Nightly PRN Valerie Greene MD        metoprolol succinate (TOPROL-XL) 24 hr tablet 25 mg  25 mg Oral Daily Piero Chavez MD   25 mg at 05/09/20 0939    ondansetron injection 8 mg  8 mg Intravenous Q8H Piero Chavez MD   8 mg at 05/09/20 1400    oxyCODONE immediate release tablet Tab 10 mg  10 mg Oral Q6H PRN Valerie Greene MD   10 mg at 05/09/20 1747    piperacillin-tazobactam 4.5 g in sodium chloride 0.9% 100 mL IVPB (ready to mix system)  4.5 g Intravenous Q12H JOSE Dela MD 25 mL/hr at 05/09/20 0939 4.5 g at 05/09/20 0939    polyethylene glycol packet 17 g  17 g Oral BID PRN Valerie Greene MD        polyethylene glycol packet 17 g  17 g Oral Daily Valerie Greene MD   17 g at 05/09/20 0939    promethazine (PHENERGAN) 25 mg in dextrose 5 % 50 mL IVPB  25 mg Intravenous Q6H PRN Pieor Chavez  mL/hr at 05/09/20 0630 25 mg at 05/09/20 0630    senna-docusate 8.6-50 mg per tablet 1 tablet  1 tablet Oral Daily PRN Valerie Greene MD        sodium chloride 0.9% flush 5 mL  5 mL Intravenous PRN Valerie Greene MD        thiamine (B-1) 500 mg in dextrose 5 % 50 mL IVPB  500 mg Intravenous TID Piero Chavez MD   Stopped at 05/09/20 1500    tiZANidine tablet 4 mg  4 mg Oral Nightly PRN Valerie Greene MD           Review of Systems   Constitutional: Negative for fever.   Neurological: Positive for weakness. Negative for seizures.   Psychiatric/Behavioral: Positive for confusion.     Objective:     Vital Signs (Most Recent):  Temp: 96.4 °F (35.8 °C) (05/09/20 1603)  Pulse: 91 (05/09/20 1603)  Resp: 16 (05/09/20 1603)  BP: (!) 141/69 (05/09/20 1603)  SpO2: (!) 94 % (05/09/20 1603) Vital Signs (24h Range):  Temp:  [96.4 °F (35.8 °C)-97.9 °F (36.6 °C)] 96.4 °F (35.8 °C)  Pulse:  [82-98] 91  Resp:  [16-18] 16  SpO2:  [94 %-95 %]  94 %  BP: (137-158)/(69-85) 141/69     Weight: 77.1 kg (170 lb)  Body mass index is 27.44 kg/m².    Physical Exam   Constitutional: She appears well-developed. No distress.   HENT:   Head: Normocephalic.   Eyes: EOM are normal.   Cardiovascular: Normal rate and regular rhythm. Exam reveals no friction rub.   No murmur heard.  Pulmonary/Chest: Effort normal and breath sounds normal. No stridor. No respiratory distress.   Abdominal: Soft. Bowel sounds are normal. She exhibits no distension. There is no tenderness.   Neurological: She has normal strength. Finger-nose-finger test: end-intention tremor    Reflex Scores:       Bicep reflexes are 1+ on the right side and 1+ on the left side.       Brachioradialis reflexes are 1+ on the right side and 1+ on the left side.       Patellar reflexes are 1+ on the right side and 1+ on the left side.  Skin: Skin is warm and dry.       NEUROLOGICAL EXAMINATION:     MENTAL STATUS   Level of consciousness: alert       Oriented to month/year     CRANIAL NERVES     CN II   Visual fields full to confrontation.     CN III, IV, VI   Extraocular motions are normal.     CN V   Facial sensation intact.     CN VII   Facial expression full, symmetric.     CN VIII   Hearing: intact    CN IX, X   Palate: symmetric    CN XI   CN XI normal.     CN XII   CN XII normal.        Pupils round, constricted b/l with small response to light       MOTOR EXAM   Muscle bulk: normal  Right arm tone: cogwheel rigidity (very slight, fluctuating)  Left arm tone: cogwheel rigidity (very slight, fluctuating)    Strength   Strength 5/5 throughout.     REFLEXES     Reflexes   Right brachioradialis: 1+  Left brachioradialis: 1+  Right biceps: 1+  Left biceps: 1+  Right patellar: 1+  Left patellar: 1+  Right Macedo: absent  Left Macedo: absent  Right ankle clonus: absent  Left ankle clonus: absent    SENSORY EXAM   Light touch normal.     GAIT AND COORDINATION      Coordination   Finger-nose-finger test:  end-intention tremor        No rest tremor noted on exam today.    Mild postural tremor, RUE>LUE.  Improved from yesterday.         Significant Labs:   Recent Results (from the past 24 hour(s))   Comprehensive Metabolic Panel (CMP)    Collection Time: 05/09/20  3:43 AM   Result Value Ref Range    Sodium 135 (L) 136 - 145 mmol/L    Potassium 4.2 3.5 - 5.1 mmol/L    Chloride 104 95 - 110 mmol/L    CO2 18 (L) 23 - 29 mmol/L    Glucose 114 (H) 70 - 110 mg/dL    BUN, Bld 40 (H) 8 - 23 mg/dL    Creatinine 3.5 (H) 0.5 - 1.4 mg/dL    Calcium 9.2 8.7 - 10.5 mg/dL    Total Protein 7.1 6.0 - 8.4 g/dL    Albumin 2.1 (L) 3.5 - 5.2 g/dL    Total Bilirubin 0.8 0.1 - 1.0 mg/dL    Alkaline Phosphatase 220 (H) 55 - 135 U/L     (H) 10 - 40 U/L     (H) 10 - 44 U/L    Anion Gap 13 8 - 16 mmol/L    eGFR if African American 14.2 (A) >60 mL/min/1.73 m^2    eGFR if non  12.3 (A) >60 mL/min/1.73 m^2   Magnesium    Collection Time: 05/09/20  3:43 AM   Result Value Ref Range    Magnesium 1.9 1.6 - 2.6 mg/dL   Phosphorus    Collection Time: 05/09/20  3:43 AM   Result Value Ref Range    Phosphorus 4.3 2.7 - 4.5 mg/dL   CBC with Automated Differential    Collection Time: 05/09/20  3:43 AM   Result Value Ref Range    WBC 2.03 (L) 3.90 - 12.70 K/uL    RBC 2.50 (L) 4.00 - 5.40 M/uL    Hemoglobin 8.4 (L) 12.0 - 16.0 g/dL    Hematocrit 25.9 (L) 37.0 - 48.5 %    Mean Corpuscular Volume 104 (H) 82 - 98 fL    Mean Corpuscular Hemoglobin 33.6 (H) 27.0 - 31.0 pg    Mean Corpuscular Hemoglobin Conc 32.4 32.0 - 36.0 g/dL    RDW 16.5 (H) 11.5 - 14.5 %    Platelets 53 (L) 150 - 350 K/uL    MPV 10.3 9.2 - 12.9 fL    Immature Granulocytes 1.0 (H) 0.0 - 0.5 %    Gran # (ANC) 1.3 (L) 1.8 - 7.7 K/uL    Immature Grans (Abs) 0.02 0.00 - 0.04 K/uL    Lymph # 0.5 (L) 1.0 - 4.8 K/uL    Mono # 0.1 (L) 0.3 - 1.0 K/uL    Eos # 0.0 0.0 - 0.5 K/uL    Baso # 0.01 0.00 - 0.20 K/uL    nRBC 0 0 /100 WBC    Gran% 66.0 38.0 - 73.0 %    Lymph% 24.1  18.0 - 48.0 %    Mono% 6.9 4.0 - 15.0 %    Eosinophil% 1.5 0.0 - 8.0 %    Basophil% 0.5 0.0 - 1.9 %    Platelet Estimate Decreased (A)     Aniso Slight     Poik Slight     Ovalocytes Occasional     Differential Method Automated    Osmolality, Serum    Collection Time: 05/09/20  3:43 AM   Result Value Ref Range    Osmolality 293 275 - 295 mOsm/kg   POCT glucose    Collection Time: 05/09/20  7:47 AM   Result Value Ref Range    POCT Glucose 117 (H) 70 - 110 mg/dL   POCT glucose    Collection Time: 05/09/20 12:44 PM   Result Value Ref Range    POCT Glucose 160 (H) 70 - 110 mg/dL   ISTAT PROCEDURE    Collection Time: 05/09/20  3:28 PM   Result Value Ref Range    POC PH 7.317 (L) 7.35 - 7.45    POC PCO2 37.0 35 - 45 mmHg    POC PO2 40 40 - 60 mmHg    POC HCO3 18.9 (L) 24 - 28 mmol/L    POC BE -7 -2 to 2 mmol/L    POC SATURATED O2 71 (L) 95 - 100 %    POC TCO2 20 (L) 24 - 29 mmol/L    Sample VENOUS     Site Other     Allens Test N/A    Sodium, urine, random    Collection Time: 05/09/20  3:45 PM   Result Value Ref Range    Sodium Urine Random 51 20 - 250 mmol/L   Creatinine, urine, random    Collection Time: 05/09/20  3:45 PM   Result Value Ref Range    Creatinine, Random Ur 26.0 15.0 - 325.0 mg/dL   Urea nitrogen, urine    Collection Time: 05/09/20  3:45 PM   Result Value Ref Range    Urine Urea Nitrogen, Random 189 140 - 1050 mg/dL   Urinalysis, Reflex to Urine Culture Urine, Clean Catch    Collection Time: 05/09/20  3:45 PM   Result Value Ref Range    Specimen UA Urine, Clean Catch     Color, UA Straw Yellow, Straw, Kinga    Appearance, UA Clear Clear    pH, UA 5.0 5.0 - 8.0    Specific Gravity, UA 1.010 1.005 - 1.030    Protein, UA Negative Negative    Glucose, UA Negative Negative    Ketones, UA Negative Negative    Bilirubin (UA) Negative Negative    Occult Blood UA 1+ (A) Negative    Nitrite, UA Negative Negative    Leukocytes, UA Negative Negative   Protein / creatinine ratio, urine    Collection Time: 05/09/20   "3:45 PM   Result Value Ref Range    Protein, Urine Random 35 (H) 0 - 15 mg/dL    Creatinine, Random Ur 26.0 15.0 - 325.0 mg/dL    Prot/Creat Ratio, Ur 1.35 (H) 0.00 - 0.20   Osmolality, urine    Collection Time: 20  3:45 PM   Result Value Ref Range    Osmolality, Ur 215 50 - 1200 mOsm/kg   Microalbumin/creatinine urine ratio    Collection Time: 20  3:45 PM   Result Value Ref Range    Microalbum.,U,Random 70.0 ug/mL    Creatinine, Random Ur 26.0 15.0 - 325.0 mg/dL    Microalb Creat Ratio 269.2 (H) 0.0 - 30.0 ug/mg   Urinalysis Microscopic    Collection Time: 20  3:45 PM   Result Value Ref Range    RBC, UA 2 0 - 4 /hpf    WBC, UA 1 0 - 5 /hpf    Bacteria Rare None-Occ /hpf    Squam Epithel, UA 2 /hpf    Microscopic Comment SEE COMMENT    POCT glucose    Collection Time: 20  5:45 PM   Result Value Ref Range    POCT Glucose 124 (H) 70 - 110 mg/dL         Significant Imagin/9/20 EEG: Per report,  "The patient is a  74-year-old female who is being evaluated for tremors.  She is currently not maintained on any antiseizure medications.  This is a mildly abnormal EEG during wakefulness and drowsiness.  The overall degree of slowing for given age is suggestive mild encephalopathy nonspecific to the cause.  There is no evidence of an epileptic process on this recording.  No seizures were recorded during this study."    Assessment and Plan:     * Left lower quadrant abdominal pain  -Management per primary      Tremor  Ms. Preston is a 73 yo female w/ PMH significant for HTN, HLD, mesothelioma, DVT on lovenox, MDD, DM, who presented to [facility] with abdominal pain.  Neurology consulted for concern of PD.      Overall, her course is complicated by cancer/chemo, poor history, prior use of abilify, and thiamine deficiency.  At this time, I am less suspicious that this is idiopathic PD.  Given the fluctuations in frequency/amplitude and presence (rest and postural on day 1, only present in a reduced form " with posture on day 2), consider non-physiologic etiologies.    Her EEG has not revealed any epileptic discharges, OK to discontinue.    Recommendations  -F/u thiamine, continue to replete as appropriate pending level (if results low, please  Continue 500 IV TID x2-7 days, then 250 IV Qdaily x3-5 days, then 100 mg PO TID x1-2 weeks, then 100 mg PO Qdaily thereafter).  -delirium precautions; minimize sedating medications as able  -continue to treat abd pain per primary        Thiamine deficiency  3/5/20 Thiamine resulted low at 26  Could contribute to cognitive difficulties.  Daughter reports pt has not been receiving thiamine at home.  -f/u repeat level, replete as under tremor section    General neurology will sign off.  Please call for any questions/concerns.    Acute renal failure with acute tubular necrosis superimposed on stage 3 chronic kidney disease  -Management per primary      Malignant pleural mesothelioma  -Management per primary      Hypertension, essential  -Management per primary            VTE Risk Mitigation (From admission, onward)         Ordered     IP VTE HIGH RISK PATIENT  Once      05/05/20 1000     Place sequential compression device  Until discontinued      05/05/20 1000                Efe Grossman MD  Neurology  [facility]

## 2020-05-09 NOTE — PROGRESS NOTES
"[facility]  Hematology/Oncology  Progress Note    Patient Name: Isabell Preston  Admission Date: 5/5/2020  Hospital Length of Stay: 2 days  Code Status: Full Code     Subjective:     HPI:  Ms. Isabell Preston is a 74 y.o. 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 Last treatment on 4/28/20, next scheduled or 5/19/20) , DVT on daily Lovenox, depression, diabetes mellitus, diverticulosis, hypertension, hyperlipidemia, multiple abdominal surgeries who presents to [facility] ED with abdominal pain, headache, fever (subjective, greater than 103 at home), vomiting (non-bloody or not coffee-ground), and 1 bout of loose bowel movements, however normally has constipation..  Her abdominal pain localizes to the left upper and lower quadrants and is worse with palpation and movement.  She denies any alleviating factors, cough, and shortness of breath.       Patient states she was tested for COVID-19 approximately 2 months ago.  The results of these tests were both negative however she was treated as presumptive positive given her immunocompromised state.  Since that time, the patient has not left the house or had social contact with any COVID infected individuals.  The patient last received chemotherapy 2 weeks ago.  The patient states that the symptoms she is experiencing today are identical to those she experienced 2 months ago when she was presumed positive for COVID-19.      Per documentation , "tested negative for covid x 2 however based on labs PCP felt that pt had covid and got false neg result. "  COVID -19 testing on 5/5 negative .  Patient stated that her PCP culture until her that her initial COVID testing was presumed to be positive, however patient was unable to elaborate on this did not no additional information.  Chart review shows all negative COVID test.     In the ED CT scan of the " abdomen and pelvis without contrast was obtained.  Showing.   1. No acute abnormality identified on today's examination.  2. Diverticulosis without evidence to suggest diverticulitis.  3. Nonspecific bilateral perinephric fat stranding.  Correlation with urinalysis advised.  4. Cholecystectomy, hysterectomy and additional incidental findings as above.    Hospital course:  Patient admitted to hospital medicine (5/5) for further evaluation and treatment. Patient afebrile and HDS. Has GINA with sCr 2.6 (baseline ~1.6). Continues to have significant left-sided abdominal pain, not improved with prn morphine.  Pain medications changed to Dilaudid. Overall, nausea improved and no further episodes of diarrhea or loose stools.  Antibiotics changed from Unasyn to Zosyn. Giving IVF and Cr trended down to 2.2. Etiology of abdominal pain unclear at this time, treating for presumed diverticulitis.     Transferred to med onc service (5/7). Patient had fever overnight (5/6) Tmax 100.9, resolved with tylenol. Infectious workup thus far negative, however procal elevated 1.38; lactate wnl; CXR unremarkable; UA negative for UTI, BCx NGTD. Continuing with zosyn to cover for potential abdominal source; patient with persistent nausea/vomiting. Also with Hb drop from 7.5 to 6.5. S/p 1U PRBC (5/7). Serum Cr trending back upward, given additional fluids. 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 Neurology consulted for assistance with tremors as well as reported incidents of episodic confusion/hallucinations. Per neuro recs, checked ammonia level (wnl) and thiamine level (pending). Previously low thiamine level, not taking supplements at home. Started on IV thiamine 500mg TID x 5 days (end date: 5/13), plan to transition to po pending thiamine level result. Neuro also recommending 24hr EEG to evaluate for possible epileptiform  activity contributing to confusions. Labs with progressing leukopenia/thrombocytopenia and uptrending sCr/LFTs, likely 2/2 recent chemo; continuing IVF.     Interval History: Patient remains afebrile >24hrs and HDS, remains on zosyn for possible diverticulitis. Worsening leukopenia/thrombocytopenia and uptrending creatinine and LFTs, likely 2/2 recent chemo however will continue to monitor. sCr remains elevated, obtain urine lytes to confirm suspected pre-renal cause; giving additional fluids. Also ordered RP ultrasound to rule out postrenal obstruction. Patient remains on EEG monitoring for recent tremors, per neuro. On thiamine supplementation, awaiting thiamine level.    Oncology Treatment Plan:   OP NSCLC PEMETREXED + CARBOPLATIN (AUC) Q3W    Medications:  Continuous Infusions:    Scheduled Meds:   amLODIPine  10 mg Oral Daily    DULoxetine  60 mg Oral Daily    enoxaparin  80 mg Subcutaneous Q24H    folic acid  1 mg Oral Daily    metoprolol succinate  25 mg Oral Daily    ondansetron  8 mg Intravenous Q8H    piperacillin-tazobactam (ZOSYN) IVPB  4.5 g Intravenous Q12H    polyethylene glycol  17 g Oral Daily    thiamine (VITAMIN B1) IVPB  500 mg Intravenous TID     PRN Meds:sodium chloride, Dextrose 10% Bolus, Dextrose 10% Bolus, glucagon (human recombinant), glucose, glucose, HYDROmorphone, insulin aspart U-100, lidocaine-prilocaine, magnesium hydroxide 400 mg/5 ml, melatonin, oxyCODONE, polyethylene glycol, promethazine (PHENERGAN) IVPB, senna-docusate 8.6-50 mg, sodium chloride 0.9%, tiZANidine     Review of Systems   Constitutional: Positive for activity change and appetite change (decreased). Negative for chills and fever.   HENT: Negative for congestion, rhinorrhea, sore throat and trouble swallowing.    Eyes: Negative for photophobia and visual disturbance.   Respiratory: Negative for cough, chest tightness and shortness of breath.    Cardiovascular: Negative for chest pain and leg swelling.    Gastrointestinal: Positive for abdominal pain, constipation, nausea and vomiting. Negative for diarrhea.   Genitourinary: Negative for dysuria, flank pain and hematuria.   Musculoskeletal: Negative for myalgias and neck pain.   Skin: Negative for rash and wound.   Neurological: Positive for tremors, weakness and headaches. Negative for syncope.   Psychiatric/Behavioral: Positive for confusion and hallucinations. Negative for agitation.     Objective:     Vital Signs (Most Recent):  Temp: 97.4 °F (36.3 °C) (05/09/20 0800)  Pulse: 87 (05/09/20 0800)  Resp: 16 (05/09/20 0800)  BP: 137/85 (05/09/20 0800)  SpO2: 95 % (05/09/20 0415) Vital Signs (24h Range):  Temp:  [97.4 °F (36.3 °C)-97.9 °F (36.6 °C)] 97.4 °F (36.3 °C)  Pulse:  [82-98] 87  Resp:  [15-18] 16  SpO2:  [93 %-95 %] 95 %  BP: (118-158)/(64-85) 137/85     Weight: 77.1 kg (170 lb)  Body mass index is 27.44 kg/m².  Body surface area is 1.89 meters squared.      Intake/Output Summary (Last 24 hours) at 5/9/2020 0913  Last data filed at 5/9/2020 0414  Gross per 24 hour   Intake 2130 ml   Output 1200 ml   Net 930 ml       Physical Exam   Constitutional: She is oriented to person, place, and time. She appears well-developed and well-nourished. No distress.   HENT:   Head: Normocephalic and atraumatic.   Mouth/Throat: Oropharynx is clear and moist.   Eyes: Conjunctivae are normal. No scleral icterus.   Neck: Normal range of motion. Neck supple.   Cardiovascular: Regular rhythm, normal heart sounds and intact distal pulses.   No murmur heard.  Pulmonary/Chest: Effort normal. No respiratory distress. She has no wheezes.   Diffuse coarse crackles bilaterally   Abdominal: Soft. Bowel sounds are normal. She exhibits no distension. There is tenderness (diffuse, more prominent LUQ/LLQ). There is guarding (voluntary).   Musculoskeletal: She exhibits edema (trace) and tenderness (L ankle, chronic from previous fracture). She exhibits no deformity.   Neurological: She is  alert and oriented to person, place, and time.   Bilateral UE/LE tremors noted   Skin: Skin is warm and dry. She is not diaphoretic.   Psychiatric: She has a normal mood and affect. Her behavior is normal. Judgment and thought content normal.       Significant Labs:   CBC:   Recent Labs   Lab 05/08/20  0347 05/09/20  0343   WBC 1.70* 2.03*   HGB 7.5* 8.4*   HCT 23.7* 25.9*   PLT 64* 53*   , CMP:   Recent Labs   Lab 05/08/20  0347 05/08/20  1520 05/09/20  0343   * 136 135*   K 4.0 4.2 4.2    105 104   CO2 19* 18* 18*   * 138* 114*   BUN 34* 37* 40*   CREATININE 3.0* 3.3* 3.5*   CALCIUM 9.2 9.1 9.2   PROT 6.8 7.8 7.1   ALBUMIN 2.1* 2.3* 2.1*   BILITOT 1.3* 1.0 0.8   ALKPHOS 174* 204* 220*   AST 77* 106* 132*   ALT 64* 84* 100*   ANIONGAP 12 13 13   EGFRNONAA 14.8* 13.2* 12.3*    and All pertinent labs from the last 24 hours have been reviewed.    Diagnostic Results:  I have reviewed and interpreted all pertinent imaging results/findings within the past 24 hours.    Assessment/Plan:     * Left lower quadrant abdominal pain  Hx of diagnosed reticulocytes and diverticulitis.  Current etiology for abdominal pain is unclear  - CT AP without contrast in the ED demonstrating no acute abnormality; diverticulosis without evidence to suggest diverticulitis; nonspecific bilateral perinephric fat stranding.  -- given the fact that CT AP was performed without contrast (due to GINA), wonder if diverticulitis was not appreciated due to lack of contrast.  Will treat currently for possible diverticulitis vs other abdominal source    - IV Unasyn started on 5/5-->changed to zosyn on 5/6  - febrile to 100.9 overnight 5/6; infectious w/u negative to date (CXR unremarkable, UA negative for UTI, BCx NGTD; however procal elevated 1.38)  - low threshold to add vanc for MRSA coverage if becomes febrile again  - has remained afebrile >24hrs, HDS  - pain control with oxy 10mg q6h prn and IV dilaudid 1mg for breakthrough pain  -  nausea control with scheduled IV zofran 8mg q8h and prn IV phenergan 25mg q6h  - continue empiric abx with zosyn for now    Macrocytic anemia  Hb 7.8 on admission with   - Hb down trended to 6.5 on 5/7 - s/p 1U PRBC  - continue to monitor  - transfuse as needed for Hb > 7    Acute renal failure with acute tubular necrosis superimposed on stage 3 chronic kidney disease  sCr on admission 2.6 (baseline ~1.6). GINA likely pre-renal. Concern for ischemic ATN due to GI losses    -s/p IVF with sCr initially trending down, now trending back upward  - give additional IVF with NS  - suspect continued uptrend could be due to recent chemotherapy (pemetrexed and carboplatin last cycle late April 2020)  - ordered urine lytes, protein/Cr ratio to evaluate. NEEDS TO BE COLLECTED.  - ordered retroperitoneal ultrasound to evaluate  - daily BMP  - continue to monitor    Tremor  - Patient with debilitating b/l UE and LE tremor, followed by neurology outpatient. 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 Was considering sinemet outpatient.   - Patient also with reported incidents of episodic confusion/hallucinations. MRI without infarcts.    Plan  - Neurology consulted, apprec recs  - Ammonia level (wnl); thiamine level (ordered 5/8, results pending)  - Patient with prior low levels of thiamine, not taking supplements at home  - Start IV thiamine 500mg TID x 5 days (end date: 5/13), then transition to po pending result of thiamine level  - No sinamet at this time, as has potential to worsen hallucinations and current abdominal pain  - 24 hr EEG to evaluate for possible epileptiform activity  - delirium precautions; minimize sedating medications     Chemotherapy induced nausea and vomiting  - persistent nausea/vomiting  - scheduled IV zofran 8mg q8h  - prn IV phenergan 25mg q6h    Malignant pleural mesothelioma  Receiving outpatient chemotherapy - NSCLC PEMETREXED + CARBOPLATIN (AUC) Q3W and  IVF .   Last treatment on 4/28/20, next scheduled or 5/19/20)  - on RA, satting well  - continue to monitor    History of DVT of lower extremity  - continue home Lovenox 80mg daily  - platelet count has been downtrending, may need to hold lovenox if platelet count drops below 50 (bleed risk)    Type 2 diabetes mellitus without complication, without long-term current use of insulin  HbA1c 6.1, well controlled  - POCT glucose AC QHS  - LDSSI  - diabetic diet    Hypertension, essential  - cont home med amlodipine 10mg daily  - unclear why home metoprolol was held on admission (75mg daily at home)  - remains normotensive on amlodipine, restarted metoprolol at low dose 25mg daily  - continue to monitor           Carlie Morales MD  Hematology/Oncology  [facility]

## 2020-05-09 NOTE — PROCEDURES
EXTENDED  ELECTROENCEPHALOGRAM  REPORT    DATE OF SERVICE:  05/08/2020  EEG NUMBER: [id number]  REQUESTED BY:  Dr. Deal  LOCATION OF SERVICE:  821  METHODOLOGY   Electroencephalographic (EEG) recording is with electrodes placed according to the International 10-20 placement system.  Thirty two (32) channels of digital signal (sampling rate of 512/sec) including T1 and T2 was simultaneously recorded from the scalp and may include  EKG, EMG, and/or eye monitors.  Recording band pass was 0.1 to 512 hz.  Digital video recording of the patient is simultaneously recorded with the EEG.  The patient is instructed report clinical symptoms which may occur during the recording session.  EEG and video recording is stored and archived in digital format.  Activation procedures which include photic stimulation, hyperventilation and instructing patients to perform simple task are done in selected patients.   The EEG is displayed on a monitor screen and can be reviewed using different montages.  Computer assisted analysis is employed to detect spike and electrographic seizure activity.   The entire record is submitted for computer analysis.  The entire recording is visually reviewed and the times identified by computer analysis as being spikes or seizures are reviewed again.  Compresses spectral analysis (CSA) is also performed on the activity recorded from each individual channel.  This is displayed as a power display of frequencies from 0 to 30 Hz over time.   The CSA is reviewed looking for asymmetries in power between homologous areas of the scalp and then compared with the original EEG recording.     [employer] software was also utilized in the review of this study.  This software suite analyzes the EEG recording in multiple domains.  Coherence and rhythmicity is computed to identify EEG sections which may contain organized seizures.  Each channel undergoes analysis to detect presence of spike and sharp waves which have  special and morphological characteristic of epileptic activity.  The routine EEG recording is converted from spacial into frequency domain.  This is then displayed comparing homologous areas to identify areas of significant asymmetry.  Algorithm to identify non-cortically generated artifact is used to separate eye movement, EMG and other artifact from the EEG.      RECORDING TIMES  Start on 05/08/2020 at 2:05 p.m.   Stop on 05/09/2020 at 7:00 a.m.    A total of 16 hours 55 minutes of EEG recording was obtained.    EEG FINGINGS  Waking state -   Background consists of a somewhat irregular 5-7 hertz rhythm seen most in the posterior head quadrants was some spread into the central areas.  Irregular beta was noted diffusely.  Intermittently delta frequencies predominantly 1-2 hertz were intermixed.  The there is no lateralized or focal changes and no spike or sharp wave activity seen.     Sleep state -   With sleep the background became more diffuse and continuous mixture of delta and theta frequencies.  Expected sleep patterns of sleep spindles and K with complexes were not recorded.  There is no lateralized or focal features during sleep no spike or sharp wave activity was recorded.  Activation procedures:   Photic stimulation - Not performed  Hyperventilation - Not performed    Cognitive testing:   Not performed  Cardiac Monit or:   Single lead EKG was obtained and normal cardiac rhythm with heart rate of approximately 70    IMPRESSION:  Abnormal EEG - there is diffuse slowing both the waking and sleeping state indicative generalized nonfocal nonspecific encephalopathic process and no evidence for an irritative focus.

## 2020-05-09 NOTE — ASSESSMENT & PLAN NOTE
Hx of diagnosed reticulocytes and diverticulitis.  Current etiology for abdominal pain is unclear  - CT AP without contrast in the ED demonstrating no acute abnormality; diverticulosis without evidence to suggest diverticulitis; nonspecific bilateral perinephric fat stranding.  -- given the fact that CT AP was performed without contrast (due to GINA), wonder if diverticulitis was not appreciated due to lack of contrast.  Will treat currently for possible diverticulitis vs other abdominal source    - IV Unasyn started on 5/5-->changed to zosyn on 5/6  - febrile to 100.9 overnight 5/6; infectious w/u negative to date (CXR unremarkable, UA negative for UTI, BCx NGTD; however procal elevated 1.38)  - low threshold to add vanc for MRSA coverage if becomes febrile again  - has remained afebrile >24hrs, HDS  - pain control with oxy 10mg q6h prn and IV dilaudid 1mg for breakthrough pain  - nausea control with scheduled IV zofran 8mg q8h and prn IV phenergan 25mg q6h  - continue empiric abx with zosyn for now

## 2020-05-09 NOTE — SUBJECTIVE & OBJECTIVE
Interval History: Patient remains afebrile >24hrs and HDS, remains on zosyn for possible diverticulitis. Worsening leukopenia/thrombocytopenia and uptrending creatinine and LFTs, likely 2/2 recent chemo however will continue to monitor. sCr remains elevated, obtain urine lytes to confirm suspected pre-renal cause; giving additional fluids. Also ordered RP ultrasound to rule out postrenal obstruction. Patient remains on EEG monitoring for recent tremors, per neuro. On thiamine supplementation, awaiting thiamine level.    Oncology Treatment Plan:   OP NSCLC PEMETREXED + CARBOPLATIN (AUC) Q3W    Medications:  Continuous Infusions:    Scheduled Meds:   amLODIPine  10 mg Oral Daily    DULoxetine  60 mg Oral Daily    enoxaparin  80 mg Subcutaneous Q24H    folic acid  1 mg Oral Daily    metoprolol succinate  25 mg Oral Daily    ondansetron  8 mg Intravenous Q8H    piperacillin-tazobactam (ZOSYN) IVPB  4.5 g Intravenous Q12H    polyethylene glycol  17 g Oral Daily    thiamine (VITAMIN B1) IVPB  500 mg Intravenous TID     PRN Meds:sodium chloride, Dextrose 10% Bolus, Dextrose 10% Bolus, glucagon (human recombinant), glucose, glucose, HYDROmorphone, insulin aspart U-100, lidocaine-prilocaine, magnesium hydroxide 400 mg/5 ml, melatonin, oxyCODONE, polyethylene glycol, promethazine (PHENERGAN) IVPB, senna-docusate 8.6-50 mg, sodium chloride 0.9%, tiZANidine     Review of Systems   Constitutional: Positive for activity change and appetite change (decreased). Negative for chills and fever.   HENT: Negative for congestion, rhinorrhea, sore throat and trouble swallowing.    Eyes: Negative for photophobia and visual disturbance.   Respiratory: Negative for cough, chest tightness and shortness of breath.    Cardiovascular: Negative for chest pain and leg swelling.   Gastrointestinal: Positive for abdominal pain, constipation, nausea and vomiting. Negative for diarrhea.   Genitourinary: Negative for dysuria, flank pain and  hematuria.   Musculoskeletal: Negative for myalgias and neck pain.   Skin: Negative for rash and wound.   Neurological: Positive for tremors, weakness and headaches. Negative for syncope.   Psychiatric/Behavioral: Positive for confusion and hallucinations. Negative for agitation.     Objective:     Vital Signs (Most Recent):  Temp: 97.4 °F (36.3 °C) (05/09/20 0800)  Pulse: 87 (05/09/20 0800)  Resp: 16 (05/09/20 0800)  BP: 137/85 (05/09/20 0800)  SpO2: 95 % (05/09/20 0415) Vital Signs (24h Range):  Temp:  [97.4 °F (36.3 °C)-97.9 °F (36.6 °C)] 97.4 °F (36.3 °C)  Pulse:  [82-98] 87  Resp:  [15-18] 16  SpO2:  [93 %-95 %] 95 %  BP: (118-158)/(64-85) 137/85     Weight: 77.1 kg (170 lb)  Body mass index is 27.44 kg/m².  Body surface area is 1.89 meters squared.      Intake/Output Summary (Last 24 hours) at 5/9/2020 0913  Last data filed at 5/9/2020 0414  Gross per 24 hour   Intake 2130 ml   Output 1200 ml   Net 930 ml       Physical Exam   Constitutional: She is oriented to person, place, and time. She appears well-developed and well-nourished. No distress.   HENT:   Head: Normocephalic and atraumatic.   Mouth/Throat: Oropharynx is clear and moist.   Eyes: Conjunctivae are normal. No scleral icterus.   Neck: Normal range of motion. Neck supple.   Cardiovascular: Regular rhythm, normal heart sounds and intact distal pulses.   No murmur heard.  Pulmonary/Chest: Effort normal. No respiratory distress. She has no wheezes.   Diffuse coarse crackles bilaterally   Abdominal: Soft. Bowel sounds are normal. She exhibits no distension. There is tenderness (diffuse, more prominent LUQ/LLQ). There is guarding (voluntary).   Musculoskeletal: She exhibits edema (trace) and tenderness (L ankle, chronic from previous fracture). She exhibits no deformity.   Neurological: She is alert and oriented to person, place, and time.   Bilateral UE/LE tremors noted   Skin: Skin is warm and dry. She is not diaphoretic.   Psychiatric: She has a normal  mood and affect. Her behavior is normal. Judgment and thought content normal.       Significant Labs:   CBC:   Recent Labs   Lab 05/08/20  0347 05/09/20  0343   WBC 1.70* 2.03*   HGB 7.5* 8.4*   HCT 23.7* 25.9*   PLT 64* 53*   , CMP:   Recent Labs   Lab 05/08/20  0347 05/08/20  1520 05/09/20  0343   * 136 135*   K 4.0 4.2 4.2    105 104   CO2 19* 18* 18*   * 138* 114*   BUN 34* 37* 40*   CREATININE 3.0* 3.3* 3.5*   CALCIUM 9.2 9.1 9.2   PROT 6.8 7.8 7.1   ALBUMIN 2.1* 2.3* 2.1*   BILITOT 1.3* 1.0 0.8   ALKPHOS 174* 204* 220*   AST 77* 106* 132*   ALT 64* 84* 100*   ANIONGAP 12 13 13   EGFRNONAA 14.8* 13.2* 12.3*    and All pertinent labs from the last 24 hours have been reviewed.    Diagnostic Results:  I have reviewed and interpreted all pertinent imaging results/findings within the past 24 hours.

## 2020-05-09 NOTE — ASSESSMENT & PLAN NOTE
sCr on admission 2.6 (baseline ~1.6). GINA likely pre-renal. Concern for ischemic ATN due to GI losses    -s/p IVF with sCr initially trending down, now trending back upward  - give additional IVF with NS  - suspect continued uptrend could be due to recent chemotherapy (pemetrexed and carboplatin last cycle late April 2020)  - ordered urine lytes, protein/Cr ratio to evaluate. NEEDS TO BE COLLECTED.  - ordered retroperitoneal ultrasound to evaluate  - daily BMP  - continue to monitor

## 2020-05-09 NOTE — NURSING
Pt had a second episode of getting out of bed without calling and peeing on the floor, therefore unable to collect urine sample. Pt also removed IV. Bed alarm is on but patient gets up too quickly. Charge nurse notified. Telesitter ordered. MD notified of increasing confusion and ordered VBG. Also notified that afternoon thiamine will be held d/t the first dose arriving late from pharmacy.

## 2020-05-09 NOTE — SUBJECTIVE & OBJECTIVE
Subjective:     Interval History:   No acute events overnight per EMR.  Pt appears confused this morning, reporting that she had gone into other rooms to check their temperature this morning.  Improved on attending rounds to prior baseline.    Tremor improved.    Current Neurological Medications:   Thiamine      Current Facility-Administered Medications   Medication Dose Route Frequency Provider Last Rate Last Dose    0.9%  NaCl infusion (for blood administration)   Intravenous Q24H PRN Piero Chavez MD        0.9%  NaCl infusion   Intravenous Continuous Carlie Morales  mL/hr at 05/09/20 1233      amLODIPine tablet 10 mg  10 mg Oral Daily Valerie Greene MD   10 mg at 05/09/20 0939    dextrose 10% (D10W) Bolus  12.5 g Intravenous PRN Valerie Greene MD        dextrose 10% (D10W) Bolus  25 g Intravenous PRN Valerie Greene MD        DULoxetine DR capsule 60 mg  60 mg Oral Daily Valerie Greene MD   60 mg at 05/09/20 0939    folic acid tablet 1 mg  1 mg Oral Daily Valerie Greene MD   1 mg at 05/09/20 0939    glucagon (human recombinant) injection 1 mg  1 mg Intramuscular PRN Valerie Greene MD        glucose chewable tablet 16 g  16 g Oral PRN Valerie Greene MD        glucose chewable tablet 24 g  24 g Oral PRN Valerie Greene MD        HYDROmorphone injection 1 mg  1 mg Intravenous Q6H PRN Valerie Greene MD   1 mg at 05/07/20 0000    insulin aspart U-100 pen 0-5 Units  0-5 Units Subcutaneous QID (AC + HS) PRN Valerie Greene MD   2 Units at 05/06/20 1243    lidocaine-prilocaine cream   Topical (Top) PRN Piero Chavez MD        magnesium hydroxide 400 mg/5 ml suspension 2,400 mg  30 mL Oral Daily PRN Valerie Greene MD   2,400 mg at 05/08/20 1814    melatonin tablet 8 mg  8 mg Oral Nightly PRN Valerie Greene MD        metoprolol succinate (TOPROL-XL) 24 hr tablet 25 mg  25 mg Oral Daily Piero Chavez MD   25 mg at 05/09/20 0939    ondansetron injection  8 mg  8 mg Intravenous Q8H Piero Chavez MD   8 mg at 05/09/20 1400    oxyCODONE immediate release tablet Tab 10 mg  10 mg Oral Q6H PRN Valerie Greene MD   10 mg at 05/09/20 1747    piperacillin-tazobactam 4.5 g in sodium chloride 0.9% 100 mL IVPB (ready to mix system)  4.5 g Intravenous Q12H JOSE Deal MD 25 mL/hr at 05/09/20 0939 4.5 g at 05/09/20 0939    polyethylene glycol packet 17 g  17 g Oral BID PRN Valerie Greene MD        polyethylene glycol packet 17 g  17 g Oral Daily Valerie Greene MD   17 g at 05/09/20 0939    promethazine (PHENERGAN) 25 mg in dextrose 5 % 50 mL IVPB  25 mg Intravenous Q6H PRN Piero Chavez  mL/hr at 05/09/20 0630 25 mg at 05/09/20 0630    senna-docusate 8.6-50 mg per tablet 1 tablet  1 tablet Oral Daily PRN Valerie Greene MD        sodium chloride 0.9% flush 5 mL  5 mL Intravenous PRN Valerie Greene MD        thiamine (B-1) 500 mg in dextrose 5 % 50 mL IVPB  500 mg Intravenous TID Piero Chavez MD   Stopped at 05/09/20 1500    tiZANidine tablet 4 mg  4 mg Oral Nightly PRN Valerie Greene MD           Review of Systems   Constitutional: Negative for fever.   Neurological: Positive for weakness. Negative for seizures.   Psychiatric/Behavioral: Positive for confusion.     Objective:     Vital Signs (Most Recent):  Temp: 96.4 °F (35.8 °C) (05/09/20 1603)  Pulse: 91 (05/09/20 1603)  Resp: 16 (05/09/20 1603)  BP: (!) 141/69 (05/09/20 1603)  SpO2: (!) 94 % (05/09/20 1603) Vital Signs (24h Range):  Temp:  [96.4 °F (35.8 °C)-97.9 °F (36.6 °C)] 96.4 °F (35.8 °C)  Pulse:  [82-98] 91  Resp:  [16-18] 16  SpO2:  [94 %-95 %] 94 %  BP: (137-158)/(69-85) 141/69     Weight: 77.1 kg (170 lb)  Body mass index is 27.44 kg/m².    Physical Exam   Constitutional: She appears well-developed. No distress.   HENT:   Head: Normocephalic.   Eyes: EOM are normal.   Cardiovascular: Normal rate and regular rhythm. Exam reveals no friction rub.   No murmur  heard.  Pulmonary/Chest: Effort normal and breath sounds normal. No stridor. No respiratory distress.   Abdominal: Soft. Bowel sounds are normal. She exhibits no distension. There is no tenderness.   Neurological: She has normal strength. Finger-nose-finger test: end-intention tremor    Reflex Scores:       Bicep reflexes are 1+ on the right side and 1+ on the left side.       Brachioradialis reflexes are 1+ on the right side and 1+ on the left side.       Patellar reflexes are 1+ on the right side and 1+ on the left side.  Skin: Skin is warm and dry.       NEUROLOGICAL EXAMINATION:     MENTAL STATUS   Level of consciousness: alert       Oriented to month/year     CRANIAL NERVES     CN II   Visual fields full to confrontation.     CN III, IV, VI   Extraocular motions are normal.     CN V   Facial sensation intact.     CN VII   Facial expression full, symmetric.     CN VIII   Hearing: intact    CN IX, X   Palate: symmetric    CN XI   CN XI normal.     CN XII   CN XII normal.        Pupils round, constricted b/l with small response to light       MOTOR EXAM   Muscle bulk: normal  Right arm tone: cogwheel rigidity (very slight, fluctuating)  Left arm tone: cogwheel rigidity (very slight, fluctuating)    Strength   Strength 5/5 throughout.     REFLEXES     Reflexes   Right brachioradialis: 1+  Left brachioradialis: 1+  Right biceps: 1+  Left biceps: 1+  Right patellar: 1+  Left patellar: 1+  Right Macedo: absent  Left Macedo: absent  Right ankle clonus: absent  Left ankle clonus: absent    SENSORY EXAM   Light touch normal.     GAIT AND COORDINATION      Coordination   Finger-nose-finger test: end-intention tremor        No rest tremor noted on exam today.    Mild postural tremor, RUE>LUE.  Improved from yesterday.         Significant Labs:   Recent Results (from the past 24 hour(s))   Comprehensive Metabolic Panel (CMP)    Collection Time: 05/09/20  3:43 AM   Result Value Ref Range    Sodium 135 (L) 136 - 145 mmol/L     Potassium 4.2 3.5 - 5.1 mmol/L    Chloride 104 95 - 110 mmol/L    CO2 18 (L) 23 - 29 mmol/L    Glucose 114 (H) 70 - 110 mg/dL    BUN, Bld 40 (H) 8 - 23 mg/dL    Creatinine 3.5 (H) 0.5 - 1.4 mg/dL    Calcium 9.2 8.7 - 10.5 mg/dL    Total Protein 7.1 6.0 - 8.4 g/dL    Albumin 2.1 (L) 3.5 - 5.2 g/dL    Total Bilirubin 0.8 0.1 - 1.0 mg/dL    Alkaline Phosphatase 220 (H) 55 - 135 U/L     (H) 10 - 40 U/L     (H) 10 - 44 U/L    Anion Gap 13 8 - 16 mmol/L    eGFR if African American 14.2 (A) >60 mL/min/1.73 m^2    eGFR if non  12.3 (A) >60 mL/min/1.73 m^2   Magnesium    Collection Time: 05/09/20  3:43 AM   Result Value Ref Range    Magnesium 1.9 1.6 - 2.6 mg/dL   Phosphorus    Collection Time: 05/09/20  3:43 AM   Result Value Ref Range    Phosphorus 4.3 2.7 - 4.5 mg/dL   CBC with Automated Differential    Collection Time: 05/09/20  3:43 AM   Result Value Ref Range    WBC 2.03 (L) 3.90 - 12.70 K/uL    RBC 2.50 (L) 4.00 - 5.40 M/uL    Hemoglobin 8.4 (L) 12.0 - 16.0 g/dL    Hematocrit 25.9 (L) 37.0 - 48.5 %    Mean Corpuscular Volume 104 (H) 82 - 98 fL    Mean Corpuscular Hemoglobin 33.6 (H) 27.0 - 31.0 pg    Mean Corpuscular Hemoglobin Conc 32.4 32.0 - 36.0 g/dL    RDW 16.5 (H) 11.5 - 14.5 %    Platelets 53 (L) 150 - 350 K/uL    MPV 10.3 9.2 - 12.9 fL    Immature Granulocytes 1.0 (H) 0.0 - 0.5 %    Gran # (ANC) 1.3 (L) 1.8 - 7.7 K/uL    Immature Grans (Abs) 0.02 0.00 - 0.04 K/uL    Lymph # 0.5 (L) 1.0 - 4.8 K/uL    Mono # 0.1 (L) 0.3 - 1.0 K/uL    Eos # 0.0 0.0 - 0.5 K/uL    Baso # 0.01 0.00 - 0.20 K/uL    nRBC 0 0 /100 WBC    Gran% 66.0 38.0 - 73.0 %    Lymph% 24.1 18.0 - 48.0 %    Mono% 6.9 4.0 - 15.0 %    Eosinophil% 1.5 0.0 - 8.0 %    Basophil% 0.5 0.0 - 1.9 %    Platelet Estimate Decreased (A)     Aniso Slight     Poik Slight     Ovalocytes Occasional     Differential Method Automated    Osmolality, Serum    Collection Time: 05/09/20  3:43 AM   Result Value Ref Range    Osmolality 293 275  - 295 mOsm/kg   POCT glucose    Collection Time: 05/09/20  7:47 AM   Result Value Ref Range    POCT Glucose 117 (H) 70 - 110 mg/dL   POCT glucose    Collection Time: 05/09/20 12:44 PM   Result Value Ref Range    POCT Glucose 160 (H) 70 - 110 mg/dL   ISTAT PROCEDURE    Collection Time: 05/09/20  3:28 PM   Result Value Ref Range    POC PH 7.317 (L) 7.35 - 7.45    POC PCO2 37.0 35 - 45 mmHg    POC PO2 40 40 - 60 mmHg    POC HCO3 18.9 (L) 24 - 28 mmol/L    POC BE -7 -2 to 2 mmol/L    POC SATURATED O2 71 (L) 95 - 100 %    POC TCO2 20 (L) 24 - 29 mmol/L    Sample VENOUS     Site Other     Allens Test N/A    Sodium, urine, random    Collection Time: 05/09/20  3:45 PM   Result Value Ref Range    Sodium Urine Random 51 20 - 250 mmol/L   Creatinine, urine, random    Collection Time: 05/09/20  3:45 PM   Result Value Ref Range    Creatinine, Random Ur 26.0 15.0 - 325.0 mg/dL   Urea nitrogen, urine    Collection Time: 05/09/20  3:45 PM   Result Value Ref Range    Urine Urea Nitrogen, Random 189 140 - 1050 mg/dL   Urinalysis, Reflex to Urine Culture Urine, Clean Catch    Collection Time: 05/09/20  3:45 PM   Result Value Ref Range    Specimen UA Urine, Clean Catch     Color, UA Straw Yellow, Straw, Kinga    Appearance, UA Clear Clear    pH, UA 5.0 5.0 - 8.0    Specific Gravity, UA 1.010 1.005 - 1.030    Protein, UA Negative Negative    Glucose, UA Negative Negative    Ketones, UA Negative Negative    Bilirubin (UA) Negative Negative    Occult Blood UA 1+ (A) Negative    Nitrite, UA Negative Negative    Leukocytes, UA Negative Negative   Protein / creatinine ratio, urine    Collection Time: 05/09/20  3:45 PM   Result Value Ref Range    Protein, Urine Random 35 (H) 0 - 15 mg/dL    Creatinine, Random Ur 26.0 15.0 - 325.0 mg/dL    Prot/Creat Ratio, Ur 1.35 (H) 0.00 - 0.20   Osmolality, urine    Collection Time: 05/09/20  3:45 PM   Result Value Ref Range    Osmolality, Ur 215 50 - 1200 mOsm/kg   Microalbumin/creatinine urine ratio     "Collection Time: 20  3:45 PM   Result Value Ref Range    Microalbum.,U,Random 70.0 ug/mL    Creatinine, Random Ur 26.0 15.0 - 325.0 mg/dL    Microalb Creat Ratio 269.2 (H) 0.0 - 30.0 ug/mg   Urinalysis Microscopic    Collection Time: 20  3:45 PM   Result Value Ref Range    RBC, UA 2 0 - 4 /hpf    WBC, UA 1 0 - 5 /hpf    Bacteria Rare None-Occ /hpf    Squam Epithel, UA 2 /hpf    Microscopic Comment SEE COMMENT    POCT glucose    Collection Time: 20  5:45 PM   Result Value Ref Range    POCT Glucose 124 (H) 70 - 110 mg/dL         Significant Imagin/9/20 EEG: Per report,  "The patient is a  74-year-old female who is being evaluated for tremors.  She is currently not maintained on any antiseizure medications.  This is a mildly abnormal EEG during wakefulness and drowsiness.  The overall degree of slowing for given age is suggestive mild encephalopathy nonspecific to the cause.  There is no evidence of an epileptic process on this recording.  No seizures were recorded during this study."  "

## 2020-05-09 NOTE — ASSESSMENT & PLAN NOTE
Ms. Preston is a 75 yo female w/ PMH significant for HTN, HLD, mesothelioma, DVT on lovenox, MDD, DM, who presented to [facility] with abdominal pain.  Neurology consulted for concern of PD.      Overall, her course is complicated by cancer/chemo, poor history, prior use of abilify, and thiamine deficiency.  At this time, I am less suspicious that this is idiopathic PD.  Given the fluctuations in frequency/amplitude and presence (rest and postural on day 1, only present in a reduced form with posture on day 2), consider non-physiologic etiologies.    Her EEG has not revealed any epileptic discharges, OK to discontinue.    Recommendations  -F/u thiamine, continue to replete as appropriate pending level (if results low, please  Continue 500 IV TID x2-7 days, then 250 IV Qdaily x3-5 days, then 100 mg PO TID x1-2 weeks, then 100 mg PO Qdaily thereafter).  -delirium precautions; minimize sedating medications as able  -continue to treat abd pain per primary  -May f/u for further tremor evaluation as outpatient with Dr. Mancia

## 2020-05-09 NOTE — ASSESSMENT & PLAN NOTE
- Patient with debilitating b/l UE and LE tremor, followed by neurology outpatient. 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 Was considering sinemet outpatient.   - Patient also with reported incidents of episodic confusion/hallucinations. MRI without infarcts.    Plan  - Neurology consulted, apprec recs  - Ammonia level (wnl); thiamine level (ordered 5/8, results pending)  - Patient with prior low levels of thiamine, not taking supplements at home  - Start IV thiamine 500mg TID x 5 days (end date: 5/13), then transition to po pending result of thiamine level  - No sinamet at this time, as has potential to worsen hallucinations and current abdominal pain  - 24 hr EEG to evaluate for possible epileptiform activity  - delirium precautions; minimize sedating medications

## 2020-05-09 NOTE — NURSING
Pt had an episode of incontinence. Unable to collect urine at this time. Will try again later with patient when she feels the urge to urinate again.

## 2020-05-09 NOTE — PROCEDURES
DATE: 5/9/20    EEG NUMBER:  [id number]    REFERRING PHYSICIAN:  Dr. Chavez     This EEG was performed to assess for evidence of underlying epilepsy.     ELECTROENCEPHALOGRAM REPORT     METHODOLOGY:  Electroencephalographic (EEG) is recorded with electrodes placed according to the International 10-20 placement system.  Thirty two (32) channels of digital signal (sampling rate of 512/sec), including T1 and T2, were simultaneously recorded from the scalp and may include EKG, EMG, and/or eye monitors.  Recording band pass was 0.1 to 512 Hz.  Digital video recording of the patient is simultaneously recorded with the EEG.  The patient is instructed to report clinical symptoms which may occur during the recording session.  EEG   and video recording are stored and archived in digital format.  Activation procedures, which include photic stimulation, hyperventilation and instructing patients to perform simple tasks, are done in selected patients.     The EEG is displayed on a monitor screen and can be reviewed using different montages.  Computer-assisted analysis is employed to detect spike and electrographic seizure activity.  The entire record is submitted for computer analysis.  The entire recording is visually reviewed, and the times identified by computer analysis as being spikes or seizures are reviewed again.     Compressed spectral analysis (CSA) is also performed on the activity recorded from each individual channel.  This is displayed as a power display of frequencies from 0 to 30 Hz over time.  The CSA is reviewed looking for asymmetries in power between homologous areas of the scalp, then compared with the original EEG recording.     Kelan software was also utilized in the review of this study.  This software suite analyzes the EEG recording in multiple domains.  Coherence and rhythmicity are computed to identify EEG sections which may contain organized seizures.  Each channel undergoes analysis to detect the  presence of spike and sharp waves which have special and morphological characteristics of epileptic activity.  The routine EEG recording is converted from special into frequency domain.  This is then displayed comparing homologous areas to identify areas of significant asymmetry.  Algorithm to identify non-cortically generated artifact is used to separate artifact from the EEG.     Recording time   Start on May 9, 2020 at hours 7 min 0 sec 32  End on May 9, 2000 20 hr 13 min 31 sec 50  The total time of EEG recording for the study was 5 hr and 46 min    EEG FINDINGS:  The recording was obtained with a number of standard bipolar and referential montages during wakefulness and drowsiness.  In the alert state, the posterior background rhythm was a symmetric, well-modulated 7Hz rhythm, which reacted symmetrically to eye opening.  Activation procedures were not performed.  During drowsiness, the background rhythm waxed and waned and there were periods of slowing. There were no focal abnormalities.  There were no interictal epileptiform abnormalities and no clinical or electrographic seizures were recorded.    The EKG channel revealed a sinus rhythm.     IMPRESSION:  This is an abnormal EEG during wakefulness and drowsiness.  Mild slowing was noted     CLINICAL CORRELATION:  The patient is a  74-year-old female who is being evaluated for tremors.  She is currently not maintained on any antiseizure medications.  This is a mildly abnormal EEG during wakefulness and drowsiness.  The overall degree of slowing for given age is suggestive mild encephalopathy nonspecific to the cause.  There is no evidence of an epileptic process on this recording.  No seizures were recorded during this study.

## 2020-05-10 LAB
ALBUMIN SERPL BCP-MCNC: 1.9 G/DL (ref 3.5–5.2)
ALP SERPL-CCNC: 221 U/L (ref 55–135)
ALT SERPL W/O P-5'-P-CCNC: 112 U/L (ref 10–44)
ANION GAP SERPL CALC-SCNC: 12 MMOL/L (ref 8–16)
ANISOCYTOSIS BLD QL SMEAR: SLIGHT
APTT BLDCRRT: 26.3 SEC (ref 21–32)
AST SERPL-CCNC: 135 U/L (ref 10–40)
BACTERIA BLD CULT: NORMAL
BACTERIA BLD CULT: NORMAL
BASOPHILS # BLD AUTO: 0.01 K/UL (ref 0–0.2)
BASOPHILS NFR BLD: 0.4 % (ref 0–1.9)
BILIRUB SERPL-MCNC: 0.7 MG/DL (ref 0.1–1)
BUN SERPL-MCNC: 42 MG/DL (ref 8–23)
BURR CELLS BLD QL SMEAR: ABNORMAL
CALCIUM SERPL-MCNC: 8.6 MG/DL (ref 8.7–10.5)
CHLORIDE SERPL-SCNC: 109 MMOL/L (ref 95–110)
CO2 SERPL-SCNC: 18 MMOL/L (ref 23–29)
CREAT SERPL-MCNC: 3.5 MG/DL (ref 0.5–1.4)
DACRYOCYTES BLD QL SMEAR: ABNORMAL
DIFFERENTIAL METHOD: ABNORMAL
EOSINOPHIL # BLD AUTO: 0 K/UL (ref 0–0.5)
EOSINOPHIL NFR BLD: 0.4 % (ref 0–8)
ERYTHROCYTE [DISTWIDTH] IN BLOOD BY AUTOMATED COUNT: 15.9 % (ref 11.5–14.5)
EST. GFR  (AFRICAN AMERICAN): 14.2 ML/MIN/1.73 M^2
EST. GFR  (NON AFRICAN AMERICAN): 12.3 ML/MIN/1.73 M^2
FIBRINOGEN PPP-MCNC: 682 MG/DL (ref 182–366)
GLUCOSE SERPL-MCNC: 114 MG/DL (ref 70–110)
HCT VFR BLD AUTO: 23.9 % (ref 37–48.5)
HGB BLD-MCNC: 7.7 G/DL (ref 12–16)
IMM GRANULOCYTES # BLD AUTO: 0.03 K/UL (ref 0–0.04)
IMM GRANULOCYTES NFR BLD AUTO: 1.2 % (ref 0–0.5)
INR PPP: 1 (ref 0.8–1.2)
LYMPHOCYTES # BLD AUTO: 0.5 K/UL (ref 1–4.8)
LYMPHOCYTES NFR BLD: 17.3 % (ref 18–48)
MAGNESIUM SERPL-MCNC: 1.7 MG/DL (ref 1.6–2.6)
MCH RBC QN AUTO: 33.2 PG (ref 27–31)
MCHC RBC AUTO-ENTMCNC: 32.2 G/DL (ref 32–36)
MCV RBC AUTO: 103 FL (ref 82–98)
MONOCYTES # BLD AUTO: 0.1 K/UL (ref 0.3–1)
MONOCYTES NFR BLD: 5.4 % (ref 4–15)
NEUTROPHILS # BLD AUTO: 2 K/UL (ref 1.8–7.7)
NEUTROPHILS NFR BLD: 75.3 % (ref 38–73)
NRBC BLD-RTO: 1 /100 WBC
OVALOCYTES BLD QL SMEAR: ABNORMAL
PHOSPHATE SERPL-MCNC: 4.3 MG/DL (ref 2.7–4.5)
PLATELET # BLD AUTO: 38 K/UL (ref 150–350)
PLATELET BLD QL SMEAR: ABNORMAL
PMV BLD AUTO: 10.6 FL (ref 9.2–12.9)
POCT GLUCOSE: 111 MG/DL (ref 70–110)
POCT GLUCOSE: 125 MG/DL (ref 70–110)
POCT GLUCOSE: 145 MG/DL (ref 70–110)
POCT GLUCOSE: 178 MG/DL (ref 70–110)
POIKILOCYTOSIS BLD QL SMEAR: SLIGHT
POTASSIUM SERPL-SCNC: 4.2 MMOL/L (ref 3.5–5.1)
PROT SERPL-MCNC: 6.7 G/DL (ref 6–8.4)
PROTHROMBIN TIME: 10.8 SEC (ref 9–12.5)
RBC # BLD AUTO: 2.32 M/UL (ref 4–5.4)
SCHISTOCYTES BLD QL SMEAR: ABNORMAL
SODIUM SERPL-SCNC: 139 MMOL/L (ref 136–145)
WBC # BLD AUTO: 2.6 K/UL (ref 3.9–12.7)

## 2020-05-10 PROCEDURE — 99233 SBSQ HOSP IP/OBS HIGH 50: CPT | Mod: GC,,, | Performed by: INTERNAL MEDICINE

## 2020-05-10 PROCEDURE — 25500020 PHARM REV CODE 255: Performed by: STUDENT IN AN ORGANIZED HEALTH CARE EDUCATION/TRAINING PROGRAM

## 2020-05-10 PROCEDURE — 85730 THROMBOPLASTIN TIME PARTIAL: CPT

## 2020-05-10 PROCEDURE — 63600175 PHARM REV CODE 636 W HCPCS: Performed by: STUDENT IN AN ORGANIZED HEALTH CARE EDUCATION/TRAINING PROGRAM

## 2020-05-10 PROCEDURE — 84100 ASSAY OF PHOSPHORUS: CPT

## 2020-05-10 PROCEDURE — 80053 COMPREHEN METABOLIC PANEL: CPT

## 2020-05-10 PROCEDURE — 85025 COMPLETE CBC W/AUTO DIFF WBC: CPT

## 2020-05-10 PROCEDURE — 25000003 PHARM REV CODE 250: Performed by: STUDENT IN AN ORGANIZED HEALTH CARE EDUCATION/TRAINING PROGRAM

## 2020-05-10 PROCEDURE — 85610 PROTHROMBIN TIME: CPT

## 2020-05-10 PROCEDURE — 36415 COLL VENOUS BLD VENIPUNCTURE: CPT

## 2020-05-10 PROCEDURE — 20600001 HC STEP DOWN PRIVATE ROOM

## 2020-05-10 PROCEDURE — 25000003 PHARM REV CODE 250: Performed by: INTERNAL MEDICINE

## 2020-05-10 PROCEDURE — 63600175 PHARM REV CODE 636 W HCPCS: Performed by: INTERNAL MEDICINE

## 2020-05-10 PROCEDURE — 85384 FIBRINOGEN ACTIVITY: CPT

## 2020-05-10 PROCEDURE — 99233 PR SUBSEQUENT HOSPITAL CARE,LEVL III: ICD-10-PCS | Mod: GC,,, | Performed by: INTERNAL MEDICINE

## 2020-05-10 PROCEDURE — 25000003 PHARM REV CODE 250: Performed by: HOSPITALIST

## 2020-05-10 PROCEDURE — 83735 ASSAY OF MAGNESIUM: CPT

## 2020-05-10 RX ORDER — DICYCLOMINE HYDROCHLORIDE 10 MG/1
10 CAPSULE ORAL 4 TIMES DAILY
Status: DISCONTINUED | OUTPATIENT
Start: 2020-05-10 | End: 2020-05-13 | Stop reason: HOSPADM

## 2020-05-10 RX ADMIN — DULOXETINE 60 MG: 60 CAPSULE, DELAYED RELEASE ORAL at 08:05

## 2020-05-10 RX ADMIN — THIAMINE HYDROCHLORIDE 500 MG: 100 INJECTION, SOLUTION INTRAMUSCULAR; INTRAVENOUS at 08:05

## 2020-05-10 RX ADMIN — ONDANSETRON 8 MG: 2 INJECTION, SOLUTION INTRAMUSCULAR; INTRAVENOUS at 06:05

## 2020-05-10 RX ADMIN — AMLODIPINE BESYLATE 10 MG: 10 TABLET ORAL at 08:05

## 2020-05-10 RX ADMIN — ONDANSETRON 8 MG: 2 INJECTION, SOLUTION INTRAMUSCULAR; INTRAVENOUS at 09:05

## 2020-05-10 RX ADMIN — DICYCLOMINE HYDROCHLORIDE 10 MG: 10 CAPSULE ORAL at 04:05

## 2020-05-10 RX ADMIN — OXYCODONE HYDROCHLORIDE 10 MG: 10 TABLET ORAL at 04:05

## 2020-05-10 RX ADMIN — DICYCLOMINE HYDROCHLORIDE 10 MG: 10 CAPSULE ORAL at 09:05

## 2020-05-10 RX ADMIN — METOPROLOL SUCCINATE 25 MG: 25 TABLET, EXTENDED RELEASE ORAL at 08:05

## 2020-05-10 RX ADMIN — DICYCLOMINE HYDROCHLORIDE 10 MG: 10 CAPSULE ORAL at 12:05

## 2020-05-10 RX ADMIN — FOLIC ACID 1 MG: 1 TABLET ORAL at 08:05

## 2020-05-10 RX ADMIN — IOHEXOL 15 ML: 350 INJECTION, SOLUTION INTRAVENOUS at 02:05

## 2020-05-10 RX ADMIN — ONDANSETRON 8 MG: 2 INJECTION, SOLUTION INTRAMUSCULAR; INTRAVENOUS at 02:05

## 2020-05-10 RX ADMIN — DICYCLOMINE HYDROCHLORIDE 10 MG: 10 CAPSULE ORAL at 08:05

## 2020-05-10 RX ADMIN — IOHEXOL 15 ML: 350 INJECTION, SOLUTION INTRAVENOUS at 12:05

## 2020-05-10 RX ADMIN — THIAMINE HYDROCHLORIDE 500 MG: 100 INJECTION, SOLUTION INTRAMUSCULAR; INTRAVENOUS at 03:05

## 2020-05-10 RX ADMIN — PIPERACILLIN SODIUM,TAZOBACTAM SODIUM 4.5 G: 4; .5 INJECTION, POWDER, FOR SOLUTION INTRAVENOUS at 08:05

## 2020-05-10 NOTE — PROGRESS NOTES
"[facility]  Hematology/Oncology  Progress Note    Patient Name: Isabell Preston  Admission Date: 5/5/2020  Hospital Length of Stay: 3 days  Code Status: Full Code     Subjective:     HPI:  Ms. Isabell Preston is a 74 y.o. 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 Last treatment on 4/28/20, next scheduled or 5/19/20) , DVT on daily Lovenox, depression, diabetes mellitus, diverticulosis, hypertension, hyperlipidemia, multiple abdominal surgeries who presents to [facility] with abdominal pain, headache, fever (subjective, greater than 103 at home), vomiting (non-bloody or not coffee-ground), and 1 bout of loose bowel movements, however normally has constipation..  Her abdominal pain localizes to the left upper and lower quadrants and is worse with palpation and movement.  She denies any alleviating factors, cough, and shortness of breath.       Patient states she was tested for COVID-19 approximately 2 months ago.  The results of these tests were both negative however she was treated as presumptive positive given her immunocompromised state.  Since that time, the patient has not left the house or had social contact with any COVID infected individuals.  The patient last received chemotherapy 2 weeks ago.  The patient states that the symptoms she is experiencing today are identical to those she experienced 2 months ago when she was presumed positive for COVID-19.      Per documentation , "tested negative for covid x 2 however based on labs PCP felt that pt had covid and got false neg result. "  COVID -19 testing on 5/5 negative .  Patient stated that her PCP culture until her that her initial COVID testing was presumed to be positive, however patient was unable to elaborate on this did not no additional information.  Chart review shows all negative COVID test.     In the ED CT scan of the " abdomen and pelvis without contrast was obtained.  Showing.   1. No acute abnormality identified on today's examination.  2. Diverticulosis without evidence to suggest diverticulitis.  3. Nonspecific bilateral perinephric fat stranding.  Correlation with urinalysis advised.  4. Cholecystectomy, hysterectomy and additional incidental findings as above.    Hospital Course:  Patient admitted to hospital medicine (5/5) for further evaluation and treatment. Patient afebrile and HDS. Has GINA with sCr 2.6 (baseline ~1.6). Continues to have significant left-sided abdominal pain, not improved with prn morphine.  Pain medications changed to Dilaudid. Overall, nausea improved and no further episodes of diarrhea or loose stools.  Antibiotics changed from Unasyn to Zosyn. Giving IVF and Cr trended down to 2.2. Etiology of abdominal pain unclear at this time, treating for presumed diverticulitis.     Transferred to med onc service (5/7). Patient had fever overnight (5/6) Tmax 100.9, resolved with tylenol. Infectious workup thus far negative, however procal elevated 1.38; lactate wnl; CXR unremarkable; UA negative for UTI, BCx NGTD. Continuing with zosyn to cover for potential abdominal source; patient with persistent nausea/vomiting. Also with Hb drop from 7.5 to 6.5. S/p 1U PRBC (5/7). Serum Cr trending back upward, given additional fluids. 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 Neurology consulted for assistance with tremors as well as reported incidents of episodic confusion/hallucinations. Per neuro recs, checked ammonia level (wnl) and thiamine level (pending). Previously low thiamine level, not taking supplements at home. Started on IV thiamine 500mg TID x 5 days (end date: 5/13), plan to transition to po pending thiamine level result. Neuro also recommending 24hr EEG to evaluate for possible epileptiform  activity contributing to confusions-->no seizure activity. Labs with progressing leukopenia/thrombocytopenia and uptrending sCr/LFTs, likely 2/2 recent chemo; continuing IVF. Ordered urine studies and RP ultrasound for GINA. RP ultrasound showing evidence of medical renal disease. Urine studies significant for proteinuria with elevated protein/creatinine ratio. Patient also noted to be intermittently confused on 5/9 and 5/10, saying she is seeing people come into her room that are not actually there. No notable electrolyte abnormalities or fevers to suggest new infectious process (patient already on zosyn for several days).     Interval History: Patient reporting abdominal pain in the epigastric area bilaterally today. Patient was intermittently confused in last 24h. No new electrolyte abnormalities. Patient on fluids for dehydration. On ABX for possible infection. Considered CO2 narcosis but VBG with normal PCO2. Slightly acidemic but lactate normal. Unclear why she is acutely confused. Patient does report anxiety and takes lorazepam at home. patient remains afebrile >24hrs and HDS, remains on zosyn for possible diverticulitis. Worsening leukopenia/thrombocytopenia. Creatinine and LFTs appear more flat today. Renal workup showing elevated protein/creatinine ratio. EEG without seizure activity. On thiamine supplementation, awaiting thiamine level.    Oncology Treatment Plan:   OP NSCLC PEMETREXED + CARBOPLATIN (AUC) Q3W    Medications:  Continuous Infusions:   sodium chloride 0.9% 125 mL/hr at 05/09/20 1233     Scheduled Meds:   amLODIPine  10 mg Oral Daily    DULoxetine  60 mg Oral Daily    folic acid  1 mg Oral Daily    metoprolol succinate  25 mg Oral Daily    ondansetron  8 mg Intravenous Q8H    piperacillin-tazobactam (ZOSYN) IVPB  4.5 g Intravenous Q12H    polyethylene glycol  17 g Oral Daily    thiamine (VITAMIN B1) IVPB  500 mg Intravenous TID     PRN Meds:sodium chloride, Dextrose 10% Bolus,  Dextrose 10% Bolus, glucagon (human recombinant), glucose, glucose, HYDROmorphone, insulin aspart U-100, lidocaine-prilocaine, magnesium hydroxide 400 mg/5 ml, melatonin, oxyCODONE, polyethylene glycol, promethazine (PHENERGAN) IVPB, senna-docusate 8.6-50 mg, sodium chloride 0.9%, tiZANidine     Review of Systems   Constitutional: Positive for activity change and appetite change (decreased). Negative for chills and fever.   HENT: Negative for congestion, rhinorrhea, sore throat and trouble swallowing.    Eyes: Negative for photophobia and visual disturbance.   Respiratory: Negative for cough, chest tightness and shortness of breath.    Cardiovascular: Negative for chest pain and leg swelling.   Gastrointestinal: Positive for abdominal pain, constipation, nausea and vomiting. Negative for diarrhea.   Genitourinary: Negative for dysuria, flank pain and hematuria.   Musculoskeletal: Negative for myalgias and neck pain.   Skin: Negative for rash and wound.   Neurological: Positive for tremors, weakness and headaches. Negative for syncope.   Psychiatric/Behavioral: Positive for confusion and hallucinations. Negative for agitation.     Objective:     Vital Signs (Most Recent):  Temp: 98.3 °F (36.8 °C) (05/10/20 0327)  Pulse: 87 (05/10/20 0327)  Resp: 16 (05/10/20 0327)  BP: (!) 162/81 (05/10/20 0327)  SpO2: 95 % (05/10/20 0327) Vital Signs (24h Range):  Temp:  [96.4 °F (35.8 °C)-98.4 °F (36.9 °C)] 98.3 °F (36.8 °C)  Pulse:  [81-93] 87  Resp:  [16-18] 16  SpO2:  [94 %-95 %] 95 %  BP: (141-162)/(69-81) 162/81     Weight: 85.8 kg (189 lb 2.5 oz)  Body mass index is 30.53 kg/m².  Body surface area is 2 meters squared.      Intake/Output Summary (Last 24 hours) at 5/10/2020 0837  Last data filed at 5/10/2020 0700  Gross per 24 hour   Intake 2576.25 ml   Output 1000 ml   Net 1576.25 ml       Physical Exam   Constitutional: She is oriented to person, place, and time. She appears well-developed and well-nourished. No distress.    HENT:   Head: Normocephalic and atraumatic.   Mouth/Throat: Oropharynx is clear and moist.   Eyes: Conjunctivae are normal. No scleral icterus.   Neck: Normal range of motion. Neck supple.   Cardiovascular: Regular rhythm, normal heart sounds and intact distal pulses.   No murmur heard.  Pulmonary/Chest: Effort normal. No respiratory distress. She has no wheezes.   Diffuse coarse crackles bilaterally   Abdominal: Soft. Bowel sounds are normal. She exhibits no distension. There is tenderness (diffuse, more prominent LUQ/LLQ). There is guarding (voluntary).   Musculoskeletal: She exhibits edema (trace) and tenderness (L ankle, chronic from previous fracture). She exhibits no deformity.   Neurological: She is alert and oriented to person, place, and time.   Bilateral UE/LE tremors noted   Skin: Skin is warm and dry. She is not diaphoretic.   Psychiatric: She has a normal mood and affect. Her behavior is normal. Judgment and thought content normal.       Significant Labs:   CBC:   Recent Labs   Lab 05/09/20  0343 05/10/20  0330   WBC 2.03* 2.60*   HGB 8.4* 7.7*   HCT 25.9* 23.9*   PLT 53* 38*   , CMP:   Recent Labs   Lab 05/08/20  1520 05/09/20  0343 05/10/20  0330    135* 139   K 4.2 4.2 4.2    104 109   CO2 18* 18* 18*   * 114* 114*   BUN 37* 40* 42*   CREATININE 3.3* 3.5* 3.5*   CALCIUM 9.1 9.2 8.6*   PROT 7.8 7.1 6.7   ALBUMIN 2.3* 2.1* 1.9*   BILITOT 1.0 0.8 0.7   ALKPHOS 204* 220* 221*   * 132* 135*   ALT 84* 100* 112*   ANIONGAP 13 13 12   EGFRNONAA 13.2* 12.3* 12.3*    and All pertinent labs from the last 24 hours have been reviewed.    Diagnostic Results:  I have reviewed and interpreted all pertinent imaging results/findings within the past 24 hours.    Assessment/Plan:     * Left lower quadrant abdominal pain  Hx of diagnosed reticulocytes and diverticulitis.  Current etiology for abdominal pain is unclear  - CT AP without contrast in the ED demonstrating no acute abnormality;  diverticulosis without evidence to suggest diverticulitis; nonspecific bilateral perinephric fat stranding.  -- given the fact that CT AP was performed without contrast (due to GINA), wonder if diverticulitis was not appreciated due to lack of contrast.  Will treat currently for possible diverticulitis vs other abdominal source    - IV Unasyn started on 5/5-->changed to zosyn on 5/6  - febrile to 100.9 overnight 5/6; infectious w/u negative to date (CXR unremarkable, UA negative for UTI, BCx NGTD; however procal elevated 1.38)  - low threshold to add vanc for MRSA coverage if becomes febrile again  - has remained afebrile >24hrs, HDS  - pain control with oxy 10mg q6h prn and IV dilaudid 1mg for breakthrough pain  - trying bentyl 5/10 for continued abdominal pain  - nausea control with scheduled IV zofran 8mg q8h and prn IV phenergan 25mg q6h  - continue empiric abx with zosyn for now    Acute encephalopathy  Patient was intermittently confused since 5/9. Hospital delirium can be 2/2 fluid status, electrolyte abnormalities, infection, hypercapnia, withdrawal etc. No new electrolyte abnormalities. Patient on fluids for dehydration. On ABX for possible infection. Considered CO2 narcosis but VBG with normal PCO2. Slightly acidemic with low bicarb suggesting metabolic acidosis but lactate normal. Unclear why she is acutely confused. Patient does report anxiety and takes lorazepam at home.    - continue to monitor    Macrocytic anemia  Hb 7.8 on admission with   - Hb down trended to 6.5 on 5/7 - s/p 1U PRBC  - continue to monitor  - transfuse as needed for Hb > 7    Acute renal failure with acute tubular necrosis superimposed on stage 3 chronic kidney disease  sCr on admission 2.6 (baseline ~1.6). GINA likely pre-renal. Concern for ischemic ATN due to GI losses    -s/p IVF with sCr initially trending down, now trending back upward  - give additional IVF with NS  - suspect continued uptrend could be due to recent  chemotherapy (pemetrexed and carboplatin last cycle late April 2020)  - ordered urine lytes, protein/Cr ratio to evaluate-->elevated protein/Cr ratio. Consider glomerulonephritis, possible nephro consult.  - ordered retroperitoneal ultrasound to evaluate-->medical renal disease, no hydronephrosis  - continue to monitor    Tremor  - Patient with debilitating b/l UE and LE tremor, followed by neurology outpatient. 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 Was considering sinemet outpatient.   - Patient also with reported incidents of episodic confusion/hallucinations. MRI without infarcts.    Plan  - Neurology consulted, apprec recs  - Ammonia level (wnl); thiamine level (ordered 5/8, results pending)  - Patient with prior low levels of thiamine, not taking supplements at home  - Start IV thiamine 500mg TID x 5 days (end date: 5/13), then transition to po pending result of thiamine level  - No sinamet at this time, as has potential to worsen hallucinations and current abdominal pain  - 24 hr EEG to evaluate for possible epileptiform activity  - delirium precautions; minimize sedating medications     Chemotherapy induced nausea and vomiting  - persistent nausea/vomiting  - scheduled IV zofran 8mg q8h  - prn IV phenergan 25mg q6h    Malignant pleural mesothelioma  Receiving outpatient chemotherapy - NSCLC PEMETREXED + CARBOPLATIN (AUC) Q3W and IVF .   Last treatment on 4/28/20, next scheduled or 5/19/20)  - on RA, satting well  - continue to monitor    History of DVT of lower extremity  - continue home Lovenox 80mg daily  - platelet count has been downtrending, may need to hold lovenox if platelet count drops below 50 (bleed risk)    Type 2 diabetes mellitus without complication, without long-term current use of insulin  HbA1c 6.1, well controlled  - POCT glucose AC QHS  - LDSSI  - diabetic diet    Hypertension, essential  - cont home med amlodipine 10mg daily  - unclear why  home metoprolol was held on admission (75mg daily at home)  - remains normotensive on amlodipine, restarted metoprolol at low dose 25mg daily  - continue to monitor           Carlie Morales MD  Hematology/Oncology  [facility]

## 2020-05-10 NOTE — ASSESSMENT & PLAN NOTE
Hx of diagnosed reticulocytes and diverticulitis.  Current etiology for abdominal pain is unclear  - CT AP without contrast in the ED demonstrating no acute abnormality; diverticulosis without evidence to suggest diverticulitis; nonspecific bilateral perinephric fat stranding.  -- given the fact that CT AP was performed without contrast (due to GINA), wonder if diverticulitis was not appreciated due to lack of contrast.  Will treat currently for possible diverticulitis vs other abdominal source    - IV Unasyn started on 5/5 -> changed to zosyn on 5/6  - febrile to 100.9 overnight 5/6; infectious w/u negative to date (CXR unremarkable, UA negative for UTI, BCx NGTD; however procal elevated 1.38)  - patient has remained afebrile >24hrs, HDS  - pain control with oxy 10mg q6h prn  - trial of bentyl 5/10 for continued abdominal pain - some subjective improvement  - nausea control with scheduled IV zofran 8mg q8h and prn IV phenergan 25mg q6h  - completed 5 days of IV zosyn during admission; discontinued 5/11 as less suspicious of infectious etiology

## 2020-05-10 NOTE — NURSING
Plan of care reviewed with patient and family . FAll precautions maintained,side rail up x2,call light in reach, bed in low position and locked,nonskid socks on.  Encouraged to eat and drink.  Requesting pain medication during the shift and getting relief. Needed assistance with getting up to the bedside commode.

## 2020-05-10 NOTE — SUBJECTIVE & OBJECTIVE
Interval History: Patient reporting abdominal pain in the epigastric area bilaterally today. Patient was intermittently confused in last 24h. No new electrolyte abnormalities. Patient on fluids for dehydration. On ABX for possible infection. Considered CO2 narcosis but VBG with normal PCO2. Slightly acidemic but lactate normal. Unclear why she is acutely confused. Patient does report anxiety and takes lorazepam at home. patient remains afebrile >24hrs and HDS, remains on zosyn for possible diverticulitis. Worsening leukopenia/thrombocytopenia. Creatinine and LFTs appear more flat today. Renal workup showing elevated protein/creatinine ratio. EEG without seizure activity. On thiamine supplementation, awaiting thiamine level.    Oncology Treatment Plan:   OP NSCLC PEMETREXED + CARBOPLATIN (AUC) Q3W    Medications:  Continuous Infusions:   sodium chloride 0.9% 125 mL/hr at 05/09/20 1233     Scheduled Meds:   amLODIPine  10 mg Oral Daily    DULoxetine  60 mg Oral Daily    folic acid  1 mg Oral Daily    metoprolol succinate  25 mg Oral Daily    ondansetron  8 mg Intravenous Q8H    piperacillin-tazobactam (ZOSYN) IVPB  4.5 g Intravenous Q12H    polyethylene glycol  17 g Oral Daily    thiamine (VITAMIN B1) IVPB  500 mg Intravenous TID     PRN Meds:sodium chloride, Dextrose 10% Bolus, Dextrose 10% Bolus, glucagon (human recombinant), glucose, glucose, HYDROmorphone, insulin aspart U-100, lidocaine-prilocaine, magnesium hydroxide 400 mg/5 ml, melatonin, oxyCODONE, polyethylene glycol, promethazine (PHENERGAN) IVPB, senna-docusate 8.6-50 mg, sodium chloride 0.9%, tiZANidine     Review of Systems   Constitutional: Positive for activity change and appetite change (decreased). Negative for chills and fever.   HENT: Negative for congestion, rhinorrhea, sore throat and trouble swallowing.    Eyes: Negative for photophobia and visual disturbance.   Respiratory: Negative for cough, chest tightness and shortness of breath.     Cardiovascular: Negative for chest pain and leg swelling.   Gastrointestinal: Positive for abdominal pain, constipation, nausea and vomiting. Negative for diarrhea.   Genitourinary: Negative for dysuria, flank pain and hematuria.   Musculoskeletal: Negative for myalgias and neck pain.   Skin: Negative for rash and wound.   Neurological: Positive for tremors, weakness and headaches. Negative for syncope.   Psychiatric/Behavioral: Positive for confusion and hallucinations. Negative for agitation.     Objective:     Vital Signs (Most Recent):  Temp: 98.3 °F (36.8 °C) (05/10/20 0327)  Pulse: 87 (05/10/20 0327)  Resp: 16 (05/10/20 0327)  BP: (!) 162/81 (05/10/20 0327)  SpO2: 95 % (05/10/20 0327) Vital Signs (24h Range):  Temp:  [96.4 °F (35.8 °C)-98.4 °F (36.9 °C)] 98.3 °F (36.8 °C)  Pulse:  [81-93] 87  Resp:  [16-18] 16  SpO2:  [94 %-95 %] 95 %  BP: (141-162)/(69-81) 162/81     Weight: 85.8 kg (189 lb 2.5 oz)  Body mass index is 30.53 kg/m².  Body surface area is 2 meters squared.      Intake/Output Summary (Last 24 hours) at 5/10/2020 0837  Last data filed at 5/10/2020 0700  Gross per 24 hour   Intake 2576.25 ml   Output 1000 ml   Net 1576.25 ml       Physical Exam   Constitutional: She is oriented to person, place, and time. She appears well-developed and well-nourished. No distress.   HENT:   Head: Normocephalic and atraumatic.   Mouth/Throat: Oropharynx is clear and moist.   Eyes: Conjunctivae are normal. No scleral icterus.   Neck: Normal range of motion. Neck supple.   Cardiovascular: Regular rhythm, normal heart sounds and intact distal pulses.   No murmur heard.  Pulmonary/Chest: Effort normal. No respiratory distress. She has no wheezes.   Diffuse coarse crackles bilaterally   Abdominal: Soft. Bowel sounds are normal. She exhibits no distension. There is tenderness (diffuse, more prominent LUQ/LLQ). There is guarding (voluntary).   Musculoskeletal: She exhibits edema (trace) and tenderness (L ankle, chronic  from previous fracture). She exhibits no deformity.   Neurological: She is alert and oriented to person, place, and time.   Bilateral UE/LE tremors noted   Skin: Skin is warm and dry. She is not diaphoretic.   Psychiatric: She has a normal mood and affect. Her behavior is normal. Judgment and thought content normal.       Significant Labs:   CBC:   Recent Labs   Lab 05/09/20  0343 05/10/20  0330   WBC 2.03* 2.60*   HGB 8.4* 7.7*   HCT 25.9* 23.9*   PLT 53* 38*   , CMP:   Recent Labs   Lab 05/08/20  1520 05/09/20  0343 05/10/20  0330    135* 139   K 4.2 4.2 4.2    104 109   CO2 18* 18* 18*   * 114* 114*   BUN 37* 40* 42*   CREATININE 3.3* 3.5* 3.5*   CALCIUM 9.1 9.2 8.6*   PROT 7.8 7.1 6.7   ALBUMIN 2.3* 2.1* 1.9*   BILITOT 1.0 0.8 0.7   ALKPHOS 204* 220* 221*   * 132* 135*   ALT 84* 100* 112*   ANIONGAP 13 13 12   EGFRNONAA 13.2* 12.3* 12.3*    and All pertinent labs from the last 24 hours have been reviewed.    Diagnostic Results:  I have reviewed and interpreted all pertinent imaging results/findings within the past 24 hours.

## 2020-05-10 NOTE — ASSESSMENT & PLAN NOTE
Patient was intermittently confused since 5/9. Hospital delirium can be 2/2 fluid status, electrolyte abnormalities, infection, hypercapnia, withdrawal etc. No new electrolyte abnormalities. Patient on fluids for dehydration. On ABX for possible infection. Considered CO2 narcosis but VBG with normal PCO2. Slightly acidemic with low bicarb suggesting metabolic acidosis but lactate normal. Unclear why she is acutely confused. Patient does report anxiety and takes lorazepam at home.    - continue to monitor

## 2020-05-10 NOTE — ASSESSMENT & PLAN NOTE
sCr on admission 2.6 (baseline ~1.6). GIAN likely pre-renal. Concern for ischemic ATN due to GI losses    -s/p IVF with sCr initially trending down, now trending back upward  - give additional IVF with NS  - suspect continued uptrend could be due to recent chemotherapy (pemetrexed and carboplatin last cycle late April 2020)  - ordered urine lytes, protein/Cr ratio to evaluate-->elevated protein/Cr ratio. Consider glomerulonephritis, possible nephro consult.  - ordered retroperitoneal ultrasound to evaluate-->medical renal disease, no hydronephrosis  - continue to monitor

## 2020-05-11 ENCOUNTER — PATIENT MESSAGE (OUTPATIENT)
Dept: NEUROLOGY | Facility: CLINIC | Age: 75
End: 2020-05-11

## 2020-05-11 LAB
ABO + RH BLD: NORMAL
ALBUMIN SERPL BCP-MCNC: 2 G/DL (ref 3.5–5.2)
ALP SERPL-CCNC: 264 U/L (ref 55–135)
ALT SERPL W/O P-5'-P-CCNC: 106 U/L (ref 10–44)
ANION GAP SERPL CALC-SCNC: 13 MMOL/L (ref 8–16)
ANISOCYTOSIS BLD QL SMEAR: SLIGHT
AST SERPL-CCNC: 109 U/L (ref 10–40)
BASOPHILS # BLD AUTO: 0.01 K/UL (ref 0–0.2)
BASOPHILS NFR BLD: 0.2 % (ref 0–1.9)
BILIRUB SERPL-MCNC: 0.8 MG/DL (ref 0.1–1)
BLD GP AB SCN CELLS X3 SERPL QL: NORMAL
BUN SERPL-MCNC: 42 MG/DL (ref 8–23)
CALCIUM SERPL-MCNC: 8.8 MG/DL (ref 8.7–10.5)
CHLORIDE SERPL-SCNC: 109 MMOL/L (ref 95–110)
CO2 SERPL-SCNC: 17 MMOL/L (ref 23–29)
CREAT SERPL-MCNC: 3.4 MG/DL (ref 0.5–1.4)
DIFFERENTIAL METHOD: ABNORMAL
EOSINOPHIL # BLD AUTO: 0 K/UL (ref 0–0.5)
EOSINOPHIL NFR BLD: 0.4 % (ref 0–8)
ERYTHROCYTE [DISTWIDTH] IN BLOOD BY AUTOMATED COUNT: 15.6 % (ref 11.5–14.5)
EST. GFR  (AFRICAN AMERICAN): 14.7 ML/MIN/1.73 M^2
EST. GFR  (NON AFRICAN AMERICAN): 12.8 ML/MIN/1.73 M^2
GLUCOSE SERPL-MCNC: 107 MG/DL (ref 70–110)
HCT VFR BLD AUTO: 26 % (ref 37–48.5)
HGB BLD-MCNC: 8.4 G/DL (ref 12–16)
HYPOCHROMIA BLD QL SMEAR: ABNORMAL
IMM GRANULOCYTES # BLD AUTO: 0.04 K/UL (ref 0–0.04)
IMM GRANULOCYTES NFR BLD AUTO: 0.8 % (ref 0–0.5)
LIPASE SERPL-CCNC: 51 U/L (ref 4–60)
LYMPHOCYTES # BLD AUTO: 0.7 K/UL (ref 1–4.8)
LYMPHOCYTES NFR BLD: 14.9 % (ref 18–48)
MAGNESIUM SERPL-MCNC: 1.7 MG/DL (ref 1.6–2.6)
MCH RBC QN AUTO: 32.7 PG (ref 27–31)
MCHC RBC AUTO-ENTMCNC: 32.3 G/DL (ref 32–36)
MCV RBC AUTO: 101 FL (ref 82–98)
MONOCYTES # BLD AUTO: 0.2 K/UL (ref 0.3–1)
MONOCYTES NFR BLD: 3.6 % (ref 4–15)
NEUTROPHILS # BLD AUTO: 3.8 K/UL (ref 1.8–7.7)
NEUTROPHILS NFR BLD: 80.1 % (ref 38–73)
NRBC BLD-RTO: 0 /100 WBC
PHOSPHATE SERPL-MCNC: 4 MG/DL (ref 2.7–4.5)
PLATELET # BLD AUTO: 37 K/UL (ref 150–350)
PMV BLD AUTO: 11.5 FL (ref 9.2–12.9)
POCT GLUCOSE: 123 MG/DL (ref 70–110)
POCT GLUCOSE: 136 MG/DL (ref 70–110)
POCT GLUCOSE: 149 MG/DL (ref 70–110)
POIKILOCYTOSIS BLD QL SMEAR: SLIGHT
POLYCHROMASIA BLD QL SMEAR: ABNORMAL
POTASSIUM SERPL-SCNC: 3.8 MMOL/L (ref 3.5–5.1)
PROT SERPL-MCNC: 6.9 G/DL (ref 6–8.4)
RBC # BLD AUTO: 2.57 M/UL (ref 4–5.4)
SODIUM SERPL-SCNC: 139 MMOL/L (ref 136–145)
WBC # BLD AUTO: 4.77 K/UL (ref 3.9–12.7)

## 2020-05-11 PROCEDURE — 86850 RBC ANTIBODY SCREEN: CPT

## 2020-05-11 PROCEDURE — 80053 COMPREHEN METABOLIC PANEL: CPT

## 2020-05-11 PROCEDURE — 97535 SELF CARE MNGMENT TRAINING: CPT

## 2020-05-11 PROCEDURE — 97116 GAIT TRAINING THERAPY: CPT

## 2020-05-11 PROCEDURE — 25000003 PHARM REV CODE 250: Performed by: HOSPITALIST

## 2020-05-11 PROCEDURE — 20600001 HC STEP DOWN PRIVATE ROOM

## 2020-05-11 PROCEDURE — 63600175 PHARM REV CODE 636 W HCPCS: Performed by: STUDENT IN AN ORGANIZED HEALTH CARE EDUCATION/TRAINING PROGRAM

## 2020-05-11 PROCEDURE — 25000003 PHARM REV CODE 250: Performed by: STUDENT IN AN ORGANIZED HEALTH CARE EDUCATION/TRAINING PROGRAM

## 2020-05-11 PROCEDURE — 99233 SBSQ HOSP IP/OBS HIGH 50: CPT | Mod: GC,,, | Performed by: INTERNAL MEDICINE

## 2020-05-11 PROCEDURE — 84100 ASSAY OF PHOSPHORUS: CPT

## 2020-05-11 PROCEDURE — 99233 PR SUBSEQUENT HOSPITAL CARE,LEVL III: ICD-10-PCS | Mod: GC,,, | Performed by: INTERNAL MEDICINE

## 2020-05-11 PROCEDURE — 97110 THERAPEUTIC EXERCISES: CPT

## 2020-05-11 PROCEDURE — 97530 THERAPEUTIC ACTIVITIES: CPT

## 2020-05-11 PROCEDURE — 85025 COMPLETE CBC W/AUTO DIFF WBC: CPT

## 2020-05-11 PROCEDURE — 83690 ASSAY OF LIPASE: CPT

## 2020-05-11 PROCEDURE — 83735 ASSAY OF MAGNESIUM: CPT

## 2020-05-11 RX ORDER — PANTOPRAZOLE SODIUM 40 MG/1
40 TABLET, DELAYED RELEASE ORAL DAILY
Status: DISCONTINUED | OUTPATIENT
Start: 2020-05-11 | End: 2020-05-11

## 2020-05-11 RX ORDER — METOPROLOL SUCCINATE 50 MG/1
50 TABLET, EXTENDED RELEASE ORAL DAILY
Status: DISCONTINUED | OUTPATIENT
Start: 2020-05-11 | End: 2020-05-13

## 2020-05-11 RX ADMIN — THIAMINE HYDROCHLORIDE 500 MG: 100 INJECTION, SOLUTION INTRAMUSCULAR; INTRAVENOUS at 09:05

## 2020-05-11 RX ADMIN — FOLIC ACID 1 MG: 1 TABLET ORAL at 08:05

## 2020-05-11 RX ADMIN — DICYCLOMINE HYDROCHLORIDE 10 MG: 10 CAPSULE ORAL at 08:05

## 2020-05-11 RX ADMIN — ONDANSETRON 8 MG: 2 INJECTION, SOLUTION INTRAMUSCULAR; INTRAVENOUS at 09:05

## 2020-05-11 RX ADMIN — THIAMINE HYDROCHLORIDE 500 MG: 100 INJECTION, SOLUTION INTRAMUSCULAR; INTRAVENOUS at 02:05

## 2020-05-11 RX ADMIN — PROMETHAZINE HYDROCHLORIDE 25 MG: 25 INJECTION INTRAMUSCULAR; INTRAVENOUS at 08:05

## 2020-05-11 RX ADMIN — METOPROLOL SUCCINATE 50 MG: 50 TABLET, EXTENDED RELEASE ORAL at 08:05

## 2020-05-11 RX ADMIN — DULOXETINE 60 MG: 60 CAPSULE, DELAYED RELEASE ORAL at 08:05

## 2020-05-11 RX ADMIN — AMLODIPINE BESYLATE 10 MG: 10 TABLET ORAL at 08:05

## 2020-05-11 RX ADMIN — ONDANSETRON 8 MG: 2 INJECTION, SOLUTION INTRAMUSCULAR; INTRAVENOUS at 05:05

## 2020-05-11 RX ADMIN — DICYCLOMINE HYDROCHLORIDE 10 MG: 10 CAPSULE ORAL at 09:05

## 2020-05-11 RX ADMIN — OXYCODONE HYDROCHLORIDE 10 MG: 10 TABLET ORAL at 08:05

## 2020-05-11 RX ADMIN — DICYCLOMINE HYDROCHLORIDE 10 MG: 10 CAPSULE ORAL at 06:05

## 2020-05-11 RX ADMIN — ONDANSETRON 8 MG: 2 INJECTION, SOLUTION INTRAMUSCULAR; INTRAVENOUS at 01:05

## 2020-05-11 RX ADMIN — DICYCLOMINE HYDROCHLORIDE 10 MG: 10 CAPSULE ORAL at 01:05

## 2020-05-11 NOTE — PT/OT/SLP PROGRESS
Physical Therapy Treatment    Patient Name:  Isabell Preston   MRN:  [id number]    Recommendations:     Discharge Recommendations:  nursing facility, skilled   Discharge Equipment Recommendations: none   Barriers to discharge: None    Assessment:     Isabell Preston is a 74 y.o. female admitted with a medical diagnosis of Left lower quadrant abdominal pain.  She presents with the following impairments/functional limitations:  weakness, gait instability, impaired endurance, impaired functional mobilty, pain . Patient limited by pain  And sleepiness during session. She performed transfers w/ RW and CGA from EOB; walk in room w/ RW and CGA w/ IV pole in tow 105 feet. Patient will benefit from continued physical therapy to address deficits and improve safety and functional mobility. Continue with physical therapy plan of care. .    Rehab Prognosis: Good; patient would benefit from acute skilled PT services to address these deficits and reach maximum level of function.    Recent Surgery: * No surgery found *      Plan:     During this hospitalization, patient to be seen 4 x/week to address the identified rehab impairments via gait training, therapeutic activities, therapeutic exercises and progress toward the following goals:    · Plan of Care Expires:  06/05/20    Subjective     Chief Complaint: abdominal pain  Patient/Family Comments/goals: reports feels better after threapy; reports   Pain/Comfort:  · Pain Rating 1: 8/10  · Location - Orientation 1: lower  · Location 1: abdomen  · Pain Addressed 1: Distraction, Reposition  · Pain Rating Post-Intervention 1: 5/10      Objective:     Communicated with nurse prior to session.  Patient found HOB elevated with telemetry, bed alarm, peripheral IV(camera) upon PT entry to room.     General Precautions: Standard, fall, neutropenic   Orthopedic Precautions:N/A   Braces: N/A     Functional Mobility:  · Bed Mobility:     · Supine to Sit: stand by assistance and extra time and  HOB fully elevated  · Sit to Supine: stand by assistance and and extra time anf HOB fully elevated  · Transfers:     · Sit to Stand:  contact guard assistance with rolling walker  · Gait: w/ RW and CGA w/ IV pole in tow in room 105 feet, slow pace, decreased foot clearance. Distance limited by fatigue, pain improved w/ ambulation      AM-PAC 6 CLICK MOBILITY  Turning over in bed (including adjusting bedclothes, sheets and blankets)?: 4  Sitting down on and standing up from a chair with arms (e.g., wheelchair, bedside commode, etc.): 3  Moving from lying on back to sitting on the side of the bed?: 3  Moving to and from a bed to a chair (including a wheelchair)?: 3  Need to walk in hospital room?: 3  Climbing 3-5 steps with a railing?: 2  Basic Mobility Total Score: 18       Therapeutic Activities and Exercises:   Patient performs 20x LAQ, QS, GS, AP    Patient left HOB elevated with all lines intact, call button in reach, bed alarm on, nurse/telesitter notified and nurse okayed for PT to set up lunch ..    GOALS:   Multidisciplinary Problems     Physical Therapy Goals        Problem: Physical Therapy Goal    Goal Priority Disciplines Outcome Goal Variances Interventions   Physical Therapy Goal     PT, PT/OT Ongoing, Progressing     Description:  Goals to be met by: 20     Patient will increase functional independence with mobility by performin. Supine to sit with Stand-by Assistance. - GOAL MET  REVISED: Mod I  2. Sit to stand transfer with Stand-by Assistance.  3. Bed to chair transfer with Stand-by Assistance using Rolling Walker. - GOAL MET  REVISED: S with use of RW  4. Gait  x 50 feet with Stand-by Assistance using Rolling Walker. - GOAL MET  Pt to amb 150', S, with RW  5. Ascend/Descend 4 inch curb step with Moderate Assistance using Rolling Walker.  6. Lower extremity exercise program x 20 reps per handout, with assistance as needed.                       Time Tracking:     PT Received On:  05/11/20  PT Start Time: 1159     PT Stop Time: 1228  PT Total Time (min): 29 min     Billable Minutes: Gait Training 15 and Therapeutic Exercise 14    Treatment Type: Treatment  PT/PTA: PT     PTA Visit Number: 0     Rolanda Trotter, PT  05/11/2020

## 2020-05-11 NOTE — ASSESSMENT & PLAN NOTE
sCr on admission 2.6 (baseline ~1.6). GINA likely pre-renal. Concern for ischemic ATN due to GI losses    - s/p IVF with sCr initially trending down, now trending back upward  - given additional IVF with NS  - suspect continued uptrend could be due to recent chemotherapy (pemetrexed and carboplatin last cycle late April 2020), however now outside of expected window of side effect  - RP US showing medical renal disease; no hydronephrosis  - ordered urine lytes, protein/Cr ratio to evaluate -> elevated protein/Cr ratio. Consider intra-renal causes.  - Nephrology consulted, apprec recs  - continue to trend CMP

## 2020-05-11 NOTE — PLAN OF CARE
Problem: Adult Inpatient Plan of Care  Goal: Plan of Care Review  Outcome: Ongoing, Progressing  Flowsheets (Taken 5/11/2020 1410)  Plan of Care Reviewed With: patient  Patient remains free from falls and injury this shift. Bed in low, locked position with call light in reach. Plan of care reviewed with pt.  VSS.  Emesis x1. Blood sugar monitored before meals.  Patient encouraged to call for assistance when getting out of bed.  Patient verbalized understanding. All belongings within reach.  Will continue to monitor.

## 2020-05-11 NOTE — ASSESSMENT & PLAN NOTE
Hx of DVTs - LLE 1970s and 1980s; DVT of L subclavian 1980s  Home lovenox 120mg daily  - started at 80mg daily on admission  - platelet count has been downtrending < 50  - d/c'd lovenox due to bleed risk (5/9)  - restart as appropriate

## 2020-05-11 NOTE — SUBJECTIVE & OBJECTIVE
Interval History: NAEON. Patient continues to c/o abdominal pain in bilateral epigastric area, as well as nausea/vomiting this morning. Repeat CT abdo without evidence of diverticulitis, possibly some inflammation around duodenum. Completed 5 days of zosyn, will discontinue abx today as less concerning for infectious etiology and zosyn may be contributing to GINA. sCr and LFTs stable/slightly improved today. Consulted nephrology, will follow-up on recs. Close monitoring of platelet count.     Oncology Treatment Plan:   OP NSCLC PEMETREXED + CARBOPLATIN (AUC) Q3W    Medications:  Continuous Infusions:  Scheduled Meds:   amLODIPine  10 mg Oral Daily    dicyclomine  10 mg Oral QID    DULoxetine  60 mg Oral Daily    folic acid  1 mg Oral Daily    metoprolol succinate  50 mg Oral Daily    ondansetron  8 mg Intravenous Q8H    polyethylene glycol  17 g Oral Daily    thiamine (VITAMIN B1) IVPB  500 mg Intravenous TID     PRN Meds:sodium chloride, Dextrose 10% Bolus, Dextrose 10% Bolus, glucagon (human recombinant), glucose, glucose, insulin aspart U-100, lidocaine-prilocaine, magnesium hydroxide 400 mg/5 ml, melatonin, oxyCODONE, polyethylene glycol, promethazine (PHENERGAN) IVPB, senna-docusate 8.6-50 mg, sodium chloride 0.9%, tiZANidine     Review of Systems   Constitutional: Positive for activity change and appetite change (decreased). Negative for chills and fever.   HENT: Negative for congestion, rhinorrhea, sore throat and trouble swallowing.    Eyes: Negative for photophobia and visual disturbance.   Respiratory: Negative for cough, chest tightness and shortness of breath.    Cardiovascular: Negative for chest pain and leg swelling.   Gastrointestinal: Positive for abdominal pain, nausea and vomiting. Negative for constipation and diarrhea.   Genitourinary: Negative for dysuria, flank pain and hematuria.   Musculoskeletal: Negative for myalgias and neck pain.   Skin: Negative for rash and wound.    Neurological: Positive for tremors, weakness and headaches. Negative for syncope.   Psychiatric/Behavioral: Positive for confusion and hallucinations. Negative for agitation.     Objective:     Vital Signs (Most Recent):  Temp: 98 °F (36.7 °C) (05/11/20 0740)  Pulse: 88 (05/11/20 0740)  Resp: 18 (05/11/20 0740)  BP: 133/75 (05/11/20 0740)  SpO2: 96 % (05/11/20 0740) Vital Signs (24h Range):  Temp:  [97.5 °F (36.4 °C)-98 °F (36.7 °C)] 98 °F (36.7 °C)  Pulse:  [87-98] 88  Resp:  [15-18] 18  SpO2:  [93 %-96 %] 96 %  BP: (133-167)/(65-82) 133/75     Weight: 88 kg (194 lb 0.1 oz)  Body mass index is 31.31 kg/m².  Body surface area is 2.02 meters squared.      Intake/Output Summary (Last 24 hours) at 5/11/2020 0941  Last data filed at 5/11/2020 0837  Gross per 24 hour   Intake 2270 ml   Output 2150 ml   Net 120 ml       Physical Exam   Constitutional: She is oriented to person, place, and time. She appears well-developed and well-nourished. No distress.   HENT:   Head: Normocephalic and atraumatic.   Mouth/Throat: Oropharynx is clear and moist.   Eyes: Conjunctivae are normal. No scleral icterus.   Neck: Normal range of motion. Neck supple.   Cardiovascular: Regular rhythm, normal heart sounds and intact distal pulses.   No murmur heard.  Pulmonary/Chest: Effort normal. No respiratory distress. She has no wheezes.   Diffuse coarse crackles bilaterally   Abdominal: Soft. Bowel sounds are normal. She exhibits no distension. There is tenderness (diffuse, more prominent in epigastric area/under rib cage b/l). There is guarding (voluntary).   Musculoskeletal: She exhibits edema (trace, b/l ankles) and tenderness (L ankle, chronic from previous fracture). She exhibits no deformity.   Neurological: She is alert and oriented to person, place, and time.   Bilateral UE/LE tremors improved   Skin: Skin is warm and dry. She is not diaphoretic.   Psychiatric: She has a normal mood and affect. Her behavior is normal. Judgment and  thought content normal.       Significant Labs:   CBC:   Recent Labs   Lab 05/10/20  0330 05/11/20  0330   WBC 2.60* 4.77   HGB 7.7* 8.4*   HCT 23.9* 26.0*   PLT 38* 37*   , CMP:   Recent Labs   Lab 05/10/20  0330 05/11/20  0330    139   K 4.2 3.8    109   CO2 18* 17*   * 107   BUN 42* 42*   CREATININE 3.5* 3.4*   CALCIUM 8.6* 8.8   PROT 6.7 6.9   ALBUMIN 1.9* 2.0*   BILITOT 0.7 0.8   ALKPHOS 221* 264*   * 109*   * 106*   ANIONGAP 12 13   EGFRNONAA 12.3* 12.8*    and All pertinent labs from the last 24 hours have been reviewed.    Diagnostic Results:  I have reviewed all pertinent imaging results/findings within the past 24 hours.

## 2020-05-11 NOTE — PLAN OF CARE
Problem: Occupational Therapy Goal  Goal: Occupational Therapy Goal  Description  Goals to be met by: 6/5    Patient will increase functional independence with ADLs by performing:    UE Dressing with Set-up Assistance.  LE Dressing with Stand-by Assistance.  Grooming while seated with Set-up Assistance. MET on 5/11   Toileting from bedside commode with Contact Guard Assistance for hygiene and clothing management.       Outcome: Ongoing, Progressing     Goals remain appropriate  Dean Pride OT   05/11/2020

## 2020-05-11 NOTE — PROGRESS NOTES
"[facility]  Hematology/Oncology  Progress Note    Patient Name: Isabell Preston  Admission Date: 5/5/2020  Hospital Length of Stay: 4 days  Code Status: Full Code     Subjective:     HPI:  Ms. Isabell Preston is a 74 y.o. 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 Last treatment on 4/28/20, next scheduled or 5/19/20) , DVT on daily Lovenox, depression, diabetes mellitus, diverticulosis, hypertension, hyperlipidemia, multiple abdominal surgeries who presents to [facility] ED with abdominal pain, headache, fever (subjective, greater than 103 at home), vomiting (non-bloody or not coffee-ground), and 1 bout of loose bowel movements, however normally has constipation..  Her abdominal pain localizes to the left upper and lower quadrants and is worse with palpation and movement.  She denies any alleviating factors, cough, and shortness of breath.       Patient states she was tested for COVID-19 approximately 2 months ago.  The results of these tests were both negative however she was treated as presumptive positive given her immunocompromised state.  Since that time, the patient has not left the house or had social contact with any COVID infected individuals.  The patient last received chemotherapy 2 weeks ago.  The patient states that the symptoms she is experiencing today are identical to those she experienced 2 months ago when she was presumed positive for COVID-19.      Per documentation , "tested negative for covid x 2 however based on labs PCP felt that pt had covid and got false neg result. "  COVID -19 testing on 5/5 negative .  Patient stated that her PCP culture until her that her initial COVID testing was presumed to be positive, however patient was unable to elaborate on this did not no additional information.  Chart review shows all negative COVID test.     In the ED CT scan of the " abdomen and pelvis without contrast was obtained.  Showing.   1. No acute abnormality identified on today's examination.  2. Diverticulosis without evidence to suggest diverticulitis.  3. Nonspecific bilateral perinephric fat stranding.  Correlation with urinalysis advised.  4. Cholecystectomy, hysterectomy and additional incidental findings as above.    Interval History: NAEON. Patient continues to c/o abdominal pain in bilateral epigastric area, as well as nausea/vomiting this morning. Repeat CT abdo without evidence of diverticulitis, possibly some inflammation around duodenum. Completed 5 days of zosyn, will discontinue abx today as less concerning for infectious etiology and zosyn may be contributing to GINA. sCr and LFTs stable/slightly improved today. Consulted nephrology, will follow-up on recs. Close monitoring of platelet count.     Oncology Treatment Plan:   OP NSCLC PEMETREXED + CARBOPLATIN (AUC) Q3W    Medications:  Continuous Infusions:  Scheduled Meds:   amLODIPine  10 mg Oral Daily    dicyclomine  10 mg Oral QID    DULoxetine  60 mg Oral Daily    folic acid  1 mg Oral Daily    metoprolol succinate  50 mg Oral Daily    ondansetron  8 mg Intravenous Q8H    polyethylene glycol  17 g Oral Daily    thiamine (VITAMIN B1) IVPB  500 mg Intravenous TID     PRN Meds:sodium chloride, Dextrose 10% Bolus, Dextrose 10% Bolus, glucagon (human recombinant), glucose, glucose, insulin aspart U-100, lidocaine-prilocaine, magnesium hydroxide 400 mg/5 ml, melatonin, oxyCODONE, polyethylene glycol, promethazine (PHENERGAN) IVPB, senna-docusate 8.6-50 mg, sodium chloride 0.9%, tiZANidine     Review of Systems   Constitutional: Positive for activity change and appetite change (decreased). Negative for chills and fever.   HENT: Negative for congestion, rhinorrhea, sore throat and trouble swallowing.    Eyes: Negative for photophobia and visual disturbance.   Respiratory: Negative for cough, chest tightness and  shortness of breath.    Cardiovascular: Negative for chest pain and leg swelling.   Gastrointestinal: Positive for abdominal pain, nausea and vomiting. Negative for constipation and diarrhea.   Genitourinary: Negative for dysuria, flank pain and hematuria.   Musculoskeletal: Negative for myalgias and neck pain.   Skin: Negative for rash and wound.   Neurological: Positive for tremors, weakness and headaches. Negative for syncope.   Psychiatric/Behavioral: Positive for confusion and hallucinations. Negative for agitation.     Objective:     Vital Signs (Most Recent):  Temp: 98 °F (36.7 °C) (05/11/20 0740)  Pulse: 88 (05/11/20 0740)  Resp: 18 (05/11/20 0740)  BP: 133/75 (05/11/20 0740)  SpO2: 96 % (05/11/20 0740) Vital Signs (24h Range):  Temp:  [97.5 °F (36.4 °C)-98 °F (36.7 °C)] 98 °F (36.7 °C)  Pulse:  [87-98] 88  Resp:  [15-18] 18  SpO2:  [93 %-96 %] 96 %  BP: (133-167)/(65-82) 133/75     Weight: 88 kg (194 lb 0.1 oz)  Body mass index is 31.31 kg/m².  Body surface area is 2.02 meters squared.      Intake/Output Summary (Last 24 hours) at 5/11/2020 0941  Last data filed at 5/11/2020 0837  Gross per 24 hour   Intake 2270 ml   Output 2150 ml   Net 120 ml       Physical Exam   Constitutional: She is oriented to person, place, and time. She appears well-developed and well-nourished. No distress.   HENT:   Head: Normocephalic and atraumatic.   Mouth/Throat: Oropharynx is clear and moist.   Eyes: Conjunctivae are normal. No scleral icterus.   Neck: Normal range of motion. Neck supple.   Cardiovascular: Regular rhythm, normal heart sounds and intact distal pulses.   No murmur heard.  Pulmonary/Chest: Effort normal. No respiratory distress. She has no wheezes.   Diffuse coarse crackles bilaterally   Abdominal: Soft. Bowel sounds are normal. She exhibits no distension. There is tenderness (diffuse, more prominent in epigastric area/under rib cage b/l). There is guarding (voluntary).   Musculoskeletal: She exhibits edema  (trace, b/l ankles) and tenderness (L ankle, chronic from previous fracture). She exhibits no deformity.   Neurological: She is alert and oriented to person, place, and time.   Bilateral UE/LE tremors improved   Skin: Skin is warm and dry. She is not diaphoretic.   Psychiatric: She has a normal mood and affect. Her behavior is normal. Judgment and thought content normal.       Significant Labs:   CBC:   Recent Labs   Lab 05/10/20  0330 05/11/20  0330   WBC 2.60* 4.77   HGB 7.7* 8.4*   HCT 23.9* 26.0*   PLT 38* 37*   , CMP:   Recent Labs   Lab 05/10/20  0330 05/11/20  0330    139   K 4.2 3.8    109   CO2 18* 17*   * 107   BUN 42* 42*   CREATININE 3.5* 3.4*   CALCIUM 8.6* 8.8   PROT 6.7 6.9   ALBUMIN 1.9* 2.0*   BILITOT 0.7 0.8   ALKPHOS 221* 264*   * 109*   * 106*   ANIONGAP 12 13   EGFRNONAA 12.3* 12.8*    and All pertinent labs from the last 24 hours have been reviewed.    Diagnostic Results:  I have reviewed all pertinent imaging results/findings within the past 24 hours.    Assessment/Plan:     * Left lower quadrant abdominal pain  Hx of diagnosed reticulocytes and diverticulitis.  Current etiology for abdominal pain is unclear  - CT AP without contrast in the ED demonstrating no acute abnormality; diverticulosis without evidence to suggest diverticulitis; nonspecific bilateral perinephric fat stranding.  -- given the fact that CT AP was performed without contrast (due to GINA), wonder if diverticulitis was not appreciated due to lack of contrast.  Will treat currently for possible diverticulitis vs other abdominal source    - IV Unasyn started on 5/5 -> changed to zosyn on 5/6  - febrile to 100.9 overnight 5/6; infectious w/u negative to date (CXR unremarkable, UA negative for UTI, BCx NGTD; however procal elevated 1.38)  - patient has remained afebrile >24hrs, HDS  - pain control with oxy 10mg q6h prn  - trial of bentyl 5/10 for continued abdominal pain - some subjective  improvement  - nausea control with scheduled IV zofran 8mg q8h and prn IV phenergan 25mg q6h  - completed 5 days of IV zosyn during admission; discontinued 5/11 as less suspicious of infectious etiology    Acute encephalopathy  Patient was intermittently confused since 5/9. Hospital delirium can be 2/2 fluid status, electrolyte abnormalities, infection, hypercapnia, withdrawal etc. No new electrolyte abnormalities. Patient on fluids for dehydration. On ABX for possible infection. Considered CO2 narcosis but VBG with normal PCO2. Slightly acidemic with low bicarb suggesting metabolic acidosis but lactate normal. Unclear why she is acutely confused. Patient does report anxiety and takes lorazepam at home.    - continue to monitor    Macrocytic anemia  Hb 7.8 on admission with   - Hb down trended to 6.5 on 5/7 - s/p 1U PRBC  - continue to monitor  - transfuse as needed for Hb > 7    Acute renal failure with acute tubular necrosis superimposed on stage 3 chronic kidney disease  sCr on admission 2.6 (baseline ~1.6). GINA likely pre-renal. Concern for ischemic ATN due to GI losses    - s/p IVF with sCr initially trending down, now trending back upward  - given additional IVF with NS  - suspect continued uptrend could be due to recent chemotherapy (pemetrexed and carboplatin last cycle late April 2020), however now outside of expected window of side effect  - RP US showing medical renal disease; no hydronephrosis  - ordered urine lytes, protein/Cr ratio to evaluate -> elevated protein/Cr ratio. Consider intra-renal causes.  - Nephrology consulted, apprec recs  - continue to trend CMP    Tremor  - Patient with debilitating b/l UE and LE tremor, followed by neurology outpatient. 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 Was considering sinemet outpatient.   - Patient also with reported incidents of episodic confusion/hallucinations. MRI without infarcts.    Plan  - Neurology  consulted, apprec recs  - Ammonia level (wnl); thiamine level (ordered 5/8, results pending)  - Patient with prior low levels of thiamine, not taking supplements at home  - Start IV thiamine 500mg TID x 5 days (end date: 5/13), then transition to po pending result of thiamine level  - No sinamet at this time, as has potential to worsen hallucinations and current abdominal pain  - 24 hr EEG complete without demonstration of epileptiform activity  - delirium precautions; minimize sedating medications     Chemotherapy induced nausea and vomiting  - persistent nausea/vomiting  - scheduled IV zofran 8mg q8h  - prn IV phenergan 25mg q6h    Malignant pleural mesothelioma  Receiving outpatient chemotherapy - NSCLC PEMETREXED + CARBOPLATIN (AUC) Q3W and IVF .   Last treatment on 4/28/20, next scheduled or 5/19/20)  - on RA, satting well  - continue to monitor    History of DVT of lower extremity  Hx of DVTs - LLE 1970s and 1980s; DVT of L subclavian 1980s  Home lovenox 120mg daily  - started at 80mg daily on admission  - platelet count has been downtrending < 50  - d/c'd lovenox due to bleed risk (5/9)  - restart as appropriate    Type 2 diabetes mellitus without complication, without long-term current use of insulin  HbA1c 6.1, well controlled  - POCT glucose AC QHS  - LDSSI  - diabetic diet    Hypertension, essential  - cont home amlodipine 10mg daily  - unclear why home metoprolol (75mg daily) was held on admission, possibly 2/2 concern for sepsis  - remains normotensive on amlodipine, restarted metoprolol at low dose 25mg daily  - increased metoprolol to 50mg daily (5/11), advance to home dose as appropriate  - continue to monitor             Piero Chavez MD  Hematology/Oncology  [facility]

## 2020-05-11 NOTE — PT/OT/SLP PROGRESS
Occupational Therapy   Treatment    Name: Isabell Preston  MRN: [id number]  Admitting Diagnosis:  Left lower quadrant abdominal pain       Recommendations:     Discharge Recommendations: nursing facility, skilled  Discharge Equipment Recommendations:  none  Barriers to discharge:  None    Assessment:     Isabell Preston is a 74 y.o. female with a medical diagnosis of Left lower quadrant abdominal pain.  She presents with the deficits listed below.  Pt tolerated session well as demonstrated by her ability to perform grooming activities standing at the sink with SBA/CGA, surpassing one of her goals.  However, pt continues to require cues for safety awareness and continues to need a rolling walker for ambulation.  Performance deficits affecting function are weakness, impaired endurance, impaired self care skills, impaired functional mobilty, gait instability, impaired balance, decreased safety awareness, pain.     Rehab Prognosis:  Good; patient would benefit from acute skilled OT services to address these deficits and reach maximum level of function.       Plan:     Patient to be seen 4 x/week to address the above listed problems via self-care/home management, therapeutic activities, therapeutic exercises  · Plan of Care Expires:    · Plan of Care Reviewed with: patient    Subjective     Pain/Comfort:  Pain Rating 1: 8/10  Location - Orientation 1: lower  Location 1: abdomen  Pain Addressed 1: Reposition, Distraction  Pain Rating Post-Intervention 1: 8/10    Objective:     Communicated with: RN prior to session.  Patient found HOB elevated with peripheral IV, telemetry upon OT entry to room.    General Precautions: Standard, fall, neutropenic   Orthopedic Precautions:N/A   Braces: N/A     Occupational Performance:     Bed Mobility:     · Patient completed Scooting/Bridging EOB with stand by assistance  · Patient completed Supine to Sit with stand by assistance  · Patient completed Sit to Supine with stand by  assistance     Functional Mobility/Transfers:  · Patient completed Sit <> Stand Transfer from EOB with minimum assistance  with  rolling walker   · Patient completed Toilet Transfer Step Transfer technique with contact guard assistance with  rolling walker and grab bars to get on toilet and with moderate assistance with rolling walker and grab bar to get off toilet   · Functional Mobility: Pt ambulated a functional household distance from EOB to bathroom sink and back with CGA with rolling walker, requiring verbal cues to maneuver walker.  Pt required one seated rest break on the toilet.     Activities of Daily Living:  · Grooming: stand by assistance /contact guard assistance with rolling walker to brush teeth and wash face while standing at bathroom sink  · Lower Body Dressing: setup assistance to raudel B slippers while seated EOB        [facility] 6 Click ADL: 20    Treatment & Education:  - Pt educated on role of OT, POC, benefit of performing out of bed activity, and safety when performing functional transfers and self care tasks.     Patient left HOB elevated with all lines intact, bed alarm on and nursing presentEducation:      GOALS:   Multidisciplinary Problems     Occupational Therapy Goals        Problem: Occupational Therapy Goal    Goal Priority Disciplines Outcome Interventions   Occupational Therapy Goal     OT, PT/OT Ongoing, Progressing    Description:  Goals to be met by: 6/5    Patient will increase functional independence with ADLs by performing:    UE Dressing with Set-up Assistance.  LE Dressing with Stand-by Assistance.  Grooming while seated with Set-up Assistance. MET on 5/11   Toileting from bedside commode with Contact Guard Assistance for hygiene and clothing management.                        Time Tracking:     OT Date of Treatment: 05/11/20  OT Start Time: 1035  OT Stop Time: 1107  OT Total Time (min): 32 min    Billable Minutes:Self Care/Home Management 16 min  Therapeutic Activity 16  stephane Pride, OT  5/11/2020

## 2020-05-11 NOTE — ASSESSMENT & PLAN NOTE
- cont home amlodipine 10mg daily  - unclear why home metoprolol (75mg daily) was held on admission, possibly 2/2 concern for sepsis  - remains normotensive on amlodipine, restarted metoprolol at low dose 25mg daily  - increased metoprolol to 50mg daily (5/11), advance to home dose as appropriate  - continue to monitor

## 2020-05-11 NOTE — PLAN OF CARE
05/11/20 1633   Discharge Reassessment   Assessment Type Discharge Planning Reassessment   Do you have any problems affording any of your prescribed medications? No   Discharge Plan A Skilled Nursing Facility   Discharge Plan B Home with family;Home Health   DME Needed Upon Discharge  none   Anticipated Discharge Disposition SNF     Ricki Talbot, RN MSN  Critical Care-   Ext. [phone]

## 2020-05-11 NOTE — PLAN OF CARE
Problem: Physical Therapy Goal  Goal: Physical Therapy Goal  Description  Goals to be met by: 20     Patient will increase functional independence with mobility by performin. Supine to sit with Stand-by Assistance. - GOAL MET  REVISED: Mod I  2. Sit to stand transfer with Stand-by Assistance.  3. Bed to chair transfer with Stand-by Assistance using Rolling Walker. - GOAL MET  REVISED: S with use of RW  4. Gait  x 50 feet with Stand-by Assistance using Rolling Walker. - GOAL MET  Pt to amb 150', S, with RW  5. Ascend/Descend 4 inch curb step with Moderate Assistance using Rolling Walker.  6. Lower extremity exercise program x 20 reps per handout, with assistance as needed.      Outcome: Ongoing, Progressing. Goals remain appropriate. Continue with Physical therapy Plan of Care. Rolanda Trotter, PT 2020

## 2020-05-12 PROBLEM — E51.9 THIAMINE DEFICIENCY: Status: RESOLVED | Noted: 2020-05-08 | Resolved: 2020-05-12

## 2020-05-12 PROBLEM — Z75.8 DISCHARGE PLANNING ISSUES: Status: ACTIVE | Noted: 2020-05-12

## 2020-05-12 LAB
ALBUMIN SERPL BCP-MCNC: 1.9 G/DL (ref 3.5–5.2)
ALP SERPL-CCNC: 282 U/L (ref 55–135)
ALT SERPL W/O P-5'-P-CCNC: 101 U/L (ref 10–44)
ANION GAP SERPL CALC-SCNC: 10 MMOL/L (ref 8–16)
ANISOCYTOSIS BLD QL SMEAR: SLIGHT
AST SERPL-CCNC: 106 U/L (ref 10–40)
BASOPHILS # BLD AUTO: 0.01 K/UL (ref 0–0.2)
BASOPHILS NFR BLD: 0.2 % (ref 0–1.9)
BILIRUB SERPL-MCNC: 0.5 MG/DL (ref 0.1–1)
BUN SERPL-MCNC: 42 MG/DL (ref 8–23)
CALCIUM SERPL-MCNC: 8.4 MG/DL (ref 8.7–10.5)
CHLORIDE SERPL-SCNC: 110 MMOL/L (ref 95–110)
CO2 SERPL-SCNC: 20 MMOL/L (ref 23–29)
CREAT SERPL-MCNC: 3.4 MG/DL (ref 0.5–1.4)
DIFFERENTIAL METHOD: ABNORMAL
EOSINOPHIL # BLD AUTO: 0 K/UL (ref 0–0.5)
EOSINOPHIL NFR BLD: 0.4 % (ref 0–8)
ERYTHROCYTE [DISTWIDTH] IN BLOOD BY AUTOMATED COUNT: 15.9 % (ref 11.5–14.5)
EST. GFR  (AFRICAN AMERICAN): 14.7 ML/MIN/1.73 M^2
EST. GFR  (NON AFRICAN AMERICAN): 12.8 ML/MIN/1.73 M^2
GLUCOSE SERPL-MCNC: 114 MG/DL (ref 70–110)
HCT VFR BLD AUTO: 23.5 % (ref 37–48.5)
HGB BLD-MCNC: 7.6 G/DL (ref 12–16)
HYPOCHROMIA BLD QL SMEAR: ABNORMAL
IMM GRANULOCYTES # BLD AUTO: 0.08 K/UL (ref 0–0.04)
IMM GRANULOCYTES NFR BLD AUTO: 1.6 % (ref 0–0.5)
LYMPHOCYTES # BLD AUTO: 0.6 K/UL (ref 1–4.8)
LYMPHOCYTES NFR BLD: 12.1 % (ref 18–48)
MAGNESIUM SERPL-MCNC: 1.7 MG/DL (ref 1.6–2.6)
MCH RBC QN AUTO: 32.9 PG (ref 27–31)
MCHC RBC AUTO-ENTMCNC: 32.3 G/DL (ref 32–36)
MCV RBC AUTO: 102 FL (ref 82–98)
MONOCYTES # BLD AUTO: 0.2 K/UL (ref 0.3–1)
MONOCYTES NFR BLD: 4.3 % (ref 4–15)
NEUTROPHILS # BLD AUTO: 4.2 K/UL (ref 1.8–7.7)
NEUTROPHILS NFR BLD: 81.4 % (ref 38–73)
NRBC BLD-RTO: 1 /100 WBC
OVALOCYTES BLD QL SMEAR: ABNORMAL
PHOSPHATE SERPL-MCNC: 4.2 MG/DL (ref 2.7–4.5)
PLATELET # BLD AUTO: 33 K/UL (ref 150–350)
PMV BLD AUTO: 11.9 FL (ref 9.2–12.9)
POCT GLUCOSE: 118 MG/DL (ref 70–110)
POCT GLUCOSE: 139 MG/DL (ref 70–110)
POCT GLUCOSE: 151 MG/DL (ref 70–110)
POCT GLUCOSE: 154 MG/DL (ref 70–110)
POCT GLUCOSE: 217 MG/DL (ref 70–110)
POIKILOCYTOSIS BLD QL SMEAR: SLIGHT
POLYCHROMASIA BLD QL SMEAR: ABNORMAL
POTASSIUM SERPL-SCNC: 3.4 MMOL/L (ref 3.5–5.1)
PROT SERPL-MCNC: 6.4 G/DL (ref 6–8.4)
RBC # BLD AUTO: 2.31 M/UL (ref 4–5.4)
SODIUM SERPL-SCNC: 140 MMOL/L (ref 136–145)
VIT B1 BLD-MCNC: 131 UG/L (ref 38–122)
WBC # BLD AUTO: 5.13 K/UL (ref 3.9–12.7)

## 2020-05-12 PROCEDURE — 99233 SBSQ HOSP IP/OBS HIGH 50: CPT | Mod: GC,,, | Performed by: INTERNAL MEDICINE

## 2020-05-12 PROCEDURE — 80053 COMPREHEN METABOLIC PANEL: CPT

## 2020-05-12 PROCEDURE — 25000003 PHARM REV CODE 250: Performed by: STUDENT IN AN ORGANIZED HEALTH CARE EDUCATION/TRAINING PROGRAM

## 2020-05-12 PROCEDURE — 85025 COMPLETE CBC W/AUTO DIFF WBC: CPT

## 2020-05-12 PROCEDURE — 99233 PR SUBSEQUENT HOSPITAL CARE,LEVL III: ICD-10-PCS | Mod: GC,,, | Performed by: INTERNAL MEDICINE

## 2020-05-12 PROCEDURE — 63600175 PHARM REV CODE 636 W HCPCS: Performed by: STUDENT IN AN ORGANIZED HEALTH CARE EDUCATION/TRAINING PROGRAM

## 2020-05-12 PROCEDURE — 84100 ASSAY OF PHOSPHORUS: CPT

## 2020-05-12 PROCEDURE — 20600001 HC STEP DOWN PRIVATE ROOM

## 2020-05-12 PROCEDURE — 97535 SELF CARE MNGMENT TRAINING: CPT

## 2020-05-12 PROCEDURE — 83735 ASSAY OF MAGNESIUM: CPT

## 2020-05-12 PROCEDURE — 97530 THERAPEUTIC ACTIVITIES: CPT

## 2020-05-12 PROCEDURE — 25000003 PHARM REV CODE 250: Performed by: HOSPITALIST

## 2020-05-12 RX ORDER — POTASSIUM CHLORIDE 750 MG/1
40 CAPSULE, EXTENDED RELEASE ORAL ONCE
Status: COMPLETED | OUTPATIENT
Start: 2020-05-12 | End: 2020-05-12

## 2020-05-12 RX ADMIN — DULOXETINE 60 MG: 60 CAPSULE, DELAYED RELEASE ORAL at 09:05

## 2020-05-12 RX ADMIN — DICYCLOMINE HYDROCHLORIDE 10 MG: 10 CAPSULE ORAL at 01:05

## 2020-05-12 RX ADMIN — AMLODIPINE BESYLATE 10 MG: 10 TABLET ORAL at 08:05

## 2020-05-12 RX ADMIN — METOPROLOL SUCCINATE 50 MG: 50 TABLET, EXTENDED RELEASE ORAL at 08:05

## 2020-05-12 RX ADMIN — ONDANSETRON 8 MG: 2 INJECTION, SOLUTION INTRAMUSCULAR; INTRAVENOUS at 01:05

## 2020-05-12 RX ADMIN — THIAMINE HYDROCHLORIDE 500 MG: 100 INJECTION, SOLUTION INTRAMUSCULAR; INTRAVENOUS at 08:05

## 2020-05-12 RX ADMIN — FOLIC ACID 1 MG: 1 TABLET ORAL at 09:05

## 2020-05-12 RX ADMIN — ONDANSETRON 8 MG: 2 INJECTION, SOLUTION INTRAMUSCULAR; INTRAVENOUS at 06:05

## 2020-05-12 RX ADMIN — INSULIN ASPART 2 UNITS: 100 INJECTION, SOLUTION INTRAVENOUS; SUBCUTANEOUS at 11:05

## 2020-05-12 RX ADMIN — DICYCLOMINE HYDROCHLORIDE 10 MG: 10 CAPSULE ORAL at 04:05

## 2020-05-12 RX ADMIN — THIAMINE HYDROCHLORIDE 500 MG: 100 INJECTION, SOLUTION INTRAMUSCULAR; INTRAVENOUS at 03:05

## 2020-05-12 RX ADMIN — POTASSIUM CHLORIDE 40 MEQ: 750 CAPSULE, EXTENDED RELEASE ORAL at 08:05

## 2020-05-12 RX ADMIN — DICYCLOMINE HYDROCHLORIDE 10 MG: 10 CAPSULE ORAL at 08:05

## 2020-05-12 RX ADMIN — ONDANSETRON 8 MG: 2 INJECTION, SOLUTION INTRAMUSCULAR; INTRAVENOUS at 10:05

## 2020-05-12 RX ADMIN — POLYETHYLENE GLYCOL 3350 17 G: 17 POWDER, FOR SOLUTION ORAL at 08:05

## 2020-05-12 RX ADMIN — THIAMINE HYDROCHLORIDE 500 MG: 100 INJECTION, SOLUTION INTRAMUSCULAR; INTRAVENOUS at 09:05

## 2020-05-12 NOTE — PHYSICIAN QUERY
PT Name: Isabell Preston  MR #: [id number]    Physician Query Form - Consultant Diagnosis Clarification     CDS: Dahlia Chou RN    Contact information:[email or web address] or (cell) [phone]    This form is a permanent document in the medical record.     Query Date: May 12, 2020      By submitting this query, we are merely seeking further clarification of documentation.  Please utilize your independent clinical judgment when addressing the question(s) below.      The Medical record contains the following:   Diagnosis Supporting Clinical Information Location in Medical Record   Moderate Malnutrition   Spoke with pt over phone, reported decreased intake over past week, with vomiting after eating. States prior to past week, good PO intake at home and drinking 2 ONS per day. Pt able to tolerate some of her lunch with no emesis after eating. Wt loss reported of 5 lbs over past week. UBW ~175 lbs per pt. Encouraged intake as tolerable and pt receiving ONS to increase intake. Pt meeting criteria for acute malnutrition at this time due to decreased appetite and wt loss.  I/O: +.5 L since admit  Energy Calories Required: not meeting needs  Protein Required: not meeting needs  Fluid Required: other (see comments)  Comments: LBM 5/4  Tolerance: tolerating  % Intake of Estimated Energy Needs: 0 - 25 %  % Meal Intake: 0 - 25 %  Moderate Protein-Calorie Malnutrition  Malnutrition in the context of Acute Illness/Injury     Related to (etiology):  Decreased intake 2/2 abdominal pain      Signs and Symptoms (as evidenced by):  Energy Intake: <50% of estimated energy requirement for x 1 week  Body Fat Depletion: MARLENA  Muscle Mass Depletion: MARLENA  Weight Loss: 2% x 1 week      Interventions(treatment strategy):  Collaboration of care with other providers  Commercial beverage  Modified diet- bland       Monitor and Evaluation     Food and Nutrient Intake: energy intake, food and beverage intake  Food and Nutrient Adminstration:  diet order  Anthropometric Measurements: weight, weight change  Biochemical Data, Medical Tests and Procedures: electrolyte and renal panel, lipid profile, gastrointestinal profile, glucose/endocrine profile, inflammatory profile  Nutrition-Focused Physical Findings: overall appearance   Nutrition Consult 5/5         Do you agree with the Consultants diagnosis of Moderate Malnutrition?    [ X ] Yes   [  ] No   [  ] Other/Clarification of findings:   [  ] Clinically undetermined

## 2020-05-12 NOTE — MEDICAL/APP STUDENT
Progress Note    Primary Team: Networked reference to record PCT   Admit Date: 5/5/2020   Length of Stay:  LOS: 5 days   SUBJECTIVE:   Reason for Admission:  Left lower quadrant abdominal pain    HPI:  74 year old woman with PMH mesothelioma on active chemotherapy (Premetrexed + Carboplatin), DVT on lovenox, DM, diverticulosis, HTN, HLD, multiple abdominal surgeries presented to ED on 5/5 for abdominal pain, fever, vomiting and loose bowel movements. States since last chemo on 4/28, decreased appetite, abdominal pain. Vomited every day since the therapy and diarrhea 2x/day. Denies bloody vomit or diarrhea. Cr 2.6 on admission from baseline 1.6. Nephrology was consulted for uptrending Cr and proteinuria.     Interval History:    Pt continues to complain of abdominal pain and decreased appetite. O2 sat maintained on room air. Also has noticed swelling in her ankles. Denies CP, SOB, dysuria or hematuria.     BUN: 37 --> 40 --> 42 --> 42  Cr: 3.3 --> 3.5 --> 3.5 --> 3.4  Bicarb: 18 --> 18 --> 18 --> 17    Review of Systems   Constitutional: Negative for fever.   Respiratory: Negative for cough and shortness of breath.    Cardiovascular: Positive for leg swelling. Negative for chest pain.   Gastrointestinal: Positive for abdominal pain, diarrhea and nausea. Negative for vomiting.   Genitourinary: Negative for dysuria and hematuria.       OBJECTIVE:     Temp:  [97.6 °F (36.4 °C)-98.1 °F (36.7 °C)]   Pulse:  [79-92]   Resp:  [16-18]   BP: (136-157)/(70-76)   SpO2:  [94 %-98 %]  Body mass index is 31.31 kg/m².  Intake/Outake:  This Shift:  No intake/output data recorded.    Net I/O past 24h:     Intake/Output Summary (Last 24 hours) at 5/12/2020 0800  Last data filed at 5/12/2020 0700  Gross per 24 hour   Intake 730 ml   Output 1400 ml   Net -670 ml             Physical Exam:  Physical Exam   Constitutional: She is oriented to person, place, and time. She appears well-developed and well-nourished.   HENT:   Head:  Normocephalic and atraumatic.   Eyes: EOM are normal.   Neck: Normal range of motion.   Cardiovascular: Normal rate, regular rhythm and normal heart sounds.   Pulmonary/Chest: Effort normal. No stridor. No respiratory distress. She has rales.   Neurological: She is alert and oriented to person, place, and time.   Skin: Skin is warm and dry.       Laboratory:  CBC/Anemia Labs: Coags:    Recent Labs   Lab 05/10/20  0330 05/11/20  0330 05/12/20  0345   WBC 2.60* 4.77 5.13   HGB 7.7* 8.4* 7.6*   HCT 23.9* 26.0* 23.5*   PLT 38* 37* 33*   * 101* 102*   RDW 15.9* 15.6* 15.9*    Recent Labs   Lab 05/10/20  1250   INR 1.0   APTT 26.3        Chemistries:   Recent Labs   Lab 05/10/20  0330 05/11/20  0330 05/12/20  0345    139 140   K 4.2 3.8 3.4*    109 110   CO2 18* 17* 20*   BUN 42* 42* 42*   CREATININE 3.5* 3.4* 3.4*   CALCIUM 8.6* 8.8 8.4*   PROT 6.7 6.9 6.4   BILITOT 0.7 0.8 0.5   ALKPHOS 221* 264* 282*   * 106* 101*   * 109* 106*   MG 1.7 1.7 1.7   PHOS 4.3 4.0 4.2        Medications:  Scheduled Meds:   amLODIPine  10 mg Oral Daily    dicyclomine  10 mg Oral QID    DULoxetine  60 mg Oral Daily    folic acid  1 mg Oral Daily    metoprolol succinate  50 mg Oral Daily    ondansetron  8 mg Intravenous Q8H    polyethylene glycol  17 g Oral Daily    potassium chloride  40 mEq Oral Once    thiamine (VITAMIN B1) IVPB  500 mg Intravenous TID                             Continuous Infusions:  PRN Meds:.sodium chloride, Dextrose 10% Bolus, Dextrose 10% Bolus, glucagon (human recombinant), glucose, glucose, insulin aspart U-100, lidocaine-prilocaine, magnesium hydroxide 400 mg/5 ml, melatonin, oxyCODONE, polyethylene glycol, promethazine (PHENERGAN) IVPB, senna-docusate 8.6-50 mg, sodium chloride 0.9%, tiZANidine     ASSESSMENT/PLAN:   GINA  - Cr elevated 3.4 from baseline 1.6 (Cr: 3.3 --> 3.5 --> 3.5 --> 3.4)  -Possible causes include volume depletion from GI losses post chemo therapy  session, Zosyn nephrotoxicity, Carboplatin nephrotoxicity (given 4/28).    -Give 1300 BID bicarb    Proteinuria  -Possibly due to DM, follow up outpatient    Ania Martins  Nephrology  MS4

## 2020-05-12 NOTE — PLAN OF CARE
Goals addressed and unmet.  Cont with POC  Marialuisa Cortes, OT  5/12/2020    Problem: Occupational Therapy Goal  Goal: Occupational Therapy Goal  Description  Goals to be met by: 6/5    Patient will increase functional independence with ADLs by performing:    UE Dressing with Set-up Assistance.  LE Dressing with Stand-by Assistance.  Grooming while seated with Set-up Assistance. MET on 5/11   Toileting from bedside commode with Contact Guard Assistance for hygiene and clothing management.       Outcome: Ongoing, Progressing

## 2020-05-12 NOTE — PT/OT/SLP PROGRESS
Occupational Therapy   Treatment    Name: Isabell Preston  MRN: [id number]  Admitting Diagnosis:  Left lower quadrant abdominal pain       Recommendations:     Discharge Recommendations: nursing facility, skilled  Discharge Equipment Recommendations:  none  Barriers to discharge:  None    Assessment:     Isabell Preston is a 74 y.o. female with a medical diagnosis of Left lower quadrant abdominal pain.  She presents supine and willing to particpate.   Pt with good participation in standing ADL at sink with improving endurance for task despite continued slow mobility.  Encouraged increased daily participation in self care tasks for preperation for return to home. Pt with fair reaching in place while standing with poor balance and confidence.  Able to complete UE therex/HEP.  Performance deficits affecting function are weakness, impaired endurance, impaired sensation, impaired self care skills, gait instability.     Rehab Prognosis:  Good; patient would benefit from acute skilled OT services to address these deficits and reach maximum level of function.       Plan:     Patient to be seen 4 x/week to address the above listed problems via self-care/home management, therapeutic activities, therapeutic exercises  · Plan of Care Expires:    · Plan of Care Reviewed with: patient    Subjective     Pain/Comfort:  ·      Objective:     Communicated with: RN prior to session.  Patient found supine with SCD, bed alarm upon OT entry to room.    General Precautions: Standard, fall, neutropenic   Orthopedic Precautions:N/A   Braces:       Occupational Performance:     Bed Mobility:    · Patient completed Rolling/Turning to Left with  contact guard assistance  · Patient completed Rolling/Turning to Right with contact guard assistance  · Patient completed Scooting/Bridging with contact guard assistance  · Patient completed Supine to Sit with contact guard assistance     Functional Mobility/Transfers:  · Patient completed Sit <>  Stand Transfer with contact guard assistance  with  rolling walker   · Patient completed Bed <> Chair Transfer using Step Transfer technique with contact guard assistance with rolling walker  · Patient completed Toilet Transfer Step Transfer technique with contact guard assistance with  rolling walker  · Functional Mobility: Pt with slow mobility     Activities of Daily Living:  · Grooming: stand by assistance standing at sink  · Upper Body Dressing: stand by assistance    · Lower Body Dressing: minimum assistance    · Toileting: contact guard assistance        [facility] 6 Click ADL: 20    Treatment & Education:  Pt educated on safety, role of OT, importance of increased participation in self care for gains , expectations for participation, expectations for gains, POC, energy conservation, caregiver strain. White board updated.   - ADL training  - UE therex/HEP  Transfer training    Patient left up in chair with all lines intactEducation:      GOALS:   Multidisciplinary Problems     Occupational Therapy Goals        Problem: Occupational Therapy Goal    Goal Priority Disciplines Outcome Interventions   Occupational Therapy Goal     OT, PT/OT Ongoing, Progressing    Description:  Goals to be met by: 6/5    Patient will increase functional independence with ADLs by performing:    UE Dressing with Set-up Assistance.  LE Dressing with Stand-by Assistance.  Grooming while seated with Set-up Assistance. MET on 5/11   Toileting from bedside commode with Contact Guard Assistance for hygiene and clothing management.                        Time Tracking:     OT Date of Treatment: 05/12/20  OT Start Time: 0815  OT Stop Time: 0855  OT Total Time (min): 40 min    Billable Minutes:Self Care/Home Management 15  Therapeutic Activity 25    Marialuisa Cortes, OT  5/12/2020

## 2020-05-12 NOTE — MEDICAL/APP STUDENT
Progress Note    Primary Team: Networked reference to record PCT   Admit Date: 5/5/2020   Length of Stay:  LOS: 5 days   SUBJECTIVE:   Reason for Admission:  Left lower quadrant abdominal pain    HPI:  74 year old woman with PMH mesothelioma on active chemotherapy (Premetrexed + Carboplatin), DVT on lovenox, DM, diverticulosis, HTN, HLD, multiple abdominal surgeries presented to ED on 5/5 for abdominal pain, fever, vomiting and loose bowel movements. States since last chemo on 4/28, decreased appetite, abdominal pain. Vomited every day since the therapy and diarrhea 2x/day. Denies bloody vomit or diarrhea. Cr 2.6 on admission from baseline 1.6. Nephrology was consulted for uptrending Cr and proteinuria.      Interval History:    No acute events overnight. O2 sat maintained on room air. Denies CP, SOB, dysuria or hematuria.      BUN: 37 --> 40 --> 42 --> 42 --> 42  Cr: 3.3 --> 3.5 --> 3.5 --> 3.4 --> 3.4  Bicarb: 18 --> 18 --> 18 --> 17 --> 20       Review of Systems   Constitutional: Negative for fever.   Respiratory: Negative for shortness of breath.    Cardiovascular: Positive for leg swelling. Negative for chest pain.   Gastrointestinal: Positive for abdominal pain and diarrhea. Negative for blood in stool, constipation, nausea and vomiting.   Genitourinary: Positive for dysuria. Negative for hematuria.        Pain and burning when urinate           OBJECTIVE:     Temp:  [97.8 °F (36.6 °C)-98.2 °F (36.8 °C)]   Pulse:  [72-86]   Resp:  [16-18]   BP: (133-157)/(64-83)   SpO2:  [92 %-98 %]  Body mass index is 31.31 kg/m².  Intake/Outake:  This Shift:  I/O this shift:  In: 550 [P.O.:500; IV Piggyback:50]  Out: 600 [Urine:600]    Net I/O past 24h:     Intake/Output Summary (Last 24 hours) at 5/12/2020 1635  Last data filed at 5/12/2020 1339  Gross per 24 hour   Intake 840 ml   Output 1450 ml   Net -610 ml             Physical Exam:  Physical Exam   Constitutional: She is oriented to person, place, and time. She  appears well-developed and well-nourished.   HENT:   Head: Normocephalic and atraumatic.   Eyes: EOM are normal.   Neck: Normal range of motion.   Cardiovascular: Normal rate, regular rhythm and normal heart sounds.   Pulmonary/Chest: Effort normal. No respiratory distress.   Crackles left lower lung field   Abdominal: Soft. She exhibits distension. There is tenderness.   Neurological: She is alert and oriented to person, place, and time.   Skin: Skin is warm and dry.   +1 pitting edema bilateral lower legs to knees    Laboratory:  CBC/Anemia Labs: Coags:    Recent Labs   Lab 05/10/20  0330 05/11/20  0330 05/12/20  0345   WBC 2.60* 4.77 5.13   HGB 7.7* 8.4* 7.6*   HCT 23.9* 26.0* 23.5*   PLT 38* 37* 33*   * 101* 102*   RDW 15.9* 15.6* 15.9*    Recent Labs   Lab 05/10/20  1250   INR 1.0   APTT 26.3        Chemistries:   Recent Labs   Lab 05/10/20  0330 05/11/20  0330 05/12/20  0345    139 140   K 4.2 3.8 3.4*    109 110   CO2 18* 17* 20*   BUN 42* 42* 42*   CREATININE 3.5* 3.4* 3.4*   CALCIUM 8.6* 8.8 8.4*   PROT 6.7 6.9 6.4   BILITOT 0.7 0.8 0.5   ALKPHOS 221* 264* 282*   * 106* 101*   * 109* 106*   MG 1.7 1.7 1.7   PHOS 4.3 4.0 4.2        Medications:  Scheduled Meds:   amLODIPine  10 mg Oral Daily    dicyclomine  10 mg Oral QID    DULoxetine  60 mg Oral Daily    folic acid  1 mg Oral Daily    metoprolol succinate  50 mg Oral Daily    ondansetron  8 mg Intravenous Q8H    polyethylene glycol  17 g Oral Daily    thiamine (VITAMIN B1) IVPB  500 mg Intravenous TID                             Continuous Infusions:  PRN Meds:.sodium chloride, Dextrose 10% Bolus, Dextrose 10% Bolus, glucagon (human recombinant), glucose, glucose, insulin aspart U-100, lidocaine-prilocaine, magnesium hydroxide 400 mg/5 ml, melatonin, oxyCODONE, polyethylene glycol, promethazine (PHENERGAN) IVPB, senna-docusate 8.6-50 mg, sodium chloride 0.9%, tiZANidine     ASSESSMENT/PLAN:   GINA  - Cr elevated 3.4  from baseline 1.6 (Cr: 3.3 --> 3.5 --> 3.5 --> 3.4 --> 3.4)  -Possible causes include volume depletion from GI losses post chemo therapy session, Zosyn nephrotoxicity, Carboplatin nephrotoxicity (given 4/28).               -Recommend CXR due to possible pleural effusion or fluid overload   -No need for diuresis at this time    - Monitor daily renal function         - Strict Is and Os         - Renally dose all medications        - Avoid nephrotoxins       Ania Martins  Nephrology  MS4

## 2020-05-12 NOTE — PROGRESS NOTES
"[facility]  Hematology/Oncology  Progress Note    Patient Name: Isabell Preston  Admission Date: 5/5/2020  Hospital Length of Stay: 5 days  Code Status: Full Code     Subjective:     HPI:  Ms. Isabell Preston is a 74 y.o. 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 Last treatment on 4/28/20, next scheduled or 5/19/20) , DVT on daily Lovenox, depression, diabetes mellitus, diverticulosis, hypertension, hyperlipidemia, multiple abdominal surgeries who presents to [facility] ED with abdominal pain, headache, fever (subjective, greater than 103 at home), vomiting (non-bloody or not coffee-ground), and 1 bout of loose bowel movements, however normally has constipation..  Her abdominal pain localizes to the left upper and lower quadrants and is worse with palpation and movement.  She denies any alleviating factors, cough, and shortness of breath.       Patient states she was tested for COVID-19 approximately 2 months ago.  The results of these tests were both negative however she was treated as presumptive positive given her immunocompromised state.  Since that time, the patient has not left the house or had social contact with any COVID infected individuals.  The patient last received chemotherapy 2 weeks ago.  The patient states that the symptoms she is experiencing today are identical to those she experienced 2 months ago when she was presumed positive for COVID-19.      Per documentation , "tested negative for covid x 2 however based on labs PCP felt that pt had covid and got false neg result. "  COVID -19 testing on 5/5 negative .  Patient stated that her PCP culture until her that her initial COVID testing was presumed to be positive, however patient was unable to elaborate on this did not no additional information.  Chart review shows all negative COVID test.     In the ED CT scan of the " abdomen and pelvis without contrast was obtained.  Showing.   1. No acute abnormality identified on today's examination.  2. Diverticulosis without evidence to suggest diverticulitis.  3. Nonspecific bilateral perinephric fat stranding.  Correlation with urinalysis advised.  4. Cholecystectomy, hysterectomy and additional incidental findings as above.    Hospital Course:  Patient admitted to hospital medicine (5/5) for further evaluation and treatment. Patient afebrile and HDS. Has GINA with sCr 2.6 (baseline ~1.6). Continues to have significant left-sided abdominal pain, not improved with prn morphine.  Pain medications changed to Dilaudid. Overall, nausea improved and no further episodes of diarrhea or loose stools.  Antibiotics changed from Unasyn to Zosyn. Giving IVF and Cr trended down to 2.2. Etiology of abdominal pain unclear at this time, treating for presumed diverticulitis.     Transferred to med onc service (5/7). Patient had fever overnight (5/6) Tmax 100.9, resolved with tylenol. Infectious workup thus far negative, however procal elevated 1.38; lactate wnl; CXR unremarkable; UA negative for UTI, BCx NGTD. Continuing with zosyn to cover for potential abdominal source; patient with persistent nausea/vomiting. Also with Hb drop from 7.5 to 6.5. S/p 1U PRBC (5/7). Serum Cr trending back upward, given additional fluids. 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 Neurology consulted for assistance with tremors as well as reported incidents of episodic confusion/hallucinations. Per neuro recs, checked ammonia level (wnl) and thiamine level (pending). Previously low thiamine level, not taking supplements at home. Started on IV thiamine 500mg TID x 5 days (end date: 5/13), plan to transition to po pending thiamine level result. Neuro also recommending 24hr EEG to evaluate for possible epileptiform  activity contributing to confusions--> no seizure activity noted. Labs with progressing leukopenia/thrombocytopenia and uptrending sCr/LFTs, possibly 2/2 recent chemo (though now outside of expected window); continuing IVF. Ordered urine studies and RP ultrasound for GINA. RP ultrasound showing evidence of medical renal disease; no hydronephrosis. Urine studies significant for proteinuria with elevated protein/creatinine ratio, suggestive of intra-renal cause of GINA; nephrology consulted. Home lovenox for DVT ppx held (5/9) due to downtrending platelets < 50 and increased bleed risk. Patient also noted to be intermittently confused on 5/9 and 5/10, saying she is seeing people come into her room that are not actually there. No notable electrolyte abnormalities or fevers to suggest new infectious process. Episodes of confusion/hallucinations may be more likely due to acute delirium +/- component of vascular dementia based on prior MRI reading 4/17. Patient completed 5 days of zosyn since admission, discontinued 5/11 as less suspicious of infectious etiology and could be contributing to GINA.    Interval History: NAEON. Patient states abdominal pain slightly improved this am. Repeat CT abdo without evidence of diverticulitis, possibly some inflammation around duodenum. sCr (3.4 today) and LFTs flat since 5/11. Consulted nephrology, will follow-up on recs. Close monitoring of platelet count.     Oncology Treatment Plan:   OP NSCLC PEMETREXED + CARBOPLATIN (AUC) Q3W    Medications:  Continuous Infusions:  Scheduled Meds:   amLODIPine  10 mg Oral Daily    dicyclomine  10 mg Oral QID    DULoxetine  60 mg Oral Daily    folic acid  1 mg Oral Daily    metoprolol succinate  50 mg Oral Daily    ondansetron  8 mg Intravenous Q8H    polyethylene glycol  17 g Oral Daily    thiamine (VITAMIN B1) IVPB  500 mg Intravenous TID     PRN Meds:sodium chloride, Dextrose 10% Bolus, Dextrose 10% Bolus, glucagon (human recombinant),  glucose, glucose, insulin aspart U-100, lidocaine-prilocaine, magnesium hydroxide 400 mg/5 ml, melatonin, oxyCODONE, polyethylene glycol, promethazine (PHENERGAN) IVPB, senna-docusate 8.6-50 mg, sodium chloride 0.9%, tiZANidine     Review of Systems   Constitutional: Positive for activity change and appetite change (decreased). Negative for chills and fever.   HENT: Negative for congestion, rhinorrhea, sore throat and trouble swallowing.    Eyes: Negative for photophobia and visual disturbance.   Respiratory: Negative for cough, chest tightness and shortness of breath.    Cardiovascular: Negative for chest pain and leg swelling.   Gastrointestinal: Positive for abdominal pain, nausea and vomiting. Negative for constipation and diarrhea.   Genitourinary: Negative for dysuria, flank pain and hematuria.   Musculoskeletal: Negative for myalgias and neck pain.   Skin: Negative for rash and wound.   Neurological: Positive for tremors, weakness and headaches. Negative for syncope.   Psychiatric/Behavioral: Positive for confusion and hallucinations. Negative for agitation.     Objective:     Vital Signs (Most Recent):  Temp: 98.2 °F (36.8 °C) (05/12/20 1118)  Pulse: 72 (05/12/20 1118)  Resp: 17 (05/12/20 1118)  BP: 133/64 (05/12/20 1118)  SpO2: (!) 92 % (05/12/20 1118) Vital Signs (24h Range):  Temp:  [97.7 °F (36.5 °C)-98.2 °F (36.8 °C)] 98.2 °F (36.8 °C)  Pulse:  [72-86] 72  Resp:  [16-18] 17  SpO2:  [92 %-98 %] 92 %  BP: (133-157)/(64-83) 133/64     Weight: 88 kg (194 lb 0.1 oz)  Body mass index is 31.31 kg/m².  Body surface area is 2.02 meters squared.      Intake/Output Summary (Last 24 hours) at 5/12/2020 1136  Last data filed at 5/12/2020 0925  Gross per 24 hour   Intake 790 ml   Output 1450 ml   Net -660 ml       Physical Exam   Constitutional: She is oriented to person, place, and time. She appears well-developed and well-nourished. No distress.   HENT:   Head: Normocephalic and atraumatic.   Mouth/Throat:  Oropharynx is clear and moist.   Eyes: Conjunctivae are normal. No scleral icterus.   Neck: Normal range of motion. Neck supple.   Cardiovascular: Regular rhythm, normal heart sounds and intact distal pulses.   No murmur heard.  Pulmonary/Chest: Effort normal. No respiratory distress. She has no wheezes.   Diffuse coarse crackles bilaterally   Abdominal: Soft. Bowel sounds are normal. She exhibits no distension. There is tenderness (diffuse, more prominent in epigastric area/under rib cage b/l). There is guarding (voluntary).   Musculoskeletal: She exhibits edema (trace, b/l ankles) and tenderness (L ankle, chronic from previous fracture). She exhibits no deformity.   Neurological: She is alert and oriented to person, place, and time.   Bilateral UE/LE tremors improved   Skin: Skin is warm and dry. She is not diaphoretic.   Psychiatric: She has a normal mood and affect. Her behavior is normal. Judgment and thought content normal.       Significant Labs:   CBC:   Recent Labs   Lab 05/11/20  0330 05/12/20  0345   WBC 4.77 5.13   HGB 8.4* 7.6*   HCT 26.0* 23.5*   PLT 37* 33*   , CMP:   Recent Labs   Lab 05/11/20  0330 05/12/20  0345    140   K 3.8 3.4*    110   CO2 17* 20*    114*   BUN 42* 42*   CREATININE 3.4* 3.4*   CALCIUM 8.8 8.4*   PROT 6.9 6.4   ALBUMIN 2.0* 1.9*   BILITOT 0.8 0.5   ALKPHOS 264* 282*   * 106*   * 101*   ANIONGAP 13 10   EGFRNONAA 12.8* 12.8*    and All pertinent labs from the last 24 hours have been reviewed.    Diagnostic Results:  I have reviewed all pertinent imaging results/findings within the past 24 hours.    Assessment/Plan:     * Left lower quadrant abdominal pain  Hx of diagnosed reticulocytes and diverticulitis.  Current etiology for abdominal pain is unclear  - CT AP without contrast in the ED demonstrating no acute abnormality; diverticulosis without evidence to suggest diverticulitis; nonspecific bilateral perinephric fat stranding.  -- given the  fact that CT AP was performed without contrast (due to GINA), wonder if diverticulitis was not appreciated due to lack of contrast.  Will treat currently for possible diverticulitis vs other abdominal source    - IV Unasyn started on 5/5 -> changed to zosyn on 5/6  - febrile to 100.9 overnight 5/6; infectious w/u negative to date (CXR unremarkable, UA negative for UTI, BCx NGTD; however procal elevated 1.38)  - patient has remained afebrile >24hrs, HDS  - pain control with oxy 10mg q6h prn  - trial of bentyl 5/10 for continued abdominal pain - some subjective improvement  - nausea control with scheduled IV zofran 8mg q8h and prn IV phenergan 25mg q6h  - completed 5 days of IV zosyn during admission; discontinued 5/11 as less suspicious of infectious etiology    Discharge planning issues  Discharge pending the following:    - completion of IV thiamine repletion on 5/13    - creatinine: appears to be flattening out, but still is significantly elevated from baseline as of 5/12. Appreciate nephrology input    - thrombocytopenia: appears to be flattening out. Could be related to chemo. Can likely f/u outpatient with routine CBC    - abdominal pain: no clear source of infection per multiple abdominal CTs and patient has completed a 5 day course of ABX. Further ABX unlikely to be of significant yield.    - PT/OT recommending SNF, but family would like to take her home as they have good support at home and feel they can take care of her at home.    Acute encephalopathy  Patient was intermittently confused since 5/9. Hospital delirium can be 2/2 fluid status, electrolyte abnormalities, infection, hypercapnia, withdrawal etc. No new electrolyte abnormalities. Patient on fluids for dehydration. On ABX for possible infection. Considered CO2 narcosis but VBG with normal PCO2. Slightly acidemic with low bicarb suggesting metabolic acidosis but lactate normal. Unclear why she is acutely confused. Patient does report anxiety and  takes lorazepam at home.    - continue to monitor    Macrocytic anemia  Hb 7.8 on admission with   - Hb down trended to 6.5 on 5/7 - s/p 1U PRBC  - continue to monitor  - transfuse as needed for Hb > 7    Acute renal failure with acute tubular necrosis superimposed on stage 3 chronic kidney disease  sCr on admission 2.6 (baseline ~1.6). GINA likely pre-renal. Concern for ischemic ATN due to GI losses    - s/p IVF with sCr initially trending down, now trending back upward  - given additional IVF with NS  - suspect continued uptrend could be due to recent chemotherapy (pemetrexed and carboplatin last cycle late April 2020), however now outside of expected window of side effect  - RP US showing medical renal disease; no hydronephrosis  - ordered urine lytes, protein/Cr ratio to evaluate -> elevated protein/Cr ratio. Consider intra-renal causes.  - Nephrology consulted, apprec recs  - continue to trend CMP    Tremor  - Patient with debilitating b/l UE and LE tremor, followed by neurology outpatient. 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 Was considering sinemet outpatient.   - Patient also with reported incidents of episodic confusion/hallucinations. MRI without infarcts.    Plan  - Neurology consulted, apprec recs  - Ammonia level (wnl); thiamine level (131, slightly elevated)  - Patient with prior low levels of thiamine, not taking supplements at home  - Start IV thiamine 500mg TID x 5 days (end date: 5/13), will stop after since no evidence of thiamine deficiency  - No sinamet at this time, as has potential to worsen hallucinations and current abdominal pain  - 24 hr EEG complete without demonstration of epileptiform activity  - delirium precautions; minimize sedating medications     Chemotherapy induced nausea and vomiting  - persistent nausea/vomiting  - scheduled IV zofran 8mg q8h  - prn IV phenergan 25mg q6h    Malignant pleural mesothelioma  Receiving outpatient  chemotherapy - NSCLC PEMETREXED + CARBOPLATIN (AUC) Q3W and IVF .   Last treatment on 4/28/20, next scheduled or 5/19/20)  - on RA, satting well  - continue to monitor    History of DVT of lower extremity  Hx of DVTs - LLE 1970s and 1980s; DVT of L subclavian 1980s  Home lovenox 120mg daily  - started at 80mg daily on admission  - platelet count has been downtrending < 50  - d/c'd lovenox due to bleed risk (5/9)  - restart as appropriate    Type 2 diabetes mellitus without complication, without long-term current use of insulin  HbA1c 6.1, well controlled  - POCT glucose AC QHS  - LDSSI  - diabetic diet    Hypertension, essential  - cont home amlodipine 10mg daily  - unclear why home metoprolol (75mg daily) was held on admission, possibly 2/2 concern for sepsis  - remains normotensive on amlodipine, restarted metoprolol at low dose 25mg daily  - increased metoprolol to 50mg daily (5/11), advance to home dose as appropriate  - continue to monitor           Carlie Morales MD  Hematology/Oncology  [facility]

## 2020-05-12 NOTE — PLAN OF CARE
Patient AAOX3. Not oriented to situatioin and confused at times. Makes random and repetitive statements not related to situation. Vital signs stable, patient afebrile with no complaints of nausea or pain. Patient voiding via BSCC. Bed alarm and Avasys in use. Patient remembered to call two times this shift but other two times got up on her own and set off bed alarm and Avays camera. No acute events overnight. Fall precautions maintained. Reinforced instructions to patient to call for assistance. Call light within reach will continue to monitor.

## 2020-05-12 NOTE — PLAN OF CARE
POC reviewed with patient; understanding verbalized. Neuro checks done q4; pt remains disoriented to situation. Avasys in place and bed alarm on. Pt up to chair for several hours this shift; she is up with assist. Diabetic diet with good appetite; ACHS with coverage required this shift. Potassium replacements administered this shift; Thiamine administered as ordered. Pt voids via hte BSC; no noted BM this shift. Pt. with nonskid footwear on, bed in lowest position, and locked with bed rails up x2.  Pt. instructed to call prior to getting OOB.  Pt. has call light and personal items within reach. VSS and afebrile this shift. All questions and concerns addressed at this time. Will continue to monitor.

## 2020-05-12 NOTE — SUBJECTIVE & OBJECTIVE
Interval History: NAEON. Patient states abdominal pain slightly improved this am. Repeat CT abdo without evidence of diverticulitis, possibly some inflammation around duodenum. sCr (3.4 today) and LFTs flat since 5/11. Consulted nephrology, will follow-up on recs. Close monitoring of platelet count.     Oncology Treatment Plan:   OP NSCLC PEMETREXED + CARBOPLATIN (AUC) Q3W    Medications:  Continuous Infusions:  Scheduled Meds:   amLODIPine  10 mg Oral Daily    dicyclomine  10 mg Oral QID    DULoxetine  60 mg Oral Daily    folic acid  1 mg Oral Daily    metoprolol succinate  50 mg Oral Daily    ondansetron  8 mg Intravenous Q8H    polyethylene glycol  17 g Oral Daily    thiamine (VITAMIN B1) IVPB  500 mg Intravenous TID     PRN Meds:sodium chloride, Dextrose 10% Bolus, Dextrose 10% Bolus, glucagon (human recombinant), glucose, glucose, insulin aspart U-100, lidocaine-prilocaine, magnesium hydroxide 400 mg/5 ml, melatonin, oxyCODONE, polyethylene glycol, promethazine (PHENERGAN) IVPB, senna-docusate 8.6-50 mg, sodium chloride 0.9%, tiZANidine     Review of Systems   Constitutional: Positive for activity change and appetite change (decreased). Negative for chills and fever.   HENT: Negative for congestion, rhinorrhea, sore throat and trouble swallowing.    Eyes: Negative for photophobia and visual disturbance.   Respiratory: Negative for cough, chest tightness and shortness of breath.    Cardiovascular: Negative for chest pain and leg swelling.   Gastrointestinal: Positive for abdominal pain, nausea and vomiting. Negative for constipation and diarrhea.   Genitourinary: Negative for dysuria, flank pain and hematuria.   Musculoskeletal: Negative for myalgias and neck pain.   Skin: Negative for rash and wound.   Neurological: Positive for tremors, weakness and headaches. Negative for syncope.   Psychiatric/Behavioral: Positive for confusion and hallucinations. Negative for agitation.     Objective:     Vital  Signs (Most Recent):  Temp: 98.2 °F (36.8 °C) (05/12/20 1118)  Pulse: 72 (05/12/20 1118)  Resp: 17 (05/12/20 1118)  BP: 133/64 (05/12/20 1118)  SpO2: (!) 92 % (05/12/20 1118) Vital Signs (24h Range):  Temp:  [97.7 °F (36.5 °C)-98.2 °F (36.8 °C)] 98.2 °F (36.8 °C)  Pulse:  [72-86] 72  Resp:  [16-18] 17  SpO2:  [92 %-98 %] 92 %  BP: (133-157)/(64-83) 133/64     Weight: 88 kg (194 lb 0.1 oz)  Body mass index is 31.31 kg/m².  Body surface area is 2.02 meters squared.      Intake/Output Summary (Last 24 hours) at 5/12/2020 1136  Last data filed at 5/12/2020 0925  Gross per 24 hour   Intake 790 ml   Output 1450 ml   Net -660 ml       Physical Exam   Constitutional: She is oriented to person, place, and time. She appears well-developed and well-nourished. No distress.   HENT:   Head: Normocephalic and atraumatic.   Mouth/Throat: Oropharynx is clear and moist.   Eyes: Conjunctivae are normal. No scleral icterus.   Neck: Normal range of motion. Neck supple.   Cardiovascular: Regular rhythm, normal heart sounds and intact distal pulses.   No murmur heard.  Pulmonary/Chest: Effort normal. No respiratory distress. She has no wheezes.   Diffuse coarse crackles bilaterally   Abdominal: Soft. Bowel sounds are normal. She exhibits no distension. There is tenderness (diffuse, more prominent in epigastric area/under rib cage b/l). There is guarding (voluntary).   Musculoskeletal: She exhibits edema (trace, b/l ankles) and tenderness (L ankle, chronic from previous fracture). She exhibits no deformity.   Neurological: She is alert and oriented to person, place, and time.   Bilateral UE/LE tremors improved   Skin: Skin is warm and dry. She is not diaphoretic.   Psychiatric: She has a normal mood and affect. Her behavior is normal. Judgment and thought content normal.       Significant Labs:   CBC:   Recent Labs   Lab 05/11/20  0330 05/12/20  0345   WBC 4.77 5.13   HGB 8.4* 7.6*   HCT 26.0* 23.5*   PLT 37* 33*   , CMP:   Recent Labs    Lab 05/11/20  0330 05/12/20  0345    140   K 3.8 3.4*    110   CO2 17* 20*    114*   BUN 42* 42*   CREATININE 3.4* 3.4*   CALCIUM 8.8 8.4*   PROT 6.9 6.4   ALBUMIN 2.0* 1.9*   BILITOT 0.8 0.5   ALKPHOS 264* 282*   * 106*   * 101*   ANIONGAP 13 10   EGFRNONAA 12.8* 12.8*    and All pertinent labs from the last 24 hours have been reviewed.    Diagnostic Results:  I have reviewed all pertinent imaging results/findings within the past 24 hours.

## 2020-05-12 NOTE — PHYSICIAN QUERY
PT Name: Isabell Preston  MR #: [id number]     CDS: Dahlia Chou RN     Contact information:[email or web address] or (cell) [phone]    This form is a permanent document in the medical record.     Query Date: May 12, 2020    Physician Query - Neurological Condition Clarification    By submitting this query, we are merely seeking further clarification of documentation to reflect the severity of illness of your patient. Please utilize your independent clinical judgment when addressing the question(s) below.    The Medical record reflects the following:     Indicators   Supporting Clinical Findings Location in Medical Record   x AMS, Confusion,  LOC, etc.  Neurology consulted for resting tremor and reported incidents of episodic confusion/hallucinations.    Patient alert, but has episodes of intermittent confusion. Patient having hallucinations of people coming into her room that are not. Her memory is also more impaired today than yesterday   Hem/Onc PN 5/8      Oncology RN POC 5/9   x Acute / Chronic Illness Acute encephalopathy  Patient was intermittently confused since 5/9. Hospital delirium can be 2/2 fluid status, electrolyte abnormalities, infection, hypercapnia, withdrawal etc. No new electrolyte abnormalities. Patient on fluids for dehydration. On ABX for possible infection. Considered CO2 narcosis but VBG with normal PCO2. Slightly acidemic with low bicarb suggesting metabolic acidosis but lactate normal. Unclear why she is acutely confused. Patient does report anxiety and takes lorazepam at home.   Hem/Onc PN 5/11    Radiology Findings     x Electrolyte Imbalance Thiamine deficiency  3/5/20 Thiamine resulted low at 26  Could contribute to cognitive difficulties.  Daughter reports pt has not been receiving thiamine at home.  -f/u repeat level, replete as under tremor section   Neurology PN 5/1   x Medication thiamine (B-1) 500 mg in dextrose 5 % 50 mL IVPB  Ordered Dose: 500 mg   Route:  Intravenous   Frequency: 3 times daily @ 12.5 mL/hr over 4 Hours MAR    Treatment             x Other IMPRESSION:  Abnormal EEG - there is diffuse slowing both the waking and sleeping state indicative generalized nonfocal nonspecific encephalopathic process and no evidence for an irritative focus. EEG 5/9     Encephalopathy- is a general term for any diffuse disease of the brain that alters brain function or structure. Treatment of the cognitive dysfunction varies but is ultimately dependent on the treatment of the underlying condition.    Major Symptoms of Encephalopathy - Decreased level of consciousness, fluctuating alertness/concentration, confusion, agitation, lethargy, somnolence, drowsiness, obtundation, stupor, or coma.         References: National Institutes of Healths (NIH) National [place] of Neurological Disorders and Strokes;  HCPro 2016; Advisory Board     Clinical Guidelines:   These guidelines will set system standards to assist providers in managing, documentation, and coding of encephalopathy. The intent of this document is to serve as a system guideline, not replace the providers clinical judgment:    Provider, please specify the diagnosis or diagnoses associated with above clinical findings.    [   ] Metabolic Encephalopathy - Due to electrolye imbalance, metabolic derangements, or infections processes, includes Septic Encephalopathy   [   ] Other Encephalopathy - Includes uremic encephalopathy   [X   ] Unspecified Encephalopathy      [   ] Other Neurological Condition-  Includes Post-ictal altered mental status. (please specify condition): _________   [   ]  Clinically Undetermined     Please document in your progress notes daily for the duration of treatment until resolved, and include in your discharge summary.

## 2020-05-12 NOTE — ASSESSMENT & PLAN NOTE
- Patient with debilitating b/l UE and LE tremor, followed by neurology outpatient. 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 Was considering sinemet outpatient.   - Patient also with reported incidents of episodic confusion/hallucinations. MRI without infarcts.    Plan  - Neurology consulted, apprec recs  - Ammonia level (wnl); thiamine level (131, slightly elevated)  - Patient with prior low levels of thiamine, not taking supplements at home  - Started IV thiamine 500mg TID x 5 days (end date: 5/13), discontinued after since no evidence of thiamine deficiency  - No sinamet at this time, as has potential to worsen hallucinations and current abdominal pain  - 24 hr EEG complete without demonstration of epileptiform activity  - delirium precautions; minimize sedating medications

## 2020-05-12 NOTE — ASSESSMENT & PLAN NOTE
Discharge pending the following:    - PT/OT recommending SNF, but family would like to take her home as they have good support at home and feel they can take care of her at home.  - Discharge home with HH

## 2020-05-12 NOTE — PT/OT/SLP PROGRESS
Physical Therapy      Patient Name:  Isabell Preston   MRN:  [id number]    Pt not seen on this date; Chart reviewed and pt remain appropriate for PT services at this time. ?Will see pt as schedule allows      Jose Rafael Ritter, PTA  5/12/2020

## 2020-05-12 NOTE — ASSESSMENT & PLAN NOTE
Discharge pending the following:    - creatinine: appears to be flattening out, but still is significantly elevated from baseline as of 5/12. Appreciate nephrology input    - thrombocytopenia: appears to be flattening out. Could be related to chemo. Can likely f/u outpatient with routine CBC    - abdominal pain: no clear source of infection per multiple abdominal CTs and patient has completed a 5 day course of ABX. Further ABX unlikely to be of significant yield.    - PT/OT recommending SNF, but family would like to take her home as they have good support at home and feel they can take care of her at home.

## 2020-05-13 ENCOUNTER — PATIENT MESSAGE (OUTPATIENT)
Dept: HEMATOLOGY/ONCOLOGY | Facility: CLINIC | Age: 75
End: 2020-05-13

## 2020-05-13 VITALS
HEIGHT: 66 IN | HEART RATE: 81 BPM | DIASTOLIC BLOOD PRESSURE: 63 MMHG | RESPIRATION RATE: 16 BRPM | OXYGEN SATURATION: 96 % | SYSTOLIC BLOOD PRESSURE: 135 MMHG | TEMPERATURE: 98 F | WEIGHT: 184.06 LBS | BODY MASS INDEX: 29.58 KG/M2

## 2020-05-13 LAB
ALBUMIN SERPL BCP-MCNC: 2 G/DL (ref 3.5–5.2)
ALP SERPL-CCNC: 344 U/L (ref 55–135)
ALT SERPL W/O P-5'-P-CCNC: 117 U/L (ref 10–44)
ANION GAP SERPL CALC-SCNC: 10 MMOL/L (ref 8–16)
AST SERPL-CCNC: 119 U/L (ref 10–40)
BASOPHILS # BLD AUTO: 0.01 K/UL (ref 0–0.2)
BASOPHILS NFR BLD: 0.2 % (ref 0–1.9)
BILIRUB SERPL-MCNC: 0.5 MG/DL (ref 0.1–1)
BUN SERPL-MCNC: 45 MG/DL (ref 8–23)
CALCIUM SERPL-MCNC: 8.8 MG/DL (ref 8.7–10.5)
CHLORIDE SERPL-SCNC: 111 MMOL/L (ref 95–110)
CO2 SERPL-SCNC: 21 MMOL/L (ref 23–29)
CREAT SERPL-MCNC: 3.5 MG/DL (ref 0.5–1.4)
DIFFERENTIAL METHOD: ABNORMAL
EOSINOPHIL # BLD AUTO: 0 K/UL (ref 0–0.5)
EOSINOPHIL NFR BLD: 0.5 % (ref 0–8)
ERYTHROCYTE [DISTWIDTH] IN BLOOD BY AUTOMATED COUNT: 15.7 % (ref 11.5–14.5)
EST. GFR  (AFRICAN AMERICAN): 14.2 ML/MIN/1.73 M^2
EST. GFR  (NON AFRICAN AMERICAN): 12.3 ML/MIN/1.73 M^2
GLUCOSE SERPL-MCNC: 116 MG/DL (ref 70–110)
HCT VFR BLD AUTO: 24.1 % (ref 37–48.5)
HGB BLD-MCNC: 7.6 G/DL (ref 12–16)
IMM GRANULOCYTES # BLD AUTO: 0.08 K/UL (ref 0–0.04)
IMM GRANULOCYTES NFR BLD AUTO: 1.4 % (ref 0–0.5)
LYMPHOCYTES # BLD AUTO: 0.9 K/UL (ref 1–4.8)
LYMPHOCYTES NFR BLD: 15.6 % (ref 18–48)
MAGNESIUM SERPL-MCNC: 1.6 MG/DL (ref 1.6–2.6)
MCH RBC QN AUTO: 32.3 PG (ref 27–31)
MCHC RBC AUTO-ENTMCNC: 31.5 G/DL (ref 32–36)
MCV RBC AUTO: 103 FL (ref 82–98)
MONOCYTES # BLD AUTO: 0.3 K/UL (ref 0.3–1)
MONOCYTES NFR BLD: 4.9 % (ref 4–15)
NEUTROPHILS # BLD AUTO: 4.5 K/UL (ref 1.8–7.7)
NEUTROPHILS NFR BLD: 77.4 % (ref 38–73)
NRBC BLD-RTO: 0 /100 WBC
PHOSPHATE SERPL-MCNC: 4.1 MG/DL (ref 2.7–4.5)
PLATELET # BLD AUTO: 45 K/UL (ref 150–350)
PMV BLD AUTO: 12.3 FL (ref 9.2–12.9)
POCT GLUCOSE: 116 MG/DL (ref 70–110)
POCT GLUCOSE: 161 MG/DL (ref 70–110)
POTASSIUM SERPL-SCNC: 3.7 MMOL/L (ref 3.5–5.1)
PROT SERPL-MCNC: 6.7 G/DL (ref 6–8.4)
RBC # BLD AUTO: 2.35 M/UL (ref 4–5.4)
SODIUM SERPL-SCNC: 142 MMOL/L (ref 136–145)
WBC # BLD AUTO: 5.76 K/UL (ref 3.9–12.7)

## 2020-05-13 PROCEDURE — 97116 GAIT TRAINING THERAPY: CPT | Mod: CQ

## 2020-05-13 PROCEDURE — 25000003 PHARM REV CODE 250: Performed by: STUDENT IN AN ORGANIZED HEALTH CARE EDUCATION/TRAINING PROGRAM

## 2020-05-13 PROCEDURE — 97530 THERAPEUTIC ACTIVITIES: CPT

## 2020-05-13 PROCEDURE — 63600175 PHARM REV CODE 636 W HCPCS: Performed by: STUDENT IN AN ORGANIZED HEALTH CARE EDUCATION/TRAINING PROGRAM

## 2020-05-13 PROCEDURE — 97535 SELF CARE MNGMENT TRAINING: CPT

## 2020-05-13 PROCEDURE — 84100 ASSAY OF PHOSPHORUS: CPT

## 2020-05-13 PROCEDURE — 83735 ASSAY OF MAGNESIUM: CPT

## 2020-05-13 PROCEDURE — 97110 THERAPEUTIC EXERCISES: CPT | Mod: CQ

## 2020-05-13 PROCEDURE — 80053 COMPREHEN METABOLIC PANEL: CPT

## 2020-05-13 PROCEDURE — 25000003 PHARM REV CODE 250: Performed by: HOSPITALIST

## 2020-05-13 PROCEDURE — 99238 HOSP IP/OBS DSCHRG MGMT 30/<: CPT | Mod: GC,,, | Performed by: INTERNAL MEDICINE

## 2020-05-13 PROCEDURE — 85025 COMPLETE CBC W/AUTO DIFF WBC: CPT

## 2020-05-13 PROCEDURE — 99238 PR HOSPITAL DISCHARGE DAY,<30 MIN: ICD-10-PCS | Mod: GC,,, | Performed by: INTERNAL MEDICINE

## 2020-05-13 PROCEDURE — 97530 THERAPEUTIC ACTIVITIES: CPT | Mod: CQ

## 2020-05-13 RX ORDER — ONDANSETRON HYDROCHLORIDE 8 MG/1
8 TABLET, FILM COATED ORAL EVERY 8 HOURS PRN
Qty: 60 TABLET | Refills: 2 | Status: SHIPPED | OUTPATIENT
Start: 2020-05-13 | End: 2020-07-29 | Stop reason: SDUPTHER

## 2020-05-13 RX ORDER — PROMETHAZINE HYDROCHLORIDE 25 MG/1
25 TABLET ORAL EVERY 6 HOURS PRN
Qty: 60 TABLET | Refills: 0 | Status: ON HOLD | OUTPATIENT
Start: 2020-05-13 | End: 2020-01-01 | Stop reason: HOSPADM

## 2020-05-13 RX ORDER — HEPARIN 100 UNIT/ML
300 SYRINGE INTRAVENOUS ONCE
Status: COMPLETED | OUTPATIENT
Start: 2020-05-13 | End: 2020-05-13

## 2020-05-13 RX ORDER — OXYCODONE HYDROCHLORIDE 10 MG/1
10 TABLET ORAL EVERY 6 HOURS PRN
Qty: 60 TABLET | Refills: 0 | Status: ON HOLD | OUTPATIENT
Start: 2020-05-13 | End: 2020-01-01 | Stop reason: HOSPADM

## 2020-05-13 RX ADMIN — ONDANSETRON 8 MG: 2 INJECTION, SOLUTION INTRAMUSCULAR; INTRAVENOUS at 06:05

## 2020-05-13 RX ADMIN — HEPARIN 300 UNITS: 100 SYRINGE at 03:05

## 2020-05-13 RX ADMIN — METOPROLOL SUCCINATE 75 MG: 50 TABLET, EXTENDED RELEASE ORAL at 08:05

## 2020-05-13 RX ADMIN — FOLIC ACID 1 MG: 1 TABLET ORAL at 08:05

## 2020-05-13 RX ADMIN — DICYCLOMINE HYDROCHLORIDE 10 MG: 10 CAPSULE ORAL at 01:05

## 2020-05-13 RX ADMIN — DICYCLOMINE HYDROCHLORIDE 10 MG: 10 CAPSULE ORAL at 08:05

## 2020-05-13 RX ADMIN — OXYCODONE HYDROCHLORIDE 10 MG: 10 TABLET ORAL at 02:05

## 2020-05-13 RX ADMIN — ONDANSETRON 8 MG: 2 INJECTION, SOLUTION INTRAMUSCULAR; INTRAVENOUS at 01:05

## 2020-05-13 RX ADMIN — OXYCODONE HYDROCHLORIDE 10 MG: 10 TABLET ORAL at 08:05

## 2020-05-13 RX ADMIN — DICYCLOMINE HYDROCHLORIDE 10 MG: 10 CAPSULE ORAL at 04:05

## 2020-05-13 RX ADMIN — AMLODIPINE BESYLATE 10 MG: 10 TABLET ORAL at 08:05

## 2020-05-13 RX ADMIN — DULOXETINE 60 MG: 60 CAPSULE, DELAYED RELEASE ORAL at 08:05

## 2020-05-13 RX ADMIN — POLYETHYLENE GLYCOL 3350 17 G: 17 POWDER, FOR SOLUTION ORAL at 08:05

## 2020-05-13 RX ADMIN — OXYCODONE HYDROCHLORIDE 10 MG: 10 TABLET ORAL at 03:05

## 2020-05-13 NOTE — PROGRESS NOTES
Spoke with [facility] liason nurse Aquino to follow up re: patient's referral. She confirmed with the staff in the office that they already had authorization from [facility], and patient is on their schedule for the nurse admission assessment visit tomorrow. There are no other discharge needs requiring Oncology Social Worker's intervention at this time.

## 2020-05-13 NOTE — PLAN OF CARE
GALINA faxed HH Orders to [facility] via  for review. GALINA will continue to follow.      05/13/20 7352   Post-Acute Status   Post-Acute Authorization Home Health   Home Health Status Referrals Sent     Heidi Juarez LMSW   - [facility]  Ext. [phone]

## 2020-05-13 NOTE — PT/OT/SLP PROGRESS
"Occupational Therapy   Treatment    Name: Isabell Preston  MRN: [id number]  Admitting Diagnosis:  Left lower quadrant abdominal pain       Recommendations:     Discharge Recommendations: nursing facility, skilled  Discharge Equipment Recommendations:  none  Barriers to discharge:  None    Assessment:     Isabell Preston is a 74 y.o. female with a medical diagnosis of Left lower quadrant abdominal pain.  She presents with the deficits listed below.  Pt tolerated session well with increased endurance as demonstrated by her ability to perform standing ADL tasks and functional mobility activity without a seated rest break.  However, pt continues to demonstrate slow mobility and continues to require verbal cues for safely maneuvering her rolling walker.  Performance deficits affecting function are weakness, impaired endurance, impaired functional mobilty, gait instability, pain, impaired balance, decreased safety awareness, impaired self care skills.     Rehab Prognosis:  Good; patient would benefit from acute skilled OT services to address these deficits and reach maximum level of function.       Plan:     Patient to be seen 4 x/week to address the above listed problems via self-care/home management, therapeutic activities, therapeutic exercises  · Plan of Care Expires:    · Plan of Care Reviewed with: patient    Subjective   "I'm feeling better but my chest hurts."  "I'm looking forward to seeing my daughter."  Pain/Comfort:  · Pain Rating 1: 7/10  · Location - Orientation 1: lower  · Location 1: chest  · Pain Addressed 1: Distraction, Reposition  · Pain Rating Post-Intervention 1: 7/10    Objective:     Communicated with: RN prior to session.  Patient found supine with bed alarm upon OT entry to room.    General Precautions: Standard, fall, neutropenic   Orthopedic Precautions:N/A   Braces: N/A     Occupational Performance:     Bed Mobility:    · Patient completed Supine to Sit with stand by assistance  · Patient " completed Sit to Supine with stand by assistance     Functional Mobility/Transfers:  · Patient completed Sit <> Stand Transfer from EOB with contact guard assistance  with  rolling walker   · Functional Mobility: Pt ambulated a functional household distance from EOB to bathroom sink, then down the hallway and back to EOB with CGA with rolling walker, requiring verbal cues to safely maneuver walker around room and hallway.     Activities of Daily Living:  · Grooming: stand by assistance with setup to brush teeth and wash face while standing at the bathroom sink with the rolling walker.  Pt able to reach soap dispenser with wash cloth.      [facility] 6 Click ADL: 20    Treatment & Education:  - Pt educated on role of OT, POC, benefit of performing out of bed activity, and safety when performing functional transfers and self-care tasks.    Patient left supine with call button in reach, bed alarm on and telesitter notifiedEducation:      GOALS:   Multidisciplinary Problems     Occupational Therapy Goals        Problem: Occupational Therapy Goal    Goal Priority Disciplines Outcome Interventions   Occupational Therapy Goal     OT, PT/OT Ongoing, Progressing    Description:  Goals to be met by: 6/5    Patient will increase functional independence with ADLs by performing:    UE Dressing with Set-up Assistance.  LE Dressing with Stand-by Assistance.  Grooming while seated with Set-up Assistance. MET on 5/11   Toileting from bedside commode with Contact Guard Assistance for hygiene and clothing management.                        Time Tracking:     OT Date of Treatment: 05/13/20  OT Start Time: 1117  OT Stop Time: 1144  OT Total Time (min): 27 min    Billable Minutes:Self Care/Home Management 10 min.  Therapeutic Activity 17 min    Dean Pride OT  5/13/2020

## 2020-05-13 NOTE — DISCHARGE SUMMARY
"[facility]  Hematology/Oncology  Discharge Summary      Patient Name: Isabell Preston  MRN: [id number]  Admission Date: 5/5/2020  Hospital Length of Stay: 6 days  Discharge Date and Time:  05/13/2020 12:44 PM  Attending Physician: JOSE Deal MD   Discharging Provider: Piero Chavez MD  Primary Care Provider: Ayo Archuleta MD    HPI: Ms. Isabell Preston is a 74 y.o. 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 Last treatment on 4/28/20, next scheduled or 5/19/20) , DVT on daily Lovenox, depression, diabetes mellitus, diverticulosis, hypertension, hyperlipidemia, multiple abdominal surgeries who presents to [facility] with abdominal pain, headache, fever (subjective, greater than 103 at home), vomiting (non-bloody or not coffee-ground), and 1 bout of loose bowel movements, however normally has constipation..  Her abdominal pain localizes to the left upper and lower quadrants and is worse with palpation and movement.  She denies any alleviating factors, cough, and shortness of breath.       Patient states she was tested for COVID-19 approximately 2 months ago.  The results of these tests were both negative however she was treated as presumptive positive given her immunocompromised state.  Since that time, the patient has not left the house or had social contact with any COVID infected individuals.  The patient last received chemotherapy 2 weeks ago.  The patient states that the symptoms she is experiencing today are identical to those she experienced 2 months ago when she was presumed positive for COVID-19.      Per documentation , "tested negative for covid x 2 however based on labs PCP felt that pt had covid and got false neg result. "  COVID -19 testing on 5/5 negative .  Patient stated that her PCP culture until her that her initial COVID testing was presumed to be positive, " however patient was unable to elaborate on this did not no additional information.  Chart review shows all negative COVID test.     In the ED CT scan of the abdomen and pelvis without contrast was obtained.  Showing.   1. No acute abnormality identified on today's examination.  2. Diverticulosis without evidence to suggest diverticulitis.  3. Nonspecific bilateral perinephric fat stranding.  Correlation with urinalysis advised.  4. Cholecystectomy, hysterectomy and additional incidental findings as above.    * No surgery found *     Hospital Course: Patient admitted to hospital medicine (5/5) for further evaluation and treatment. Patient afebrile and HDS. Has GINA with sCr 2.6 (baseline ~1.6). Continues to have significant left-sided abdominal pain, not improved with prn morphine.  Pain medications changed to Dilaudid. Overall, nausea improved and no further episodes of diarrhea or loose stools.  Antibiotics changed from Unasyn to Zosyn. Giving IVF and Cr trended down to 2.2. Etiology of abdominal pain unclear at this time, treating for presumed diverticulitis.    Transferred to med onc service (5/7). Patient had fever overnight (5/6) Tmax 100.9, resolved with tylenol. Infectious workup thus far negative, however procal elevated 1.38; lactate wnl; CXR unremarkable; UA negative for UTI, BCx NGTD. Continuing with zosyn to cover for potential abdominal source; patient with persistent nausea/vomiting. Also with Hb drop from 7.5 to 6.5. S/p 1U PRBC (5/7). Serum Cr trending back upward, given additional fluids. 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 Neurology consulted for assistance with tremors as well as reported incidents of episodic confusion/hallucinations. Per neuro recs, checked ammonia level (wnl) and thiamine level (pending). Previously low thiamine level, not taking supplements at home.  Started on IV thiamine 500mg TID x 5 days (end date: 5/13), plan to transition to po pending thiamine level result. Neuro also recommending 24hr EEG to evaluate for possible epileptiform activity contributing to confusions--> no seizure activity noted. Labs with progressing leukopenia/thrombocytopenia and uptrending sCr/LFTs, possibly 2/2 recent chemo (though now outside of expected window); continuing IVF. Ordered urine studies and RP ultrasound for GINA. RP ultrasound showing evidence of medical renal disease; no hydronephrosis. Urine studies significant for proteinuria with elevated protein/creatinine ratio, suggestive of intra-renal cause of GINA; nephrology consulted. Home lovenox for DVT ppx held (5/9) due to downtrending platelets < 50 and increased bleed risk. Patient also noted to be intermittently confused on 5/9 and 5/10, saying she is seeing people come into her room that are not actually there. No notable electrolyte abnormalities or fevers to suggest new infectious process. Episodes of confusion/hallucinations may be more likely due to acute delirium +/- component of vascular dementia based on prior MRI reading 4/17. Patient completed 5 days of zosyn since admission, discontinued 5/11 as less suspicious of infectious etiology and could be contributing to GINA.    Patient with stable sCr ~3.5 and elevated LFTs of unknown cause. Workup unrevealing of acute abdominal etiologies contributing to pain. However, patient reports abdo pain somewhat improved since admission. WBC wnl and platelets trending back up. IV thiamine discontinued as B12 level resulted mildly elevated at 131 prior to supplementation. Stable for discharge home with home health 5/13. Discussed with daughters plan of care and need for outpatient follow-up to trend kidney and liver function. Labs to be collected Friday 5/15 and virtual visit with onc scheduled for Mon 5/18, also with neuro follow-up 5/22. Patient will need to be restarted on  home therapeutic lovenox (for hx of DVT) when platelets normalized. Home triamterene-HCTZ and atorvastatin also held 2/2 GINA and elevated LFTs, resume as appropriate outpatient.    Consults:   Consults (From admission, onward)        Status Ordering Provider     Inpatient consult to Nephrology  Once     Provider:  (Not yet assigned)    Completed GARY BROOKS     Inpatient consult to Neurology  Once     Provider:  (Not yet assigned)    Completed ELMER GARCIA     Inpatient consult to Registered Dietitian/Nutritionist  Once     Provider:  (Not yet assigned)    Completed BEN FLORES          Significant Diagnostic Studies: Labs:   CMP   Recent Labs   Lab 05/12/20  0345 05/13/20  0300    142   K 3.4* 3.7    111*   CO2 20* 21*   * 116*   BUN 42* 45*   CREATININE 3.4* 3.5*   CALCIUM 8.4* 8.8   PROT 6.4 6.7   ALBUMIN 1.9* 2.0*   BILITOT 0.5 0.5   ALKPHOS 282* 344*   * 119*   * 117*   ANIONGAP 10 10   ESTGFRAFRICA 14.7* 14.2*   EGFRNONAA 12.8* 12.3*   , CBC   Recent Labs   Lab 05/12/20  0345 05/13/20  0300   WBC 5.13 5.76   HGB 7.6* 7.6*   HCT 23.5* 24.1*   PLT 33* 45*    and All labs within the past 24 hours have been reviewed    Pending Diagnostic Studies:     Procedure Component Value Units Date/Time    CBC auto differential [[id number]] Collected:  05/08/20 0347    Order Status:  Sent Lab Status:  In process Updated:  05/08/20 0348    Specimen:  Blood     Narrative:       Collection has been rescheduled by TS2 at 05/07/2020 09:54 Reason: pt   is a unit collect. spoke to theresa Whitehead        Final Active Diagnoses:    Diagnosis Date Noted POA    PRINCIPAL PROBLEM:  Left lower quadrant abdominal pain [R10.32] 05/05/2020 Yes    Discharge planning issues [Z02.9] 05/12/2020 Not Applicable    Acute encephalopathy [G93.40]  No    Acute renal failure with acute tubular necrosis superimposed on stage 3 chronic kidney disease [N17.0, N18.3] 05/05/2020 Yes    Macrocytic anemia  [D53.9] 05/05/2020 Yes    Diverticulosis [K57.90] 05/05/2020 Yes    Memory change [R41.3] 03/05/2020 Yes    Tremor [R25.1] 03/05/2020 Yes    Chemotherapy induced nausea and vomiting [R11.2, T45.1X5A] 08/22/2019 Yes    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] 07/18/2019 Yes     Chronic    History of DVT of lower extremity [Z86.718] 07/03/2019 Not Applicable    Type 2 diabetes mellitus without complication, without long-term current use of insulin [E11.9] 06/05/2019 Yes    Hypertension, essential [I10] 07/31/2015 Yes     Chronic    Abdominal pain [R10.9] 11/12/2014 Unknown      Problems Resolved During this Admission:    Diagnosis Date Noted Date Resolved POA    Thiamine deficiency [E51.9] 05/08/2020 05/12/2020 Yes      Discharged Condition: stable    Disposition: Home-Health Care [facility]    Follow Up:  Follow-up Information     [facility] In 1 week.    Specialty:  Nephrology  Contact information:  [address] [place] [id number] [phone]               Patient Instructions:   No discharge procedures on file.     Medications:  Reconciled Home Medications:      Medication List      START taking these medications    oxyCODONE 10 mg Tab immediate release tablet  Commonly known as:  ROXICODONE  Take 1 tablet (10 mg total) by mouth every 6 (six) hours as needed.        CHANGE how you take these medications    ondansetron 8 MG tablet  Commonly known as:  ZOFRAN  Take 1 tablet (8 mg total) by mouth every 8 (eight) hours as needed for Nausea.  What changed:    · medication strength  · how much to take  · when to take this  · reasons to take this        CONTINUE taking these medications    acetaminophen 500 MG tablet  Commonly known as:  TYLENOL  Take 2 tablets (1,000 mg total) by mouth every 6 (six) hours as needed for Pain.     amLODIPine 10 MG tablet  Commonly known as:  NORVASC  TAKE 1 TABLET BY MOUTH EVERY DAY     desoximetasone 0.05 % cream  Commonly known as:  TOPICORT  Apply  topically.     dicyclomine 10 MG capsule  Commonly known as:  BENTYL  TAKE 1 CAPSULE BY MOUTH TWICE A DAY     DULoxetine 60 MG capsule  Commonly known as:  CYMBALTA  Take 1 capsule (60 mg total) by mouth once daily.     fluticasone 27.5 mcg/actuation nasal spray  Commonly known as:  VERAMYST  2 sprays by Nasal route daily as needed for Rhinitis or Allergies.     folic acid 400 MCG tablet  Commonly known as:  FOLVITE  Take 1 tablet (400 mcg total) by mouth once daily.     gabapentin 100 MG capsule  Commonly known as:  NEURONTIN  TAKE 1 CAPSULE BY MOUTH TWICE A DAY     lancets 28 gauge Misc  Commonly known as:  TRUEPLUS LANCETS  1 lancet by Misc.(Non-Drug; Combo Route) route once daily. Test blood sugar once daily, type 2 diabetes, controlled. E11.9     lidocaine-prilocaine cream  Commonly known as:  EMLA  Apply topically as needed.     LINZESS 145 mcg Cap capsule  Generic drug:  linaCLOtide  TAKE 1 CAPSULE BY MOUTH EVERY DAY     magic mouthwash diphen/antac/lidoc/nysta  Swish10 mLs 4 (four) times daily.     * metoprolol succinate 25 MG 24 hr tablet  Commonly known as:  TOPROL-XL  TAKE 1 TABLET BY MOUTH ONCE DAILY. TOTAL DAILY DOSE 75MG     * metoprolol succinate 50 MG 24 hr tablet  Commonly known as:  TOPROL-XL  TAKE 1 TABLET BY MOUTH ONCE DAILY. TOTAL DAILY DOSE 75MG     mometasone 0.1% 0.1 % cream  Commonly known as:  ELOCON  BALWINDER TO DARK AREAS BID ON LEGS PRN     prochlorperazine 10 MG tablet  Commonly known as:  COMPAZINE  Take 1 tablet (10 mg total) by mouth every 6 (six) hours as needed.     promethazine 25 MG tablet  Commonly known as:  PHENERGAN  Take 1 tablet (25 mg total) by mouth every 6 (six) hours as needed for Nausea (Taken headache does not resolve).     tiZANidine 4 MG tablet  Commonly known as:  ZANAFLEX  TAKE 1 TABLETBY MOUTH NIGHTLY AT BEDTIME AS NEEDED         * This list has 2 medication(s) that are the same as other medications prescribed for you. Read the directions carefully, and ask your  doctor or other care provider to review them with you.            STOP taking these medications    atorvastatin 10 MG tablet  Commonly known as:  LIPITOR     atorvastatin 40 MG tablet  Commonly known as:  LIPITOR     enoxaparin 120 mg/0.8 mL Syrg  Commonly known as:  LOVENOX     thiamine 100 MG tablet     triamterene-hydrochlorothiazide 37.5-25 mg 37.5-25 mg per tablet  Commonly known as:  MAXZIDE-25            Piero Chavez MD  Hematology/Oncology  [facility]

## 2020-05-13 NOTE — PT/OT/SLP PROGRESS
Physical Therapy Treatment    Patient Name:  Isabell Preston   MRN:  [id number]    Recommendations:     Discharge Recommendations:  nursing facility, skilled   Discharge Equipment Recommendations bedside commode  Barriers to discharge: None    Assessment:     Isabell Preston is a 74 y.o. female admitted with a medical diagnosis of Left lower quadrant abdominal pain.  She presents with the following impairments/functional limitations:  impaired endurance, impaired self care skills, impaired functional mobilty, gait instability, impaired balance, decreased safety awareness, decreased coordination .  Pt  motivated and cooperative with treatment session. Pt Progressing with PT Intervention.  Pt would continue to benefit from skilled PT to address overall functional mobility and goals. Goals remain appropriate    Rehab Prognosis: Good; patient would benefit from acute skilled PT services to address these deficits and reach maximum level of function.    Recent Surgery: * No surgery found *      Plan:     During this hospitalization, patient to be seen 4 x/week to address the identified rehab impairments via gait training, therapeutic activities, therapeutic exercises and progress toward the following goals:    · Plan of Care Expires:  06/05/20    Subjective     Chief Complaint: chest pain    Pain/Comfort:  · Pain Rating 1: 7/10  · Location - Orientation 1: lower  · Location 1: chest  · Pain Addressed 1: Reposition, Distraction  · Pain Rating Post-Intervention 1: 7/10      Objective:     Communicated with RN prior to session.  Patient found supine with bed alarm upon PT entry to room.     General Precautions: Standard, fall, neutropenic   Orthopedic Precautions:N/A   Braces: N/A     Functional Mobility:  · Bed Mobility:     · Supine to Sit: stand by assistance and extra time and HOB fully elevated  · Sit to Supine: stand by assistance and and increased time  · Transfers:     · Sit to Stand:  contact guard assistance  with rolling walker  · Gait: w/ RW and CGA w/ IV pole in tow 120 feet pt demoslow pace, decreased foot clearance. Distance limited by fatigue  Pt had mask on out of room and therapist had mask on upon entering room and during treatment      AM-PAC 6 CLICK MOBILITY  Turning over in bed (including adjusting bedclothes, sheets and blankets)?: 4  Sitting down on and standing up from a chair with arms (e.g., wheelchair, bedside commode, etc.): 3  Moving from lying on back to sitting on the side of the bed?: 3  Moving to and from a bed to a chair (including a wheelchair)?: 3  Need to walk in hospital room?: 3  Climbing 3-5 steps with a railing?: 2  Basic Mobility Total Score: 18       Therapeutic Activities and Exercises:   educated patient on progress, safety,d/c,PT POC, on the effects of prolonged immobility and the importance of performing OOB activity and exercises to promote healing and reduce recovery time   Patient performed therex  seated  B LE AROM AP, LAQ, Hip Flexion, Hip Abd/Add with facilitation for correct performance and sequencing. Exercises performed to develop and maintain pt's strength, endurance and flexibility.   Updated white board with appropriate PT mobility information for medical team notification  Donned an extra gown   Pt safe to ambulate in hallway with RN or PCT assistance   Call nursing/pct to transfer to chair/use bathroom. Pt stated understanding  Bedside table in front of patient and area set up for function, convenience, and safety. RN aware of patient's mobility needs and status. Questions/concerns addressed within PTA scope of practice; patient with no further questions. Time was provided for active listening, discussion of health disposition, and discussion of safe discharge. Pt?verbalized?agreement .      Patient left supine with all lines intact, call button in reach, bed alarm on and nsg notified..    GOALS:   Multidisciplinary Problems     Physical Therapy Goals        Problem:  Physical Therapy Goal    Goal Priority Disciplines Outcome Goal Variances Interventions   Physical Therapy Goal     PT, PT/OT Ongoing, Progressing     Description:  Goals to be met by: 20     Patient will increase functional independence with mobility by performin. Supine to sit with Stand-by Assistance. - GOAL MET  REVISED: Mod I  2. Sit to stand transfer with Stand-by Assistance.  3. Bed to chair transfer with Stand-by Assistance using Rolling Walker. - GOAL MET  REVISED: S with use of RW  4. Gait  x 50 feet with Stand-by Assistance using Rolling Walker. - GOAL MET  Pt to amb 150', S, with RW  5. Ascend/Descend 4 inch curb step with Moderate Assistance using Rolling Walker.  6. Lower extremity exercise program x 20 reps per handout, with assistance as needed.                       Time Tracking:     PT Received On: 20  PT Start Time: 922     PT Stop Time: 100  PT Total Time (min): 39 min     Billable Minutes: Gait Training 15, Therapeutic Activity 9 and Therapeutic Exercise 15    Treatment Type: Treatment  PT/PTA: PTA     PTA Visit Number: 1     Jose Rafael Ritter, TAN  2020

## 2020-05-13 NOTE — MEDICAL/APP STUDENT
Progress Note    Primary Team: Networked reference to record PCT   Admit Date: 5/5/2020   Length of Stay:  LOS: 6 days   SUBJECTIVE:   Reason for Admission:  Left lower quadrant abdominal pain    HPI:  74 year old woman with PMH mesothelioma on active chemotherapy (Premetrexed + Carboplatin), DVT on lovenox, DM, diverticulosis, HTN, HLD, multiple abdominal surgeries presented to ED on 5/5 for abdominal pain, fever, vomiting and loose bowel movements. States since last chemo on 4/28, decreased appetite, abdominal pain. Vomited every day since the therapy and diarrhea 2x/day. Denies bloody vomit or diarrhea. Cr 2.6 on admission from baseline 1.6. Nephrology was consulted for uptrending Cr and proteinuria.      Interval History:    No acute events overnight. O2 sat maintained on room air. Denies CP, SOB, dysuria or hematuria.      BUN: 37 --> 40 --> 42 --> 42 --> 42 --> 45  Cr: 3.3 --> 3.5 --> 3.5 --> 3.4 --> 3.4 --> 3.5    Review of Systems   Constitutional: Negative for fever.   Respiratory: Positive for shortness of breath (exertional). Negative for cough.    Cardiovascular: Negative for chest pain.   Gastrointestinal: Positive for abdominal pain. Negative for constipation, diarrhea, nausea and vomiting.   Genitourinary: Negative for dysuria and hematuria.     OBJECTIVE:     Temp:  [97.4 °F (36.3 °C)-98.3 °F (36.8 °C)]   Pulse:  [72-91]   Resp:  [16-18]   BP: (133-161)/(64-85)   SpO2:  [92 %-100 %]  Body mass index is 29.71 kg/m².  Intake/Outake:  This Shift:  No intake/output data recorded.    Net I/O past 24h:     Intake/Output Summary (Last 24 hours) at 5/13/2020 0840  Last data filed at 5/13/2020 0600  Gross per 24 hour   Intake 920 ml   Output 1800 ml   Net -880 ml             Physical Exam:  Physical Exam   Constitutional: She is oriented to person, place, and time. She appears well-developed and well-nourished.   HENT:   Head: Normocephalic and atraumatic.   Eyes: EOM are normal.   Neck: Normal range of  motion.   Cardiovascular: Normal rate, regular rhythm and normal heart sounds.   Pulmonary/Chest: Effort normal and breath sounds normal. She has no rales.   Abdominal: Soft. She exhibits distension. There is tenderness.   Neurological: She is alert and oriented to person, place, and time.   Skin: Skin is warm and dry.     Laboratory:  CBC/Anemia Labs: Coags:    Recent Labs   Lab 05/11/20  0330 05/12/20  0345 05/13/20  0300   WBC 4.77 5.13 5.76   HGB 8.4* 7.6* 7.6*   HCT 26.0* 23.5* 24.1*   PLT 37* 33* 45*   * 102* 103*   RDW 15.6* 15.9* 15.7*    Recent Labs   Lab 05/10/20  1250   INR 1.0   APTT 26.3        Chemistries:   Recent Labs   Lab 05/11/20 0330 05/12/20 0345 05/13/20  0300    140 142   K 3.8 3.4* 3.7    110 111*   CO2 17* 20* 21*   BUN 42* 42* 45*   CREATININE 3.4* 3.4* 3.5*   CALCIUM 8.8 8.4* 8.8   PROT 6.9 6.4 6.7   BILITOT 0.8 0.5 0.5   ALKPHOS 264* 282* 344*   * 101* 117*   * 106* 119*   MG 1.7 1.7 1.6   PHOS 4.0 4.2 4.1        Medications:  Scheduled Meds:   amLODIPine  10 mg Oral Daily    dicyclomine  10 mg Oral QID    DULoxetine  60 mg Oral Daily    folic acid  1 mg Oral Daily    metoprolol succinate  75 mg Oral Daily    ondansetron  8 mg Intravenous Q8H    polyethylene glycol  17 g Oral Daily                             Continuous Infusions:  PRN Meds:.sodium chloride, Dextrose 10% Bolus, Dextrose 10% Bolus, glucagon (human recombinant), glucose, glucose, insulin aspart U-100, lidocaine-prilocaine, magnesium hydroxide 400 mg/5 ml, melatonin, oxyCODONE, polyethylene glycol, promethazine (PHENERGAN) IVPB, senna-docusate 8.6-50 mg, sodium chloride 0.9%, tiZANidine     ASSESSMENT/PLAN:   GINA  - Cr elevated 3.4 from baseline 1.6 (Cr: 3.3 --> 3.5 --> 3.5 --> 3.4 --> 3.4 --> 3.5)  -Possible causes include volume depletion from GI losses post chemo therapy session, Zosyn nephrotoxicity, Carboplatin nephrotoxicity (given 4/28).               -No need for diuresis at  this time         - Monitor daily renal function         - Strict Is and Os         - Renally dose all medications              - Avoid nephrotoxins        Ania Martins  Nephrology  MS4

## 2020-05-13 NOTE — ASSESSMENT & PLAN NOTE
sCr on admission 2.6 (baseline ~1.6). GINA likely pre-renal. Concern for ischemic ATN due to GI losses    - s/p IVF with sCr initially trending down, now trending back upward  - given additional IVF with NS  - suspect continued uptrend could be due to recent chemotherapy (pemetrexed and carboplatin last cycle late April 2020), however now outside of expected window of side effect  - RP US showing medical renal disease; no hydronephrosis  - ordered urine lytes, protein/Cr ratio to evaluate -> elevated protein/Cr ratio. Consider intra-renal causes.  - Nephrology consulted, apprec recs - no additional recs, continue to monitor  - continue to trend CMP

## 2020-05-13 NOTE — PLAN OF CARE
Problem: Occupational Therapy Goal  Goal: Occupational Therapy Goal  Description  Goals to be met by: 6/5    Patient will increase functional independence with ADLs by performing:    UE Dressing with Set-up Assistance.  LE Dressing with Stand-by Assistance.  Grooming while seated with Set-up Assistance. MET on 5/11   Toileting from bedside commode with Contact Guard Assistance for hygiene and clothing management.       Outcome: Ongoing, Progressing     Goals remain appropriate    Dean Pride OT   05/13/2020

## 2020-05-13 NOTE — PROGRESS NOTES
Pt discharged to home health per MD order. Discharge instructions explained to hemant Marshall and given to patient to be sent home with patient. Meds delivered to bedside. PIV removed prior to discharge. Port flushed and Heparin locked with Heparin U/ml as per protocol. Pt ambulating in room with assistance; tolerating regular diet; voiding without difficulty; pain well-controlled with PO pain meds. VSS; O2 sats WNL. Pt left floor by wheelchair to home via transportation to be picked up by Marly (daughter).

## 2020-05-13 NOTE — PLAN OF CARE
Problem: Physical Therapy Goal  Goal: Physical Therapy Goal  Description  Goals to be met by: 20     Patient will increase functional independence with mobility by performin. Supine to sit with Stand-by Assistance. - GOAL MET  REVISED: Mod I  2. Sit to stand transfer with Stand-by Assistance.  3. Bed to chair transfer with Stand-by Assistance using Rolling Walker. - GOAL MET  REVISED: S with use of RW  4. Gait  x 50 feet with Stand-by Assistance using Rolling Walker. - GOAL MET  Pt to amb 150', S, with RW  5. Ascend/Descend 4 inch curb step with Moderate Assistance using Rolling Walker.  6. Lower extremity exercise program x 20 reps per handout, with assistance as needed.      Outcome: Ongoing, Progressing   Pt progressing towards goals. continue with PT POC.Goals remain appropriate.  Jose Rafael Ritter PTA

## 2020-05-13 NOTE — PROGRESS NOTES
Informed per the Medical Oncology team that patient will be discharged home today. Spoke with the Resident and requested updated home health orders. Patient has a pending referral in with [facility]. Called [facility] liason nurse Aquino and forwarded updated orders and chart information to [facility] via [employer]. [facility] along with their Medical Necessity form. Spoke with patient's daughter Tiffanie Preston ([phone] cell) via phone re: discharge plans and arrangements. Provided her with the phone number for [facility] to call in payment for patient's discharge medications, which will need to be delivered bedside. She will  patient when she is ready to leave from the hospital. Patient will be going to her daughter Gely Morris's house at [address] [place] [address], [facility]; her cell phone number is [phone]. Discussed physical therapy's recommendation for a bedside commode and her daughter Tiffanie said that patient has one, it is at her daughter Marly's house and she will ask Marly to bring it to [facility] [place]. Spoke with patient's nurse Gaby re: discharge plans and arrangements. She has spoken with hemant Moreno via phone and gone over the discharge instructions, and will be contacting Patient Escort. Spoke with Beulah with [facility] re: home health referral through [facility]; she will contact the agency and provide authorization information. No other discharge needs indicated at this time.

## 2020-05-13 NOTE — ASSESSMENT & PLAN NOTE
- cont home amlodipine 10mg daily  - started home metoprolol at low dose due to concern for sepsis - advanced to home dose 75mg daily (5/13)  - held home triamterene-HCTZ 2/2 GINA, resume as appropriate  - continue to monitor

## 2020-05-13 NOTE — ASSESSMENT & PLAN NOTE
Patient was intermittently confused since 5/9. Hospital delirium can be 2/2 fluid status, electrolyte abnormalities, infection, hypercapnia, withdrawal etc. No new electrolyte abnormalities. Patient on fluids for dehydration. On ABX for possible infection. Considered CO2 narcosis but VBG with normal PCO2. Slightly acidemic with low bicarb suggesting metabolic acidosis but lactate normal. Unclear why she is acutely confused. Patient does report anxiety and takes lorazepam at home.    - continue to monitor  - mental status waxes/wanes - likely 2/2 hospital delirium  - follow-up outpatient neuro

## 2020-05-13 NOTE — PLAN OF CARE
Patient AAOX3. Not oriented to situatioin and confused at times. Vital signs stable, patient afebrile with no complaints of nausea, patient complained of pain in her abdomen and was provided PRN oxycodone with positive results. Patient voiding via BSCC. No BM. No complaints of dysuria. Urine is clear, yellow and without foul odor. Bed alarm and Avasys in use. Patient remembering to utilize call night for all needs this shift. No acute events overnight. Fall precautions maintained. Reinforced instructions to patient to call for assistance. Call light within reach will continue to monitor.

## 2020-05-13 NOTE — PLAN OF CARE
[facility]    HOME HEALTH ORDERS  FACE TO FACE ENCOUNTER    Patient Name: Isabell Preston  YOB: 1945    PCP: Ayo Archuleta MD   PCP Address: [address] / [place] [address]  PCP Phone Number: [phone]  PCP Fax: [phone]    Encounter Date: 05/13/2020    Admit to Home Health    Diagnoses:  Active Hospital Problems    Diagnosis  POA    *Left lower quadrant abdominal pain [R10.32]  Yes    Discharge planning issues [Z02.9]  Not Applicable    Acute encephalopathy [G93.40]  No    Acute renal failure with acute tubular necrosis superimposed on stage 3 chronic kidney disease [N17.0, N18.3]  Yes    Macrocytic anemia [D53.9]  Yes    Diverticulosis [K57.90]  Yes    Memory change [R41.3]  Yes     Insidious onset and progressive worsening of cognition over the past 2 years (before starting chemotherapy) with her physical symptoms (debilitating pill-rolling tremor, shuffling gait, symmetrical) reportedly starting after chemotherapy began (approximately 5-6 months ago). Since the patient received her diagnosis of mesothelioma, her daughters took over all IADL management, with the exception of cooking, which the patient continues to participate in and do well with.          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       Type 2 diabetes mellitus without complication, without long-term current use of insulin [E11.9]  Yes    Hypertension, essential [I10]  Yes     Chronic    Abdominal pain [R10.9]  Unknown      Resolved Hospital Problems    Diagnosis Date Resolved POA    Thiamine deficiency [E51.9] 05/12/2020 Yes       Future Appointments   Date Time Provider Department Center   5/15/2020  1:00 PM LAB, HEMONC CANCER BLDG [facility] LAB HENRY Tan    5/15/2020  1:10 PM COVID TESTING, [facility] HEMONC [facility] HEM ONC Ramirez Cance   5/18/2020  9:00 AM Antonella Goetz NP [facility] HEM ONC [facility]   5/19/2020 10:00 AM CHEMO 8 [facility] CHEMO [facility] Cance   5/22/2020  2:40 PM Aries Quintanilla MD [facility] NEURO [facility]   6/5/2020 10:40 AM Cassandra Mancia MD [facility] NEURO [facility]           I have seen and examined this patient face to face today. My clinical findings that support the need for the home health skilled services and home bound status are the following:  Weakness/numbness causing balance and gait disturbance due to Weakness/Debility and Malignancy/Cancer making it taxing to leave home.  Requiring assistive device to leave home due to unsteady gait caused by  Weakness/Debility and Malignancy/Cancer.  Mental confusion making it unsafe for patient to leave home alone due to  Acute Delirium.    Allergies:  Review of patient's allergies indicates:   Allergen Reactions    Ciprofloxacin Anaphylaxis    Fructose     Gluten protein Other (See Comments)     GI upset  GI upset    Lactase Other (See Comments)     GI upset  GI upset    Latex, natural rubber Rash       Diet: diabetic diet: 2000 calorie    Activities: activity as tolerated    Nursing:   SN to complete comprehensive assessment including routine vital signs. Instruct on disease process and s/s of complications to report to MD. Review/verify medication list sent home with the patient at time of discharge  and instruct patient/caregiver as needed. Frequency may be adjusted depending on start of care date.    Notify MD if SBP > 160 or < 90; DBP > 90 or < 50; HR > 120 or < 50; Temp > 101; Other:         CONSULTS:    Physical Therapy to evaluate and treat. Evaluate for home safety and equipment needs; Establish/upgrade home exercise program. Perform / instruct on therapeutic exercises, gait training, transfer training, and Range of Motion.  Occupational Therapy to evaluate and treat. Evaluate home environment  for safety and equipment needs. Perform/Instruct on transfers, ADL training, ROM, and therapeutic exercises.  Aide to provide assistance with personal care, ADLs, and vital signs.    MISCELLANEOUS CARE:  Diabetic Care:   SN to perform and educate Diabetic management with blood glucose monitoring:, Fingerstick blood sugar AC and HS and Report CBG < 60 or > 350 to physician.    WOUND CARE ORDERS  n/a      Medications: Review discharge medications with patient and family and provide education.      Current Discharge Medication List      START taking these medications    Details   oxyCODONE (ROXICODONE) 10 mg Tab immediate release tablet Take 1 tablet (10 mg total) by mouth every 6 (six) hours as needed.  Qty: 60 tablet, Refills: 0    Comments: Quantity prescribed more than 7 day supply? Yes, quantity medically necessary         CONTINUE these medications which have CHANGED    Details   ondansetron (ZOFRAN) 8 MG tablet Take 1 tablet (8 mg total) by mouth every 8 (eight) hours as needed for Nausea.  Qty: 60 tablet, Refills: 2      promethazine (PHENERGAN) 25 MG tablet Take 1 tablet (25 mg total) by mouth every 6 (six) hours as needed for Nausea (Taken headache does not resolve).  Qty: 60 tablet, Refills: 0         CONTINUE these medications which have NOT CHANGED    Details   acetaminophen (TYLENOL) 500 MG tablet Take 2 tablets (1,000 mg total) by mouth every 6 (six) hours as needed for Pain.  Qty: 30 tablet, Refills: 0      amLODIPine (NORVASC) 10 MG tablet TAKE 1 TABLET BY MOUTH EVERY DAY  Qty: 90 tablet, Refills: 0    Associated Diagnoses: Hypertension, essential      desoximetasone (TOPICORT) 0.05 % cream Apply topically.      dicyclomine (BENTYL) 10 MG capsule TAKE 1 CAPSULE BY MOUTH TWICE A DAY  Qty: 90 capsule, Refills: 0      DULoxetine (CYMBALTA) 60 MG capsule Take 1 capsule (60 mg total) by mouth once daily.  Qty: 90 capsule, Refills: 3      fluticasone (VERAMYST) 27.5 mcg/actuation nasal spray 2 sprays by  Nasal route daily as needed for Rhinitis or Allergies.       folic acid (FOLVITE) 400 MCG tablet Take 1 tablet (400 mcg total) by mouth once daily.  Qty: 30 tablet, Refills: 11    Associated Diagnoses: Malignant pleural mesothelioma      gabapentin (NEURONTIN) 100 MG capsule TAKE 1 CAPSULE BY MOUTH TWICE A DAY  Qty: 180 capsule, Refills: 1    Associated Diagnoses: Malignant pleural mesothelioma; Neoplasm related pain (acute) (chronic)      lancets (TRUEPLUS LANCETS) 28 gauge Misc 1 lancet by Misc.(Non-Drug; Combo Route) route once daily. Test blood sugar once daily, type 2 diabetes, controlled. E11.9  Qty: 100 each, Refills: 3    Associated Diagnoses: Diabetes mellitus without complication      LINZESS 145 mcg Cap capsule TAKE 1 CAPSULE BY MOUTH EVERY DAY  Qty: 90 capsule, Refills: 0    Comments: DX Code Needed  PLEASE ADVISE.  Associated Diagnoses: Generalized abdominal pain      magic mouthwash diphen/antac/lidoc/nysta Swish10 mLs 4 (four) times daily.  Qty: 120 mL, Refills: 0    Comments: Mix in equal parts.  Generic is fine.      !! metoprolol succinate (TOPROL-XL) 25 MG 24 hr tablet TAKE 1 TABLET BY MOUTH ONCE DAILY. TOTAL DAILY DOSE 75MG  Qty: 90 tablet, Refills: 0    Associated Diagnoses: HTN, goal below 140/90      !! metoprolol succinate (TOPROL-XL) 50 MG 24 hr tablet TAKE 1 TABLET BY MOUTH ONCE DAILY. TOTAL DAILY DOSE 75MG  Qty: 90 tablet, Refills: 0    Associated Diagnoses: HTN, goal below 140/90      mometasone 0.1% (ELOCON) 0.1 % cream BALWINDER TO DARK AREAS BID ON LEGS PRN  Qty: 15 g, Refills: 1    Associated Diagnoses: Varicose veins of both lower extremities, unspecified whether complicated      prochlorperazine (COMPAZINE) 10 MG tablet Take 1 tablet (10 mg total) by mouth every 6 (six) hours as needed.  Qty: 60 tablet, Refills: 1    Comments: DX Code Needed  .  Associated Diagnoses: Chemotherapy induced nausea and vomiting      tiZANidine (ZANAFLEX) 4 MG tablet TAKE 1 TABLETBY MOUTH NIGHTLY AT BEDTIME AS  NEEDED  Qty: 90 tablet, Refills: 0      lidocaine-prilocaine (EMLA) cream Apply topically as needed.  Qty: 30 g, Refills: 1    Associated Diagnoses: Malignant pleural mesothelioma       !! - Potential duplicate medications found. Please discuss with provider.      STOP taking these medications       atorvastatin (LIPITOR) 10 MG tablet Comments:   Reason for Stopping:         enoxaparin (LOVENOX) 120 mg/0.8 mL Syrg Comments:   Reason for Stopping:         triamterene-hydrochlorothiazide 37.5-25 mg (MAXZIDE-25) 37.5-25 mg per tablet Comments:   Reason for Stopping:         atorvastatin (LIPITOR) 40 MG tablet Comments:   Reason for Stopping:         thiamine 100 MG tablet Comments:   Reason for Stopping:               I certify that this patient is confined to her home and needs intermittent skilled nursing care, physical therapy and occupational therapy.

## 2020-05-13 NOTE — DISCHARGE INSTRUCTIONS
:  [facility], [phone], to provide skilled nursing, aide, physical and occupational therapy services.   Shruthi Osorio, MSW, [facility]  [phone]

## 2020-05-13 NOTE — PROGRESS NOTES
"[facility]  Adult Nutrition  Progress Note    SUMMARY       Recommendations    Recommendation:   1. Continue diabetic diet as tolerable, suggest liberalizing diet to increase intake, monitor BG levels   2. Add boost pudding BID and [employer] BID to increase intake   3. Suggest MVI   4. Pt continues with decreased intake, enteral nutrition suggested if tolerable     RD to monitor and follow up     Goals: pt to tolerate >85% of EEN/EPN by RD follow up   Nutrition Goal Status: goal not met  Communication of RD Recs: other (comment)(POC)    Reason for Assessment    Reason For Assessment: RD follow-up  Diagnosis: (left lower quadrant abdominal pain)  Relevant Medical History: DVT; HTN; HLD; diverticulitis; cancer; DM  Interdisciplinary Rounds: did not attend  General Information Comments: Remote assessment completed. Pt reported appetite decreasing at this time. PO 0-25% of meals, unable to tolerate most food per pt. States she does not like the boost plus but is willing to try boost pudding and [employer] ONS. Pt with c/o nausea and vomiting and abdominal pain. Wt gain likely fluid related as pt noted with edema. UBW ~175 lbs. Pt meets criteria for moderate malnutrition at this time. Due to recent hospital wide restrictions to limit the transfer of (COVID-19), we are not performing any physical exams at this time. All S/S will be observational; NFPE to be performed at a future date.  Nutrition Discharge Planning: adequate PO intake     Nutrition Risk Screen    Nutrition Risk Screen: no indicators present    Nutrition/Diet History    Spiritual, Cultural Beliefs, Lutheran Practices, Values that Affect Care: no  Food Allergies: other (see comments)(gluten protein; fructose; lactate)  Factors Affecting Nutritional Intake: decreased appetite, nausea/vomiting    Anthropometrics    Temp: 98 °F (36.7 °C)  Height Method: Stated  Height: 5' 6" (167.6 cm)  Height (inches): 66 in  Weight Method: Bed " Scale  Weight: 83.5 kg (184 lb 1.4 oz)  Weight (lb): 184.09 lb  Ideal Body Weight (IBW), Female: 130 lb  % Ideal Body Weight, Female (lb): 130.77 %  BMI (Calculated): 29.7  BMI Grade: 25 - 29.9 - overweight    Lab/Procedures/Meds    Pertinent Labs Reviewed: reviewed  Pertinent Labs Comments: BUN 45; Cr 3.5; Glucose 116  Pertinent Medications Reviewed: reviewed  Pertinent Medications Comments: folic acid; metoprolol; amlodipine; polyethylene glycol     Estimated/Assessed Needs    Weight Used For Calorie Calculations: 77.1 kg (169 lb 15.6 oz)  Energy Calorie Requirements (kcal): 6450-0880 kcal/d  Energy Need Method: Kcal/kg  Protein Requirements: 77-92 g/day   Weight Used For Protein Calculations: 77.1 kg (169 lb 15.6 oz)  Fluid Requirements (mL): 1 mL/kcal or per MD   RDA Method (mL): 1927  CHO Requirement: 240    Nutrition Prescription Ordered    Current Diet Order: Diabetic diet  Oral Nutrition Supplement: boost plus     Evaluation of Received Nutrient/Fluid Intake    I/O: +7.1 L since admit  Energy Calories Required: not meeting needs  Protein Required: not meeting needs  Fluid Required: other (see comments)  Comments: LBM 5/12  Tolerance: tolerating  % Intake of Estimated Energy Needs: 0 - 25 %  % Meal Intake: 0 - 25 %    Nutrition Risk    Level of Risk/Frequency of Follow-up: high     Assessment and Plan     Nutrition Problem:  Moderate Protein-Calorie Malnutrition  Malnutrition in the context of Acute Illness/Injury     Related to (etiology):  Decreased intake 2/2 abdominal pain      Signs and Symptoms (as evidenced by):  Energy Intake: <50% of estimated energy requirement for x 1 week  Body Fat Depletion: MARLENA  Muscle Mass Depletion: MARLENA  Weight Loss: 2% x 1 week      Interventions(treatment strategy):  Collaboration of care with other providers  Commercial beverage  Modified diet- bland      Nutrition Diagnosis Status:  Continues     Monitor and Evaluation    Food and Nutrient Intake: energy intake, food and  beverage intake  Food and Nutrient Adminstration: diet order  Anthropometric Measurements: weight, weight change  Biochemical Data, Medical Tests and Procedures: electrolyte and renal panel, lipid profile, gastrointestinal profile, glucose/endocrine profile, inflammatory profile  Nutrition-Focused Physical Findings: overall appearance     Nutrition Follow-Up    RD Follow-up?: Yes

## 2020-05-14 ENCOUNTER — TELEPHONE (OUTPATIENT)
Dept: HEMATOLOGY/ONCOLOGY | Facility: CLINIC | Age: 75
End: 2020-05-14

## 2020-05-14 ENCOUNTER — PATIENT MESSAGE (OUTPATIENT)
Dept: HEMATOLOGY/ONCOLOGY | Facility: CLINIC | Age: 75
End: 2020-05-14

## 2020-05-14 ENCOUNTER — PATIENT MESSAGE (OUTPATIENT)
Dept: FAMILY MEDICINE | Facility: CLINIC | Age: 75
End: 2020-05-14

## 2020-05-14 ENCOUNTER — NURSE TRIAGE (OUTPATIENT)
Dept: ADMINISTRATIVE | Facility: CLINIC | Age: 75
End: 2020-05-14

## 2020-05-14 ENCOUNTER — PATIENT OUTREACH (OUTPATIENT)
Dept: ADMINISTRATIVE | Facility: CLINIC | Age: 75
End: 2020-05-14

## 2020-05-14 NOTE — TELEPHONE ENCOUNTER
Contacted patient's daughter, Nancy, patient discharged from inpatient last night. Daughter noted ankles and feet were swollen when she came home with bruising along her knees no nose bleeds. She does get out of breath with short distances in home but not when sitting. Has virtual visit with oncology tomorrow. Discussed if more SOB with lying down or when sitting to ED. All questions answered

## 2020-05-14 NOTE — PROGRESS NOTES
Pt's daughter, Gely, states that patient has been experiencing urinary incontinence, LE swelling and bruising. States she elevated patient's legs over night but little to no relief. Would like to know if they should use compression stockings. Also states that Lovenox was stopped at discharge. Would like to know if medication should be resumed. Message sent to Antonella Goetz NP and PCP. Call was also referred for triage. FREDO Cisneros RN will attempt to return call.

## 2020-05-14 NOTE — PATIENT INSTRUCTIONS
Abdominal Pain    Abdominal pain is pain in the stomach or belly area. Everyone has this pain from time to time. In many cases it goes away on its own. But abdominal pain can sometimes be due to a serious problem, such as appendicitis. So its important to know when to seek help.  Causes of abdominal pain  There are many possible causes of abdominal pain. Common causes in adults include:  · Constipation, diarrhea, or gas  · Stomach acid flowing back up into the esophagus (acid reflux or heartburn)  · Severe acid reflux, called GERD (gastroesophageal reflux disease)  · A sore in the lining of the stomach or small intestine (peptic ulcer)  · Inflammation of the gallbladder, liver, or pancreas  · Gallstones or kidney stones  · Appendicitis   · Intestinal blockage   · An internal organ pushing through a muscle or other tissue (hernia)  · Urinary tract infections  · In women, menstrual cramps, fibroids, or endometriosis  · Inflammation or infection of the intestines  Diagnosing the cause of abdominal pain  Your healthcare provider will do a physical exam help find the cause of your pain. If needed, tests will be ordered. Belly pain has many possible causes. So it can be hard to find the reason for your pain. Giving details about your pain can help. Tell your provider where and when you feel the pain, and what makes it better or worse. Also let your provider know if you have other symptoms such as:  · Fever  · Tiredness  · Upset stomach (nausea)  · Vomiting  · Changes in bathroom habits  Treating abdominal pain  Some causes of pain need emergency medical treatment right away. These include appendicitis or a bowel blockage. Other problems can be treated with rest, fluids, or medicines. Your healthcare provider can give you specific instructions for treatment or self-care based on what is causing your pain.  If you have vomiting or diarrhea, sip water or other clear fluids. When you are ready to eat solid foods again,  start with small amounts of easy-to-digest, low-fat foods. These include apple sauce, toast, or crackers.   When to seek medical care  Call 911 or go to the hospital right away if you:  · Cant pass stool and are vomiting  · Are vomiting blood or have bloody diarrhea or black, tarry diarrhea  · Have chest, neck, or shoulder pain  · Feel like you might pass out  · Have pain in your shoulder blades with nausea  · Have sudden, severe belly pain  · Have new, severe pain unlike any you have felt before  · Have a belly that is rigid, hard, and tender to touch  Call your healthcare provider if you have:  · Pain for more than 5 days  · Bloating for more than 2 days  · Diarrhea for more than 5 days  · A fever of 100.4°F (38.0°C) or higher, or as directed by your provider  · Pain that gets worse  · Weight loss for no reason  · Continued lack of appetite  · Blood in your stool  How to prevent abdominal pain  Here are some tips to help prevent abdominal pain:  · Eat smaller amounts of food at one time.  · Avoid greasy, fried, or other high-fat foods.  · Avoid foods that give you gas.  · Exercise regularly.  · Drink plenty of fluids.  To help prevent GERD symptoms:  · Quit smoking.  · Reduce alcohol and certain foods that increase stomach acid.  · Avoid aspirin and over-the-counter pain and fever medicines (NSAIDS or nonsteroidal anti-inflammatory drugs), if possible  · Lose extra weight.  · Finish eating at least 2 hours before you go to bed or lie down.  · Raise the head of your bed.  Date Last Reviewed: 7/1/2016 © 2000-2020 [employer] [address] [place] [address], [place] [address]. All rights reserved. This information is not intended as a substitute for professional medical care. Always follow your healthcare professional's instructions.

## 2020-05-14 NOTE — TELEPHONE ENCOUNTER
----- Message from Rima Hall LPN sent at 5/14/2020  9:53 AM CDT -----  Contact: Gely [phone]  Spoke with patient's daughter, states that patient has been experiencing urinary incontinence, LE swelling and bruising. States she elevated patient's legs over night but little to no relief. Would like to know if they should use compression stockings.Patient is also experiencing some urinary incontinence. Also states that Lovenox was stopped at discharge. Would like to know if medication should be resumed. Please contact patients daughter Gely as soon as possible to discuss.    Respectfully,  Rima Hall LPN  [facility]    [email or web address]       Please do not reply to this message, as this inbox is not routinely monitored.

## 2020-05-14 NOTE — TELEPHONE ENCOUNTER
Cg stated her mom was discharge yesterday. Cg stated she noticed her mom's ankles were swollen, bruised and her feet are huge. She is now incontinent of urine and her urine has a strong odor. Cg also stated she is not sure if pt should be taking the Lovenox. Care advice recommend pt see Md today. Cg stated they have physical and occupational therapy and home health nurse coming today and wants someone from Dr Archuleta's office to call them.     Reason for Disposition   MODERATE swelling of both ankles (e.g., swelling extends up to the knees) AND new onset or worsening    Additional Information   Negative: Sounds like a life-threatening emergency to the triager   Negative: Difficulty breathing at rest   Negative: Entire foot is cool or blue in comparison to other side   Negative: SEVERE swelling (e.g., swelling extends above knee, entire leg is swollen, weeping fluid)   Negative: Thigh or calf pain and only 1 side and present > 1 hour   Negative: Thigh, calf, or ankle swelling in only one leg   Negative: Thigh, calf, or ankle swelling in both legs, but one side is definitely more swollen   Negative: Cast on leg or ankle and has increasing pain   Negative: Can't walk or can barely stand (new onset)   Negative: Fever and red area (or area very tender to touch)   Negative: Patient sounds very sick or weak to the triager   Negative: Swelling of face, arm or hands (Exception: slight puffiness of fingers during hot weather)   Negative: Pregnant > 20 weeks and sudden weight gain (i.e., > 2 lbs, 1 kg in one week)    Protocols used: LEG SWELLING AND EDEMA-A-OH       negative...

## 2020-05-15 ENCOUNTER — PATIENT MESSAGE (OUTPATIENT)
Dept: HEMATOLOGY/ONCOLOGY | Facility: CLINIC | Age: 75
End: 2020-05-15

## 2020-05-15 ENCOUNTER — DOCUMENTATION ONLY (OUTPATIENT)
Dept: HEMATOLOGY/ONCOLOGY | Facility: CLINIC | Age: 75
End: 2020-05-15

## 2020-05-15 ENCOUNTER — OFFICE VISIT (OUTPATIENT)
Dept: HEMATOLOGY/ONCOLOGY | Facility: CLINIC | Age: 75
End: 2020-05-15
Payer: MEDICARE

## 2020-05-15 ENCOUNTER — TELEPHONE (OUTPATIENT)
Dept: HEMATOLOGY/ONCOLOGY | Facility: CLINIC | Age: 75
End: 2020-05-15

## 2020-05-15 DIAGNOSIS — D63.0 ANEMIA IN NEOPLASTIC DISEASE: ICD-10-CM

## 2020-05-15 DIAGNOSIS — M79.89 LEG SWELLING: ICD-10-CM

## 2020-05-15 DIAGNOSIS — L03.90 CELLULITIS, UNSPECIFIED CELLULITIS SITE: ICD-10-CM

## 2020-05-15 DIAGNOSIS — D69.6 THROMBOCYTOPENIA: ICD-10-CM

## 2020-05-15 DIAGNOSIS — N17.9 AKI (ACUTE KIDNEY INJURY): ICD-10-CM

## 2020-05-15 DIAGNOSIS — R11.2 NAUSEA AND VOMITING, INTRACTABILITY OF VOMITING NOT SPECIFIED, UNSPECIFIED VOMITING TYPE: ICD-10-CM

## 2020-05-15 DIAGNOSIS — Z79.01 ANTICOAGULATED: ICD-10-CM

## 2020-05-15 DIAGNOSIS — C45.0 MALIGNANT PLEURAL MESOTHELIOMA: Primary | ICD-10-CM

## 2020-05-15 DIAGNOSIS — Z86.718 HISTORY OF DVT (DEEP VEIN THROMBOSIS): ICD-10-CM

## 2020-05-15 DIAGNOSIS — R32 URINARY INCONTINENCE, UNSPECIFIED TYPE: ICD-10-CM

## 2020-05-15 DIAGNOSIS — R79.89 ELEVATED LFTS: ICD-10-CM

## 2020-05-15 PROCEDURE — 1101F PT FALLS ASSESS-DOCD LE1/YR: CPT | Mod: CPTII,95,, | Performed by: NURSE PRACTITIONER

## 2020-05-15 PROCEDURE — 99499 UNLISTED E&M SERVICE: CPT | Mod: 95,,, | Performed by: NURSE PRACTITIONER

## 2020-05-15 PROCEDURE — 99215 PR OFFICE/OUTPT VISIT, EST, LEVL V, 40-54 MIN: ICD-10-PCS | Mod: 95,,, | Performed by: NURSE PRACTITIONER

## 2020-05-15 PROCEDURE — 1159F MED LIST DOCD IN RCRD: CPT | Mod: 95,,, | Performed by: NURSE PRACTITIONER

## 2020-05-15 PROCEDURE — 99215 OFFICE O/P EST HI 40 MIN: CPT | Mod: 95,,, | Performed by: NURSE PRACTITIONER

## 2020-05-15 PROCEDURE — 1159F PR MEDICATION LIST DOCUMENTED IN MEDICAL RECORD: ICD-10-PCS | Mod: 95,,, | Performed by: NURSE PRACTITIONER

## 2020-05-15 PROCEDURE — 99499 RISK ADDL DX/OHS AUDIT: ICD-10-PCS | Mod: 95,,, | Performed by: NURSE PRACTITIONER

## 2020-05-15 PROCEDURE — 1101F PR PT FALLS ASSESS DOC 0-1 FALLS W/OUT INJ PAST YR: ICD-10-PCS | Mod: CPTII,95,, | Performed by: NURSE PRACTITIONER

## 2020-05-15 NOTE — Clinical Note
Labs today with HH. Schedule labs (CBC,CMP), PET and follow up with Dr. Burt in the next 2 weeks.Schedule nephrology consult ASAP. It appears order has already been placed and pt was seen in the hospital.

## 2020-05-15 NOTE — PROGRESS NOTES
In response to in basket msg from Antonella Goetz NP [id number] to alert her to subsequent home health orders that had been placed by provider.  Rafaela located orders on Epic and will ensure that home health care team is aware of the addition of these orders.

## 2020-05-15 NOTE — TELEPHONE ENCOUNTER
----- Message from Antonella Goetz NP sent at 5/15/2020 11:22 AM CDT -----  Labs today with HH.     Schedule labs (CBC,CMP), PET and follow up with Dr. Burt in the next 2 weeks.    Schedule nephrology consult ASAP. It appears order has already been placed and pt was seen in the hospital.
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
## 2020-05-15 NOTE — Clinical Note
Peter Archuleta,  I had urgent care visit today with Ms. Preston. She was on oncology hospital service and all of her issues are not thought to be related to her cancer or chemo. I discussed her case with Dr. Burt and Dr. Deal. I know there was suspicion that she may have been a false negative COVID test. I will get antibody screening on her. Im getting repeat labs with HH today and treat what appears to be cellulitis to her ankles (she submitted pictures through [employer]). They understand we cannot treat her with chemo during this time. She did not have a nephrology appt made so I am facilitating this. You may want to keep close follow up with her as well.Antonella Block, MSN, APRN, FNP-CHematology and Medical OncologyNurse Practitioner to Dr. Austin Ghotra Practitioner, [facility]

## 2020-05-16 ENCOUNTER — HOSPITAL ENCOUNTER (EMERGENCY)
Facility: HOSPITAL | Age: 75
Discharge: HOME | End: 2020-05-16
Attending: EMERGENCY MEDICINE
Payer: MEDICARE

## 2020-05-16 ENCOUNTER — PATIENT MESSAGE (OUTPATIENT)
Dept: HEMATOLOGY/ONCOLOGY | Facility: CLINIC | Age: 75
End: 2020-05-16

## 2020-05-16 ENCOUNTER — NURSE TRIAGE (OUTPATIENT)
Dept: ADMINISTRATIVE | Facility: CLINIC | Age: 75
End: 2020-05-16

## 2020-05-16 VITALS
DIASTOLIC BLOOD PRESSURE: 84 MMHG | TEMPERATURE: 99 F | BODY MASS INDEX: 27.64 KG/M2 | HEART RATE: 85 BPM | WEIGHT: 172 LBS | SYSTOLIC BLOOD PRESSURE: 176 MMHG | HEIGHT: 66 IN | RESPIRATION RATE: 20 BRPM | OXYGEN SATURATION: 98 %

## 2020-05-16 DIAGNOSIS — M79.89 RIGHT LEG SWELLING: ICD-10-CM

## 2020-05-16 DIAGNOSIS — L02.415 CELLULITIS AND ABSCESS OF RIGHT LOWER EXTREMITY: Primary | ICD-10-CM

## 2020-05-16 DIAGNOSIS — L03.115 CELLULITIS AND ABSCESS OF RIGHT LOWER EXTREMITY: Primary | ICD-10-CM

## 2020-05-16 DIAGNOSIS — M25.473 ANKLE SWELLING: ICD-10-CM

## 2020-05-16 LAB
ABO + RH BLD: NORMAL
ALBUMIN SERPL BCP-MCNC: 2.4 G/DL (ref 3.5–5.2)
ALP SERPL-CCNC: 377 U/L (ref 55–135)
ALT SERPL W/O P-5'-P-CCNC: 106 U/L (ref 10–44)
ANION GAP SERPL CALC-SCNC: 11 MMOL/L (ref 8–16)
AST SERPL-CCNC: 86 U/L (ref 10–40)
BASOPHILS # BLD AUTO: 0.01 K/UL (ref 0–0.2)
BASOPHILS NFR BLD: 0.1 % (ref 0–1.9)
BILIRUB SERPL-MCNC: 0.4 MG/DL (ref 0.1–1)
BLD GP AB SCN CELLS X3 SERPL QL: NORMAL
BNP SERPL-MCNC: 99 PG/ML (ref 0–99)
BUN SERPL-MCNC: 43 MG/DL (ref 8–23)
CALCIUM SERPL-MCNC: 9.1 MG/DL (ref 8.7–10.5)
CHLORIDE SERPL-SCNC: 103 MMOL/L (ref 95–110)
CO2 SERPL-SCNC: 25 MMOL/L (ref 23–29)
CREAT SERPL-MCNC: 3.1 MG/DL (ref 0.5–1.4)
CRP SERPL-MCNC: 53.3 MG/L (ref 0–8.2)
DIFFERENTIAL METHOD: ABNORMAL
EOSINOPHIL # BLD AUTO: 0.1 K/UL (ref 0–0.5)
EOSINOPHIL NFR BLD: 0.7 % (ref 0–8)
ERYTHROCYTE [DISTWIDTH] IN BLOOD BY AUTOMATED COUNT: 16.4 % (ref 11.5–14.5)
ERYTHROCYTE [SEDIMENTATION RATE] IN BLOOD BY WESTERGREN METHOD: 83 MM/HR (ref 0–36)
EST. GFR  (AFRICAN AMERICAN): 16.4 ML/MIN/1.73 M^2
EST. GFR  (NON AFRICAN AMERICAN): 14.3 ML/MIN/1.73 M^2
GLUCOSE SERPL-MCNC: 128 MG/DL (ref 70–110)
HCT VFR BLD AUTO: 26.6 % (ref 37–48.5)
HGB BLD-MCNC: 8.5 G/DL (ref 12–16)
IMM GRANULOCYTES # BLD AUTO: 0.08 K/UL (ref 0–0.04)
IMM GRANULOCYTES NFR BLD AUTO: 1.1 % (ref 0–0.5)
INR PPP: 1 (ref 0.8–1.2)
LYMPHOCYTES # BLD AUTO: 1.3 K/UL (ref 1–4.8)
LYMPHOCYTES NFR BLD: 17.9 % (ref 18–48)
MCH RBC QN AUTO: 33.2 PG (ref 27–31)
MCHC RBC AUTO-ENTMCNC: 32 G/DL (ref 32–36)
MCV RBC AUTO: 104 FL (ref 82–98)
MONOCYTES # BLD AUTO: 0.4 K/UL (ref 0.3–1)
MONOCYTES NFR BLD: 6.1 % (ref 4–15)
NEUTROPHILS # BLD AUTO: 5.2 K/UL (ref 1.8–7.7)
NEUTROPHILS NFR BLD: 74.1 % (ref 38–73)
NRBC BLD-RTO: 0 /100 WBC
PLATELET # BLD AUTO: 88 K/UL (ref 150–350)
PMV BLD AUTO: 12.9 FL (ref 9.2–12.9)
POTASSIUM SERPL-SCNC: 4.3 MMOL/L (ref 3.5–5.1)
PROT SERPL-MCNC: 7.9 G/DL (ref 6–8.4)
PROTHROMBIN TIME: 10.7 SEC (ref 9–12.5)
RBC # BLD AUTO: 2.56 M/UL (ref 4–5.4)
SODIUM SERPL-SCNC: 139 MMOL/L (ref 136–145)
WBC # BLD AUTO: 7 K/UL (ref 3.9–12.7)

## 2020-05-16 PROCEDURE — 99285 PR EMERGENCY DEPT VISIT,LEVEL V: ICD-10-PCS | Mod: ,,, | Performed by: EMERGENCY MEDICINE

## 2020-05-16 PROCEDURE — 86140 C-REACTIVE PROTEIN: CPT

## 2020-05-16 PROCEDURE — 80053 COMPREHEN METABOLIC PANEL: CPT

## 2020-05-16 PROCEDURE — 83880 ASSAY OF NATRIURETIC PEPTIDE: CPT

## 2020-05-16 PROCEDURE — 86850 RBC ANTIBODY SCREEN: CPT

## 2020-05-16 PROCEDURE — 25000003 PHARM REV CODE 250: Performed by: EMERGENCY MEDICINE

## 2020-05-16 PROCEDURE — 85652 RBC SED RATE AUTOMATED: CPT

## 2020-05-16 PROCEDURE — 99285 EMERGENCY DEPT VISIT HI MDM: CPT | Mod: ,,, | Performed by: EMERGENCY MEDICINE

## 2020-05-16 PROCEDURE — 85025 COMPLETE CBC W/AUTO DIFF WBC: CPT

## 2020-05-16 PROCEDURE — 99285 EMERGENCY DEPT VISIT HI MDM: CPT | Mod: 25

## 2020-05-16 PROCEDURE — 85610 PROTHROMBIN TIME: CPT

## 2020-05-16 RX ORDER — HYDROCODONE BITARTRATE AND ACETAMINOPHEN 5; 325 MG/1; MG/1
1 TABLET ORAL
Status: COMPLETED | OUTPATIENT
Start: 2020-05-16 | End: 2020-05-16

## 2020-05-16 RX ORDER — DOXYCYCLINE 100 MG/1
100 CAPSULE ORAL 2 TIMES DAILY
Qty: 14 CAPSULE | Refills: 0 | Status: SHIPPED | OUTPATIENT
Start: 2020-05-16 | End: 2020-05-23

## 2020-05-16 RX ORDER — DOXYCYCLINE HYCLATE 100 MG
100 TABLET ORAL
Status: COMPLETED | OUTPATIENT
Start: 2020-05-16 | End: 2020-05-16

## 2020-05-16 RX ORDER — CEPHALEXIN 500 MG/1
500 CAPSULE ORAL
Status: COMPLETED | OUTPATIENT
Start: 2020-05-16 | End: 2020-05-16

## 2020-05-16 RX ORDER — CEPHALEXIN 500 MG/1
500 CAPSULE ORAL EVERY 12 HOURS
Qty: 14 CAPSULE | Refills: 0 | Status: SHIPPED | OUTPATIENT
Start: 2020-05-16 | End: 2020-05-23

## 2020-05-16 RX ADMIN — DOXYCYCLINE HYCLATE 100 MG: 100 TABLET, COATED ORAL at 05:05

## 2020-05-16 RX ADMIN — CEPHALEXIN 500 MG: 500 CAPSULE ORAL at 05:05

## 2020-05-16 RX ADMIN — HYDROCODONE BITARTRATE AND ACETAMINOPHEN 1 TABLET: 5; 325 TABLET ORAL at 04:05

## 2020-05-16 NOTE — DISCHARGE INSTRUCTIONS
Return to the emergency department for any fevers, worsening weakness, severe pain, red streaking up your leg, or other concerning symptoms.    Follow up with Antonella and Dr. Burt.

## 2020-05-16 NOTE — ED NOTES
Pt received discharge instructions and prescriptions. Verbalizes understanding . IV removed and bleeding controlled
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
## 2020-05-16 NOTE — ED NOTES
Pt back in room from Ultrasound . Resting on cardiac, O2, and blood pressure monitor. Denies needs at this time. Updated on plan of care . Verbalizes understanding

## 2020-05-16 NOTE — ED TRIAGE NOTES
Patient complaining of BLE pain and swelling. Right ankle noted to be red and more swollen than left. Warm to touch and tender with palpation. Also reports diffuse abdominal pain. Denies CP, SOB, cough, fever, n/v/d.

## 2020-05-16 NOTE — TELEPHONE ENCOUNTER
Reason for Disposition   Sounds like a life-threatening emergency to the triager    Additional Information   Negative: Severe difficulty breathing (e.g., struggling for each breath, speaks in single words)   Negative: Looks like a broken bone or dislocated joint (e.g., crooked or deformed)    Protocols used: LEG SWELLING AND EDEMA-LINDA    Katia, patient's daughter, states the leg swelling is more severe and the patient cannot move her legs. She was tested for covid 19 but got negative results. Patient's daughter states the oncology team (Antonella or Dr. Burt) are ordering the covid antibody test in the event Ms. Preston test results are false negatives; her pcp thinks that the manifestations and patient's organ damage is a consequence of covid 19 and advised that patient gets retested, but he would discuss it with her other providers on her treatment team. Advise Ms. Montalvo to call 911 because the patient's pain pills are not helping with the severe pain and she and her  would have to try to carry the patient to the car. Advised patient's daughter to call 911 and she verbalized understanding.

## 2020-05-18 ENCOUNTER — TELEPHONE (OUTPATIENT)
Dept: HEMATOLOGY/ONCOLOGY | Facility: CLINIC | Age: 75
End: 2020-05-18

## 2020-05-18 ENCOUNTER — PATIENT MESSAGE (OUTPATIENT)
Dept: HEMATOLOGY/ONCOLOGY | Facility: CLINIC | Age: 75
End: 2020-05-18

## 2020-05-18 DIAGNOSIS — Z13.9 ENCOUNTER FOR SCREENING: ICD-10-CM

## 2020-05-18 DIAGNOSIS — C45.0 MALIGNANT PLEURAL MESOTHELIOMA: Primary | Chronic | ICD-10-CM

## 2020-05-18 DIAGNOSIS — R26.81 GAIT INSTABILITY: Primary | ICD-10-CM

## 2020-05-18 NOTE — TELEPHONE ENCOUNTER
"----- Message from Connie Voss sent at 5/18/2020  2:48 PM CDT -----  Home Health RN / Patient Status Notes:    RN or caller?: Stacy  Treating Provider?: Dr. Burt  Contact Preference?: [phone]  Message/Note:  Stacy states they can do all the other test except for COVID-19 Antibody testing     Additional Notes:  "Thank you for all that you do for our patients'"      "

## 2020-05-18 NOTE — TELEPHONE ENCOUNTER
----- Message from Jayla Schultz sent at 5/18/2020  2:38 PM CDT -----  Contact: [facility]   Patient Advice/Staff Message     Caller name: Daryn     Reason for call: Unable to preform lab for the covid antibody test at this time.    Do you feel you need to be seen today:: No        Communication Preference: [phone]    Additional Information:

## 2020-05-19 ENCOUNTER — PATIENT MESSAGE (OUTPATIENT)
Dept: FAMILY MEDICINE | Facility: CLINIC | Age: 75
End: 2020-05-19

## 2020-05-19 ENCOUNTER — PATIENT MESSAGE (OUTPATIENT)
Dept: HEMATOLOGY/ONCOLOGY | Facility: CLINIC | Age: 75
End: 2020-05-19

## 2020-05-19 ENCOUNTER — LAB VISIT (OUTPATIENT)
Dept: LAB | Facility: HOSPITAL | Age: 75
End: 2020-05-19
Attending: INTERNAL MEDICINE
Payer: MEDICARE

## 2020-05-19 ENCOUNTER — TELEPHONE (OUTPATIENT)
Dept: HEMATOLOGY/ONCOLOGY | Facility: CLINIC | Age: 75
End: 2020-05-19

## 2020-05-19 ENCOUNTER — DOCUMENT SCAN (OUTPATIENT)
Dept: HOME HEALTH SERVICES | Facility: HOSPITAL | Age: 75
End: 2020-05-19
Payer: MEDICARE

## 2020-05-19 DIAGNOSIS — N17.0 ACUTE KIDNEY FAILURE WITH LESION OF TUBULAR NECROSIS: ICD-10-CM

## 2020-05-19 DIAGNOSIS — N39.0 URINARY TRACT INFECTION, SITE NOT SPECIFIED: Primary | ICD-10-CM

## 2020-05-19 LAB
ALBUMIN SERPL BCP-MCNC: 2.5 G/DL (ref 3.5–5.2)
ALP SERPL-CCNC: 319 U/L (ref 55–135)
ALT SERPL W/O P-5'-P-CCNC: 52 U/L (ref 10–44)
ANION GAP SERPL CALC-SCNC: 12 MMOL/L (ref 8–16)
AST SERPL-CCNC: 38 U/L (ref 10–40)
BASOPHILS # BLD AUTO: 0.01 K/UL (ref 0–0.2)
BASOPHILS NFR BLD: 0.1 % (ref 0–1.9)
BILIRUB SERPL-MCNC: 0.3 MG/DL (ref 0.1–1)
BILIRUB UR QL STRIP: NEGATIVE
BUN SERPL-MCNC: 47 MG/DL (ref 8–23)
CALCIUM SERPL-MCNC: 9.6 MG/DL (ref 8.7–10.5)
CHLORIDE SERPL-SCNC: 100 MMOL/L (ref 95–110)
CLARITY UR: CLEAR
CO2 SERPL-SCNC: 27 MMOL/L (ref 23–29)
COLOR UR: NORMAL
CREAT SERPL-MCNC: 3 MG/DL (ref 0.5–1.4)
DIFFERENTIAL METHOD: ABNORMAL
EOSINOPHIL # BLD AUTO: 0 K/UL (ref 0–0.5)
EOSINOPHIL NFR BLD: 0.3 % (ref 0–8)
ERYTHROCYTE [DISTWIDTH] IN BLOOD BY AUTOMATED COUNT: 16.3 % (ref 11.5–14.5)
EST. GFR  (AFRICAN AMERICAN): 17 ML/MIN/1.73 M^2
EST. GFR  (NON AFRICAN AMERICAN): 15 ML/MIN/1.73 M^2
GLUCOSE SERPL-MCNC: 93 MG/DL (ref 70–110)
GLUCOSE UR QL STRIP: NEGATIVE
HCT VFR BLD AUTO: 25.5 % (ref 37–48.5)
HGB BLD-MCNC: 8 G/DL (ref 12–16)
HGB UR QL STRIP: NEGATIVE
IMM GRANULOCYTES # BLD AUTO: 0.08 K/UL (ref 0–0.04)
IMM GRANULOCYTES NFR BLD AUTO: 0.9 % (ref 0–0.5)
KETONES UR QL STRIP: NEGATIVE
LEUKOCYTE ESTERASE UR QL STRIP: NEGATIVE
LYMPHOCYTES # BLD AUTO: 1.7 K/UL (ref 1–4.8)
LYMPHOCYTES NFR BLD: 17.7 % (ref 18–48)
MCH RBC QN AUTO: 32.4 PG (ref 27–31)
MCHC RBC AUTO-ENTMCNC: 31.4 G/DL (ref 32–36)
MCV RBC AUTO: 103 FL (ref 82–98)
MONOCYTES # BLD AUTO: 0.9 K/UL (ref 0.3–1)
MONOCYTES NFR BLD: 9.6 % (ref 4–15)
NEUTROPHILS # BLD AUTO: 6.7 K/UL (ref 1.8–7.7)
NEUTROPHILS NFR BLD: 71.4 % (ref 38–73)
NITRITE UR QL STRIP: NEGATIVE
NRBC BLD-RTO: 0 /100 WBC
PH UR STRIP: 7 [PH] (ref 5–8)
PLATELET # BLD AUTO: 152 K/UL (ref 150–350)
PMV BLD AUTO: 11.6 FL (ref 9.2–12.9)
POTASSIUM SERPL-SCNC: 4.2 MMOL/L (ref 3.5–5.1)
PROT SERPL-MCNC: 8.3 G/DL (ref 6–8.4)
PROT UR QL STRIP: NEGATIVE
RBC # BLD AUTO: 2.47 M/UL (ref 4–5.4)
SARS-COV-2 IGG SERPLBLD QL IA.RAPID: NEGATIVE
SODIUM SERPL-SCNC: 139 MMOL/L (ref 136–145)
SP GR UR STRIP: 1 (ref 1–1.03)
URN SPEC COLLECT METH UR: NORMAL
UROBILINOGEN UR STRIP-ACNC: NEGATIVE EU/DL
WBC # BLD AUTO: 9.41 K/UL (ref 3.9–12.7)

## 2020-05-19 PROCEDURE — 80053 COMPREHEN METABOLIC PANEL: CPT

## 2020-05-19 PROCEDURE — 81003 URINALYSIS AUTO W/O SCOPE: CPT

## 2020-05-19 PROCEDURE — 86769 SARS-COV-2 COVID-19 ANTIBODY: CPT

## 2020-05-19 PROCEDURE — 85025 COMPLETE CBC W/AUTO DIFF WBC: CPT

## 2020-05-19 PROCEDURE — 87086 URINE CULTURE/COLONY COUNT: CPT

## 2020-05-19 NOTE — TELEPHONE ENCOUNTER
"----- Message from Kelly Pruitt sent at 5/19/2020  9:15 AM CDT -----  Contact: ZENY RN  Name of caller: Theo   Provider name: Javed Avila MD   Contact Preference:  [phone]  Is this regarding current patient or new patient?: current   What is the nature of the call?    - lab orders scheduled for [facility] today,   asking if they should do the lab at the pts home instead.   Please call and advise the RN.     Additional Notes:   "Thank you for all that you do for our patients'"     "

## 2020-05-21 ENCOUNTER — DOCUMENT SCAN (OUTPATIENT)
Dept: HOME HEALTH SERVICES | Facility: HOSPITAL | Age: 75
End: 2020-05-21
Payer: MEDICARE

## 2020-05-21 LAB — BACTERIA UR CULT: NO GROWTH

## 2020-05-22 ENCOUNTER — PATIENT OUTREACH (OUTPATIENT)
Dept: ADMINISTRATIVE | Facility: OTHER | Age: 75
End: 2020-05-22

## 2020-05-26 ENCOUNTER — OFFICE VISIT (OUTPATIENT)
Dept: NEUROLOGY | Facility: CLINIC | Age: 75
End: 2020-05-26
Payer: MEDICARE

## 2020-05-26 VITALS — BODY MASS INDEX: 27.63 KG/M2 | HEIGHT: 66 IN | WEIGHT: 171.94 LBS

## 2020-05-26 DIAGNOSIS — R41.3 MEMORY LOSS: Primary | ICD-10-CM

## 2020-05-26 DIAGNOSIS — R53.83 OTHER FATIGUE: ICD-10-CM

## 2020-05-26 PROCEDURE — 1101F PT FALLS ASSESS-DOCD LE1/YR: CPT | Mod: CPTII,95,, | Performed by: NEUROLOGICAL SURGERY

## 2020-05-26 PROCEDURE — 99214 OFFICE O/P EST MOD 30 MIN: CPT | Mod: 95,,, | Performed by: NEUROLOGICAL SURGERY

## 2020-05-26 PROCEDURE — 1159F MED LIST DOCD IN RCRD: CPT | Mod: 95,,, | Performed by: NEUROLOGICAL SURGERY

## 2020-05-26 PROCEDURE — 99214 PR OFFICE/OUTPT VISIT, EST, LEVL IV, 30-39 MIN: ICD-10-PCS | Mod: 95,,, | Performed by: NEUROLOGICAL SURGERY

## 2020-05-26 PROCEDURE — 99499 RISK ADDL DX/OHS AUDIT: ICD-10-PCS | Mod: 95,,, | Performed by: NEUROLOGICAL SURGERY

## 2020-05-26 PROCEDURE — 99499 UNLISTED E&M SERVICE: CPT | Mod: 95,,, | Performed by: NEUROLOGICAL SURGERY

## 2020-05-26 PROCEDURE — 1159F PR MEDICATION LIST DOCUMENTED IN MEDICAL RECORD: ICD-10-PCS | Mod: 95,,, | Performed by: NEUROLOGICAL SURGERY

## 2020-05-26 PROCEDURE — 1101F PR PT FALLS ASSESS DOC 0-1 FALLS W/OUT INJ PAST YR: ICD-10-PCS | Mod: CPTII,95,, | Performed by: NEUROLOGICAL SURGERY
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
## 2020-05-27 ENCOUNTER — HOSPITAL ENCOUNTER (OUTPATIENT)
Dept: RADIOLOGY | Facility: HOSPITAL | Age: 75
Discharge: HOME | End: 2020-05-27
Attending: NURSE PRACTITIONER
Payer: MEDICARE

## 2020-05-27 DIAGNOSIS — C45.0 MALIGNANT PLEURAL MESOTHELIOMA: ICD-10-CM

## 2020-05-27 LAB — POCT GLUCOSE: 130 MG/DL (ref 70–110)

## 2020-05-27 PROCEDURE — A9552 F18 FDG: HCPCS

## 2020-05-27 PROCEDURE — 78815 PET IMAGE W/CT SKULL-THIGH: CPT | Mod: TC,PS

## 2020-05-27 PROCEDURE — 78815 PET IMAGE W/CT SKULL-THIGH: CPT | Mod: 26,PS,, | Performed by: RADIOLOGY

## 2020-05-27 PROCEDURE — 78815 NM PET CT ROUTINE: ICD-10-PCS | Mod: 26,PS,, | Performed by: RADIOLOGY

## 2020-05-28 ENCOUNTER — LAB VISIT (OUTPATIENT)
Dept: LAB | Facility: HOSPITAL | Age: 75
End: 2020-05-28
Attending: NEUROLOGICAL SURGERY
Payer: MEDICARE

## 2020-05-28 ENCOUNTER — TELEPHONE (OUTPATIENT)
Dept: FAMILY MEDICINE | Facility: CLINIC | Age: 75
End: 2020-05-28

## 2020-05-28 ENCOUNTER — TELEPHONE (OUTPATIENT)
Dept: NEUROLOGY | Facility: CLINIC | Age: 75
End: 2020-05-28

## 2020-05-28 ENCOUNTER — PATIENT OUTREACH (OUTPATIENT)
Dept: ADMINISTRATIVE | Facility: OTHER | Age: 75
End: 2020-05-28

## 2020-05-28 ENCOUNTER — OFFICE VISIT (OUTPATIENT)
Dept: NEPHROLOGY | Facility: CLINIC | Age: 75
End: 2020-05-28
Payer: MEDICARE

## 2020-05-28 VITALS
SYSTOLIC BLOOD PRESSURE: 160 MMHG | DIASTOLIC BLOOD PRESSURE: 92 MMHG | BODY MASS INDEX: 26.36 KG/M2 | WEIGHT: 164 LBS | HEIGHT: 66 IN | OXYGEN SATURATION: 98 % | HEART RATE: 75 BPM

## 2020-05-28 DIAGNOSIS — R41.3 MEMORY LOSS: ICD-10-CM

## 2020-05-28 DIAGNOSIS — D64.9 FATIGUE ASSOCIATED WITH ANEMIA: ICD-10-CM

## 2020-05-28 DIAGNOSIS — E78.2 MIXED HYPERLIPIDEMIA: Chronic | ICD-10-CM

## 2020-05-28 DIAGNOSIS — N18.5 CKD (CHRONIC KIDNEY DISEASE) STAGE 5, GFR LESS THAN 15 ML/MIN: ICD-10-CM

## 2020-05-28 DIAGNOSIS — N18.30 ACUTE RENAL FAILURE WITH ACUTE TUBULAR NECROSIS SUPERIMPOSED ON STAGE 3 CHRONIC KIDNEY DISEASE: ICD-10-CM

## 2020-05-28 DIAGNOSIS — I10 HYPERTENSION, ESSENTIAL: Primary | Chronic | ICD-10-CM

## 2020-05-28 DIAGNOSIS — N17.0 ACUTE RENAL FAILURE WITH ACUTE TUBULAR NECROSIS SUPERIMPOSED ON STAGE 3 CHRONIC KIDNEY DISEASE: ICD-10-CM

## 2020-05-28 LAB
25(OH)D3+25(OH)D2 SERPL-MCNC: 23 NG/ML (ref 30–96)
ALBUMIN SERPL BCP-MCNC: 2.9 G/DL (ref 3.5–5.2)
ANION GAP SERPL CALC-SCNC: 14 MMOL/L (ref 8–16)
BUN SERPL-MCNC: 80 MG/DL (ref 8–23)
CALCIUM SERPL-MCNC: 9.7 MG/DL (ref 8.7–10.5)
CHLORIDE SERPL-SCNC: 104 MMOL/L (ref 95–110)
CO2 SERPL-SCNC: 22 MMOL/L (ref 23–29)
CREAT SERPL-MCNC: 4.5 MG/DL (ref 0.5–1.4)
EST. GFR  (AFRICAN AMERICAN): 10.5 ML/MIN/1.73 M^2
EST. GFR  (NON AFRICAN AMERICAN): 9.1 ML/MIN/1.73 M^2
FERRITIN SERPL-MCNC: 1478 NG/ML (ref 20–300)
GLUCOSE SERPL-MCNC: 94 MG/DL (ref 70–110)
IRON SERPL-MCNC: 147 UG/DL (ref 30–160)
PHOSPHATE SERPL-MCNC: 5.5 MG/DL (ref 2.7–4.5)
POTASSIUM SERPL-SCNC: 4.6 MMOL/L (ref 3.5–5.1)
PTH-INTACT SERPL-MCNC: 278 PG/ML (ref 9–77)
SATURATED IRON: 42 % (ref 20–50)
SODIUM SERPL-SCNC: 140 MMOL/L (ref 136–145)
TOTAL IRON BINDING CAPACITY: 352 UG/DL (ref 250–450)
TRANSFERRIN SERPL-MCNC: 238 MG/DL (ref 200–375)

## 2020-05-28 PROCEDURE — 36415 COLL VENOUS BLD VENIPUNCTURE: CPT

## 2020-05-28 PROCEDURE — 83540 ASSAY OF IRON: CPT

## 2020-05-28 PROCEDURE — 99999 PR PBB SHADOW E&M-EST. PATIENT-LVL V: ICD-10-PCS | Mod: PBBFAC,GC,, | Performed by: HOSPITALIST

## 2020-05-28 PROCEDURE — 83520 IMMUNOASSAY QUANT NOS NONAB: CPT

## 2020-05-28 PROCEDURE — 82728 ASSAY OF FERRITIN: CPT

## 2020-05-28 PROCEDURE — 83970 ASSAY OF PARATHORMONE: CPT

## 2020-05-28 PROCEDURE — 83519 RIA NONANTIBODY: CPT | Mod: 59

## 2020-05-28 PROCEDURE — 83520 IMMUNOASSAY QUANT NOS NONAB: CPT | Mod: 59

## 2020-05-28 PROCEDURE — 99999 PR PBB SHADOW E&M-EST. PATIENT-LVL V: CPT | Mod: PBBFAC,GC,, | Performed by: HOSPITALIST

## 2020-05-28 PROCEDURE — 82306 VITAMIN D 25 HYDROXY: CPT

## 2020-05-28 PROCEDURE — 80069 RENAL FUNCTION PANEL: CPT

## 2020-05-28 NOTE — PATIENT INSTRUCTIONS
- Drink around 2 L water daily  - AVOID all NSAIDs ( ibuprofen, advil, aleve,goodys)  - Blood tests   - Follow-up with Nephrology in 1 month  - Follow-up with Heme/Onc physician about lab tests

## 2020-05-28 NOTE — TELEPHONE ENCOUNTER
I left a message for Skyla at [facility] to call the office. Pt does not have an upcoming OV scheduled.

## 2020-05-28 NOTE — TELEPHONE ENCOUNTER
----- Message from Alison Linnaux sent at 5/28/2020  2:10 PM CDT -----  Contact: Skyla=EMELIA  Type: Patient Call Back    Who called: Aj    What is the request in detail: Please complete a Med Rec with this patient on her next office visit amLODIPine (NORVASC) 10 MG tablet  Can the clinic reply by [facility]? no  Would the patient rather a call back or a response via [facility]? call    Best call back number: [phone]

## 2020-05-28 NOTE — PROGRESS NOTES
Subjective:       Patient ID: Isabell Preston is a 74 y.o. Black or  female who presents for re-evaluation of progression of CKD4    HPI     Ms. Preston is a 71 yo AA f w/ hx of CKD4, DM2, HTN, HLD, DVT on daily lovenox,  mesothelioma (Receiving outpatient chemotherapy - NSCLC PEMETREXED + CARBOPLATIN (AUC) Q3W and IVF. Patient was last seen in Nephrology clinic in April 2018 with  for reduced GFR thought to be secondary to dehydration and overuse of Goody's powder for headaches. Patient was advised to have f/u labs and return to clinic for f/u which was never made.       Patient presents today for nephrology appointment for worsening GFR and creatinine, has not been seen in clinic since April 2018. Patient was recently hospitalized for COVID-like symptoms earlier this month; however, repeatedly tested negative for COVID. Patient was discharged and found to subsequently have right foot cellulitis in the outpatient setting and was prescribed a course of Abx ( reported Doxycycline and Keflex) which she finished a week ago.       Patient is also endorsing worsening energy levels, patient states she can walk to the bathroom but cannot walk back because she feels exhausted. Patient is sleeping all day with no relief of the fatigue. She denies SOB, cough, orthopnea, VANESSA, chest pain, palpitations. Patient is upset as she has a reduced appetite and has a bitter taste in her mouth. Patient still making a good amount of urine; however, he's had dysuria, hematuria, flank pain; but occasionally urinary incontinence. Patient also reports to have had frothy urine.      Review of Systems   Constitutional: Positive for appetite change and fatigue. Negative for chills and fever.   Eyes: Negative for visual disturbance.   Respiratory: Negative for cough, shortness of breath and wheezing.    Cardiovascular: Positive for leg swelling. Negative for chest pain and palpitations.   Gastrointestinal: Negative  for abdominal pain, constipation, diarrhea, nausea and vomiting.   Endocrine: Positive for polyuria.   Genitourinary: Negative for difficulty urinating, dysuria, flank pain and hematuria.   Musculoskeletal: Negative for neck pain and neck stiffness.   Neurological: Negative for dizziness, tremors, weakness, light-headedness, numbness and headaches.   Psychiatric/Behavioral: Positive for confusion (intermittent as reported by daughter) and dysphoric mood. Negative for hallucinations. The patient is not nervous/anxious.            Objective:      Physical Exam   Constitutional: She is oriented to person, place, and time. No distress.   HENT:   Head: Normocephalic and atraumatic.   Mouth/Throat: No oropharyngeal exudate.   Eyes: Pupils are equal, round, and reactive to light. EOM are normal.   Neck: Normal range of motion. No JVD present.   Cardiovascular: Normal rate, regular rhythm and normal heart sounds.   No murmur heard.  Pulmonary/Chest: Effort normal and breath sounds normal. No respiratory distress. She has no wheezes. She has no rales.   Abdominal: Soft. Bowel sounds are normal. She exhibits no distension. There is no tenderness.   Musculoskeletal: Normal range of motion. She exhibits edema (bilateral trace edema). She exhibits no tenderness.   Lymphadenopathy:     She has no cervical adenopathy.   Neurological: She is alert and oriented to person, place, and time. No cranial nerve deficit or sensory deficit.   Skin: Skin is warm and dry. She is not diaphoretic.   Psychiatric: She has a normal mood and affect. Her behavior is normal. Judgment and thought content normal.           Assessment:       1. Hypertension, essential    2. Acute renal failure with acute tubular necrosis superimposed on stage 3 chronic kidney disease    3. Fatigue associated with anemia     4. Mixed hyperlipidemia    5. CKD (chronic kidney disease) stage 5, GFR less than 15 ml/min          Diagnoses and all orders for this  visit:    Hypertension, essential  - Amlodipine 10 mg daily  - Metoprolol succinate 25 mg daily    Fatigue associated with anemia   -     Ferritin  -     Iron and TIBC  - Would recommend Heme/Onc  management of iron transfusions    CKD (chronic kidney disease) stage 5, GFR less than 15 ml/min  - UPCr repeat today  -  Free light chains and ALICIA, will  assess for signs of multiple  myeloma as patient with negative  protein on urine dipstick however,  elevated UPCr  -  Repeat Urinalysis and spin urine to  assess for casts  -  PTH ordered   -  Vitamin D ordered  -  F/u in 1 month, repeat labs before  next visit  -  Discussed avoidance of NSAIDs,  encouraged hydration and  counseled on DM2 and HTN  management

## 2020-05-28 NOTE — TELEPHONE ENCOUNTER
----- Message from Angie Horvath sent at 5/28/2020  4:00 PM CDT -----  Contact: Denita (dtr) @ [phone]  Caller is asking to speak with someone regarding getting the patient seen sooner than the 7/27 appt. Please call.

## 2020-05-29 ENCOUNTER — TELEPHONE (OUTPATIENT)
Dept: NEUROLOGY | Facility: CLINIC | Age: 75
End: 2020-05-29

## 2020-05-29 ENCOUNTER — PATIENT MESSAGE (OUTPATIENT)
Dept: ADMINISTRATIVE | Facility: HOSPITAL | Age: 75
End: 2020-05-29

## 2020-05-29 ENCOUNTER — OFFICE VISIT (OUTPATIENT)
Dept: HEMATOLOGY/ONCOLOGY | Facility: CLINIC | Age: 75
End: 2020-05-29
Payer: MEDICARE

## 2020-05-29 DIAGNOSIS — C45.0 MALIGNANT PLEURAL MESOTHELIOMA: Primary | ICD-10-CM

## 2020-05-29 PROBLEM — N18.5 CKD (CHRONIC KIDNEY DISEASE) STAGE 5, GFR LESS THAN 15 ML/MIN: Status: ACTIVE | Noted: 2020-05-29

## 2020-05-29 LAB
KAPPA LC SER QL IA: 9.59 MG/DL (ref 0.33–1.94)
KAPPA LC/LAMBDA SER IA: 2.46 (ref 0.26–1.65)
LAMBDA LC SER QL IA: 3.9 MG/DL (ref 0.57–2.63)

## 2020-05-29 PROCEDURE — 1101F PT FALLS ASSESS-DOCD LE1/YR: CPT | Mod: CPTII,95,, | Performed by: INTERNAL MEDICINE

## 2020-05-29 PROCEDURE — 1159F PR MEDICATION LIST DOCUMENTED IN MEDICAL RECORD: ICD-10-PCS | Mod: 95,,, | Performed by: INTERNAL MEDICINE

## 2020-05-29 PROCEDURE — 1101F PR PT FALLS ASSESS DOC 0-1 FALLS W/OUT INJ PAST YR: ICD-10-PCS | Mod: CPTII,95,, | Performed by: INTERNAL MEDICINE

## 2020-05-29 PROCEDURE — 99214 OFFICE O/P EST MOD 30 MIN: CPT | Mod: 95,,, | Performed by: INTERNAL MEDICINE

## 2020-05-29 PROCEDURE — 99499 UNLISTED E&M SERVICE: CPT | Mod: 95,,, | Performed by: INTERNAL MEDICINE

## 2020-05-29 PROCEDURE — 99499 RISK ADDL DX/OHS AUDIT: ICD-10-PCS | Mod: 95,,, | Performed by: INTERNAL MEDICINE

## 2020-05-29 PROCEDURE — 1159F MED LIST DOCD IN RCRD: CPT | Mod: 95,,, | Performed by: INTERNAL MEDICINE

## 2020-05-29 PROCEDURE — 99214 PR OFFICE/OUTPT VISIT, EST, LEVL IV, 30-39 MIN: ICD-10-PCS | Mod: 95,,, | Performed by: INTERNAL MEDICINE

## 2020-05-29 NOTE — TELEPHONE ENCOUNTER
"Spoke with Gely, she was informed I was calling regarding the message of wanting a sooner appt than the scheduled 7/27/2020. Gely became slightly frustrated when I pronounced her name wrong and stated why I was calling based of the message I received. Per Gely "this is not the reason I am calling, this is ridiculous, I am calling to find out why my mother's appts keep being rescheduled, my mother had an appt on May 26 which was canceled, it was rescheduled to June 5 which was canceled, this is ridiculous". I apologized to Gely numerous of times stating I understand the frustration of rescheduling the appts. I informed her the appt on the May 26 was with another provider that was completed virtually, the appt with Dr. De La Paz scheduled on May 12th was canceled due to the pt being admitted to the hospital a message that was sent though the pt portal, that was sent on May 11,  the appt on June 5 was canceled due to the provider not being in clinic. Gely states "can you tell me why the provider is cancelling clinic". Gely advised I am not able to state why the provider is cancelling clinic and appts are being rescheduled, I get a message saying to cancel and reschedule clinic, I do what I am asked by the provider. Gely asked for "my name and states she will like to speak with my supervisor". My name and supervisor's name was given. Gely states "my phone etiquette is not good". I informed her phone etiquette goes both ways, I will have my supervisor contact her to further discuss.   "

## 2020-05-29 NOTE — TELEPHONE ENCOUNTER
Call received from Gely. States she is upset that her mother's appt is being rescheduled to end July and that it was communicated poorly by support staff. Apologized to pt for conversation going poorly but confirmed that the appt given is the next available. Verbalizes understanding.
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
## 2020-05-29 NOTE — PROGRESS NOTES
Patient Isabell Preston, MRN [id number], was dependent on dialysis (ICD10 Z99.2) at the time of this visit on 5/29/20. This addendum is made to the medical record on 05/29/2020.

## 2020-06-01 ENCOUNTER — TELEPHONE (OUTPATIENT)
Dept: HEMATOLOGY/ONCOLOGY | Facility: CLINIC | Age: 75
End: 2020-06-01

## 2020-06-01 DIAGNOSIS — C45.0 MALIGNANT PLEURAL MESOTHELIOMA: Primary | Chronic | ICD-10-CM

## 2020-06-01 NOTE — PROGRESS NOTES
[facility] STAFF NOTE    The note from the fellow/resident was reviewed. I have personally interviewed and examined the patient. There were no additional findings with regards to the history or physical exam.    I agree with the assessment and plan of  Dr. Velasquez

## 2020-06-01 NOTE — TELEPHONE ENCOUNTER
----- Message from Austin Burt MD sent at 5/29/2020  5:37 PM CDT -----  RTC 6 wks with CBC, CMP and to see Antonella.

## 2020-06-04 DIAGNOSIS — E11.9 DIABETES MELLITUS WITHOUT COMPLICATION: ICD-10-CM

## 2020-06-04 RX ORDER — ISOPROPYL ALCOHOL 70 ML/100ML
1 SWAB TOPICAL
COMMUNITY
Start: 2020-06-04

## 2020-06-04 RX ORDER — ISOPROPYL ALCOHOL 70 ML/100ML
1 SWAB TOPICAL
COMMUNITY
End: 2020-06-04 | Stop reason: SDUPTHER

## 2020-06-04 RX ORDER — INSULIN PUMP SYRINGE, 3 ML
EACH MISCELLANEOUS
COMMUNITY
End: 2020-06-04 | Stop reason: SDUPTHER

## 2020-06-04 RX ORDER — LANCETS 28 GAUGE
1 EACH MISCELLANEOUS DAILY
Qty: 100 EACH | Refills: 3 | Status: ON HOLD | OUTPATIENT
Start: 2020-06-04 | End: 2021-01-01 | Stop reason: HOSPADM

## 2020-06-04 RX ORDER — INSULIN PUMP SYRINGE, 3 ML
1 EACH MISCELLANEOUS DAILY PRN
Qty: 1 EACH | Refills: 0 | Status: ON HOLD | OUTPATIENT
Start: 2020-06-04 | End: 2021-01-01 | Stop reason: HOSPADM

## 2020-06-05 DIAGNOSIS — E11.9 TYPE 2 DIABETES MELLITUS WITHOUT COMPLICATION: ICD-10-CM

## 2020-06-05 LAB
ACHR BIND AB SER-SCNC: 0 NMOL/L
ACHR MOD AB/ACHR TOTAL SFR SER: 0 %
MG INTERPRETIVE COMMENTS: ABNORMAL
MUSK ANTIBODY TEST: 0 NMOL/L (ref 0–0.02)
STRIA MUS AB TITR SER: ABNORMAL TITER

## 2020-06-09 DIAGNOSIS — I10 HTN, GOAL BELOW 140/90: ICD-10-CM

## 2020-06-09 RX ORDER — TIZANIDINE 4 MG/1
TABLET ORAL
Qty: 90 TABLET | Refills: 0 | Status: SHIPPED | OUTPATIENT
Start: 2020-06-09 | End: 2021-01-01

## 2020-06-09 RX ORDER — METOPROLOL SUCCINATE 50 MG/1
TABLET, EXTENDED RELEASE ORAL
Qty: 90 TABLET | Refills: 0 | Status: SHIPPED | OUTPATIENT
Start: 2020-06-09 | End: 2020-07-02

## 2020-06-09 RX ORDER — METOPROLOL SUCCINATE 25 MG/1
TABLET, EXTENDED RELEASE ORAL
Qty: 90 TABLET | Refills: 0 | Status: SHIPPED | OUTPATIENT
Start: 2020-06-09 | End: 2020-07-02

## 2020-06-24 ENCOUNTER — TELEPHONE (OUTPATIENT)
Dept: NEPHROLOGY | Facility: CLINIC | Age: 75
End: 2020-06-24

## 2020-06-24 DIAGNOSIS — N18.5 STAGE 5 CHRONIC KIDNEY DISEASE: Primary | ICD-10-CM

## 2020-06-26 ENCOUNTER — OFFICE VISIT (OUTPATIENT)
Dept: FAMILY MEDICINE | Facility: CLINIC | Age: 75
End: 2020-06-26
Payer: MEDICARE

## 2020-06-26 VITALS
WEIGHT: 162 LBS | HEART RATE: 68 BPM | SYSTOLIC BLOOD PRESSURE: 180 MMHG | DIASTOLIC BLOOD PRESSURE: 96 MMHG | HEIGHT: 66 IN | RESPIRATION RATE: 18 BRPM | BODY MASS INDEX: 26.03 KG/M2 | OXYGEN SATURATION: 96 % | TEMPERATURE: 98 F

## 2020-06-26 DIAGNOSIS — F33.41 RECURRENT MAJOR DEPRESSIVE DISORDER, IN PARTIAL REMISSION: Primary | ICD-10-CM

## 2020-06-26 DIAGNOSIS — I10 HYPERTENSION, ESSENTIAL: Chronic | ICD-10-CM

## 2020-06-26 DIAGNOSIS — N18.5 CKD (CHRONIC KIDNEY DISEASE) STAGE 5, GFR LESS THAN 15 ML/MIN: ICD-10-CM

## 2020-06-26 PROCEDURE — 3080F PR MOST RECENT DIASTOLIC BLOOD PRESSURE >= 90 MM HG: ICD-10-PCS | Mod: CPTII,S$GLB,, | Performed by: INTERNAL MEDICINE

## 2020-06-26 PROCEDURE — 3288F PR FALLS RISK ASSESSMENT DOCUMENTED: ICD-10-PCS | Mod: CPTII,S$GLB,, | Performed by: INTERNAL MEDICINE

## 2020-06-26 PROCEDURE — 3288F FALL RISK ASSESSMENT DOCD: CPT | Mod: CPTII,S$GLB,, | Performed by: INTERNAL MEDICINE

## 2020-06-26 PROCEDURE — 99999 PR PBB SHADOW E&M-EST. PATIENT-LVL V: ICD-10-PCS | Mod: PBBFAC,,, | Performed by: INTERNAL MEDICINE

## 2020-06-26 PROCEDURE — 3077F PR MOST RECENT SYSTOLIC BLOOD PRESSURE >= 140 MM HG: ICD-10-PCS | Mod: CPTII,S$GLB,, | Performed by: INTERNAL MEDICINE

## 2020-06-26 PROCEDURE — 3008F BODY MASS INDEX DOCD: CPT | Mod: CPTII,S$GLB,, | Performed by: INTERNAL MEDICINE

## 2020-06-26 PROCEDURE — 1100F PTFALLS ASSESS-DOCD GE2>/YR: CPT | Mod: CPTII,S$GLB,, | Performed by: INTERNAL MEDICINE

## 2020-06-26 PROCEDURE — 1159F PR MEDICATION LIST DOCUMENTED IN MEDICAL RECORD: ICD-10-PCS | Mod: S$GLB,,, | Performed by: INTERNAL MEDICINE

## 2020-06-26 PROCEDURE — 3008F PR BODY MASS INDEX (BMI) DOCUMENTED: ICD-10-PCS | Mod: CPTII,S$GLB,, | Performed by: INTERNAL MEDICINE

## 2020-06-26 PROCEDURE — 99214 PR OFFICE/OUTPT VISIT, EST, LEVL IV, 30-39 MIN: ICD-10-PCS | Mod: S$GLB,,, | Performed by: INTERNAL MEDICINE

## 2020-06-26 PROCEDURE — 1126F PR PAIN SEVERITY QUANTIFIED, NO PAIN PRESENT: ICD-10-PCS | Mod: S$GLB,,, | Performed by: INTERNAL MEDICINE

## 2020-06-26 PROCEDURE — 3080F DIAST BP >= 90 MM HG: CPT | Mod: CPTII,S$GLB,, | Performed by: INTERNAL MEDICINE

## 2020-06-26 PROCEDURE — 1159F MED LIST DOCD IN RCRD: CPT | Mod: S$GLB,,, | Performed by: INTERNAL MEDICINE

## 2020-06-26 PROCEDURE — 99214 OFFICE O/P EST MOD 30 MIN: CPT | Mod: S$GLB,,, | Performed by: INTERNAL MEDICINE

## 2020-06-26 PROCEDURE — 1100F PR PT FALLS ASSESS DOC 2+ FALLS/FALL W/INJURY/YR: ICD-10-PCS | Mod: CPTII,S$GLB,, | Performed by: INTERNAL MEDICINE

## 2020-06-26 PROCEDURE — 1126F AMNT PAIN NOTED NONE PRSNT: CPT | Mod: S$GLB,,, | Performed by: INTERNAL MEDICINE

## 2020-06-26 PROCEDURE — 3077F SYST BP >= 140 MM HG: CPT | Mod: CPTII,S$GLB,, | Performed by: INTERNAL MEDICINE

## 2020-06-26 PROCEDURE — 99999 PR PBB SHADOW E&M-EST. PATIENT-LVL V: CPT | Mod: PBBFAC,,, | Performed by: INTERNAL MEDICINE

## 2020-06-26 RX ORDER — DULOXETIN HYDROCHLORIDE 30 MG/1
30 CAPSULE, DELAYED RELEASE ORAL DAILY
Qty: 30 CAPSULE | Refills: 1 | Status: SHIPPED | OUTPATIENT
Start: 2020-06-26 | End: 2020-07-18
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
## 2020-06-29 ENCOUNTER — DOCUMENT SCAN (OUTPATIENT)
Dept: HOME HEALTH SERVICES | Facility: HOSPITAL | Age: 75
End: 2020-06-29
Payer: MEDICARE

## 2020-07-01 ENCOUNTER — PATIENT OUTREACH (OUTPATIENT)
Dept: ADMINISTRATIVE | Facility: OTHER | Age: 75
End: 2020-07-01

## 2020-07-01 NOTE — PROGRESS NOTES
Requested updates within Care Everywhere.  Patient's chart was reviewed for overdue BRYON topics.  Immunizations reconciled.

## 2020-07-02 ENCOUNTER — OFFICE VISIT (OUTPATIENT)
Dept: NEPHROLOGY | Facility: CLINIC | Age: 75
End: 2020-07-02
Payer: MEDICARE

## 2020-07-02 VITALS
OXYGEN SATURATION: 96 % | DIASTOLIC BLOOD PRESSURE: 90 MMHG | HEIGHT: 66 IN | WEIGHT: 167.75 LBS | HEART RATE: 85 BPM | SYSTOLIC BLOOD PRESSURE: 170 MMHG | BODY MASS INDEX: 26.96 KG/M2

## 2020-07-02 DIAGNOSIS — N18.30 CKD (CHRONIC KIDNEY DISEASE) STAGE 3, GFR 30-59 ML/MIN: Primary | ICD-10-CM

## 2020-07-02 PROCEDURE — 99213 PR OFFICE/OUTPT VISIT, EST, LEVL III, 20-29 MIN: ICD-10-PCS | Mod: GC,S$GLB,, | Performed by: HOSPITALIST

## 2020-07-02 PROCEDURE — 99213 OFFICE O/P EST LOW 20 MIN: CPT | Mod: GC,S$GLB,, | Performed by: HOSPITALIST

## 2020-07-02 PROCEDURE — 99999 PR PBB SHADOW E&M-EST. PATIENT-LVL V: CPT | Mod: PBBFAC,GC,, | Performed by: HOSPITALIST

## 2020-07-02 PROCEDURE — 99999 PR PBB SHADOW E&M-EST. PATIENT-LVL V: ICD-10-PCS | Mod: PBBFAC,GC,, | Performed by: HOSPITALIST

## 2020-07-02 RX ORDER — CARVEDILOL 6.25 MG/1
3.12 TABLET ORAL 2 TIMES DAILY WITH MEALS
Qty: 30 TABLET | Refills: 11 | Status: SHIPPED | OUTPATIENT
Start: 2020-07-02 | End: 2021-01-01

## 2020-07-02 RX ORDER — CHLORTHALIDONE 25 MG/1
50 TABLET ORAL DAILY
Qty: 60 TABLET | Refills: 11 | Status: ON HOLD | OUTPATIENT
Start: 2020-07-02 | End: 2020-01-01 | Stop reason: HOSPADM

## 2020-07-02 RX ORDER — LISINOPRIL 5 MG/1
2.5 TABLET ORAL DAILY
Qty: 45 TABLET | Refills: 3 | Status: ON HOLD | OUTPATIENT
Start: 2020-07-02 | End: 2021-01-01 | Stop reason: SDUPTHER
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
## 2020-07-03 NOTE — PROGRESS NOTES
I have personally encountered the patient. Clinical, laboratorial and imaging information available in medical records were reveiwed by me. I agree with the assessment and recommendations provided by Dr. Montiel who was under my supervision.

## 2020-07-07 ENCOUNTER — OFFICE VISIT (OUTPATIENT)
Dept: URGENT CARE | Facility: CLINIC | Age: 75
End: 2020-07-07
Payer: MEDICARE

## 2020-07-07 VITALS
HEART RATE: 73 BPM | OXYGEN SATURATION: 96 % | BODY MASS INDEX: 26.84 KG/M2 | SYSTOLIC BLOOD PRESSURE: 171 MMHG | WEIGHT: 167 LBS | DIASTOLIC BLOOD PRESSURE: 87 MMHG | HEIGHT: 66 IN | TEMPERATURE: 96 F

## 2020-07-07 DIAGNOSIS — R10.9 ABDOMINAL PAIN, UNSPECIFIED ABDOMINAL LOCATION: ICD-10-CM

## 2020-07-07 DIAGNOSIS — R19.7 DIARRHEA, UNSPECIFIED TYPE: Primary | ICD-10-CM

## 2020-07-07 DIAGNOSIS — K57.92 DIVERTICULITIS: ICD-10-CM

## 2020-07-07 LAB
CTP QC/QA: YES
FECAL OCCULT BLOOD, POC: NEGATIVE

## 2020-07-07 PROCEDURE — 82270 POCT OCCULT BLOOD STOOL: ICD-10-PCS | Mod: S$GLB,,, | Performed by: NURSE PRACTITIONER

## 2020-07-07 PROCEDURE — 82270 OCCULT BLOOD FECES: CPT | Mod: S$GLB,,, | Performed by: NURSE PRACTITIONER

## 2020-07-07 PROCEDURE — 99213 OFFICE O/P EST LOW 20 MIN: CPT | Mod: S$GLB,,, | Performed by: NURSE PRACTITIONER

## 2020-07-07 PROCEDURE — 99213 PR OFFICE/OUTPT VISIT, EST, LEVL III, 20-29 MIN: ICD-10-PCS | Mod: S$GLB,,, | Performed by: NURSE PRACTITIONER

## 2020-07-07 RX ORDER — DICYCLOMINE HYDROCHLORIDE 20 MG/1
20 TABLET ORAL EVERY 6 HOURS
Qty: 30 TABLET | Refills: 0 | Status: ON HOLD | OUTPATIENT
Start: 2020-07-07 | End: 2020-01-01 | Stop reason: HOSPADM

## 2020-07-07 RX ORDER — METRONIDAZOLE 500 MG/1
500 TABLET ORAL 2 TIMES DAILY
Qty: 14 TABLET | Refills: 0 | Status: SHIPPED | OUTPATIENT
Start: 2020-07-07 | End: 2020-07-14

## 2020-07-07 NOTE — PATIENT INSTRUCTIONS
Patient Instructions      Please follow up with your Primary care provider within 2-5 days if your signs and symptoms have not resolved or worsen.  If your condition worsens or fails to improve we recommend that you receive another evaluation at the emergency room immediately or contact your primary medical clinic to discuss your concerns.      You must understand that you have received an Urgent Care treatment only and that you may be released before all of your medical problems are known or treated.   You, the patient, will arrange for follow up care as instructed.        Use gatorade/pedialyte/coconut water or rehydration packets to help stay hydrated. Vitamin water and plain water do not contain rehydrating electrolytes.  Increase clear liquids (water, gatorade, pedialyte, broths, jello, etc) Hold off on solids for 12-18 hours. Then advance to BRAT diet (banana, rice, applesauce, tea, toast/crackers), then advance further as tolerated.  Use Peptobismol to help alleviate your diarrhea symptoms. Avoid immodium.   Diverticulitis    Some people get pouches along the wall of the colon as they get older. The pouches, called diverticuli, usually cause no symptoms. If the pouches become blocked, you can get an infection. This infection is called diverticulitis. It causes pain in your lower abdomen and fever. If not treated, it can become a serious condition, causing an abscess to form inside the pouch. The abscess may block the intestinal tract even or rupture, spreading infection throughout the abdomen.  When treatment is started early, oral antibiotics alone may be enough to cure diverticulitis. This method is tried first. But, if you don't improve or if your condition gets worse while using oral antibiotics, you may need to be admitted to the hospital for IV antibiotics. Severe cases may require surgery.  Home care  The following guidelines will help you care for yourself at home:  · During the acute illness, rest  and follow your healthcare provider's instructions about diet. Sometimes you will need to follow a clear liquid diet to rest your bowel. Once your symptoms are better, you may be told to follow a low-fiber diet for some time. Include foods like:  ¨ Flake cereal, mashed potatoes, pancakes, waffles, pasta, white bread, rice, applesauce, bananas, eggs, fish, poultry, tofu, and cooked soft vegetables  · Take antibiotics exactly as instructed. Don't miss any doses or stop taking the medication, even if you feel better.  · Monitor your temperature and tell your healthcare provider if you have rising temperatures.  Preventing future attacks  Once you have an episode of diverticulitis, you are at risk for having it again. After you have recovered from this episode, you may be able to lower your risk by eating a high-fiber diet (20 gm/day to 35 gm/day of fiber). This cleans out the colon pouches that already exist and may prevent new ones from forming. Foods high in fiber include fresh fruits and edible peelings, raw or lightly cooked vegetables, whole grain cereals and breads, dried beans and peas, and bran.  Other steps that can help prevent future attacks include:  · Take your medicines, such as antibiotics, as your healthcare provider says.  · Drink 6 to 8 glasses of water every day, unless told otherwise.  · Use a heating pad or hot water bottle to help abdominal cramping or pain.  · Begin an exercise program. Ask your healthcare provider how to get started. You can benefit from simple activities such as walking or gardening.  · Treat diarrhea with a bland diet. Start with liquids only; then slowly add fiber over time.  · Watch for changes in your bowel movements (constipation to diarrhea). Avoid constipation by eating a high fiber diet and taking a stool softener if needed.  · Get plenty of rest and sleep.  Follow-up care  Follow up with your healthcare provider as advised or sooner if you are not getting better in the  next 2 days.  When to seek medical advice  Call your healthcare provider right away if any of these occur:  · Fever of 100.4°F (38°C) or higher, or as directed by your healthcare provider  · Repeated vomiting or swelling of the abdomen  · Weakness, dizziness, light-headedness  · Pain in your abdomen that gets worse, severe, or spreads to your back  · Pain that moves to the right lower abdomen  · Rectal bleeding (stools that are red, black or maroon color)  · Unexpected vaginal bleeding  Date Last Reviewed: 9/1/2016  © 9283-8346 [employer]. [address] [place] [address]. All rights reserved. This information is not intended as a substitute for professional medical care. Always follow your healthcare professional's instructions.

## 2020-07-07 NOTE — PROGRESS NOTES
"Subjective:       Patient ID: Isabell Preston is a 74 y.o. female.    Vitals:  height is 5' 6" (1.676 m) and weight is 75.8 kg (167 lb). Her temperature is 96 °F (35.6 °C). Her blood pressure is 171/87 (abnormal) and her pulse is 73. Her oxygen saturation is 96%.     Chief Complaint: Abdominal Pain    Ambulatory with c/o abdominal pain and diarrhea about 4 x a days for 4 days. Patient states that her symptoms started on 4th of July after eating some popcorn. Bowel movement has been unusually dark brown/dark green close to black. She denies taking any medication. She has some nausea associated with it. She denies vomiting. She states she has a history of diverticulitis and has had similar episodes in past although it has been a while. She is currently a cancer patient who is not undergoing any active chemo but is on maintenence medication. She denies low platelet or wbc in past.     Abdominal Pain  This is a recurrent problem. The current episode started in the past 7 days. The onset quality is sudden. The problem occurs constantly. The problem has been unchanged. The pain is at a severity of 9/10. The pain is severe. The quality of the pain is cramping and aching. Pertinent negatives include no arthralgias, diarrhea, dysuria, fever, frequency, headaches, myalgias, nausea or vomiting.       Constitution: Negative for chills, fatigue and fever.   HENT: Negative for congestion and sore throat.    Neck: Negative for painful lymph nodes.   Cardiovascular: Negative for chest pain and leg swelling.   Eyes: Negative for double vision and blurred vision.   Respiratory: Negative for cough and shortness of breath.    Gastrointestinal: Positive for abdominal pain and dark colored stools. Negative for nausea, vomiting and diarrhea.   Genitourinary: Negative for dysuria, frequency, urgency and history of kidney stones.   Musculoskeletal: Negative for joint pain, joint swelling, muscle cramps and muscle ache.   Skin: Negative " for color change, pale, rash and bruising.   Allergic/Immunologic: Negative for seasonal allergies.   Neurological: Negative for dizziness, history of vertigo, light-headedness, passing out and headaches.   Hematologic/Lymphatic: Negative for swollen lymph nodes.   Psychiatric/Behavioral: Negative for nervous/anxious, sleep disturbance and depression. The patient is not nervous/anxious.        Objective:      Physical Exam   Constitutional: She is oriented to person, place, and time. She appears well-developed. She is cooperative.  Non-toxic appearance. She does not appear ill. No distress.   HENT:   Head: Normocephalic and atraumatic.   Right Ear: Hearing, tympanic membrane, external ear and ear canal normal.   Left Ear: Hearing, tympanic membrane, external ear and ear canal normal.   Nose: Nose normal. No mucosal edema, rhinorrhea or nasal deformity. No epistaxis. Right sinus exhibits no maxillary sinus tenderness and no frontal sinus tenderness. Left sinus exhibits no maxillary sinus tenderness and no frontal sinus tenderness.   Mouth/Throat: Uvula is midline, oropharynx is clear and moist and mucous membranes are normal. No trismus in the jaw. Normal dentition. No uvula swelling. No posterior oropharyngeal erythema.   Eyes: Conjunctivae and lids are normal. Right eye exhibits no discharge. Left eye exhibits no discharge. No scleral icterus.   Neck: Trachea normal, normal range of motion, full passive range of motion without pain and phonation normal. Neck supple.   Cardiovascular: Normal rate, regular rhythm, normal heart sounds and normal pulses.   Pulmonary/Chest: Effort normal and breath sounds normal. No respiratory distress.   Abdominal: Soft. Normal appearance and bowel sounds are normal. She exhibits no distension, no pulsatile midline mass and no mass. There is no abdominal tenderness.   Genitourinary: Rectum:      Guaiac result negative.     Musculoskeletal: Normal range of motion.         General: No  deformity.   Neurological: She is alert and oriented to person, place, and time. She exhibits normal muscle tone. Coordination normal.   Skin: Skin is warm, dry, intact, not diaphoretic and not pale.   Psychiatric: Her speech is normal and behavior is normal. Judgment and thought content normal.   Nursing note and vitals reviewed.        Assessment:       1. Diarrhea, unspecified type    2. Abdominal pain, unspecified abdominal location    3. Diverticulitis        Plan:         Diarrhea, unspecified type  -     POCT occult blood stool  -     metroNIDAZOLE (FLAGYL) 500 MG tablet; Take 1 tablet (500 mg total) by mouth 2 (two) times daily. for 7 days  Dispense: 14 tablet; Refill: 0  -     dicyclomine (BENTYL) 20 mg tablet; Take 1 tablet (20 mg total) by mouth every 6 (six) hours.  Dispense: 30 tablet; Refill: 0    Abdominal pain, unspecified abdominal location  -     POCT occult blood stool  -     metroNIDAZOLE (FLAGYL) 500 MG tablet; Take 1 tablet (500 mg total) by mouth 2 (two) times daily. for 7 days  Dispense: 14 tablet; Refill: 0  -     dicyclomine (BENTYL) 20 mg tablet; Take 1 tablet (20 mg total) by mouth every 6 (six) hours.  Dispense: 30 tablet; Refill: 0    Diverticulitis  -     POCT occult blood stool  -     metroNIDAZOLE (FLAGYL) 500 MG tablet; Take 1 tablet (500 mg total) by mouth 2 (two) times daily. for 7 days  Dispense: 14 tablet; Refill: 0  -     dicyclomine (BENTYL) 20 mg tablet; Take 1 tablet (20 mg total) by mouth every 6 (six) hours.  Dispense: 30 tablet; Refill: 0          Patient Instructions     Patient Instructions      Please follow up with your Primary care provider within 2-5 days if your signs and symptoms have not resolved or worsen.  If your condition worsens or fails to improve we recommend that you receive another evaluation at the emergency room immediately or contact your primary medical clinic to discuss your concerns.      You must understand that you have received an Urgent Care  treatment only and that you may be released before all of your medical problems are known or treated.   You, the patient, will arrange for follow up care as instructed.        Use gatorade/pedialyte/coconut water or rehydration packets to help stay hydrated. Vitamin water and plain water do not contain rehydrating electrolytes.  Increase clear liquids (water, gatorade, pedialyte, broths, jello, etc) Hold off on solids for 12-18 hours. Then advance to BRAT diet (banana, rice, applesauce, tea, toast/crackers), then advance further as tolerated.  Use Peptobismol to help alleviate your diarrhea symptoms. Avoid immodium.   Diverticulitis    Some people get pouches along the wall of the colon as they get older. The pouches, called diverticuli, usually cause no symptoms. If the pouches become blocked, you can get an infection. This infection is called diverticulitis. It causes pain in your lower abdomen and fever. If not treated, it can become a serious condition, causing an abscess to form inside the pouch. The abscess may block the intestinal tract even or rupture, spreading infection throughout the abdomen.  When treatment is started early, oral antibiotics alone may be enough to cure diverticulitis. This method is tried first. But, if you don't improve or if your condition gets worse while using oral antibiotics, you may need to be admitted to the hospital for IV antibiotics. Severe cases may require surgery.  Home care  The following guidelines will help you care for yourself at home:  · During the acute illness, rest and follow your healthcare provider's instructions about diet. Sometimes you will need to follow a clear liquid diet to rest your bowel. Once your symptoms are better, you may be told to follow a low-fiber diet for some time. Include foods like:  ¨ Flake cereal, mashed potatoes, pancakes, waffles, pasta, white bread, rice, applesauce, bananas, eggs, fish, poultry, tofu, and cooked soft vegetables  · Take  antibiotics exactly as instructed. Don't miss any doses or stop taking the medication, even if you feel better.  · Monitor your temperature and tell your healthcare provider if you have rising temperatures.  Preventing future attacks  Once you have an episode of diverticulitis, you are at risk for having it again. After you have recovered from this episode, you may be able to lower your risk by eating a high-fiber diet (20 gm/day to 35 gm/day of fiber). This cleans out the colon pouches that already exist and may prevent new ones from forming. Foods high in fiber include fresh fruits and edible peelings, raw or lightly cooked vegetables, whole grain cereals and breads, dried beans and peas, and bran.  Other steps that can help prevent future attacks include:  · Take your medicines, such as antibiotics, as your healthcare provider says.  · Drink 6 to 8 glasses of water every day, unless told otherwise.  · Use a heating pad or hot water bottle to help abdominal cramping or pain.  · Begin an exercise program. Ask your healthcare provider how to get started. You can benefit from simple activities such as walking or gardening.  · Treat diarrhea with a bland diet. Start with liquids only; then slowly add fiber over time.  · Watch for changes in your bowel movements (constipation to diarrhea). Avoid constipation by eating a high fiber diet and taking a stool softener if needed.  · Get plenty of rest and sleep.  Follow-up care  Follow up with your healthcare provider as advised or sooner if you are not getting better in the next 2 days.  When to seek medical advice  Call your healthcare provider right away if any of these occur:  · Fever of 100.4°F (38°C) or higher, or as directed by your healthcare provider  · Repeated vomiting or swelling of the abdomen  · Weakness, dizziness, light-headedness  · Pain in your abdomen that gets worse, severe, or spreads to your back  · Pain that moves to the right lower abdomen  · Rectal  bleeding (stools that are red, black or maroon color)  · Unexpected vaginal bleeding  Date Last Reviewed: 9/1/2016  © 0161-8643 [employer]. [address] [place] [address], [place] [address]. All rights reserved. This information is not intended as a substitute for professional medical care. Always follow your healthcare professional's instructions.

## 2020-07-08 ENCOUNTER — LAB VISIT (OUTPATIENT)
Dept: LAB | Facility: HOSPITAL | Age: 75
End: 2020-07-08
Attending: NURSE PRACTITIONER
Payer: MEDICARE

## 2020-07-08 DIAGNOSIS — C45.0 MALIGNANT PLEURAL MESOTHELIOMA: Chronic | ICD-10-CM

## 2020-07-08 DIAGNOSIS — N18.30 CKD (CHRONIC KIDNEY DISEASE) STAGE 3, GFR 30-59 ML/MIN: ICD-10-CM

## 2020-07-08 LAB
ALBUMIN SERPL BCP-MCNC: 3.6 G/DL (ref 3.5–5.2)
ALP SERPL-CCNC: 88 U/L (ref 55–135)
ALT SERPL W/O P-5'-P-CCNC: 9 U/L (ref 10–44)
ANION GAP SERPL CALC-SCNC: 11 MMOL/L (ref 8–16)
ANION GAP SERPL CALC-SCNC: 11 MMOL/L (ref 8–16)
AST SERPL-CCNC: 20 U/L (ref 10–40)
BILIRUB SERPL-MCNC: 0.2 MG/DL (ref 0.1–1)
BUN SERPL-MCNC: 55 MG/DL (ref 8–23)
BUN SERPL-MCNC: 55 MG/DL (ref 8–23)
CALCIUM SERPL-MCNC: 9.7 MG/DL (ref 8.7–10.5)
CALCIUM SERPL-MCNC: 9.7 MG/DL (ref 8.7–10.5)
CHLORIDE SERPL-SCNC: 103 MMOL/L (ref 95–110)
CHLORIDE SERPL-SCNC: 103 MMOL/L (ref 95–110)
CO2 SERPL-SCNC: 25 MMOL/L (ref 23–29)
CO2 SERPL-SCNC: 25 MMOL/L (ref 23–29)
CREAT SERPL-MCNC: 4.3 MG/DL (ref 0.5–1.4)
CREAT SERPL-MCNC: 4.3 MG/DL (ref 0.5–1.4)
ERYTHROCYTE [DISTWIDTH] IN BLOOD BY AUTOMATED COUNT: 14.9 % (ref 11.5–14.5)
EST. GFR  (AFRICAN AMERICAN): 11 ML/MIN/1.73 M^2
EST. GFR  (AFRICAN AMERICAN): 11 ML/MIN/1.73 M^2
EST. GFR  (NON AFRICAN AMERICAN): 10 ML/MIN/1.73 M^2
EST. GFR  (NON AFRICAN AMERICAN): 10 ML/MIN/1.73 M^2
GLUCOSE SERPL-MCNC: 145 MG/DL (ref 70–110)
GLUCOSE SERPL-MCNC: 145 MG/DL (ref 70–110)
HCT VFR BLD AUTO: 27.4 % (ref 37–48.5)
HGB BLD-MCNC: 8.7 G/DL (ref 12–16)
IMM GRANULOCYTES # BLD AUTO: 0.03 K/UL (ref 0–0.04)
MCH RBC QN AUTO: 33.5 PG (ref 27–31)
MCHC RBC AUTO-ENTMCNC: 31.8 G/DL (ref 32–36)
MCV RBC AUTO: 105 FL (ref 82–98)
NEUTROPHILS # BLD AUTO: 4.8 K/UL (ref 1.8–7.7)
PLATELET # BLD AUTO: 239 K/UL (ref 150–350)
PMV BLD AUTO: 8.7 FL (ref 9.2–12.9)
POTASSIUM SERPL-SCNC: 3.3 MMOL/L (ref 3.5–5.1)
POTASSIUM SERPL-SCNC: 3.3 MMOL/L (ref 3.5–5.1)
PROT SERPL-MCNC: 7.6 G/DL (ref 6–8.4)
RBC # BLD AUTO: 2.6 M/UL (ref 4–5.4)
SODIUM SERPL-SCNC: 139 MMOL/L (ref 136–145)
SODIUM SERPL-SCNC: 139 MMOL/L (ref 136–145)
WBC # BLD AUTO: 6.65 K/UL (ref 3.9–12.7)

## 2020-07-08 PROCEDURE — 80053 COMPREHEN METABOLIC PANEL: CPT

## 2020-07-08 PROCEDURE — 85027 COMPLETE CBC AUTOMATED: CPT

## 2020-07-08 PROCEDURE — 36415 COLL VENOUS BLD VENIPUNCTURE: CPT

## 2020-07-09 ENCOUNTER — TELEPHONE (OUTPATIENT)
Dept: HEMATOLOGY/ONCOLOGY | Facility: CLINIC | Age: 75
End: 2020-07-09

## 2020-07-09 DIAGNOSIS — N18.30 CHRONIC KIDNEY DISEASE, STAGE III (MODERATE): Primary | ICD-10-CM

## 2020-07-09 RX ORDER — POTASSIUM CHLORIDE 750 MG/1
10 TABLET, EXTENDED RELEASE ORAL DAILY
Qty: 30 TABLET | Refills: 3 | Status: SHIPPED | OUTPATIENT
Start: 2020-07-09 | End: 2020-01-01

## 2020-07-09 NOTE — NURSING
Oncology Navigation   Intake      Treatment  Current Status: Completed    Support Systems: Children  Barriers of Care: Comorbidities  Comorbidities: Currently non ambulatory only with wheelchair and has been diagnosed with Parkinson's disease.       Acuity  Surgical Procedure Complexity: 2  ECO  Comorbidities in Medical History: 1  Hospitalization Within the Past Month: 2   Needed: 0  Support: 2  Verbalizes Financial Concerns: 0  Transportation: 0  History of noncompliance/frequent no shows and cancellations: 0  Verbalizes the need for more education: 0 (Per Daughter Nancy)  Other Factors (+1 for Each): 2  Navigation Acuity: 15     Follow Up  No follow-ups on file.   Called to speak with daughter to check up on f/u.  All chemotherapy has been discontinued due to pt's increasing physical decline.  Pt has been diagnosed with Parkinson's per her daughter and has significant mood swings to include sundowners in the afternoon.  Total care by family members. Daughter has sought psychological care to cope with the declining health of her mother.  Will call if any additional services needed.

## 2020-07-10 ENCOUNTER — OFFICE VISIT (OUTPATIENT)
Dept: HEMATOLOGY/ONCOLOGY | Facility: CLINIC | Age: 75
End: 2020-07-10
Payer: MEDICARE

## 2020-07-10 DIAGNOSIS — D63.0 ANEMIA IN NEOPLASTIC DISEASE: ICD-10-CM

## 2020-07-10 DIAGNOSIS — C45.0 MALIGNANT PLEURAL MESOTHELIOMA: Primary | ICD-10-CM

## 2020-07-10 DIAGNOSIS — N18.4 CKD (CHRONIC KIDNEY DISEASE) STAGE 4, GFR 15-29 ML/MIN: ICD-10-CM

## 2020-07-10 DIAGNOSIS — T88.7XXA MEDICATION SIDE EFFECT: ICD-10-CM

## 2020-07-10 PROCEDURE — 1159F PR MEDICATION LIST DOCUMENTED IN MEDICAL RECORD: ICD-10-PCS | Mod: 95,,, | Performed by: NURSE PRACTITIONER

## 2020-07-10 PROCEDURE — 99499 UNLISTED E&M SERVICE: CPT | Mod: 95,,, | Performed by: NURSE PRACTITIONER

## 2020-07-10 PROCEDURE — 99499 RISK ADDL DX/OHS AUDIT: ICD-10-PCS | Mod: 95,,, | Performed by: NURSE PRACTITIONER

## 2020-07-10 PROCEDURE — 1101F PR PT FALLS ASSESS DOC 0-1 FALLS W/OUT INJ PAST YR: ICD-10-PCS | Mod: CPTII,95,, | Performed by: NURSE PRACTITIONER

## 2020-07-10 PROCEDURE — 99213 OFFICE O/P EST LOW 20 MIN: CPT | Mod: 95,,, | Performed by: NURSE PRACTITIONER

## 2020-07-10 PROCEDURE — 1101F PT FALLS ASSESS-DOCD LE1/YR: CPT | Mod: CPTII,95,, | Performed by: NURSE PRACTITIONER

## 2020-07-10 PROCEDURE — 1159F MED LIST DOCD IN RCRD: CPT | Mod: 95,,, | Performed by: NURSE PRACTITIONER

## 2020-07-10 PROCEDURE — 99213 PR OFFICE/OUTPT VISIT, EST, LEVL III, 20-29 MIN: ICD-10-PCS | Mod: 95,,, | Performed by: NURSE PRACTITIONER
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
## 2020-07-15 ENCOUNTER — TELEPHONE (OUTPATIENT)
Dept: NEUROLOGY | Facility: CLINIC | Age: 75
End: 2020-07-15

## 2020-07-15 NOTE — TELEPHONE ENCOUNTER
"Spoke with Aleksandr, she was informed there has been some changes to Dr. Mancia schedule, there are virtual appts and in person appts, Ms. Preston 7/27/2020 appt will need to be converted to a virtual visit. Aleksandr verbalized understanding asking for the next in person visit. She was informed it will be 8/6/2020 at 2 pm. Per Aleksandr "she will call the clinic back to schedule after speaking with her sisters, I may not be able to bring my mom to the appt".  "

## 2020-07-15 NOTE — TELEPHONE ENCOUNTER
----- Message from Ramila Payton sent at 7/15/2020 11:15 AM CDT -----  Regarding: daughter  Pt daughter called and stated her mother will keep the appt for the  7/27. And it can be change to virtual visit.

## 2020-07-20 ENCOUNTER — PATIENT MESSAGE (OUTPATIENT)
Dept: PSYCHIATRY | Facility: CLINIC | Age: 75
End: 2020-07-20

## 2020-07-24 DIAGNOSIS — E11.9 TYPE 2 DIABETES MELLITUS WITHOUT COMPLICATION: ICD-10-CM

## 2020-07-27 ENCOUNTER — OFFICE VISIT (OUTPATIENT)
Dept: NEUROLOGY | Facility: CLINIC | Age: 75
End: 2020-07-27
Payer: MEDICARE

## 2020-07-27 DIAGNOSIS — R25.1 TREMOR: ICD-10-CM

## 2020-07-27 DIAGNOSIS — R41.3 MEMORY LOSS: ICD-10-CM

## 2020-07-27 PROCEDURE — 99215 PR OFFICE/OUTPT VISIT, EST, LEVL V, 40-54 MIN: ICD-10-PCS | Mod: 95,,, | Performed by: PSYCHIATRY & NEUROLOGY

## 2020-07-27 PROCEDURE — 1101F PT FALLS ASSESS-DOCD LE1/YR: CPT | Mod: CPTII,95,, | Performed by: PSYCHIATRY & NEUROLOGY

## 2020-07-27 PROCEDURE — 99499 RISK ADDL DX/OHS AUDIT: ICD-10-PCS | Mod: 95,,, | Performed by: PSYCHIATRY & NEUROLOGY

## 2020-07-27 PROCEDURE — 1159F PR MEDICATION LIST DOCUMENTED IN MEDICAL RECORD: ICD-10-PCS | Mod: 95,,, | Performed by: PSYCHIATRY & NEUROLOGY

## 2020-07-27 PROCEDURE — 1101F PR PT FALLS ASSESS DOC 0-1 FALLS W/OUT INJ PAST YR: ICD-10-PCS | Mod: CPTII,95,, | Performed by: PSYCHIATRY & NEUROLOGY

## 2020-07-27 PROCEDURE — 99499 UNLISTED E&M SERVICE: CPT | Mod: 95,,, | Performed by: PSYCHIATRY & NEUROLOGY

## 2020-07-27 PROCEDURE — 99215 OFFICE O/P EST HI 40 MIN: CPT | Mod: 95,,, | Performed by: PSYCHIATRY & NEUROLOGY

## 2020-07-27 PROCEDURE — 1159F MED LIST DOCD IN RCRD: CPT | Mod: 95,,, | Performed by: PSYCHIATRY & NEUROLOGY

## 2020-07-27 RX ORDER — CARBIDOPA AND LEVODOPA 25; 100 MG/1; MG/1
1 TABLET ORAL 3 TIMES DAILY
Qty: 90 TABLET | Refills: 11 | Status: ON HOLD | OUTPATIENT
Start: 2020-07-27 | End: 2020-01-01 | Stop reason: SDUPTHER

## 2020-07-27 RX ORDER — DONEPEZIL HYDROCHLORIDE 10 MG/1
10 TABLET, FILM COATED ORAL NIGHTLY
Qty: 30 TABLET | Refills: 11 | Status: SHIPPED | OUTPATIENT
Start: 2020-07-27 | End: 2021-01-01

## 2020-07-27 NOTE — PROGRESS NOTES
"  The patient location is: home  The chief complaint leading to consultation is: tremor  Visit type: audiovisual  Total time spent with patient: 20 mins  Each patient to whom he or she provides medical services by telemedicine is:  (1) informed of the relationship between the physician and patient and the respective role of any other health care provider with respect to management of the patient; and (2) notified that he or she may decline to receive medical services by telemedicine and may withdraw from such care at any time.    Notes: below      MOVEMENT DISORDERS CLINIC    PCP/Referring Provider: No referring provider defined for this encounter.  Date of Service: 7/27/2020    Chief Complaint: tremors, gait imbalance      Interval Hx    Since last visit,   Stopped abilify and tremors remain but are less prominent R>L handed - she feels embarassed when she tries to eat  Walking improved - can walk 20 feet before she gets out of breath - no longer major shuffling  Continues off Abilify but depression is ongoing  Family reporting hallucinations but on further asking she is asleep and acting out her dreams  She does have a delusion of dropping through a hole if she walks through a doorframe  No other delusions  Memory is stable but poor  She forgets what she said several minutes before    Did not yet start carbidopa/levodopa 25/100mg 1 tab PO TID     Has not tried memory medications    Prior management  MRI brent was nomral  EEG OK  No seizure-like events    "PriorHPI: Isabell Preston is a R HANDED 74 y.o. 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   She notes a resting and tremor arms and legs since 1 year. When she hold a glass of water, her hands shake and food spills. Tremor can come and go. Struggles to put on makeup. Struggles to put in her earrings. " "Started Abilify since at least 2 years.  Issues walking since 1 year. Uses a cane or walker. Falls seldomly. Can walk 100  Feet before she tired. No feet shuffling. Walking continues to decline.    No fam hx tremors or PD.  MRI brain showed no atrophy 2019    Neuroleptic exposure:  Abilify, compazine"    PD Review of Symptoms:  Anosmia: poor smell x 6 months  Dysarthria/Hypophonia: none  Dysphagia/Sialorrhea:none  Depression: yes  Cognitive slowing: prior to chemo , short term memory issues, and steadily decreased - forgets date, where here family is, events and tasks  Hallucinations: yes during hospitalization  Sleep issues:  -RBD: talks in her sleep    Review of Systems:   Review of Systems   Constitutional: Negative for fever.   HENT: Negative for congestion.    Eyes: Negative for double vision.   Respiratory: Negative for cough and shortness of breath.    Cardiovascular: Negative for chest pain and leg swelling.   Gastrointestinal: Negative for nausea.   Genitourinary: Negative for dysuria.   Musculoskeletal: Positive for falls.   Skin: Negative for rash.   Neurological: Positive for tremors. Negative for speech change and headaches.   Psychiatric/Behavioral: Positive for depression, hallucinations and memory loss.         Current Medications:  Outpatient Encounter Medications as of 7/27/2020   Medication Sig Dispense Refill    acetaminophen (TYLENOL) 500 MG tablet Take 2 tablets (1,000 mg total) by mouth every 6 (six) hours as needed for Pain. 30 tablet 0    alcohol swabs PadM Apply 1 each topically as needed.      amLODIPine (NORVASC) 10 MG tablet TAKE 1 TABLET BY MOUTH EVERY DAY 90 tablet 0    blood sugar diagnostic (BLOOD GLUCOSE TEST) Strp 1 strip by Misc.(Non-Drug; Combo Route) route daily as needed. 90 each 1    blood-glucose meter kit 1 each by Other route daily as needed for Other. Use as instructed 1 each 0    carvediloL (COREG) 6.25 MG tablet Take 0.5 tablets (3.125 mg total) by mouth 2 (two) " times daily with meals. 30 tablet 11    chlorthalidone (HYGROTEN) 25 MG Tab Take 2 tablets (50 mg total) by mouth once daily. 60 tablet 11    desoximetasone (TOPICORT) 0.05 % cream as needed.       dicyclomine (BENTYL) 10 MG capsule TAKE 1 CAPSULE BY MOUTH TWICE A DAY 90 capsule 0    dicyclomine (BENTYL) 20 mg tablet Take 1 tablet (20 mg total) by mouth every 6 (six) hours. 30 tablet 0    DULoxetine (CYMBALTA) 30 MG capsule TAKE 1 CAPSULE BY MOUTH EVERY DAY 30 capsule 1    fluticasone (VERAMYST) 27.5 mcg/actuation nasal spray 2 sprays by Nasal route daily as needed for Rhinitis or Allergies.       folic acid (FOLVITE) 400 MCG tablet Take 1 tablet (400 mcg total) by mouth once daily. 30 tablet 11    gabapentin (NEURONTIN) 100 MG capsule TAKE 1 CAPSULE BY MOUTH TWICE A  capsule 1    lancets (TRUEPLUS LANCETS) 28 gauge Misc 1 lancet by Misc.(Non-Drug; Combo Route) route once daily. Test blood sugar once daily, type 2 diabetes, controlled. E11.9 100 each 3    lancets-blood glucose strips 30 gauge Cmpk by Misc.(Non-Drug; Combo Route) route.      lidocaine-prilocaine (EMLA) cream Apply topically as needed. 30 g 1    LINZESS 145 mcg Cap capsule TAKE 1 CAPSULE BY MOUTH EVERY DAY (Patient taking differently: daily as needed. ) 90 capsule 0    lisinopriL (PRINIVIL,ZESTRIL) 5 MG tablet Take 0.5 tablets (2.5 mg total) by mouth once daily. 45 tablet 3    magic mouthwash diphen/antac/lidoc/nysta Swish10 mLs 4 (four) times daily. 120 mL 0    mometasone 0.1% (ELOCON) 0.1 % cream BALWINDER TO DARK AREAS BID ON LEGS PRN 15 g 1    ondansetron (ZOFRAN) 8 MG tablet Take 1 tablet (8 mg total) by mouth every 8 (eight) hours as needed for Nausea. 60 tablet 2    oxyCODONE (ROXICODONE) 10 mg Tab immediate release tablet Take 1 tablet (10 mg total) by mouth every 6 (six) hours as needed. 60 tablet 0    potassium chloride SA (K-DUR,KLOR-CON) 10 MEQ tablet Take 1 tablet (10 mEq total) by mouth once daily. 30 tablet 3     prochlorperazine (COMPAZINE) 10 MG tablet TAKE 1 TABLET BY MOUTH EVERY 6 HOURS AS NEEDED 60 tablet 0    promethazine (PHENERGAN) 25 MG tablet Take 1 tablet (25 mg total) by mouth every 6 (six) hours as needed for Nausea (Taken headache does not resolve). 60 tablet 0    tiZANidine (ZANAFLEX) 4 MG tablet TAKE 1 TABLETBY MOUTH NIGHTLY AT BEDTIME AS NEEDED 90 tablet 0     No facility-administered encounter medications on file as of 7/27/2020.        Past Medical History:  Patient Active Problem List   Diagnosis    Diarrhea    Abdominal pain    Hypertension, essential    Blurred vision, left eye    SI (sacroiliac) joint dysfunction    Muscle spasm    Spinal enthesopathy    DDD (degenerative disc disease), lumbar    Chronic deep vein thrombosis (DVT) of distal vein of lower extremity, unspecified laterality    Hyperlipidemia    History of CVA (cerebrovascular accident)    Iron deficiency anemia due to sideropenic dysphagia    Diverticulosis of large intestine with hemorrhage    Other fatigue    Neuropathic pain    Type 2 diabetes mellitus without complication, without long-term current use of insulin    Acute renal failure superimposed on stage 3 chronic kidney disease    Acute respiratory insufficiency    Pain from hematoma evacuation    Hematoma of chest wall, right, initial encounter    GINA (acute kidney injury)    History of DVT of lower extremity    Heart palpitations    Chest wall pain    Constipation    Malignant pleural mesothelioma    Chemotherapy induced nausea and vomiting    Chemotherapy induced neutropenia    Chemotherapy adverse reaction, initial encounter    Chest pain    Tremor    Memory loss    Acute viral syndrome    Pulmonary hypertension    Left lower quadrant abdominal pain    Acute renal failure with acute tubular necrosis superimposed on stage 3 chronic kidney disease    Macrocytic anemia    Diverticulosis    Discharge planning issues    CKD (chronic kidney  disease) stage 5, GFR less than 15 ml/min       Past Surgical History:  Past Surgical History:   Procedure Laterality Date    ANKLE FRACTURE SURGERY      left ankle    APENDIX AND GALL BLADDER REMOVED      APPENDECTOMY      BREAST SURGERY      lumpectomy right side - benign    CHOLECYSTECTOMY      colon resection for diverticulitis x 2      HEMORRHOID SURGERY      HERNIA REPAIR      umbilical hernia repair    HYSTERECTOMY      INSERTION OF TUNNELED CENTRAL VENOUS CATHETER (CVC) WITH SUBCUTANEOUS PORT Left 2019    Procedure: INSERTION, PORT-A-CATH;  Surgeon: Sebastian Prasad MD;  Location: [facility] CATH LAB;  Service: Radiology;  Laterality: Left;    PLEURODESIS WITH VIDEO-ASSISTED THORACOSCOPIC SURGERY (VATS) Right 7/3/2019    Procedure: VATS, WITH PLEURODESIS;  Surgeon: Ben Smith MD;  Location: [address];  Service: Thoracic;  Laterality: Right;    THORACOSCOPIC BIOPSY OF PLEURA Right 7/3/2019    Procedure: VATS, WITH PLEURA BIOPSY;  Surgeon: Ben Smith MD;  Location: [facility] OR [address] [place] [address];  Service: Thoracic;  Laterality: Right;  RIGHT VATS, DRAINAGE, PLEURAL BIOPSY  possible  THORACOTOMY  PLEURODESIS  possible   PLEURX    TONSILLECTOMY      TOTAL ABDOMINAL HYSTERECTOMY W/ BILATERAL SALPINGOOPHORECTOMY      UMBILICAL HERNIA REPAIR         Current Living Situation: home    Social:  Social History     Socioeconomic History    Marital status:      Spouse name: Not on file    Number of children: 3    Years of education: Not on file    Highest education level: Not on file   Occupational History    Occupation: retired -    Social Needs    Financial resource strain: Not on file    Food insecurity     Worry: Not on file     Inability: Not on file    Transportation needs     Medical: Not on file     Non-medical: Not on file   Tobacco Use    Smoking status: Former Smoker     Types: Cigarettes     Quit date: 1970     Years since quittin.0     Smokeless tobacco: Never Used   Substance and Sexual Activity    Alcohol use: No    Drug use: No    Sexual activity: Not on file   Lifestyle    Physical activity     Days per week: Not on file     Minutes per session: Not on file    Stress: Not on file   Relationships    Social connections     Talks on phone: Not on file     Gets together: Not on file     Attends Yazidi service: Not on file     Active member of club or organization: Not on file     Attends meetings of clubs or organizations: Not on file     Relationship status: Not on file   Other Topics Concern    Patient feels they ought to cut down on drinking/drug use Not Asked    Patient annoyed by others criticizing their drinking/drug use Not Asked    Patient has felt bad or guilty about drinking/drug use Not Asked    Patient has had a drink/used drugs as an eye opener in the AM Not Asked   Social History Narrative    Not on file       Family History:  Family History   Problem Relation Age of Onset    Glaucoma Mother     Stroke Mother     Stroke Paternal Uncle     Early death Paternal Uncle          from stroke in 40s    Cancer Father         multiple myeloma    Arthritis Father     Cataracts Sister     Diabetes Sister     Arthritis Sister     Alcohol abuse Brother     Depression Brother     Clotting disorder Maternal Aunt         DVT    Birth defects Daughter         bilateral ear defects    Heart disease Daughter         Sinus tachycardia    Cataracts Paternal Grandmother     Arthritis Paternal Grandmother     Diabetes Paternal Grandmother     Glaucoma Paternal Grandmother     Breast cancer Maternal Aunt     Ovarian cancer Daughter     Schizophrenia Neg Hx     Suicide Neg Hx        PHYSICAL:  There were no vitals taken for this visit.  Physical Exam  Constitutional: Well-developed, well-nourished, appears stated age  Eyes: No scleral icterus  ENT: Moist oral mucosa  Cardiovascular: No lower extremity edema    Respiratory: No labored breathing   Skin: No rash   Hematologic: No bruising  Psychiatric: No depression  Other: GI/ deferred   · Mental status: Alert and oriented to person, place, time, and situation;   · Speech: normal (not dysarthric), no aphasia  · Cranial nerves:            · CN II: Pupils mid-position and equal, not tested light or accommodation  · CN III, IV, VI: Extraocular movements full, no nystagmus visualized  · CN V: Not tested   · CN VII: Face strong and symmetric bilaterally   · CN VIII: Hearing intact to voice and conversation   · CN IX, X: Palate raises midline and symmetric   · CN XI: Strong shoulder shrug B   · CN XII: Tongue appears midline   · Motor: Normal bulk by appearance, no drift   · Sensory: Not tested    · Gait: Not tested  · Deep tendon reflexes: Not tested  · Movement/Coordination                    No hypophonic speech.                     Mild facial masking.                   R hand pillrolling tremor moderate                      No other dystonia, chorea, athetosis, myoclonus, or tics visualized.  No lip smacking or dyskinesias    Bradykinesia  ? Finger taps Finger flicks BRETT Heel taps   Left 1+ 1+ - -   Right 2+ 2+ - -       Laboratory Data:  NA    Imaging:  MRI brain w/o atrophy 2019      Assessment//Plan:   Problem List Items Addressed This Visit        Neuro    Tremor    Current Assessment & Plan     Debilitating pillrolling tremor, shuffling gait, which was symmetrical and since stopping abilify is R-sided. Gait improved.  Thi is suggestive of iPD with unmasking with neuroleptics. 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               Follow-up: 2mos  Discussed the importance of  ongoing exercise in efforts to improve mobility and balance.    Cassandra Mancia MD, MS  [facility]  Department of Neurology  Movement Disorders

## 2020-07-27 NOTE — ASSESSMENT & PLAN NOTE
Debilitating pillrolling tremor, shuffling gait, which was symmetrical and since stopping abilify is R-sided. Gait improved.  Thi is suggestive of iPD with unmasking with neuroleptics. Suggested now that we have strong evidence of underlying PDism, may start carbidopa/levodopa 25/100mg 1 tab PO TID (after starting aricept to curb delusions.)

## 2020-07-29 DIAGNOSIS — C45.0 MALIGNANT PLEURAL MESOTHELIOMA: Primary | Chronic | ICD-10-CM

## 2020-07-29 RX ORDER — ONDANSETRON HYDROCHLORIDE 8 MG/1
8 TABLET, FILM COATED ORAL EVERY 8 HOURS PRN
Qty: 60 TABLET | Refills: 2 | Status: ON HOLD | OUTPATIENT
Start: 2020-07-29 | End: 2020-01-01 | Stop reason: SDUPTHER

## 2020-08-01 PROBLEM — B02.30 HERPES ZOSTER WITH OPHTHALMIC COMPLICATION: Status: ACTIVE | Noted: 2020-01-01

## 2020-08-01 PROBLEM — T42.8X5A: Status: ACTIVE | Noted: 2020-01-01

## 2020-08-01 NOTE — ED TRIAGE NOTES
Patient is a 74 year old female that presents to ED with c/o possible medication reaction. Patient's daughter at bedside sttes pt was recentyl dx with parkinson's and started a new med Carvadopa-levadopa and has had multiple reactions to the medication including: blisters to the mouth and face, rapid heart rate, and a period of alerted mental status today. Patient's daughter describes episode as a a period of staring off into space while brushing teeth and had loss of bowel and bladder today. Patient is currently AAOx3. Denies any pain, SOB, chest pain, and fevers. Patient has blisters noted to right side of face and in mouth.

## 2020-08-01 NOTE — ED NOTES
Patient assisted to bedpan for urine sample. Urine collected and sent to lab for analysis. Patient on continuous cardiac monitor, BP cuff, and pulse ox. VSS. Side rails up and bed in lowest position. Call light in reach. Pt's daughter at bedside. Will continue to monitor.
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
## 2020-08-02 PROBLEM — S20.211A: Status: RESOLVED | Noted: 2019-07-02 | Resolved: 2020-01-01

## 2020-08-02 PROBLEM — R10.32 LEFT LOWER QUADRANT ABDOMINAL PAIN: Status: RESOLVED | Noted: 2020-05-05 | Resolved: 2020-01-01

## 2020-08-02 PROBLEM — N17.0 ACUTE RENAL FAILURE WITH ACUTE TUBULAR NECROSIS SUPERIMPOSED ON STAGE 3 CHRONIC KIDNEY DISEASE: Status: RESOLVED | Noted: 2020-05-05 | Resolved: 2020-01-01

## 2020-08-02 PROBLEM — N18.30 ACUTE RENAL FAILURE WITH ACUTE TUBULAR NECROSIS SUPERIMPOSED ON STAGE 3 CHRONIC KIDNEY DISEASE: Status: RESOLVED | Noted: 2020-05-05 | Resolved: 2020-01-01

## 2020-08-02 PROBLEM — R06.89 ACUTE RESPIRATORY INSUFFICIENCY: Status: RESOLVED | Noted: 2019-06-30 | Resolved: 2020-01-01

## 2020-08-02 PROBLEM — N17.9 AKI (ACUTE KIDNEY INJURY): Status: RESOLVED | Noted: 2019-07-02 | Resolved: 2020-01-01

## 2020-08-02 PROBLEM — R07.9 CHEST PAIN: Status: RESOLVED | Noted: 2019-11-10 | Resolved: 2020-01-01

## 2020-08-02 PROBLEM — B34.9 ACUTE VIRAL SYNDROME: Status: RESOLVED | Noted: 2020-03-27 | Resolved: 2020-01-01

## 2020-08-02 PROBLEM — D53.9 MACROCYTIC ANEMIA: Status: RESOLVED | Noted: 2020-05-05 | Resolved: 2020-01-01

## 2020-08-02 PROBLEM — K57.90 DIVERTICULOSIS: Status: RESOLVED | Noted: 2020-05-05 | Resolved: 2020-01-01

## 2020-08-02 PROBLEM — N18.30 ACUTE RENAL FAILURE SUPERIMPOSED ON STAGE 3 CHRONIC KIDNEY DISEASE: Status: RESOLVED | Noted: 2019-06-12 | Resolved: 2020-01-01

## 2020-08-02 PROBLEM — N17.9 ACUTE RENAL FAILURE SUPERIMPOSED ON STAGE 3 CHRONIC KIDNEY DISEASE: Status: RESOLVED | Noted: 2019-06-12 | Resolved: 2020-01-01

## 2020-08-02 PROBLEM — R07.81 PLEURITIC CHEST PAIN: Status: RESOLVED | Noted: 2019-07-02 | Resolved: 2020-01-01

## 2020-08-02 PROBLEM — R00.2 HEART PALPITATIONS: Status: RESOLVED | Noted: 2019-07-03 | Resolved: 2020-01-01

## 2020-08-02 PROBLEM — Z86.718 HISTORY OF DVT OF LOWER EXTREMITY: Status: RESOLVED | Noted: 2019-07-03 | Resolved: 2020-01-01

## 2020-08-02 PROBLEM — I27.20 PULMONARY HYPERTENSION: Status: RESOLVED | Noted: 2020-04-27 | Resolved: 2020-01-01

## 2020-08-02 PROBLEM — R07.89 CHEST WALL PAIN: Status: RESOLVED | Noted: 2019-07-03 | Resolved: 2020-01-01

## 2020-08-02 PROBLEM — Z75.8 DISCHARGE PLANNING ISSUES: Status: RESOLVED | Noted: 2020-05-12 | Resolved: 2020-01-01

## 2020-08-02 NOTE — ASSESSMENT & PLAN NOTE
Patient followed by Oncology clinic and previously on chemotherapy, currently on hold secondary to worsening kidney function.

## 2020-08-02 NOTE — ASSESSMENT & PLAN NOTE
· Patient with debilitating b/l UE and LE tremor, followed by Neurology outpatient. Suspected  PD, however tremor worsened after weaning off Abilify and patient recently started on Sinemet  · At home patient having sweating, palpitations, and insomnia, so patient stopped taking due to concern side effects of Sinemet. After discontinuing, patient had episode of what sounds like day-time sleepiness, possibly from abrupt discontinuation of the medication and low dopamine levels.  · Neurology consulted for further recommendations regarding need for Sinemet and/or dosage adjustments.

## 2020-08-02 NOTE — H&P
"Hospital Medicine  History and Physical Exam    Team: [facility]OSP MED K Zac Grossman MD  Admit Date: 8/1/2020  Principal Problem:  Accidental side effect of Parkinson agent   Patient information was obtained from patient, past medical records and ER records.   Primary care Physician: Ayo Archuleta MD  Code status: Full Code    HPI: 73 yo F with PMHx of mesothelioma, recent diagnosis of Parkinson's Disease on carbidopa/levodopa, CKD IV,  DVT on daily Lovenox, depression, DM2, HTN, and HLD who presents to the ED for an episode of decreased alertness today for about 10 minutes. The patient's daughter reports that the patient has had side effects of sweating,  palpitations, insomnia,and lack of appetite since starting carbidopa/levodopa and aricept 3 days ago after she was prescribed the medications by her neurologist (see neurology note from 7/27). She stopped taking the medication yesterday (last dose 7/30) because of the side effects. Early this morning, while the patient was brushing her teeth, the patient's daughter reports that the patient "dozed off" and started to slump over as if she was asleep. The patient's daughter reports that it was as if she was asleep standing up. The daughter guided her down to a chair. The patient was incontinent of stool during this episode. She woke up after about 10 minutes without any reported post-ictal period.     Additionally, the patient reports blisters around her L eye/forehead and sores in her mouth that also formed around Thursday, the same time she started the new medications. She describes the rash as very painful. She has cataracts in her L eye, but she reports that her vision has gotten even blurrier over the last couple of days. She has never had a similar rash. She denies any other rashes, fevers or chills. She has never had shingles before and is uncertain if she received the vaccine.    Hemoglobin A1C   Date Value Ref Range Status   05/05/2020 6.1 (H) 4.0 - 5.6 % " Final     Comment:     ADA Screening Guidelines:  5.7-6.4%  Consistent with prediabetes  >or=6.5%  Consistent with diabetes  High levels of fetal hemoglobin interfere with the HbA1C  assay. Heterozygous hemoglobin variants (HbS, HgC, etc)do  not significantly interfere with this assay.   However, presence of multiple variants may affect accuracy.     11/10/2019 6.9 (H) 4.0 - 5.6 % Final     Comment:     ADA Screening Guidelines:  5.7-6.4%  Consistent with prediabetes  >or=6.5%  Consistent with diabetes  High levels of fetal hemoglobin interfere with the HbA1C  assay. Heterozygous hemoglobin variants (HbS, HgC, etc)do  not significantly interfere with this assay.   However, presence of multiple variants may affect accuracy.     06/05/2019 6.2 (H) 4.0 - 5.6 % Final     Comment:     ADA Screening Guidelines:  5.7-6.4%  Consistent with prediabetes  >or=6.5%  Consistent with diabetes  High levels of fetal hemoglobin interfere with the HbA1C  assay. Heterozygous hemoglobin variants (HbS, HgC, etc)do  not significantly interfere with this assay.   However, presence of multiple variants may affect accuracy.         Past Medical History: Patient has a past medical history of Ambulates with cane, Anticoagulant long-term use, Anxiety, Behavioral problem, Cancer, Cataract, Clotting disorder, Colon polyp, DDD (degenerative disc disease), lumbar (6/27/2016), Deep vein thrombosis, Depression, Diabetes mellitus type II, Diverticulosis, Encounter for blood transfusion, Eye injuries, General anesthetics causing adverse effect in therapeutic use, History of blood clots, History of DVT of lower extremity (7/3/2019), History of psychiatric care, History of psychiatric hospitalization, Hyperlipidemia, Hypertension, Psychiatric problem, Retinal defect (2006), and Ulcer.    Past Surgical History: Patient has a past surgical history that includes Appendectomy; Cholecystectomy; Umbilical hernia repair; colon resection for diverticulitis x 2;  Total abdominal hysterectomy w/ bilateral salpingoophorectomy; Tonsillectomy; Hemorrhoid surgery; Ankle fracture surgery; Breast surgery (1998); Hernia repair (2000); Hysterectomy; APENDIX AND GALL BLADDER REMOVED; Thoracoscopic biopsy of pleura (Right, 7/3/2019); Pleurodesis with video-assisted thoracoscopic surgery (VATS) (Right, 7/3/2019); and Insertion of tunneled central venous catheter (CVC) with subcutaneous port (Left, 8/5/2019).    Social History: Patient reports that she quit smoking about 50 years ago. Her smoking use included cigarettes. She has never used smokeless tobacco. She reports that she does not drink alcohol or use drugs.    Family History: family history includes Alcohol abuse in her brother; Arthritis in her father, paternal grandmother, and sister; Birth defects in her daughter; Breast cancer in her maternal aunt; Cancer in her father; Cataracts in her paternal grandmother and sister; Clotting disorder in her maternal aunt; Depression in her brother; Diabetes in her paternal grandmother and sister; Early death in her paternal uncle; Glaucoma in her mother and paternal grandmother; Heart disease in her daughter; Ovarian cancer in her daughter; Stroke in her mother and paternal uncle.    Medications: reviewed     Allergies: Patient is allergic to ciprofloxacin; fructose; gluten protein; lactase; and latex, natural rubber.    ROS  Pain Scale: 7 /10   Constitutional: no fever or chills, Positive for sweating, insomnia, and lack of appetite  Respiratory: no cough or shortness of breath  Cardiovascular: no chest pain, Positive for palpitations  Gastrointestinal: no nausea or vomiting, no abdominal pain or change in bowel habits  Genitourinary: no hematuria or dysuria  Integument/Breast: Positive for blistering rash to L eye and forehead and mouth ulcers  Hematologic/Lymphatic: no easy bruising or lymphadenopathy  Musculoskeletal: no arthralgias or myalgias  Neurological: Positive for  tremors  Behavioral/Psych: no depression or anxiety    PEx  Temp:  [98.2 °F (36.8 °C)]   Pulse:  [68-74]   Resp:  [18]   BP: (143-179)/(74-86)   SpO2:  [97 %-98 %]   Body mass index is 25.82 kg/m².   No intake or output data in the 24 hours ending 08/01/20 1953    General appearance: no distress, pt. Resting comfortably  Mental status: Alert and oriented x 3  HEENT: L eye with surrounding erythema and edema, vesicular rash extending to laterally from eye (see pictures). 2 small Oral ulcerations noted to L side of buccal mucosa and L side of bottom lip  Neck: supple, thyroid not enlarged  Pulm:   normal respiratory effort, CTA B, no c/w/r  Card: RRR, S1, S2 normal, no murmur, click, rub or gallop  Abd: soft, NT, ND, BS present; no masses, no organomegaly  Ext: no c/c/e  Pulses: 2+, symmetric  Skin: Vsicular rash to L side of forehead and lateral of L eye with   Neuro: CN II-XII grossly intact, no focal numbness or weakness, normal strength and tone             Recent Results (from the past 24 hour(s))   Comprehensive metabolic panel    Collection Time: 08/01/20  3:16 PM   Result Value Ref Range    Sodium 137 136 - 145 mmol/L    Potassium 3.6 3.5 - 5.1 mmol/L    Chloride 103 95 - 110 mmol/L    CO2 22 (L) 23 - 29 mmol/L    Glucose 146 (H) 70 - 110 mg/dL    BUN, Bld 47 (H) 8 - 23 mg/dL    Creatinine 3.5 (H) 0.5 - 1.4 mg/dL    Calcium 9.7 8.7 - 10.5 mg/dL    Total Protein 8.0 6.0 - 8.4 g/dL    Albumin 3.8 3.5 - 5.2 g/dL    Total Bilirubin 0.3 0.1 - 1.0 mg/dL    Alkaline Phosphatase 83 55 - 135 U/L    AST 20 10 - 40 U/L    ALT 19 10 - 44 U/L    Anion Gap 12 8 - 16 mmol/L    eGFR if African American 14.2 (A) >60 mL/min/1.73 m^2    eGFR if non  12.3 (A) >60 mL/min/1.73 m^2   CBC auto differential    Collection Time: 08/01/20  3:16 PM   Result Value Ref Range    WBC 6.94 3.90 - 12.70 K/uL    RBC 3.01 (L) 4.00 - 5.40 M/uL    Hemoglobin 10.1 (L) 12.0 - 16.0 g/dL    Hematocrit 31.3 (L) 37.0 - 48.5 %    Mean  Corpuscular Volume 104 (H) 82 - 98 fL    Mean Corpuscular Hemoglobin 33.6 (H) 27.0 - 31.0 pg    Mean Corpuscular Hemoglobin Conc 32.3 32.0 - 36.0 g/dL    RDW 12.8 11.5 - 14.5 %    Platelets 260 150 - 350 K/uL    MPV 9.5 9.2 - 12.9 fL    Immature Granulocytes 0.3 0.0 - 0.5 %    Gran # (ANC) 5.0 1.8 - 7.7 K/uL    Immature Grans (Abs) 0.02 0.00 - 0.04 K/uL    Lymph # 1.3 1.0 - 4.8 K/uL    Mono # 0.6 0.3 - 1.0 K/uL    Eos # 0.0 0.0 - 0.5 K/uL    Baso # 0.04 0.00 - 0.20 K/uL    nRBC 0 0 /100 WBC    Gran% 72.4 38.0 - 73.0 %    Lymph% 18.0 18.0 - 48.0 %    Mono% 8.1 4.0 - 15.0 %    Eosinophil% 0.6 0.0 - 8.0 %    Basophil% 0.6 0.0 - 1.9 %    Differential Method Automated    Magnesium    Collection Time: 08/01/20  3:16 PM   Result Value Ref Range    Magnesium 1.5 (L) 1.6 - 2.6 mg/dL   TSH    Collection Time: 08/01/20  3:16 PM   Result Value Ref Range    TSH 1.475 0.400 - 4.000 uIU/mL   Urinalysis, Reflex to Urine Culture Urine, Clean Catch    Collection Time: 08/01/20  5:35 PM    Specimen: Urine   Result Value Ref Range    Specimen UA Urine, Catheterized     Color, UA Yellow Yellow, Straw, King[place]    Appearance, UA Hazy (A) Clear    pH, UA 5.0 5.0 - 8.0    Specific Gravity, UA 1.010 1.005 - 1.030    Protein, UA Negative Negative    Glucose, UA Negative Negative    Ketones, UA Negative Negative    Bilirubin (UA) Negative Negative    Occult Blood UA Negative Negative    Nitrite, UA Negative Negative    Leukocytes, UA Negative Negative       No results for input(s): POCTGLUCOSE in the last 168 hours.    There are no hospital problems to display for this patient.      Assessment and Plan:  iPD  Accidental Side effect of Parkinson Agent  - Patient with debilitating b/l UE and LE tremor, followed by neurology outpatient. Suspected  PD, however tremor worsened after weaning off abilify, so pt. Recently started on sinemet  -Side effects of sweating, palpitations, and insomnia, so pt. Discontinued drug. After discontinuing, pt. Had  episode of what sounds like day-time sleepiness, possibly from abrupt discontinuation of the medication and low dopamine levels  -Neurology consulted for further recommendations regarding need for sinemet and/or dosage adjustments. Continue to monitor pt. Overnight for any further episodes     Herpes Zoster with Ophthalmic Complication  -Pt. With L sided vesicular rash involving forehead and extending to L eye with reported blurred vision  -Given history of maligancy recently on chemotherapy (potentially immunosuppressed) and extension involving eye, will treat with IV acyclovir 10 mg/kg Q24hrs (renal adjustment)  -Symptomatic pain relief  -Ophthalmology consulted for further evaluation of eye and recommendations regarding need for ophthalmic steroids    CKD4  -Cr 3.5 on admit, at baseline  -Continue to monitor and avoid nephrotoxic medications     Malignant pleural mesothelioma  -Pt. Previously on chemotherapy, currently on hold 2/2 worsening kidney function  -No acute issues, continue to f/u with heme/onc as outaptient     History of DVT of lower extremity  -Pt. Previously on therapuetic lovenox, but no longer on per documentation and pt.  -Standard ppx heparin dose ordered while inpatient     Hypertension, essential  - cont home amlodipine 10mg, coreg 3.125, lisnopril 2.5mg, and chlorthalidone 50 mg    DVT PPx: Lovenox    Zac Grossman MD  Hospital Medicine Staff  [phone] pager

## 2020-08-02 NOTE — ASSESSMENT & PLAN NOTE
· Patient with recent significant decline in renal function felt related to chemotherapy that patient was receiving to treat her mesothelioma.   · Cr does seem to be improving from 1 month ago when in 4.3-4.5 range and down to 3.3-3.5 so far on this admit. This appears to be patient's new baseline function.  · Controlled. Patient is at baseline renal function at present.   · Need to avoid any nephrotoxic agents such as NSAIDS, IV contrast dye or aminoglycosides unless necessary while patient is hospitalized.  · Will renally adjust medications based on patient's creatinine clearance.   · Will monitor daily BMP to monitor renal function.

## 2020-08-02 NOTE — ASSESSMENT & PLAN NOTE
"· Patient followed by neurology clinic who noted on recent visit on 7/27/2020 that patient with debilitating pillrolling tremor, shuffling gait, which was symmetrical and since stopping Abilify. "Thiis suggestive of PD with unmasking with neuroleptics. Suggested now that we have strong evidence of underlying PDism, may start carbidopa/levodopa 25/100mg 1 tab PO TID (after starting aricept to curb delusions)".  · Patient with reported side effects since starting Sinemet so on hold and Neurology consulted to see if should restart Sinemet.   "

## 2020-08-02 NOTE — ASSESSMENT & PLAN NOTE
Good control in the hospital in past 24 hours. Patient on no meds at home.   · Plan is to monitor POCT glucose 4 times a day with each meal and at bedtime and cover with Novolog low dose sliding scale insulin.   · Target pre-meal glucose goal is <140 with all random glucoses <180 in non-critically ill patient.

## 2020-08-02 NOTE — ASSESSMENT & PLAN NOTE
· Patient's blood pressure is controlled here in the hospital over past 24 hours.   · Goal for blood pressure is SBP < 140 and DBP < 90 as patient > or = 60 years of age and patient has advanced chronic kidney disease based on JNC 8 guidelines.   · Continue current treatment regimen of Amlodipine 10 mg po daily, Coreg 3.125 mg po BID, Lisnopril 2.5 mg po daily and Chlorthalidone 50 mg po daily to treat in hospital. This is patient's home regimen.   · Plan is to monitor patient's blood pressure routinely while patient is hospitalized.

## 2020-08-02 NOTE — NURSING
Pt arrived to floor via stretcher.  Telemetry monitor in place.  Pt c/o face/eye pain; pt had received PRN Norco at 2130, ordered PRN q6 hrs.  Swords with medicine notified for additional pain medication orders.

## 2020-08-02 NOTE — HPI
"75 yo F with PMHx of mesothelioma, recent diagnosis of Parkinson's Disease on carbidopa/levodopa, CKD IV,  DVT on daily Lovenox, depression, DM2, HTN, and HLD who presents to the ED for an episode of decreased alertness today for about 10 minutes. The patient's daughter reports that the patient has had side effects of sweating,  palpitations, insomnia,and lack of appetite since starting carbidopa/levodopa and aricept 3 days ago after she was prescribed the medications by her neurologist (see neurology note from 7/27). She stopped taking the medication yesterday (last dose 7/30) because of the side effects. Early this morning, while the patient was brushing her teeth, the patient's daughter reports that the patient "dozed off" and started to slump over as if she was asleep. The patient's daughter reports that it was as if she was asleep standing up. The daughter guided her down to a chair. The patient was incontinent of stool during this episode. She woke up after about 10 minutes without any reported post-ictal period.      Additionally, the patient reports blisters around her L eye/forehead and sores in her mouth that also formed around Thursday, the same time she started the new medications. She describes the rash as very painful. She has cataracts in her L eye, but she reports that her vision has gotten even blurrier over the last couple of days. She has never had a similar rash. She denies any other rashes, fevers or chills. She has never had shingles before. Family did report patient was vaccinated in past for shingles.   "

## 2020-08-02 NOTE — PROGRESS NOTES
"[facility] Hospital Medicine  Progress Note    Patient Name: Isabell Preston  MRN: [id number]  Patient Class: IP- Inpatient   Admission Date: 8/1/2020  Length of Stay: 1 days  Attending Physician: Isabell Higgins MD  Primary Care Provider: Ayo Archuleta MD    [facility] Medicine Team: [facility] Isabell Higgins MD    Subjective:     Principal Problem:Accidental side effect of Parkinson agent        HPI:  75 yo F with PMHx of mesothelioma, recent diagnosis of Parkinson's Disease on carbidopa/levodopa, CKD IV,  DVT on daily Lovenox, depression, DM2, HTN, and HLD who presents to the ED for an episode of decreased alertness today for about 10 minutes. The patient's daughter reports that the patient has had side effects of sweating,  palpitations, insomnia,and lack of appetite since starting carbidopa/levodopa and aricept 3 days ago after she was prescribed the medications by her neurologist (see neurology note from 7/27). She stopped taking the medication yesterday (last dose 7/30) because of the side effects. Early this morning, while the patient was brushing her teeth, the patient's daughter reports that the patient "dozed off" and started to slump over as if she was asleep. The patient's daughter reports that it was as if she was asleep standing up. The daughter guided her down to a chair. The patient was incontinent of stool during this episode. She woke up after about 10 minutes without any reported post-ictal period.      Additionally, the patient reports blisters around her L eye/forehead and sores in her mouth that also formed around Thursday, the same time she started the new medications. She describes the rash as very painful. She has cataracts in her L eye, but she reports that her vision has gotten even blurrier over the last couple of days. She has never had a similar rash. She denies any other rashes, fevers or chills. She has never had shingles before and is uncertain if " she received the vaccine.    Overview/Hospital Course:  Patient neurologically intact and awake and alert and suspect having side effects to Sinemet that was started in Neurology clinic in past 1 week of sweating palpitations and insomnia. Patient also started on IV Acyclovir for ocular Herpes Zoster in V1 distribution to left side of face. Opthalmology consulted as patient also complaining of worsening vision to left eye on side of Zoster outbreak to evaluate. Patient complaining of signifciant pain to left side of face and started on Prednisone and Neurontin to treat for neuropathic pain.     Interval History: Patient admitted overnight for episode of unresponsiveness and new onset facial rash consistent with Herpes Zoster. Patient this am is awake and alert and responsive and answering all questions appropriately. Patient complaining of significant pain to left side of face especially around her left eye and forehead area. Patient also complaining of significant oral pain related to mouth soreness and reports difficulty eating due to mouth pain.     Review of Systems   Constitutional: Negative for fatigue and fever.   HENT: Positive for mouth sores. Negative for ear pain and trouble swallowing.         Facial pain to left side of face   Eyes: Positive for pain (Left eye).        Blurry vision to left eye   Respiratory: Negative for cough and shortness of breath.    Cardiovascular: Negative for chest pain and leg swelling.   Gastrointestinal: Negative for abdominal pain, diarrhea, nausea and vomiting.   Musculoskeletal: Negative for arthralgias.   Skin: Positive for rash (Vesicular and to left side of face).   Neurological: Positive for tremors. Negative for dizziness and light-headedness.   Psychiatric/Behavioral: Negative for agitation, confusion and hallucinations.     Objective:     Vital Signs (Most Recent):  Temp: 97.5 °F (36.4 °C) (08/02/20 0324)  Pulse: 62 (08/02/20 1104)  Resp: 18 (08/02/20 1218)  BP:  132/67 (08/02/20 0324)  SpO2: 96 % (08/02/20 0324) Vital Signs (24h Range):  Temp:  [97.5 °F (36.4 °C)-98.2 °F (36.8 °C)] 97.5 °F (36.4 °C)  Pulse:  [62-74] 62  Resp:  [16-19] 18  SpO2:  [96 %-100 %] 96 %  BP: (132-179)/(67-86) 132/67     Weight: 72.8 kg (160 lb 7.9 oz)  Body mass index is 25.9 kg/m².    Intake/Output Summary (Last 24 hours) at 8/2/2020 1337  Last data filed at 8/1/2020 2302  Gross per 24 hour   Intake 150 ml   Output --   Net 150 ml      Physical Exam  Vitals signs and nursing note reviewed.   Constitutional:       General: She is not in acute distress.     Appearance: She is well-developed.   HENT:      Nose: Nose normal.      Mouth/Throat:      Mouth: Oral lesions (Ulcerations to upper lip and buccal mucosa on left side) present.   Eyes:      General: No scleral icterus.        Right eye: No discharge.         Left eye: No discharge.      Extraocular Movements: Extraocular movements intact.      Conjunctiva/sclera: Conjunctivae normal.      Pupils: Pupils are equal, round, and reactive to light.   Cardiovascular:      Rate and Rhythm: Normal rate and regular rhythm.      Heart sounds: Normal heart sounds. No murmur. No friction rub. No gallop.    Pulmonary:      Effort: Pulmonary effort is normal. No respiratory distress.      Breath sounds: Normal breath sounds. No wheezing.   Abdominal:      General: Abdomen is flat. Bowel sounds are normal. There is no distension.      Palpations: Abdomen is soft.      Tenderness: There is no abdominal tenderness.   Skin:     General: Skin is warm.      Findings: Rash present. No erythema. Rash is vesicular (Left forehead and around left eye).   Neurological:      Mental Status: She is alert and oriented to person, place, and time.   Psychiatric:         Mood and Affect: Mood normal.         Behavior: Behavior normal.         Thought Content: Thought content normal.         Judgment: Judgment normal.         Significant Labs:   CBC:   Recent Labs   Lab  08/01/20  1516 08/02/20  0410   WBC 6.94 5.20   HGB 10.1* 8.9*   HCT 31.3* 28.5*    224     CMP:   Recent Labs   Lab 08/01/20  1516 08/02/20  0410    137   K 3.6 3.4*    100   CO2 22* 24   * 125*   BUN 47* 44*   CREATININE 3.5* 3.3*   CALCIUM 9.7 8.9   PROT 8.0  --    ALBUMIN 3.8  --    BILITOT 0.3  --    ALKPHOS 83  --    AST 20  --    ALT 19  --    ANIONGAP 12 13   EGFRNONAA 12.3* 13.2*     Magnesium:   Recent Labs   Lab 08/01/20  1516 08/02/20  0410   MG 1.5* 1.8     Significant Imaging: I have reviewed all pertinent imaging results/findings within the past 24 hours.      Assessment/Plan:      * Accidental side effect of Parkinson agent  · Patient with debilitating b/l UE and LE tremor, followed by Neurology outpatient. Suspected  PD, however tremor worsened after weaning off Abilify and patient recently started on Sinemet  · At home patient having sweating, palpitations, and insomnia, so patient stopped taking due to concern side effects of Sinemet. After discontinuing, patient had episode of what sounds like day-time sleepiness, possibly from abrupt discontinuation of the medication and low dopamine levels.  · Neurology consulted for further recommendations regarding need for Sinemet and/or dosage adjustments.       Herpes zoster with ophthalmic complication  · Patient with vesicular rash to left side of face consistent with Herpes Zoster.  · Patient on contact and airborne isolation as immunocompromised due to active malignancy and recent chemotherapy.   · Continue IV Acyclovir to treat (renall dosed) for now due to immunocompromised status and ocular involvement.  · Opthalmology consulted to evaluate ocular involvement as patient reporting worsening blurry vision and pain to left eye.   · Patient started on Prednsione 60 mg po daily + Neurontin 100 mg po TID to help with neuropathic pain from Zoster.       Hypertension, essential  · Patient's blood pressure is controlled here in the  hospital over past 24 hours.   · Goal for blood pressure is SBP < 140 and DBP < 90 as patient > or = 60 years of age and patient has advanced chronic kidney disease based on JNC 8 guidelines.   · Continue current treatment regimen of Amlodipine 10 mg po daily, Coreg 3.125 mg po BID, Lisnopril 2.5 mg po daily and Chlorthalidone 50 mg po daily to treat in hospital. This is patient's home regimen.   · Plan is to monitor patient's blood pressure routinely while patient is hospitalized.         Malignant pleural mesothelioma  Patient followed by Oncology clinic and previously on chemotherapy, currently on hold secondary to worsening kidney function.      CKD (chronic kidney disease) stage 5, GFR less than 15 ml/min  · Patient with recent significant decline in renal function felt related to chemotherapy that patient was receiving to treat her mesothelioma.   · Cr does seem to be improving from 1 month ago when in 4.3-4.5 range and down to 3.3-3.5 so far on this admit. This appears to be patient's new baseline function.  · Controlled. Patient is at baseline renal function at present.   · Need to avoid any nephrotoxic agents such as NSAIDS, IV contrast dye or aminoglycosides unless necessary while patient is hospitalized.  · Will renally adjust medications based on patient's creatinine clearance.   · Will monitor daily BMP to monitor renal function.     Type 2 diabetes mellitus without complication, without long-term current use of insulin  Good control in the hospital in past 24 hours. Patient on no meds at home.   · Plan is to monitor POCT glucose 4 times a day with each meal and at bedtime and cover with Novolog low dose sliding scale insulin.   · Target pre-meal glucose goal is <140 with all random glucoses <180 in non-critically ill patient.      Tremor  · Patient followed by neurology clinic who noted on recent visit on 7/27/2020 that patient with debilitating pillrolling tremor, shuffling gait, which was symmetrical  "and since stopping Abilify. "Thiis suggestive of PD with unmasking with neuroleptics. Suggested now that we have strong evidence of underlying PDism, may start carbidopa/levodopa 25/100mg 1 tab PO TID (after starting aricept to curb delusions)".  · Patient with reported side effects since starting Sinemet so on hold and Neurology consulted to see if should restart Sinemet.     Memory loss  Followed by Neurology clinic who also noted significant memory loss and recommended starting Aricept on last visit on 7/27.       VTE Risk Mitigation (From admission, onward)         Ordered     heparin (porcine) injection 5,000 Units  Every 8 hours      08/01/20 2153     IP VTE HIGH RISK PATIENT  Once      08/02/20 0129     Place sequential compression device  Until discontinued      08/02/20 0129                      Isabell Higgins MD  Department of Hospital Medicine   [facility]    "

## 2020-08-02 NOTE — PT/OT/SLP PROGRESS
Occupational Therapy      Patient Name:  Isabell Preston   MRN:  [id number]    Patient not seen today secondary to multiple visits of caregivers during attempts.   . Will follow-up next day.    Marialuisa Cortes OT  8/2/2020

## 2020-08-02 NOTE — HOSPITAL COURSE
Patient neurologically intact and awake and alert and suspect having side effects to Sinemet that was started in Neurology clinic in past 1 week of sweating palpitations and insomnia. Patient also started on IV Acyclovir for ocular Herpes Zoster in V1 distribution to left side of face. Opthalmology consulted as patient also complaining of worsening vision to left eye on side of Zoster outbreak. Opthalmology noted no involvement with eye with Herpes Zoster and noted no signs of vasculitis. Opthalmology recommended Artifical tears 1 drop to both eyes 4 times daily to help with dry eyes. Patient complaining of signifciant pain to left side of face and started on Prednisone and Neurontin to treat for neuropathic pain and Magic mouthwash to help with oral lesions. Spoke with Neurology consult team and okay for patient to stay off Sinemet and have patient follow-up in Neurology clinic to discuss other treatment options.

## 2020-08-02 NOTE — PROGRESS NOTES
Pharmacist Renal Dose Adjustment Note    Isabell Preston is a 74 y.o. female being treated with the acyclovir for herpes zoster with ophthalmic complication.    Patient Data:    Vital Signs (Most Recent):  Temp: 98.2 °F (36.8 °C) (08/01/20 1453)  Pulse: 68 (08/01/20 1734)  Resp: 18 (08/01/20 1453)  BP: (!) 154/79 (08/01/20 1734)  SpO2: 98 % (08/01/20 1734)   Vital Signs (72h Range):  Temp:  [98.2 °F (36.8 °C)]   Pulse:  [68-74]   Resp:  [18]   BP: (143-179)/(74-86)   SpO2:  [97 %-98 %]      Recent Labs   Lab 08/01/20  1516   CREATININE 3.5*     Serum creatinine:  3.5 mg/dL (H) 08/01/20 1516  Estimated creatinine clearance:  14.6 mL/min (A)    Acyclovir 590 mg (10 mg/kg ideal body weight) IVPB every 8 hours will be changed to acyclovir 590 mg (10 mg/kg ideal body weight) IVPB every 24 hours.     Pharmacist's Name:  Wilmer Wharton  Pharmacist's Extension:  [phone]

## 2020-08-02 NOTE — CONSULTS
"Consultation Report  Ophthalmology Service    Date: 08/02/2020    Chief complaint/Reason for Consult: "ocular involvement of herpes zoster"     History of Present Illness: Isabell Preston is a 74 y.o. 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    The patient states approximately 3 weeks of blurred vision in the left eye that onset prior to her rash and gradually worsened. She states at baseline her left eye has been poor (unable to read, see TV) but has been gradually worsening over the past 3 weeks. She denies any photophobia, flashes, floaters, curtains, pain with EOM.    Patient denies any visual changes, visual disturbances, such as flashes, floaters, or curtain-veil in visual field, and ocular discomfort OD.    POcularHx: Follows with Dr. Valdez on [facility]; CE IOL right eye, 'stroke' in right eye approx 2006     Current eye gtts: none      Family Hx: Denies family history of glaucoma, macular degeneration, or blindness.   family history includes Alcohol abuse in her brother; Arthritis in her father, paternal grandmother, and sister; Birth defects in her daughter; Breast cancer in her maternal aunt; Cancer in her father; Cataracts in her paternal grandmother and sister; Clotting disorder in her maternal aunt; Depression in her brother; Diabetes in her paternal grandmother and sister; Early death in her paternal uncle; Glaucoma in her mother and paternal grandmother; Heart disease in her daughter; Ovarian cancer in her daughter; Stroke in her mother and paternal uncle.     PMHx:  has a past medical history of Ambulates with cane, Anticoagulant long-term use, Anxiety, Behavioral problem, Cancer, Cataract, Chronic deep vein thrombosis (DVT) of distal " vein of lower extremity, unspecified laterality (10/21/2016), Clotting disorder, Colon polyp, DDD (degenerative disc disease), lumbar (6/27/2016), Deep vein thrombosis, Depression, Diabetes mellitus type II, Diverticulosis, Encounter for blood transfusion, Eye injuries, General anesthetics causing adverse effect in therapeutic use, History of blood clots, History of DVT of lower extremity (7/3/2019), History of psychiatric care, History of psychiatric hospitalization, Hyperlipidemia, Hypertension, Psychiatric problem, Retinal defect (2006), and Ulcer.     PSurgHx:  has a past surgical history that includes Appendectomy; Cholecystectomy; Umbilical hernia repair; colon resection for diverticulitis x 2; Total abdominal hysterectomy w/ bilateral salpingoophorectomy; Tonsillectomy; Hemorrhoid surgery; Ankle fracture surgery; Breast surgery (1998); Hernia repair (2000); Hysterectomy; APENDIX AND GALL BLADDER REMOVED; Thoracoscopic biopsy of pleura (Right, 7/3/2019); Pleurodesis with video-assisted thoracoscopic surgery (VATS) (Right, 7/3/2019); and Insertion of tunneled central venous catheter (CVC) with subcutaneous port (Left, 8/5/2019).     Home Medications:   Prior to Admission medications    Medication Sig Start Date End Date Taking? Authorizing Provider   acetaminophen (TYLENOL) 500 MG tablet Take 2 tablets (1,000 mg total) by mouth every 6 (six) hours as needed for Pain. 2/19/20   Isabella Ojeda DO   alcohol swabs PadM Apply 1 each topically as needed. 6/4/20   Ayo Archuleta MD   amLODIPine (NORVASC) 10 MG tablet TAKE 1 TABLET BY MOUTH EVERY DAY 12/9/19   Ayo Archuleta MD   blood sugar diagnostic (BLOOD GLUCOSE TEST) Strp 1 strip by Misc.(Non-Drug; Combo Route) route daily as needed. 6/4/20   Ayo Archuleta MD   blood-glucose meter kit 1 each by Other route daily as needed for Other. Use as instructed 6/4/20   Ayo Archuleta MD   carbidopa-levodopa  mg (SINEMET)  mg per tablet Take 1 tablet  by mouth 3 (three) times daily. 7/27/20 7/27/21  Cassandra Mancia MD   carvediloL (COREG) 6.25 MG tablet Take 0.5 tablets (3.125 mg total) by mouth 2 (two) times daily with meals. 7/2/20 7/2/21  Jose Miguel Jefferson MD   chlorthalidone (HYGROTEN) 25 MG Tab Take 2 tablets (50 mg total) by mouth once daily. 7/2/20 7/2/21  Jose Miguel Jefferson MD   desoximetasone (TOPICORT) 0.05 % cream as needed.     Historical Provider, MD   dicyclomine (BENTYL) 10 MG capsule TAKE 1 CAPSULE BY MOUTH TWICE A DAY 1/24/20   Ayo Archuleta MD   dicyclomine (BENTYL) 20 mg tablet Take 1 tablet (20 mg total) by mouth every 6 (six) hours. 7/7/20   MELINA Daniel   donepeziL (ARICEPT) 10 MG tablet Take 1 tablet (10 mg total) by mouth every evening. 7/27/20 7/27/21  Cassandra Mancia MD   DULoxetine (CYMBALTA) 30 MG capsule TAKE 1 CAPSULE BY MOUTH EVERY DAY 7/18/20   Ayo Archuleta MD   fluticasone (VERAMYST) 27.5 mcg/actuation nasal spray 2 sprays by Nasal route daily as needed for Rhinitis or Allergies.     Historical Provider, MD   folic acid (FOLVITE) 400 MCG tablet Take 1 tablet (400 mcg total) by mouth once daily. 8/9/19 8/8/20  Antonella Goetz NP   gabapentin (NEURONTIN) 100 MG capsule TAKE 1 CAPSULE BY MOUTH TWICE A DAY 6/9/20   Antonella Goetz NP   lancets (TRUEPLUS LANCETS) 28 gauge Misc 1 lancet by Misc.(Non-Drug; Combo Route) route once daily. Test blood sugar once daily, type 2 diabetes, controlled. E11.9 6/4/20   Ayo Archuleta MD   lancets-blood glucose strips 30 gauge Cmpk by Misc.(Non-Drug; Combo Route) route.    Historical Provider, MD   lidocaine-prilocaine (EMLA) cream Apply topically as needed. 11/22/19   Antonella Goetz NP   LINZESS 145 mcg Cap capsule TAKE 1 CAPSULE BY MOUTH EVERY DAY  Patient taking differently: daily as needed.  11/8/19   Ayo Archuleta MD   lisinopriL (PRINIVIL,ZESTRIL) 5 MG tablet Take 0.5 tablets (2.5 mg total) by mouth once daily. 7/2/20 7/2/21  Jose Miguel Jefferson MD   magic mouthwash  diphen/antac/lidoc/nysta Swish10 mLs 4 (four) times daily. 9/4/19   Dusty Mcleod MD   mometasone 0.1% (ELOCON) 0.1 % cream BALWINDER TO DARK AREAS BID ON LEGS PRN 3/19/19   Ayo Archuleta MD   ondansetron (ZOFRAN) 8 MG tablet Take 1 tablet (8 mg total) by mouth every 8 (eight) hours as needed for Nausea. 7/29/20   Ayo Archuleta MD   oxyCODONE (ROXICODONE) 10 mg Tab immediate release tablet Take 1 tablet (10 mg total) by mouth every 6 (six) hours as needed. 5/13/20   Piero Chavez MD   potassium chloride SA (K-DUR,KLOR-CON) 10 MEQ tablet Take 1 tablet (10 mEq total) by mouth once daily. 7/9/20   Jose Miguel Jefferson MD   prochlorperazine (COMPAZINE) 10 MG tablet TAKE 1 TABLET BY MOUTH EVERY 6 HOURS AS NEEDED 7/21/20   Silas Alves MD   promethazine (PHENERGAN) 25 MG tablet Take 1 tablet (25 mg total) by mouth every 6 (six) hours as needed for Nausea (Taken headache does not resolve). 5/13/20   Piero Chavez MD   tiZANidine (ZANAFLEX) 4 MG tablet TAKE 1 TABLETBY MOUTH NIGHTLY AT BEDTIME AS NEEDED 6/9/20   Ayo Archuleta MD        Medications this encounter:    (Magic mouthwash) 1:1:1 Benadryl 12.5mg/5ml liq, aluminum & magnesium hydroxide-simehticone (Maalox), lidocaine viscous 2%  10 mL Swish & Spit QID (AC & HS)    acyclovir  10 mg/kg (Ideal) Intravenous Q24H    amLODIPine  10 mg Oral Daily    carvediloL  3.125 mg Oral BID WM    chlorthalidone  50 mg Oral Daily    dicyclomine  10 mg Oral BID    DULoxetine  30 mg Oral Daily    gabapentin  100 mg Oral TID    heparin (porcine)  5,000 Units Subcutaneous Q8H    lisinopriL  2.5 mg Oral Daily    predniSONE  60 mg Oral Daily       Allergies: is allergic to ciprofloxacin; fructose; gluten protein; lactase; and latex, natural rubber.     Social:  reports that she quit smoking about 50 years ago. Her smoking use included cigarettes. She has never used smokeless tobacco. She reports that she does not drink alcohol or use drugs.     ROS: As per HPI    Ocular  examination/Dilated fundus examination:  Base Eye Exam     Visual Acuity (Snellen - Linear)       Right Left    Dist sc 20/50 -1 20/200    Dist ph sc 20/50 +2 NI    Near cc 20/40 PH 20/25-2 20/400 PH 20/200          Tonometry (Tonopen, 10:06 AM)       Right Left    Pressure 11 14          Pupils       Dark Light Shape React APD    Right 2 1.5 Round Brisk None    Left 2 1.5 Round Brisk None          Visual Fields       Right Left     Full Full          Extraocular Movement       Right Left     Full, Ortho Full, Ortho          Neuro/Psych     Oriented x3: Yes          Dilation     Both eyes: 1% Mydriacyl, 2.5% Phenylephrine @ 10:10 AM            Slit Lamp and Fundus Exam     External Exam       Right Left    External Normal Vesicular rash on right forehead, negative Vasquez sign          Pen Light Exam       Right Left    Lids/Lashes Normal Normal    Conjunctiva/Sclera White and quiet White and quiet    Cornea Scattered PEEs, corneal sensation intact Scattered PEEs, corneal sensation intact    Anterior Chamber Deep and formed Deep and formed    Iris Round and reactive Round and reactive    Lens Posterior chamber intraocular lens Posterior chamber intraocular lens    Vitreous Vitreous syneresis Vitreous syneresis          Fundus Exam       Right Left    Disc Normal Normal    C/D Ratio 0.3 0.3    Macula Flat Flat    Vessels WNL WNL    Periphery 1 DBH inferior to arcade, flat 360, no holes/tears/detachments Lattice degeneration temporally, posteriorly to this is a ~2 DD hyperpigmented choroidal nevus, no holes/tears/detachments, no vasculitis or sheathing, or focal areas of retinal necrosis,              B-scan left eye 8/2/2020: No signs of vitreitis, scleritis, RD, or mass    Assessment/Plan:     1. Concern for herpes zoster ophthalmicus, left eye  - 5 days of vesicular rash in left V1 distribution, negative Vasquez sign  - VA poor in left eye at baseline; current visual acuity appears to be dry eye syndrome  -  Overall hazy view 2/2 corneal irregularity. Anterior exam shows PEEs, but no pseudodendrites; lattice degeneration temporally adjacent to a 2DD nevus in far periphery left eye. No evidence of vasculitis, vascular sheathing, or focal areas of retinal necrosis.   - B-scan shows no mass, RD, or detachment  - Continue IV acyclovir  - Start artificial tears both eyes 4 times daily  - Will follow-up tomorrow if still inpatient or set patient up for follow-up as outpatient to occur in 1 week    Seen with Dr. Melo (PGY-3). Patient also seen by Dr. Olvera (retina fellow).     Gerardo Khanna MD PGY-2  Ophthalmology Resident  08/02/2020  10:09 AM

## 2020-08-02 NOTE — ASSESSMENT & PLAN NOTE
Followed by Neurology clinic who also noted significant memory loss and recommended starting Aricept on last visit on 7/27.

## 2020-08-02 NOTE — SUBJECTIVE & OBJECTIVE
Interval History: Patient admitted overnight for episode of unresponsiveness and new onset facial rash consistent with Herpes Zoster. Patient this am is awake and alert and responsive and answering all questions appropriately. Patient complaining of significant pain to left side of face especially around her left eye and forehead area. Patient also complaining of significant oral pain related to mouth soreness and reports difficulty eating due to mouth pain.     Review of Systems   Constitutional: Negative for fatigue and fever.   HENT: Positive for mouth sores. Negative for ear pain and trouble swallowing.         Facial pain to left side of face   Eyes: Positive for pain (Left eye).        Blurry vision to left eye   Respiratory: Negative for cough and shortness of breath.    Cardiovascular: Negative for chest pain and leg swelling.   Gastrointestinal: Negative for abdominal pain, diarrhea, nausea and vomiting.   Musculoskeletal: Negative for arthralgias.   Skin: Positive for rash (Vesicular and to left side of face).   Neurological: Positive for tremors. Negative for dizziness and light-headedness.   Psychiatric/Behavioral: Negative for agitation, confusion and hallucinations.     Objective:     Vital Signs (Most Recent):  Temp: 97.5 °F (36.4 °C) (08/02/20 0324)  Pulse: 62 (08/02/20 1104)  Resp: 18 (08/02/20 1218)  BP: 132/67 (08/02/20 0324)  SpO2: 96 % (08/02/20 0324) Vital Signs (24h Range):  Temp:  [97.5 °F (36.4 °C)-98.2 °F (36.8 °C)] 97.5 °F (36.4 °C)  Pulse:  [62-74] 62  Resp:  [16-19] 18  SpO2:  [96 %-100 %] 96 %  BP: (132-179)/(67-86) 132/67     Weight: 72.8 kg (160 lb 7.9 oz)  Body mass index is 25.9 kg/m².    Intake/Output Summary (Last 24 hours) at 8/2/2020 1337  Last data filed at 8/1/2020 2302  Gross per 24 hour   Intake 150 ml   Output --   Net 150 ml      Physical Exam  Vitals signs and nursing note reviewed.   Constitutional:       General: She is not in acute distress.     Appearance: She is  well-developed.   HENT:      Nose: Nose normal.      Mouth/Throat:      Mouth: Oral lesions (Ulcerations to upper lip and buccal mucosa on left side) present.   Eyes:      General: No scleral icterus.        Right eye: No discharge.         Left eye: No discharge.      Extraocular Movements: Extraocular movements intact.      Conjunctiva/sclera: Conjunctivae normal.      Pupils: Pupils are equal, round, and reactive to light.   Cardiovascular:      Rate and Rhythm: Normal rate and regular rhythm.      Heart sounds: Normal heart sounds. No murmur. No friction rub. No gallop.    Pulmonary:      Effort: Pulmonary effort is normal. No respiratory distress.      Breath sounds: Normal breath sounds. No wheezing.   Abdominal:      General: Abdomen is flat. Bowel sounds are normal. There is no distension.      Palpations: Abdomen is soft.      Tenderness: There is no abdominal tenderness.   Skin:     General: Skin is warm.      Findings: Rash present. No erythema. Rash is vesicular (Left forehead and around left eye).   Neurological:      Mental Status: She is alert and oriented to person, place, and time.   Psychiatric:         Mood and Affect: Mood normal.         Behavior: Behavior normal.         Thought Content: Thought content normal.         Judgment: Judgment normal.         Significant Labs:   CBC:   Recent Labs   Lab 08/01/20  1516 08/02/20  0410   WBC 6.94 5.20   HGB 10.1* 8.9*   HCT 31.3* 28.5*    224     CMP:   Recent Labs   Lab 08/01/20  1516 08/02/20  0410    137   K 3.6 3.4*    100   CO2 22* 24   * 125*   BUN 47* 44*   CREATININE 3.5* 3.3*   CALCIUM 9.7 8.9   PROT 8.0  --    ALBUMIN 3.8  --    BILITOT 0.3  --    ALKPHOS 83  --    AST 20  --    ALT 19  --    ANIONGAP 12 13   EGFRNONAA 12.3* 13.2*     Magnesium:   Recent Labs   Lab 08/01/20  1516 08/02/20  0410   MG 1.5* 1.8     Significant Imaging: I have reviewed all pertinent imaging results/findings within the past 24 hours.

## 2020-08-02 NOTE — ASSESSMENT & PLAN NOTE
· Patient with vesicular rash to left side of face consistent with Herpes Zoster.  · Patient on contact and airborne isolation as immunocompromised due to active malignancy and recent chemotherapy.   · Continue IV Acyclovir to treat (renall dosed) for now due to immunocompromised status and ocular involvement.  · Opthalmology consulted to evaluate ocular involvement as patient reporting worsening blurry vision and pain to left eye.   · Patient started on Prednsione 60 mg po daily + Neurontin 100 mg po TID to help with neuropathic pain from Zoster.

## 2020-08-02 NOTE — PLAN OF CARE
Pt alert throughout evening.  Pain to L eye/face uncontrolled by PRN morphine and Norco.  Swords notified, one time dose IV Dilaudid ordered.  Instructed pt to discuss pain management in AM.      Pt continues to report blurred vision to left eye.  Sores noted to pt's mouth.    Bedpan at bedside.  Pt instructed to call for assistance ambulating to bathroom.

## 2020-08-03 PROBLEM — C45.9: Status: ACTIVE | Noted: 2019-07-18

## 2020-08-03 NOTE — PLAN OF CARE
Problem: Occupational Therapy Goal  Goal: Occupational Therapy Goal  Description: Goals to be met by: 8/17/2020    Patient will increase functional independence with ADLs by performing:    UE Dressing with Set-up Assistance.  LE Dressing with Supervision.  Grooming while standing at sink with Supervision.  Toileting from commode over bathroom toilet with Supervision for hygiene and clothing management.   Supine to sit with Modified [place].  Step transfer with Supervision with rolling walker.  Toilet transfer to commode over bathroom toilet with Supervision with rolling walker.    Outcome: Ongoing, Progressing     Pt evaluated and OT goals set.    Dean Pride, CAMERON   08/03/2020

## 2020-08-03 NOTE — PLAN OF CARE
Problem: Physical Therapy Goal  Goal: Physical Therapy Goal  Description: Goals to be met by: 20     Patient will increase functional independence with mobility by performin. Supine to sit with Modified [place]  2. Sit to supine with Modified [place]  3. Sit to stand transfer with Modified [place]  4. Gait  x 50 feet with Modified [place] using Rolling Walker.     Outcome: Ongoing, Progressing   Initial evaluation completed. Patient tolerated assessment well. Established POC and goals. Patient would continue to benefit from skilled PT services in order to improve functional mobility independence.       Soraida Zaragoza, PT, DPT  8/3/2020

## 2020-08-03 NOTE — NURSING
Rounds performed this am, pt alert and oriented x 4. Pt requested pain medication due to left facial pain. C/o of pain of 10 on a 0-10 pain scale. Hydrocodone-acetaminophen given as ordered. Lungs cta, abdominal sounds present x 4 quadrants. Pt 18 gauge to right hand is infiltrated,will discontinue. And attempt new access. Left resting in bed, side rails up x 2 call light in reach, wheels locked bed in low position
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
## 2020-08-03 NOTE — PLAN OF CARE
08/03/20 1515   Discharge Assessment   Assessment Type Discharge Planning Assessment   Confirmed/corrected address and phone number on facesheet? Yes   Assessment information obtained from? Patient   Expected Length of Stay (days) 3   Communicated expected length of stay with patient/caregiver yes   Prior to hospitilization cognitive status: Alert/Oriented   Prior to hospitalization functional status: Assistive Equipment   Current cognitive status: Alert/Oriented   Current Functional Status: Needs Assistance   Lives With child([place]), adult  (pt rotates staying with her 3 daughters)   Able to Return to Prior Arrangements yes   Is patient able to care for self after discharge? Yes   Readmission Within the Last 30 Days no previous admission in last 30 days   Patient currently being followed by outpatient case management? No   Patient currently receives any other outside agency services? No   Equipment Currently Used at Home cane, straight;walker, rolling;shower chair;glucometer;other (see comments)  (BP machine)   Do you have any problems affording any of your prescribed medications? No   Is the patient taking medications as prescribed? yes   Does the patient have transportation home? Yes   Transportation Anticipated family or friend will provide  (daughters)   Does the patient receive services at the [facility]? No   Discharge Plan A Home with family   Discharge Plan B Home Health   DME Needed Upon Discharge  other (see comments)  (tbd)   Patient/Family in Agreement with Plan yes       Dx: accidental side effect of Parkinson agent  Consult: ophth & PT/OT    [id number] [employer] [facility] [place] [address] [facility]  [address] [place] [address]  Phone: [phone] Fax: [phone]    Payor: [employer] MANAGED MEDICARE / Plan: [employer] [facility] HEALTH / Product Type: Medicare Advantage /     Ayo Archuleta MD (PCP)   Message sent to Dr. Archuleta's  requesting a hospital follow up  appointment in 1-2 weeks.  to call the patient with appointment date & time.     Will continue to follow.

## 2020-08-03 NOTE — NURSING
AAOx4.  VVS.  Afebrile.  No complaints of chest pain or shortness of breath.  Abdomen-soft.  No edema to lower extremities.  At 0258 requested and was given Hydrocodone by mouth for complain of pain to the left side of face from Shingles.  Remained free from falls or incidents.
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
## 2020-08-03 NOTE — PROGRESS NOTES
Called daughter who reported she started both aricept and carbidopa/levodopa at the same time.  We had discussed a slow uptitration in my last note.  Her spell appeared to be a syncope spell.    Will screen for orthostasis prior to attempting a carbidopa/levodopa restart

## 2020-08-04 PROBLEM — T42.8X5A: Status: RESOLVED | Noted: 2020-01-01 | Resolved: 2020-01-01

## 2020-08-04 NOTE — PLAN OF CARE
Problem: Physical Therapy Goal  Goal: Physical Therapy Goal  Description: Goals to be met by: 20     Patient will increase functional independence with mobility by performin. Supine to sit with Modified [place]  2. Sit to supine with Modified [place]  3. Sit to stand transfer with Modified [place]  4. Gait  x 50 feet with Modified [place] using Rolling Walker.     Outcome: Ongoing, Progressing   Patient tolerated treatment well. Established POC and goals reviewed and remain appropriate. Plan is to continue to improve patient's functional mobility capabilities.      Soraida Zaragoza, PT, DPT  2020

## 2020-08-04 NOTE — NURSING
Physical therapy evaluated patient this morning. Pt ambulated in room to bathroom without any acute distress. Exercise program demonstrated and instructed to pt. V/u. Prn hydrocodone/acetaminophen 5-325 mg administered due to c/of of pain of 8 on a 0-10 pain scale on her left side of face. Pt is up in chair awaiting possible discharge home later today.

## 2020-08-04 NOTE — ASSESSMENT & PLAN NOTE
· Resolved. Patient states he is feeling much better off Sinemet.   · Patient with debilitating b/l UE and LE tremor, followed by Neurology outpatient. Suspected  PD, however tremor worsened after weaning off Abilify and patient recently started on Sinemet  · At home patient having sweating, palpitations, and insomnia, so patient stopped taking due to concern side effects of Sinemet. After discontinuing, patient had episode of what sounds like day-time sleepiness, possibly from abrupt discontinuation of the medication and low dopamine levels.  · Neurology consulted for further recommendations regarding need for Sinemet and/or dosage adjustments and stated patient okay to stop Sinemet and have patient follow-up in neurology clinic to discuss other medication adjustments or other choices to use.

## 2020-08-04 NOTE — PLAN OF CARE
GALINA faxed HH Orders to [facility] via  for review. GALINA will continue to follow.        08/04/20 1158   Post-Acute Status   Post-Acute Authorization Home Health   Home Health Status Referrals Sent       Heidi Juarez LMSW   - [facility]  Ext. [phone]

## 2020-08-04 NOTE — PT/OT/SLP PROGRESS
Occupational Therapy   Treatment    Name: Isabell Preston  MRN: [id number]  Admitting Diagnosis:  Herpes zoster with ophthalmic complication       Recommendations:     Discharge Recommendations: home health OT  Discharge Equipment Recommendations:  none  Barriers to discharge:  None    Assessment:     Isabell Preston is a 74 y.o. female with a medical diagnosis of Herpes zoster with ophthalmic complication.  She presents with good participation and motivation.  Pt continues to be at risk for falls.  Performance deficits affecting function are weakness, impaired endurance, impaired self care skills, impaired functional mobilty, impaired balance, decreased upper extremity function, decreased lower extremity function.     Rehab Prognosis:  Fair; patient would benefit from acute skilled OT services to address these deficits and reach maximum level of function.       Plan:     Patient to be seen 4 x/week to address the above listed problems via self-care/home management, therapeutic activities, therapeutic exercises  · Plan of Care Expires: 09/03/20  · Plan of Care Reviewed with: patient    Subjective     Pain/Comfort:  · Pain Rating 1: (did note rate)  · Location 1: (left side of face (due to shingles))  · Pain Addressed 1: Reposition, Distraction, Nurse notified  · Pain Rating Post-Intervention 1: (did not rate)    Objective:     Communicated with: RN prior to session.  Patient found supine with telemetry upon OT entry to room. No family present. Pt voiced desire to have female therapists only from home health once discharged home.    General Precautions: Standard, fall, airborne   Orthopedic Precautions:N/A   Braces: N/A     Occupational Performance:     Bed Mobility:    · Patient completed Supine to Sit with stand by assistance     Functional Mobility/Transfers:  · Patient completed Sit <> Stand Transfer with contact guard assistance  with  rolling walker   · Patient completed Toilet Transfer Stand Pivot technique  with minimum assistance with  rolling walker  · Functional Mobility: CGA using RW in room    Activities of Daily Living:  · Upper Body Dressing: stand by assistance seated EOB  · Lower Body Dressing: stand by assistance donning socks seated EOB  · Toileting: contact guard assistance from standard commode using RW      [facility] 6 Click ADL: 18    Treatment & Education:  Provided education (demonstration & verbal instruction) on therex pt can perform outside of therapy sessions to facilitate increased strength & endurance.  Discussed discharge recommendations with pt verbalizing understanding & agreement. Pt had no further questions & when asked whether there were any concerns pt reported none.      Patient left up in chair with all lines intact, call button in reach and RN notifiedEducation:      GOALS:   Multidisciplinary Problems     Occupational Therapy Goals        Problem: Occupational Therapy Goal    Goal Priority Disciplines Outcome Interventions   Occupational Therapy Goal     OT, PT/OT Ongoing, Progressing    Description: Goals to be met by: 8/17/2020    Patient will increase functional independence with ADLs by performing:    UE Dressing with Set-up Assistance.  LE Dressing with Supervision.  Grooming while standing at sink with Supervision.  Toileting from commode over bathroom toilet with Supervision for hygiene and clothing management.   Supine to sit with Modified [place].  Step transfer with Supervision with rolling walker.  Toilet transfer to commode over bathroom toilet with Supervision with rolling walker.                     Time Tracking:     OT Date of Treatment: 08/04/20  OT Start Time: 1104  OT Stop Time: 1129  OT Total Time (min): 25 min    Billable Minutes:Self Care/Home Management 15  Therapeutic Activity 10    VIRAL Valerio  8/4/2020

## 2020-08-04 NOTE — ASSESSMENT & PLAN NOTE
Followed by Neurology clinic who also noted significant memory loss and recommended starting Aricept on last visit on 7/27. Patient to restart Aricept on hospital discharge.

## 2020-08-04 NOTE — ASSESSMENT & PLAN NOTE
· Improving slowly. Plan to continue IV Acyclovir. Rash not worse since admit and still in V1 distribution. Plan to likely discharge home on oral Valtrex to complete treatment.   · Patient with vesicular rash to left side of face consistent with Herpes Zoster.  · Patient on contact and airborne isolation as immunocompromised due to active malignancy and recent chemotherapy.   · Continue IV Acyclovir to treat (renall dosed) for now due to immunocompromised status and ocular involvement.  · Opthalmology consulted to evaluate ocular involvement as patient reporting worsening blurry vision and pain to left eye and evaluated patient and stated no ocular involvement and recommended artifical tears to help with dry eyes and following patient while in hospital.   · Patient started on Prednsione 60 mg po daily + Neurontin 100 mg po TID to help with neuropathic pain from Zoster and will continue and plan to continue on discharge.

## 2020-08-04 NOTE — PLAN OF CARE
Pt aaox4. C/o pain to L eye managed with po pain medication. No iv access, unable to obtain. Picc Consult ordered by Soraida Denny. Very weak when ambulating. Instructed to call for assistance. Poc reviewed with pt, questions answered. Pt able to express needs with staff. She is aaox4. Free of any harm/injuries. Bed locked and low, walker at bedside. Personal items within reach. tm

## 2020-08-04 NOTE — PLAN OF CARE
The Medical Team is unable to admit the patient due to her residence. Patient is residing at her daughters home at [address] [place] [address], [place] [address]. SW contacted [facility] and spoke with a representative there, who reported that The Medical Team needs to contact them to provide that information. Then, [facility] will be able to assign a new HH agency. GALINA will continue to follow.     Heidi Juarez LMSW   - [facility]  Ext. [phone]

## 2020-08-04 NOTE — ASSESSMENT & PLAN NOTE
"· Patient followed by Neurology clinic who noted on recent visit on 7/27/2020 that patient with debilitating pillrolling tremor, shuffling gait, which was symmetrical and since stopping Abilify. "Thiis suggestive of PD with unmasking with neuroleptics. Suggested now that we have strong evidence of underlying PDism, may start carbidopa/levodopa 25/100mg 1 tab PO TID (after starting aricept to curb delusions)".  · Patient with reported side effects since starting Sinemet so on hold and Neurology consulted and stated to keep patient off for now and have her follow-up in Neurology clinic as outpatient for further discussion on medication.   "

## 2020-08-04 NOTE — PT/OT/SLP PROGRESS
"Physical Therapy Treatment    Patient Name:  Isabell Preston   MRN:  [id number]    Recommendations:     Discharge Recommendations:  home health PT   Discharge Equipment Recommendations: none   Barriers to discharge: None    Assessment:     Isabell Preston is a 74 y.o. female admitted with a medical diagnosis of Herpes zoster with ophthalmic complication.  She presents with the following impairments/functional limitations:  impaired endurance, weakness, impaired self care skills, impaired functional mobilty, decreased lower extremity function, decreased upper extremity function, decreased coordination, gait instability. Patient tolerated session well. She was able to ambulate an increased distance this date. Patient would continue to benefit from skilled PT services while in the hospital. At this time, upon d/c she is an appropriate candidate for HH.     Rehab Prognosis: Good; patient would benefit from acute skilled PT services to address these deficits and reach maximum level of function.    Recent Surgery: * No surgery found *      Plan:     During this hospitalization, patient to be seen 4 x/week to address the identified rehab impairments via gait training, therapeutic activities, therapeutic exercises, neuromuscular re-education and progress toward the following goals:    · Plan of Care Expires:  09/02/20    Subjective     Chief Complaint: L sided facial pain   Patient/Family Comments/goals: "I think I'm leaving today."  Pain/Comfort:  · Pain Rating 1: (did not rate)  · Location 1: (L side of face due to shingles)  · Pain Addressed 2: Reposition, Distraction, Nurse notified      Objective:     Communicated with RN prior to session.  Patient found HOB elevated with telemetry upon PT entry to room.     General Precautions: Standard, fall, airborne   Orthopedic Precautions:N/A   Braces: N/A     Functional Mobility:  · Bed Mobility:     · Scooting: stand by assistance  · Supine to Sit: stand by assistance  · HOB " elevated and handrail used   · Transfers:     · Sit to Stand:  stand by assistance with rolling walker  · Toilet Transfer: moderate assistance with  rolling walker  using  Step Transfer  · Low toilet seat   · Gait: 10ft + 25ft with close SBA and RW  ·  BLE tremor upon standing   ·  slow trupti, FFP over RW  ·  narrow DIEGO with vc's to increase step width - patient frequently scissoring over midline with BLE   · Balance: sitting EOB - Sup; static standing - SBA; dynamic standing - SBA     While UIC patient encouraged to perform BLE ankle pums, LAQ, hip flexion     AM-PAC 6 CLICK MOBILITY  Turning over in bed (including adjusting bedclothes, sheets and blankets)?: 4  Sitting down on and standing up from a chair with arms (e.g., wheelchair, bedside commode, etc.): 3  Moving from lying on back to sitting on the side of the bed?: 4  Moving to and from a bed to a chair (including a wheelchair)?: 3  Need to walk in hospital room?: 3  Climbing 3-5 steps with a railing?: 3  Basic Mobility Total Score: 20       Therapeutic Activities and Exercises:  -Patient educated on the continued role and goal of PT  -Questions and concerns answered within the the PT scope of practice.   -White board updated in patient room to reflect level of assistance needed with nursing.     Patient left up in chair with all lines intact, call button in reach and RN present..    GOALS:   Multidisciplinary Problems     Physical Therapy Goals        Problem: Physical Therapy Goal    Goal Priority Disciplines Outcome Goal Variances Interventions   Physical Therapy Goal     PT, PT/OT Ongoing, Progressing     Description: Goals to be met by: 20     Patient will increase functional independence with mobility by performin. Supine to sit with Modified [place]  2. Sit to supine with Modified [place]  3. Sit to stand transfer with Modified [place]  4. Gait  x 50 feet with Modified [place] using Rolling Walker.                       Time Tracking:     PT Received On: 08/04/20  PT Start Time: 1104     PT Stop Time: 1129  PT Total Time (min): 25 min     Billable Minutes: Gait Training 15 and Therapeutic Activity 10    Treatment Type: Treatment  PT/PTA: PT     PTA Visit Number: 0     Soraida Zaragoza, PT  08/04/2020

## 2020-08-04 NOTE — PROGRESS NOTES
"[facility] Hospital Medicine  Progress Note    Patient Name: Isabell Preston  MRN: [id number]  Patient Class: IP- Inpatient   Admission Date: 8/1/2020  Length of Stay: 2 days  Attending Physician: Isabell Higgins MD  Primary Care Provider: Ayo Archuleta MD    [facility] Medicine Team: [facility] MED K Isabell Higgins MD    Subjective:     Principal Problem:Accidental side effect of Parkinson agent        HPI:  75 yo F with PMHx of mesothelioma, recent diagnosis of Parkinson's Disease on carbidopa/levodopa, CKD IV,  DVT on daily Lovenox, depression, DM2, HTN, and HLD who presents to the ED for an episode of decreased alertness today for about 10 minutes. The patient's daughter reports that the patient has had side effects of sweating,  palpitations, insomnia,and lack of appetite since starting carbidopa/levodopa and aricept 3 days ago after she was prescribed the medications by her neurologist (see neurology note from 7/27). She stopped taking the medication yesterday (last dose 7/30) because of the side effects. Early this morning, while the patient was brushing her teeth, the patient's daughter reports that the patient "dozed off" and started to slump over as if she was asleep. The patient's daughter reports that it was as if she was asleep standing up. The daughter guided her down to a chair. The patient was incontinent of stool during this episode. She woke up after about 10 minutes without any reported post-ictal period.      Additionally, the patient reports blisters around her L eye/forehead and sores in her mouth that also formed around Thursday, the same time she started the new medications. She describes the rash as very painful. She has cataracts in her L eye, but she reports that her vision has gotten even blurrier over the last couple of days. She has never had a similar rash. She denies any other rashes, fevers or chills. She has never had shingles before and is uncertain if " she received the vaccine.    Overview/Hospital Course:  Patient neurologically intact and awake and alert and suspect having side effects to Sinemet that was started in Neurology clinic in past 1 week of sweating palpitations and insomnia. Patient also started on IV Acyclovir for ocular Herpes Zoster in V1 distribution to left side of face. Opthalmology consulted as patient also complaining of worsening vision to left eye on side of Zoster outbreak. Opthalmology noted no involvement with eye with Herpes Zoster and noted no signs of vasculitis. Opthalmology recommended Artifical tears 1 drop to both eyes 4 times daily to help with dry eyes. Patient complaining of signifciant pain to left side of face and started on Prednisone and Neurontin to treat for neuropathic pain and Magic mouthwash to help with oral lesions. Spoke with Neurology consult team and okay for patient to stay off Sinemet and have patient follow-up in Neurology clinic to discuss other treatment options.     Interval History: Patient reports Magic mouthwash is helping with sores in mouth and less painful. Patient still reports significant pain to left side of face particularly around her eye and on her forehead where the outbreak of vesicles is much worse. Overall patient states she is feeling much better today than yesterday. Patient still with active lesions to her face.     Review of Systems   Constitutional: Negative for fatigue and fever.   HENT: Positive for mouth sores. Negative for ear pain and trouble swallowing.         Facial pain to left side of face   Eyes: Positive for pain (Left eye).        Blurry vision to left eye   Respiratory: Negative for cough and shortness of breath.    Cardiovascular: Negative for chest pain and leg swelling.   Gastrointestinal: Negative for abdominal pain, diarrhea, nausea and vomiting.   Musculoskeletal: Negative for arthralgias.   Skin: Positive for rash (Vesicular and to left side of face).   Neurological:  Positive for tremors. Negative for dizziness and light-headedness.   Psychiatric/Behavioral: Negative for agitation, confusion and hallucinations.     Objective:     Vital Signs (Most Recent):  Temp: 98.2 °F (36.8 °C) (08/03/20 1301)  Pulse: 102 (08/03/20 1910)  Resp: 19 (08/03/20 1301)  BP: 122/70 (08/03/20 1301)  SpO2: 95 % (08/03/20 1301) Vital Signs (24h Range):  Temp:  [98 °F (36.7 °C)-98.7 °F (37.1 °C)] 98.2 °F (36.8 °C)  Pulse:  [] 102  Resp:  [10-19] 19  SpO2:  [90 %-95 %] 95 %  BP: (122-148)/(70-85) 122/70     Weight: 72.8 kg (160 lb 7.9 oz)  Body mass index is 25.9 kg/m².    Intake/Output Summary (Last 24 hours) at 8/3/2020 1916  Last data filed at 8/3/2020 1900  Gross per 24 hour   Intake 1010 ml   Output 600 ml   Net 410 ml      Physical Exam  Vitals signs and nursing note reviewed.   Constitutional:       General: She is not in acute distress.     Appearance: She is well-developed.   HENT:      Nose: Nose normal.      Mouth/Throat:      Mouth: Oral lesions (Ulcerations to upper lip and buccal mucosa on left side) present.   Eyes:      General: No scleral icterus.        Right eye: No discharge.         Left eye: No discharge.      Extraocular Movements: Extraocular movements intact.      Conjunctiva/sclera: Conjunctivae normal.      Pupils: Pupils are equal, round, and reactive to light.   Cardiovascular:      Rate and Rhythm: Normal rate and regular rhythm.      Heart sounds: Normal heart sounds. No murmur. No friction rub. No gallop.    Pulmonary:      Effort: Pulmonary effort is normal. No respiratory distress.      Breath sounds: Normal breath sounds. No wheezing.   Abdominal:      General: Abdomen is flat. Bowel sounds are normal. There is no distension.      Palpations: Abdomen is soft.      Tenderness: There is no abdominal tenderness.   Skin:     General: Skin is warm.      Findings: Rash present. No erythema. Rash is vesicular (Left forehead and around left eye).   Neurological:       Mental Status: She is alert and oriented to person, place, and time.   Psychiatric:         Mood and Affect: Mood normal.         Behavior: Behavior normal.         Thought Content: Thought content normal.         Judgment: Judgment normal.         Significant Labs:   CBC:   Recent Labs   Lab 08/02/20 0410 08/03/20  0439   WBC 5.20 4.10   HGB 8.9* 9.8*   HCT 28.5* 29.1*    239     CMP:   Recent Labs   Lab 08/02/20 0410 08/03/20  0439    134*   K 3.4* 3.6    99   CO2 24 22*   * 157*   BUN 44* 46*   CREATININE 3.3* 3.5*   CALCIUM 8.9 9.7   ANIONGAP 13 13   EGFRNONAA 13.2* 12.3*     Magnesium:   Recent Labs   Lab 08/02/20 0410 08/03/20  0439   MG 1.8 2.2     POCT Glucose:   Recent Labs   Lab 08/03/20  0759 08/03/20  1207 08/03/20  1600   POCTGLUCOSE 145* 189* 173*       Significant Imaging: I have reviewed all pertinent imaging results/findings within the past 24 hours.      Assessment/Plan:      * Accidental side effect of Parkinson agent  · Resolved. Patient states he is feeling much better off Sinemet.   · Patient with debilitating b/l UE and LE tremor, followed by Neurology outpatient. Suspected  PD, however tremor worsened after weaning off Abilify and patient recently started on Sinemet  · At home patient having sweating, palpitations, and insomnia, so patient stopped taking due to concern side effects of Sinemet. After discontinuing, patient had episode of what sounds like day-time sleepiness, possibly from abrupt discontinuation of the medication and low dopamine levels.  · Neurology consulted for further recommendations regarding need for Sinemet and/or dosage adjustments and stated patient okay to stop Sinemet and have patient follow-up in neurology clinic to discuss other medication adjustments or other choices to use.       Herpes zoster with ophthalmic complication  · Improving slowly. Plan to continue IV Acyclovir. Rash not worse since admit and still in V1 distribution. Plan  to likely discharge home on oral Valtrex to complete treatment.   · Patient with vesicular rash to left side of face consistent with Herpes Zoster.  · Patient on contact and airborne isolation as immunocompromised due to active malignancy and recent chemotherapy.   · Continue IV Acyclovir to treat (renall dosed) for now due to immunocompromised status and ocular involvement.  · Opthalmology consulted to evaluate ocular involvement as patient reporting worsening blurry vision and pain to left eye and evaluated patient and stated no ocular involvement and recommended artifical tears to help with dry eyes and following patient while in hospital.   · Patient started on Prednsione 60 mg po daily + Neurontin 100 mg po TID to help with neuropathic pain from Zoster and will continue and plan to continue on discharge.      Hypertension, essential  · Patient's blood pressure is controlled here in the hospital over past 24 hours.   · Goal for blood pressure is SBP < 140 and DBP < 90 as patient > or = 60 years of age and patient has advanced chronic kidney disease based on JNC 8 guidelines.   · Continue current treatment regimen of Amlodipine 10 mg po daily, Coreg 3.125 mg po BID, Lisnopril 2.5 mg po daily and Chlorthalidone 50 mg po daily to treat in hospital. This is patient's home regimen.   · Plan is to monitor patient's blood pressure routinely while patient is hospitalized.         Mesothelioma, biphasic, malignant  Patient followed by Oncology clinic and previously on chemotherapy, currently on hold secondary to worsening kidney function.      CKD (chronic kidney disease) stage 5, GFR less than 15 ml/min  · Patient with recent significant decline in renal function felt related to chemotherapy that patient was receiving to treat her mesothelioma.   · Cr does seem to be improving from 1 month ago when in 4.3-4.5 range and down to 3.3-3.5 so far on this admit. This appears to be patient's new baseline  "function.  · Controlled. Patient is at baseline renal function at present.   · Need to avoid any nephrotoxic agents such as NSAIDS, IV contrast dye or aminoglycosides unless necessary while patient is hospitalized.  · Will renally adjust medications based on patient's creatinine clearance.   · Will monitor daily BMP to monitor renal function.     Type 2 diabetes mellitus without complication, without long-term current use of insulin  Good control in the hospital in past 24 hours. Patient on no meds at home.   · Plan is to monitor POCT glucose 4 times a day with each meal and at bedtime and cover with Novolog low dose sliding scale insulin.   · Target pre-meal glucose goal is <140 with all random glucoses <180 in non-critically ill patient.      Tremor  · Patient followed by Neurology clinic who noted on recent visit on 7/27/2020 that patient with debilitating pillrolling tremor, shuffling gait, which was symmetrical and since stopping Abilify. "Thiis suggestive of PD with unmasking with neuroleptics. Suggested now that we have strong evidence of underlying PDism, may start carbidopa/levodopa 25/100mg 1 tab PO TID (after starting aricept to curb delusions)".  · Patient with reported side effects since starting Sinemet so on hold and Neurology consulted and stated to keep patient off for now and have her follow-up in Neurology clinic as outpatient for further discussion on medication.     Memory loss  Followed by Neurology clinic who also noted significant memory loss and recommended starting Aricept on last visit on 7/27. Patient to restart Aricept on hospital discharge.       VTE Risk Mitigation (From admission, onward)         Ordered     heparin (porcine) injection 5,000 Units  Every 8 hours      08/01/20 2153     IP VTE HIGH RISK PATIENT  Once      08/02/20 0129     Place sequential compression device  Until discontinued      08/02/20 0129                Discharge disposition: Plan home discharge tomorrow with HH " PT/OT and continued treatment of Herpes Zoster as outpatient. Patient to follow-up with Opthalmology as outpatient.       Isabell Higgins MD  Department of Hospital Medicine   [facility]

## 2020-08-04 NOTE — SUBJECTIVE & OBJECTIVE
Interval History: Patient reports Magic mouthwash is helping with sores in mouth and less painful. Patient still reports significant pain to left side of face particularly around her eye and on her forehead where the outbreak of vesicles is much worse. Overall patient states she is feeling much better today than yesterday. Patient still with active lesions to her face.     Review of Systems   Constitutional: Negative for fatigue and fever.   HENT: Positive for mouth sores. Negative for ear pain and trouble swallowing.         Facial pain to left side of face   Eyes: Positive for pain (Left eye).        Blurry vision to left eye   Respiratory: Negative for cough and shortness of breath.    Cardiovascular: Negative for chest pain and leg swelling.   Gastrointestinal: Negative for abdominal pain, diarrhea, nausea and vomiting.   Musculoskeletal: Negative for arthralgias.   Skin: Positive for rash (Vesicular and to left side of face).   Neurological: Positive for tremors. Negative for dizziness and light-headedness.   Psychiatric/Behavioral: Negative for agitation, confusion and hallucinations.     Objective:     Vital Signs (Most Recent):  Temp: 98.2 °F (36.8 °C) (08/03/20 1301)  Pulse: 102 (08/03/20 1910)  Resp: 19 (08/03/20 1301)  BP: 122/70 (08/03/20 1301)  SpO2: 95 % (08/03/20 1301) Vital Signs (24h Range):  Temp:  [98 °F (36.7 °C)-98.7 °F (37.1 °C)] 98.2 °F (36.8 °C)  Pulse:  [] 102  Resp:  [10-19] 19  SpO2:  [90 %-95 %] 95 %  BP: (122-148)/(70-85) 122/70     Weight: 72.8 kg (160 lb 7.9 oz)  Body mass index is 25.9 kg/m².    Intake/Output Summary (Last 24 hours) at 8/3/2020 1916  Last data filed at 8/3/2020 1900  Gross per 24 hour   Intake 1010 ml   Output 600 ml   Net 410 ml      Physical Exam  Vitals signs and nursing note reviewed.   Constitutional:       General: She is not in acute distress.     Appearance: She is well-developed.   HENT:      Nose: Nose normal.      Mouth/Throat:      Mouth: Oral  lesions (Ulcerations to upper lip and buccal mucosa on left side) present.   Eyes:      General: No scleral icterus.        Right eye: No discharge.         Left eye: No discharge.      Extraocular Movements: Extraocular movements intact.      Conjunctiva/sclera: Conjunctivae normal.      Pupils: Pupils are equal, round, and reactive to light.   Cardiovascular:      Rate and Rhythm: Normal rate and regular rhythm.      Heart sounds: Normal heart sounds. No murmur. No friction rub. No gallop.    Pulmonary:      Effort: Pulmonary effort is normal. No respiratory distress.      Breath sounds: Normal breath sounds. No wheezing.   Abdominal:      General: Abdomen is flat. Bowel sounds are normal. There is no distension.      Palpations: Abdomen is soft.      Tenderness: There is no abdominal tenderness.   Skin:     General: Skin is warm.      Findings: Rash present. No erythema. Rash is vesicular (Left forehead and around left eye).   Neurological:      Mental Status: She is alert and oriented to person, place, and time.   Psychiatric:         Mood and Affect: Mood normal.         Behavior: Behavior normal.         Thought Content: Thought content normal.         Judgment: Judgment normal.         Significant Labs:   CBC:   Recent Labs   Lab 08/02/20  0410 08/03/20  0439   WBC 5.20 4.10   HGB 8.9* 9.8*   HCT 28.5* 29.1*    239     CMP:   Recent Labs   Lab 08/02/20 0410 08/03/20  0439    134*   K 3.4* 3.6    99   CO2 24 22*   * 157*   BUN 44* 46*   CREATININE 3.3* 3.5*   CALCIUM 8.9 9.7   ANIONGAP 13 13   EGFRNONAA 13.2* 12.3*     Magnesium:   Recent Labs   Lab 08/02/20  0410 08/03/20  0439   MG 1.8 2.2     POCT Glucose:   Recent Labs   Lab 08/03/20  0759 08/03/20  1207 08/03/20  1600   POCTGLUCOSE 145* 189* 173*       Significant Imaging: I have reviewed all pertinent imaging results/findings within the past 24 hours.

## 2020-08-04 NOTE — PLAN OF CARE
Problem: Occupational Therapy Goal  Goal: Occupational Therapy Goal  Description: Goals to be met by: 8/17/2020    Patient will increase functional independence with ADLs by performing:    UE Dressing with Set-up Assistance.  LE Dressing with Supervision.  Grooming while standing at sink with Supervision.  Toileting from commode over bathroom toilet with Supervision for hygiene and clothing management.   Supine to sit with Modified [place].  Step transfer with Supervision with rolling walker.  Toilet transfer to commode over bathroom toilet with Supervision with rolling walker.    Outcome: Ongoing, Progressing     Goals remain appropriate

## 2020-08-04 NOTE — PLAN OF CARE
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]  Yes     Priority: 4     CKD (chronic kidney disease) stage 5, GFR less than 15 ml/min [N18.5]  Yes     Priority: 5     Type 2 diabetes mellitus without complication, without long-term current use of insulin [E11.9]  Yes     Priority: 6     Tremor [R25.1]  Yes     Priority: 7     Memory loss [R41.3]  Yes     Priority: 8                  Resolved Hospital Problems    Diagnosis Date Resolved POA    Accidental side effect of Parkinson agent [T42.8X5A] 08/04/2020 Yes     Priority: 1 - High    History of DVT of lower extremity [Z86.718] 08/02/2020 Not Applicable     DVTs of LLE 1970s and 1980s; DVT of L subclavian 1980s.           Future Appointments   Date Time Provider Department Center   8/6/2020 11:00 AM LAB, [facility] [place]   8/6/2020 11:15 AM SPECIMEN, [facility]IL[facility] SPECLAB [place]   8/13/2020  3:30 PM Jose Miguel Jefferson MD [facility] NE[address]   8/18/2020  9:00 AM LAB, SAME DAY [facility] LAB VNP [facility]   8/18/2020 10:00 AM [facility] PET CT LIMIT 500 LBS [facility] PET CT [facility]   8/20/2020  2:30 PM Austin Burt MD [facility] HEMONC3 [address]   9/11/2020  9:30 AM Luke Vasquez MD [facility] PSYCH [facility]   9/28/2020 10:40 AM Cassandra Mancia MD [facility] NEURO [address]        My clinical findings that support the need for the home health skilled services and home bound status are the  following:  Weakness/numbness causing balance and gait disturbance due to Weakness/Debility making it taxing to leave home.  Requiring assistive device to leave home due to unsteady gait caused by  Weakness/Debility.    Allergies:   Review of patient's allergies indicates:   Allergen Reactions    Ciprofloxacin Anaphylaxis    Fructose     Gluten protein Other (See Comments)     GI upset  GI upset    Lactase Other (See Comments)     GI upset  GI upset    Latex, natural rubber Rash        Diet: diabetic diet: 2000 calorie    Activities: activity as tolerated    Nursing:   SN to complete comprehensive assessment including routine vital signs. Instruct on disease process and s/s of complications to report to MD. Review/verify medication list sent home with the patient at time of discharge  and instruct patient/caregiver as needed. Frequency may be adjusted depending on start of care date.    Notify MD if SBP > 160 or < 90; DBP > 90 or < 50; HR > 120 or < 50; Temp > 101    CONSULTS:    Physical Therapy to evaluate and treat. Evaluate for home safety and equipment needs; Establish/upgrade home exercise program. Perform / instruct on therapeutic exercises, gait training, transfer training, and Range of Motion.  Occupational Therapy to evaluate and treat. Evaluate home environment for safety and equipment needs. Perform/Instruct on transfers, ADL training, ROM, and therapeutic exercises.    MISCELLANEOUS CARE:  N/A    WOUND CARE ORDERS  n/a    Medications: Review discharge medications with patient and family and provide education.      Current Discharge Medication List      START taking these medications    Details   artificial tears (ISOPTO TEARS) 0.5 % ophthalmic solution Place 1 drop into both eyes 4 (four) times daily.  Qty:        predniSONE (DELTASONE) 20 MG tablet Take 2 tablets (40 mg total) by mouth once daily. for 7 days  Qty: 14 tablet, Refills: 0      valACYclovir (VALTREX) 1000 MG tablet Take 1 tablet  (1,000 mg total) by mouth once daily. for 7 days  Qty: 7 tablet, Refills: 0         CONTINUE these medications which have CHANGED    Details   carbidopa-levodopa  mg (SINEMET)  mg per tablet Take 1 tablet by mouth 3 (three) times daily. Hold until you speak with your neurologist to see when and if safe to resume.  Qty: 90 tablet, Refills: 11      magic mouthwash diphen/antac/lidoc/nysta Swish10 mLs 4 (four) times daily.  Qty: 120 mL, Refills: 0    Comments: Mix in equal parts.  Generic is fine.         CONTINUE these medications which have NOT CHANGED    Details   acetaminophen (TYLENOL) 500 MG tablet Take 2 tablets (1,000 mg total) by mouth every 6 (six) hours as needed for Pain.  Qty: 30 tablet, Refills: 0      alcohol swabs PadM Apply 1 each topically as needed.      amLODIPine (NORVASC) 10 MG tablet TAKE 1 TABLET BY MOUTH EVERY DAY  Qty: 90 tablet, Refills: 0    Associated Diagnoses: Hypertension, essential      blood sugar diagnostic (BLOOD GLUCOSE TEST) Strp 1 strip by Misc.(Non-Drug; Combo Route) route daily as needed.  Qty: 90 each, Refills: 1      blood-glucose meter kit 1 each by Other route daily as needed for Other. Use as instructed  Qty: 1 each, Refills: 0      carvediloL (COREG) 6.25 MG tablet Take 0.5 tablets (3.125 mg total) by mouth 2 (two) times daily with meals.  Qty: 30 tablet, Refills: 11    Comments: .      chlorthalidone (HYGROTEN) 25 MG Tab Take 2 tablets (50 mg total) by mouth once daily.  Qty: 60 tablet, Refills: 11    Comments: .      desoximetasone (TOPICORT) 0.05 % cream as needed.       dicyclomine (BENTYL) 10 MG capsule TAKE 1 CAPSULE BY MOUTH TWICE A DAY  Qty: 90 capsule, Refills: 0      dicyclomine (BENTYL) 20 mg tablet Take 1 tablet (20 mg total) by mouth every 6 (six) hours.  Qty: 30 tablet, Refills: 0    Associated Diagnoses: Diarrhea, unspecified type; Abdominal pain, unspecified abdominal location; Diverticulitis      donepeziL (ARICEPT) 10 MG tablet Take 1 tablet  (10 mg total) by mouth every evening.  Qty: 30 tablet, Refills: 11      DULoxetine (CYMBALTA) 30 MG capsule TAKE 1 CAPSULE BY MOUTH EVERY DAY  Qty: 30 capsule, Refills: 1    Associated Diagnoses: Recurrent major depressive disorder, in partial remission      fluticasone (VERAMYST) 27.5 mcg/actuation nasal spray 2 sprays by Nasal route daily as needed for Rhinitis or Allergies.       folic acid (FOLVITE) 400 MCG tablet Take 1 tablet (400 mcg total) by mouth once daily.  Qty: 30 tablet, Refills: 11    Associated Diagnoses: Malignant pleural mesothelioma      gabapentin (NEURONTIN) 100 MG capsule TAKE 1 CAPSULE BY MOUTH TWICE A DAY  Qty: 180 capsule, Refills: 1    Associated Diagnoses: Malignant pleural mesothelioma; Neoplasm related pain (acute) (chronic)      lancets (TRUEPLUS LANCETS) 28 gauge Misc 1 lancet by Misc.(Non-Drug; Combo Route) route once daily. Test blood sugar once daily, type 2 diabetes, controlled. E11.9  Qty: 100 each, Refills: 3    Associated Diagnoses: Diabetes mellitus without complication      lancets-blood glucose strips 30 gauge Cmpk by Misc.(Non-Drug; Combo Route) route.      lidocaine-prilocaine (EMLA) cream Apply topically as needed.  Qty: 30 g, Refills: 1    Associated Diagnoses: Malignant pleural mesothelioma      LINZESS 145 mcg Cap capsule TAKE 1 CAPSULE BY MOUTH EVERY DAY  Qty: 90 capsule, Refills: 0    Comments: DX Code Needed  PLEASE ADVISE.  Associated Diagnoses: Generalized abdominal pain      lisinopriL (PRINIVIL,ZESTRIL) 5 MG tablet Take 0.5 tablets (2.5 mg total) by mouth once daily.  Qty: 45 tablet, Refills: 3    Comments: .      mometasone 0.1% (ELOCON) 0.1 % cream BALWINDER TO DARK AREAS BID ON LEGS PRN  Qty: 15 g, Refills: 1    Associated Diagnoses: Varicose veins of both lower extremities, unspecified whether complicated      ondansetron (ZOFRAN) 8 MG tablet Take 1 tablet (8 mg total) by mouth every 8 (eight) hours as needed for Nausea.  Qty: 60 tablet, Refills: 2    Associated  Diagnoses: Malignant pleural mesothelioma      oxyCODONE (ROXICODONE) 10 mg Tab immediate release tablet Take 1 tablet (10 mg total) by mouth every 6 (six) hours as needed.  Qty: 60 tablet, Refills: 0    Comments: Quantity prescribed more than 7 day supply? Yes, quantity medically necessary      potassium chloride SA (K-DUR,KLOR-CON) 10 MEQ tablet Take 1 tablet (10 mEq total) by mouth once daily.  Qty: 30 tablet, Refills: 3      prochlorperazine (COMPAZINE) 10 MG tablet TAKE 1 TABLET BY MOUTH EVERY 6 HOURS AS NEEDED  Qty: 60 tablet, Refills: 0    Associated Diagnoses: Chemotherapy induced nausea and vomiting      promethazine (PHENERGAN) 25 MG tablet Take 1 tablet (25 mg total) by mouth every 6 (six) hours as needed for Nausea (Taken headache does not resolve).  Qty: 60 tablet, Refills: 0      tiZANidine (ZANAFLEX) 4 MG tablet TAKE 1 TABLETBY MOUTH NIGHTLY AT BEDTIME AS NEEDED  Qty: 90 tablet, Refills: 0              I certify that this patient is confined to her home and needs intermittent skilled nursing care, physical therapy and occupational therapy.    _________________________________  Isabell Higgins MD  08/04/2020

## 2020-08-04 NOTE — PLAN OF CARE
Patient asleep in bed when CM rounded. No family present. CM informed the patient's daughter, Nancy Morris ([phone]), of plan to discharge the patient home with [facility] today. Nancy verbalized understanding, stated that the patient will be staying at her house ([address] [place], [address]) following discharge, stated that she or her sister will provide transportation, & requesting an update from the MD. CM informed Dr. Higgins & nurse Jose Elias ([phone]) of above. Hospital follow up appointment scheduled for the patient with NP Peter Gomez on 8/11/2020 at 1300. Will continue to follow.

## 2020-08-04 NOTE — NURSING
No IV access at start of shift. 3 unsuccessful attempts by 2 RN's. Unable to give acyclavoir tonight. Will attempt to contact ICU to start iv. Soraida with [facility] notified.

## 2020-08-05 NOTE — PLAN OF CARE
GALINA faxed HH Orders to [facility] via  for review. Michelle at [facility] has assigned [facility] to the patient and stated that [facility] will admit the patient on Sunday. GALINA will continue to follow.        08/05/20 1136   Post-Acute Status   Post-Acute Authorization Home Health   Home Health Status Set-up Complete       Heidi Juarez LMSW   - [facility]  Ext. [phone]

## 2020-08-05 NOTE — PATIENT INSTRUCTIONS
Treatment for Herpes Eye Disease  Herpes eye disease is a condition caused by the herpes simplex virus. It causes redness, pain, tearing, and other symptoms in the eyes. Its a common condition for people who have been exposed to the herpes virus. In severe cases, it can cause loss of eyesight.  Types of treatment  Treatment for herpes eye disease depends on how severe it is and the parts of your eye affected. Possible treatments include:  · Antiviral ointment for your eyelids  · Antiviral eye drops  · Antiviral medicine taken by mouth  · Antibiotic ointment for your eyelids, to prevent infection by bacteria  · Antibiotic eye drops, to prevent infection by bacteria  · Steroid eye drops, to ease inflammation  Most of these are used for a short time. But you may need to take an oral antiviral medicine ongoing. This is to help prevent future flare-ups.  Possible complications of herpes eye disease  Herpes eye disease can cause severe complications. A flare-up can make your eye more likely to get other eye infections. This is why you may be treated with antibiotics.  In some cases herpes eye disease permanently scars the cornea. The cornea is usually clear. Scarring makes it cloudy. This can cause eyesight loss. If eyesight loss is severe, you may need a corneal transplant to restore your eyesight.  Herpes eye disease can also cause a temporary increase in pressure in the front part of your eye. This may need to be treated with special eye drops. In rare cases it can cause infection of the brain and tissues around the brain. This may need to be treated in the hospital with an antiviral medicine given through an IV line.  Preventing herpes eye disease flare-ups  Your healthcare provider may prescribe a medicine to decrease your chance of having the virus become active. You can also help reduce your chance of a flare-up. Avoid getting too much sunshine and lower your stress. Go to your eye care doctor at the first sign  of symptoms.     When to call the healthcare provider  Call your healthcare provider right away if you have any of these:  · Eye pain  · Blurred vision  · Fluid coming from your eye    Date Last Reviewed: 8/6/2015  © 5268-1144 [employer]. [address] [place] [address], [place] [address]. All rights reserved. This information is not intended as a substitute for professional medical care. Always follow your healthcare professional's instructions.

## 2020-08-05 NOTE — DISCHARGE SUMMARY
"[facility] Hospital Medicine  Discharge Summary      Patient Name: Isabell Preston  MRN: [id number]  Admission Date: 8/1/2020  Hospital Length of Stay: 3 days  Discharge Date and Time: 8/4/2020  8:15 PM  Attending Physician: Isabell Higgins MD   Discharging Provider: Isabell Higgins MD  Primary Care Provider: Ayo Archuleta MD  [facility] Medicine Team: [facility] HOSP MED  Isabell Higgins MD    HPI:   75 yo F with PMHx of mesothelioma, recent diagnosis of Parkinson's Disease on carbidopa/levodopa, CKD IV,  DVT on daily Lovenox, depression, DM2, HTN, and HLD who presents to the ED for an episode of decreased alertness today for about 10 minutes. The patient's daughter reports that the patient has had side effects of sweating,  palpitations, insomnia,and lack of appetite since starting carbidopa/levodopa and aricept 3 days ago after she was prescribed the medications by her neurologist (see neurology note from 7/27). She stopped taking the medication yesterday (last dose 7/30) because of the side effects. Early this morning, while the patient was brushing her teeth, the patient's daughter reports that the patient "dozed off" and started to slump over as if she was asleep. The patient's daughter reports that it was as if she was asleep standing up. The daughter guided her down to a chair. The patient was incontinent of stool during this episode. She woke up after about 10 minutes without any reported post-ictal period.      Additionally, the patient reports blisters around her L eye/forehead and sores in her mouth that also formed around Thursday, the same time she started the new medications. She describes the rash as very painful. She has cataracts in her L eye, but she reports that her vision has gotten even blurrier over the last couple of days. She has never had a similar rash. She denies any other rashes, fevers or chills. She has never had shingles before. Family did report patient " was vaccinated in past for shingles.         Hospital Course:   Patient neurologically intact and awake and alert and suspect having side effects to Sinemet that was started in Neurology clinic in past 1 week of sweating palpitations and insomnia. Patient also started on IV Acyclovir for ocular Herpes Zoster in V1 distribution to left side of face. Opthalmology consulted as patient also complaining of worsening vision to left eye on side of Zoster outbreak. Opthalmology noted no involvement with eye with Herpes Zoster and noted no signs of vasculitis. Opthalmology recommended Artifical tears 1 drop to both eyes 4 times daily to help with dry eyes. Patient complaining of signifciant pain to left side of face and started on Prednisone and Neurontin to treat for neuropathic pain and Magic mouthwash to help with oral lesions. Spoke with Neurology consult team and okay for patient to stay off Sinemet and have patient follow-up in Neurology clinic to discuss other treatment options.      Consults:   Consults (From admission, onward)        Status Ordering Provider     Inpatient consult to Ophthalmology  Once     Provider:  (Not yet assigned)    SERENITY Elizondo          * Herpes zoster with ophthalmic complication  · Improving slowly. Rash not worse since admit and still in V1 distribution. Patient discharged home on Valtrex 1000 mg po daily (renally adjusted for her CrCl) for 7 days to complete treatment. Patient started on Prednsione 40 mg po daily and plan 7 day treatment and also discharged on Neurontin 100 mg po BID to help with neuropathic pain from Zoster.  · Patient with vesicular rash to left side of face consistent with Herpes Zoster.  · Patient on contact and airborne isolation as immunocompromised due to active malignancy and recent chemotherapy.   · Patient treated with V Acyclovir to treat (renall dosed) due to immunocompromised status and ocular involvement in hospital.   · Opthalmology  consulted to evaluate ocular involvement as patient reporting worsening blurry vision and pain to left eye and evaluated patient and stated no ocular involvement and recommended artifical tears to help with dry eyes and patient to continue on discharge and Opthalmology to schedule follow-up appointment in 1 week.       Accidental side effect of Parkinson agent-resolved as of 8/4/2020  · Resolved. Patient states he is feeling much better off Sinemet.   · Patient with debilitating b/l UE and LE tremor, followed by Neurology outpatient. Suspected  PD, however tremor worsened after weaning off Abilify and patient recently started on Sinemet  · At home patient having sweating, palpitations, and insomnia, so patient stopped taking due to concern side effects of Sinemet. After discontinuing, patient had episode of what sounds like day-time sleepiness, possibly from abrupt discontinuation of the medication and low dopamine levels.  · Neurology consulted for further recommendations regarding need for Sinemet and/or dosage adjustments and stated patient okay to stop Sinemet and have patient follow-up in Neurology clinic to discuss other medication adjustments. Spoke with family and they will call their regular neurologist to discuss when and of to restart and at what dose.      Hypertension, essential  · Patient's blood pressure is controlled here in the hospital.  · Goal for blood pressure is SBP < 140 and DBP < 90 as patient > or = 60 years of age and patient has advanced chronic kidney disease based on JNC 8 guidelines.   · Continue current treatment regimen of Amlodipine 10 mg po daily, Coreg 3.125 mg po BID, Lisnopril 2.5 mg po daily and Chlorthalidone 50 mg po daily to treat on discharge as taking at home prior to this admit.       Mesothelioma, biphasic, malignant  Patient followed by Oncology clinic and previously on chemotherapy, currently on hold secondary to worsening kidney function.      CKD (chronic kidney  "disease) stage 5, GFR less than 15 ml/min  · Patient with recent significant decline in renal function felt related to chemotherapy that patient was receiving to treat her mesothelioma.   · Cr does seem to be improving from 1 month ago when in 4.3-4.5 range and down to 3.3-3.5 so far on this admit. This appears to be patient's new baseline function.  · Controlled. Patient is at baseline renal function at present.   · Need to avoid any nephrotoxic agents such as NSAIDS, IV contrast dye or aminoglycosides unless necessary in future.   · Renally adjust medications based on patient's creatinine clearance.       Type 2 diabetes mellitus without complication, without long-term current use of insulin  Good control in the hospital. Patient on no meds at home and needs none on discharge.        Tremor  · Patient followed by Neurology clinic who noted on recent visit on 7/27/2020 that patient with debilitating pillrolling tremor, shuffling gait, which was symmetrical and since stopping Abilify. "Thiis suggestive of PD with unmasking with neuroleptics. Suggested now that we have strong evidence of underlying PDism, may start carbidopa/levodopa 25/100mg 1 tab PO TID (after starting aricept to curb delusions)".  · Patient with reported side effects since starting Sinemet so on hold and Neurology consulted and stated to keep patient off for now and have her follow-up in Neurology clinic as outpatient for further discussion on medication.     Memory loss  Followed by Neurology clinic who also noted significant memory loss and recommended starting Aricept on last visit on 7/27. Patient to restart Aricept on hospital discharge.       Final Active Diagnoses:    Diagnosis Date Noted POA    PRINCIPAL PROBLEM:  Herpes zoster with ophthalmic complication [B02.30] 08/01/2020 Yes    Hypertension, essential [I10] 07/31/2015 Yes     Chronic    Mesothelioma, biphasic, malignant [C45.9] 07/18/2019 Yes    CKD (chronic kidney disease) stage " 5, GFR less than 15 ml/min [N18.5] 05/29/2020 Yes    Type 2 diabetes mellitus without complication, without long-term current use of insulin [E11.9] 06/05/2019 Yes    Tremor [R25.1] 03/05/2020 Yes    Memory loss [R41.3] 03/05/2020 Yes      Problems Resolved During this Admission:    Diagnosis Date Noted Date Resolved POA    Accidental side effect of Parkinson agent [T42.8X5A] 08/01/2020 08/04/2020 Yes    History of DVT of lower extremity [Z86.718] 07/03/2019 08/02/2020 Not Applicable       Discharged Condition: good    Disposition: Home-Health Care [facility]    Follow Up:  Future Appointments   Date Time Provider Department Center   8/6/2020 11:00 AM LAB, [facility] LAB [place]   8/6/2020 11:15 AM SPECIMEN, [facility] SPECLAB [place]   8/11/2020  1:00 PM Peter Gomez NP [facility] - B   8/13/2020  3:30 PM Jose Miguel Jefferson MD [facility] NEPHRO [facility]   8/17/2020  8:30 AM Luke Vasquez MD [facility] PSYCH [facility]   8/18/2020  9:00 AM LAB, SAME DAY [facility] LAB VNP [facility]   8/18/2020 10:00 AM [facility] PET CT LIMIT 500 LBS [facility] PET CT [facility]   8/20/2020  2:30 PM Austin Burt MD [facility] HEMONC3 [facility]   9/11/2020  9:30 AM Luke Vasquez MD [facility] PSYCH [facility]   9/28/2020 10:40 AM Cassandra Mancia MD [facility] NEURO [facility]       Follow-up Information     Peter Gomez NP On 8/11/2020.    Specialty: Family Medicine  Why: at 1:00pm; hospital follow up appointment (Dr. Archuleta not available)   Contact information:  [address]  [facility] [address]  [phone]             [facility] - Ophthalmology In 2 weeks.    Specialty: Ophthalmology  Why: Opthalmology clinic to call the patient with appointment date & time.   Contact information:  [address]  [facility] [address] [phone]  Additional information:  The Optometry department is located on the 10th floor. If you are seeing Dr. Awan, her clinic is located in the [facility] on the 1st floor.            [facility] [employer] [facility] On 8/5/2020.    Specialty: Home Health Services  Why: will provide home health services  Contact information:  [address]  [address] [place]  [phone]                 Patient Instructions:      Diet Adult Regular     Call MD for:  temperature >100.4     Call MD for:  persistent nausea and vomiting or diarrhea     Call MD for:  severe uncontrolled pain     Call MD for:  redness, tenderness, or signs of infection (pain, swelling, redness, odor or green/yellow discharge around incision site)     Call MD for:  difficulty breathing or increased cough     Call MD for:  severe persistent headache     Call MD for:  worsening rash     Call MD for:  persistent dizziness, light-headedness, or visual disturbances     Call MD for:  increased confusion or weakness     Activity as tolerated       Significant Diagnostic Studies: Labs:   CMP   Recent Labs   Lab 08/03/20  0439 08/04/20  0907   * 132*   K 3.6 3.6   CL 99 99   CO2 22* 22*   * 158*   BUN 46* 65*   CREATININE 3.5* 3.7*   CALCIUM 9.7 9.3   ANIONGAP 13 11   ESTGFRAFRICA 14.2* 13.3*   EGFRNONAA 12.3* 11.5*    and CBC   Recent Labs   Lab 08/03/20  0439 08/04/20  0907   WBC 4.10 12.23   HGB 9.8* 10.2*   HCT 29.1* 31.2*    270       Pending Diagnostic Studies:     None         Medications:  Reconciled Home Medications:      Medication List      START taking these medications    artificial tears 0.5 % ophthalmic solution  Commonly known as: ISOPTO TEARS  Place 1 drop into both eyes 4 (four) times daily.     predniSONE 20 MG tablet  Commonly known as: DELTASONE  Take 2 tablets (40 mg total) by mouth once daily. for 7 days  Start taking on: August 5, 2020     valACYclovir 1000 MG tablet  Commonly known as: VALTREX  Take 1 tablet (1,000 mg total) by mouth once daily. for 7 days        CHANGE how you take these medications    carbidopa-levodopa  mg  mg per tablet  Commonly known as: SINEMET  Take 1 tablet  by mouth 3 (three) times daily. Hold until you speak with your neurologist to see when and if safe to resume.  What changed: additional instructions     LINZESS 145 mcg Cap capsule  Generic drug: linaCLOtide  TAKE 1 CAPSULE BY MOUTH EVERY DAY  What changed:   · how much to take  · how to take this  · when to take this  · reasons to take this        CONTINUE taking these medications    acetaminophen 500 MG tablet  Commonly known as: TYLENOL  Take 2 tablets (1,000 mg total) by mouth every 6 (six) hours as needed for Pain.     alcohol swabs Padm  Apply 1 each topically as needed.     amLODIPine 10 MG tablet  Commonly known as: NORVASC  TAKE 1 TABLET BY MOUTH EVERY DAY     blood sugar diagnostic Strp  Commonly known as: BLOOD GLUCOSE TEST  1 strip by Misc.(Non-Drug; Combo Route) route daily as needed.     blood-glucose meter kit  1 each by Other route daily as needed for Other. Use as instructed     carvediloL 6.25 MG tablet  Commonly known as: COREG  Take 0.5 tablets (3.125 mg total) by mouth 2 (two) times daily with meals.     chlorthalidone 25 MG Tab  Commonly known as: HYGROTEN  Take 2 tablets (50 mg total) by mouth once daily.     desoximetasone 0.05 % cream  Commonly known as: TOPICORT  as needed.     dicyclomine 10 MG capsule  Commonly known as: BENTYL  TAKE 1 CAPSULE BY MOUTH TWICE A DAY     donepeziL 10 MG tablet  Commonly known as: ARICEPT  Take 1 tablet (10 mg total) by mouth every evening.     DULoxetine 30 MG capsule  Commonly known as: CYMBALTA  TAKE 1 CAPSULE BY MOUTH EVERY DAY     fluticasone 27.5 mcg/actuation nasal spray  Commonly known as: VERAMYST  2 sprays by Nasal route daily as needed for Rhinitis or Allergies.     folic acid 400 MCG tablet  Commonly known as: FOLVITE  Take 1 tablet (400 mcg total) by mouth once daily.     gabapentin 100 MG capsule  Commonly known as: NEURONTIN  TAKE 1 CAPSULE BY MOUTH TWICE A DAY     lancets 28 gauge Misc  Commonly known as: TRUEPLUS LANCETS  1 lancet by  Misc.(Non-Drug; Combo Route) route once daily. Test blood sugar once daily, type 2 diabetes, controlled. E11.9     lancets-blood glucose strips 30 gauge Cmpk  by Misc.(Non-Drug; Combo Route) route.     lidocaine-prilocaine cream  Commonly known as: EMLA  Apply topically as needed.     lisinopriL 5 MG tablet  Commonly known as: PRINIVIL,ZESTRIL  Take 0.5 tablets (2.5 mg total) by mouth once daily.     magic mouthwash diphen/antac/lidoc/nysta  Swish 10 mls by mouth four times daily     mometasone 0.1% 0.1 % cream  Commonly known as: ELOCON  BALWINDER TO DARK AREAS BID ON LEGS PRN     ondansetron 8 MG tablet  Commonly known as: ZOFRAN  Take 1 tablet (8 mg total) by mouth every 8 (eight) hours as needed for Nausea.     oxyCODONE 10 mg Tab immediate release tablet  Commonly known as: ROXICODONE  Take 1 tablet (10 mg total) by mouth every 6 (six) hours as needed.     potassium chloride SA 10 MEQ tablet  Commonly known as: K-DUR,KLOR-CON  Take 1 tablet (10 mEq total) by mouth once daily.     prochlorperazine 10 MG tablet  Commonly known as: COMPAZINE  TAKE 1 TABLET BY MOUTH EVERY 6 HOURS AS NEEDED     promethazine 25 MG tablet  Commonly known as: PHENERGAN  Take 1 tablet (25 mg total) by mouth every 6 (six) hours as needed for Nausea (Taken headache does not resolve).     tiZANidine 4 MG tablet  Commonly known as: ZANAFLEX  TAKE 1 TABLETBY MOUTH NIGHTLY AT BEDTIME AS NEEDED                     Indwelling Lines/Drains at time of discharge: None      Time spent on the discharge of patient: 32 minutes  Patient was seen and examined on the date of discharge and determined to be suitable for discharge.         Isabell Higgins MD  Department of Hospital Medicine  [facility]

## 2020-08-05 NOTE — ASSESSMENT & PLAN NOTE
· Improving slowly. Rash not worse since admit and still in V1 distribution. Patient discharged home on Valtrex 1000 mg po daily (renally adjusted for her CrCl) for 7 days to complete treatment. Patient started on Prednsione 40 mg po daily and plan 7 day treatment and also discharged on Neurontin 100 mg po BID to help with neuropathic pain from Zoster.  · Patient with vesicular rash to left side of face consistent with Herpes Zoster.  · Patient on contact and airborne isolation as immunocompromised due to active malignancy and recent chemotherapy.   · Patient treated with V Acyclovir to treat (renall dosed) due to immunocompromised status and ocular involvement in hospital.   · Opthalmology consulted to evaluate ocular involvement as patient reporting worsening blurry vision and pain to left eye and evaluated patient and stated no ocular involvement and recommended artifical tears to help with dry eyes and patient to continue on discharge and Opthalmology to schedule follow-up appointment in 1 week.

## 2020-08-05 NOTE — PLAN OF CARE
08/05/20 0725   Final Note   Assessment Type Final Discharge Note     Patient discharged home on 8/4/2020 with HH to be assigned by PHN.     8/6/2020  Discharge summary faxed to [facility]. Per GALINA Gordon's note, patient will receive HH serivces from [facility] starting Sunday (8/9/2020).  informed the patient's daughter, Nancy Morris ([phone]), of above. Nancy stated that there was a change in plans & the patient discharged to another daughter's house. Patient discharged to Tera Troy's ([address] , [place], [address]) house. Voicemail message left for Elayne ([phone]) w/[facility] informing of above. Awaiting return call.

## 2020-08-05 NOTE — ASSESSMENT & PLAN NOTE
· Resolved. Patient states he is feeling much better off Sinemet.   · Patient with debilitating b/l UE and LE tremor, followed by Neurology outpatient. Suspected  PD, however tremor worsened after weaning off Abilify and patient recently started on Sinemet  · At home patient having sweating, palpitations, and insomnia, so patient stopped taking due to concern side effects of Sinemet. After discontinuing, patient had episode of what sounds like day-time sleepiness, possibly from abrupt discontinuation of the medication and low dopamine levels.  · Neurology consulted for further recommendations regarding need for Sinemet and/or dosage adjustments and stated patient okay to stop Sinemet and have patient follow-up in Neurology clinic to discuss other medication adjustments. Spoke with family and they will call their regular neurologist to discuss when and of to restart and at what dose.

## 2020-08-06 NOTE — ASSESSMENT & PLAN NOTE
· Patient's blood pressure is controlled here in the hospital.  · Goal for blood pressure is SBP < 140 and DBP < 90 as patient > or = 60 years of age and patient has advanced chronic kidney disease based on JNC 8 guidelines.   · Continue current treatment regimen of Amlodipine 10 mg po daily, Coreg 3.125 mg po BID, Lisnopril 2.5 mg po daily and Chlorthalidone 50 mg po daily to treat on discharge as taking at home prior to this admit.

## 2020-08-06 NOTE — ASSESSMENT & PLAN NOTE
· Patient with recent significant decline in renal function felt related to chemotherapy that patient was receiving to treat her mesothelioma.   · Cr does seem to be improving from 1 month ago when in 4.3-4.5 range and down to 3.3-3.5 so far on this admit. This appears to be patient's new baseline function.  · Controlled. Patient is at baseline renal function at present.   · Need to avoid any nephrotoxic agents such as NSAIDS, IV contrast dye or aminoglycosides unless necessary in future.   · Renally adjust medications based on patient's creatinine clearance.

## 2020-08-11 NOTE — PATIENT INSTRUCTIONS
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
## 2020-08-12 NOTE — PROGRESS NOTES
Requested updates from Care Everywhere.  Reviewed chart for overdue BRYON topics.  Updated Health Maintenance.   Reconciled immunizations in LINKS.
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
## 2020-08-14 NOTE — PROGRESS NOTES
Clinical, laboratorial and imaging information available in medical records were reveiwed by me. I agree with the assessment and recommendations provided by Dr. Montiel who was under my supervision.

## 2020-08-17 NOTE — TELEPHONE ENCOUNTER
----- Message from Jayla Schultz sent at 8/17/2020  8:47 AM CDT -----  Contact: Nancy (daughter)  Patient Advice/Staff Message     Caller name/title: Nancy (daughter)    Provider: Bishnu    Reason for call: Calling to let Esa know that she sent a msg via Tarisa, if she could check it and give her a call back ASAP.    Do you feel you need to be seen today:: No     Communication preference:: [phone]    Additional Info::

## 2020-08-19 PROBLEM — K92.2 GASTROINTESTINAL HEMORRHAGE: Status: ACTIVE | Noted: 2020-01-01
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
## 2020-08-19 NOTE — ED TRIAGE NOTES
Patient is a 74 year old female that presents to ED with c/o abdominal pain, diarrhea, N/V and coffee grounds stool. Pt states diarrhea started about a week ago and home meds have not been helping. Patient has hx of Parkinson's and Mesothelioma cancer, not currently on chemo or radiation.

## 2020-08-19 NOTE — TELEPHONE ENCOUNTER
"I spoke to the pt daughter and she reports for one week the pt has been having vomiting and diarrhea. She is taking Imodium and Zofran already and it is not helping. She reports the pt has no energy and is sleeping all day. She also reports the diarrhea looks like "coffee grounds". Advised per PCP to take pt to the ER. The daughter verbalizes understanding and agrees.  "

## 2020-08-19 NOTE — FIRST PROVIDER EVALUATION
" Emergency Department TeleTRIAGE Encounter Note      CHIEF COMPLAINT    Chief Complaint   Patient presents with    Diarrhea     diarrhea x 2 weeks, cancer pt, md advised to come to er "losing too many electrolytes"    Nausea       VITAL SIGNS   Initial Vitals [08/19/20 1317]   BP Pulse Resp Temp SpO2   129/68 86 18 97.9 °F (36.6 °C) 98 %      MAP       --            ALLERGIES    Review of patient's allergies indicates:   Allergen Reactions    Ciprofloxacin Anaphylaxis    Fructose     Gluten protein Other (See Comments)     GI upset  GI upset    Lactase Other (See Comments)     GI upset  GI upset    Latex, natural rubber Rash       PROVIDER TRIAGE NOTE  Pt is a 74 yr old female with hx of dementia, metastatic mesothelioma, CKD, DM HT, and HLD who presents to the ED with complaints of diarrhea and vomiting over the last week and half.  States she spoke with oncologist and PCP who instructed her to come to ED.  States she is not currently on chemo meds.  States she has not been running any fevers.  Reports generalized weakness and fatigue.  Denies recent antibiotic use, but states she had hospital stay at beginning of August.    ORDERS  Labs Reviewed   CULTURE, STOOL   CBC W/ AUTO DIFFERENTIAL   COMPREHENSIVE METABOLIC PANEL   LIPASE   POCT GLUCOSE MONITORING CONTINUOUS       ED Orders (720h ago, onward)    Start Ordered     Status Ordering Provider    08/19/20 1345 08/19/20 1331  sodium chloride 0.9% bolus 500 mL  ED 1 Time      Ordered KYLE QUESADA    08/19/20 1345 08/19/20 1331  ondansetron injection 4 mg  ED 1 Time      Ordered KYLE QUESADA    08/19/20 1330 08/19/20 1331  EKG 12-lead  Once      Ordered KYLE QUESADA    08/19/20 1330 08/19/20 1331  POCT glucose  Once      Ordered KYLE QUESADA    08/19/20 1329 08/19/20 1331  Saline lock IV  Once      Ordered KYLE QUESADA    08/19/20 1329 08/19/20 1331  CBC auto differential  STAT  " Collect    Ordered ADORE-KYLE MCLEOD E.    08/19/20 1329 08/19/20 1331  Comprehensive metabolic panel  STAT  Collect    Ordered ADORE-KYLE MCLEOD E.    08/19/20 1329 08/19/20 1331  Lipase  STAT  Collect    Ordered ADORE-KYLE MCLEOD E.    08/19/20 1329 08/19/20 1331  Stool culture **CANNOT BE ORDERED STAT**  Once  Collect    Ordered KYLE QUESADA.            Virtual Visit Note: The provider triage portion of this emergency department evaluation and documentation was performed via [employer], a HIPAA-compliant telemedicine application, in concert with a tele-presenter in the room. A face to face patient evaluation with one of my colleagues will occur once the patient is placed in an emergency department room.      DISCLAIMER: This note was prepared with [employer] voice recognition transcription software. Garbled syntax, mangled pronouns, and other bizarre constructions may be attributed to that software system.

## 2020-08-20 PROBLEM — K57.92 DIVERTICULITIS: Status: ACTIVE | Noted: 2020-01-01

## 2020-08-20 NOTE — PROGRESS NOTES
[facility] Medicine                                                                     Progress Note     Team: [facility] [place] [facility] Alvaro Estrada MD   Admit Date: 8/19/2020   Hospital Day: 1  TO: 8/22/2020   Code status: Full Code   Principal Problem: Diverticulitis     SUMMARY:     Ms. Preston is a 74 y.o. female with mesothelioma (on maintenance medications only), DM, CKD, history of DVT (no longer on anticoagulants), history GI bleed here today with diarrhea and abdominal pain. Reports that for the past 2 weeks, she has had diffuse diarrhea. Over the past couple days, it has began to look like coffee grounds. Has h/o GI bleed in past. Not currently on blood thinners. Has associated diffuse abd pain that comes in waves. Has nausea and vomiting as well over the past day that is refractory to home zofran. Daughter reports pt has had excessive fatigue over the past 2 weeks as well. Denies cough, fevers, chills, hematochezia, dysuria. FOB positive in ER.    Admitted to hospital medicine for intractable N/V secondary to suspected diverticulitis and possible GIB. Started on IV abx and PPI. Consulted GI, no acute endoscopic intervention indicated at this time. Low suspicion for GIB. GI rec treating diverticulitis with IV abx with GI follow up outpatient. Diet advancing.    SUBJECTIVE:     Pt was seen and examined at bedside. HDS and afebrile overnight. Continues to have intermittent nausea and emesis. Mild abdominal pain. No fevers, cp, cough, diarrhea or blood in stool noted. Wanted to try some diet today, will advance. GI is not planning any procedures.     ROS (Positive in Bold, otherwise negative)  Pain Scale: 2 /10   Constitutional: fever, chills, night sweats, weakness  CV: chest pain, edema, palpitations  Resp: SOB, cough, sputum production  GI: changes in  appetite, nausea, emesis, pain, melena, hematochezia, GERD, hematemesis  : Dysuria, hematuria, urinary urgency, frequency  MSK: arthralgia/myalgia, joint swelling  SKIN: rashes, pruritis, petechiae   Neuro/Psych, FNDm anxiety, depression      OBJECTIVE:     Vitals:  Temp:  [98.1 °F (36.7 °C)-98.7 °F (37.1 °C)]   Pulse:  [69-74]   Resp:  [16-20]   BP: (141-161)/(69-77)   SpO2:  [94 %-98 %]      I & O (Last 24H):   No intake or output data in the 24 hours ending 08/20/20 1717      GEN: AA female  in no acute distress. Nontoxic. Resting in bed. Cooperative.  HEENT: NCAT. PERRL. EOMI. Conjunctivae/corneas clear, sclera Anicteric.  CVS: RRR. Normal s1 s2 no murmur, click, rub or gallop  LUNG: CTAB. Normal respiratory effort. No wheezes, rhonchi, or crackles.  ABD: Normoactive BS, soft, mild diffuse tenderness, ND, no masses or organomegaly.  EXT: No edema. No cyanosis. Full ROM.  SKIN: color, texture, turgor normal. No rashes or lesions  NEURO: Alert, oriented x 4, Spont mvt of all extremities with no focal deficits noted.      All recent labs and imaging has been reviewed.     Recent Results (from the past 24 hour(s))   CBC auto differential    Collection Time: 08/20/20  4:09 AM   Result Value Ref Range    WBC 7.49 3.90 - 12.70 K/uL    RBC 2.64 (L) 4.00 - 5.40 M/uL    Hemoglobin 9.0 (L) 12.0 - 16.0 g/dL    Hematocrit 28.3 (L) 37.0 - 48.5 %    Mean Corpuscular Volume 107 (H) 82 - 98 fL    Mean Corpuscular Hemoglobin 34.1 (H) 27.0 - 31.0 pg    Mean Corpuscular Hemoglobin Conc 31.8 (L) 32.0 - 36.0 g/dL    RDW 13.2 11.5 - 14.5 %    Platelets 182 150 - 350 K/uL    MPV 9.4 9.2 - 12.9 fL    Immature Granulocytes 0.4 0.0 - 0.5 %    Gran # (ANC) 5.6 1.8 - 7.7 K/uL    Immature Grans (Abs) 0.03 0.00 - 0.04 K/uL    Lymph # 1.2 1.0 - 4.8 K/uL    Mono # 0.6 0.3 - 1.0 K/uL    Eos # 0.1 0.0 - 0.5 K/uL    Baso # 0.02 0.00 - 0.20 K/uL    nRBC 0 0 /100 WBC    Gran% 74.7 (H) 38.0 - 73.0 %    Lymph% 15.9 (L) 18.0 - 48.0 %    Mono% 7.6 4.0  - 15.0 %    Eosinophil% 1.1 0.0 - 8.0 %    Basophil% 0.3 0.0 - 1.9 %    Differential Method Automated    Basic metabolic panel    Collection Time: 08/20/20  4:09 AM   Result Value Ref Range    Sodium 141 136 - 145 mmol/L    Potassium 3.3 (L) 3.5 - 5.1 mmol/L    Chloride 109 95 - 110 mmol/L    CO2 24 23 - 29 mmol/L    Glucose 97 70 - 110 mg/dL    BUN, Bld 36 (H) 8 - 23 mg/dL    Creatinine 2.6 (H) 0.5 - 1.4 mg/dL    Calcium 8.5 (L) 8.7 - 10.5 mg/dL    Anion Gap 8 8 - 16 mmol/L    eGFR if African American 20.3 (A) >60 mL/min/1.73 m^2    eGFR if non  17.6 (A) >60 mL/min/1.73 m^2   POCT glucose    Collection Time: 08/20/20  7:05 AM   Result Value Ref Range    POCT Glucose 97 70 - 110 mg/dL   POCT glucose    Collection Time: 08/20/20 12:12 PM   Result Value Ref Range    POCT Glucose 110 70 - 110 mg/dL   POCT glucose    Collection Time: 08/20/20  4:19 PM   Result Value Ref Range    POCT Glucose 120 (H) 70 - 110 mg/dL       Recent Labs   Lab 08/18/20  0930 08/19/20  1406 08/20/20  0705 08/20/20  1212 08/20/20  1619   POCTGLUCOSE 145* 134* 97 110 120*       Hemoglobin A1C   Date Value Ref Range Status   08/19/2020 6.1 (H) 4.0 - 5.6 % Final     Comment:     ADA Screening Guidelines:  5.7-6.4%  Consistent with prediabetes  >or=6.5%  Consistent with diabetes  High levels of fetal hemoglobin interfere with the HbA1C  assay. Heterozygous hemoglobin variants (HbS, HgC, etc)do  not significantly interfere with this assay.   However, presence of multiple variants may affect accuracy.     05/05/2020 6.1 (H) 4.0 - 5.6 % Final     Comment:     ADA Screening Guidelines:  5.7-6.4%  Consistent with prediabetes  >or=6.5%  Consistent with diabetes  High levels of fetal hemoglobin interfere with the HbA1C  assay. Heterozygous hemoglobin variants (HbS, HgC, etc)do  not significantly interfere with this assay.   However, presence of multiple variants may affect accuracy.     11/10/2019 6.9 (H) 4.0 - 5.6 % Final     Comment:      "ADA Screening Guidelines:  5.7-6.4%  Consistent with prediabetes  >or=6.5%  Consistent with diabetes  High levels of fetal hemoglobin interfere with the HbA1C  assay. Heterozygous hemoglobin variants (HbS, HgC, etc)do  not significantly interfere with this assay.   However, presence of multiple variants may affect accuracy.          Active Hospital Problems    Diagnosis  POA    *Gastrointestinal hemorrhage [K92.2]  Yes      Resolved Hospital Problems   No resolved problems to display.          ASSESSMENT AND PLAN:     Gastrointestinal Bleed  -Concern for GIB as per ED  -Protonix 80m x 1 given ,40Mg IV BID  -Consult GI, low suspicion for GIB  -PPI oral  -Daily CBC     Diverticulitis  - Presenitng with 2 week hx of abdominal pain, worsening now with emesis  - CT scan -" Findings concerning for diverticulitis involving the proximal descending colon, correlation is advised"  - Continue Rocephin and Flagyl for now  - C-scope in the future per GI and radiology recs  - Advance diet as able     Hx of Mesthelioma  - Recent PET scan 8/18  "New, multinodular pleural thickening on the right, some of which is hypermetabolic and suspicious for recurrence.  Tissue sampling could be confirmatory"  - will need to fu with outpatient hem onc     CKD stage IV most likely multifactorial due to cisplatin use   - Creatine ar baseline- 3.2  - Followed by Nephrology     HTN:  - Blood pressures recently improved with increased medications   - Well controlled Coreg  - Cont chlorthalidone and low dose lisinopril     Diabetes  - Sliding Scale  - Hgb A1C 6.1     Recent Ocular herpes zoster  - Complteted 7 days of valacyclovir and prednisone     Past Covid -19 Infection  - noted       Prophylaxis- SCDs  Code Status- Full Code   Discharge plan and follow up - d/c home once medically stable    Discharge Planning   TO: 8/22/2020     Code Status: Prior   Is the patient medically ready for discharge?:     Reason for patient still in hospital (select " all that apply): Patient unstable  Discharge Plan A: Home with family, Home Health          Alvaro Estrada MD  Hospital Medicine Staff  Pager [phone]
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
## 2020-08-20 NOTE — H&P
Hospital Medicine  History and Physical Exam    Team: [facility] MED A Emanuel Chapin MD  Admit Date: 8/19/2020  TO   Principal Problem:  <principal problem not specified>   Patient information was obtained from patient and ER records.   Primary care Physician: Ayo Archuleta MD  Code status: Full Code    HPI:Ms. Preston is a 74 y.o. female with mesothelioma (on maintenance medications only), DM, CKD, history of DVT (no longer on anticoagulants), history GI bleed here today with diarrhea and abdominal pain. Reports that for the past 2 weeks, she has had diffuse diarrhea. Over the past couple days, it has began to look like coffee grounds. Has h/o GI bleed in past. Not currently on blood thinners. Has associated diffuse abd pain that comes in waves. Has nausea and vomiting as well over the past day that is refractory to home zofran. Daughter reports pt has had excessive fatigue over the past 2 weeks as well. Denies cough, fevers, chills, hematochezia, dysuria.    FOB positive in ER.    Past Medical History: Patient has a past medical history of Ambulates with cane, Anticoagulant long-term use, Anxiety, Behavioral problem, Blurred vision, left eye (7/31/2015), Cancer, Cataract, Chronic deep vein thrombosis (DVT) of distal vein of lower extremity, unspecified laterality (10/21/2016), CKD (chronic kidney disease) stage 5, GFR less than 15 ml/min (5/29/2020), Clotting disorder, Colon polyp, DDD (degenerative disc disease), lumbar (6/27/2016), Deep vein thrombosis, Depression, Diabetes mellitus type II, Diverticulosis, Encounter for blood transfusion, Eye injuries, General anesthetics causing adverse effect in therapeutic use, Herpes zoster with ophthalmic complication (8/1/2020), History of blood clots, History of DVT of lower extremity (7/3/2019), History of psychiatric care, History of psychiatric hospitalization, Hyperlipidemia, Hypertension, Hypertension, essential (7/31/2015), Memory loss (3/5/2020), Mesothelioma,  biphasic, malignant (7/18/2019), Psychiatric problem, Retinal defect (2006), Tremor (3/5/2020), Type 2 diabetes mellitus without complication, without long-term current use of insulin (6/5/2019), and Ulcer.    Past Surgical History: Patient has a past surgical history that includes Appendectomy; Cholecystectomy; Umbilical hernia repair; colon resection for diverticulitis x 2; Total abdominal hysterectomy w/ bilateral salpingoophorectomy; Tonsillectomy; Hemorrhoid surgery; Ankle fracture surgery; Breast surgery (1998); Hernia repair (2000); Hysterectomy; APENDIX AND GALL BLADDER REMOVED; Thoracoscopic biopsy of pleura (Right, 7/3/2019); Pleurodesis with video-assisted thoracoscopic surgery (VATS) (Right, 7/3/2019); and Insertion of tunneled central venous catheter (CVC) with subcutaneous port (Left, 8/5/2019).    Social History: Patient reports that she quit smoking about 50 years ago. Her smoking use included cigarettes. She has never used smokeless tobacco. She reports that she does not drink alcohol or use drugs.    Family History: family history includes Alcohol abuse in her brother; Arthritis in her father, paternal grandmother, and sister; Birth defects in her daughter; Breast cancer in her maternal aunt; Cancer in her father; Cataracts in her paternal grandmother and sister; Clotting disorder in her maternal aunt; Depression in her brother; Diabetes in her paternal grandmother and sister; Early death in her paternal uncle; Glaucoma in her mother and paternal grandmother; Heart disease in her daughter; Ovarian cancer in her daughter; Stroke in her mother and paternal uncle.    Medications:   Prior to Admission medications    Medication Sig Start Date End Date Taking? Authorizing Provider   acetaminophen (TYLENOL) 500 MG tablet Take 2 tablets (1,000 mg total) by mouth every 6 (six) hours as needed for Pain. 2/19/20   Isabella Ojeda, DO   alcohol swabs PadM Apply 1 each topically as needed. 6/4/20   Ayo MITTAL  MD [place]   amLODIPine (NORVASC) 10 MG tablet TAKE 1 TABLET BY MOUTH EVERY DAY 12/9/19   Ayo Archuleta MD   artificial tears (ISOPTO TEARS) 0.5 % ophthalmic solution Place 1 drop into both eyes 4 (four) times daily. 8/4/20   Isabell Higgins MD   atorvastatin (LIPITOR) 40 MG tablet TAKE 1 TABLET BY MOUTH EVERY DAY 8/19/20   Ayo Archuleta MD   blood sugar diagnostic (BLOOD GLUCOSE TEST) Strp 1 strip by Misc.(Non-Drug; Combo Route) route daily as needed. 6/4/20   Ayo Archuleta MD   blood-glucose meter kit 1 each by Other route daily as needed for Other. Use as instructed 6/4/20   Ayo Archuleta MD   carbidopa-levodopa  mg (SINEMET)  mg per tablet Take 1 tablet by mouth 3 (three) times daily. Hold until you speak with your neurologist to see when and if safe to resume. 8/4/20 8/4/21  Isabell Higgins MD   carvediloL (COREG) 6.25 MG tablet Take 0.5 tablets (3.125 mg total) by mouth 2 (two) times daily with meals. 7/2/20 7/2/21  Jose Miguel Jefferson MD   chlorthalidone (HYGROTEN) 25 MG Tab Take 2 tablets (50 mg total) by mouth once daily. 7/2/20 7/2/21  Jose Miguel Jefferson MD   desoximetasone (TOPICORT) 0.05 % cream as needed.     Historical Provider, MD   dicyclomine (BENTYL) 10 MG capsule TAKE 1 CAPSULE BY MOUTH TWICE A DAY 1/24/20   Ayo Archuleta MD   dicyclomine (BENTYL) 20 mg tablet Take 1 tablet (20 mg total) by mouth every 6 (six) hours. 7/7/20   Nasrin Deutsch, MELINA   donepeziL (ARICEPT) 10 MG tablet Take 1 tablet (10 mg total) by mouth every evening. 7/27/20 7/27/21  Cassandra Mancia MD   DULoxetine (CYMBALTA) 30 MG capsule TAKE 1 CAPSULE BY MOUTH EVERY DAY 8/18/20   Ayo Archuleta MD   fluticasone (VERAMYST) 27.5 mcg/actuation nasal spray 2 sprays by Nasal route daily as needed for Rhinitis or Allergies.     Historical Provider, MD   folic acid (FOLVITE) 400 MCG tablet Take 1 tablet (400 mcg total) by mouth once daily. 8/9/19 8/8/20  Antonella Goetz NP   gabapentin  (NEURONTIN) 100 MG capsule TAKE 1 CAPSULE BY MOUTH TWICE A DAY 6/9/20   Antonella Goetz NP   lancets (TRUEPLUS LANCETS) 28 gauge Misc 1 lancet by Misc.(Non-Drug; Combo Route) route once daily. Test blood sugar once daily, type 2 diabetes, controlled. E11.9 6/4/20   Ayo Archuleta MD   lancets-blood glucose strips 30 gauge Cmpk by Misc.(Non-Drug; Combo Route) route.    Historical Provider, MD   lidocaine-prilocaine (EMLA) cream Apply topically as needed. 11/22/19   Antonella Goetz NP   LINZESS 145 mcg Cap capsule TAKE 1 CAPSULE BY MOUTH EVERY DAY  Patient taking differently: daily as needed.  11/8/19   Ayo Archuleta MD   lisinopriL (PRINIVIL,ZESTRIL) 5 MG tablet Take 0.5 tablets (2.5 mg total) by mouth once daily. 7/2/20 7/2/21  Jose Miguel Jefferson MD   magic mouthwash diphen/antac/lidoc/nysta Swish 10 mls by mouth four times daily 8/4/20   Isabell Higgins MD   mometasone 0.1% (ELOCON) 0.1 % cream BALWINDER TO DARK AREAS BID ON LEGS PRN 3/19/19   Ayo Archuleta MD   ondansetron (ZOFRAN) 8 MG tablet Take 1 tablet (8 mg total) by mouth every 8 (eight) hours as needed for Nausea. 7/29/20   Ayo Archuleta MD   oxyCODONE (ROXICODONE) 10 mg Tab immediate release tablet Take 1 tablet (10 mg total) by mouth every 6 (six) hours as needed. 5/13/20   Piero Chavez MD   potassium chloride SA (K-DUR,KLOR-CON) 10 MEQ tablet Take 1 tablet (10 mEq total) by mouth once daily. 7/9/20   Jose Miguel Jefferson MD   prochlorperazine (COMPAZINE) 10 MG tablet TAKE 1 TABLET BY MOUTH EVERY 6 HOURS AS NEEDED 7/21/20   Silas Alves MD   promethazine (PHENERGAN) 25 MG tablet Take 1 tablet (25 mg total) by mouth every 6 (six) hours as needed for Nausea (Taken headache does not resolve). 5/13/20   Piero Chavez MD   sodium bicarbonate 650 MG tablet Take 1 tablet (650 mg total) by mouth 2 (two) times daily. 8/13/20 8/13/21  Jose Miguel Jefferson MD   tiZANidine (ZANAFLEX) 4 MG tablet TAKE 1 TABLETBY MOUTH NIGHTLY AT BEDTIME AS NEEDED 6/9/20   Ayo  DAXA Archuleta MD       Allergies: Patient is allergic to ciprofloxacin; fructose; gluten protein; lactase; and latex, natural rubber.    ROS    Constitutional: neg for fever or chills  Eyes: neg for visual changes  ENT: neg for nasal congestion or sore throat  Respiratory: neg for cough or shortness of breath  Cardiovascular: neg for chest pain or palpitations  Gastrointestinal: pos for nausea or vomiting or  Pos abdominal pain. Neg for change in bowel habits  Genitourinary: neg for hematuria or dysuria  Integument/Breast: neg for rash or pruritis  Hematologic/Lymphatic: neg for easy bruising neg for lymphadenopathy   Musculoskeletal: neg for arthralgias or myalgias  Neurological: neg for seizures or tremors  Endocrine: neg for heat or cold intolerance    PEx  Temp:  [97.9 °F (36.6 °C)-98.7 °F (37.1 °C)]   Pulse:  [66-86]   Resp:  [16-18]   BP: (129-152)/(65-74)   SpO2:  [95 %-98 %]   Body mass index is 25.18 kg/m².     General: well developed, well nourished, neg for acute distress  Eyes: conjunctivae/corneas clear.   Head: normocephalic, atraumatic.   Neck: supple, symmetrical, trachea midline, neg for JVD, neg for carotid bruits  Lungs: clear to auscultation bilaterally, normal respiratory effort  Heart: regular rate and rhythm, neg for murmur  Extremities: neg for edema, neg for joint swelling, pulses: 2+ at ankles   Abdomen: Soft,  Mild  Diffuse tenderness; liver and spleen not palpable, bowel sounds active, neg for aortic bruits  Skin: Skin color, texture, turgor normal. Neg for rashes or lesions.   Neurologic: CN II-XII grossly intact, DTR: 2/4 bilaterally. Normal muscle strength and tone. Neg for focal numbness or weakness. Mild essential tremoor  Mental status: Alert, oriented x 4, affect appropriate, memory intact    Recent Labs   Lab 08/18/20  0915 08/19/20  1358   WBC 10.57 8.97   HGB 10.0* 10.6*   HCT 31.6* 32.5*    223     Recent Labs   Lab 08/18/20  0915 08/19/20  1358    138   K 3.5 3.5     102   CO2 24 26   BUN 45* 43*   CREATININE 3.3* 3.2*   * 135*   CALCIUM 9.3 9.3   LIPASE  --  44     Recent Labs   Lab 08/18/20  0915 08/19/20  1358   ALKPHOS 113 104   * 115*   AST 57* 34   ALBUMIN 3.7 3.5   PROT 7.4 7.4   BILITOT 0.4 0.4      Recent Labs   Lab 08/18/20  0930 08/19/20  1406   POCTGLUCOSE 145* 134*     Recent Labs     08/19/20  1414   LACTATE 1.3        @LABRCNT(CPK:3,CPKMB:3,mb:3,TroponinI:3)  )@  Hemoglobin A1C   Date Value Ref Range Status   08/19/2020 6.1 (H) 4.0 - 5.6 % Final     Comment:     ADA Screening Guidelines:  5.7-6.4%  Consistent with prediabetes  >or=6.5%  Consistent with diabetes  High levels of fetal hemoglobin interfere with the HbA1C  assay. Heterozygous hemoglobin variants (HbS, HgC, etc)do  not significantly interfere with this assay.   However, presence of multiple variants may affect accuracy.     05/05/2020 6.1 (H) 4.0 - 5.6 % Final     Comment:     ADA Screening Guidelines:  5.7-6.4%  Consistent with prediabetes  >or=6.5%  Consistent with diabetes  High levels of fetal hemoglobin interfere with the HbA1C  assay. Heterozygous hemoglobin variants (HbS, HgC, etc)do  not significantly interfere with this assay.   However, presence of multiple variants may affect accuracy.     11/10/2019 6.9 (H) 4.0 - 5.6 % Final     Comment:     ADA Screening Guidelines:  5.7-6.4%  Consistent with prediabetes  >or=6.5%  Consistent with diabetes  High levels of fetal hemoglobin interfere with the HbA1C  assay. Heterozygous hemoglobin variants (HbS, HgC, etc)do  not significantly interfere with this assay.   However, presence of multiple variants may affect accuracy.             Active Hospital Problems    Diagnosis  POA    Gastrointestinal hemorrhage [K92.2]  Yes      Resolved Hospital Problems   No resolved problems to display.       Overview: 74 year old female that presents to ED with c/o abdominal pain, diarrhea, N/V and coffee grounds stool. Hx of mesothelioma an  "recent shingles.      Assessment and Plan:    Gastrointestinal Bleed  Protonix 80m x 1 given ,40Mg IV BID  Consult GI  CBC in am    Diverticulitis  NPO  Rocephin and Flagyl for now  CT scan -" Findings concerning for diverticulitis involving the proximal descending colon, correlation is advised"  C-scope in the future per radiology recs    Hx of Mesthelioma  Recent PET scan 8/18  "New, multinodular pleural thickening on the right, some of which is hypermetabolic and suspicious for recurrence.  Tissue sampling could be confirmatory"  Will consultHem Onc.    CKD stage IV most likely multifactorial due to cisplatin use   Creatine ar baseline- 3.2  Followed by Nephrology    HTN:   Blood pressures recently improved with increased medications   Well controlled Coreg  Cont chlorthalidone and low dose lisinopril    Diabetes  Sliding Scale  Hgb A1C 6.1     Recent Ocular herpes zoster  Complteted 7 days of valacyclovir and prednisone    Past Covid -19 Infection      Diet:  NPO  GI PPx: Protonix 40mg BID IV  DVT PPx:  SCD      Goals of Care: Return to prior functional status  Discharge plan:To Home  Time (minutes) spent in care of the patient (Greater than 1/2 spent in direct face-to-face contact) 35 minutes    Emanuel Chapin MD    "

## 2020-08-20 NOTE — PLAN OF CARE
08/20/20 1013   Discharge Assessment   Assessment Type Discharge Planning Assessment   Confirmed/corrected address and phone number on facesheet? Yes   Prior to hospitilization cognitive status: Alert/Oriented   Prior to hospitalization functional status: Assistive Equipment   Current cognitive status: Alert/Oriented   Current Functional Status: Assistive Equipment   Lives With child([place]), adult   Able to Return to Prior Arrangements yes   Is patient able to care for self after discharge? Unable to determine at this time (comments)   Who are your caregiver(s) and their phone number(s)? Alida Troy [phone]   Readmission Within the Last 30 Days previous discharge plan unsuccessful   If yes, most recent facility [place] [facility] [place]   Patient currently being followed by outpatient case management? No   Equipment Currently Used at Home walker, rolling;wheelchair;bedside commode;grab bar;shower chair   Do you have any problems affording any of your prescribed medications? No   Is the patient taking medications as prescribed? yes   Does the patient have transportation home? Yes   Transportation Anticipated family or friend will provide   Does the patient receive services at the [facility]? No   Discharge Plan A Home with family;Home Health   Discharge Plan B Home with family   DME Needed Upon Discharge  other (see comments)  (TBD)   Readmission Questionnaire   At the time of your discharge, did someone talk to you about what your health problems were? Yes   At the time of discharge, did someone talk to you about what to watch out for regarding worsening of your health problem? Yes   At the time of discharge, did someone talk to you about what to do if you experienced worsening of your health problem? Yes   At the time of discharge, did someone talk to you about which medication to take when you left the hospital and which ones to stop taking? Yes   At the time of discharge, did someone talk to you about  when and where to follow up with a doctor after you left the hospital? Yes   How often do you need to have someone help you when you read instructions, pamphlets, or other written material from your doctor or pharmacy? Never   Do you have problems taking your medications as prescribed? No   Do you have any problems affording any of  your prescribed medications? No   Do you have problems obtaining/receiving your medications? No   Does the patient have transportation to healthcare appointments? No   Living Arrangements house   Does the patient have family/friends to help with healtcare needs after discharge? yes   Does your caregiver provide all the help you need? Yes   Are you currently feeling confused? No   Are you currently having problems thinking? No   Are you currently having memory problems? No   Have you felt down, depressed, or hopeless? 1   Have you felt little interest or pleasure in doing things? 1   In the last 7 days, my sleep quality was: fair     Patient lives with her daughter and receives HH. Very well groomed and pleasant.    Ayo Archuleta MD  [address] [place] [id number]       [facility] [id number] [place] [employer] - [address]  [address] [address]  Phone: [phone] Fax: [phone]    Payor: [employer] MANAGED MEDICARE / Plan: [employer] [facility] HEALTH / Product Type: Medicare Advantage /     PCP F/U scheduled.

## 2020-08-21 NOTE — PROGRESS NOTES
[facility] Hospital Medicine                                                                     Progress Note     Team: [facility] A Alvaro Estrada MD   Admit Date: 8/19/2020   Hospital Day: 2  TO: 8/22/2020   Code status: Full Code   Principal Problem: Diverticulitis     SUMMARY:     Ms. Preston is a 74 y.o. female with mesothelioma (on maintenance medications only), DM, CKD, history of DVT (no longer on anticoagulants), history GI bleed here today with diarrhea and abdominal pain. Reports that for the past 2 weeks, she has had diffuse diarrhea. Over the past couple days, it has began to look like coffee grounds. Has h/o GI bleed in past. Not currently on blood thinners. Has associated diffuse abd pain that comes in waves. Has nausea and vomiting as well over the past day that is refractory to home zofran. Daughter reports pt has had excessive fatigue over the past 2 weeks as well. Denies cough, fevers, chills, hematochezia, dysuria. FOB positive in ER.    Admitted to hospital medicine for intractable N/V secondary to suspected diverticulitis and possible GIB. Started on IV abx and PPI. Consulted GI, no acute endoscopic intervention indicated at this time. Low suspicion for GIB. GI rec treating diverticulitis with IV abx with GI follow up outpatient. Diet advancing as able, however continues to have severe nausea and abdominal pain.    SUBJECTIVE:     Pt was seen and examined at bedside. HDS and afebrile overnight. This morning patient feels unwell, continues to have nausea and abdominal pain, however not worse. She had emesis multiple times last night and today. Had one episode of diarrhea this AM, no melena, and is passing flatus. No there complaints including fevers, cp, cough, dysuria. Unable to take PO meds this AM due to nausea. Nausea meds titrated. KUB  ordered, reviewed, no acute pathology noted.    ROS (Positive in Bold, otherwise negative)  Pain Scale: 6 /10   Constitutional: fever, chills, night sweats, weakness  CV: chest pain, edema, palpitations  Resp: SOB, cough, sputum production  GI: changes in appetite, nausea, emesis, pain, melena, hematochezia, GERD, hematemesis  : Dysuria, hematuria, urinary urgency, frequency  MSK: arthralgia/myalgia, joint swelling  SKIN: rashes, pruritis, petechiae   Neuro/Psych, FNDm anxiety, depression      OBJECTIVE:     Vitals:  Temp:  [97 °F (36.1 °C)-98.5 °F (36.9 °C)]   Pulse:  [68-85]   Resp:  [16-20]   BP: (121-166)/(58-82)   SpO2:  [91 %-97 %]      I & O (Last 24H):     Intake/Output Summary (Last 24 hours) at 8/21/2020 1441  Last data filed at 8/21/2020 0900  Gross per 24 hour   Intake 240 ml   Output --   Net 240 ml         GEN: AA female  in no acute distress. Nontoxic. Resting in bed. Cooperative.  HEENT: NCAT. PERRL. EOMI. Conjunctivae/corneas clear, sclera Anicteric.  CVS: RRR. Normal s1 s2 no murmur, click, rub or gallop  LUNG: CTAB. Normal respiratory effort. No wheezes, rhonchi, or crackles.  ABD: Normoactive BS, soft, moderate diffuse tenderness, ND, no masses or organomegaly, no peritoneal signs  EXT: No edema. No cyanosis. Full ROM.  SKIN: color, texture, turgor normal. No rashes or lesions  NEURO: Alert, oriented x 4, Spont mvt of all extremities with no focal deficits noted.      All recent labs and imaging has been reviewed.     Recent Results (from the past 24 hour(s))   POCT glucose    Collection Time: 08/20/20  4:19 PM   Result Value Ref Range    POCT Glucose 120 (H) 70 - 110 mg/dL   POCT glucose    Collection Time: 08/20/20  9:04 PM   Result Value Ref Range    POCT Glucose 172 (H) 70 - 110 mg/dL   Comprehensive metabolic panel    Collection Time: 08/21/20  6:03 AM   Result Value Ref Range    Sodium 142 136 - 145 mmol/L    Potassium 4.1 3.5 - 5.1 mmol/L    Chloride 109 95 - 110 mmol/L    CO2 26 23 - 29  mmol/L    Glucose 117 (H) 70 - 110 mg/dL    BUN, Bld 29 (H) 8 - 23 mg/dL    Creatinine 2.7 (H) 0.5 - 1.4 mg/dL    Calcium 9.0 8.7 - 10.5 mg/dL    Total Protein 6.0 6.0 - 8.4 g/dL    Albumin 2.9 (L) 3.5 - 5.2 g/dL    Total Bilirubin 0.2 0.1 - 1.0 mg/dL    Alkaline Phosphatase 90 55 - 135 U/L    AST 24 10 - 40 U/L    ALT 60 (H) 10 - 44 U/L    Anion Gap 7 (L) 8 - 16 mmol/L    eGFR if African American 19.4 (A) >60 mL/min/1.73 m^2    eGFR if non  16.9 (A) >60 mL/min/1.73 m^2   Magnesium    Collection Time: 08/21/20  6:03 AM   Result Value Ref Range    Magnesium 1.4 (L) 1.6 - 2.6 mg/dL   Phosphorus    Collection Time: 08/21/20  6:03 AM   Result Value Ref Range    Phosphorus 3.3 2.7 - 4.5 mg/dL   CBC auto differential    Collection Time: 08/21/20  6:04 AM   Result Value Ref Range    WBC 6.40 3.90 - 12.70 K/uL    RBC 2.49 (L) 4.00 - 5.40 M/uL    Hemoglobin 8.4 (L) 12.0 - 16.0 g/dL    Hematocrit 27.1 (L) 37.0 - 48.5 %    Mean Corpuscular Volume 109 (H) 82 - 98 fL    Mean Corpuscular Hemoglobin 33.7 (H) 27.0 - 31.0 pg    Mean Corpuscular Hemoglobin Conc 31.0 (L) 32.0 - 36.0 g/dL    RDW 12.9 11.5 - 14.5 %    Platelets 174 150 - 350 K/uL    MPV 10.0 9.2 - 12.9 fL    Immature Granulocytes 0.3 0.0 - 0.5 %    Gran # (ANC) 4.6 1.8 - 7.7 K/uL    Immature Grans (Abs) 0.02 0.00 - 0.04 K/uL    Lymph # 1.3 1.0 - 4.8 K/uL    Mono # 0.5 0.3 - 1.0 K/uL    Eos # 0.1 0.0 - 0.5 K/uL    Baso # 0.02 0.00 - 0.20 K/uL    nRBC 0 0 /100 WBC    Gran% 71.3 38.0 - 73.0 %    Lymph% 19.8 18.0 - 48.0 %    Mono% 7.0 4.0 - 15.0 %    Eosinophil% 1.3 0.0 - 8.0 %    Basophil% 0.3 0.0 - 1.9 %    Differential Method Automated    POCT glucose    Collection Time: 08/21/20  8:00 AM   Result Value Ref Range    POCT Glucose 111 (H) 70 - 110 mg/dL   POCT glucose    Collection Time: 08/21/20 12:13 PM   Result Value Ref Range    POCT Glucose 150 (H) 70 - 110 mg/dL       Recent Labs   Lab 08/20/20  0705 08/20/20  1212 08/20/20  1619 08/20/20  3666  "08/21/20  0800 08/21/20  1213   POCTGLUCOSE 97 110 120* 172* 111* 150*       Hemoglobin A1C   Date Value Ref Range Status   08/19/2020 6.1 (H) 4.0 - 5.6 % Final     Comment:     ADA Screening Guidelines:  5.7-6.4%  Consistent with prediabetes  >or=6.5%  Consistent with diabetes  High levels of fetal hemoglobin interfere with the HbA1C  assay. Heterozygous hemoglobin variants (HbS, HgC, etc)do  not significantly interfere with this assay.   However, presence of multiple variants may affect accuracy.     05/05/2020 6.1 (H) 4.0 - 5.6 % Final     Comment:     ADA Screening Guidelines:  5.7-6.4%  Consistent with prediabetes  >or=6.5%  Consistent with diabetes  High levels of fetal hemoglobin interfere with the HbA1C  assay. Heterozygous hemoglobin variants (HbS, HgC, etc)do  not significantly interfere with this assay.   However, presence of multiple variants may affect accuracy.     11/10/2019 6.9 (H) 4.0 - 5.6 % Final     Comment:     ADA Screening Guidelines:  5.7-6.4%  Consistent with prediabetes  >or=6.5%  Consistent with diabetes  High levels of fetal hemoglobin interfere with the HbA1C  assay. Heterozygous hemoglobin variants (HbS, HgC, etc)do  not significantly interfere with this assay.   However, presence of multiple variants may affect accuracy.          Active Hospital Problems    Diagnosis  POA    *Diverticulitis [K57.92]  Yes    Gastrointestinal hemorrhage [K92.2]  Yes      Resolved Hospital Problems   No resolved problems to display.          ASSESSMENT AND PLAN:     Diverticulitis  - Presenitng with 2 week hx of abdominal pain, worsening now with emesis  - CT scan -" Findings concerning for diverticulitis involving the proximal descending colon, correlation is advised"  - Continue Rocephin and Flagyl for now  - C-scope in the future per GI and radiology recs  - Advance diet as able    Gastrointestinal Bleed  -Concern for GIB as per ED  -Protonix 80m x 1 given ,40Mg IV BID  -Consult GI, low suspicion for " "GIB, no procedures planned  -PPI oral  -Daily CBC, gradual drop noted     Hx of Mesthelioma  - Recent PET scan 8/18  "New, multinodular pleural thickening on the right, some of which is hypermetabolic and suspicious for recurrence.  Tissue sampling could be confirmatory"  - will need to fu with outpatient hem onc     CKD stage IV most likely multifactorial due to cisplatin use   - Creatine ar baseline- 3.2  - Followed by Nephrology     HTN:  - Blood pressures recently improved with increased medications   - Well controlled Coreg  - Cont chlorthalidone and low dose lisinopril     Diabetes  - Sliding Scale  - Hgb A1C 6.1     Recent Ocular herpes zoster  - Complteted 7 days of valacyclovir and prednisone  - now has post herpetic neuralgia, will provide lidocaine cream for now     Past Covid -19 Infection  - noted    Hypomagnesemia   - replete and recheck         Prophylaxis- SCDs  Code Status- Full Code   Discharge plan and follow up - d/c home once medically stable    Discharge Planning   TO: 8/22/2020     Code Status: Prior   Is the patient medically ready for discharge?:     Reason for patient still in hospital (select all that apply): Patient unstable  Discharge Plan A: Home with family, Home Health          Alvaro Estrada MD  Hospital Medicine Staff  Pager [phone]  "

## 2020-08-21 NOTE — PT/OT/SLP PROGRESS
Occupational Therapy      Patient Name:  Isabell Preston   MRN:  [id number]    Patient not seen today secondary to, attempted to complete OT evaluation. Patient was supine in bed with eyes closed. Daughter in room and reported patient had a rough morning of throwing up and had just fallen asleep. Daughter requested evaluation be completed tomorrow. Will follow-up 8-22-20 for evaluation.    Petra Thompson, OT  8/21/2020

## 2020-08-21 NOTE — PLAN OF CARE
NO FALLS  NAUSEA CONTROLLED WITH PRN IV MEDICATIONS  CONTINUOUS IV FLUIDS INITIATED  FULL LIQUID DIET   XRAY OF ABDOMEN PREFORMED  MAGNESIUM REPLACED VIA IV
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
## 2020-08-22 NOTE — PLAN OF CARE
Problem: Physical Therapy Goal  Goal: Physical Therapy Goal  Description: Goals to be met by: 20     Patient will increase functional independence with mobility by performin. Supine to sit with Modified [place]  2. Sit to supine with Modified [place]  3. Sit to stand transfer with Supervision  4. Bed to chair transfer with Supervision using Rolling Walker  5. Gait  x 150 feet with Supervision using Rolling Walker.   6. Stand for 10 minutes with Stand-by Assistance using Rolling Walker    Outcome: Ongoing, Progressing     Initial evaluation performed and PT POC established this date  Alexandra Preston PT, DPT  2020

## 2020-08-22 NOTE — PROGRESS NOTES
[facility] Hospital Medicine                                                                     Progress Note     Team: [facility] HOSP MED A Alvaro Estrada MD   Admit Date: 8/19/2020   Hospital Day: 3  TO: 8/24/2020   Code status: Full Code   Principal Problem: Diverticulitis     SUMMARY:     Ms. Preston is a 74 y.o. female with mesothelioma (on maintenance medications only), DM, CKD, history of DVT (no longer on anticoagulants), history GI bleed here today with diarrhea and abdominal pain. Reports that for the past 2 weeks, she has had diffuse diarrhea. Over the past couple days, it has began to look like coffee grounds. Has h/o GI bleed in past. Not currently on blood thinners. Has associated diffuse abd pain that comes in waves. Has nausea and vomiting as well over the past day that is refractory to home zofran. Daughter reports pt has had excessive fatigue over the past 2 weeks as well. Denies cough, fevers, chills, hematochezia, dysuria. FOB positive in ER.    Admitted to hospital medicine for intractable N/V secondary to suspected diverticulitis and possible GIB. Started on IV abx and PPI. Consulted GI, no acute endoscopic intervention indicated at this time. Low suspicion for GIB. GI rec treating diverticulitis with IV abx with GI follow up outpatient. Continues to have significant nausea, and abdominal pain, gradually improving. Remains on IV abx, continue for now.    SUBJECTIVE:     Pt was seen and examined at bedside. HDS and afebrile overnight. This morning patient feels better overall, abdominal pain, nausea and emesis improving. Able to tolerate her meds and full liquid diet. Plan to continue full liquid for now, and advance diet tomorrow. Denied fevers, cp, cough, dysuria, melena. Continue IV abx today, switch to PO tomorrow.      ROS (Positive in Bold,  otherwise negative)  Pain Scale: 0 /10   Constitutional: fever, chills, night sweats, weakness  CV: chest pain, edema, palpitations  Resp: SOB, cough, sputum production  GI: changes in appetite, nausea (improving), emesis(improving), pain(improving), melena, hematochezia, GERD, hematemesis  : Dysuria, hematuria, urinary urgency, frequency  MSK: arthralgia/myalgia, joint swelling  SKIN: rashes, pruritis, petechiae   Neuro/Psych, FNDm anxiety, depression      OBJECTIVE:     Vitals:  Temp:  [97.3 °F (36.3 °C)-99 °F (37.2 °C)]   Pulse:  []   Resp:  [14-19]   BP: (150-175)/(67-93)   SpO2:  [96 %-99 %]      I & O (Last 24H):     Intake/Output Summary (Last 24 hours) at 8/22/2020 1356  Last data filed at 8/21/2020 1700  Gross per 24 hour   Intake 120 ml   Output --   Net 120 ml         GEN: AA female  in no acute distress. Nontoxic. Resting in bed. Cooperative.  HEENT: NCAT. PERRL. EOMI. Conjunctivae/corneas clear, sclera Anicteric.  CVS: RRR. Normal s1 s2 no murmur, click, rub or gallop  LUNG: CTAB. Normal respiratory effort. No wheezes, rhonchi, or crackles.  ABD: Normoactive BS, soft, mild diffuse tenderness, ND, no masses or organomegaly, no peritoneal signs  EXT: No edema. No cyanosis. Full ROM.  SKIN: color, texture, turgor normal. No rashes or lesions  NEURO: Alert, oriented x 4, Spont mvt of all extremities with no focal deficits noted.      All recent labs and imaging has been reviewed.     Recent Results (from the past 24 hour(s))   POCT glucose    Collection Time: 08/21/20  4:34 PM   Result Value Ref Range    POCT Glucose 100 70 - 110 mg/dL   POCT glucose    Collection Time: 08/21/20  9:55 PM   Result Value Ref Range    POCT Glucose 81 70 - 110 mg/dL   CBC auto differential    Collection Time: 08/22/20  4:01 AM   Result Value Ref Range    WBC 5.98 3.90 - 12.70 K/uL    RBC 2.64 (L) 4.00 - 5.40 M/uL    Hemoglobin 8.8 (L) 12.0 - 16.0 g/dL    Hematocrit 28.0 (L) 37.0 - 48.5 %    Mean Corpuscular Volume 106  (H) 82 - 98 fL    Mean Corpuscular Hemoglobin 33.3 (H) 27.0 - 31.0 pg    Mean Corpuscular Hemoglobin Conc 31.4 (L) 32.0 - 36.0 g/dL    RDW 12.9 11.5 - 14.5 %    Platelets 194 150 - 350 K/uL    MPV 9.9 9.2 - 12.9 fL    Immature Granulocytes 0.3 0.0 - 0.5 %    Gran # (ANC) 4.3 1.8 - 7.7 K/uL    Immature Grans (Abs) 0.02 0.00 - 0.04 K/uL    Lymph # 1.1 1.0 - 4.8 K/uL    Mono # 0.4 0.3 - 1.0 K/uL    Eos # 0.1 0.0 - 0.5 K/uL    Baso # 0.02 0.00 - 0.20 K/uL    nRBC 0 0 /100 WBC    Gran% 72.4 38.0 - 73.0 %    Lymph% 18.7 18.0 - 48.0 %    Mono% 7.0 4.0 - 15.0 %    Eosinophil% 1.3 0.0 - 8.0 %    Basophil% 0.3 0.0 - 1.9 %    Differential Method Automated    Comprehensive metabolic panel    Collection Time: 08/22/20  4:01 AM   Result Value Ref Range    Sodium 141 136 - 145 mmol/L    Potassium 3.1 (L) 3.5 - 5.1 mmol/L    Chloride 107 95 - 110 mmol/L    CO2 24 23 - 29 mmol/L    Glucose 132 (H) 70 - 110 mg/dL    BUN, Bld 25 (H) 8 - 23 mg/dL    Creatinine 3.0 (H) 0.5 - 1.4 mg/dL    Calcium 8.7 8.7 - 10.5 mg/dL    Total Protein 6.1 6.0 - 8.4 g/dL    Albumin 3.1 (L) 3.5 - 5.2 g/dL    Total Bilirubin 0.2 0.1 - 1.0 mg/dL    Alkaline Phosphatase 86 55 - 135 U/L    AST 19 10 - 40 U/L    ALT 46 (H) 10 - 44 U/L    Anion Gap 10 8 - 16 mmol/L    eGFR if African American 17.1 (A) >60 mL/min/1.73 m^2    eGFR if non  14.8 (A) >60 mL/min/1.73 m^2   Magnesium    Collection Time: 08/22/20  4:01 AM   Result Value Ref Range    Magnesium 1.7 1.6 - 2.6 mg/dL   Phosphorus    Collection Time: 08/22/20  4:01 AM   Result Value Ref Range    Phosphorus 2.1 (L) 2.7 - 4.5 mg/dL   POCT glucose    Collection Time: 08/22/20  8:05 AM   Result Value Ref Range    POCT Glucose 120 (H) 70 - 110 mg/dL   POCT glucose    Collection Time: 08/22/20 12:20 PM   Result Value Ref Range    POCT Glucose 148 (H) 70 - 110 mg/dL       Recent Labs   Lab 08/21/20  0800 08/21/20  1213 08/21/20  1634 08/21/20  2155 08/22/20  0805 08/22/20  1220   POCTGLUCOSE 111* 150*  "100 81 120* 148*       Hemoglobin A1C   Date Value Ref Range Status   08/19/2020 6.1 (H) 4.0 - 5.6 % Final     Comment:     ADA Screening Guidelines:  5.7-6.4%  Consistent with prediabetes  >or=6.5%  Consistent with diabetes  High levels of fetal hemoglobin interfere with the HbA1C  assay. Heterozygous hemoglobin variants (HbS, HgC, etc)do  not significantly interfere with this assay.   However, presence of multiple variants may affect accuracy.     05/05/2020 6.1 (H) 4.0 - 5.6 % Final     Comment:     ADA Screening Guidelines:  5.7-6.4%  Consistent with prediabetes  >or=6.5%  Consistent with diabetes  High levels of fetal hemoglobin interfere with the HbA1C  assay. Heterozygous hemoglobin variants (HbS, HgC, etc)do  not significantly interfere with this assay.   However, presence of multiple variants may affect accuracy.     11/10/2019 6.9 (H) 4.0 - 5.6 % Final     Comment:     ADA Screening Guidelines:  5.7-6.4%  Consistent with prediabetes  >or=6.5%  Consistent with diabetes  High levels of fetal hemoglobin interfere with the HbA1C  assay. Heterozygous hemoglobin variants (HbS, HgC, etc)do  not significantly interfere with this assay.   However, presence of multiple variants may affect accuracy.          Active Hospital Problems    Diagnosis  POA    *Diverticulitis [K57.92]  Yes    Gastrointestinal hemorrhage [K92.2]  Yes      Resolved Hospital Problems   No resolved problems to display.          ASSESSMENT AND PLAN:     Diverticulitis  Hx perforation of diverticulum with diverticulitis s/p surgery  - Presenitng with 2 week hx of abdominal pain, worsening now with intractable nausea with emesis  - CT scan -" Findings concerning for diverticulitis involving the proximal descending colon, correlation is advised"  - Continue IV Unasyn   - C-scope in the future per GI and radiology recs  - Advance diet as able  - Gradually improving     Gastrointestinal Bleed  -Concern for GIB as per ED  -Protonix 80m x 1 given " ",40Mg IV BID  -Consult GI, low suspicion for GIB, no procedures planned  -PPI oral  -Daily CBC, gradual drop noted     Hx of Mesthelioma  - Recent PET scan 8/18  "New, multinodular pleural thickening on the right, some of which is hypermetabolic and suspicious for recurrence.  Tissue sampling could be confirmatory"  - will need to fu with outpatient hem onc     CKD stage IV most likely multifactorial due to cisplatin use   - Creatine ar baseline- 3.2  - Followed by Nephrology     HTN:  - Blood pressures recently improved with increased medications   - Well controlled Coreg  - Cont chlorthalidone and low dose lisinopril     Diabetes  - Sliding Scale  - Hgb A1C 6.1     Recent Ocular herpes zoster  - Complteted 7 days of valacyclovir and prednisone  - now has post herpetic neuralgia, will provide lidocaine cream for now     Past Covid -19 Infection  - noted    Hypomagnesemia   Hypokalemia  Hypophosphatemia  - replete and recheck         Prophylaxis- SCDs  Code Status- Full Code   Discharge plan and follow up - d/c home once medically stable    Discharge Planning   TO: 8/22/2020     Code Status: Prior   Is the patient medically ready for discharge?:     Reason for patient still in hospital (select all that apply): Patient unstable  Discharge Plan A: Home with family, Home Health          Alvaro Estrada MD  Hospital Medicine Staff  Pager [phone]  "
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
## 2020-08-22 NOTE — PLAN OF CARE
Problem: Occupational Therapy Goal  Goal: Occupational Therapy Goal  Description: Goals to be met by: 9/4/20     Patient will increase functional independence with ADLs by performing:    Feeding with [place].  UE Dressing with Supervision.  LE Dressing with Minimal Assistance.  Grooming while standing at sink with Supervision.  Toileting from toilet with Stand-by Assistance for hygiene and clothing management.   Toilet transfer to toilet with Supervision.    Outcome: Ongoing, Progressing     Evaluation complete-see note for details. Goals and POC established.    VIRAL King  8/22/2020   Pager #: [phone]

## 2020-08-23 NOTE — PLAN OF CARE
GALINA spoke with pt and daughter Nydia at bedside about therapy rec for inpatient rehab. Nydia states that she does not want pt placed in a facility due to COVID-19 and pt's co-morbidities. Nydia states that she would like pt to resume HH with [facility] with additional therapy days, if possible.     Tabitha Spicer, AMANDEEP  [facility]

## 2020-08-23 NOTE — PROGRESS NOTES
[facility] Hospital Medicine                                                                     Progress Note     Team: [facility] MED A Alvaro Estrada MD   Admit Date: 8/19/2020   Hospital Day: 4  TO: 8/26/2020   Code status: Full Code   Principal Problem: Diverticulitis     SUMMARY:     Ms. Preston is a 74 y.o. female with mesothelioma (on maintenance medications only), DM, CKD, history of DVT (no longer on anticoagulants), history GI bleed here today with diarrhea and abdominal pain. Reports that for the past 2 weeks, she has had diffuse diarrhea. Over the past couple days, it has began to look like coffee grounds. Has h/o GI bleed in past. Not currently on blood thinners. Has associated diffuse abd pain that comes in waves. Has nausea and vomiting as well over the past day that is refractory to home zofran. Daughter reports pt has had excessive fatigue over the past 2 weeks as well. Denies cough, fevers, chills, hematochezia, dysuria. FOB positive in ER.    Admitted to hospital medicine for intractable N/V secondary to suspected diverticulitis and possible GIB. Started on IV abx and PPI. Consulted GI, no acute endoscopic intervention indicated at this time. Low suspicion for GIB. GI rec treating diverticulitis with IV abx with GI follow up outpatient. Patient continued to have significant nausea, and abdominal pain during this hospitalization, however now gradually improving (nasuea and pain). Remained on IV abx, no switched to po augmentin. Now patient having diarrhea, could be abx induced vs infectious. Will need stool cultures and C diff ro. RN aware. Continues to have significant diarrhea. Pending stool studies before any imodium.    PT OT rec Rehab. Will alert CM/SW.       SUBJECTIVE:     Pt was seen and examined at bedside. HDS and afebrile overnight. This  morning patient in good spirit. Feels better overall, and gradually improving. Nausea resolving. Pain still present, but imporving. Advanced diet yesterday, tolerated well. No episodes of emesis today. ow patient having diarrhea, could be abx induced vs infectious. Will need stool cultures and C diff ro. RN aware. Continues to have significant diarrhea. Pending stool studies before any imodium. IV abx switched to PO.      ROS (Positive in Bold, otherwise negative)  Pain Scale: 3 /10   Constitutional: fever, chills, night sweats, weakness  CV: chest pain, edema, palpitations  Resp: SOB, cough, sputum production  GI: changes in appetite, nausea (improving), emesis(improving), pain(improving), melena, hematochezia, GERD, hematemesis  : Dysuria, hematuria, urinary urgency, frequency  MSK: arthralgia/myalgia, joint swelling  SKIN: rashes, pruritis, petechiae   Neuro/Psych, FNDm anxiety, depression      OBJECTIVE:     Vitals:  Temp:  [97.3 °F (36.3 °C)-98.5 °F (36.9 °C)]   Pulse:  []   Resp:  [12-18]   BP: (120-166)/(60-88)   SpO2:  [92 %-97 %]      I & O (Last 24H):   No intake or output data in the 24 hours ending 08/23/20 1051      GEN: AA female  in no acute distress. Nontoxic. Resting in bed. Cooperative.  HEENT: NCAT. PERRL. EOMI. Conjunctivae/corneas clear, sclera Anicteric.  CVS: RRR. Normal s1 s2 no murmur, click, rub or gallop  LUNG: CTAB. Normal respiratory effort. No wheezes, rhonchi, or crackles.  ABD: Normoactive BS, soft, mild RLQ and LLQ tenderness, ND, no masses or organomegaly, no peritoneal signs  EXT: No edema. No cyanosis. Full ROM.  SKIN: color, texture, turgor normal. No rashes or lesions  NEURO: Alert, oriented x 4, Spont mvt of all extremities with no focal deficits noted.      All recent labs and imaging has been reviewed.     Recent Results (from the past 24 hour(s))   POCT glucose    Collection Time: 08/22/20 12:20 PM   Result Value Ref Range    POCT Glucose 148 (H) 70 - 110 mg/dL    POCT glucose    Collection Time: 08/22/20  4:25 PM   Result Value Ref Range    POCT Glucose 157 (H) 70 - 110 mg/dL   POCT glucose    Collection Time: 08/22/20  9:24 PM   Result Value Ref Range    POCT Glucose 105 70 - 110 mg/dL   CBC auto differential    Collection Time: 08/23/20  4:40 AM   Result Value Ref Range    WBC 4.51 3.90 - 12.70 K/uL    RBC 2.60 (L) 4.00 - 5.40 M/uL    Hemoglobin 8.7 (L) 12.0 - 16.0 g/dL    Hematocrit 28.0 (L) 37.0 - 48.5 %    Mean Corpuscular Volume 108 (H) 82 - 98 fL    Mean Corpuscular Hemoglobin 33.5 (H) 27.0 - 31.0 pg    Mean Corpuscular Hemoglobin Conc 31.1 (L) 32.0 - 36.0 g/dL    RDW 13.1 11.5 - 14.5 %    Platelets 180 150 - 350 K/uL    MPV 9.8 9.2 - 12.9 fL    Immature Granulocytes 0.2 0.0 - 0.5 %    Gran # (ANC) 2.8 1.8 - 7.7 K/uL    Immature Grans (Abs) 0.01 0.00 - 0.04 K/uL    Lymph # 1.2 1.0 - 4.8 K/uL    Mono # 0.4 0.3 - 1.0 K/uL    Eos # 0.1 0.0 - 0.5 K/uL    Baso # 0.02 0.00 - 0.20 K/uL    nRBC 0 0 /100 WBC    Gran% 62.8 38.0 - 73.0 %    Lymph% 26.4 18.0 - 48.0 %    Mono% 8.2 4.0 - 15.0 %    Eosinophil% 2.0 0.0 - 8.0 %    Basophil% 0.4 0.0 - 1.9 %    Differential Method Automated    Comprehensive metabolic panel    Collection Time: 08/23/20  4:40 AM   Result Value Ref Range    Sodium 141 136 - 145 mmol/L    Potassium 3.2 (L) 3.5 - 5.1 mmol/L    Chloride 108 95 - 110 mmol/L    CO2 25 23 - 29 mmol/L    Glucose 103 70 - 110 mg/dL    BUN, Bld 19 8 - 23 mg/dL    Creatinine 2.7 (H) 0.5 - 1.4 mg/dL    Calcium 8.2 (L) 8.7 - 10.5 mg/dL    Total Protein 5.7 (L) 6.0 - 8.4 g/dL    Albumin 2.7 (L) 3.5 - 5.2 g/dL    Total Bilirubin 0.1 0.1 - 1.0 mg/dL    Alkaline Phosphatase 82 55 - 135 U/L    AST 16 10 - 40 U/L    ALT 34 10 - 44 U/L    Anion Gap 8 8 - 16 mmol/L    eGFR if African American 19.4 (A) >60 mL/min/1.73 m^2    eGFR if non  16.9 (A) >60 mL/min/1.73 m^2   Magnesium    Collection Time: 08/23/20  4:40 AM   Result Value Ref Range    Magnesium 1.4 (L) 1.6 - 2.6 mg/dL    Phosphorus    Collection Time: 08/23/20  4:40 AM   Result Value Ref Range    Phosphorus 3.1 2.7 - 4.5 mg/dL   Reticulocytes    Collection Time: 08/23/20  4:40 AM   Result Value Ref Range    Retic 1.7 0.5 - 2.5 %   Iron and TIBC    Collection Time: 08/23/20  4:40 AM   Result Value Ref Range    Iron 43 30 - 160 ug/dL    Transferrin 172 (L) 200 - 375 mg/dL    TIBC 255 250 - 450 ug/dL    Saturated Iron 17 (L) 20 - 50 %   Ferritin    Collection Time: 08/23/20  4:40 AM   Result Value Ref Range    Ferritin 290 20.0 - 300.0 ng/mL   POCT glucose    Collection Time: 08/23/20  7:49 AM   Result Value Ref Range    POCT Glucose 89 70 - 110 mg/dL       Recent Labs   Lab 08/21/20  2155 08/22/20  0805 08/22/20  1220 08/22/20  1625 08/22/20  2124 08/23/20  0749   POCTGLUCOSE 81 120* 148* 157* 105 89       Hemoglobin A1C   Date Value Ref Range Status   08/19/2020 6.1 (H) 4.0 - 5.6 % Final     Comment:     ADA Screening Guidelines:  5.7-6.4%  Consistent with prediabetes  >or=6.5%  Consistent with diabetes  High levels of fetal hemoglobin interfere with the HbA1C  assay. Heterozygous hemoglobin variants (HbS, HgC, etc)do  not significantly interfere with this assay.   However, presence of multiple variants may affect accuracy.     05/05/2020 6.1 (H) 4.0 - 5.6 % Final     Comment:     ADA Screening Guidelines:  5.7-6.4%  Consistent with prediabetes  >or=6.5%  Consistent with diabetes  High levels of fetal hemoglobin interfere with the HbA1C  assay. Heterozygous hemoglobin variants (HbS, HgC, etc)do  not significantly interfere with this assay.   However, presence of multiple variants may affect accuracy.     11/10/2019 6.9 (H) 4.0 - 5.6 % Final     Comment:     ADA Screening Guidelines:  5.7-6.4%  Consistent with prediabetes  >or=6.5%  Consistent with diabetes  High levels of fetal hemoglobin interfere with the HbA1C  assay. Heterozygous hemoglobin variants (HbS, HgC, etc)do  not significantly interfere with this assay.   However,  "presence of multiple variants may affect accuracy.          Active Hospital Problems    Diagnosis  POA    *Diverticulitis [K57.92]  Yes    Gastrointestinal hemorrhage [K92.2]  Yes      Resolved Hospital Problems   No resolved problems to display.          ASSESSMENT AND PLAN:     Diverticulitis  Hx perforation of diverticulum with diverticulitis s/p surgery  - Presenitng with 2 week hx of abdominal pain, worsening now with intractable nausea with emesis  - CT scan -" Findings concerning for diverticulitis involving the proximal descending colon, correlation is advised"  - Started on IV abx and supportive care with antiemetics and analgesics  - Pain and nausea now improving, able to tolerate diet, advancing   - IV Unasyn switch to PO Augmentin renally dosed   - C-scope in the future per GI and radiology recs  - Stool studies pending, diarrhea for the past 2 days, C diff and stool studies are ordered   - Gradually improving     Gastrointestinal Bleed  -Concern for GIB as per ED  -Protonix 80m x 1 given ,40Mg IV BID  -Consult GI, low suspicion for GIB, no procedures planned  -PPI oral  -Daily CBC, gradual drop noted     Hx of Mesthelioma  - Recent PET scan 8/18  "New, multinodular pleural thickening on the right, some of which is hypermetabolic and suspicious for recurrence.  Tissue sampling could be confirmatory"  - will need to fu with outpatient hem onc     CKD stage IV most likely multifactorial due to cisplatin use   - Creatine ar baseline- 3.2  - Followed by Nephrology  - stable  - renally dose meds     HTN:  - Blood pressures recently improved with increased medications   - Well controlled Coreg  - Cont chlorthalidone and low dose lisinopril     Diabetes  - Sliding Scale  - Hgb A1C 6.1     Recent Ocular herpes zoster  - Complteted 7 days of valacyclovir and prednisone  - now has post herpetic neuralgia, will provide lidocaine cream for now     Past Covid -19 Infection  - noted    Hypomagnesemia "   Hypokalemia  Hypophosphatemia  - replete and recheck         Prophylaxis- SCDs  Code Status- Full Code   Discharge plan and follow up - d/c to rehab once medically stable    Discharge Planning   TO: 8/26/2020     Code Status: Prior   Is the patient medically ready for discharge?:     Reason for patient still in hospital (select all that apply): Patient unstable  Discharge Plan A: Home with family, Home Health          Alvaro Estrada MD  [facility] Medicine Staff  Pager [phone]

## 2020-08-24 NOTE — PLAN OF CARE
Problem: Occupational Therapy Goal  Goal: Occupational Therapy Goal  Description: Goals to be met by: 9/4/20     Patient will increase functional independence with ADLs by performing:    Feeding with [place].  UE Dressing with Supervision.  LE Dressing with Minimal Assistance.  Grooming while standing at sink with Supervision.  Toileting from toilet with Stand-by Assistance for hygiene and clothing management.   Toilet transfer to toilet with Supervision.    Outcome: Ongoing, Progressing

## 2020-08-24 NOTE — PT/OT/SLP PROGRESS
"     Physical Therapy  Pt Not Seen    Patient Name:  Isabell Preston   MRN:  [id number]      1:15 pm  Patient not seen today secondary to  Other (Comment)(pt's nurse (Nicole) reports "she's in the process of being discharged").     Miriam Ross, PTA  8/24/2020      "

## 2020-08-24 NOTE — TELEPHONE ENCOUNTER
----- Message from Carri Pool MA sent at 8/24/2020  5:00 PM CDT -----  Contact: Pt @[phone]  This is not an epilepsy patient   ----- Message -----  From: Chava Dunne  Sent: 8/24/2020   2:45 PM CDT  To: , #    Caller calling to schedule a hospital f/u, pls call

## 2020-08-24 NOTE — PLAN OF CARE
08/24/20 1529   Final Note   Assessment Type Final Discharge Note   Anticipated Discharge Disposition Home-Health   What phone number can be called within the next 1-3 days to see how you are doing after discharge? [phone]   Hospital Follow Up  Appt(s) scheduled? Yes   Right Care Referral Info   Post Acute Recommendation No Care   Post-Acute Status   Post-Acute Authorization Home Health   Home Health Status Set-up Complete   Discharge Delays None known at this time     Future Appointments   Date Time Provider Department Center   8/31/2020  2:20 PM Ayo Archuleta MD [facility]   9/1/2020 11:00 AM Nate Kan MD [facility] COLSURG [place]   9/11/2020  9:30 AM Luke Vasquez MD [facility] PSYCH [facility]   9/21/2020  1:00 PM Ayo Archuleta MD [facility]   9/28/2020 10:40 AM Cassandra Mancia MD [facility] NEURO [place]

## 2020-08-24 NOTE — PLAN OF CARE
Problem: Fall Injury Risk  Goal: Absence of Fall and Fall-Related Injury  Outcome: Ongoing, Progressing     Problem: Adult Inpatient Plan of Care  Goal: Plan of Care Review  Outcome: Ongoing, Progressing  Goal: Patient-Specific Goal (Individualization)  Outcome: Ongoing, Progressing  Goal: Absence of Hospital-Acquired Illness or Injury  Outcome: Ongoing, Progressing  Goal: Optimal Comfort and Wellbeing  Outcome: Ongoing, Progressing  Goal: Readiness for Transition of Care  Outcome: Ongoing, Progressing  Goal: Rounds/Family Conference  Outcome: Ongoing, Progressing     Problem: Diabetes Comorbidity  Goal: Blood Glucose Level Within Desired Range  Outcome: Ongoing, Progressing     Pt aaox4. V/s stable. No c/o pain or discomfort. Stool specimen collected. Will continue to monitor and interventions as appropriate.

## 2020-08-24 NOTE — NURSING
Pt's peripheral IV removed, discharge instructions given and explained.  Pt received medications at bedside by delivery.  Pt refused wheelchair escort, didn't want to wait for transport, daughter brought wheelchair up to room.  Pt has left the floor with daughter, belongings and wheelchair.

## 2020-08-24 NOTE — PLAN OF CARE
08/24/20 1002   Post-Acute Status   Post-Acute Authorization Home Health   Home Health Status Awaiting Internal Medical Clearance     Sw did fax home health orders, h & p and face sheet to [employer] [facility] at  for hh provider. Pt is setup with [facility] per Jorge with PHN. Pt will be seen on Wed am for admit.

## 2020-08-24 NOTE — PLAN OF CARE
[facility]    HOME HEALTH ORDERS  FACE TO FACE ENCOUNTER    Patient Name: Isabell Preston  YOB: 1945    PCP: Ayo Archuleta MD   PCP Address: [address]  PCP Phone Number: [phone]  PCP Fax: [phone]    Encounter Date: 08/24/2020    Admit to Home Health    Diagnoses:  Active Hospital Problems    Diagnosis  POA    *Diverticulitis [K57.92]  Yes    Gastrointestinal hemorrhage [K92.2]  Yes      Resolved Hospital Problems   No resolved problems to display.       Future Appointments   Date Time Provider Department Center   8/31/2020  2:20 PM Ayo Archuleta MD [facility]   9/11/2020  9:30 AM Luke Vasquez MD [facility]   9/21/2020  1:00 PM Ayo Archuleta MD [facility]   9/28/2020 10:40 AM Cassandra aMncia MD [facility]     Follow-up Information     Ayo Archuleta MD On 8/31/2020.    Specialties: Internal Medicine, Wound Care  Why: scheduled @ 220  Contact information:  [address] [phone]                     I have seen and examined this patient face to face today. My clinical findings that support the need for the home health skilled services and home bound status are the following:  Weakness/numbness causing balance and gait disturbance due to Weakness/Debility making it taxing to leave home.    Allergies:  Review of patient's allergies indicates:   Allergen Reactions    Ciprofloxacin Anaphylaxis    Fructose     Gluten protein Other (See Comments)     GI upset  GI upset    Lactase Other (See Comments)     GI upset  GI upset    Latex, natural rubber Rash       Diet: renal diet    Activities: as tolerated    Nursing:   SN to complete comprehensive assessment including routine vital signs. Instruct on disease process and s/s of complications to report to MD. Review/verify medication list sent home with the patient at time of discharge  and instruct  patient/caregiver as needed. Frequency may be adjusted depending on start of care date.    Notify MD if SBP > 160 or < 90; DBP > 90 or < 50; HR > 120 or < 50; Temp > 101; Other:         CONSULTS:    Physical Therapy to evaluate and treat. Evaluate for home safety and equipment needs; Establish/upgrade home exercise program. Perform / instruct on therapeutic exercises, gait training, transfer training, and Range of Motion.  Occupational Therapy to evaluate and treat. Evaluate home environment for safety and equipment needs. Perform/Instruct on transfers, ADL training, ROM, and therapeutic exercises.      Medications: Review discharge medications with patient and family and provide education.      Current Discharge Medication List      START taking these medications    Details   amoxicillin-clavulanate 500-125mg (AUGMENTIN) 500-125 mg Tab Take 1 tablet (500 mg total) by mouth 2 (two) times daily. for 5 days  Qty: 10 tablet, Refills: 0    Comments: BEDSIDE DELIVERY ALL MEDS      ferrous sulfate (FEOSOL) 325 mg (65 mg iron) Tab tablet Take 1 tablet (325 mg total) by mouth daily with breakfast.  Qty: 30 tablet, Refills: 4      magnesium oxide (MAG-OX) 400 mg (241.3 mg magnesium) tablet Take 1 tablet (400 mg total) by mouth once daily.  Refills: 0      melatonin (MELATIN) 3 mg tablet Take 2 tablets (6 mg total) by mouth nightly as needed for Insomnia.  Qty:  , Refills: 0      pantoprazole (PROTONIX) 40 MG tablet Take 1 tablet (40 mg total) by mouth once daily.  Qty: 30 tablet, Refills: 11         CONTINUE these medications which have CHANGED    Details   dicyclomine (BENTYL) 10 MG capsule Take 1 capsule (10 mg total) by mouth 2 (two) times daily.  Qty: 90 capsule, Refills: 0      lidocaine-prilocaine (EMLA) cream Apply topically as needed.  Qty: 30 g, Refills: 1    Associated Diagnoses: Malignant pleural mesothelioma      ondansetron (ZOFRAN) 8 MG tablet Take 1 tablet (8 mg total) by mouth every 8 (eight) hours as needed  for Nausea.  Qty: 30 tablet, Refills: 0    Associated Diagnoses: Malignant pleural mesothelioma         CONTINUE these medications which have NOT CHANGED    Details   acetaminophen (TYLENOL) 500 MG tablet Take 2 tablets (1,000 mg total) by mouth every 6 (six) hours as needed for Pain.  Qty: 30 tablet, Refills: 0      alcohol swabs PadM Apply 1 each topically as needed.      amLODIPine (NORVASC) 10 MG tablet TAKE 1 TABLET BY MOUTH EVERY DAY  Qty: 90 tablet, Refills: 0    Associated Diagnoses: Hypertension, essential      artificial tears (ISOPTO TEARS) 0.5 % ophthalmic solution Place 1 drop into both eyes 4 (four) times daily.  Qty:        atorvastatin (LIPITOR) 40 MG tablet TAKE 1 TABLET BY MOUTH EVERY DAY  Qty: 90 tablet, Refills: 3    Associated Diagnoses: Controlled type 2 diabetes mellitus without complication, without long-term current use of insulin      blood sugar diagnostic (BLOOD GLUCOSE TEST) Strp 1 strip by Misc.(Non-Drug; Combo Route) route daily as needed.  Qty: 90 each, Refills: 1      blood-glucose meter kit 1 each by Other route daily as needed for Other. Use as instructed  Qty: 1 each, Refills: 0      carvediloL (COREG) 6.25 MG tablet Take 0.5 tablets (3.125 mg total) by mouth 2 (two) times daily with meals.  Qty: 30 tablet, Refills: 11    Comments: .      desoximetasone (TOPICORT) 0.05 % cream as needed.       donepeziL (ARICEPT) 10 MG tablet Take 1 tablet (10 mg total) by mouth every evening.  Qty: 30 tablet, Refills: 11      DULoxetine (CYMBALTA) 30 MG capsule TAKE 1 CAPSULE BY MOUTH EVERY DAY  Qty: 30 capsule, Refills: 1    Associated Diagnoses: Recurrent major depressive disorder, in partial remission      fluticasone (VERAMYST) 27.5 mcg/actuation nasal spray 2 sprays by Nasal route daily as needed for Rhinitis or Allergies.       folic acid (FOLVITE) 400 MCG tablet Take 1 tablet (400 mcg total) by mouth once daily.  Qty: 30 tablet, Refills: 11    Associated Diagnoses: Malignant pleural  mesothelioma      gabapentin (NEURONTIN) 100 MG capsule TAKE 1 CAPSULE BY MOUTH TWICE A DAY  Qty: 180 capsule, Refills: 1    Associated Diagnoses: Malignant pleural mesothelioma; Neoplasm related pain (acute) (chronic)      lancets (TRUEPLUS LANCETS) 28 gauge Misc 1 lancet by Misc.(Non-Drug; Combo Route) route once daily. Test blood sugar once daily, type 2 diabetes, controlled. E11.9  Qty: 100 each, Refills: 3    Associated Diagnoses: Diabetes mellitus without complication      lancets-blood glucose strips 30 gauge Cmpk by Misc.(Non-Drug; Combo Route) route.      LINZESS 145 mcg Cap capsule TAKE 1 CAPSULE BY MOUTH EVERY DAY  Qty: 90 capsule, Refills: 0    Comments: DX Code Needed  PLEASE ADVISE.  Associated Diagnoses: Generalized abdominal pain      lisinopriL (PRINIVIL,ZESTRIL) 5 MG tablet Take 0.5 tablets (2.5 mg total) by mouth once daily.  Qty: 45 tablet, Refills: 3    Comments: .      magic mouthwash diphen/antac/lidoc/nysta Swish 10 mls by mouth four times daily  Qty: 120 mL, Refills: 0    Comments: Mix in equal parts.  Generic is fine.      mometasone 0.1% (ELOCON) 0.1 % cream BALWINDER TO DARK AREAS BID ON LEGS PRN  Qty: 15 g, Refills: 1    Associated Diagnoses: Varicose veins of both lower extremities, unspecified whether complicated      potassium chloride SA (K-DUR,KLOR-CON) 10 MEQ tablet Take 1 tablet (10 mEq total) by mouth once daily.  Qty: 30 tablet, Refills: 3      sodium bicarbonate 650 MG tablet Take 1 tablet (650 mg total) by mouth 2 (two) times daily.  Qty: 120 tablet, Refills: 11      tiZANidine (ZANAFLEX) 4 MG tablet TAKE 1 TABLETBY MOUTH NIGHTLY AT BEDTIME AS NEEDED  Qty: 90 tablet, Refills: 0         STOP taking these medications       carbidopa-levodopa  mg (SINEMET)  mg per tablet Comments:   Reason for Stopping:         chlorthalidone (HYGROTEN) 25 MG Tab Comments:   Reason for Stopping:         dicyclomine (BENTYL) 20 mg tablet Comments:   Reason for Stopping:         oxyCODONE  (ROXICODONE) 10 mg Tab immediate release tablet Comments:   Reason for Stopping:         prochlorperazine (COMPAZINE) 10 MG tablet Comments:   Reason for Stopping:         promethazine (PHENERGAN) 25 MG tablet Comments:   Reason for Stopping:               I certify that this patient is confined to her home and needs physical therapy and occupational therapy.

## 2020-08-24 NOTE — PT/OT/SLP PROGRESS
"Occupational Therapy   Treatment    Name: Isabell Preston  MRN: [id number]  Admitting Diagnosis:  Diverticulitis       Recommendations:     Discharge Recommendations: rehabilitation facility      Assessment:     Isabell Preston is a 74 y.o. female with a medical diagnosis of Diverticulitis. Performance deficits affecting function are weakness, impaired endurance, impaired self care skills, impaired functional mobilty, gait instability.   Pt tolerated session well with good effort and performance.     Rehab Prognosis:  Good; patient would benefit from acute skilled OT services to address these deficits and reach maximum level of function.       Plan:     Patient to be seen 4 x/week to address the above listed problems via self-care/home management, therapeutic activities, therapeutic exercises  · Plan of Care Expires: 09/21/20  · Plan of Care Reviewed with: patient    Subjective     "I have Parkinson's. 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   Pain/Comfort:  · Pain Rating 1: 0/10    Objective:     Communicated with: nsg  prior to session.  Pt found supine in bed    General Precautions: Standard, fall     Occupational Performance:     Bed Mobility:    Supine>sit with SBA    Functional Mobility/Transfers:  · Sit>Stand with CGA from bed, chair, toilet with RW     Activities of Daily Living:  · Feeding: set-up  · G/H: standing with SBA  · Toileting: SBA  ·       [facility] 6 Click ADL: 17    Treatment & Education:  Pt completed functional mobility in room and hallway with RW and SBA. Pt moves very slowly and requiring CGA for small radius turn in small environment to ensure safety.   Pt completed functional mobility in hallway approx 30 feet x 2 trials with seated rest break between trials. Pt very conversive and discussed new impairments related to new dx of Parkinsons and it's impact on her independence.  Education provided re: safety with functional mobility/ADL skills.   Education provided and pt performed exs to " promote strength/endurance as well as large motions to promote increased freedom and ease of movement.    Patient left up in chair with all lines intact, call button in reach and nsg notifiedEducation:      GOALS:   Multidisciplinary Problems     Occupational Therapy Goals        Problem: Occupational Therapy Goal    Goal Priority Disciplines Outcome Interventions   Occupational Therapy Goal     OT, PT/OT Ongoing, Progressing    Description: Goals to be met by: 9/4/20     Patient will increase functional independence with ADLs by performing:    Feeding with [place].  UE Dressing with Supervision.  LE Dressing with Minimal Assistance.  Grooming while standing at sink with Supervision.  Toileting from toilet with Stand-by Assistance for hygiene and clothing management.   Toilet transfer to toilet with Supervision.                     Time Tracking:     OT Date of Treatment: 08/24/20  OT Start Time: 1100  OT Stop Time: 1130  OT Total Time (min): 30 min    Billable Minutes:Self Care/Home Management 15  Therapeutic Activity 15    VIRAL Ruano  8/24/2020

## 2020-08-25 NOTE — TELEPHONE ENCOUNTER
Called pt to inform that PCP placed mammogram orders, states will call the appointment center to schedule.

## 2020-08-25 NOTE — PATIENT INSTRUCTIONS
Discharge Instructions for Diverticulitis  You have been diagnosed with diverticulitis. This is a condition in which small pouches form in your colon (large intestine) and become inflamed or infected. Follow the guidelines below for home care.  As you recover  Tips for recovery include:  · Eat a low-fiber diet. Your healthcare provider may advise a liquid diet. This gives your bowel a chance to rest so that it can recover.  · Foods to include: flake cereal, mashed potatoes, pancakes, waffles, pasta, white bread, rice, applesauce, bananas, eggs, fish, poultry, tofu, and well-cooked vegetables  · Take your medicines as directed. Do not stop taking the medicines, even if you feel better.  · Monitor your temperature and report any rising temperature to your healthcare provider.  · Take antibiotics exactly as directed. Do not miss any and keep taking them even if you feel better.   · Drink 6 to 8 glasses of water every day, unless directed otherwise.  · Use a heating pad or hot water bottle to reduce abdominal cramping or pain.  Preventing diverticulitis in the future  Tips for prevention include:  · Eat a high-fiber diet. Fiber adds bulk to the stool so that it passes through the large intestine more easily.  · Keep drinking 6 to 8 glasses of water every day, unless directed otherwise.  · Begin an exercise program. Ask your healthcare provider how to get started. You can benefit from simple activities such as walking or gardening.  · Treat diarrhea with a bland diet. Start with liquids only, then slowly add fiber over time.  · Watch for changes in your bowel movements (constipation to diarrhea).  · Avoid constipation with fiber and add a stool softener if needed.   · Get plenty of rest and sleep.  Follow-up care  Make a follow-up appointment as directed by our staff.  When to call your healthcare provider  Call your healthcare provider immediately if you have any of the following:  · Fever of 100.4°F (38.0°C) or  higher, or as directed by your healthcare provider  · Chills  · Severe cramps in the belly, most commonly the lower left side  · Tenderness in the belly, most commonly the lower left side  · Nausea and vomiting  · Bleeding from your rectum   Date Last Reviewed: 7/1/2016  © 0578-0836 [employer]. [address] [place] [address], [place] [address]. All rights reserved. This information is not intended as a substitute for professional medical care. Always follow your healthcare professional's instructions.

## 2020-08-25 NOTE — TELEPHONE ENCOUNTER
----- Message from Zaira Venegas sent at 8/25/2020  3:11 PM CDT -----  Type: Patient Call Back       What is the request in detail:  pt calling to speak to a nurse regarding mammo gram      Can the clinic reply by [facility]? No       Would the patient rather a call back or a response via [facility]? Call back       Best call back number: [phone]

## 2020-08-25 NOTE — TELEPHONE ENCOUNTER
----- Message from Jennifer Grossman sent at 8/25/2020 11:12 AM CDT -----  Type:  Mammogram    Caller is requesting to schedule their annual mammogram appointment.  Order is not listed in EPIC.  Please enter order and contact patient to schedule.  Name of Caller:  Daughter- Gely    Where would they like the mammogram performed?  Patient had a cancer scan at [facility] and was told they saw activity and need her to have a Mammogram. Please call     Would the patient rather a call back or a response via [facility]?  Call     Best Call Back Number:[phone]

## 2020-08-25 NOTE — TELEPHONE ENCOUNTER
Called pt and informed new mammogram orders placed by PCP. Pt states will call the appointment center to schedule.

## 2020-08-25 NOTE — PROGRESS NOTES
C3 nurse attempted to contact patient. The following occurred:   C3 nurse attempted to contact Isabell Preston for a TCC post hospital discharge follow up call. The patient is unable to conduct the call @ this time. The patient requested a callback.    The patient has a scheduled HOSFU appointment with Ayo Archuleta MD on 08/31/2020 @ 0532. Message sent to Physician staff.

## 2020-08-26 NOTE — DISCHARGE SUMMARY
"[facility]  Discharge Summary  Hospital Medicine    Patient Name: Isabell Preston  YOB: 1945    Admit Date: 8/19/2020    Discharge Date and Time: 8/24/2020  3:15 PM    Discharge Attending Physician: Alvaro Estrada MD     Team: [facility]    Reason for Admission:   Chief Complaint   Patient presents with    Diarrhea     diarrhea x 2 weeks, cancer pt, md advised to come to er "losing too many electrolytes"    Nausea       Active Hospital Problems    Diagnosis  POA    *Diverticulitis [K57.92]  Yes    Gastrointestinal hemorrhage [K92.2]  Yes      Resolved Hospital Problems   No resolved problems to display.       HPI:   Ms. Preston is a 74 y.o. female with mesothelioma (on maintenance medications only), DM, CKD, history of DVT (no longer on anticoagulants), history GI bleed here today with diarrhea and abdominal pain. Reports that for the past 2 weeks, she has had diffuse diarrhea. Over the past couple days, it has began to look like coffee grounds. Has h/o GI bleed in past. Not currently on blood thinners. Has associated diffuse abd pain that comes in waves. Has nausea and vomiting as well over the past day that is refractory to home zofran. Daughter reports pt has had excessive fatigue over the past 2 weeks as well. Denies cough, fevers, chills, hematochezia, dysuria. FOB positive in ER.     Hospital Course:   Admitted to hospital medicine for intractable N/V secondary to suspected diverticulitis and possible GIB. Started on IV abx and PPI. Consulted GI, no acute endoscopic intervention indicated at this time. Low suspicion for GIB. GI rec treating diverticulitis with IV abx with GI follow up outpatient. Patient continued to have significant nausea, and abdominal pain during this hospitalization, however now gradually improving (nasuea and pain). Remained on IV abx, no switched to po augmentin. Now patient having diarrhea, could be abx induced vs infectious. Will need stool " cultures and C diff ro. RN aware. Continues to have significant diarrhea. C diff neg. Stool pending, however diarrhea and pain improving. Now able to eat wo pain and nausea. Stable for dc with oral abx. PCP and GI fu OP. PT OT rec rehab however patient and family would like home with HH. Dc home with HH.       Principal Problem: Diverticulitis    Other Problems Addressed:  Diverticulitis  Hx perforation of diverticulum with diverticulitis s/p surgery  Gastrointestinal Bleed  Hx of Mesthelioma  CKD stage IV most likely multifactorial due to cisplatin use   HTN  Diabetes type 2  Recent Ocular herpes zoster  Past Covid -19 Infection  Hypomagnesemia   Hypokalemia  Hypophosphatemia    Procedures Performed: * No surgery found *    Special Care, Treatment, and Services Provided: none    Consults: GI    Significant Diagnostic Studies:  Nuc Stress EF   Date Value Ref Range Status   05/28/2019 70 % Final     Hemoglobin A1C   Date Value Ref Range Status   08/19/2020 6.1 (H) 4.0 - 5.6 % Final     Comment:     ADA Screening Guidelines:  5.7-6.4%  Consistent with prediabetes  >or=6.5%  Consistent with diabetes  High levels of fetal hemoglobin interfere with the HbA1C  assay. Heterozygous hemoglobin variants (HbS, HgC, etc)do  not significantly interfere with this assay.   However, presence of multiple variants may affect accuracy.     05/05/2020 6.1 (H) 4.0 - 5.6 % Final     Comment:     ADA Screening Guidelines:  5.7-6.4%  Consistent with prediabetes  >or=6.5%  Consistent with diabetes  High levels of fetal hemoglobin interfere with the HbA1C  assay. Heterozygous hemoglobin variants (HbS, HgC, etc)do  not significantly interfere with this assay.   However, presence of multiple variants may affect accuracy.       All work up reviewed       Final Diagnoses: Same as principal problem.    Discharged Condition: stable  Face to face services were provided on 8/26/2020   Time Spent:  I spent >30 minutes on the discharge, which included  "reviewing hospital course with patient/family, reviewing discharge medications, and arranging follow-up care.  Physical Exam on 8/26/2020:  /74 (BP Location: Right arm, Patient Position: Sitting)   Pulse 71   Temp 97.7 °F (36.5 °C) (Oral)   Resp 18   Ht 5' 6" (1.676 m)   Wt 76.9 kg (169 lb 8.5 oz)   SpO2 98%   Breastfeeding No   BMI 27.36 kg/m²     GEN: AA female  in no acute distress. Nontoxic. Resting in bed. Cooperative.  HEENT: NCAT. PERRL. EOMI. Conjunctivae/corneas clear, sclera Anicteric.  CVS: RRR. Normal s1 s2 no murmur, click, rub or gallop  LUNG: CTAB. Normal respiratory effort. No wheezes, rhonchi, or crackles.  ABD: Normoactive BS, soft, improving mild RLQ and LLQ tenderness, ND, no masses or organomegaly, no peritoneal signs  EXT: No edema. No cyanosis. Full ROM.  SKIN: color, texture, turgor normal. No rashes or lesions  NEURO: Alert, oriented x 4, Spont mvt of all extremities with no focal deficits noted.    Disposition: Home or Self Care    Follow Up Instructions:   Follow-up Information     Ayo Archuleta MD On 8/31/2020.    Specialties: Internal Medicine, Wound Care  Why: scheduled @ 220  Contact information:  [address] [facility] [address]  [phone]             [facility] In 2 days.    Specialty: Home Health Services  Contact information:  [address] [facility].  [address]  [facility] [id number] [phone]             Nate Kan MD On 9/1/2020.    Specialty: Colon and Rectal Surgery  Why: Scheduled @[facility]  Contact information:  [address] [facility] [address]  [phone]             Emily Trujillo MD On 8/24/2020.    Specialty: Neurology  Why: MD. Trujillo RN to call patient to schedule appointment  Contact information:  [address] [facility] [address]  [phone]                 Future Appointments   Date Time Provider Department Center   8/31/2020  2:20 PM Ayo Archuleta MD [facility] "   9/1/2020 11:00 AM Nate Kan MD [facility] COLSUR[address]   9/11/2020  9:30 AM Luke Vasquez MD [facility] PSYCH [place] [facility]   9/21/2020  1:00 PM Ayo Archuleta MD [facility] [place] [facility]   9/28/2020 10:40 AM Cassandra Mancia MD [facility] NEURO [facility]       Medications:    Discharge Medication List as of 8/24/2020  1:31 PM      START taking these medications    Details   amoxicillin-clavulanate 500-125mg (AUGMENTIN) 500-125 mg Tab Take 1 tablet (500 mg total) by mouth 2 (two) times daily. for 5 days, Starting Mon 8/24/2020, Until Sat 8/29/2020, Normal      ferrous sulfate (FEOSOL) 325 mg (65 mg iron) Tab tablet Take 1 tablet (325 mg total) by mouth daily with breakfast., Starting Mon 8/24/2020, Normal      magnesium oxide (MAG-OX) 400 mg (241.3 mg magnesium) tablet Take 1 tablet (400 mg total) by mouth once daily., Starting Tue 8/25/2020, OTC      melatonin (MELATIN) 3 mg tablet Take 2 tablets (6 mg total) by mouth nightly as needed for Insomnia., Starting Mon 8/24/2020, OTC      pantoprazole (PROTONIX) 40 MG tablet Take 1 tablet (40 mg total) by mouth once daily., Starting Tue 8/25/2020, Until Wed 8/25/2021, Normal         CONTINUE these medications which have CHANGED    Details   dicyclomine (BENTYL) 10 MG capsule Take 1 capsule (10 mg total) by mouth 2 (two) times daily., Starting Mon 8/24/2020, Normal      lidocaine-prilocaine (EMLA) cream Apply topically as needed., Starting Mon 8/24/2020, Normal      ondansetron (ZOFRAN) 8 MG tablet Take 1 tablet (8 mg total) by mouth every 8 (eight) hours as needed for Nausea., Starting Mon 8/24/2020, Normal         CONTINUE these medications which have NOT CHANGED    Details   acetaminophen (TYLENOL) 500 MG tablet Take 2 tablets (1,000 mg total) by mouth every 6 (six) hours as needed for Pain., Starting Wed 2/19/2020, Print      alcohol swabs PadM Apply 1 each topically as needed., Starting u 6/4/2020, OTC      amLODIPine (NORVASC) 10 MG tablet TAKE 1 TABLET  BY MOUTH EVERY DAY, Normal      artificial tears (ISOPTO TEARS) 0.5 % ophthalmic solution Place 1 drop into both eyes 4 (four) times daily., Starting Tue 8/4/2020, OTC      atorvastatin (LIPITOR) 40 MG tablet TAKE 1 TABLET BY MOUTH EVERY DAY, Normal      blood sugar diagnostic (BLOOD GLUCOSE TEST) Strp 1 strip by Misc.(Non-Drug; Combo Route) route daily as needed., Starting Thu 6/4/2020, Normal      blood-glucose meter kit 1 each by Other route daily as needed for Other. Use as instructed, Starting Thu 6/4/2020, Normal      carvediloL (COREG) 6.25 MG tablet Take 0.5 tablets (3.125 mg total) by mouth 2 (two) times daily with meals., Starting Thu 7/2/2020, Until Fri 7/2/2021, Normal      desoximetasone (TOPICORT) 0.05 % cream as needed. , Historical Med      donepeziL (ARICEPT) 10 MG tablet Take 1 tablet (10 mg total) by mouth every evening., Starting Mon 7/27/2020, Until Tue 7/27/2021, Normal      DULoxetine (CYMBALTA) 30 MG capsule TAKE 1 CAPSULE BY MOUTH EVERY DAY, Normal      fluticasone (VERAMYST) 27.5 mcg/actuation nasal spray 2 sprays by Nasal route daily as needed for Rhinitis or Allergies. , Historical Med      folic acid (FOLVITE) 400 MCG tablet Take 1 tablet (400 mcg total) by mouth once daily., Starting Fri 8/9/2019, Until Sat 8/8/2020, Normal      gabapentin (NEURONTIN) 100 MG capsule TAKE 1 CAPSULE BY MOUTH TWICE A DAY, Normal      lancets (TRUEPLUS LANCETS) 28 gauge Misc 1 lancet by Misc.(Non-Drug; Combo Route) route once daily. Test blood sugar once daily, type 2 diabetes, controlled. E11.9, Starting Thu 6/4/2020, Print      lancets-blood glucose strips 30 gauge Cmpk by Misc.(Non-Drug; Combo Route) route., Historical Med      LINZESS 145 mcg Cap capsule TAKE 1 CAPSULE BY MOUTH EVERY DAY, Normal      lisinopriL (PRINIVIL,ZESTRIL) 5 MG tablet Take 0.5 tablets (2.5 mg total) by mouth once daily., Starting Thu 7/2/2020, Until Fri 7/2/2021, Normal      magic mouthwash diphen/antac/lidoc/nysta Swish 10 mls  by mouth four times daily, Starting Tue 8/4/2020, Normal      mometasone 0.1% (ELOCON) 0.1 % cream BALWINDER TO DARK AREAS BID ON LEGS PRN, Normal      potassium chloride SA (K-DUR,KLOR-CON) 10 MEQ tablet Take 1 tablet (10 mEq total) by mouth once daily., Starting Thu 7/9/2020, Normal      sodium bicarbonate 650 MG tablet Take 1 tablet (650 mg total) by mouth 2 (two) times daily., Starting Thu 8/13/2020, Until Fri 8/13/2021, OTC      tiZANidine (ZANAFLEX) 4 MG tablet TAKE 1 TABLETBY MOUTH NIGHTLY AT BEDTIME AS NEEDED, Normal         STOP taking these medications       carbidopa-levodopa  mg (SINEMET)  mg per tablet Comments:   Reason for Stopping:         chlorthalidone (HYGROTEN) 25 MG Tab Comments:   Reason for Stopping:         dicyclomine (BENTYL) 20 mg tablet Comments:   Reason for Stopping:         oxyCODONE (ROXICODONE) 10 mg Tab immediate release tablet Comments:   Reason for Stopping:         prochlorperazine (COMPAZINE) 10 MG tablet Comments:   Reason for Stopping:         promethazine (PHENERGAN) 25 MG tablet Comments:   Reason for Stopping:               Discharge Instructions: Discussed in length with patient. If her presenting symptoms were to worsen, or if febrile, she should return to the ED. Patient voiced understanding. She needs to fu with PCP and GI with appropriate labs.        Discharge Procedure Orders   Ambulatory referral/consult to Neurology   Standing Status: Future   Referral Priority: Routine Referral Type: Consultation   Referral Reason: Specialty Services Required   Requested Specialty: Neurology   Number of Visits Requested: 1     Ambulatory referral/consult to Nephrology   Standing Status: Future   Referral Priority: Routine Referral Type: Consultation   Referral Reason: Specialty Services Required   Requested Specialty: Nephrology   Number of Visits Requested: 1     Ambulatory referral/consult to Gastroenterology   Standing Status: Future   Referral Priority: Urgent Referral  Type: Consultation   Referral Reason: Specialty Services Required   Requested Specialty: Gastroenterology   Number of Visits Requested: 1         Alvaro Estrada MD  Department of [facility] Medicine

## 2020-08-27 NOTE — TELEPHONE ENCOUNTER
Spoke with pt's daughter and f/u appointment was scheduled for 10/22/2020 @1PM. Pt's daughter verbalized understanding and appt letter was mailed out.

## 2020-08-27 NOTE — TELEPHONE ENCOUNTER
----- Message from Ayaka Sinclair MA sent at 8/26/2020  3:49 PM CDT -----  MD KAY Yeung Staff           Hi can we please schedule this patient to follow up in clinic in 2 months to follow up diverticulitis resolution. Thanks!

## 2020-08-31 NOTE — PROGRESS NOTES
Subjective:       Patient ID: Isabell Preston is a 74 y.o. female.    Chief Complaint: Hospital Follow Up and Establish Care    Hospital follow up      HPI: 73 y/o w/ h/o multiple dvt and PE x 1 mesothelioma CKD IV presents with daughter of hosptal follow up. Admitted last week with nausea/vomitting and abdominal cramping. Ct imaging suggested diverticulitis. There was concern for possible GI bleeding. She has not been taking any anticoagulation therapy since late May 2020 due to low platelet count. Since hospital discharge tolerating po no further vomitting still with occasional lower abdominal cramping. She is scheduled for anoscopy tomorrow with colorectal surgery. Night time confusion has resolved with stopping sinamet and reducing duloxetine dose. Still very weak and getting tired with short activities within the home. Has home health with [facility] for PT and nursing services    Review of Systems   Constitutional: Positive for fatigue. Negative for activity change, appetite change, fever and unexpected weight change.   HENT: Negative for ear pain, rhinorrhea and sore throat.    Eyes: Negative for discharge and visual disturbance.   Respiratory: Negative for chest tightness, shortness of breath and wheezing.    Cardiovascular: Negative for chest pain, palpitations and leg swelling.   Gastrointestinal: Positive for abdominal pain and nausea. Negative for constipation, diarrhea and vomiting.   Endocrine: Negative for cold intolerance and heat intolerance.   Genitourinary: Negative for dysuria and hematuria.   Musculoskeletal: Negative for joint swelling and neck stiffness.   Skin: Negative for rash.   Neurological: Negative for dizziness, syncope, weakness and headaches.   Psychiatric/Behavioral: Negative for suicidal ideas.       Objective:     Vitals:    08/31/20 1430   BP: 123/82   BP Location: Right arm   Patient Position: Sitting   BP Method: Medium (Automatic)   Pulse: 80   Resp: 18   Temp: 98 °F (36.7 °C)  "  TempSrc: Oral   SpO2: 98%   Weight: 69 kg (152 lb 1.6 oz)   Height: 5' 6" (1.676 m)          Physical Exam  Constitutional:       Appearance: She is well-developed.   HENT:      Head: Normocephalic and atraumatic.   Eyes:      General: No scleral icterus.     Conjunctiva/sclera: Conjunctivae normal.   Neck:      Musculoskeletal: Normal range of motion.   Cardiovascular:      Rate and Rhythm: Normal rate and regular rhythm.      Heart sounds: No murmur. No friction rub. No gallop.    Pulmonary:      Effort: Pulmonary effort is normal.      Breath sounds: Normal breath sounds. No wheezing or rales.   Abdominal:      General: There is no distension.      Palpations: Abdomen is soft.      Tenderness: There is no abdominal tenderness. There is no guarding or rebound.   Musculoskeletal: Normal range of motion.         General: No tenderness.      Right lower leg: No edema.      Left lower leg: No edema.   Skin:     General: Skin is warm and dry.   Neurological:      Mental Status: She is alert and oriented to person, place, and time.      Cranial Nerves: No cranial nerve deficit.   Psychiatric:         Mood and Affect: Mood normal.         Behavior: Behavior normal.         Thought Content: Thought content normal.       Transitional Care Note    Family and/or Caretaker present at visit?  Yes.  Diagnostic tests reviewed/disposition: I have reviewed all completed as well as pending diagnostic tests at the time of discharge.  Disease/illness education: diverticulitis, thromboembolism and malignency  Home health/community services discussion/referrals: Patient has home health established at [facility] home [facility].   Establishment or re-establishment of referral orders for community resources: No other necessary community resources.   Discussion with other health care providers: No discussion with other health care providers necessary.           Assessment and Plan   1. Chronic deep vein thrombosis (DVT) of distal vein of lower " extremity, unspecified laterality  If no evidence of lower gi bleeding would restart lovenox at therapeutic dose for malignancy 1.5mg/kg (100mg/day sq)    2. Mesothelioma, biphasic, malignant  Treatment held due to worsening renal functiona nd functional status. PET/CT showed increase pleural plaques  - diclofenac sodium (VOLTAREN) 1 % Gel; Apply 2 g topically once daily.  Dispense: 100 g; Refill: 0

## 2020-09-01 NOTE — LETTER
September 1, 2020      Alvaro Estrada MD  [address]  [facility] [address]           [facility]  [address]  [facility] [address]  Phone: [phone]  Fax: [phone]          Patient: Isabell Preston   MR Number: [id number]   YOB: 1945   Date of Visit: 9/1/2020       Dear Dr. Alvaro Estrada:    Thank you for referring Isabell Preston to me for evaluation. Attached you will find relevant portions of my assessment and plan of care.    If you have questions, please do not hesitate to call me. I look forward to following Isabell Preston along with you.    Sincerely,    Nate Kan MD    Enclosure  CC:  No Recipients    If you would like to receive this communication electronically, please contact [email or web address] or [phone] to request more information on [employer] access.    For providers and/or their staff who would like to refer a patient to [facility], please contact us through our one-stop-shop provider referral line, [facility], at [phone].    If you feel you have received this communication in error or would no longer like to receive these types of communications, please e-mail [email or web address]
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
## 2020-09-03 NOTE — PROVIDER PROGRESS NOTES - EMERGENCY DEPT.
Encounter Date: 5/4/2020    ED Physician Progress Notes           ED Course: I, Jennifer Mary MD have assumed care of this patient from Dr. Henderson and Dr. García. Patient is a 74-year-old female with a past medical history of DVT on daily Lovenox, depression, diabetes mellitus, diverticulosis, hypertension, hyperlipidemia, mesothelioma currently on active chemotherapy and multiple abdominal surgeries who presents to [facility] ED with abdominal pain, headache, fever (subjective, greater than 103 at home), vomiting (non-bloody or not coffee-ground), and diarrhea (watery, brownish green) x4 days.    At the time of signout plan was pending CT abdomen/pelvis.    Medications given in the ED:    Medications   promethazine (PHENERGAN) 12.5 mg in dextrose 5 % 50 mL IVPB (12.5 mg Intravenous New Bag 5/5/20 0134)   morphine injection 2 mg (2 mg Intravenous Given 5/5/20 0134)       Disposition: Observation     Impression: GINA after vomiting/ diarrhea. CT abdomen/ pelvis did not show evidence of acute findings. Patient found to have GINA on labs (creatinine today is 2.6, up from 2.0 at last visit). COVID negative. Due to patient's history of mesothelioma on active chemo with vomiting/diarrhea without infectious source found, patient will be consulted to medicine for observation.     Jennifre Mary MD  [facility]   6:18AM 5/5/20  
mother is nearest relative ,also has boyfriend who is involved/supportive/Single

## 2020-09-08 NOTE — TELEPHONE ENCOUNTER
----- Message from Kamar Hawkins sent at 9/8/2020  9:40 AM CDT -----   Name of Who is Calling:     What is the request in detail: request med dann  for medication   amLODIPine (NORVASC) 10 MG tablet   and   LINZESS 145 mcg Cap capsule  patient has not taking or had refills for medication since 2019     Please contact to further discuss and advise      Can the clinic reply by [facility]: no     What Number to Call Back if not in [facility]:  vy / [facility] /[phone]

## 2020-09-11 NOTE — PROGRESS NOTES
Outpatient Psychiatry Follow-Up Visit (MD/NP)    9/11/2020    Clinical Status of Patient:  Outpatient (Ambulatory)    Chief Complaint:  Isabell Preston is a 74 y.o. female who presents today for follow-up of depression and anxiety.  Met with patient.      Interval History and Content of Current Session:  Interim Events/Subjective Report/Content of Current Session: Patient Mohan presents to clinic for follow up after a long hiatus.  She has had a lot of medical problems.  Still under treatment for mesothelioma.  Has been feeling more depressed lately but having problems snapping out of it.  She feels that she can breathe easier when she is not depressed.  Doing physical therapy at home.  Says that she did not like her neurologist or the medication (carbidopa) given to her.  Feels her depression is worse than her anxiety.  Asking for medications to help with depression.  Living with her daughters, swapping between the 3 of them.  Upset that she is not able to return home.      She has failed Zoloft, Prozac, Lexapro, Effexor, Wellbutrin XL.    Psychotherapy:  · Target symptoms: depression, anxiety   · Why chosen therapy is appropriate versus another modality: relevant to diagnosis  · Outcome monitoring methods: self-report, observation  · Therapeutic intervention type: supportive psychotherapy  · Topics discussed/themes: difficulty managing affect in interpersonal relationships, building skills sets for symptom management, symptom recognition, legal stressors  · The patient's response to the intervention is accepting. The patient's progress toward treatment goals is limited.   · Duration of intervention: 15 minutes.    Review of Systems   · PSYCHIATRIC: Pertinant items are noted in the narrative.  · CONSTITUTIONAL: No weight gain or loss.   · MUSCULOSKELETAL: Positive for muscle stiffness/tightening and pain.  · NEUROLOGIC: No weakness, sensory changes, seizures, confusion, memory loss, tremor or other abnormal  "movements.  · RESPIRATORY: No shortness of breath.  · CARDIOVASCULAR: No tachycardia or chest pain.  · GASTROINTESTINAL: No nausea, vomiting, pain, constipation or diarrhea.    Past Medical, Family and Social History: The patient's past medical, family and social history have been reviewed and updated as appropriate within the electronic medical record - see encounter notes.    Compliance: no    Side effects: None    Risk Parameters:  Patient reports no suicidal ideation  Patient reports no homicidal ideation  Patient reports no self-injurious behavior  Patient reports no violent behavior    Exam (detailed: at least 9 elements; comprehensive: all 15 elements)   Constitutional  Vitals:  Most recent vital signs, dated less than 90 days prior to this appointment, were reviewed.   Vitals:    09/11/20 0937   BP: 110/60   Pulse: 90   Weight: 70.8 kg (155 lb 15.6 oz)   Height: 5' 6" (1.676 m)        General:  unremarkable, age appropriate     Musculoskeletal  Muscle Strength/Tone:  no tremor, no tic   Gait & Station:  non-ataxic, uses walker     Psychiatric  Speech:  no latency; no press   Mood & Affect:  dysthymic  guarded   Thought Process:  normal and logical   Associations:  intact, circumstantial   Thought Content:  normal, no suicidality, no homicidality, delusions, or paranoia   Insight:  limited awareness of illness   Judgement: behavior is adequate to circumstances   Orientation:  grossly intact, person, place, situation, time/date   Memory: intact for content of interview   Language: grossly intact   Attention Span & Concentration:  able to focus   Fund of Knowledge:  intact and appropriate to age and level of education     Assessment and Diagnosis   Status/Progress: Based on the examination today, the patient's problem(s) is/are adequately but not ideally controlled.  New problems have been presented today.   Co-morbidities and Lack of compliance are complicating management of the primary condition.  There are " no active rule-out diagnoses for this patient at this time.     General Impression: We will continue pharmacological management and adjunctive therapy.      ICD-10-CM ICD-9-CM   1. Major depressive disorder, recurrent, moderate  F33.1 296.32   2. Generalized anxiety disorder  F41.1 300.02       Intervention/Counseling/Treatment Plan   · Medication Management: Continue current medications. The risks and benefits of medication were discussed with the patient.  · Counseling provided with patient as follows: importance of compliance with chosen treatment options was emphasized, risks and benefits of treatment options, including medications, were discussed with the patient, risk factor reduction, prognosis, patient education, instructions for  management, treatment and follow-up were reviewed  1.  Start Trintellix 10mg daily targeting depression (may help with anxiety).  Warned of risk of sola, suicidality, serotonin syndrome.  2.  Taper off of Cymbalta as this may be affected by her renal function.  3.  She is to let me know in a couple weeks as to how she is doing and we may provide refills.      Return to Clinic: 6 months, as needed

## 2020-09-11 NOTE — PATIENT INSTRUCTIONS
"        You have been provided with a certain amount of medication with a specified number of refills.  Please follow up within an adequate time before you run out of medications.    REFILLS FOR CONTROLLED SUBSTANCES WILL NOT BE GIVEN WITHOUT AN APPOINTMENT.  I will not honor or fill automated refill requests from pharmacies.  You must come in for an appointment to get refills.        Please book your next appointment for myself or therapist by phone by calling our office at [phone].        Note that follow up appointments are 10-20 minutes long.  It is important that we focus on medication management.  Should you need therapy, please get set up with our therapist or call your insurance company to find out which therapists are available in your area.      PLEASE BE AT LEAST 15 MINUTES EARLY FOR YOUR NEXT APPOINTMENT.  Late arrivals WILL BE TURNED AWAY AND ASKED TO RESCHEDULE.  YOU MUST COME EARLY TO ALLOW TIME FOR CHECK-IN AS WELL AS GET YOUR VITAL SIGNS AND GO OVER YOUR MEDICATIONS.  Tardiness is not fair to the patients who present after you and are on time for their appointments.  It causes a delay in the appointments for patients and staff.  YOU MAY ALSO BE DISCHARGED FROM CLINIC with multiple late arrivals, late cancellations, or "No Show" appointments.       -----------------------------------------------------------------------------------------------------------------  IF YOU FEEL SUICIDAL OR HAVING THOUGHTS OR PLANS TO HURT YOURSELF OR OTHERS, CALL 911 OR REPORT TO THE NEAREST EMERGENCY ROOM.  YOU CAN ALSO ACCESS THE FOLLOWING HOTLINE:    National Suicide Prevention Hotline Number 2-069-236-JVNT (0064)                  The plan is to stop taking the duloxetine (Cymbalta) and replace it with vortioxetine (Trintellix).  This new medication is a good "clean" medicine for depression.  It doesn't have bad interactions with people who have a lot of medical problems.  Start taking a half pill of Trintellix " daily for 2 weeks.  After 2 weeks, stop taking the duloxetine and increase the Trintellix to a whole pill daily.  It may take about 4-6 weeks for the medicine to work to it's full potential.  It works for depression and anxiety.  Reach out to me in about 3-4 weeks to let me know how you are doing so that I can send refills.
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
## 2020-09-16 NOTE — TELEPHONE ENCOUNTER
Received in basket message regarding colonoscopy order. Called patient's daughter, Nancy, to schedule. Left voicemail message with direct number to call back.

## 2020-09-21 NOTE — PROGRESS NOTES
"Subjective:       Patient ID: Isabell Preston is a 74 y.o. female.    Chief Complaint: Establish Care, Follow-up, Flu Vaccine, and Immunizations    F/u blood pressure    HPI: 73 y/o w/ HTN CKD IV mesothelioma (therapy on hold due to worsening functional status) presents with daughter for follow up. Continues to have lwoer abdominal cramping with darker stools one to two times per day. Scheduled for colonoscopy in two weeks to follow up flare of diverticulitis one month ago still holding lovenox due to melena no orthostatic symptoms. Sleep has improved no longer with night terrors or tremors. Still requiring assistance with ADL's (meal prep and self care) due to generalized weakness and fatigue. Appetite "good" no nausea/vomitting    Review of Systems   Constitutional: Positive for fatigue. Negative for activity change, appetite change, fever and unexpected weight change.   HENT: Negative for ear pain, rhinorrhea and sore throat.    Eyes: Negative for discharge and visual disturbance.   Respiratory: Positive for shortness of breath. Negative for chest tightness and wheezing.    Cardiovascular: Negative for chest pain, palpitations and leg swelling.   Gastrointestinal: Positive for diarrhea. Negative for abdominal pain, anal bleeding, constipation, nausea and vomiting.   Endocrine: Negative for cold intolerance and heat intolerance.   Genitourinary: Negative for dysuria and hematuria.   Musculoskeletal: Negative for joint swelling and neck stiffness.   Skin: Negative for rash.   Neurological: Negative for dizziness, syncope, weakness and headaches.   Psychiatric/Behavioral: Negative for suicidal ideas.       Objective:     Vitals:    09/21/20 1312 09/21/20 1334   BP: (!) 144/72 132/72   BP Location: Right arm    Patient Position: Sitting    BP Method: Medium (Automatic)    Pulse: 85    Resp: 17    Temp: 97.6 °F (36.4 °C)    TempSrc: Oral    SpO2: 96%    Weight: 70.4 kg (155 lb 3.3 oz)    Height: 5' 6" (1.676 m)  "          Physical Exam  Constitutional:       Appearance: She is well-developed.   HENT:      Head: Normocephalic and atraumatic.   Eyes:      General: No scleral icterus.     Conjunctiva/sclera: Conjunctivae normal.   Neck:      Musculoskeletal: Normal range of motion.   Cardiovascular:      Rate and Rhythm: Normal rate and regular rhythm.      Heart sounds: No murmur. No friction rub. No gallop.    Pulmonary:      Effort: Pulmonary effort is normal.      Breath sounds: Normal breath sounds. No wheezing or rales.   Abdominal:      General: There is no distension.      Palpations: Abdomen is soft.      Tenderness: There is no abdominal tenderness. There is no right CVA tenderness, left CVA tenderness, guarding or rebound.   Musculoskeletal: Normal range of motion.         General: No tenderness.      Right lower leg: No edema.      Left lower leg: No edema.   Skin:     General: Skin is warm and dry.   Neurological:      Mental Status: She is alert and oriented to person, place, and time.      Cranial Nerves: No cranial nerve deficit.   Psychiatric:         Mood and Affect: Mood normal.         Behavior: Behavior normal.         Assessment and Plan   1. Need for influenza vaccination  Flu vaccine today  - Influenza (FLUAD) - Quadrivalent (Adjuvanted) *Preferred* (65+) (PF)    2. Mesothelioma, biphasic, malignant  Chemo on hold since March 2020 due to nephrotoxicity considering immunotherapy (has follow up with heme/onc)    3. Diverticulitis  S/p antibiotics pain improved but stools have not holding anticoagulation until GI evaluation given reported melena    4. Need for pneumococcal vaccine  Pcv23#2 today  - (In Office Administered) Pneumococcal Polysaccharide Vaccine (23 Valent) (SQ/IM)

## 2020-09-21 NOTE — PROGRESS NOTES
Pt in clinic for flu vaccine. Name and  verified. Allergies reviewed. Administered flu vaccine to pt in left deltoid. Administered PPV 23 to pt in right deltoid. Pt tolerated well.

## 2020-09-28 NOTE — PROGRESS NOTES
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 tablet, Rfl: 0    alcohol swabs PadM, Apply 1 each topically as needed., Disp: , Rfl:     amLODIPine (NORVASC) 10 MG tablet, Take 1 tablet (10 mg total) by mouth once daily., Disp: 90 tablet, Rfl: 0    artificial tears (ISOPTO TEARS) 0.5 % ophthalmic solution, Place 1 drop into both eyes 4 (four) times daily., Disp:  , Rfl:     atorvastatin (LIPITOR) 40 MG tablet, TAKE 1 TABLET BY MOUTH EVERY DAY, Disp: 90 tablet, Rfl: 3    blood sugar diagnostic (BLOOD GLUCOSE TEST) Strp, 1 strip by Misc.(Non-Drug; Combo Route) route daily as needed., Disp: 90 each, Rfl: 1    blood-glucose meter kit, 1 each by Other route daily as needed for Other. Use as instructed, Disp: 1 each, Rfl: 0    carvediloL (COREG) 6.25 MG tablet, Take 0.5 tablets (3.125 mg total) by mouth 2 (two) times daily with meals., Disp: 30 tablet, Rfl: 11    desoximetasone (TOPICORT) 0.05 % cream, as needed. , Disp: , Rfl:     diclofenac sodium (VOLTAREN) 1 % Gel, Apply 2 g topically once daily., Disp: 100 g, Rfl: 0    dicyclomine (BENTYL) 10 MG capsule, Take 1 capsule (10 mg total) by mouth 2 (two) times daily., Disp: 90 capsule, Rfl: 0    donepeziL (ARICEPT) 10 MG tablet, Take 1 tablet (10 mg total) by mouth every evening. (Patient not taking: Reported on 2020), Disp: 30 tablet, Rfl: 11    ferrous  sulfate (FEOSOL) 325 mg (65 mg iron) Tab tablet, Take 1 tablet (325 mg total) by mouth daily with breakfast., Disp: 30 tablet, Rfl: 4    fluticasone (VERAMYST) 27.5 mcg/actuation nasal spray, 2 sprays by Nasal route daily as needed for Rhinitis or Allergies. , Disp: , Rfl:     folic acid (FOLVITE) 400 MCG tablet, Take 1 tablet (400 mcg total) by mouth once daily., Disp: 30 tablet, Rfl: 11    gabapentin (NEURONTIN) 100 MG capsule, TAKE 1 CAPSULE BY MOUTH TWICE A DAY, Disp: 180 capsule, Rfl: 1    hyoscyamine (ANASPAZ,LEVSIN) 0.125 mg Tab, Take 1 tablet (125 mcg total) by mouth every 4 (four) hours as needed., Disp: 120 tablet, Rfl: 4    lancets (TRUEPLUS LANCETS) 28 gauge Misc, 1 lancet by Misc.(Non-Drug; Combo Route) route once daily. Test blood sugar once daily, type 2 diabetes, controlled. E11.9, Disp: 100 each, Rfl: 3    lancets-blood glucose strips 30 gauge Cmpk, by Misc.(Non-Drug; Combo Route) route., Disp: , Rfl:     lidocaine-prilocaine (EMLA) cream, Apply topically as needed., Disp: 30 g, Rfl: 1    linaCLOtide (LINZESS) 145 mcg Cap capsule, Take 1 capsule (145 mcg total) by mouth once daily., Disp: 90 capsule, Rfl: 0    lisinopriL (PRINIVIL,ZESTRIL) 5 MG tablet, Take 0.5 tablets (2.5 mg total) by mouth once daily., Disp: 45 tablet, Rfl: 3    magnesium oxide (MAG-OX) 400 mg (241.3 mg magnesium) tablet, Take 1 tablet (400 mg total) by mouth once daily., Disp: , Rfl: 0    melatonin (MELATIN) 3 mg tablet, Take 2 tablets (6 mg total) by mouth nightly as needed for Insomnia., Disp:  , Rfl: 0    mometasone 0.1% (ELOCON) 0.1 % cream, BALWINDER TO DARK AREAS BID ON LEGS PRN, Disp: 15 g, Rfl: 1    ondansetron (ZOFRAN) 8 MG tablet, Take 1 tablet (8 mg total) by mouth every 8 (eight) hours as needed for Nausea., Disp: 30 tablet, Rfl: 0    oxyCODONE-acetaminophen (PERCOCET) 5-325 mg per tablet, Take 1 tablet by mouth every 6 (six) hours as needed for Pain., Disp: 30 each, Rfl: 0    pantoprazole (PROTONIX) 40 MG  tablet, Take 1 tablet (40 mg total) by mouth once daily., Disp: 30 tablet, Rfl: 11    potassium chloride SA (K-DUR,KLOR-CON) 10 MEQ tablet, TAKE 1 TABLET BY MOUTH ONCE DAILY, Disp: 90 tablet, Rfl: 1    prochlorperazine (COMPAZINE) 10 MG tablet, Take 1 tablet (10 mg total) by mouth every 6 (six) hours as needed., Disp: 30 tablet, Rfl: 0    sodium bicarbonate 650 MG tablet, Take 1 tablet (650 mg total) by mouth 2 (two) times daily., Disp: 120 tablet, Rfl: 11    tiZANidine (ZANAFLEX) 4 MG tablet, TAKE 1 TABLETBY MOUTH NIGHTLY AT BEDTIME AS NEEDED, Disp: 90 tablet, Rfl: 0    vortioxetine (TRINTELLIX) 10 mg Tab, Take 1 tablet (10 mg total) by mouth once daily., Disp: 30 tablet, Rfl: 1    Family Neurological & Psychiatric History    Family History   Problem Relation Age of Onset    Glaucoma Mother     Stroke Mother     Stroke Paternal Uncle     Early death Paternal Uncle          from stroke in 40s    Cancer Father         multiple myeloma    Arthritis Father     Cataracts Sister     Diabetes Sister     Arthritis Sister     Alcohol abuse Brother     Depression Brother     Clotting disorder Maternal Aunt         DVT    Birth defects Daughter         bilateral ear defects    Heart disease Daughter         Sinus tachycardia    Cataracts Paternal Grandmother     Arthritis Paternal Grandmother     Diabetes Paternal Grandmother     Glaucoma Paternal Grandmother     Breast cancer Maternal Aunt     Ovarian cancer Daughter     Schizophrenia Neg Hx     Suicide Neg Hx      Neurologic: AD (mother,early to mid 60s)  Psychiatric: Depression and alcohol abuse (brother)    EDUCATION, OCCUPATION, & SOCIAL HISTORY   Education  Level Attained: HS + business school (graduated a 2 yr program in 1 yr) + [email or web address] and [employer] (2 yrs). Did not attend college.  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 3 daughters and 3 grandsons.   Support System: Yes  HobbiesActivities: Spending time with family, friends,   Current Living Situation: Lives with one of her three daughters at any given moment.     OBJECTIVE:     MENTAL STATUS AND OBSERVATIONS:   Appearance: Casually dressed and adequate grooming/hygiene.   Alertness: Attentive and alert.   Orientation: O x 4 during the clinical interview. With the exception of being off by one for the correct day of the week and day of the month (stated it was "Thursday the 24th" when it was Friday the 25th), she was O x 4 during her testing appointment.   Gait: Remained seated in a wheelchair on the day of the testing   Motor movements/mannerisms: Tremor observed in her right hand on day of testing  Vision & Hearing: Adequate for interview. The patient stated that she cannot see out of her left eye; however, this reportedly did not interfere with her ability to complete testing.   Speech/language: Normal in rate, rhythm, tone, and volume. No significant word finding difficulty observed. Comprehension was normal during the clinical interview. She required repetition, elaboration, and clarification of test instructions to ensure her comprehension of complex test instructions.   Mood/Affect: The patients stated mood was "pretty good." Affect was congruent with stated mood on day of the clinical interview. Her affect was slightly anxious on day of testing.    Interpersonal Behavior: Rapport was quickly and easily established   Suicidality/Homicidality: Denied  Hallucinations/Delusions: None evidenced or endorsed  Thought Content & Processes:Thoughts seemed logical and goal-directed.   Insight & Judgment: Appropriate  Participation in Clinical Interview: Full    PROCEDURES/TESTS ADMINISTERED: In addition to performing a review of pertinent medical " records, reviewing limits to confidentiality, conducting a clinical interview, and explaining procedures, the following measures were administered:Zacarias-Yandel Instrumental Activities of Daily Living Scale (IADL); The Neuropsychiatric Inventory Questionnaire (NPI-Q); Fortuna-in-the-Hand Test; Test of Premorbid Functioning (TOPF); Wechsler Adult Intelligence Scale, Fourth Edition (WAIS-IV) [Digit Span and Symbol Search subtests]; Repeatable Battery for the Assessment of Neuropsychological Status (RBANS, form A, Duff AA norms); Neuropsychological Assessment Battery (NAB) [Naming subtest, form 1]; Verbal fluency tests (CFL, MOANS norms); Kenny Complex Figure Test (RCFT) [copy only]; Clock Drawing Test (Schralphlen et al. 2006 norms);[place] Making Test, parts A and B (Сергей et al., 2004 norms); Geriatric Depression Scale (GDS-30); and Generalized Anxiety Disorder - 7 Item Scale (AIXA-7). Manual norms were used unless otherwise indicated.    TEST TAKING BEHAVIOR AND VALIDITY: During testing, this patient worked at a slow pace. She was self-critical and required additional reassurance from the examiner to continue with various tasks. She commented on her nervousness, particularly as she took long pauses in between providing responses on tests. On learning trials for memory measures, she made intrusion errors and recalled some of this information at a later time. She also made source memory errors and recalled information from one memory measure during another. She was sometimes perseverative in her drawing of a complex geometric figure and made the same error several times (despite correction) on a divided attention task. Overall, she persevered throughout the evaluation. Scores on stand-alone and embedded performance validity measures were within normal limits. The current results, therefore, are likely an accurate reflection of the patient's current functioning.    TEST RESULTS    Raw Score Type of Standardized Score  Standardized Score Percentile/CP Descriptor   ACS RDS 7 - - - -   CITH 10 - - - -   RBANS Effort Index 1 - -      PREMORBID FUNCTIONING Raw Score Type of Standardized Score Standardized Score Percentile/CP Descriptor   TOPF simple dem. eFSIQ - SS 89 23 Low Average   TOPF pred. eFSIQ - SS 81 10 Low Average   TOPF simple + pred. eFSIQ - SS 83 13 Low Average   COGNITIVE SCREENING Raw Score Type of Standardized Score Standardized Score Percentile/CP Descriptor   MMSE 24 - - - Impaired   Orientation - Place 5/5 - - - -   Orientation - Date 3/5 - - - -   RBANS         Immediate Memory - SS 73 4 Below Average   VS/Construction -  53 Average   Language -  53 Average   Attention - SS 85 16 Low Average   Delayed Memory - SS 70 2 Below Average   Total Scale - SS 76 5 Below Average   Subtests         List Learning (3, 3, 5, 3) 14 Tscore 30 2 Below Average   Story Memory (4, 5) 9 Tscore 37 9 Low Average   Figure Copy 20 Tscore 60 84 High Average   Line Orientation 10 Tscore 43 24 Low Average   Naming 10 Tscore 53 62 Average   Fluency 14 Tscore 47 38 Average   Digit Span 9 Tscore 43 24 Low Average   Coding 26 Tscore 47 38 Average   List Recall 1 Tscore 40 16 Low Average   List Recognition 17 Tscore 47 38 Average   Story Recall 1 Tscore 30 2 Below Average   Figure Recall 1 Tscore 30 2 Below Average   Story Recognition  8 ss 0 8-15 Low Average   Figure Recognition 0 - - - -   LANGUAGE FUNCTIONING Raw Score Type of Standardized Score Standardized Score Percentile/CP Descriptor   RBANS Naming 10 Tscore 53 62 Average   RBANS Semantic Fluency 14 Tscore 47 38 Average   TOPF Word Reading 20 SS 80 9 Low Average   NAB Naming 29 Tscore 48 42 Average   CFL 26 ss 10 50 Average   VISUOSPATIAL FUNCTIONING Raw Score Type of Standardized Score Standardized Score Percentile/CP Descriptor   RBANS Line Orientation  10 Tscore 43 24 Low Average   RBANS Figure Copy 20 Tscore 60 84 High Average   WAIS-IV Block Design 24 ss 8 25 Average    RCFT Copy 21 - - <1 Exceptionally Low   RCFT Time to Copy 112 - - >16 WNL   Clock Request 5 - - 100 Exceptionally High   Clock Copy 5 - - 100 Exceptionally High   Clock Total 10 - - 100 Exceptionally High   LEARNING & MEMORY Raw Score Type of Standardized Score Standardized Score Percentile/CP Descriptor   RBANS         Immediate Memory - SS 73 4 Below Average   Delayed Memory - SS 70 2 Below Average   List Learning (3, 3, 5, 3) 14 Tscore 30 2 Below Average   List Recall 1 Tscore 40 16 Low Average   List Recognition 17 Tscore 47 38 Average   Story Memory (4, 5) 9 Tscore 37 9 Low Average   Story Recall 1 Tscore 30 2 Below Average   Story Recognition  8 - 0 8-15 Low Average   Figure Recall 1 Tscore 30 2 Below Average   Figure Recognition 0 - - - -   ATTENTION/WORKING MEMORY Raw Score Type of Standardized Score Standardized Score Percentile/CP Descriptor   WAIS-IV Digit Span 13 ss 3 1 Exceptionally Low          DS Forward 8 ss 8 25 Average         DS Backward 4 ss 5 5 Below Average         DS Sequence 1 ss 2 0.4 Exceptionally Low          Longest Digit Forward 5 - - - -         Longest Digit Backward 2 - - - -         Longest Digit Sequence 2 - - - -   RBANS Digit Span  9 Tscore 43 24 Low Average   MENTAL PROCESSING SPEED Raw Score Type of Standardized Score Standardized Score Percentile/CP Descriptor   WAIS-IV PSI - SS 79 8 Below Average   WAIS-IV Symbol Search 12 ss 5 5 Below Average   WAIS-IV Coding 32 ss 7 16 Low Average   RBANS Coding 26 Tscore 47 38 Average   TMT A  59 ss 10 50 Average   TMT A errors 0 - - - -   Stroop: Word Reading 73 ss 9 37 Average   Stroop: Color Naming 51 ss 9 37 Average   EXECUTIVE FUNCTIONING Raw Score Type of Standardized Score Standardized Score Percentile/CP Descriptor   TMT B DC Tscore - - -   TMT B errors N/A - - - -   Stroop: C/W 19 ss 9 37 Average   MOOD & PERSONALITY Raw Score Type of Standardized Score Standardized Score Percentile/CP Descriptor   GDS-30 27 - - - Severe   AIXA-7  11 - - - Moderate   ss = scaled score (mean = 10, SD = 3); SS = standard score (mean = 100, SD = 15); Tscore mean = 50, SD = 10; zscore (mean = 0.00, SD = 1)       BILLING  Service Description CPT Code Minutes Units   Psychiatric diagnostic evaluation by physician [id number] (previously billed)   Neurobehavioral status exam by physician [id number] (previously billed)   Each additional hour by physician [id number] (previously billed)   Test Evaluation Services --  --   Neuropsychological testing evaluation services by physician [id number] [phone] [id number]   Each additional hour by physician [id number]  2   Test Administration and Scoring --  --   Psychological or neuropsychological test administration and scoring by physician [id number]  0   Each additional 30 minutes by physician [id number]  0   Psychological or neuropsychological test administration and scoring by technician [id number]   Each additional 30 minutes by technician [id number]  4

## 2020-09-28 NOTE — ASSESSMENT & PLAN NOTE
Ellen for syncope after starting carbidopa/levodopa 25/100mg 1 tab PO TID. I explained to her family that this drug could worsen underlying orthostasis and cause a syncope spells as she experienced. Suggested sitting and standing BPS x1 week. Will have to coordinate with her team re: best BP medications if she is indeed orthostatic.

## 2020-09-28 NOTE — PROGRESS NOTES
"  The patient location is: home  The chief complaint leading to consultation is: tremor  Visit type: audiovisual  Total time spent with patient: 20 mins  Each patient to whom he or she provides medical services by telemedicine is:  (1) informed of the relationship between the physician and patient and the respective role of any other health care provider with respect to management of the patient; and (2) notified that he or she may decline to receive medical services by telemedicine and may withdraw from such care at any time.    Notes: below      MOVEMENT DISORDERS [facility]INIC    PCP/Referring Provider: No referring provider defined for this encounter.  Date of Service: 9/28/2020    Chief Complaint: tremors, gait imbalance      Interval Hx  Continues off abilify  Continues to have b/l pillrolling tremor    Started carbidopa/levodopa 25/100mg 1/2 tab PO TID   Started aricept 5mg QHS    She had a hospitalization for shingles and a syncope spell and then stopped carbidopa/levodopa     Has been taking Bps  Sitting ranges 102-130 systolic  Diastolic 62-68    Continues on norvasc on lisinopril  She does currently have orthostasis and heart racing, sweating    Working with PCP and nephrologist on BP medications    Family reporting hallucinations but on further asking she is asleep and acting out her dreams  She does have a delusion of dropping through a hole if she walks through a doorframe  No other delusions  Memory is stable but poor  She forgets what she said several minutes before      Prior management  MRI brent was normal  EEG OK  No seizure-like events    "PriorHPI: Isabell Preston is a R HANDED 74 y.o. 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   She notes a resting and tremor arms and legs since 1 year. When she hold a glass of water, her hands shake and " "food spills. Tremor can come and go. Struggles to put on makeup. Struggles to put in her earrings. Started Abilify since at least 2 years.  Issues walking since 1 year. Uses a cane or walker. Falls seldomly. Can walk 100  Feet before she tired. No feet shuffling. Walking continues to decline.    No fam hx tremors or PD.  MRI brain showed no atrophy 2019    Neuroleptic exposure:  Abilify, compazine"    PD Review of Symptoms:  Anosmia: poor smell x 6 months  Dysarthria/Hypophonia: none  Dysphagia/Sialorrhea:none  Depression: yes  Cognitive slowing: prior to chemo , short term memory issues, and steadily decreased - forgets date, where here family is, events and tasks  Hallucinations: yes during hospitalization  Sleep issues:  -RBD: talks in her sleep    Review of Systems:   Review of Systems   Constitutional: Negative for fever.   HENT: Negative for congestion.    Eyes: Negative for double vision.   Respiratory: Negative for cough and shortness of breath.    Cardiovascular: Negative for chest pain and leg swelling.   Gastrointestinal: Negative for nausea.   Genitourinary: Negative for dysuria.   Musculoskeletal: Positive for falls.   Skin: Negative for rash.   Neurological: Positive for tremors. Negative for speech change and headaches.   Psychiatric/Behavioral: Positive for depression, hallucinations and memory loss.         Current Medications:  Outpatient Encounter Medications as of 9/28/2020   Medication Sig Dispense Refill    acetaminophen (TYLENOL) 500 MG tablet Take 2 tablets (1,000 mg total) by mouth every 6 (six) hours as needed for Pain. 30 tablet 0    alcohol swabs PadM Apply 1 each topically as needed.      amLODIPine (NORVASC) 10 MG tablet Take 1 tablet (10 mg total) by mouth once daily. 90 tablet 0    artificial tears (ISOPTO TEARS) 0.5 % ophthalmic solution Place 1 drop into both eyes 4 (four) times daily.      atorvastatin (LIPITOR) 40 MG tablet TAKE 1 TABLET BY MOUTH EVERY DAY 90 tablet 3    " blood sugar diagnostic (BLOOD GLUCOSE TEST) Strp 1 strip by Misc.(Non-Drug; Combo Route) route daily as needed. 90 each 1    blood-glucose meter kit 1 each by Other route daily as needed for Other. Use as instructed 1 each 0    carvediloL (COREG) 6.25 MG tablet Take 0.5 tablets (3.125 mg total) by mouth 2 (two) times daily with meals. 30 tablet 11    desoximetasone (TOPICORT) 0.05 % cream as needed.       diclofenac sodium (VOLTAREN) 1 % Gel Apply 2 g topically once daily. 100 g 0    dicyclomine (BENTYL) 10 MG capsule Take 1 capsule (10 mg total) by mouth 2 (two) times daily. 90 capsule 0    donepeziL (ARICEPT) 10 MG tablet Take 1 tablet (10 mg total) by mouth every evening. (Patient not taking: Reported on 9/21/2020) 30 tablet 11    ferrous sulfate (FEOSOL) 325 mg (65 mg iron) Tab tablet Take 1 tablet (325 mg total) by mouth daily with breakfast. 30 tablet 4    fluticasone (VERAMYST) 27.5 mcg/actuation nasal spray 2 sprays by Nasal route daily as needed for Rhinitis or Allergies.       folic acid (FOLVITE) 400 MCG tablet Take 1 tablet (400 mcg total) by mouth once daily. 30 tablet 11    gabapentin (NEURONTIN) 100 MG capsule TAKE 1 CAPSULE BY MOUTH TWICE A  capsule 1    hyoscyamine (ANASPAZ,LEVSIN) 0.125 mg Tab Take 1 tablet (125 mcg total) by mouth every 4 (four) hours as needed. 120 tablet 4    lancets (TRUEPLUS LANCETS) 28 gauge Misc 1 lancet by Misc.(Non-Drug; Combo Route) route once daily. Test blood sugar once daily, type 2 diabetes, controlled. E11.9 100 each 3    lancets-blood glucose strips 30 gauge Cmpk by Misc.(Non-Drug; Combo Route) route.      lidocaine-prilocaine (EMLA) cream Apply topically as needed. 30 g 1    linaCLOtide (LINZESS) 145 mcg Cap capsule Take 1 capsule (145 mcg total) by mouth once daily. 90 capsule 0    lisinopriL (PRINIVIL,ZESTRIL) 5 MG tablet Take 0.5 tablets (2.5 mg total) by mouth once daily. 45 tablet 3    magnesium oxide (MAG-OX) 400 mg (241.3 mg  magnesium) tablet Take 1 tablet (400 mg total) by mouth once daily.  0    melatonin (MELATIN) 3 mg tablet Take 2 tablets (6 mg total) by mouth nightly as needed for Insomnia.  0    mometasone 0.1% (ELOCON) 0.1 % cream BALWINDER TO DARK AREAS BID ON LEGS PRN 15 g 1    ondansetron (ZOFRAN) 8 MG tablet Take 1 tablet (8 mg total) by mouth every 8 (eight) hours as needed for Nausea. 30 tablet 0    oxyCODONE-acetaminophen (PERCOCET) 5-325 mg per tablet Take 1 tablet by mouth every 6 (six) hours as needed for Pain. 30 each 0    pantoprazole (PROTONIX) 40 MG tablet Take 1 tablet (40 mg total) by mouth once daily. 30 tablet 11    potassium chloride SA (K-DUR,KLOR-CON) 10 MEQ tablet Take 1 tablet (10 mEq total) by mouth once daily. 30 tablet 3    prochlorperazine (COMPAZINE) 10 MG tablet Take 10 mg by mouth every 6 (six) hours as needed.      sodium bicarbonate 650 MG tablet Take 1 tablet (650 mg total) by mouth 2 (two) times daily. 120 tablet 11    tiZANidine (ZANAFLEX) 4 MG tablet TAKE 1 TABLETBY MOUTH NIGHTLY AT BEDTIME AS NEEDED 90 tablet 0    vortioxetine (TRINTELLIX) 10 mg Tab Take 1 tablet (10 mg total) by mouth once daily. 30 tablet 1     No facility-administered encounter medications on file as of 9/28/2020.        Past Medical History:  Patient Active Problem List   Diagnosis    Hypertension, essential    Blurred vision, left eye    Spinal enthesopathy    DDD (degenerative disc disease), lumbar    Chronic deep vein thrombosis (DVT) of distal vein of lower extremity, unspecified laterality    Hyperlipidemia    History of CVA (cerebrovascular accident)    Iron deficiency anemia due to sideropenic dysphagia    Diverticulosis of large intestine with hemorrhage    Other fatigue    Neuropathic pain    Type 2 diabetes mellitus without complication, without long-term current use of insulin    Syncope    Palpitations    Constipation    Mesothelioma, biphasic, malignant    Chemotherapy induced nausea and  vomiting    Chemotherapy induced neutropenia    Chemotherapy adverse reaction, initial encounter    Tremor    Memory loss    CKD (chronic kidney disease) stage 5, GFR less than 15 ml/min    Herpes zoster with ophthalmic complication    Gastrointestinal hemorrhage    Diverticulitis       Past Surgical History:  Past Surgical History:   Procedure Laterality Date    ANKLE FRACTURE SURGERY      left ankle    APENDIX AND GALL BLADDER REMOVED      APPENDECTOMY      BREAST SURGERY  1998    lumpectomy right side - benign    CHOLECYSTECTOMY      colon resection for diverticulitis x 2      HEMORRHOID SURGERY      HERNIA REPAIR  2000    umbilical hernia repair    HYSTERECTOMY      INSERTION OF TUNNELED CENTRAL VENOUS CATHETER (CVC) WITH SUBCUTANEOUS PORT Left 8/5/2019    Procedure: INSERTION, PORT-A-CATH;  Surgeon: Sebastian Prasad MD;  Location[facility] CATH LAB;  Service: Radiology;  Laterality: Left;    PLEURODESIS WITH VIDEO-ASSISTED THORACOSCOPIC SURGERY (VATS) Right 7/3/2019    Procedure: VATS, WITH PLEURODESIS;  Surgeon: Ben Smith MD;  Location: [address];  Service: Thoracic;  Laterality: Right;    THORACOSCOPIC BIOPSY OF PLEURA Right 7/3/2019    Procedure: VATS, WITH PLEURA BIOPSY;  Surgeon: Ben Smith MD;  Location: [facility] OR [address] [place] [address];  Service: Thoracic;  Laterality: Right;  RIGHT VATS, DRAINAGE, PLEURAL BIOPSY  possible  THORACOTOMY  PLEURODESIS  possible   PLEURX    TONSILLECTOMY      TOTAL ABDOMINAL HYSTERECTOMY W/ BILATERAL SALPINGOOPHORECTOMY      UMBILICAL HERNIA REPAIR         Current Living Situation: home    Social:  Social History     Socioeconomic History    Marital status:      Spouse name: Not on file    Number of children: 3    Years of education: Not on file    Highest education level: Not on file   Occupational History    Occupation: retired -    Social Needs    Financial resource strain: Not on file    Food insecurity     Worry:  Not on file     Inability: Not on file    Transportation needs     Medical: Not on file     Non-medical: Not on file   Tobacco Use    Smoking status: Former Smoker     Types: Cigarettes     Quit date: 1970     Years since quittin.2    Smokeless tobacco: Never Used   Substance and Sexual Activity    Alcohol use: No    Drug use: No    Sexual activity: Not on file   Lifestyle    Physical activity     Days per week: Not on file     Minutes per session: Not on file    Stress: Not on file   Relationships    Social connections     Talks on phone: Not on file     Gets together: Not on file     Attends Protestant service: Not on file     Active member of club or organization: Not on file     Attends meetings of clubs or organizations: Not on file     Relationship status: Not on file   Other Topics Concern    Patient feels they ought to cut down on drinking/drug use Not Asked    Patient annoyed by others criticizing their drinking/drug use Not Asked    Patient has felt bad or guilty about drinking/drug use Not Asked    Patient has had a drink/used drugs as an eye opener in the AM Not Asked   Social History Narrative    Not on file       Family History:  Family History   Problem Relation Age of Onset    Glaucoma Mother     Stroke Mother     Stroke Paternal Uncle     Early death Paternal Uncle          from stroke in 40s    Cancer Father         multiple myeloma    Arthritis Father     Cataracts Sister     Diabetes Sister     Arthritis Sister     Alcohol abuse Brother     Depression Brother     Clotting disorder Maternal Aunt         DVT    Birth defects Daughter         bilateral ear defects    Heart disease Daughter         Sinus tachycardia    Cataracts Paternal Grandmother     Arthritis Paternal Grandmother     Diabetes Paternal Grandmother     Glaucoma Paternal Grandmother     Breast cancer Maternal Aunt     Ovarian cancer Daughter     Schizophrenia Neg Hx     Suicide Neg Hx         PHYSICAL:  There were no vitals taken for this visit.  Physical Exam  Constitutional: Well-developed, well-nourished, appears stated age  Eyes: No scleral icterus  ENT: Moist oral mucosa  Cardiovascular: No lower extremity edema   Respiratory: No labored breathing   Skin: No rash   Hematologic: No bruising  Psychiatric: No depression  Other: GI/ deferred   · Mental status: Alert and oriented to person, place, time, and situation;   · Speech: normal (not dysarthric), no aphasia  · Cranial nerves:            · CN II: Pupils mid-position and equal, not tested light or accommodation  · CN III, IV, VI: Extraocular movements full, no nystagmus visualized  · CN V: Not tested   · CN VII: Face strong and symmetric bilaterally   · CN VIII: Hearing intact to voice and conversation   · CN IX, X: Palate raises midline and symmetric   · CN XI: Strong shoulder shrug B   · CN XII: Tongue appears midline   · Motor: Normal bulk by appearance, no drift   · Sensory: Not tested    · Gait: Not tested  · Deep tendon reflexes: Not tested  · Movement/Coordination                    No hypophonic speech.                     Mild facial masking  R hand pillrolling tremor moderate                    No other dystonia, chorea, athetosis, myoclonus, or tics visualized.  No lip smacking or dyskinesias    Bradykinesia  ? Finger taps Finger flicks BRETT Heel taps   Left 1+ 1+ - -   Right 2+ 2+ - -       Laboratory Data:  NA    Imaging:  MRI brain w/o atrophy 2019      Assessment//Plan:   Problem List Items Addressed This Visit        Neuro    Tremor    Current Assessment & Plan     Debilitating pillrolling tremor, shuffling gait, which was symmetrical and since stopping abilify is R-sided. Gait improved.  Thi is suggestive of iPD with unmasking with neuroleptics. Suggsted before retrying carbidopa/levodopa 25/100mg 1 tab PO TID, assess blood pressures           Memory loss    Overview                  Current Assessment & Plan     Donepezil  5mg QHS            Cardiac/Vascular    Palpitations - Primary    Overview     -Patient complaining of episodes of heart palpitations and presyncope  -On 07/03, CT ABD/Pelvis and US Liver with Dopper ordered for workup of possible malignancy         Current Assessment & Plan     Concern for her report of palpitations on ambulation. She does also report these on standing which are suggestive of orthostasis.  F/u cardiology         Relevant Orders    Ambulatory referral/consult to Cardiology       Other    Syncope    Current Assessment & Plan     Concnern for syncope after starting carbidopa/levodopa 25/100mg 1 tab PO TID. I explained to her family that this drug could worsen underlying orthostasis and cause a syncope spells as she experienced. Suggested sitting and standing BPS x1 week. Will have to coordinate with her team re: best BP medications if she is indeed orthostatic.           Other Visit Diagnoses     Parkinsons        Relevant Orders    Ambulatory referral/consult to Physical/Occupational Therapy          Cassandra Mancia MD, MS [facility]  Department of Neurology  Movement Disorders

## 2020-09-28 NOTE — TELEPHONE ENCOUNTER
----- Message from Cassandra Mancia MD sent at 9/28/2020 11:24 AM CDT -----  Nov 10th inperson followup (see email(

## 2020-09-28 NOTE — ASSESSMENT & PLAN NOTE
Concern for her report of palpitations on ambulation. She does also report these on standing which are suggestive of orthostasis.  F/u cardiology

## 2020-09-28 NOTE — ASSESSMENT & PLAN NOTE
Debilitating pillrolling tremor, shuffling gait, which was symmetrical and since stopping abilify is R-sided. Gait improved.  Thi is suggestive of iPD with unmasking with neuroleptics. Suggsted before retrying carbidopa/levodopa 25/100mg 1 tab PO TID, assess blood pressures

## 2020-10-06 NOTE — TELEPHONE ENCOUNTER
Pt was a no show to Covid-19 testing ordered by Barb Morris. Pt states the testing is supposed to be postponed until after the bad weather coming this weekend.

## 2020-10-09 PROBLEM — F32.A DEPRESSION: Status: ACTIVE | Noted: 2020-01-01

## 2020-10-09 PROBLEM — F41.9 ANXIETY: Status: ACTIVE | Noted: 2020-01-01

## 2020-10-09 NOTE — ASSESSMENT & PLAN NOTE
When results of the current evaluation are compared to low average range premorbid estimates (based on both demographic information and a word reading performance), this patient's language skills, visuospatial skills, and simple attention were all at or near premorbid expectations. Deficits were seen in her working memory and aspects of executive functioning (divided attention/multi-tasking and cognitive organization). Regarding her learning and memory profile, this patient's learning was reduced and she struggled to freely recall most of what she learned. Recognition cues were helpful across 2 of 3 memory measures, however. She made mild errors on questions of temporal orientation (off by one day and date on one of two evaluation days). She is reporting clinically significant symptoms of both depression and anxiety.     Together, her profile of scores is suggestive of frontosubcortical dysfunction. Since adjustments have been made to her medications, she has experienced some improvement in the symptoms listed below, including a reduction in hallucinations, reduced parkinsonism, and reduced fluctuations in mentation. At the present time, it is unclear if this patient has an emerging neurodegenerative condition. She is currently undergoing chemotherapy, her renal dysfunction is severe, and she is taking some medications known to impact cognition (tizanidine, percocet). She also has moderate microvascular ischemic changes on neuroimaging. Together, these etiologies could fully account for her cognitive dysfunction. Her cognition deserves monitoring over time and the following recommendations are made in the interim:     Oversight/Safety Concerns - The patient's daughters are to be commended for the care they are providing to this patient. No additional recommendations are necessary at this time.     Mood Management -  This patient is encouraged to continue taking Trintellix as prescribed and following up with her  psychiatrist.  She may also benefit form attending talk therapy/counseling and if interested, referrals can be placed. In addition, following the recommendations listed here may also help to benefit mood.      Managing Vascular Risk Factors - A personal history of disorders that affect the cardiovascular system (e.g., hypertension, high cholesterol, diabetes, heart disease) can have a negative impact on brain functioning especially over many years. Therefore, it is very important for this patient to maintain good control over his/her risk factors. The following is recommended:   Take all medications as prescribed and follow-up with recommendations above.   Get regular physical exercise to the extent that it is possible. Family may need to structure this into their loved ones day or week and develop a transportation plan.   Eat a well-balanced diet and following the MIND diet (see handout) has been shown to be most brain protective.    Check your blood pressure, cholesterol levels, blood sugar, and others as appropriate.     Practice good cognitive hygiene -   · Engage in regular exercise, which increases alertness and arousal and can improve attention and focus.  Consider lower impact exercises, such as yoga or light walking.  · Get a good nights sleep, as this can enhance alertness and cognition.  · Eat healthy foods and balanced meals. It is notable that research indicates certain nutrients may aid in brain function, such as B vitamins (especially B6, B12, and folic acid), antioxidants (such as vitamins C and E, and beta carotene), and Omega-3 fatty acids. Talk with your physician or nutritionist about whats right for you.   · Keep your brain active. Find activities to stay mentally active, such as reading, games (cards, checkers), puzzles (crosswords, Sudoku, jigrady saw), crafts (qcue, woodworking), gardening, or participating in activities in the community.  · Stay socially engaged. Continue staying active  with your family and friends.    Cognitive Tips and Strategies - The following tips and strategies are provided to help assist in daily activities:      Attention: Remember that inattention and lack of focus are major culprits to forgetting information so be sure and practice paying attention for adequate learning of information. If you rely on passive attention to remembering something (e.g., yeah, uh-huh approach), youll find you cannot recall it later. I recommend the following to improve attention, which may aid in later recall:  1. Reduce distractions in the area as much as possible  2. Look at the person as they are speaking to you.   3. Paraphrase as they are speaking  4. Write down important pieces of information   5. Ask them to repeat if you zone out.  6. Have them simplify and reduce information that you need to attend to during conversation.  7. Have visual cues to remind you if you need to do something later.     Processing Speed:  1. Using multiple modalities (e.g., listening, writing notes, asking questions, recording) to learn new information is likely to allow additional time for processing, thus improving memory for the material.   2. Allowing sufficient time to complete tasks will reduce frustration and help to ensure completion.     Executive Functionin. Dont attempt to multi-task.  Separate tasks so that each can be completed one at a time  2. Consider using a calendar/day planner, as that may be effective to help you plan and stay on track.  Color-coding specific tasks by importance may add additional benefit to your planner  3. Break down large projects into smaller tasks and write down the steps to completing the task.  Taking notes while reading can help with recall.     Storing Information: Use the below strategies to help you further enhance how information is stored  1. Rehearse - Immediately after seeing/hearing something, try to recall it.  Wait a few minutes, then check  again.  Gradually lengthen the intervals between rehearsals.  2. Repetition of learned material is critical to ensure storage of information to be learned. Self-test at home to ensure learning.  3. Write down important information to improve your attention and focus and to have something to look back on when you need to recall it.  4. Make sure the person doesnt rattle off, but presents in a clear, logical, and unhurried manner.      Recalling Information:  1. Jog your memory - Lose something?  Think back to when you last had it.  What did you do next?  And after that?  Mentally walk yourself through each activity that followed.  Prodding your memory this way may enable you to recall the location of the missing item.  2. Use a cue - Symbolic reminders (the proverbial string around the finger) are helpful.  So too are memos, timers, calendar notes, etc.--keep them in visible, appropriate place  3. Get organized - Have fixed locations for all important papers, key phone numbers, medications, keys, wallet, glasses, tools, etc.  4. Develop routines - Routines can anchor memories so they do not drift away.      Re-evaluation - This patient and her daughter are scheduled to return 12/10/2020 for a follow-up appointment.

## 2020-10-09 NOTE — PROGRESS NOTES
NEUROPSYCHOLOGICAL EVALUATION FEEDBACK    TELEMEDICINE DETAILS:   The patient location is: home  The chief complaint leading to consultation is: feedback regarding neuropsychological test results  Visit type: Virtual visit with synchronous audio and video  Total time spent with patient: 40 minutes  Each patient to whom he or she provides medical services by telemedicine is: (1) informed of the relationship between the physician and patient and the respective role of any other health care provider with respect to management of the patient; and (2) notified that he or she may decline to receive medical services by telemedicine and may withdraw from such care at any time.  Notes: See below.    Isabell Preston attended a feedback session today and was accompanied by her daughter.  We discussed the results of the neuropsychological evaluation and I gave time to discuss questions and concerns. For full evaluation details, please see the note from this provider dated 9/25/2020.    Kelly Villalpando, PhD  Licensed Clinical Neuropsychologist  [facility] - Department of Neurology    
No

## 2020-10-16 NOTE — PROGRESS NOTES
Subjective:    Patient ID:  Isabell Preston is a 74 y.o. female who presents for evaluation of Palpitations      HPI     Recurrent DVT on coumadin, CVA, HTN, HLD, DM, palpitations, mesothelioma    Previously saw Dr Ortiz  Patient was referred by primary care for chest pain.  She says mostly been right-sided for the past 2 weeks worsening in severity.  She thinks that she may have been lifting some heavy cases of water bottles into her car it may have been the precipitant.  She has tried ibuprofen and Tylenol with some relief.  She gets sharp pains some associated shortness of breath.  She had difficulty walking from the waiting room to our office without having to catch her breath.  She denies any sustained tachycardia or palpitations.  She has experienced no PND, orthopnea or lower extremity edema.  She has had dizziness to the point of presyncope and syncope with history of vertigo.  She does have a history of multiple clots and is on chronic oral anticoagulation with Coumadin    Stress test 5/28/19    The perfusion scan is free of evidence from myocardial ischemia or injury.    Post stress wall motion is physiologic.    Gated perfusion images showed an ejection fraction of 70 % post stress.    Echo 11/11/19  · Normal left ventricular systolic function. The estimated ejection fraction is 60%  · Moderate concentric left ventricular hypertrophy.  · No wall motion abnormalities.  · Normal right ventricular systolic function.  · Indeterminate central venous pressure. Estimated PA pressure is at least 34 mmHg.     10/16/20 Needs clearance prior to colonoscopy. On lovenox while she is off of coumadin. Denies CP. Mild stable VANESSA. CXT for mesothelioma stopped due to renal issues  EKG 8/21/20 NSR LVH NSSTT changes      Review of Systems   Constitution: Negative for decreased appetite.   HENT: Negative for ear discharge.    Eyes: Negative for blurred vision.   Respiratory: Negative for hemoptysis.    Endocrine:  Negative for polyphagia.   Hematologic/Lymphatic: Negative for adenopathy.   Skin: Negative for color change.   Musculoskeletal: Negative for joint swelling.   Genitourinary: Negative for bladder incontinence.   Neurological: Negative for brief paralysis.   Psychiatric/Behavioral: Negative for hallucinations.   Allergic/Immunologic: Negative for hives.        Objective:    Physical Exam   Constitutional: She is oriented to person, place, and time. She appears well-developed and well-nourished.   HENT:   Head: Normocephalic and atraumatic.   Eyes: Pupils are equal, round, and reactive to light. Conjunctivae and EOM are normal.   Neck: Normal range of motion. Neck supple. No thyromegaly present.   Cardiovascular: Normal rate and regular rhythm.   No murmur heard.  Pulmonary/Chest: Effort normal and breath sounds normal. No respiratory distress.   Abdominal: Soft. Bowel sounds are normal.   Musculoskeletal:         General: No edema.   Neurological: She is alert and oriented to person, place, and time.   Skin: Skin is warm and dry.   Psychiatric: She has a normal mood and affect. Her behavior is normal.         Assessment:       1. Mixed hyperlipidemia    2. Palpitations    3. Hypertension, essential         Plan:       Cleared for colonoscopy at low cardiac risk  OV 3 months with echo

## 2020-10-16 NOTE — LETTER
October 16, 2020      Cassandra Mancia MD  [address]  [place] [address]           [facility]  [address]  [place] [address]  Phone: [phone]          Patient: Isabell Preston   MR Number: [id number]   YOB: 1945   Date of Visit: 10/16/2020       Dear Dr. Cassandra Mancia:    Thank you for referring Isabell Preston to me for evaluation. Attached you will find relevant portions of my assessment and plan of care.    If you have questions, please do not hesitate to call me. I look forward to following Isabell Preston along with you.    Sincerely,    Honorio Kamara MD    Enclosure  CC:  No Recipients    If you would like to receive this communication electronically, please contact [email or web address] or [phone] to request more information on [employer] Link access.    For providers and/or their staff who would like to refer a patient to [facility], please contact us through our one-stop-shop provider referral line, [facility] , at [phone].    If you feel you have received this communication in error or would no longer like to receive these types of communications, please e-mail [email or web address]

## 2020-10-20 PROBLEM — Z91.81 RISK FOR FALLS: Status: ACTIVE | Noted: 2020-01-01

## 2020-10-20 PROBLEM — Z74.09 IMPAIRED FUNCTIONAL MOBILITY, BALANCE, GAIT, AND ENDURANCE: Status: ACTIVE | Noted: 2020-01-01
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
## 2020-10-22 NOTE — PATIENT INSTRUCTIONS
Reduce your protonix (pantoprazole) to 20mg daily (take half a tablet daily) for 2 weeks then stop it.  Schedule your colonoscopy at the end of Novemeber (the earliest)  Avoid constipation, take fiber supplements and/or miralax to ensure having at least one soft BM daily.
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
## 2020-11-02 NOTE — PLAN OF CARE
DISCONTINUE ON PATHWAY REGIMEN - Mesothelioma    LOS20        Pemetrexed (Alimta(R))       Cisplatin (Platinol(R))           Additional Orders: Patient to receive the following prior to the   initiation of therapy:  1) Dexamethasone 4 mg orally twice daily x 6 doses.    First dose 24 hours before chemotherapy.  2) Folic acid ? 400 mcg orally daily.    First dose at least 5 days prior to the first dose of pemetrexed.  3) Vitamin   B12 1,000 mcg intramuscularly every 9 weeks.  First dose at least 5 days prior   to the first dose of pemetrexed.    **Always confirm dose/schedule in your pharmacy ordering system**    REASON: Disease Progression  PRIOR TREATMENT: LOS20: Cisplatin 75 mg/m2 + Pemetrexed 500 mg/m2 q21 Days x 4   Cycles  TREATMENT RESPONSE: Complete Response (CR)    START OFF PATHWAY REGIMEN - Mesothelioma            Pembrolizumab (Keytruda)           Additional Orders: Serious immune-mediated adverse events can occur with   pembrolizumab. Please monitor your patient and refer to the linked   immune-mediated adverse reaction management materials for more information.    **Always confirm dose/schedule in your pharmacy ordering system**    Patient Characteristics:  Relapsed or Progressive Disease, Second Line and Beyond  Therapeutic Status: Relapsed or Progressive Disease    Intent of Therapy:  Non-Curative / Palliative Intent, Discussed with Patient

## 2020-11-03 NOTE — TELEPHONE ENCOUNTER
----- Message from Jayla Schultz sent at 11/3/2020 10:53 AM CST -----  Contact: Daughter  Refill or New Rx: Refill     RX Name and Strength: traMADoL (ULTRAM) 50 mg tablet    Preferred Pharmacy with phone number:  [id number] [place] - [address]  [address]  [place] [address]  Phone: [phone] Fax: [phone]

## 2020-11-05 NOTE — ED TRIAGE NOTES
Pt presents with abdominal pain and distention x3 days. Pt states has not had bowel movement in 3 days and began vomiting 2 days ago. Pt unable to tolerate food or drink.

## 2020-11-05 NOTE — ED NOTES
Pt with bowel movement after second enema. Pt states still feeling need for another bowel movement but also states relief.

## 2020-11-05 NOTE — ED NOTES
Adult Physical Assessment  LOC: Isabell Preston, 74 y.o. female verified via two identifiers.  The patient is awake, alert, oriented and speaking appropriately at this time.  APPEARANCE: Patient resting comfortably and appears to be in no acute distress at this time. Patient is clean and well groomed, patient's clothing is properly fastened.  SKIN:The skin is warm and dry, color consistent with ethnicity, patient has normal skin turgor and moist mucus membranes, skin intact, no breakdown or brusing noted.  MUSCULOSKELETAL: Patient moving all extremities well, no obvious swelling or deformities noted.  RESPIRATORY: Airway is open and patent, respirations are spontaneous, patient has a normal effort and rate, no accessory muscle use noted.  CARDIAC: Patient has a normal rate and rhythm, no periphreal edema noted in any extremity, capillary refill < 3 seconds in all extremities  ABDOMEN: Soft and tender to palpation, abdominal distention noted. Bowel sounds present in all four quadrants.  NEUROLOGIC: Eyes open spontaneously, behavior appropriate to situation, follows commands, facial expression symmetrical, bilateral hand grasp equal and even, purposeful motor response noted, normal sensation in all extremities when touched with a finger.
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
## 2020-11-05 NOTE — ED NOTES
Pt and family member with questions regarding CT. Dr. Lopez notified. Bedside commode ordered for enema.

## 2020-11-10 NOTE — PROGRESS NOTES
COVID Screening specimen collected    Negative for following Symptoms:  Fever  Cough  Shortness of breath  Difficulty breathing  GI Symptoms such as diarrhea or nausea  Loss of taste  Loss of smell

## 2020-11-10 NOTE — PLAN OF CARE
1603 pt completed and tolerated keytruda. Pt voiced no new complaints or concerns at this time. Educated pt on medication via printed handout ([facility]) and when to report to the ED. pt and daughter verbalized understanding to all. NAD noted, pt d/c home.

## 2020-11-10 NOTE — Clinical Note
Please schedule f/u in 6 weeks with CBC, CMP, TSH, T4 free, clinic visit with Dr. Burt or Naina and cycle 2 of Keytruda. Thank you

## 2020-11-10 NOTE — TELEPHONE ENCOUNTER
----- Message from Zuly Cruz sent at 11/10/2020  2:02 PM CST -----  Regarding: pts daughter  Pts hemant Moreno is calling to speak with the nurse pt had a 2:00 appt pt is running behind at her other appt in the [facility] with the other doctor can you please call pts hemant Moreno at [phone].    KIZZY

## 2020-11-11 PROBLEM — Z13.9 ENCOUNTER FOR SCREENING: Status: ACTIVE | Noted: 2020-01-01

## 2020-11-11 PROBLEM — E03.2 HYPOTHYROIDISM DUE TO MEDICATION: Status: ACTIVE | Noted: 2020-01-01

## 2020-11-11 PROBLEM — N18.4 CKD (CHRONIC KIDNEY DISEASE) STAGE 4, GFR 15-29 ML/MIN: Status: ACTIVE | Noted: 2020-01-01
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
## 2020-11-13 NOTE — TELEPHONE ENCOUNTER
She has an petros for 03/10/2021 and is asking for her prescription of Trintellix 10 mg tab be filled because she is out.

## 2020-12-04 PROBLEM — Z86.010 PERSONAL HISTORY OF COLONIC POLYPS: Status: ACTIVE | Noted: 2020-01-01
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
## 2020-12-04 NOTE — TRANSFER OF CARE
"Anesthesia Transfer of Care Note    Patient: Isabell Preston    Procedure(s) Performed: Procedure(s) (LRB):  COLONOSCOPY (N/A)    Patient location: PACU    Anesthesia Type: general    Transport from OR: Transported from OR on room air with adequate spontaneous ventilation    Post pain: adequate analgesia    Post assessment: no apparent anesthetic complications and tolerated procedure well    Post vital signs: stable    Level of consciousness: awake and responds to stimulation    Nausea/Vomiting: no nausea/vomiting    Complications: none    Transfer of care protocol was followed      Last vitals:   Visit Vitals  /58   Pulse 96   Temp 36.7 °C (98.1 °F) (Temporal)   Resp 16   Ht 5' 6" (1.676 m)   Wt 68.9 kg (152 lb)   SpO2 97%   Breastfeeding No   BMI 24.53 kg/m²     "

## 2020-12-04 NOTE — ANESTHESIA POSTPROCEDURE EVALUATION
Anesthesia Post Evaluation    Patient: Isabell Preston    Procedure(s) Performed: Procedure(s) (LRB):  COLONOSCOPY (N/A)    Final Anesthesia Type: general    Patient location during evaluation: GI PACU  Patient participation: Yes- Able to Participate  Level of consciousness: awake and alert and oriented  Post-procedure vital signs: reviewed and stable  Pain management: adequate  Airway patency: patent    PONV status at discharge: No PONV  Anesthetic complications: no      Cardiovascular status: hemodynamically stable  Respiratory status: unassisted, spontaneous ventilation and room air  Hydration status: euvolemic  Follow-up not needed.          Vitals Value Taken Time   /68 12/04/20 1027   Temp 36.3 °C (97.3 °F) 12/04/20 0957   Pulse 87 12/04/20 1027   Resp 14 12/04/20 1027   SpO2 99 % 12/04/20 1027         No case tracking events are documented in the log.      Pain/Sotero Score: Sotero Score: 9 (12/4/2020 10:27 AM)

## 2020-12-22 PROBLEM — C45.0 MALIGNANT PLEURAL MESOTHELIOMA: Status: ACTIVE | Noted: 2020-01-01

## 2020-12-22 NOTE — Clinical Note
Please schedule STAT CT chest without contrast.     Please schedule f/u in 6 weeks with CT abdomen and pelvis, cbc, cmp, TSH, T4 free, clinic visit with Dr Burt or Antonella with next cycle of Keytruda.     Thank you

## 2020-12-22 NOTE — PLAN OF CARE
Labs , hx, and medications reviewed. Assessment completed. Discussed plan of care with patient. Patient in agreement. VSS.  Chair reclined and warm blanket and snack offered. Will cont to monitor    
Pt tolerated keytruda/IVF without adverse effects. VSS. Verbalized understanding of RTC date. DC with family via wheelchair.    
Yes

## 2020-12-22 NOTE — Clinical Note
Please schedule f/u in 6 weeks with cbc, cmp, tsh, t4 free, clinic visit with Dr. Burt or Antonella and cycle 3 of Keytruda. Thank you
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
## 2021-01-01 ENCOUNTER — PATIENT MESSAGE (OUTPATIENT)
Dept: ENDOCRINOLOGY | Facility: CLINIC | Age: 76
End: 2021-01-01

## 2021-01-01 ENCOUNTER — NURSE TRIAGE (OUTPATIENT)
Dept: ADMINISTRATIVE | Facility: CLINIC | Age: 76
End: 2021-01-01

## 2021-01-01 ENCOUNTER — OFFICE VISIT (OUTPATIENT)
Dept: ENDOCRINOLOGY | Facility: CLINIC | Age: 76
End: 2021-01-01
Payer: MEDICARE

## 2021-01-01 ENCOUNTER — OFFICE VISIT (OUTPATIENT)
Dept: PALLIATIVE MEDICINE | Facility: CLINIC | Age: 76
End: 2021-01-01
Payer: MEDICARE

## 2021-01-01 ENCOUNTER — PATIENT MESSAGE (OUTPATIENT)
Dept: HEMATOLOGY/ONCOLOGY | Facility: CLINIC | Age: 76
End: 2021-01-01

## 2021-01-01 ENCOUNTER — TELEPHONE (OUTPATIENT)
Dept: ENDOCRINOLOGY | Facility: CLINIC | Age: 76
End: 2021-01-01

## 2021-01-01 ENCOUNTER — HOSPITAL ENCOUNTER (OUTPATIENT)
Dept: RADIOLOGY | Facility: HOSPITAL | Age: 76
Discharge: HOME | DRG: 439 | End: 2021-05-11
Attending: INTERNAL MEDICINE
Payer: MEDICARE

## 2021-01-01 ENCOUNTER — OFFICE VISIT (OUTPATIENT)
Dept: HEMATOLOGY/ONCOLOGY | Facility: CLINIC | Age: 76
End: 2021-01-01
Payer: MEDICARE

## 2021-01-01 ENCOUNTER — HOSPITAL ENCOUNTER (OUTPATIENT)
Dept: RADIOLOGY | Facility: HOSPITAL | Age: 76
Discharge: HOME | End: 2021-01-13
Attending: INTERNAL MEDICINE
Payer: MEDICARE

## 2021-01-01 ENCOUNTER — TELEPHONE (OUTPATIENT)
Dept: ADMINISTRATIVE | Facility: HOSPITAL | Age: 76
End: 2021-01-01

## 2021-01-01 ENCOUNTER — OFFICE VISIT (OUTPATIENT)
Dept: PSYCHIATRY | Facility: CLINIC | Age: 76
End: 2021-01-01
Payer: MEDICARE

## 2021-01-01 ENCOUNTER — TELEPHONE (OUTPATIENT)
Dept: PALLIATIVE MEDICINE | Facility: CLINIC | Age: 76
End: 2021-01-01

## 2021-01-01 ENCOUNTER — SOCIAL WORK (OUTPATIENT)
Dept: HEMATOLOGY/ONCOLOGY | Facility: CLINIC | Age: 76
End: 2021-01-01

## 2021-01-01 ENCOUNTER — LAB VISIT (OUTPATIENT)
Dept: LAB | Facility: HOSPITAL | Age: 76
End: 2021-01-01
Attending: NURSE PRACTITIONER
Payer: MEDICARE

## 2021-01-01 ENCOUNTER — PATIENT MESSAGE (OUTPATIENT)
Dept: FAMILY MEDICINE | Facility: CLINIC | Age: 76
End: 2021-01-01

## 2021-01-01 ENCOUNTER — PATIENT MESSAGE (OUTPATIENT)
Dept: ADMINISTRATIVE | Facility: HOSPITAL | Age: 76
End: 2021-01-01

## 2021-01-01 ENCOUNTER — TELEPHONE (OUTPATIENT)
Dept: HEMATOLOGY/ONCOLOGY | Facility: CLINIC | Age: 76
End: 2021-01-01

## 2021-01-01 ENCOUNTER — INFUSION (OUTPATIENT)
Dept: INFUSION THERAPY | Facility: HOSPITAL | Age: 76
End: 2021-01-01
Attending: INTERNAL MEDICINE
Payer: MEDICARE

## 2021-01-01 ENCOUNTER — DOCUMENTATION ONLY (OUTPATIENT)
Dept: HEMATOLOGY/ONCOLOGY | Facility: CLINIC | Age: 76
End: 2021-01-01

## 2021-01-01 ENCOUNTER — HOSPITAL ENCOUNTER (OUTPATIENT)
Dept: RADIOLOGY | Facility: HOSPITAL | Age: 76
Discharge: HOME | End: 2021-05-05
Attending: NURSE PRACTITIONER
Payer: MEDICARE

## 2021-01-01 ENCOUNTER — ANESTHESIA (OUTPATIENT)
Dept: ENDOSCOPY | Facility: HOSPITAL | Age: 76
End: 2021-01-01
Payer: MEDICARE

## 2021-01-01 ENCOUNTER — ANESTHESIA EVENT (OUTPATIENT)
Dept: ENDOSCOPY | Facility: HOSPITAL | Age: 76
End: 2021-01-01
Payer: MEDICARE

## 2021-01-01 ENCOUNTER — PATIENT OUTREACH (OUTPATIENT)
Dept: ADMINISTRATIVE | Facility: OTHER | Age: 76
End: 2021-01-01

## 2021-01-01 ENCOUNTER — TELEPHONE (OUTPATIENT)
Dept: PHARMACY | Facility: CLINIC | Age: 76
End: 2021-01-01

## 2021-01-01 ENCOUNTER — LAB VISIT (OUTPATIENT)
Dept: LAB | Facility: HOSPITAL | Age: 76
End: 2021-01-01
Payer: MEDICARE

## 2021-01-01 ENCOUNTER — TELEPHONE (OUTPATIENT)
Dept: NEPHROLOGY | Facility: CLINIC | Age: 76
End: 2021-01-01

## 2021-01-01 ENCOUNTER — LAB VISIT (OUTPATIENT)
Dept: INTERNAL MEDICINE | Facility: CLINIC | Age: 76
End: 2021-01-01
Payer: MEDICARE

## 2021-01-01 ENCOUNTER — HOSPITAL ENCOUNTER (INPATIENT)
Facility: HOSPITAL | Age: 76
LOS: 2 days | Discharge: HOME | DRG: 439 | End: 2021-06-07
Attending: EMERGENCY MEDICINE | Admitting: EMERGENCY MEDICINE
Payer: MEDICARE

## 2021-01-01 ENCOUNTER — HOSPITAL ENCOUNTER (INPATIENT)
Facility: HOSPITAL | Age: 76
LOS: 5 days | Discharge: HOME | DRG: 439 | End: 2021-05-19
Attending: EMERGENCY MEDICINE | Admitting: INTERNAL MEDICINE
Payer: MEDICARE

## 2021-01-01 ENCOUNTER — HOSPITAL ENCOUNTER (EMERGENCY)
Facility: HOSPITAL | Age: 76
Discharge: HOME | End: 2021-01-03
Attending: EMERGENCY MEDICINE
Payer: MEDICARE

## 2021-01-01 ENCOUNTER — HOSPITAL ENCOUNTER (OUTPATIENT)
Facility: HOSPITAL | Age: 76
Discharge: HOME | End: 2021-02-02
Attending: INTERNAL MEDICINE | Admitting: INTERNAL MEDICINE
Payer: MEDICARE

## 2021-01-01 ENCOUNTER — HOSPITAL ENCOUNTER (INPATIENT)
Facility: HOSPITAL | Age: 76
LOS: 5 days | Discharge: HOME HEALTH | DRG: 637 | End: 2021-06-13
Attending: EMERGENCY MEDICINE | Admitting: INTERNAL MEDICINE
Payer: MEDICARE

## 2021-01-01 ENCOUNTER — TELEPHONE (OUTPATIENT)
Dept: ENDOCRINOLOGY | Facility: HOSPITAL | Age: 76
End: 2021-01-01

## 2021-01-01 ENCOUNTER — INITIAL CONSULT (OUTPATIENT)
Dept: PALLIATIVE MEDICINE | Facility: CLINIC | Age: 76
End: 2021-01-01
Payer: MEDICARE

## 2021-01-01 ENCOUNTER — HOSPITAL ENCOUNTER (OUTPATIENT)
Dept: RADIOLOGY | Facility: HOSPITAL | Age: 76
Discharge: HOME | End: 2021-04-28
Attending: INTERNAL MEDICINE
Payer: MEDICARE

## 2021-01-01 ENCOUNTER — INFUSION (OUTPATIENT)
Dept: INFUSION THERAPY | Facility: HOSPITAL | Age: 76
End: 2021-01-01
Payer: MEDICARE

## 2021-01-01 ENCOUNTER — OFFICE VISIT (OUTPATIENT)
Dept: FAMILY MEDICINE | Facility: CLINIC | Age: 76
End: 2021-01-01
Payer: MEDICARE

## 2021-01-01 ENCOUNTER — HOSPITAL ENCOUNTER (EMERGENCY)
Facility: HOSPITAL | Age: 76
Discharge: HOME | End: 2021-06-30
Attending: EMERGENCY MEDICINE
Payer: MEDICARE

## 2021-01-01 ENCOUNTER — TELEPHONE (OUTPATIENT)
Dept: EMERGENCY MEDICINE | Facility: HOSPITAL | Age: 76
End: 2021-01-01

## 2021-01-01 ENCOUNTER — OFFICE VISIT (OUTPATIENT)
Dept: GASTROENTEROLOGY | Facility: CLINIC | Age: 76
End: 2021-01-01
Payer: MEDICARE

## 2021-01-01 ENCOUNTER — PATIENT MESSAGE (OUTPATIENT)
Dept: ENDOCRINOLOGY | Facility: HOSPITAL | Age: 76
End: 2021-01-01

## 2021-01-01 ENCOUNTER — PATIENT MESSAGE (OUTPATIENT)
Dept: PALLIATIVE MEDICINE | Facility: CLINIC | Age: 76
End: 2021-01-01

## 2021-01-01 ENCOUNTER — HOSPITAL ENCOUNTER (INPATIENT)
Facility: HOSPITAL | Age: 76
LOS: 2 days | Discharge: HOME | DRG: 638 | End: 2021-06-27
Attending: EMERGENCY MEDICINE | Admitting: HOSPITALIST
Payer: MEDICARE

## 2021-01-01 ENCOUNTER — HOSPITAL ENCOUNTER (OUTPATIENT)
Dept: RADIOLOGY | Facility: HOSPITAL | Age: 76
Discharge: HOME | End: 2021-02-01
Attending: PHYSICIAN ASSISTANT
Payer: MEDICARE

## 2021-01-01 ENCOUNTER — EXTERNAL HOME HEALTH (OUTPATIENT)
Dept: HOME HEALTH SERVICES | Facility: HOSPITAL | Age: 76
End: 2021-01-01
Payer: MEDICARE

## 2021-01-01 ENCOUNTER — LAB VISIT (OUTPATIENT)
Dept: LAB | Facility: HOSPITAL | Age: 76
End: 2021-01-01
Attending: INTERNAL MEDICINE
Payer: MEDICARE

## 2021-01-01 ENCOUNTER — HOSPITAL ENCOUNTER (EMERGENCY)
Facility: HOSPITAL | Age: 76
Discharge: HOME | End: 2021-04-01
Attending: EMERGENCY MEDICINE
Payer: MEDICARE

## 2021-01-01 ENCOUNTER — HOSPITAL ENCOUNTER (EMERGENCY)
Facility: HOSPITAL | Age: 76
Discharge: HOME | End: 2021-06-16
Attending: EMERGENCY MEDICINE
Payer: MEDICARE

## 2021-01-01 VITALS
WEIGHT: 171.19 LBS | SYSTOLIC BLOOD PRESSURE: 177 MMHG | OXYGEN SATURATION: 93 % | HEART RATE: 71 BPM | BODY MASS INDEX: 24.75 KG/M2 | DIASTOLIC BLOOD PRESSURE: 72 MMHG | RESPIRATION RATE: 17 BRPM | HEIGHT: 66 IN | SYSTOLIC BLOOD PRESSURE: 139 MMHG | WEIGHT: 154 LBS | SYSTOLIC BLOOD PRESSURE: 119 MMHG | HEIGHT: 66 IN | BODY MASS INDEX: 27.51 KG/M2 | HEART RATE: 65 BPM | TEMPERATURE: 96 F | DIASTOLIC BLOOD PRESSURE: 81 MMHG | DIASTOLIC BLOOD PRESSURE: 62 MMHG

## 2021-01-01 VITALS
RESPIRATION RATE: 20 BRPM | HEART RATE: 73 BPM | HEART RATE: 62 BPM | OXYGEN SATURATION: 97 % | TEMPERATURE: 98 F | DIASTOLIC BLOOD PRESSURE: 70 MMHG | WEIGHT: 152 LBS | BODY MASS INDEX: 24.43 KG/M2 | SYSTOLIC BLOOD PRESSURE: 157 MMHG | HEIGHT: 66 IN | SYSTOLIC BLOOD PRESSURE: 150 MMHG | RESPIRATION RATE: 19 BRPM | DIASTOLIC BLOOD PRESSURE: 76 MMHG | OXYGEN SATURATION: 98 % | TEMPERATURE: 99 F

## 2021-01-01 VITALS
BODY MASS INDEX: 27.6 KG/M2 | SYSTOLIC BLOOD PRESSURE: 129 MMHG | WEIGHT: 171 LBS | DIASTOLIC BLOOD PRESSURE: 61 MMHG | DIASTOLIC BLOOD PRESSURE: 77 MMHG | OXYGEN SATURATION: 98 % | RESPIRATION RATE: 14 BRPM | TEMPERATURE: 98 F | HEART RATE: 61 BPM | SYSTOLIC BLOOD PRESSURE: 151 MMHG | RESPIRATION RATE: 18 BRPM | HEART RATE: 76 BPM | TEMPERATURE: 98 F | BODY MASS INDEX: 27.63 KG/M2 | OXYGEN SATURATION: 97 % | HEIGHT: 66 IN

## 2021-01-01 VITALS
HEIGHT: 66 IN | SYSTOLIC BLOOD PRESSURE: 146 MMHG | HEIGHT: 66 IN | RESPIRATION RATE: 16 BRPM | TEMPERATURE: 98 F | RESPIRATION RATE: 16 BRPM | WEIGHT: 164.88 LBS | HEART RATE: 75 BPM | BODY MASS INDEX: 25.66 KG/M2 | WEIGHT: 159.63 LBS | HEART RATE: 65 BPM | SYSTOLIC BLOOD PRESSURE: 155 MMHG | DIASTOLIC BLOOD PRESSURE: 73 MMHG | SYSTOLIC BLOOD PRESSURE: 123 MMHG | BODY MASS INDEX: 26.5 KG/M2 | DIASTOLIC BLOOD PRESSURE: 69 MMHG | OXYGEN SATURATION: 96 % | HEART RATE: 73 BPM | DIASTOLIC BLOOD PRESSURE: 78 MMHG | TEMPERATURE: 98 F | OXYGEN SATURATION: 97 %

## 2021-01-01 VITALS
WEIGHT: 152.88 LBS | OXYGEN SATURATION: 97 % | DIASTOLIC BLOOD PRESSURE: 72 MMHG | HEIGHT: 66 IN | BODY MASS INDEX: 24.57 KG/M2 | HEART RATE: 59 BPM | SYSTOLIC BLOOD PRESSURE: 129 MMHG

## 2021-01-01 VITALS
OXYGEN SATURATION: 95 % | RESPIRATION RATE: 18 BRPM | BODY MASS INDEX: 24.48 KG/M2 | HEART RATE: 65 BPM | TEMPERATURE: 99 F | HEIGHT: 66 IN | SYSTOLIC BLOOD PRESSURE: 160 MMHG | DIASTOLIC BLOOD PRESSURE: 68 MMHG | WEIGHT: 152.31 LBS

## 2021-01-01 VITALS
DIASTOLIC BLOOD PRESSURE: 63 MMHG | HEART RATE: 71 BPM | BODY MASS INDEX: 24.77 KG/M2 | WEIGHT: 154.13 LBS | OXYGEN SATURATION: 95 % | TEMPERATURE: 98 F | SYSTOLIC BLOOD PRESSURE: 140 MMHG | HEIGHT: 66 IN | RESPIRATION RATE: 20 BRPM

## 2021-01-01 VITALS
BODY MASS INDEX: 24.48 KG/M2 | SYSTOLIC BLOOD PRESSURE: 164 MMHG | TEMPERATURE: 98 F | DIASTOLIC BLOOD PRESSURE: 77 MMHG | HEART RATE: 62 BPM | HEIGHT: 66 IN | WEIGHT: 152.31 LBS | RESPIRATION RATE: 18 BRPM

## 2021-01-01 VITALS
OXYGEN SATURATION: 96 % | RESPIRATION RATE: 16 BRPM | HEART RATE: 67 BPM | BODY MASS INDEX: 25.37 KG/M2 | HEIGHT: 66 IN | SYSTOLIC BLOOD PRESSURE: 162 MMHG | WEIGHT: 157.88 LBS | DIASTOLIC BLOOD PRESSURE: 75 MMHG | TEMPERATURE: 99 F

## 2021-01-01 VITALS
TEMPERATURE: 98 F | DIASTOLIC BLOOD PRESSURE: 67 MMHG | HEIGHT: 66 IN | WEIGHT: 171.94 LBS | HEART RATE: 87 BPM | OXYGEN SATURATION: 100 % | RESPIRATION RATE: 18 BRPM | SYSTOLIC BLOOD PRESSURE: 140 MMHG | BODY MASS INDEX: 27.63 KG/M2

## 2021-01-01 VITALS
SYSTOLIC BLOOD PRESSURE: 140 MMHG | BODY MASS INDEX: 23.84 KG/M2 | OXYGEN SATURATION: 98 % | TEMPERATURE: 97 F | WEIGHT: 148.38 LBS | HEART RATE: 71 BPM | DIASTOLIC BLOOD PRESSURE: 67 MMHG | HEIGHT: 66 IN | RESPIRATION RATE: 18 BRPM

## 2021-01-01 VITALS
WEIGHT: 152.13 LBS | DIASTOLIC BLOOD PRESSURE: 72 MMHG | HEART RATE: 82 BPM | BODY MASS INDEX: 24.45 KG/M2 | SYSTOLIC BLOOD PRESSURE: 139 MMHG | HEIGHT: 66 IN | HEART RATE: 80 BPM | OXYGEN SATURATION: 95 % | SYSTOLIC BLOOD PRESSURE: 138 MMHG | RESPIRATION RATE: 18 BRPM | DIASTOLIC BLOOD PRESSURE: 66 MMHG | TEMPERATURE: 99 F

## 2021-01-01 VITALS
SYSTOLIC BLOOD PRESSURE: 158 MMHG | HEIGHT: 66 IN | DIASTOLIC BLOOD PRESSURE: 80 MMHG | HEART RATE: 62 BPM | BODY MASS INDEX: 24.77 KG/M2 | HEART RATE: 68 BPM | WEIGHT: 154.13 LBS | DIASTOLIC BLOOD PRESSURE: 68 MMHG | SYSTOLIC BLOOD PRESSURE: 143 MMHG

## 2021-01-01 VITALS
SYSTOLIC BLOOD PRESSURE: 167 MMHG | RESPIRATION RATE: 18 BRPM | TEMPERATURE: 98 F | BODY MASS INDEX: 27.48 KG/M2 | DIASTOLIC BLOOD PRESSURE: 77 MMHG | HEIGHT: 66 IN | HEART RATE: 70 BPM | OXYGEN SATURATION: 99 % | WEIGHT: 171 LBS

## 2021-01-01 VITALS
DIASTOLIC BLOOD PRESSURE: 79 MMHG | TEMPERATURE: 99 F | HEART RATE: 64 BPM | SYSTOLIC BLOOD PRESSURE: 147 MMHG | RESPIRATION RATE: 17 BRPM | OXYGEN SATURATION: 97 %

## 2021-01-01 VITALS
TEMPERATURE: 98 F | HEART RATE: 68 BPM | DIASTOLIC BLOOD PRESSURE: 62 MMHG | RESPIRATION RATE: 18 BRPM | SYSTOLIC BLOOD PRESSURE: 129 MMHG

## 2021-01-01 VITALS
OXYGEN SATURATION: 97 % | DIASTOLIC BLOOD PRESSURE: 65 MMHG | BODY MASS INDEX: 24.43 KG/M2 | WEIGHT: 152 LBS | SYSTOLIC BLOOD PRESSURE: 136 MMHG | HEART RATE: 68 BPM | HEIGHT: 66 IN | RESPIRATION RATE: 16 BRPM | TEMPERATURE: 98 F

## 2021-01-01 VITALS
HEART RATE: 75 BPM | SYSTOLIC BLOOD PRESSURE: 142 MMHG | DIASTOLIC BLOOD PRESSURE: 67 MMHG | WEIGHT: 167.13 LBS | OXYGEN SATURATION: 96 % | HEIGHT: 66 IN | RESPIRATION RATE: 17 BRPM | TEMPERATURE: 98 F | BODY MASS INDEX: 26.86 KG/M2

## 2021-01-01 VITALS — RESPIRATION RATE: 18 BRPM | DIASTOLIC BLOOD PRESSURE: 65 MMHG | SYSTOLIC BLOOD PRESSURE: 139 MMHG | HEART RATE: 71 BPM

## 2021-01-01 VITALS
WEIGHT: 154 LBS | HEART RATE: 75 BPM | BODY MASS INDEX: 24.17 KG/M2 | SYSTOLIC BLOOD PRESSURE: 141 MMHG | HEIGHT: 67 IN | DIASTOLIC BLOOD PRESSURE: 75 MMHG

## 2021-01-01 VITALS — DIASTOLIC BLOOD PRESSURE: 70 MMHG | SYSTOLIC BLOOD PRESSURE: 142 MMHG | HEART RATE: 65 BPM

## 2021-01-01 VITALS — DIASTOLIC BLOOD PRESSURE: 72 MMHG | HEART RATE: 59 BPM | SYSTOLIC BLOOD PRESSURE: 154 MMHG

## 2021-01-01 VITALS
BODY MASS INDEX: 25.01 KG/M2 | SYSTOLIC BLOOD PRESSURE: 136 MMHG | RESPIRATION RATE: 18 BRPM | OXYGEN SATURATION: 98 % | HEART RATE: 69 BPM | TEMPERATURE: 98 F | WEIGHT: 155 LBS | DIASTOLIC BLOOD PRESSURE: 72 MMHG

## 2021-01-01 DIAGNOSIS — E03.2 HYPOTHYROIDISM DUE TO MEDICATION: ICD-10-CM

## 2021-01-01 DIAGNOSIS — R11.10 EMESIS: ICD-10-CM

## 2021-01-01 DIAGNOSIS — C45.9 MESOTHELIOMA, BIPHASIC, MALIGNANT: ICD-10-CM

## 2021-01-01 DIAGNOSIS — R10.84 ABDOMINAL PAIN, GENERALIZED: ICD-10-CM

## 2021-01-01 DIAGNOSIS — I10 HYPERTENSION, ESSENTIAL: ICD-10-CM

## 2021-01-01 DIAGNOSIS — R10.9 ABDOMINAL PAIN, UNSPECIFIED ABDOMINAL LOCATION: ICD-10-CM

## 2021-01-01 DIAGNOSIS — I10 BENIGN ESSENTIAL HTN: ICD-10-CM

## 2021-01-01 DIAGNOSIS — K92.0 HEMATEMESIS WITH NAUSEA: ICD-10-CM

## 2021-01-01 DIAGNOSIS — Z51.5 ENCOUNTER FOR PALLIATIVE CARE: ICD-10-CM

## 2021-01-01 DIAGNOSIS — E11.00 TYPE 2 DIABETES MELLITUS WITH HYPEROSMOLAR HYPERGLYCEMIC STATE (HHS): ICD-10-CM

## 2021-01-01 DIAGNOSIS — T40.2X5A CONSTIPATION DUE TO OPIOID THERAPY: ICD-10-CM

## 2021-01-01 DIAGNOSIS — N18.4 CKD (CHRONIC KIDNEY DISEASE) STAGE 4, GFR 15-29 ML/MIN: ICD-10-CM

## 2021-01-01 DIAGNOSIS — B37.49 YEAST UTI: ICD-10-CM

## 2021-01-01 DIAGNOSIS — E11.00 HYPEROSMOLAR HYPERGLYCEMIC STATE (HHS): Primary | ICD-10-CM

## 2021-01-01 DIAGNOSIS — T50.905A DRUG-INDUCED CHRONIC PANCREATITIS: ICD-10-CM

## 2021-01-01 DIAGNOSIS — Z01.818 PRE-OP TESTING: ICD-10-CM

## 2021-01-01 DIAGNOSIS — Z71.89 ADVANCED CARE PLANNING/COUNSELING DISCUSSION: ICD-10-CM

## 2021-01-01 DIAGNOSIS — R11.10 NON-INTRACTABLE VOMITING, PRESENCE OF NAUSEA NOT SPECIFIED, UNSPECIFIED VOMITING TYPE: ICD-10-CM

## 2021-01-01 DIAGNOSIS — G47.00 INSOMNIA, UNSPECIFIED TYPE: ICD-10-CM

## 2021-01-01 DIAGNOSIS — K86.1 DRUG-INDUCED CHRONIC PANCREATITIS: ICD-10-CM

## 2021-01-01 DIAGNOSIS — C45.0 MALIGNANT PLEURAL MESOTHELIOMA: Chronic | ICD-10-CM

## 2021-01-01 DIAGNOSIS — R11.10 NON-INTRACTABLE VOMITING, PRESENCE OF NAUSEA NOT SPECIFIED, UNSPECIFIED VOMITING TYPE: Primary | ICD-10-CM

## 2021-01-01 DIAGNOSIS — G89.3 CANCER RELATED PAIN: ICD-10-CM

## 2021-01-01 DIAGNOSIS — F43.23 ADJUSTMENT DISORDER WITH MIXED ANXIETY AND DEPRESSED MOOD: ICD-10-CM

## 2021-01-01 DIAGNOSIS — R74.8 ELEVATED LIPASE: ICD-10-CM

## 2021-01-01 DIAGNOSIS — E11.9 TYPE 2 DIABETES MELLITUS WITHOUT COMPLICATION, WITHOUT LONG-TERM CURRENT USE OF INSULIN: Chronic | ICD-10-CM

## 2021-01-01 DIAGNOSIS — K85.30 DRUG-INDUCED ACUTE PANCREATITIS WITHOUT INFECTION OR NECROSIS: ICD-10-CM

## 2021-01-01 DIAGNOSIS — R73.9 HYPERGLYCEMIA: Primary | ICD-10-CM

## 2021-01-01 DIAGNOSIS — K92.2 UPPER GI BLEEDING: ICD-10-CM

## 2021-01-01 DIAGNOSIS — R73.09 ELEVATED RANDOM BLOOD GLUCOSE LEVEL: ICD-10-CM

## 2021-01-01 DIAGNOSIS — R07.9 CHEST PAIN: ICD-10-CM

## 2021-01-01 DIAGNOSIS — C45.9 MESOTHELIOMA, BIPHASIC, MALIGNANT: Primary | ICD-10-CM

## 2021-01-01 DIAGNOSIS — E11.9 TYPE 2 DIABETES MELLITUS WITHOUT COMPLICATION, WITHOUT LONG-TERM CURRENT USE OF INSULIN: ICD-10-CM

## 2021-01-01 DIAGNOSIS — M79.605 LEFT LEG PAIN: ICD-10-CM

## 2021-01-01 DIAGNOSIS — T38.0X5A ADVERSE EFFECT OF CORTICOSTEROIDS, INITIAL ENCOUNTER: ICD-10-CM

## 2021-01-01 DIAGNOSIS — F33.41 RECURRENT MAJOR DEPRESSIVE DISORDER, IN PARTIAL REMISSION: ICD-10-CM

## 2021-01-01 DIAGNOSIS — C45.0 MALIGNANT PLEURAL MESOTHELIOMA: ICD-10-CM

## 2021-01-01 DIAGNOSIS — R53.0 NEOPLASTIC (MALIGNANT) RELATED FATIGUE: ICD-10-CM

## 2021-01-01 DIAGNOSIS — C45.0 MALIGNANT PLEURAL MESOTHELIOMA: Primary | ICD-10-CM

## 2021-01-01 DIAGNOSIS — K59.00 CONSTIPATION, UNSPECIFIED CONSTIPATION TYPE: Primary | ICD-10-CM

## 2021-01-01 DIAGNOSIS — E11.9 TYPE 2 DIABETES MELLITUS WITHOUT COMPLICATION, WITHOUT LONG-TERM CURRENT USE OF INSULIN: Primary | ICD-10-CM

## 2021-01-01 DIAGNOSIS — R63.0 ANOREXIA: ICD-10-CM

## 2021-01-01 DIAGNOSIS — R11.2 INTRACTABLE VOMITING WITH NAUSEA, UNSPECIFIED VOMITING TYPE: Primary | ICD-10-CM

## 2021-01-01 DIAGNOSIS — K85.90 ACUTE PANCREATITIS, UNSPECIFIED COMPLICATION STATUS, UNSPECIFIED PANCREATITIS TYPE: Primary | ICD-10-CM

## 2021-01-01 DIAGNOSIS — K86.1 DRUG-INDUCED CHRONIC PANCREATITIS: Primary | ICD-10-CM

## 2021-01-01 DIAGNOSIS — G89.3 NEOPLASM RELATED PAIN (ACUTE) (CHRONIC): ICD-10-CM

## 2021-01-01 DIAGNOSIS — R53.83 FATIGUE, UNSPECIFIED TYPE: ICD-10-CM

## 2021-01-01 DIAGNOSIS — T38.0X5A STEROID-INDUCED HYPERGLYCEMIA: Primary | ICD-10-CM

## 2021-01-01 DIAGNOSIS — R11.2 NAUSEA AND VOMITING, INTRACTABILITY OF VOMITING NOT SPECIFIED, UNSPECIFIED VOMITING TYPE: Primary | ICD-10-CM

## 2021-01-01 DIAGNOSIS — R91.8 OTHER NONSPECIFIC ABNORMAL FINDING OF LUNG FIELD: ICD-10-CM

## 2021-01-01 DIAGNOSIS — E78.2 MIXED HYPERLIPIDEMIA: Chronic | ICD-10-CM

## 2021-01-01 DIAGNOSIS — R94.31 QT PROLONGATION: ICD-10-CM

## 2021-01-01 DIAGNOSIS — L29.9 PRURITUS: ICD-10-CM

## 2021-01-01 DIAGNOSIS — M79.606 PAIN OF LOWER EXTREMITY, UNSPECIFIED LATERALITY: ICD-10-CM

## 2021-01-01 DIAGNOSIS — E11.9 TYPE 2 DIABETES MELLITUS WITHOUT COMPLICATION: ICD-10-CM

## 2021-01-01 DIAGNOSIS — E11.9 DIABETES MELLITUS WITHOUT COMPLICATION: ICD-10-CM

## 2021-01-01 DIAGNOSIS — K59.03 CONSTIPATION DUE TO OPIOID THERAPY: ICD-10-CM

## 2021-01-01 DIAGNOSIS — R73.9 STEROID-INDUCED HYPERGLYCEMIA: Primary | ICD-10-CM

## 2021-01-01 DIAGNOSIS — K29.71 GASTRITIS WITH HEMORRHAGE, UNSPECIFIED CHRONICITY, UNSPECIFIED GASTRITIS TYPE: ICD-10-CM

## 2021-01-01 DIAGNOSIS — R10.13 EPIGASTRIC ABDOMINAL PAIN: ICD-10-CM

## 2021-01-01 DIAGNOSIS — D63.0 ANEMIA IN NEOPLASTIC DISEASE: ICD-10-CM

## 2021-01-01 DIAGNOSIS — R11.0 NAUSEA: ICD-10-CM

## 2021-01-01 DIAGNOSIS — R73.9 HYPERGLYCEMIA: ICD-10-CM

## 2021-01-01 DIAGNOSIS — T50.905A DRUG-INDUCED CHRONIC PANCREATITIS: Primary | ICD-10-CM

## 2021-01-01 DIAGNOSIS — N17.9 AKI (ACUTE KIDNEY INJURY): ICD-10-CM

## 2021-01-01 DIAGNOSIS — B37.0 THRUSH: Primary | ICD-10-CM

## 2021-01-01 DIAGNOSIS — N18.30 STAGE 3 CHRONIC KIDNEY DISEASE, UNSPECIFIED WHETHER STAGE 3A OR 3B CKD: Primary | ICD-10-CM

## 2021-01-01 DIAGNOSIS — T50.905A ITCHING DUE TO DRUG: ICD-10-CM

## 2021-01-01 DIAGNOSIS — R10.9 ABDOMINAL PAIN: ICD-10-CM

## 2021-01-01 DIAGNOSIS — F33.41 MAJOR DEPRESSIVE DISORDER, RECURRENT EPISODE, IN PARTIAL REMISSION: Primary | ICD-10-CM

## 2021-01-01 DIAGNOSIS — R10.9 ABDOMINAL PAIN, UNSPECIFIED ABDOMINAL LOCATION: Primary | ICD-10-CM

## 2021-01-01 DIAGNOSIS — K85.30 DRUG-INDUCED ACUTE PANCREATITIS, UNSPECIFIED COMPLICATION STATUS: ICD-10-CM

## 2021-01-01 DIAGNOSIS — L29.8 ITCHING DUE TO DRUG: ICD-10-CM

## 2021-01-01 DIAGNOSIS — F41.9 ANXIETY: ICD-10-CM

## 2021-01-01 DIAGNOSIS — R11.2 NON-INTRACTABLE VOMITING WITH NAUSEA, UNSPECIFIED VOMITING TYPE: Primary | ICD-10-CM

## 2021-01-01 LAB
ABO + RH BLD: NORMAL
ALBUMIN SERPL BCP-MCNC: 2.7 G/DL (ref 3.5–5.2)
ALBUMIN SERPL BCP-MCNC: 2.8 G/DL (ref 3.5–5.2)
ALBUMIN SERPL BCP-MCNC: 2.9 G/DL (ref 3.5–5.2)
ALBUMIN SERPL BCP-MCNC: 3 G/DL (ref 3.5–5.2)
ALBUMIN SERPL BCP-MCNC: 3 G/DL (ref 3.5–5.2)
ALBUMIN SERPL BCP-MCNC: 3.2 G/DL (ref 3.5–5.2)
ALBUMIN SERPL BCP-MCNC: 3.3 G/DL (ref 3.5–5.2)
ALBUMIN SERPL BCP-MCNC: 3.3 G/DL (ref 3.5–5.2)
ALBUMIN SERPL BCP-MCNC: 3.4 G/DL (ref 3.5–5.2)
ALBUMIN SERPL BCP-MCNC: 3.4 G/DL (ref 3.5–5.2)
ALBUMIN SERPL BCP-MCNC: 3.5 G/DL (ref 3.5–5.2)
ALBUMIN SERPL BCP-MCNC: 3.6 G/DL (ref 3.5–5.2)
ALBUMIN SERPL BCP-MCNC: 3.7 G/DL (ref 3.5–5.2)
ALBUMIN SERPL BCP-MCNC: 3.8 G/DL (ref 3.5–5.2)
ALBUMIN SERPL BCP-MCNC: 3.9 G/DL (ref 3.5–5.2)
ALBUMIN SERPL BCP-MCNC: 4 G/DL (ref 3.5–5.2)
ALBUMIN SERPL BCP-MCNC: 4.1 G/DL (ref 3.5–5.2)
ALLENS TEST: ABNORMAL
ALP SERPL-CCNC: 102 U/L (ref 55–135)
ALP SERPL-CCNC: 105 U/L (ref 55–135)
ALP SERPL-CCNC: 107 U/L (ref 55–135)
ALP SERPL-CCNC: 111 U/L (ref 55–135)
ALP SERPL-CCNC: 114 U/L (ref 55–135)
ALP SERPL-CCNC: 115 U/L (ref 55–135)
ALP SERPL-CCNC: 117 U/L (ref 55–135)
ALP SERPL-CCNC: 122 U/L (ref 55–135)
ALP SERPL-CCNC: 122 U/L (ref 55–135)
ALP SERPL-CCNC: 125 U/L (ref 55–135)
ALP SERPL-CCNC: 126 U/L (ref 55–135)
ALP SERPL-CCNC: 127 U/L (ref 55–135)
ALP SERPL-CCNC: 131 U/L (ref 55–135)
ALP SERPL-CCNC: 135 U/L (ref 55–135)
ALP SERPL-CCNC: 151 U/L (ref 55–135)
ALP SERPL-CCNC: 151 U/L (ref 55–135)
ALP SERPL-CCNC: 158 U/L (ref 55–135)
ALP SERPL-CCNC: 167 U/L (ref 55–135)
ALP SERPL-CCNC: 169 U/L (ref 55–135)
ALP SERPL-CCNC: 169 U/L (ref 55–135)
ALP SERPL-CCNC: 198 U/L (ref 55–135)
ALP SERPL-CCNC: 227 U/L (ref 55–135)
ALP SERPL-CCNC: 87 U/L (ref 55–135)
ALP SERPL-CCNC: 90 U/L (ref 55–135)
ALP SERPL-CCNC: 91 U/L (ref 55–135)
ALT SERPL W/O P-5'-P-CCNC: 10 U/L (ref 10–44)
ALT SERPL W/O P-5'-P-CCNC: 110 U/L (ref 10–44)
ALT SERPL W/O P-5'-P-CCNC: 114 U/L (ref 10–44)
ALT SERPL W/O P-5'-P-CCNC: 18 U/L (ref 10–44)
ALT SERPL W/O P-5'-P-CCNC: 18 U/L (ref 10–44)
ALT SERPL W/O P-5'-P-CCNC: 20 U/L (ref 10–44)
ALT SERPL W/O P-5'-P-CCNC: 21 U/L (ref 10–44)
ALT SERPL W/O P-5'-P-CCNC: 22 U/L (ref 10–44)
ALT SERPL W/O P-5'-P-CCNC: 227 U/L (ref 10–44)
ALT SERPL W/O P-5'-P-CCNC: 23 U/L (ref 10–44)
ALT SERPL W/O P-5'-P-CCNC: 24 U/L (ref 10–44)
ALT SERPL W/O P-5'-P-CCNC: 25 U/L (ref 10–44)
ALT SERPL W/O P-5'-P-CCNC: 38 U/L (ref 10–44)
ALT SERPL W/O P-5'-P-CCNC: 41 U/L (ref 10–44)
ALT SERPL W/O P-5'-P-CCNC: 42 U/L (ref 10–44)
ALT SERPL W/O P-5'-P-CCNC: 44 U/L (ref 10–44)
ALT SERPL W/O P-5'-P-CCNC: 47 U/L (ref 10–44)
ALT SERPL W/O P-5'-P-CCNC: 51 U/L (ref 10–44)
ALT SERPL W/O P-5'-P-CCNC: 58 U/L (ref 10–44)
ALT SERPL W/O P-5'-P-CCNC: 60 U/L (ref 10–44)
ALT SERPL W/O P-5'-P-CCNC: 64 U/L (ref 10–44)
ALT SERPL W/O P-5'-P-CCNC: 65 U/L (ref 10–44)
ALT SERPL W/O P-5'-P-CCNC: 69 U/L (ref 10–44)
ALT SERPL W/O P-5'-P-CCNC: 81 U/L (ref 10–44)
ALT SERPL W/O P-5'-P-CCNC: 83 U/L (ref 10–44)
ALT SERPL W/O P-5'-P-CCNC: 85 U/L (ref 10–44)
ALT SERPL W/O P-5'-P-CCNC: 89 U/L (ref 10–44)
AMYLASE SERPL-CCNC: 120 U/L (ref 20–110)
AMYLASE SERPL-CCNC: 168 U/L (ref 20–110)
ANION GAP SERPL CALC-SCNC: 10 MMOL/L (ref 8–16)
ANION GAP SERPL CALC-SCNC: 11 MMOL/L (ref 8–16)
ANION GAP SERPL CALC-SCNC: 12 MMOL/L (ref 8–16)
ANION GAP SERPL CALC-SCNC: 13 MMOL/L (ref 8–16)
ANION GAP SERPL CALC-SCNC: 13 MMOL/L (ref 8–16)
ANION GAP SERPL CALC-SCNC: 14 MMOL/L (ref 8–16)
ANION GAP SERPL CALC-SCNC: 14 MMOL/L (ref 8–16)
ANION GAP SERPL CALC-SCNC: 15 MMOL/L (ref 8–16)
ANION GAP SERPL CALC-SCNC: 15 MMOL/L (ref 8–16)
ANION GAP SERPL CALC-SCNC: 18 MMOL/L (ref 8–16)
ANION GAP SERPL CALC-SCNC: 6 MMOL/L (ref 8–16)
ANION GAP SERPL CALC-SCNC: 7 MMOL/L (ref 8–16)
ANION GAP SERPL CALC-SCNC: 8 MMOL/L (ref 8–16)
ANION GAP SERPL CALC-SCNC: 8 MMOL/L (ref 8–16)
ANION GAP SERPL CALC-SCNC: 9 MMOL/L (ref 8–16)
AST SERPL-CCNC: 13 U/L (ref 10–40)
AST SERPL-CCNC: 15 U/L (ref 10–40)
AST SERPL-CCNC: 16 U/L (ref 10–40)
AST SERPL-CCNC: 16 U/L (ref 10–40)
AST SERPL-CCNC: 17 U/L (ref 10–40)
AST SERPL-CCNC: 18 U/L (ref 10–40)
AST SERPL-CCNC: 19 U/L (ref 10–40)
AST SERPL-CCNC: 19 U/L (ref 10–40)
AST SERPL-CCNC: 20 U/L (ref 10–40)
AST SERPL-CCNC: 209 U/L (ref 10–40)
AST SERPL-CCNC: 21 U/L (ref 10–40)
AST SERPL-CCNC: 21 U/L (ref 10–40)
AST SERPL-CCNC: 23 U/L (ref 10–40)
AST SERPL-CCNC: 24 U/L (ref 10–40)
AST SERPL-CCNC: 25 U/L (ref 10–40)
AST SERPL-CCNC: 26 U/L (ref 10–40)
AST SERPL-CCNC: 26 U/L (ref 10–40)
AST SERPL-CCNC: 27 U/L (ref 10–40)
AST SERPL-CCNC: 31 U/L (ref 10–40)
AST SERPL-CCNC: 39 U/L (ref 10–40)
AST SERPL-CCNC: 41 U/L (ref 10–40)
AST SERPL-CCNC: 41 U/L (ref 10–40)
AST SERPL-CCNC: 60 U/L (ref 10–40)
B-OH-BUTYR BLD STRIP-SCNC: 0 MMOL/L (ref 0–0.5)
B-OH-BUTYR BLD STRIP-SCNC: 0.2 MMOL/L (ref 0–0.5)
B-OH-BUTYR BLD STRIP-SCNC: 0.2 MMOL/L (ref 0–0.5)
B-OH-BUTYR BLD STRIP-SCNC: 0.3 MMOL/L (ref 0–0.5)
B-OH-BUTYR BLD STRIP-SCNC: 2.5 MMOL/L (ref 0–0.5)
BACTERIA #/AREA URNS AUTO: ABNORMAL /HPF
BACTERIA #/AREA URNS AUTO: NORMAL /HPF
BACTERIA BLD CULT: NORMAL
BACTERIA BLD CULT: NORMAL
BASOPHILS # BLD AUTO: 0 K/UL (ref 0–0.2)
BASOPHILS # BLD AUTO: 0.01 K/UL (ref 0–0.2)
BASOPHILS # BLD AUTO: 0.02 K/UL (ref 0–0.2)
BASOPHILS # BLD AUTO: 0.03 K/UL (ref 0–0.2)
BASOPHILS # BLD AUTO: 0.04 K/UL (ref 0–0.2)
BASOPHILS # BLD AUTO: 0.06 K/UL (ref 0–0.2)
BASOPHILS NFR BLD: 0 % (ref 0–1.9)
BASOPHILS NFR BLD: 0.1 % (ref 0–1.9)
BASOPHILS NFR BLD: 0.2 % (ref 0–1.9)
BASOPHILS NFR BLD: 0.3 % (ref 0–1.9)
BASOPHILS NFR BLD: 0.3 % (ref 0–1.9)
BASOPHILS NFR BLD: 0.5 % (ref 0–1.9)
BASOPHILS NFR BLD: 0.5 % (ref 0–1.9)
BASOPHILS NFR BLD: 0.7 % (ref 0–1.9)
BASOPHILS NFR BLD: 1.1 % (ref 0–1.9)
BILIRUB SERPL-MCNC: 0.3 MG/DL (ref 0.1–1)
BILIRUB SERPL-MCNC: 0.4 MG/DL (ref 0.1–1)
BILIRUB SERPL-MCNC: 0.5 MG/DL (ref 0.1–1)
BILIRUB SERPL-MCNC: 0.6 MG/DL (ref 0.1–1)
BILIRUB SERPL-MCNC: 0.6 MG/DL (ref 0.1–1)
BILIRUB SERPL-MCNC: 0.7 MG/DL (ref 0.1–1)
BILIRUB SERPL-MCNC: 0.8 MG/DL (ref 0.1–1)
BILIRUB SERPL-MCNC: 0.8 MG/DL (ref 0.1–1)
BILIRUB SERPL-MCNC: 1.2 MG/DL (ref 0.1–1)
BILIRUB SERPL-MCNC: 1.3 MG/DL (ref 0.1–1)
BILIRUB UR QL STRIP: NEGATIVE
BLD GP AB SCN CELLS X3 SERPL QL: NORMAL
BUN SERPL-MCNC: 15 MG/DL (ref 8–23)
BUN SERPL-MCNC: 15 MG/DL (ref 8–23)
BUN SERPL-MCNC: 16 MG/DL (ref 8–23)
BUN SERPL-MCNC: 20 MG/DL (ref 8–23)
BUN SERPL-MCNC: 20 MG/DL (ref 8–23)
BUN SERPL-MCNC: 22 MG/DL (ref 8–23)
BUN SERPL-MCNC: 22 MG/DL (ref 8–23)
BUN SERPL-MCNC: 25 MG/DL (ref 6–30)
BUN SERPL-MCNC: 25 MG/DL (ref 8–23)
BUN SERPL-MCNC: 26 MG/DL (ref 8–23)
BUN SERPL-MCNC: 28 MG/DL (ref 8–23)
BUN SERPL-MCNC: 28 MG/DL (ref 8–23)
BUN SERPL-MCNC: 29 MG/DL (ref 8–23)
BUN SERPL-MCNC: 33 MG/DL (ref 8–23)
BUN SERPL-MCNC: 34 MG/DL (ref 8–23)
BUN SERPL-MCNC: 35 MG/DL (ref 8–23)
BUN SERPL-MCNC: 35 MG/DL (ref 8–23)
BUN SERPL-MCNC: 39 MG/DL (ref 8–23)
BUN SERPL-MCNC: 39 MG/DL (ref 8–23)
BUN SERPL-MCNC: 41 MG/DL (ref 8–23)
BUN SERPL-MCNC: 42 MG/DL (ref 8–23)
BUN SERPL-MCNC: 43 MG/DL (ref 8–23)
BUN SERPL-MCNC: 44 MG/DL (ref 8–23)
BUN SERPL-MCNC: 45 MG/DL (ref 8–23)
BUN SERPL-MCNC: 46 MG/DL (ref 8–23)
BUN SERPL-MCNC: 47 MG/DL (ref 6–30)
BUN SERPL-MCNC: 47 MG/DL (ref 8–23)
BUN SERPL-MCNC: 47 MG/DL (ref 8–23)
BUN SERPL-MCNC: 48 MG/DL (ref 6–30)
BUN SERPL-MCNC: 48 MG/DL (ref 8–23)
BUN SERPL-MCNC: 49 MG/DL (ref 8–23)
BUN SERPL-MCNC: 51 MG/DL (ref 8–23)
BUN SERPL-MCNC: 52 MG/DL (ref 6–30)
BUN SERPL-MCNC: 52 MG/DL (ref 8–23)
BUN SERPL-MCNC: 53 MG/DL (ref 8–23)
BUN SERPL-MCNC: 54 MG/DL (ref 8–23)
BUN SERPL-MCNC: 55 MG/DL (ref 6–30)
BUN SERPL-MCNC: 55 MG/DL (ref 8–23)
BUN SERPL-MCNC: 55 MG/DL (ref 8–23)
BUN SERPL-MCNC: 56 MG/DL (ref 8–23)
BUN SERPL-MCNC: 57 MG/DL (ref 8–23)
BUN SERPL-MCNC: 60 MG/DL (ref 8–23)
CALCIUM SERPL-MCNC: 10.2 MG/DL (ref 8.7–10.5)
CALCIUM SERPL-MCNC: 7.4 MG/DL (ref 8.7–10.5)
CALCIUM SERPL-MCNC: 7.5 MG/DL (ref 8.7–10.5)
CALCIUM SERPL-MCNC: 7.7 MG/DL (ref 8.7–10.5)
CALCIUM SERPL-MCNC: 7.8 MG/DL (ref 8.7–10.5)
CALCIUM SERPL-MCNC: 7.9 MG/DL (ref 8.7–10.5)
CALCIUM SERPL-MCNC: 8 MG/DL (ref 8.7–10.5)
CALCIUM SERPL-MCNC: 8.1 MG/DL (ref 8.7–10.5)
CALCIUM SERPL-MCNC: 8.2 MG/DL (ref 8.7–10.5)
CALCIUM SERPL-MCNC: 8.4 MG/DL (ref 8.7–10.5)
CALCIUM SERPL-MCNC: 8.5 MG/DL (ref 8.7–10.5)
CALCIUM SERPL-MCNC: 8.5 MG/DL (ref 8.7–10.5)
CALCIUM SERPL-MCNC: 8.6 MG/DL (ref 8.7–10.5)
CALCIUM SERPL-MCNC: 8.7 MG/DL (ref 8.7–10.5)
CALCIUM SERPL-MCNC: 8.7 MG/DL (ref 8.7–10.5)
CALCIUM SERPL-MCNC: 8.9 MG/DL (ref 8.7–10.5)
CALCIUM SERPL-MCNC: 9.1 MG/DL (ref 8.7–10.5)
CALCIUM SERPL-MCNC: 9.1 MG/DL (ref 8.7–10.5)
CALCIUM SERPL-MCNC: 9.2 MG/DL (ref 8.7–10.5)
CALCIUM SERPL-MCNC: 9.3 MG/DL (ref 8.7–10.5)
CALCIUM SERPL-MCNC: 9.4 MG/DL (ref 8.7–10.5)
CALCIUM SERPL-MCNC: 9.6 MG/DL (ref 8.7–10.5)
CHLORIDE SERPL-SCNC: 100 MMOL/L (ref 95–110)
CHLORIDE SERPL-SCNC: 101 MMOL/L (ref 95–110)
CHLORIDE SERPL-SCNC: 102 MMOL/L (ref 95–110)
CHLORIDE SERPL-SCNC: 103 MMOL/L (ref 95–110)
CHLORIDE SERPL-SCNC: 104 MMOL/L (ref 95–110)
CHLORIDE SERPL-SCNC: 105 MMOL/L (ref 95–110)
CHLORIDE SERPL-SCNC: 106 MMOL/L (ref 95–110)
CHLORIDE SERPL-SCNC: 107 MMOL/L (ref 95–110)
CHLORIDE SERPL-SCNC: 108 MMOL/L (ref 95–110)
CHLORIDE SERPL-SCNC: 109 MMOL/L (ref 95–110)
CHLORIDE SERPL-SCNC: 110 MMOL/L (ref 95–110)
CHLORIDE SERPL-SCNC: 111 MMOL/L (ref 95–110)
CHLORIDE SERPL-SCNC: 112 MMOL/L (ref 95–110)
CHLORIDE SERPL-SCNC: 112 MMOL/L (ref 95–110)
CHLORIDE SERPL-SCNC: 113 MMOL/L (ref 95–110)
CHLORIDE SERPL-SCNC: 97 MMOL/L (ref 95–110)
CHLORIDE SERPL-SCNC: 98 MMOL/L (ref 95–110)
CHLORIDE SERPL-SCNC: 99 MMOL/L (ref 95–110)
CHLORIDE SERPL-SCNC: 99 MMOL/L (ref 95–110)
CHOLEST SERPL-MCNC: 116 MG/DL (ref 120–199)
CHOLEST/HDLC SERPL: 2.3 {RATIO} (ref 2–5)
CK SERPL-CCNC: 53 U/L (ref 20–180)
CLARITY UR REFRACT.AUTO: CLEAR
CO2 SERPL-SCNC: 14 MMOL/L (ref 23–29)
CO2 SERPL-SCNC: 15 MMOL/L (ref 23–29)
CO2 SERPL-SCNC: 16 MMOL/L (ref 23–29)
CO2 SERPL-SCNC: 17 MMOL/L (ref 23–29)
CO2 SERPL-SCNC: 18 MMOL/L (ref 23–29)
CO2 SERPL-SCNC: 19 MMOL/L (ref 23–29)
CO2 SERPL-SCNC: 20 MMOL/L (ref 23–29)
CO2 SERPL-SCNC: 21 MMOL/L (ref 23–29)
CO2 SERPL-SCNC: 22 MMOL/L (ref 23–29)
CO2 SERPL-SCNC: 23 MMOL/L (ref 23–29)
CO2 SERPL-SCNC: 24 MMOL/L (ref 23–29)
CO2 SERPL-SCNC: 25 MMOL/L (ref 23–29)
CO2 SERPL-SCNC: 26 MMOL/L (ref 23–29)
CO2 SERPL-SCNC: 28 MMOL/L (ref 23–29)
COLOR UR AUTO: ABNORMAL
COLOR UR AUTO: ABNORMAL
COLOR UR AUTO: COLORLESS
COLOR UR AUTO: NORMAL
COLOR UR AUTO: YELLOW
CREAT SERPL-MCNC: 1.9 MG/DL (ref 0.5–1.4)
CREAT SERPL-MCNC: 2 MG/DL (ref 0.5–1.4)
CREAT SERPL-MCNC: 2.1 MG/DL (ref 0.5–1.4)
CREAT SERPL-MCNC: 2.2 MG/DL (ref 0.5–1.4)
CREAT SERPL-MCNC: 2.2 MG/DL (ref 0.5–1.4)
CREAT SERPL-MCNC: 2.3 MG/DL (ref 0.5–1.4)
CREAT SERPL-MCNC: 2.3 MG/DL (ref 0.5–1.4)
CREAT SERPL-MCNC: 2.4 MG/DL (ref 0.5–1.4)
CREAT SERPL-MCNC: 2.4 MG/DL (ref 0.5–1.4)
CREAT SERPL-MCNC: 2.5 MG/DL (ref 0.5–1.4)
CREAT SERPL-MCNC: 2.5 MG/DL (ref 0.5–1.4)
CREAT SERPL-MCNC: 2.6 MG/DL (ref 0.5–1.4)
CREAT SERPL-MCNC: 2.7 MG/DL (ref 0.5–1.4)
CREAT SERPL-MCNC: 2.8 MG/DL (ref 0.5–1.4)
CREAT SERPL-MCNC: 2.9 MG/DL (ref 0.5–1.4)
CREAT SERPL-MCNC: 2.9 MG/DL (ref 0.5–1.4)
CREAT SERPL-MCNC: 3 MG/DL (ref 0.5–1.4)
CREAT SERPL-MCNC: 3 MG/DL (ref 0.5–1.4)
CREAT SERPL-MCNC: 3.1 MG/DL (ref 0.5–1.4)
CREAT SERPL-MCNC: 3.3 MG/DL (ref 0.5–1.4)
CREAT SERPL-MCNC: 3.3 MG/DL (ref 0.5–1.4)
CREAT SERPL-MCNC: 3.4 MG/DL (ref 0.5–1.4)
CREAT SERPL-MCNC: 3.5 MG/DL (ref 0.5–1.4)
CTP QC/QA: YES
DELSYS: ABNORMAL
DELSYS: ABNORMAL
DIFFERENTIAL METHOD: ABNORMAL
EOSINOPHIL # BLD AUTO: 0 K/UL (ref 0–0.5)
EOSINOPHIL # BLD AUTO: 0.1 K/UL (ref 0–0.5)
EOSINOPHIL # BLD AUTO: 0.2 K/UL (ref 0–0.5)
EOSINOPHIL NFR BLD: 0 % (ref 0–8)
EOSINOPHIL NFR BLD: 0.1 % (ref 0–8)
EOSINOPHIL NFR BLD: 0.2 % (ref 0–8)
EOSINOPHIL NFR BLD: 0.3 % (ref 0–8)
EOSINOPHIL NFR BLD: 0.7 % (ref 0–8)
EOSINOPHIL NFR BLD: 0.9 % (ref 0–8)
EOSINOPHIL NFR BLD: 0.9 % (ref 0–8)
EOSINOPHIL NFR BLD: 1.1 % (ref 0–8)
EOSINOPHIL NFR BLD: 1.6 % (ref 0–8)
EOSINOPHIL NFR BLD: 2 % (ref 0–8)
EOSINOPHIL NFR BLD: 2.2 % (ref 0–8)
EOSINOPHIL NFR BLD: 3.2 % (ref 0–8)
ERYTHROCYTE [DISTWIDTH] IN BLOOD BY AUTOMATED COUNT: 12.7 % (ref 11.5–14.5)
ERYTHROCYTE [DISTWIDTH] IN BLOOD BY AUTOMATED COUNT: 12.7 % (ref 11.5–14.5)
ERYTHROCYTE [DISTWIDTH] IN BLOOD BY AUTOMATED COUNT: 12.9 % (ref 11.5–14.5)
ERYTHROCYTE [DISTWIDTH] IN BLOOD BY AUTOMATED COUNT: 13 % (ref 11.5–14.5)
ERYTHROCYTE [DISTWIDTH] IN BLOOD BY AUTOMATED COUNT: 13 % (ref 11.5–14.5)
ERYTHROCYTE [DISTWIDTH] IN BLOOD BY AUTOMATED COUNT: 13.1 % (ref 11.5–14.5)
ERYTHROCYTE [DISTWIDTH] IN BLOOD BY AUTOMATED COUNT: 13.2 % (ref 11.5–14.5)
ERYTHROCYTE [DISTWIDTH] IN BLOOD BY AUTOMATED COUNT: 13.3 % (ref 11.5–14.5)
ERYTHROCYTE [DISTWIDTH] IN BLOOD BY AUTOMATED COUNT: 13.4 % (ref 11.5–14.5)
ERYTHROCYTE [DISTWIDTH] IN BLOOD BY AUTOMATED COUNT: 13.4 % (ref 11.5–14.5)
ERYTHROCYTE [DISTWIDTH] IN BLOOD BY AUTOMATED COUNT: 13.5 % (ref 11.5–14.5)
ERYTHROCYTE [DISTWIDTH] IN BLOOD BY AUTOMATED COUNT: 13.6 % (ref 11.5–14.5)
ERYTHROCYTE [DISTWIDTH] IN BLOOD BY AUTOMATED COUNT: 13.6 % (ref 11.5–14.5)
ERYTHROCYTE [DISTWIDTH] IN BLOOD BY AUTOMATED COUNT: 13.9 % (ref 11.5–14.5)
ERYTHROCYTE [DISTWIDTH] IN BLOOD BY AUTOMATED COUNT: 14.3 % (ref 11.5–14.5)
ERYTHROCYTE [DISTWIDTH] IN BLOOD BY AUTOMATED COUNT: 14.4 % (ref 11.5–14.5)
ERYTHROCYTE [DISTWIDTH] IN BLOOD BY AUTOMATED COUNT: 14.5 % (ref 11.5–14.5)
ERYTHROCYTE [DISTWIDTH] IN BLOOD BY AUTOMATED COUNT: 14.6 % (ref 11.5–14.5)
ERYTHROCYTE [DISTWIDTH] IN BLOOD BY AUTOMATED COUNT: 14.9 % (ref 11.5–14.5)
EST. GFR  (AFRICAN AMERICAN): 14.1 ML/MIN/1.73 M^2
EST. GFR  (AFRICAN AMERICAN): 14.6 ML/MIN/1.73 M^2
EST. GFR  (AFRICAN AMERICAN): 15.1 ML/MIN/1.73 M^2
EST. GFR  (AFRICAN AMERICAN): 16 ML/MIN/1.73 M^2
EST. GFR  (AFRICAN AMERICAN): 16.3 ML/MIN/1.73 M^2
EST. GFR  (AFRICAN AMERICAN): 17 ML/MIN/1.73 M^2
EST. GFR  (AFRICAN AMERICAN): 17 ML/MIN/1.73 M^2
EST. GFR  (AFRICAN AMERICAN): 17.7 ML/MIN/1.73 M^2
EST. GFR  (AFRICAN AMERICAN): 17.7 ML/MIN/1.73 M^2
EST. GFR  (AFRICAN AMERICAN): 18 ML/MIN/1.73 M^2
EST. GFR  (AFRICAN AMERICAN): 18 ML/MIN/1.73 M^2
EST. GFR  (AFRICAN AMERICAN): 18.5 ML/MIN/1.73 M^2
EST. GFR  (AFRICAN AMERICAN): 19 ML/MIN/1.73 M^2
EST. GFR  (AFRICAN AMERICAN): 19.3 ML/MIN/1.73 M^2
EST. GFR  (AFRICAN AMERICAN): 20 ML/MIN/1.73 M^2
EST. GFR  (AFRICAN AMERICAN): 20.2 ML/MIN/1.73 M^2
EST. GFR  (AFRICAN AMERICAN): 21.2 ML/MIN/1.73 M^2
EST. GFR  (AFRICAN AMERICAN): 21.2 ML/MIN/1.73 M^2
EST. GFR  (AFRICAN AMERICAN): 22.3 ML/MIN/1.73 M^2
EST. GFR  (AFRICAN AMERICAN): 22.3 ML/MIN/1.73 M^2
EST. GFR  (AFRICAN AMERICAN): 23.4 ML/MIN/1.73 M^2
EST. GFR  (AFRICAN AMERICAN): 23.4 ML/MIN/1.73 M^2
EST. GFR  (AFRICAN AMERICAN): 24.7 ML/MIN/1.73 M^2
EST. GFR  (AFRICAN AMERICAN): 24.7 ML/MIN/1.73 M^2
EST. GFR  (AFRICAN AMERICAN): 26.2 ML/MIN/1.73 M^2
EST. GFR  (AFRICAN AMERICAN): 27.7 ML/MIN/1.73 M^2
EST. GFR  (AFRICAN AMERICAN): 29.5 ML/MIN/1.73 M^2
EST. GFR  (NON AFRICAN AMERICAN): 12.2 ML/MIN/1.73 M^2
EST. GFR  (NON AFRICAN AMERICAN): 12.7 ML/MIN/1.73 M^2
EST. GFR  (NON AFRICAN AMERICAN): 13.1 ML/MIN/1.73 M^2
EST. GFR  (NON AFRICAN AMERICAN): 14 ML/MIN/1.73 M^2
EST. GFR  (NON AFRICAN AMERICAN): 14.2 ML/MIN/1.73 M^2
EST. GFR  (NON AFRICAN AMERICAN): 14.7 ML/MIN/1.73 M^2
EST. GFR  (NON AFRICAN AMERICAN): 14.7 ML/MIN/1.73 M^2
EST. GFR  (NON AFRICAN AMERICAN): 15.4 ML/MIN/1.73 M^2
EST. GFR  (NON AFRICAN AMERICAN): 15.4 ML/MIN/1.73 M^2
EST. GFR  (NON AFRICAN AMERICAN): 16 ML/MIN/1.73 M^2
EST. GFR  (NON AFRICAN AMERICAN): 16.7 ML/MIN/1.73 M^2
EST. GFR  (NON AFRICAN AMERICAN): 17 ML/MIN/1.73 M^2
EST. GFR  (NON AFRICAN AMERICAN): 17.5 ML/MIN/1.73 M^2
EST. GFR  (NON AFRICAN AMERICAN): 18 ML/MIN/1.73 M^2
EST. GFR  (NON AFRICAN AMERICAN): 18.4 ML/MIN/1.73 M^2
EST. GFR  (NON AFRICAN AMERICAN): 18.4 ML/MIN/1.73 M^2
EST. GFR  (NON AFRICAN AMERICAN): 19.3 ML/MIN/1.73 M^2
EST. GFR  (NON AFRICAN AMERICAN): 19.3 ML/MIN/1.73 M^2
EST. GFR  (NON AFRICAN AMERICAN): 20.3 ML/MIN/1.73 M^2
EST. GFR  (NON AFRICAN AMERICAN): 20.3 ML/MIN/1.73 M^2
EST. GFR  (NON AFRICAN AMERICAN): 21.4 ML/MIN/1.73 M^2
EST. GFR  (NON AFRICAN AMERICAN): 21.4 ML/MIN/1.73 M^2
EST. GFR  (NON AFRICAN AMERICAN): 22.7 ML/MIN/1.73 M^2
EST. GFR  (NON AFRICAN AMERICAN): 24.1 ML/MIN/1.73 M^2
EST. GFR  (NON AFRICAN AMERICAN): 25.6 ML/MIN/1.73 M^2
ESTIMATED AVG GLUCOSE: 117 MG/DL (ref 68–131)
FINAL PATHOLOGIC DIAGNOSIS: NORMAL
GLUCOSE SERPL-MCNC: 104 MG/DL (ref 70–110)
GLUCOSE SERPL-MCNC: 110 MG/DL (ref 70–110)
GLUCOSE SERPL-MCNC: 110 MG/DL (ref 70–110)
GLUCOSE SERPL-MCNC: 115 MG/DL (ref 70–110)
GLUCOSE SERPL-MCNC: 121 MG/DL (ref 70–110)
GLUCOSE SERPL-MCNC: 122 MG/DL (ref 70–110)
GLUCOSE SERPL-MCNC: 126 MG/DL (ref 70–110)
GLUCOSE SERPL-MCNC: 128 MG/DL (ref 70–110)
GLUCOSE SERPL-MCNC: 129 MG/DL (ref 70–110)
GLUCOSE SERPL-MCNC: 130 MG/DL (ref 70–110)
GLUCOSE SERPL-MCNC: 134 MG/DL (ref 70–110)
GLUCOSE SERPL-MCNC: 146 MG/DL (ref 70–110)
GLUCOSE SERPL-MCNC: 153 MG/DL (ref 70–110)
GLUCOSE SERPL-MCNC: 166 MG/DL (ref 70–110)
GLUCOSE SERPL-MCNC: 186 MG/DL (ref 70–110)
GLUCOSE SERPL-MCNC: 194 MG/DL (ref 70–110)
GLUCOSE SERPL-MCNC: 200 MG/DL (ref 70–110)
GLUCOSE SERPL-MCNC: 202 MG/DL (ref 70–110)
GLUCOSE SERPL-MCNC: 213 MG/DL (ref 70–110)
GLUCOSE SERPL-MCNC: 217 MG/DL (ref 70–110)
GLUCOSE SERPL-MCNC: 230 MG/DL (ref 70–110)
GLUCOSE SERPL-MCNC: 232 MG/DL (ref 70–110)
GLUCOSE SERPL-MCNC: 238 MG/DL (ref 70–110)
GLUCOSE SERPL-MCNC: 238 MG/DL (ref 70–110)
GLUCOSE SERPL-MCNC: 243 MG/DL (ref 70–110)
GLUCOSE SERPL-MCNC: 251 MG/DL (ref 70–110)
GLUCOSE SERPL-MCNC: 268 MG/DL (ref 70–110)
GLUCOSE SERPL-MCNC: 281 MG/DL (ref 70–110)
GLUCOSE SERPL-MCNC: 295 MG/DL (ref 70–110)
GLUCOSE SERPL-MCNC: 297 MG/DL (ref 70–110)
GLUCOSE SERPL-MCNC: 300 MG/DL (ref 70–110)
GLUCOSE SERPL-MCNC: 301 MG/DL (ref 70–110)
GLUCOSE SERPL-MCNC: 311 MG/DL (ref 70–110)
GLUCOSE SERPL-MCNC: 314 MG/DL (ref 70–110)
GLUCOSE SERPL-MCNC: 328 MG/DL (ref 70–110)
GLUCOSE SERPL-MCNC: 328 MG/DL (ref 70–110)
GLUCOSE SERPL-MCNC: 339 MG/DL (ref 70–110)
GLUCOSE SERPL-MCNC: 355 MG/DL (ref 70–110)
GLUCOSE SERPL-MCNC: 380 MG/DL (ref 70–110)
GLUCOSE SERPL-MCNC: 391 MG/DL (ref 70–110)
GLUCOSE SERPL-MCNC: 451 MG/DL (ref 70–110)
GLUCOSE SERPL-MCNC: 466 MG/DL (ref 70–110)
GLUCOSE SERPL-MCNC: 496 MG/DL (ref 70–110)
GLUCOSE SERPL-MCNC: 496 MG/DL (ref 70–110)
GLUCOSE SERPL-MCNC: 497 MG/DL (ref 70–110)
GLUCOSE SERPL-MCNC: 577 MG/DL (ref 70–110)
GLUCOSE SERPL-MCNC: 581 MG/DL (ref 70–110)
GLUCOSE SERPL-MCNC: 601 MG/DL (ref 70–110)
GLUCOSE SERPL-MCNC: 616 MG/DL (ref 70–110)
GLUCOSE SERPL-MCNC: 86 MG/DL (ref 70–110)
GLUCOSE SERPL-MCNC: 881 MG/DL (ref 70–110)
GLUCOSE SERPL-MCNC: 91 MG/DL (ref 70–110)
GLUCOSE SERPL-MCNC: 92 MG/DL (ref 70–110)
GLUCOSE SERPL-MCNC: 95 MG/DL (ref 70–110)
GLUCOSE SERPL-MCNC: >500 MG/DL (ref 70–110)
GLUCOSE SERPL-MCNC: >500 MG/DL (ref 70–110)
GLUCOSE SERPL-MCNC: >700 MG/DL (ref 70–110)
GLUCOSE SERPL-MCNC: >700 MG/DL (ref 70–110)
GLUCOSE UR QL STRIP: ABNORMAL
GLUCOSE UR QL STRIP: NEGATIVE
GROSS: NORMAL
HBA1C MFR BLD: 5.7 % (ref 4–5.6)
HCO3 UR-SCNC: 18.9 MMOL/L (ref 24–28)
HCO3 UR-SCNC: 19.6 MMOL/L (ref 24–28)
HCO3 UR-SCNC: 20.3 MMOL/L (ref 24–28)
HCT VFR BLD AUTO: 24.4 % (ref 37–48.5)
HCT VFR BLD AUTO: 25.3 % (ref 37–48.5)
HCT VFR BLD AUTO: 25.4 % (ref 37–48.5)
HCT VFR BLD AUTO: 25.5 % (ref 37–48.5)
HCT VFR BLD AUTO: 25.6 % (ref 37–48.5)
HCT VFR BLD AUTO: 25.6 % (ref 37–48.5)
HCT VFR BLD AUTO: 25.8 % (ref 37–48.5)
HCT VFR BLD AUTO: 26 % (ref 37–48.5)
HCT VFR BLD AUTO: 26.1 % (ref 37–48.5)
HCT VFR BLD AUTO: 26.3 % (ref 37–48.5)
HCT VFR BLD AUTO: 26.5 % (ref 37–48.5)
HCT VFR BLD AUTO: 26.6 % (ref 37–48.5)
HCT VFR BLD AUTO: 26.7 % (ref 37–48.5)
HCT VFR BLD AUTO: 26.7 % (ref 37–48.5)
HCT VFR BLD AUTO: 26.8 % (ref 37–48.5)
HCT VFR BLD AUTO: 27.3 % (ref 37–48.5)
HCT VFR BLD AUTO: 27.4 % (ref 37–48.5)
HCT VFR BLD AUTO: 28.2 % (ref 37–48.5)
HCT VFR BLD AUTO: 29.9 % (ref 37–48.5)
HCT VFR BLD AUTO: 30.5 % (ref 37–48.5)
HCT VFR BLD AUTO: 30.5 % (ref 37–48.5)
HCT VFR BLD AUTO: 30.8 % (ref 37–48.5)
HCT VFR BLD AUTO: 31.5 % (ref 37–48.5)
HCT VFR BLD AUTO: 31.6 % (ref 37–48.5)
HCT VFR BLD AUTO: 31.6 % (ref 37–48.5)
HCT VFR BLD AUTO: 32.3 % (ref 37–48.5)
HCT VFR BLD AUTO: 33.2 % (ref 37–48.5)
HCT VFR BLD AUTO: 33.6 % (ref 37–48.5)
HCT VFR BLD AUTO: 34 % (ref 37–48.5)
HCT VFR BLD AUTO: 35 % (ref 37–48.5)
HCT VFR BLD AUTO: 35.5 % (ref 37–48.5)
HCT VFR BLD CALC: 24 %PCV (ref 36–54)
HCT VFR BLD CALC: 28 %PCV (ref 36–54)
HCT VFR BLD CALC: 29 %PCV (ref 36–54)
HCT VFR BLD CALC: 32 %PCV (ref 36–54)
HCT VFR BLD CALC: 34 %PCV (ref 36–54)
HCV AB SERPL QL IA: NEGATIVE
HDLC SERPL-MCNC: 50 MG/DL (ref 40–75)
HDLC SERPL: 43.1 % (ref 20–50)
HGB BLD-MCNC: 10 G/DL (ref 12–16)
HGB BLD-MCNC: 10.3 G/DL (ref 12–16)
HGB BLD-MCNC: 10.4 G/DL (ref 12–16)
HGB BLD-MCNC: 10.4 G/DL (ref 12–16)
HGB BLD-MCNC: 10.6 G/DL (ref 12–16)
HGB BLD-MCNC: 10.6 G/DL (ref 12–16)
HGB BLD-MCNC: 10.8 G/DL (ref 12–16)
HGB BLD-MCNC: 10.9 G/DL (ref 12–16)
HGB BLD-MCNC: 11 G/DL (ref 12–16)
HGB BLD-MCNC: 11.1 G/DL (ref 12–16)
HGB BLD-MCNC: 11.5 G/DL (ref 12–16)
HGB BLD-MCNC: 8.3 G/DL (ref 12–16)
HGB BLD-MCNC: 8.4 G/DL (ref 12–16)
HGB BLD-MCNC: 8.5 G/DL (ref 12–16)
HGB BLD-MCNC: 8.6 G/DL (ref 12–16)
HGB BLD-MCNC: 8.6 G/DL (ref 12–16)
HGB BLD-MCNC: 8.7 G/DL (ref 12–16)
HGB BLD-MCNC: 8.9 G/DL (ref 12–16)
HGB BLD-MCNC: 8.9 G/DL (ref 12–16)
HGB BLD-MCNC: 9 G/DL (ref 12–16)
HGB BLD-MCNC: 9.1 G/DL (ref 12–16)
HGB BLD-MCNC: 9.1 G/DL (ref 12–16)
HGB BLD-MCNC: 9.3 G/DL (ref 12–16)
HGB BLD-MCNC: 9.3 G/DL (ref 12–16)
HGB BLD-MCNC: 9.5 G/DL (ref 12–16)
HGB BLD-MCNC: 9.7 G/DL (ref 12–16)
HGB UR QL STRIP: ABNORMAL
HGB UR QL STRIP: NEGATIVE
HYALINE CASTS UR QL AUTO: 0 /LPF
HYALINE CASTS UR QL AUTO: 0 /LPF
HYALINE CASTS UR QL AUTO: 1 /LPF
HYALINE CASTS UR QL AUTO: 1 /LPF
IMM GRANULOCYTES # BLD AUTO: 0.01 K/UL (ref 0–0.04)
IMM GRANULOCYTES # BLD AUTO: 0.02 K/UL (ref 0–0.04)
IMM GRANULOCYTES # BLD AUTO: 0.03 K/UL (ref 0–0.04)
IMM GRANULOCYTES # BLD AUTO: 0.03 K/UL (ref 0–0.04)
IMM GRANULOCYTES # BLD AUTO: 0.04 K/UL (ref 0–0.04)
IMM GRANULOCYTES # BLD AUTO: 0.04 K/UL (ref 0–0.04)
IMM GRANULOCYTES # BLD AUTO: 0.05 K/UL (ref 0–0.04)
IMM GRANULOCYTES # BLD AUTO: 0.06 K/UL (ref 0–0.04)
IMM GRANULOCYTES # BLD AUTO: 0.07 K/UL (ref 0–0.04)
IMM GRANULOCYTES # BLD AUTO: 0.08 K/UL (ref 0–0.04)
IMM GRANULOCYTES # BLD AUTO: 0.08 K/UL (ref 0–0.04)
IMM GRANULOCYTES # BLD AUTO: 0.1 K/UL (ref 0–0.04)
IMM GRANULOCYTES # BLD AUTO: 0.12 K/UL (ref 0–0.04)
IMM GRANULOCYTES # BLD AUTO: 0.14 K/UL (ref 0–0.04)
IMM GRANULOCYTES # BLD AUTO: 0.17 K/UL (ref 0–0.04)
IMM GRANULOCYTES # BLD AUTO: 0.22 K/UL (ref 0–0.04)
IMM GRANULOCYTES # BLD AUTO: 0.22 K/UL (ref 0–0.04)
IMM GRANULOCYTES NFR BLD AUTO: 0.2 % (ref 0–0.5)
IMM GRANULOCYTES NFR BLD AUTO: 0.3 % (ref 0–0.5)
IMM GRANULOCYTES NFR BLD AUTO: 0.4 % (ref 0–0.5)
IMM GRANULOCYTES NFR BLD AUTO: 0.5 % (ref 0–0.5)
IMM GRANULOCYTES NFR BLD AUTO: 0.5 % (ref 0–0.5)
IMM GRANULOCYTES NFR BLD AUTO: 0.6 % (ref 0–0.5)
IMM GRANULOCYTES NFR BLD AUTO: 0.7 % (ref 0–0.5)
IMM GRANULOCYTES NFR BLD AUTO: 0.9 % (ref 0–0.5)
IMM GRANULOCYTES NFR BLD AUTO: 1 % (ref 0–0.5)
IMM GRANULOCYTES NFR BLD AUTO: 1.2 % (ref 0–0.5)
IMM GRANULOCYTES NFR BLD AUTO: 1.6 % (ref 0–0.5)
IMM GRANULOCYTES NFR BLD AUTO: 2.6 % (ref 0–0.5)
IMM GRANULOCYTES NFR BLD AUTO: 3.3 % (ref 0–0.5)
INR PPP: 1 (ref 0.8–1.2)
KETONES UR QL STRIP: NEGATIVE
LACTATE SERPL-SCNC: 0.9 MMOL/L (ref 0.5–2.2)
LACTATE SERPL-SCNC: 1.2 MMOL/L (ref 0.5–2.2)
LACTATE SERPL-SCNC: 1.9 MMOL/L (ref 0.5–2.2)
LDLC SERPL CALC-MCNC: 54 MG/DL (ref 63–159)
LEUKOCYTE ESTERASE UR QL STRIP: ABNORMAL
LEUKOCYTE ESTERASE UR QL STRIP: NEGATIVE
LIPASE SERPL-CCNC: 100 U/L (ref 4–60)
LIPASE SERPL-CCNC: 104 U/L (ref 4–60)
LIPASE SERPL-CCNC: 128 U/L (ref 4–60)
LIPASE SERPL-CCNC: 138 U/L (ref 4–60)
LIPASE SERPL-CCNC: 203 U/L (ref 4–60)
LIPASE SERPL-CCNC: 204 U/L (ref 4–60)
LIPASE SERPL-CCNC: 294 U/L (ref 4–60)
LIPASE SERPL-CCNC: 349 U/L (ref 4–60)
LIPASE SERPL-CCNC: 49 U/L (ref 4–60)
LIPASE SERPL-CCNC: >1000 U/L (ref 4–60)
LYMPHOCYTES # BLD AUTO: 0.3 K/UL (ref 1–4.8)
LYMPHOCYTES # BLD AUTO: 0.4 K/UL (ref 1–4.8)
LYMPHOCYTES # BLD AUTO: 0.4 K/UL (ref 1–4.8)
LYMPHOCYTES # BLD AUTO: 0.5 K/UL (ref 1–4.8)
LYMPHOCYTES # BLD AUTO: 0.6 K/UL (ref 1–4.8)
LYMPHOCYTES # BLD AUTO: 0.7 K/UL (ref 1–4.8)
LYMPHOCYTES # BLD AUTO: 0.7 K/UL (ref 1–4.8)
LYMPHOCYTES # BLD AUTO: 0.8 K/UL (ref 1–4.8)
LYMPHOCYTES # BLD AUTO: 0.9 K/UL (ref 1–4.8)
LYMPHOCYTES # BLD AUTO: 1 K/UL (ref 1–4.8)
LYMPHOCYTES # BLD AUTO: 1.1 K/UL (ref 1–4.8)
LYMPHOCYTES # BLD AUTO: 1.2 K/UL (ref 1–4.8)
LYMPHOCYTES # BLD AUTO: 1.3 K/UL (ref 1–4.8)
LYMPHOCYTES # BLD AUTO: 1.4 K/UL (ref 1–4.8)
LYMPHOCYTES # BLD AUTO: 1.4 K/UL (ref 1–4.8)
LYMPHOCYTES NFR BLD: 10.4 % (ref 18–48)
LYMPHOCYTES NFR BLD: 10.4 % (ref 18–48)
LYMPHOCYTES NFR BLD: 10.7 % (ref 18–48)
LYMPHOCYTES NFR BLD: 11.5 % (ref 18–48)
LYMPHOCYTES NFR BLD: 11.8 % (ref 18–48)
LYMPHOCYTES NFR BLD: 12.8 % (ref 18–48)
LYMPHOCYTES NFR BLD: 13.2 % (ref 18–48)
LYMPHOCYTES NFR BLD: 15.6 % (ref 18–48)
LYMPHOCYTES NFR BLD: 17.2 % (ref 18–48)
LYMPHOCYTES NFR BLD: 18.1 % (ref 18–48)
LYMPHOCYTES NFR BLD: 19.5 % (ref 18–48)
LYMPHOCYTES NFR BLD: 21.9 % (ref 18–48)
LYMPHOCYTES NFR BLD: 24.7 % (ref 18–48)
LYMPHOCYTES NFR BLD: 26.3 % (ref 18–48)
LYMPHOCYTES NFR BLD: 3.5 % (ref 18–48)
LYMPHOCYTES NFR BLD: 4.7 % (ref 18–48)
LYMPHOCYTES NFR BLD: 5.1 % (ref 18–48)
LYMPHOCYTES NFR BLD: 5.5 % (ref 18–48)
LYMPHOCYTES NFR BLD: 5.9 % (ref 18–48)
LYMPHOCYTES NFR BLD: 6.6 % (ref 18–48)
LYMPHOCYTES NFR BLD: 8.5 % (ref 18–48)
LYMPHOCYTES NFR BLD: 8.6 % (ref 18–48)
LYMPHOCYTES NFR BLD: 8.9 % (ref 18–48)
LYMPHOCYTES NFR BLD: 9.5 % (ref 18–48)
Lab: NORMAL
MAGNESIUM SERPL-MCNC: 1.5 MG/DL (ref 1.6–2.6)
MAGNESIUM SERPL-MCNC: 1.5 MG/DL (ref 1.6–2.6)
MAGNESIUM SERPL-MCNC: 1.6 MG/DL (ref 1.6–2.6)
MAGNESIUM SERPL-MCNC: 1.7 MG/DL (ref 1.6–2.6)
MAGNESIUM SERPL-MCNC: 1.8 MG/DL (ref 1.6–2.6)
MAGNESIUM SERPL-MCNC: 2 MG/DL (ref 1.6–2.6)
MAGNESIUM SERPL-MCNC: 2.1 MG/DL (ref 1.6–2.6)
MCH RBC QN AUTO: 29.3 PG (ref 27–31)
MCH RBC QN AUTO: 29.7 PG (ref 27–31)
MCH RBC QN AUTO: 29.8 PG (ref 27–31)
MCH RBC QN AUTO: 29.9 PG (ref 27–31)
MCH RBC QN AUTO: 30 PG (ref 27–31)
MCH RBC QN AUTO: 30 PG (ref 27–31)
MCH RBC QN AUTO: 30.1 PG (ref 27–31)
MCH RBC QN AUTO: 30.2 PG (ref 27–31)
MCH RBC QN AUTO: 30.2 PG (ref 27–31)
MCH RBC QN AUTO: 30.3 PG (ref 27–31)
MCH RBC QN AUTO: 30.4 PG (ref 27–31)
MCH RBC QN AUTO: 30.5 PG (ref 27–31)
MCH RBC QN AUTO: 30.6 PG (ref 27–31)
MCH RBC QN AUTO: 30.7 PG (ref 27–31)
MCH RBC QN AUTO: 30.8 PG (ref 27–31)
MCH RBC QN AUTO: 31 PG (ref 27–31)
MCH RBC QN AUTO: 31 PG (ref 27–31)
MCH RBC QN AUTO: 31.1 PG (ref 27–31)
MCH RBC QN AUTO: 31.2 PG (ref 27–31)
MCH RBC QN AUTO: 31.4 PG (ref 27–31)
MCHC RBC AUTO-ENTMCNC: 31.1 G/DL (ref 32–36)
MCHC RBC AUTO-ENTMCNC: 31.1 G/DL (ref 32–36)
MCHC RBC AUTO-ENTMCNC: 31.3 G/DL (ref 32–36)
MCHC RBC AUTO-ENTMCNC: 31.5 G/DL (ref 32–36)
MCHC RBC AUTO-ENTMCNC: 31.6 G/DL (ref 32–36)
MCHC RBC AUTO-ENTMCNC: 31.8 G/DL (ref 32–36)
MCHC RBC AUTO-ENTMCNC: 32.6 G/DL (ref 32–36)
MCHC RBC AUTO-ENTMCNC: 32.7 G/DL (ref 32–36)
MCHC RBC AUTO-ENTMCNC: 32.7 G/DL (ref 32–36)
MCHC RBC AUTO-ENTMCNC: 32.8 G/DL (ref 32–36)
MCHC RBC AUTO-ENTMCNC: 32.9 G/DL (ref 32–36)
MCHC RBC AUTO-ENTMCNC: 33.1 G/DL (ref 32–36)
MCHC RBC AUTO-ENTMCNC: 33.1 G/DL (ref 32–36)
MCHC RBC AUTO-ENTMCNC: 33.2 G/DL (ref 32–36)
MCHC RBC AUTO-ENTMCNC: 33.2 G/DL (ref 32–36)
MCHC RBC AUTO-ENTMCNC: 33.3 G/DL (ref 32–36)
MCHC RBC AUTO-ENTMCNC: 33.5 G/DL (ref 32–36)
MCHC RBC AUTO-ENTMCNC: 33.5 G/DL (ref 32–36)
MCHC RBC AUTO-ENTMCNC: 33.6 G/DL (ref 32–36)
MCHC RBC AUTO-ENTMCNC: 33.7 G/DL (ref 32–36)
MCHC RBC AUTO-ENTMCNC: 33.9 G/DL (ref 32–36)
MCHC RBC AUTO-ENTMCNC: 34 G/DL (ref 32–36)
MCHC RBC AUTO-ENTMCNC: 34.1 G/DL (ref 32–36)
MCHC RBC AUTO-ENTMCNC: 34.8 G/DL (ref 32–36)
MCHC RBC AUTO-ENTMCNC: 34.8 G/DL (ref 32–36)
MCV RBC AUTO: 88 FL (ref 82–98)
MCV RBC AUTO: 88 FL (ref 82–98)
MCV RBC AUTO: 89 FL (ref 82–98)
MCV RBC AUTO: 90 FL (ref 82–98)
MCV RBC AUTO: 90 FL (ref 82–98)
MCV RBC AUTO: 91 FL (ref 82–98)
MCV RBC AUTO: 92 FL (ref 82–98)
MCV RBC AUTO: 93 FL (ref 82–98)
MCV RBC AUTO: 94 FL (ref 82–98)
MCV RBC AUTO: 95 FL (ref 82–98)
MCV RBC AUTO: 96 FL (ref 82–98)
MCV RBC AUTO: 97 FL (ref 82–98)
MCV RBC AUTO: 98 FL (ref 82–98)
MCV RBC AUTO: 98 FL (ref 82–98)
MICROSCOPIC COMMENT: ABNORMAL
MICROSCOPIC COMMENT: NORMAL
MONOCYTES # BLD AUTO: 0 K/UL (ref 0.3–1)
MONOCYTES # BLD AUTO: 0.1 K/UL (ref 0.3–1)
MONOCYTES # BLD AUTO: 0.1 K/UL (ref 0.3–1)
MONOCYTES # BLD AUTO: 0.2 K/UL (ref 0.3–1)
MONOCYTES # BLD AUTO: 0.3 K/UL (ref 0.3–1)
MONOCYTES # BLD AUTO: 0.4 K/UL (ref 0.3–1)
MONOCYTES # BLD AUTO: 0.5 K/UL (ref 0.3–1)
MONOCYTES # BLD AUTO: 0.6 K/UL (ref 0.3–1)
MONOCYTES # BLD AUTO: 0.6 K/UL (ref 0.3–1)
MONOCYTES # BLD AUTO: 0.7 K/UL (ref 0.3–1)
MONOCYTES NFR BLD: 1.1 % (ref 4–15)
MONOCYTES NFR BLD: 1.2 % (ref 4–15)
MONOCYTES NFR BLD: 1.7 % (ref 4–15)
MONOCYTES NFR BLD: 10 % (ref 4–15)
MONOCYTES NFR BLD: 2 % (ref 4–15)
MONOCYTES NFR BLD: 2.2 % (ref 4–15)
MONOCYTES NFR BLD: 2.2 % (ref 4–15)
MONOCYTES NFR BLD: 2.3 % (ref 4–15)
MONOCYTES NFR BLD: 2.4 % (ref 4–15)
MONOCYTES NFR BLD: 3.1 % (ref 4–15)
MONOCYTES NFR BLD: 3.1 % (ref 4–15)
MONOCYTES NFR BLD: 3.3 % (ref 4–15)
MONOCYTES NFR BLD: 3.8 % (ref 4–15)
MONOCYTES NFR BLD: 3.8 % (ref 4–15)
MONOCYTES NFR BLD: 4.4 % (ref 4–15)
MONOCYTES NFR BLD: 5.2 % (ref 4–15)
MONOCYTES NFR BLD: 5.3 % (ref 4–15)
MONOCYTES NFR BLD: 5.4 % (ref 4–15)
MONOCYTES NFR BLD: 5.6 % (ref 4–15)
MONOCYTES NFR BLD: 6 % (ref 4–15)
MONOCYTES NFR BLD: 7.6 % (ref 4–15)
MONOCYTES NFR BLD: 7.7 % (ref 4–15)
MONOCYTES NFR BLD: 8.9 % (ref 4–15)
MONOCYTES NFR BLD: 9.2 % (ref 4–15)
NEUTROPHILS # BLD AUTO: 10.3 K/UL (ref 1.8–7.7)
NEUTROPHILS # BLD AUTO: 10.5 K/UL (ref 1.8–7.7)
NEUTROPHILS # BLD AUTO: 12.1 K/UL (ref 1.8–7.7)
NEUTROPHILS # BLD AUTO: 14.3 K/UL (ref 1.8–7.7)
NEUTROPHILS # BLD AUTO: 2.9 K/UL (ref 1.8–7.7)
NEUTROPHILS # BLD AUTO: 3.2 K/UL (ref 1.8–7.7)
NEUTROPHILS # BLD AUTO: 3.3 K/UL (ref 1.8–7.7)
NEUTROPHILS # BLD AUTO: 3.5 K/UL (ref 1.8–7.7)
NEUTROPHILS # BLD AUTO: 3.5 K/UL (ref 1.8–7.7)
NEUTROPHILS # BLD AUTO: 3.9 K/UL (ref 1.8–7.7)
NEUTROPHILS # BLD AUTO: 4 K/UL (ref 1.8–7.7)
NEUTROPHILS # BLD AUTO: 4.2 K/UL (ref 1.8–7.7)
NEUTROPHILS # BLD AUTO: 4.6 K/UL (ref 1.8–7.7)
NEUTROPHILS # BLD AUTO: 5.3 K/UL (ref 1.8–7.7)
NEUTROPHILS # BLD AUTO: 5.3 K/UL (ref 1.8–7.7)
NEUTROPHILS # BLD AUTO: 5.5 K/UL (ref 1.8–7.7)
NEUTROPHILS # BLD AUTO: 5.6 K/UL (ref 1.8–7.7)
NEUTROPHILS # BLD AUTO: 5.6 K/UL (ref 1.8–7.7)
NEUTROPHILS # BLD AUTO: 6 K/UL (ref 1.8–7.7)
NEUTROPHILS # BLD AUTO: 6.4 K/UL (ref 1.8–7.7)
NEUTROPHILS # BLD AUTO: 6.9 K/UL (ref 1.8–7.7)
NEUTROPHILS # BLD AUTO: 7 K/UL (ref 1.8–7.7)
NEUTROPHILS # BLD AUTO: 7 K/UL (ref 1.8–7.7)
NEUTROPHILS # BLD AUTO: 7.1 K/UL (ref 1.8–7.7)
NEUTROPHILS # BLD AUTO: 7.2 K/UL (ref 1.8–7.7)
NEUTROPHILS # BLD AUTO: 7.4 K/UL (ref 1.8–7.7)
NEUTROPHILS # BLD AUTO: 7.6 K/UL (ref 1.8–7.7)
NEUTROPHILS # BLD AUTO: 7.7 K/UL (ref 1.8–7.7)
NEUTROPHILS # BLD AUTO: 8 K/UL (ref 1.8–7.7)
NEUTROPHILS NFR BLD: 60.8 % (ref 38–73)
NEUTROPHILS NFR BLD: 63.9 % (ref 38–73)
NEUTROPHILS NFR BLD: 65.1 % (ref 38–73)
NEUTROPHILS NFR BLD: 71.6 % (ref 38–73)
NEUTROPHILS NFR BLD: 74.2 % (ref 38–73)
NEUTROPHILS NFR BLD: 75.4 % (ref 38–73)
NEUTROPHILS NFR BLD: 77.9 % (ref 38–73)
NEUTROPHILS NFR BLD: 80.2 % (ref 38–73)
NEUTROPHILS NFR BLD: 80.2 % (ref 38–73)
NEUTROPHILS NFR BLD: 80.6 % (ref 38–73)
NEUTROPHILS NFR BLD: 83.1 % (ref 38–73)
NEUTROPHILS NFR BLD: 83.2 % (ref 38–73)
NEUTROPHILS NFR BLD: 83.6 % (ref 38–73)
NEUTROPHILS NFR BLD: 84.5 % (ref 38–73)
NEUTROPHILS NFR BLD: 86.9 % (ref 38–73)
NEUTROPHILS NFR BLD: 87.2 % (ref 38–73)
NEUTROPHILS NFR BLD: 87.5 % (ref 38–73)
NEUTROPHILS NFR BLD: 88 % (ref 38–73)
NEUTROPHILS NFR BLD: 90.3 % (ref 38–73)
NEUTROPHILS NFR BLD: 90.5 % (ref 38–73)
NEUTROPHILS NFR BLD: 90.9 % (ref 38–73)
NEUTROPHILS NFR BLD: 91.4 % (ref 38–73)
NEUTROPHILS NFR BLD: 91.9 % (ref 38–73)
NEUTROPHILS NFR BLD: 94.6 % (ref 38–73)
NITRITE UR QL STRIP: NEGATIVE
NONHDLC SERPL-MCNC: 66 MG/DL
NRBC BLD-RTO: 0 /100 WBC
OSMOLALITY SERPL: 307 MOSM/KG (ref 275–295)
OSMOLALITY SERPL: 315 MOSM/KG (ref 275–295)
OSMOLALITY SERPL: 337 MOSM/KG (ref 275–295)
OSMOLALITY UR: 370 MOSM/KG (ref 50–1200)
PCO2 BLDA: 35.8 MMHG (ref 35–45)
PCO2 BLDA: 37.3 MMHG (ref 35–45)
PCO2 BLDA: 40.7 MMHG (ref 35–45)
PH SMN: 7.31 [PH] (ref 7.35–7.45)
PH SMN: 7.33 [PH] (ref 7.35–7.45)
PH SMN: 7.33 [PH] (ref 7.35–7.45)
PH UR STRIP: 5 [PH] (ref 5–8)
PH UR STRIP: 6 [PH] (ref 5–8)
PH UR STRIP: 6 [PH] (ref 5–8)
PH UR STRIP: 7 [PH] (ref 5–8)
PHOSPHATE SERPL-MCNC: 2.4 MG/DL (ref 2.7–4.5)
PHOSPHATE SERPL-MCNC: 2.6 MG/DL (ref 2.7–4.5)
PHOSPHATE SERPL-MCNC: 2.7 MG/DL (ref 2.7–4.5)
PHOSPHATE SERPL-MCNC: 2.7 MG/DL (ref 2.7–4.5)
PHOSPHATE SERPL-MCNC: 2.9 MG/DL (ref 2.7–4.5)
PHOSPHATE SERPL-MCNC: 2.9 MG/DL (ref 2.7–4.5)
PHOSPHATE SERPL-MCNC: 3 MG/DL (ref 2.7–4.5)
PHOSPHATE SERPL-MCNC: 3.4 MG/DL (ref 2.7–4.5)
PHOSPHATE SERPL-MCNC: 3.4 MG/DL (ref 2.7–4.5)
PHOSPHATE SERPL-MCNC: 3.9 MG/DL (ref 2.7–4.5)
PHOSPHATE SERPL-MCNC: 3.9 MG/DL (ref 2.7–4.5)
PHOSPHATE SERPL-MCNC: 4 MG/DL (ref 2.7–4.5)
PHOSPHATE SERPL-MCNC: 4 MG/DL (ref 2.7–4.5)
PHOSPHATE SERPL-MCNC: 4.1 MG/DL (ref 2.7–4.5)
PLATELET # BLD AUTO: 105 K/UL (ref 150–450)
PLATELET # BLD AUTO: 114 K/UL (ref 150–450)
PLATELET # BLD AUTO: 114 K/UL (ref 150–450)
PLATELET # BLD AUTO: 115 K/UL (ref 150–450)
PLATELET # BLD AUTO: 115 K/UL (ref 150–450)
PLATELET # BLD AUTO: 118 K/UL (ref 150–450)
PLATELET # BLD AUTO: 122 K/UL (ref 150–450)
PLATELET # BLD AUTO: 126 K/UL (ref 150–450)
PLATELET # BLD AUTO: 156 K/UL (ref 150–450)
PLATELET # BLD AUTO: 179 K/UL (ref 150–450)
PLATELET # BLD AUTO: 201 K/UL (ref 150–450)
PLATELET # BLD AUTO: 215 K/UL (ref 150–450)
PLATELET # BLD AUTO: 221 K/UL (ref 150–450)
PLATELET # BLD AUTO: 224 K/UL (ref 150–450)
PLATELET # BLD AUTO: 227 K/UL (ref 150–450)
PLATELET # BLD AUTO: 231 K/UL (ref 150–450)
PLATELET # BLD AUTO: 243 K/UL (ref 150–350)
PLATELET # BLD AUTO: 245 K/UL (ref 150–450)
PLATELET # BLD AUTO: 248 K/UL (ref 150–350)
PLATELET # BLD AUTO: 252 K/UL (ref 150–350)
PLATELET # BLD AUTO: 264 K/UL (ref 150–450)
PLATELET # BLD AUTO: 268 K/UL (ref 150–450)
PLATELET # BLD AUTO: 283 K/UL (ref 150–450)
PLATELET # BLD AUTO: 288 K/UL (ref 150–450)
PLATELET # BLD AUTO: 294 K/UL (ref 150–450)
PLATELET # BLD AUTO: 70 K/UL (ref 150–450)
PLATELET # BLD AUTO: 74 K/UL (ref 150–450)
PLATELET # BLD AUTO: 84 K/UL (ref 150–450)
PLATELET # BLD AUTO: 85 K/UL (ref 150–450)
PLATELET # BLD AUTO: 93 K/UL (ref 150–450)
PLATELET # BLD AUTO: 96 K/UL (ref 150–450)
PMV BLD AUTO: 10.2 FL (ref 9.2–12.9)
PMV BLD AUTO: 10.3 FL (ref 9.2–12.9)
PMV BLD AUTO: 10.4 FL (ref 9.2–12.9)
PMV BLD AUTO: 10.5 FL (ref 9.2–12.9)
PMV BLD AUTO: 10.5 FL (ref 9.2–12.9)
PMV BLD AUTO: 10.6 FL (ref 9.2–12.9)
PMV BLD AUTO: 10.6 FL (ref 9.2–12.9)
PMV BLD AUTO: 10.7 FL (ref 9.2–12.9)
PMV BLD AUTO: 10.8 FL (ref 9.2–12.9)
PMV BLD AUTO: 10.9 FL (ref 9.2–12.9)
PMV BLD AUTO: 10.9 FL (ref 9.2–12.9)
PMV BLD AUTO: 11 FL (ref 9.2–12.9)
PMV BLD AUTO: 11.1 FL (ref 9.2–12.9)
PMV BLD AUTO: 11.2 FL (ref 9.2–12.9)
PMV BLD AUTO: 11.3 FL (ref 9.2–12.9)
PMV BLD AUTO: 8.7 FL (ref 9.2–12.9)
PMV BLD AUTO: 8.8 FL (ref 9.2–12.9)
PMV BLD AUTO: 8.8 FL (ref 9.2–12.9)
PMV BLD AUTO: 8.9 FL (ref 9.2–12.9)
PMV BLD AUTO: 8.9 FL (ref 9.2–12.9)
PMV BLD AUTO: 9 FL (ref 9.2–12.9)
PMV BLD AUTO: 9 FL (ref 9.2–12.9)
PMV BLD AUTO: 9.1 FL (ref 9.2–12.9)
PMV BLD AUTO: 9.2 FL (ref 9.2–12.9)
PMV BLD AUTO: 9.2 FL (ref 9.2–12.9)
PMV BLD AUTO: 9.3 FL (ref 9.2–12.9)
PMV BLD AUTO: 9.5 FL (ref 9.2–12.9)
PMV BLD AUTO: 9.7 FL (ref 9.2–12.9)
PMV BLD AUTO: 9.7 FL (ref 9.2–12.9)
PO2 BLDA: 46 MMHG (ref 40–60)
PO2 BLDA: 49 MMHG (ref 40–60)
PO2 BLDA: 55 MMHG (ref 40–60)
POC BE: -6 MMOL/L
POC BE: -6 MMOL/L
POC BE: -7 MMOL/L
POC IONIZED CALCIUM: 1.06 MMOL/L (ref 1.06–1.42)
POC IONIZED CALCIUM: 1.06 MMOL/L (ref 1.06–1.42)
POC IONIZED CALCIUM: 1.09 MMOL/L (ref 1.06–1.42)
POC IONIZED CALCIUM: 1.1 MMOL/L (ref 1.06–1.42)
POC IONIZED CALCIUM: 1.29 MMOL/L (ref 1.06–1.42)
POC SATURATED O2: 79 % (ref 95–100)
POC SATURATED O2: 81 % (ref 95–100)
POC SATURATED O2: 86 % (ref 95–100)
POC TCO2 (MEASURED): 19 MMOL/L (ref 23–29)
POC TCO2 (MEASURED): 20 MMOL/L (ref 23–29)
POC TCO2 (MEASURED): 20 MMOL/L (ref 23–29)
POC TCO2 (MEASURED): 22 MMOL/L (ref 23–29)
POC TCO2 (MEASURED): 24 MMOL/L (ref 23–29)
POC TCO2: 20 MMOL/L (ref 24–29)
POC TCO2: 21 MMOL/L (ref 24–29)
POC TCO2: 22 MMOL/L (ref 24–29)
POCT GLUCOSE: 101 MG/DL (ref 70–110)
POCT GLUCOSE: 104 MG/DL (ref 70–110)
POCT GLUCOSE: 108 MG/DL (ref 70–110)
POCT GLUCOSE: 109 MG/DL (ref 70–110)
POCT GLUCOSE: 111 MG/DL (ref 70–110)
POCT GLUCOSE: 112 MG/DL (ref 70–110)
POCT GLUCOSE: 114 MG/DL (ref 70–110)
POCT GLUCOSE: 118 MG/DL (ref 70–110)
POCT GLUCOSE: 123 MG/DL (ref 70–110)
POCT GLUCOSE: 133 MG/DL (ref 70–110)
POCT GLUCOSE: 134 MG/DL (ref 70–110)
POCT GLUCOSE: 134 MG/DL (ref 70–110)
POCT GLUCOSE: 136 MG/DL (ref 70–110)
POCT GLUCOSE: 144 MG/DL (ref 70–110)
POCT GLUCOSE: 148 MG/DL (ref 70–110)
POCT GLUCOSE: 152 MG/DL (ref 70–110)
POCT GLUCOSE: 159 MG/DL (ref 70–110)
POCT GLUCOSE: 160 MG/DL (ref 70–110)
POCT GLUCOSE: 168 MG/DL (ref 70–110)
POCT GLUCOSE: 170 MG/DL (ref 70–110)
POCT GLUCOSE: 172 MG/DL (ref 70–110)
POCT GLUCOSE: 174 MG/DL (ref 70–110)
POCT GLUCOSE: 175 MG/DL (ref 70–110)
POCT GLUCOSE: 179 MG/DL (ref 70–110)
POCT GLUCOSE: 182 MG/DL (ref 70–110)
POCT GLUCOSE: 187 MG/DL (ref 70–110)
POCT GLUCOSE: 188 MG/DL (ref 70–110)
POCT GLUCOSE: 189 MG/DL (ref 70–110)
POCT GLUCOSE: 194 MG/DL (ref 70–110)
POCT GLUCOSE: 197 MG/DL (ref 70–110)
POCT GLUCOSE: 198 MG/DL (ref 70–110)
POCT GLUCOSE: 199 MG/DL (ref 70–110)
POCT GLUCOSE: 200 MG/DL (ref 70–110)
POCT GLUCOSE: 201 MG/DL (ref 70–110)
POCT GLUCOSE: 207 MG/DL (ref 70–110)
POCT GLUCOSE: 214 MG/DL (ref 70–110)
POCT GLUCOSE: 215 MG/DL (ref 70–110)
POCT GLUCOSE: 218 MG/DL (ref 70–110)
POCT GLUCOSE: 220 MG/DL (ref 70–110)
POCT GLUCOSE: 221 MG/DL (ref 70–110)
POCT GLUCOSE: 223 MG/DL (ref 70–110)
POCT GLUCOSE: 226 MG/DL (ref 70–110)
POCT GLUCOSE: 228 MG/DL (ref 70–110)
POCT GLUCOSE: 229 MG/DL (ref 70–110)
POCT GLUCOSE: 238 MG/DL (ref 70–110)
POCT GLUCOSE: 243 MG/DL (ref 70–110)
POCT GLUCOSE: 245 MG/DL (ref 70–110)
POCT GLUCOSE: 247 MG/DL (ref 70–110)
POCT GLUCOSE: 254 MG/DL (ref 70–110)
POCT GLUCOSE: 254 MG/DL (ref 70–110)
POCT GLUCOSE: 256 MG/DL (ref 70–110)
POCT GLUCOSE: 257 MG/DL (ref 70–110)
POCT GLUCOSE: 259 MG/DL (ref 70–110)
POCT GLUCOSE: 266 MG/DL (ref 70–110)
POCT GLUCOSE: 269 MG/DL (ref 70–110)
POCT GLUCOSE: 269 MG/DL (ref 70–110)
POCT GLUCOSE: 280 MG/DL (ref 70–110)
POCT GLUCOSE: 286 MG/DL (ref 70–110)
POCT GLUCOSE: 291 MG/DL (ref 70–110)
POCT GLUCOSE: 294 MG/DL (ref 70–110)
POCT GLUCOSE: 299 MG/DL (ref 70–110)
POCT GLUCOSE: 303 MG/DL (ref 70–110)
POCT GLUCOSE: 304 MG/DL (ref 70–110)
POCT GLUCOSE: 305 MG/DL (ref 70–110)
POCT GLUCOSE: 305 MG/DL (ref 70–110)
POCT GLUCOSE: 309 MG/DL (ref 70–110)
POCT GLUCOSE: 311 MG/DL (ref 70–110)
POCT GLUCOSE: 311 MG/DL (ref 70–110)
POCT GLUCOSE: 312 MG/DL (ref 70–110)
POCT GLUCOSE: 331 MG/DL (ref 70–110)
POCT GLUCOSE: 332 MG/DL (ref 70–110)
POCT GLUCOSE: 336 MG/DL (ref 70–110)
POCT GLUCOSE: 337 MG/DL (ref 70–110)
POCT GLUCOSE: 341 MG/DL (ref 70–110)
POCT GLUCOSE: 343 MG/DL (ref 70–110)
POCT GLUCOSE: 347 MG/DL (ref 70–110)
POCT GLUCOSE: 349 MG/DL (ref 70–110)
POCT GLUCOSE: 350 MG/DL (ref 70–110)
POCT GLUCOSE: 386 MG/DL (ref 70–110)
POCT GLUCOSE: 388 MG/DL (ref 70–110)
POCT GLUCOSE: 391 MG/DL (ref 70–110)
POCT GLUCOSE: 401 MG/DL (ref 70–110)
POCT GLUCOSE: 426 MG/DL (ref 70–110)
POCT GLUCOSE: 433 MG/DL (ref 70–110)
POCT GLUCOSE: 447 MG/DL (ref 70–110)
POCT GLUCOSE: 447 MG/DL (ref 70–110)
POCT GLUCOSE: 450 MG/DL (ref 70–110)
POCT GLUCOSE: 451 MG/DL (ref 70–110)
POCT GLUCOSE: 462 MG/DL (ref 70–110)
POCT GLUCOSE: 471 MG/DL (ref 70–110)
POCT GLUCOSE: 489 MG/DL (ref 70–110)
POCT GLUCOSE: 87 MG/DL (ref 70–110)
POCT GLUCOSE: 92 MG/DL (ref 70–110)
POCT GLUCOSE: 92 MG/DL (ref 70–110)
POCT GLUCOSE: >500 MG/DL (ref 70–110)
POTASSIUM BLD-SCNC: 3.7 MMOL/L (ref 3.5–5.1)
POTASSIUM BLD-SCNC: 3.8 MMOL/L (ref 3.5–5.1)
POTASSIUM BLD-SCNC: 3.9 MMOL/L (ref 3.5–5.1)
POTASSIUM BLD-SCNC: 4.5 MMOL/L (ref 3.5–5.1)
POTASSIUM BLD-SCNC: 5.4 MMOL/L (ref 3.5–5.1)
POTASSIUM SERPL-SCNC: 3.1 MMOL/L (ref 3.5–5.1)
POTASSIUM SERPL-SCNC: 3.1 MMOL/L (ref 3.5–5.1)
POTASSIUM SERPL-SCNC: 3.3 MMOL/L (ref 3.5–5.1)
POTASSIUM SERPL-SCNC: 3.4 MMOL/L (ref 3.5–5.1)
POTASSIUM SERPL-SCNC: 3.4 MMOL/L (ref 3.5–5.1)
POTASSIUM SERPL-SCNC: 3.5 MMOL/L (ref 3.5–5.1)
POTASSIUM SERPL-SCNC: 3.6 MMOL/L (ref 3.5–5.1)
POTASSIUM SERPL-SCNC: 3.7 MMOL/L (ref 3.5–5.1)
POTASSIUM SERPL-SCNC: 3.8 MMOL/L (ref 3.5–5.1)
POTASSIUM SERPL-SCNC: 3.9 MMOL/L (ref 3.5–5.1)
POTASSIUM SERPL-SCNC: 3.9 MMOL/L (ref 3.5–5.1)
POTASSIUM SERPL-SCNC: 4 MMOL/L (ref 3.5–5.1)
POTASSIUM SERPL-SCNC: 4.1 MMOL/L (ref 3.5–5.1)
POTASSIUM SERPL-SCNC: 4.2 MMOL/L (ref 3.5–5.1)
POTASSIUM SERPL-SCNC: 4.3 MMOL/L (ref 3.5–5.1)
POTASSIUM SERPL-SCNC: 4.4 MMOL/L (ref 3.5–5.1)
POTASSIUM SERPL-SCNC: 4.6 MMOL/L (ref 3.5–5.1)
POTASSIUM SERPL-SCNC: 4.6 MMOL/L (ref 3.5–5.1)
POTASSIUM SERPL-SCNC: 4.7 MMOL/L (ref 3.5–5.1)
POTASSIUM SERPL-SCNC: 4.8 MMOL/L (ref 3.5–5.1)
POTASSIUM SERPL-SCNC: 4.9 MMOL/L (ref 3.5–5.1)
POTASSIUM SERPL-SCNC: 4.9 MMOL/L (ref 3.5–5.1)
POTASSIUM SERPL-SCNC: 5 MMOL/L (ref 3.5–5.1)
POTASSIUM SERPL-SCNC: 5.1 MMOL/L (ref 3.5–5.1)
POTASSIUM SERPL-SCNC: 5.4 MMOL/L (ref 3.5–5.1)
POTASSIUM SERPL-SCNC: 5.5 MMOL/L (ref 3.5–5.1)
POTASSIUM SERPL-SCNC: 5.6 MMOL/L (ref 3.5–5.1)
POTASSIUM SERPL-SCNC: 5.6 MMOL/L (ref 3.5–5.1)
POTASSIUM SERPL-SCNC: 5.7 MMOL/L (ref 3.5–5.1)
PROT SERPL-MCNC: 4.8 G/DL (ref 6–8.4)
PROT SERPL-MCNC: 4.9 G/DL (ref 6–8.4)
PROT SERPL-MCNC: 5 G/DL (ref 6–8.4)
PROT SERPL-MCNC: 5 G/DL (ref 6–8.4)
PROT SERPL-MCNC: 5.1 G/DL (ref 6–8.4)
PROT SERPL-MCNC: 5.2 G/DL (ref 6–8.4)
PROT SERPL-MCNC: 5.8 G/DL (ref 6–8.4)
PROT SERPL-MCNC: 5.8 G/DL (ref 6–8.4)
PROT SERPL-MCNC: 6 G/DL (ref 6–8.4)
PROT SERPL-MCNC: 6.1 G/DL (ref 6–8.4)
PROT SERPL-MCNC: 6.1 G/DL (ref 6–8.4)
PROT SERPL-MCNC: 6.3 G/DL (ref 6–8.4)
PROT SERPL-MCNC: 6.3 G/DL (ref 6–8.4)
PROT SERPL-MCNC: 6.4 G/DL (ref 6–8.4)
PROT SERPL-MCNC: 6.6 G/DL (ref 6–8.4)
PROT SERPL-MCNC: 6.6 G/DL (ref 6–8.4)
PROT SERPL-MCNC: 6.7 G/DL (ref 6–8.4)
PROT SERPL-MCNC: 6.8 G/DL (ref 6–8.4)
PROT SERPL-MCNC: 7.2 G/DL (ref 6–8.4)
PROT SERPL-MCNC: 7.5 G/DL (ref 6–8.4)
PROT SERPL-MCNC: 7.6 G/DL (ref 6–8.4)
PROT SERPL-MCNC: 7.9 G/DL (ref 6–8.4)
PROT SERPL-MCNC: 8.2 G/DL (ref 6–8.4)
PROT UR QL STRIP: ABNORMAL
PROT UR QL STRIP: NEGATIVE
PROTHROMBIN TIME: 11.2 SEC (ref 9–12.5)
RBC # BLD AUTO: 2.76 M/UL (ref 4–5.4)
RBC # BLD AUTO: 2.79 M/UL (ref 4–5.4)
RBC # BLD AUTO: 2.79 M/UL (ref 4–5.4)
RBC # BLD AUTO: 2.8 M/UL (ref 4–5.4)
RBC # BLD AUTO: 2.83 M/UL (ref 4–5.4)
RBC # BLD AUTO: 2.84 M/UL (ref 4–5.4)
RBC # BLD AUTO: 2.85 M/UL (ref 4–5.4)
RBC # BLD AUTO: 2.88 M/UL (ref 4–5.4)
RBC # BLD AUTO: 2.91 M/UL (ref 4–5.4)
RBC # BLD AUTO: 2.92 M/UL (ref 4–5.4)
RBC # BLD AUTO: 2.94 M/UL (ref 4–5.4)
RBC # BLD AUTO: 2.94 M/UL (ref 4–5.4)
RBC # BLD AUTO: 2.96 M/UL (ref 4–5.4)
RBC # BLD AUTO: 2.97 M/UL (ref 4–5.4)
RBC # BLD AUTO: 2.97 M/UL (ref 4–5.4)
RBC # BLD AUTO: 3.04 M/UL (ref 4–5.4)
RBC # BLD AUTO: 3.05 M/UL (ref 4–5.4)
RBC # BLD AUTO: 3.16 M/UL (ref 4–5.4)
RBC # BLD AUTO: 3.33 M/UL (ref 4–5.4)
RBC # BLD AUTO: 3.35 M/UL (ref 4–5.4)
RBC # BLD AUTO: 3.35 M/UL (ref 4–5.4)
RBC # BLD AUTO: 3.39 M/UL (ref 4–5.4)
RBC # BLD AUTO: 3.45 M/UL (ref 4–5.4)
RBC # BLD AUTO: 3.45 M/UL (ref 4–5.4)
RBC # BLD AUTO: 3.52 M/UL (ref 4–5.4)
RBC # BLD AUTO: 3.59 M/UL (ref 4–5.4)
RBC # BLD AUTO: 3.67 M/UL (ref 4–5.4)
RBC # BLD AUTO: 3.7 M/UL (ref 4–5.4)
RBC # BLD AUTO: 3.7 M/UL (ref 4–5.4)
RBC #/AREA URNS AUTO: 0 /HPF (ref 0–4)
RBC #/AREA URNS AUTO: 0 /HPF (ref 0–4)
RBC #/AREA URNS AUTO: 1 /HPF (ref 0–4)
RBC #/AREA URNS AUTO: 2 /HPF (ref 0–4)
SAMPLE: ABNORMAL
SARS-COV-2 RDRP RESP QL NAA+PROBE: NEGATIVE
SARS-COV-2 RNA RESP QL NAA+PROBE: NOT DETECTED
SITE: ABNORMAL
SODIUM BLD-SCNC: 130 MMOL/L (ref 136–145)
SODIUM BLD-SCNC: 131 MMOL/L (ref 136–145)
SODIUM BLD-SCNC: 131 MMOL/L (ref 136–145)
SODIUM BLD-SCNC: 137 MMOL/L (ref 136–145)
SODIUM BLD-SCNC: 143 MMOL/L (ref 136–145)
SODIUM SERPL-SCNC: 129 MMOL/L (ref 136–145)
SODIUM SERPL-SCNC: 131 MMOL/L (ref 136–145)
SODIUM SERPL-SCNC: 132 MMOL/L (ref 136–145)
SODIUM SERPL-SCNC: 133 MMOL/L (ref 136–145)
SODIUM SERPL-SCNC: 134 MMOL/L (ref 136–145)
SODIUM SERPL-SCNC: 135 MMOL/L (ref 136–145)
SODIUM SERPL-SCNC: 136 MMOL/L (ref 136–145)
SODIUM SERPL-SCNC: 137 MMOL/L (ref 136–145)
SODIUM SERPL-SCNC: 138 MMOL/L (ref 136–145)
SODIUM SERPL-SCNC: 139 MMOL/L (ref 136–145)
SODIUM SERPL-SCNC: 140 MMOL/L (ref 136–145)
SODIUM SERPL-SCNC: 141 MMOL/L (ref 136–145)
SODIUM SERPL-SCNC: 142 MMOL/L (ref 136–145)
SODIUM SERPL-SCNC: 143 MMOL/L (ref 136–145)
SODIUM SERPL-SCNC: 143 MMOL/L (ref 136–145)
SODIUM UR-SCNC: 68 MMOL/L (ref 20–250)
SP GR UR STRIP: 1.01 (ref 1–1.03)
SP GR UR STRIP: 1.02 (ref 1–1.03)
SQUAMOUS #/AREA URNS AUTO: 0 /HPF
SQUAMOUS #/AREA URNS AUTO: 0 /HPF
SQUAMOUS #/AREA URNS AUTO: 1 /HPF
SQUAMOUS #/AREA URNS AUTO: 1 /HPF
SQUAMOUS #/AREA URNS AUTO: 2 /HPF
SQUAMOUS #/AREA URNS AUTO: 3 /HPF
T4 FREE SERPL-MCNC: 0.8 NG/DL (ref 0.71–1.51)
T4 FREE SERPL-MCNC: 0.8 NG/DL (ref 0.71–1.51)
T4 FREE SERPL-MCNC: 0.87 NG/DL (ref 0.71–1.51)
T4 FREE SERPL-MCNC: 0.9 NG/DL (ref 0.71–1.51)
TRIGL SERPL-MCNC: 60 MG/DL (ref 30–150)
TSH SERPL DL<=0.005 MIU/L-ACNC: 1.41 UIU/ML (ref 0.4–4)
TSH SERPL DL<=0.005 MIU/L-ACNC: 1.75 UIU/ML (ref 0.4–4)
TSH SERPL DL<=0.005 MIU/L-ACNC: 2.07 UIU/ML (ref 0.4–4)
TSH SERPL DL<=0.005 MIU/L-ACNC: 3.07 UIU/ML (ref 0.4–4)
URN SPEC COLLECT METH UR: ABNORMAL
URN SPEC COLLECT METH UR: NORMAL
WBC # BLD AUTO: 10.18 K/UL (ref 3.9–12.7)
WBC # BLD AUTO: 10.88 K/UL (ref 3.9–12.7)
WBC # BLD AUTO: 12.11 K/UL (ref 3.9–12.7)
WBC # BLD AUTO: 13.18 K/UL (ref 3.9–12.7)
WBC # BLD AUTO: 16.08 K/UL (ref 3.9–12.7)
WBC # BLD AUTO: 3.64 K/UL (ref 3.9–12.7)
WBC # BLD AUTO: 5.26 K/UL (ref 3.9–12.7)
WBC # BLD AUTO: 5.39 K/UL (ref 3.9–12.7)
WBC # BLD AUTO: 5.39 K/UL (ref 3.9–12.7)
WBC # BLD AUTO: 5.48 K/UL (ref 3.9–12.7)
WBC # BLD AUTO: 5.53 K/UL (ref 3.9–12.7)
WBC # BLD AUTO: 5.54 K/UL (ref 3.9–12.7)
WBC # BLD AUTO: 5.63 K/UL (ref 3.9–12.7)
WBC # BLD AUTO: 5.69 K/UL (ref 3.9–12.7)
WBC # BLD AUTO: 5.84 K/UL (ref 3.9–12.7)
WBC # BLD AUTO: 6.37 K/UL (ref 3.9–12.7)
WBC # BLD AUTO: 6.59 K/UL (ref 3.9–12.7)
WBC # BLD AUTO: 6.65 K/UL (ref 3.9–12.7)
WBC # BLD AUTO: 6.74 K/UL (ref 3.9–12.7)
WBC # BLD AUTO: 7.33 K/UL (ref 3.9–12.7)
WBC # BLD AUTO: 7.75 K/UL (ref 3.9–12.7)
WBC # BLD AUTO: 8.06 K/UL (ref 3.9–12.7)
WBC # BLD AUTO: 8.07 K/UL (ref 3.9–12.7)
WBC # BLD AUTO: 8.13 K/UL (ref 3.9–12.7)
WBC # BLD AUTO: 8.21 K/UL (ref 3.9–12.7)
WBC # BLD AUTO: 8.25 K/UL (ref 3.9–12.7)
WBC # BLD AUTO: 8.29 K/UL (ref 3.9–12.7)
WBC # BLD AUTO: 8.62 K/UL (ref 3.9–12.7)
WBC # BLD AUTO: 8.7 K/UL (ref 3.9–12.7)
WBC # BLD AUTO: 8.92 K/UL (ref 3.9–12.7)
WBC # BLD AUTO: 9.6 K/UL (ref 3.9–12.7)
WBC #/AREA URNS AUTO: 0 /HPF (ref 0–5)
WBC #/AREA URNS AUTO: 0 /HPF (ref 0–5)
WBC #/AREA URNS AUTO: 1 /HPF (ref 0–5)
WBC #/AREA URNS AUTO: 1 /HPF (ref 0–5)
WBC #/AREA URNS AUTO: 4 /HPF (ref 0–5)
YEAST UR QL AUTO: ABNORMAL
YEAST UR QL AUTO: NORMAL

## 2021-01-01 PROCEDURE — 3078F DIAST BP <80 MM HG: CPT | Mod: CPTII,S$GLB,, | Performed by: INTERNAL MEDICINE

## 2021-01-01 PROCEDURE — 80053 COMPREHEN METABOLIC PANEL: CPT

## 2021-01-01 PROCEDURE — 63600175 PHARM REV CODE 636 W HCPCS: Performed by: NURSE ANESTHETIST, CERTIFIED REGISTERED

## 2021-01-01 PROCEDURE — 99999 PR PBB SHADOW E&M-EST. PATIENT-LVL V: ICD-10-PCS | Mod: PBBFAC,,, | Performed by: INTERNAL MEDICINE

## 2021-01-01 PROCEDURE — 63600175 PHARM REV CODE 636 W HCPCS: Performed by: NURSE PRACTITIONER

## 2021-01-01 PROCEDURE — 1125F PR PAIN SEVERITY QUANTIFIED, PAIN PRESENT: ICD-10-PCS | Mod: S$GLB,,, | Performed by: NURSE PRACTITIONER

## 2021-01-01 PROCEDURE — 96375 TX/PRO/DX INJ NEW DRUG ADDON: CPT

## 2021-01-01 PROCEDURE — 86900 BLOOD TYPING SEROLOGIC ABO: CPT | Performed by: STUDENT IN AN ORGANIZED HEALTH CARE EDUCATION/TRAINING PROGRAM

## 2021-01-01 PROCEDURE — 82962 GLUCOSE BLOOD TEST: CPT

## 2021-01-01 PROCEDURE — 93005 ELECTROCARDIOGRAM TRACING: CPT

## 2021-01-01 PROCEDURE — 96361 HYDRATE IV INFUSION ADD-ON: CPT

## 2021-01-01 PROCEDURE — 63600175 PHARM REV CODE 636 W HCPCS: Performed by: INTERNAL MEDICINE

## 2021-01-01 PROCEDURE — 99239 PR HOSPITAL DISCHARGE DAY,>30 MIN: ICD-10-PCS | Mod: GC,,, | Performed by: HOSPITALIST

## 2021-01-01 PROCEDURE — 99284 EMERGENCY DEPT VISIT MOD MDM: CPT | Mod: ,,, | Performed by: EMERGENCY MEDICINE

## 2021-01-01 PROCEDURE — 1125F AMNT PAIN NOTED PAIN PRSNT: CPT | Mod: S$GLB,,, | Performed by: STUDENT IN AN ORGANIZED HEALTH CARE EDUCATION/TRAINING PROGRAM

## 2021-01-01 PROCEDURE — 1159F PR MEDICATION LIST DOCUMENTED IN MEDICAL RECORD: ICD-10-PCS | Mod: S$GLB,,, | Performed by: STUDENT IN AN ORGANIZED HEALTH CARE EDUCATION/TRAINING PROGRAM

## 2021-01-01 PROCEDURE — 99214 OFFICE O/P EST MOD 30 MIN: CPT | Mod: 95,,, | Performed by: NURSE PRACTITIONER

## 2021-01-01 PROCEDURE — 99999 PR PBB SHADOW E&M-EST. PATIENT-LVL V: ICD-10-PCS | Mod: PBBFAC,,, | Performed by: NURSE PRACTITIONER

## 2021-01-01 PROCEDURE — 99499 UNLISTED E&M SERVICE: CPT | Mod: S$GLB,,, | Performed by: STUDENT IN AN ORGANIZED HEALTH CARE EDUCATION/TRAINING PROGRAM

## 2021-01-01 PROCEDURE — 97535 SELF CARE MNGMENT TRAINING: CPT

## 2021-01-01 PROCEDURE — 1157F ADVNC CARE PLAN IN RCRD: CPT | Mod: 95,,, | Performed by: NURSE PRACTITIONER

## 2021-01-01 PROCEDURE — 25000003 PHARM REV CODE 250: Performed by: STUDENT IN AN ORGANIZED HEALTH CARE EDUCATION/TRAINING PROGRAM

## 2021-01-01 PROCEDURE — 99223 1ST HOSP IP/OBS HIGH 75: CPT | Mod: ,,, | Performed by: INTERNAL MEDICINE

## 2021-01-01 PROCEDURE — 3075F PR MOST RECENT SYSTOLIC BLOOD PRESS GE 130-139MM HG: ICD-10-PCS | Mod: CPTII,S$GLB,, | Performed by: INTERNAL MEDICINE

## 2021-01-01 PROCEDURE — 1101F PT FALLS ASSESS-DOCD LE1/YR: CPT | Mod: CPTII,S$GLB,, | Performed by: NURSE PRACTITIONER

## 2021-01-01 PROCEDURE — 99215 PR OFFICE/OUTPT VISIT, EST, LEVL V, 40-54 MIN: ICD-10-PCS | Mod: S$GLB,,, | Performed by: NURSE PRACTITIONER

## 2021-01-01 PROCEDURE — 36415 COLL VENOUS BLD VENIPUNCTURE: CPT | Performed by: STUDENT IN AN ORGANIZED HEALTH CARE EDUCATION/TRAINING PROGRAM

## 2021-01-01 PROCEDURE — 3074F PR MOST RECENT SYSTOLIC BLOOD PRESSURE < 130 MM HG: ICD-10-PCS | Mod: CPTII,S$GLB,, | Performed by: NURSE PRACTITIONER

## 2021-01-01 PROCEDURE — 20600001 HC STEP DOWN PRIVATE ROOM

## 2021-01-01 PROCEDURE — 99215 OFFICE O/P EST HI 40 MIN: CPT | Mod: S$GLB,,, | Performed by: STUDENT IN AN ORGANIZED HEALTH CARE EDUCATION/TRAINING PROGRAM

## 2021-01-01 PROCEDURE — 96374 THER/PROPH/DIAG INJ IV PUSH: CPT

## 2021-01-01 PROCEDURE — 63600175 PHARM REV CODE 636 W HCPCS: Performed by: GENERAL ACUTE CARE HOSPITAL

## 2021-01-01 PROCEDURE — 99233 SBSQ HOSP IP/OBS HIGH 50: CPT | Mod: GC,,, | Performed by: INTERNAL MEDICINE

## 2021-01-01 PROCEDURE — 3288F PR FALLS RISK ASSESSMENT DOCUMENTED: ICD-10-PCS | Mod: CPTII,S$GLB,, | Performed by: NURSE PRACTITIONER

## 2021-01-01 PROCEDURE — 99222 1ST HOSP IP/OBS MODERATE 55: CPT | Mod: ,,, | Performed by: NURSE PRACTITIONER

## 2021-01-01 PROCEDURE — 1157F ADVNC CARE PLAN IN RCRD: CPT | Mod: S$GLB,,, | Performed by: INTERNAL MEDICINE

## 2021-01-01 PROCEDURE — 99284 EMERGENCY DEPT VISIT MOD MDM: CPT | Mod: ,,, | Performed by: PHYSICIAN ASSISTANT

## 2021-01-01 PROCEDURE — 1159F PR MEDICATION LIST DOCUMENTED IN MEDICAL RECORD: ICD-10-PCS | Mod: S$GLB,,, | Performed by: NURSE PRACTITIONER

## 2021-01-01 PROCEDURE — 81001 URINALYSIS AUTO W/SCOPE: CPT | Performed by: NURSE PRACTITIONER

## 2021-01-01 PROCEDURE — 82550 ASSAY OF CK (CPK): CPT | Performed by: STUDENT IN AN ORGANIZED HEALTH CARE EDUCATION/TRAINING PROGRAM

## 2021-01-01 PROCEDURE — 1159F MED LIST DOCD IN RCRD: CPT | Mod: S$GLB,,, | Performed by: NURSE PRACTITIONER

## 2021-01-01 PROCEDURE — 99215 PR OFFICE/OUTPT VISIT, EST, LEVL V, 40-54 MIN: ICD-10-PCS | Mod: S$GLB,,, | Performed by: INTERNAL MEDICINE

## 2021-01-01 PROCEDURE — 63700000 PHARM REV CODE 250 ALT 637 W/O HCPCS: Performed by: INTERNAL MEDICINE

## 2021-01-01 PROCEDURE — 3077F PR MOST RECENT SYSTOLIC BLOOD PRESSURE >= 140 MM HG: ICD-10-PCS | Mod: CPTII,S$GLB,, | Performed by: NURSE PRACTITIONER

## 2021-01-01 PROCEDURE — 99499 RISK ADDL DX/OHS AUDIT: ICD-10-PCS | Mod: S$GLB,,, | Performed by: INTERNAL MEDICINE

## 2021-01-01 PROCEDURE — 78264 GASTRIC EMPTYING IMG STUDY: CPT | Mod: 26,,, | Performed by: RADIOLOGY

## 2021-01-01 PROCEDURE — 63700000 PHARM REV CODE 250 ALT 637 W/O HCPCS: Performed by: STUDENT IN AN ORGANIZED HEALTH CARE EDUCATION/TRAINING PROGRAM

## 2021-01-01 PROCEDURE — 3075F SYST BP GE 130 - 139MM HG: CPT | Mod: CPTII,S$GLB,, | Performed by: INTERNAL MEDICINE

## 2021-01-01 PROCEDURE — 37000008 HC ANESTHESIA 1ST 15 MINUTES: Performed by: INTERNAL MEDICINE

## 2021-01-01 PROCEDURE — 99214 PR OFFICE/OUTPT VISIT, EST, LEVL IV, 30-39 MIN: ICD-10-PCS | Mod: 95,,, | Performed by: NURSE PRACTITIONER

## 2021-01-01 PROCEDURE — 1111F DSCHRG MED/CURRENT MED MERGE: CPT | Mod: CPTII,95,, | Performed by: NURSE PRACTITIONER

## 2021-01-01 PROCEDURE — 1101F PR PT FALLS ASSESS DOC 0-1 FALLS W/OUT INJ PAST YR: ICD-10-PCS | Mod: CPTII,S$GLB,, | Performed by: NURSE PRACTITIONER

## 2021-01-01 PROCEDURE — 99284 PR EMERGENCY DEPT VISIT,LEVEL IV: ICD-10-PCS | Mod: ,,, | Performed by: PHYSICIAN ASSISTANT

## 2021-01-01 PROCEDURE — 99900035 HC TECH TIME PER 15 MIN (STAT)

## 2021-01-01 PROCEDURE — 1159F MED LIST DOCD IN RCRD: CPT | Mod: S$GLB,,, | Performed by: STUDENT IN AN ORGANIZED HEALTH CARE EDUCATION/TRAINING PROGRAM

## 2021-01-01 PROCEDURE — 80053 COMPREHEN METABOLIC PANEL: CPT | Performed by: PHYSICIAN ASSISTANT

## 2021-01-01 PROCEDURE — 63600175 PHARM REV CODE 636 W HCPCS: Performed by: STUDENT IN AN ORGANIZED HEALTH CARE EDUCATION/TRAINING PROGRAM

## 2021-01-01 PROCEDURE — 99232 PR SUBSEQUENT HOSPITAL CARE,LEVL II: ICD-10-PCS | Mod: ,,, | Performed by: NURSE PRACTITIONER

## 2021-01-01 PROCEDURE — 1157F PR ADVANCE CARE PLAN OR EQUIV PRESENT IN MEDICAL RECORD: ICD-10-PCS | Mod: S$GLB,,, | Performed by: INTERNAL MEDICINE

## 2021-01-01 PROCEDURE — 99232 SBSQ HOSP IP/OBS MODERATE 35: CPT | Mod: ,,, | Performed by: INTERNAL MEDICINE

## 2021-01-01 PROCEDURE — 99499 UNLISTED E&M SERVICE: CPT | Mod: S$GLB,,, | Performed by: NURSE PRACTITIONER

## 2021-01-01 PROCEDURE — 99285 EMERGENCY DEPT VISIT HI MDM: CPT | Mod: 25

## 2021-01-01 PROCEDURE — 85027 COMPLETE CBC AUTOMATED: CPT | Performed by: NURSE PRACTITIONER

## 2021-01-01 PROCEDURE — 3008F PR BODY MASS INDEX (BMI) DOCUMENTED: ICD-10-PCS | Mod: CPTII,S$GLB,, | Performed by: INTERNAL MEDICINE

## 2021-01-01 PROCEDURE — 99999 PR PBB SHADOW E&M-EST. PATIENT-LVL IV: CPT | Mod: PBBFAC,,, | Performed by: NURSE PRACTITIONER

## 2021-01-01 PROCEDURE — 3008F BODY MASS INDEX DOCD: CPT | Mod: CPTII,S$GLB,, | Performed by: INTERNAL MEDICINE

## 2021-01-01 PROCEDURE — 83735 ASSAY OF MAGNESIUM: CPT | Performed by: STUDENT IN AN ORGANIZED HEALTH CARE EDUCATION/TRAINING PROGRAM

## 2021-01-01 PROCEDURE — 43239 EGD BIOPSY SINGLE/MULTIPLE: CPT | Performed by: INTERNAL MEDICINE

## 2021-01-01 PROCEDURE — 1159F PR MEDICATION LIST DOCUMENTED IN MEDICAL RECORD: ICD-10-PCS | Mod: S$GLB,,, | Performed by: INTERNAL MEDICINE

## 2021-01-01 PROCEDURE — 1157F PR ADVANCE CARE PLAN OR EQUIV PRESENT IN MEDICAL RECORD: ICD-10-PCS | Mod: S$GLB,,, | Performed by: NURSE PRACTITIONER

## 2021-01-01 PROCEDURE — 3288F PR FALLS RISK ASSESSMENT DOCUMENTED: ICD-10-PCS | Mod: CPTII,S$GLB,, | Performed by: STUDENT IN AN ORGANIZED HEALTH CARE EDUCATION/TRAINING PROGRAM

## 2021-01-01 PROCEDURE — 80053 COMPREHEN METABOLIC PANEL: CPT | Performed by: STUDENT IN AN ORGANIZED HEALTH CARE EDUCATION/TRAINING PROGRAM

## 2021-01-01 PROCEDURE — 1126F AMNT PAIN NOTED NONE PRSNT: CPT | Mod: S$GLB,,, | Performed by: NURSE PRACTITIONER

## 2021-01-01 PROCEDURE — 25000003 PHARM REV CODE 250: Performed by: PHYSICIAN ASSISTANT

## 2021-01-01 PROCEDURE — 1159F MED LIST DOCD IN RCRD: CPT | Mod: S$GLB,,, | Performed by: INTERNAL MEDICINE

## 2021-01-01 PROCEDURE — 82010 KETONE BODYS QUAN: CPT | Performed by: NURSE PRACTITIONER

## 2021-01-01 PROCEDURE — 1125F AMNT PAIN NOTED PAIN PRSNT: CPT | Mod: S$GLB,,, | Performed by: NURSE PRACTITIONER

## 2021-01-01 PROCEDURE — 3288F FALL RISK ASSESSMENT DOCD: CPT | Mod: CPTII,S$GLB,, | Performed by: INTERNAL MEDICINE

## 2021-01-01 PROCEDURE — 1101F PR PT FALLS ASSESS DOC 0-1 FALLS W/OUT INJ PAST YR: ICD-10-PCS | Mod: CPTII,S$GLB,, | Performed by: INTERNAL MEDICINE

## 2021-01-01 PROCEDURE — 99499 RISK ADDL DX/OHS AUDIT: ICD-10-PCS | Mod: S$GLB,,, | Performed by: NURSE PRACTITIONER

## 2021-01-01 PROCEDURE — C9113 INJ PANTOPRAZOLE SODIUM, VIA: HCPCS | Performed by: STUDENT IN AN ORGANIZED HEALTH CARE EDUCATION/TRAINING PROGRAM

## 2021-01-01 PROCEDURE — 94761 N-INVAS EAR/PLS OXIMETRY MLT: CPT

## 2021-01-01 PROCEDURE — 3288F FALL RISK ASSESSMENT DOCD: CPT | Mod: CPTII,S$GLB,, | Performed by: NURSE PRACTITIONER

## 2021-01-01 PROCEDURE — 99999 PR PBB SHADOW E&M-EST. PATIENT-LVL V: CPT | Mod: PBBFAC,,, | Performed by: NURSE PRACTITIONER

## 2021-01-01 PROCEDURE — 81001 URINALYSIS AUTO W/SCOPE: CPT | Performed by: PHYSICIAN ASSISTANT

## 2021-01-01 PROCEDURE — 1125F PR PAIN SEVERITY QUANTIFIED, PAIN PRESENT: ICD-10-PCS | Mod: S$GLB,,, | Performed by: STUDENT IN AN ORGANIZED HEALTH CARE EDUCATION/TRAINING PROGRAM

## 2021-01-01 PROCEDURE — 99999 PR PBB SHADOW E&M-EST. PATIENT-LVL II: ICD-10-PCS | Mod: PBBFAC,,, | Performed by: STUDENT IN AN ORGANIZED HEALTH CARE EDUCATION/TRAINING PROGRAM

## 2021-01-01 PROCEDURE — 3008F BODY MASS INDEX DOCD: CPT | Mod: CPTII,S$GLB,, | Performed by: NURSE PRACTITIONER

## 2021-01-01 PROCEDURE — 3008F PR BODY MASS INDEX (BMI) DOCUMENTED: ICD-10-PCS | Mod: CPTII,S$GLB,, | Performed by: NURSE PRACTITIONER

## 2021-01-01 PROCEDURE — 25000003 PHARM REV CODE 250: Performed by: EMERGENCY MEDICINE

## 2021-01-01 PROCEDURE — 74019 RADEX ABDOMEN 2 VIEWS: CPT | Mod: TC

## 2021-01-01 PROCEDURE — 84439 ASSAY OF FREE THYROXINE: CPT | Performed by: NURSE PRACTITIONER

## 2021-01-01 PROCEDURE — 74176 CT ABD & PELVIS W/O CONTRAST: CPT | Mod: 26,,, | Performed by: INTERNAL MEDICINE

## 2021-01-01 PROCEDURE — 3075F PR MOST RECENT SYSTOLIC BLOOD PRESS GE 130-139MM HG: ICD-10-PCS | Mod: CPTII,S$GLB,, | Performed by: NURSE PRACTITIONER

## 2021-01-01 PROCEDURE — 3078F PR MOST RECENT DIASTOLIC BLOOD PRESSURE < 80 MM HG: ICD-10-PCS | Mod: CPTII,S$GLB,, | Performed by: STUDENT IN AN ORGANIZED HEALTH CARE EDUCATION/TRAINING PROGRAM

## 2021-01-01 PROCEDURE — 80048 BASIC METABOLIC PNL TOTAL CA: CPT | Performed by: STUDENT IN AN ORGANIZED HEALTH CARE EDUCATION/TRAINING PROGRAM

## 2021-01-01 PROCEDURE — 1111F DSCHRG MED/CURRENT MED MERGE: CPT | Mod: CPTII,S$GLB,, | Performed by: STUDENT IN AN ORGANIZED HEALTH CARE EDUCATION/TRAINING PROGRAM

## 2021-01-01 PROCEDURE — 63600175 PHARM REV CODE 636 W HCPCS: Performed by: EMERGENCY MEDICINE

## 2021-01-01 PROCEDURE — 3078F PR MOST RECENT DIASTOLIC BLOOD PRESSURE < 80 MM HG: ICD-10-PCS | Mod: CPTII,S$GLB,, | Performed by: INTERNAL MEDICINE

## 2021-01-01 PROCEDURE — 3074F SYST BP LT 130 MM HG: CPT | Mod: CPTII,S$GLB,, | Performed by: PSYCHIATRY & NEUROLOGY

## 2021-01-01 PROCEDURE — 25000003 PHARM REV CODE 250: Performed by: NURSE PRACTITIONER

## 2021-01-01 PROCEDURE — 99499 RISK ADDL DX/OHS AUDIT: ICD-10-PCS | Mod: S$GLB,,, | Performed by: STUDENT IN AN ORGANIZED HEALTH CARE EDUCATION/TRAINING PROGRAM

## 2021-01-01 PROCEDURE — 78815 PET IMAGE W/CT SKULL-THIGH: CPT | Mod: 26,PS,, | Performed by: RADIOLOGY

## 2021-01-01 PROCEDURE — 85025 COMPLETE CBC W/AUTO DIFF WBC: CPT | Performed by: STUDENT IN AN ORGANIZED HEALTH CARE EDUCATION/TRAINING PROGRAM

## 2021-01-01 PROCEDURE — 96376 TX/PRO/DX INJ SAME DRUG ADON: CPT

## 2021-01-01 PROCEDURE — 3078F DIAST BP <80 MM HG: CPT | Mod: CPTII,S$GLB,, | Performed by: STUDENT IN AN ORGANIZED HEALTH CARE EDUCATION/TRAINING PROGRAM

## 2021-01-01 PROCEDURE — 36415 COLL VENOUS BLD VENIPUNCTURE: CPT | Mod: PO | Performed by: NURSE PRACTITIONER

## 2021-01-01 PROCEDURE — 74176 CT ABD & PELVIS W/O CONTRAST: CPT | Mod: TC

## 2021-01-01 PROCEDURE — 81001 URINALYSIS AUTO W/SCOPE: CPT | Performed by: EMERGENCY MEDICINE

## 2021-01-01 PROCEDURE — 82800 BLOOD PH: CPT

## 2021-01-01 PROCEDURE — 3044F PR MOST RECENT HEMOGLOBIN A1C LEVEL <7.0%: ICD-10-PCS | Mod: CPTII,95,, | Performed by: NURSE PRACTITIONER

## 2021-01-01 PROCEDURE — 3075F SYST BP GE 130 - 139MM HG: CPT | Mod: CPTII,S$GLB,, | Performed by: NURSE PRACTITIONER

## 2021-01-01 PROCEDURE — 78264 NM GASTRIC EMPTYING: ICD-10-PCS | Mod: 26,,, | Performed by: RADIOLOGY

## 2021-01-01 PROCEDURE — 99999 PR PBB SHADOW E&M-EST. PATIENT-LVL IV: CPT | Mod: PBBFAC,,, | Performed by: INTERNAL MEDICINE

## 2021-01-01 PROCEDURE — 80053 COMPREHEN METABOLIC PANEL: CPT | Performed by: NURSE PRACTITIONER

## 2021-01-01 PROCEDURE — 1101F PT FALLS ASSESS-DOCD LE1/YR: CPT | Mod: CPTII,S$GLB,, | Performed by: INTERNAL MEDICINE

## 2021-01-01 PROCEDURE — 99284 PR EMERGENCY DEPT VISIT,LEVEL IV: ICD-10-PCS | Mod: 25,,, | Performed by: PHYSICIAN ASSISTANT

## 2021-01-01 PROCEDURE — 85025 COMPLETE CBC W/AUTO DIFF WBC: CPT | Mod: 91 | Performed by: STUDENT IN AN ORGANIZED HEALTH CARE EDUCATION/TRAINING PROGRAM

## 2021-01-01 PROCEDURE — 93010 EKG 12-LEAD: ICD-10-PCS | Mod: ,,, | Performed by: INTERNAL MEDICINE

## 2021-01-01 PROCEDURE — 1157F PR ADVANCE CARE PLAN OR EQUIV PRESENT IN MEDICAL RECORD: ICD-10-PCS | Mod: 95,,, | Performed by: NURSE PRACTITIONER

## 2021-01-01 PROCEDURE — 93010 ELECTROCARDIOGRAM REPORT: CPT | Mod: ,,, | Performed by: INTERNAL MEDICINE

## 2021-01-01 PROCEDURE — 1101F PT FALLS ASSESS-DOCD LE1/YR: CPT | Mod: CPTII,S$GLB,, | Performed by: STUDENT IN AN ORGANIZED HEALTH CARE EDUCATION/TRAINING PROGRAM

## 2021-01-01 PROCEDURE — C9399 UNCLASSIFIED DRUGS OR BIOLOG: HCPCS | Performed by: STUDENT IN AN ORGANIZED HEALTH CARE EDUCATION/TRAINING PROGRAM

## 2021-01-01 PROCEDURE — 1125F PR PAIN SEVERITY QUANTIFIED, PAIN PRESENT: ICD-10-PCS | Mod: S$GLB,,, | Performed by: INTERNAL MEDICINE

## 2021-01-01 PROCEDURE — 99233 PR SUBSEQUENT HOSPITAL CARE,LEVL III: ICD-10-PCS | Mod: GC,,, | Performed by: INTERNAL MEDICINE

## 2021-01-01 PROCEDURE — 88305 TISSUE EXAM BY PATHOLOGIST: CPT | Performed by: PATHOLOGY

## 2021-01-01 PROCEDURE — 99284 EMERGENCY DEPT VISIT MOD MDM: CPT | Mod: 25

## 2021-01-01 PROCEDURE — 83036 HEMOGLOBIN GLYCOSYLATED A1C: CPT | Performed by: STUDENT IN AN ORGANIZED HEALTH CARE EDUCATION/TRAINING PROGRAM

## 2021-01-01 PROCEDURE — 84100 ASSAY OF PHOSPHORUS: CPT | Performed by: STUDENT IN AN ORGANIZED HEALTH CARE EDUCATION/TRAINING PROGRAM

## 2021-01-01 PROCEDURE — 25000003 PHARM REV CODE 250: Performed by: INTERNAL MEDICINE

## 2021-01-01 PROCEDURE — 83690 ASSAY OF LIPASE: CPT | Performed by: NURSE PRACTITIONER

## 2021-01-01 PROCEDURE — 97166 OT EVAL MOD COMPLEX 45 MIN: CPT

## 2021-01-01 PROCEDURE — 99497 PR ADVNCD CARE PLAN 30 MIN: ICD-10-PCS | Mod: S$GLB,,, | Performed by: STUDENT IN AN ORGANIZED HEALTH CARE EDUCATION/TRAINING PROGRAM

## 2021-01-01 PROCEDURE — 25000003 PHARM REV CODE 250: Performed by: GENERAL ACUTE CARE HOSPITAL

## 2021-01-01 PROCEDURE — 99223 PR INITIAL HOSPITAL CARE,LEVL III: ICD-10-PCS | Mod: ,,, | Performed by: INTERNAL MEDICINE

## 2021-01-01 PROCEDURE — 81003 URINALYSIS AUTO W/O SCOPE: CPT

## 2021-01-01 PROCEDURE — 99999 PR PBB SHADOW E&M-EST. PATIENT-LVL II: CPT | Mod: PBBFAC,,, | Performed by: STUDENT IN AN ORGANIZED HEALTH CARE EDUCATION/TRAINING PROGRAM

## 2021-01-01 PROCEDURE — 97530 THERAPEUTIC ACTIVITIES: CPT

## 2021-01-01 PROCEDURE — 99205 OFFICE O/P NEW HI 60 MIN: CPT | Mod: S$GLB,,, | Performed by: STUDENT IN AN ORGANIZED HEALTH CARE EDUCATION/TRAINING PROGRAM

## 2021-01-01 PROCEDURE — 82803 BLOOD GASES ANY COMBINATION: CPT

## 2021-01-01 PROCEDURE — 1111F PR DISCHARGE MEDS RECONCILED W/ CURRENT OUTPATIENT MED LIST: ICD-10-PCS | Mod: CPTII,95,, | Performed by: NURSE PRACTITIONER

## 2021-01-01 PROCEDURE — 96413 CHEMO IV INFUSION 1 HR: CPT

## 2021-01-01 PROCEDURE — U0002 COVID-19 LAB TEST NON-CDC: HCPCS | Performed by: STUDENT IN AN ORGANIZED HEALTH CARE EDUCATION/TRAINING PROGRAM

## 2021-01-01 PROCEDURE — 99999 PR PBB SHADOW E&M-EST. PATIENT-LVL V: CPT | Mod: PBBFAC,,, | Performed by: PSYCHIATRY & NEUROLOGY

## 2021-01-01 PROCEDURE — 74019 RADEX ABDOMEN 2 VIEWS: CPT | Mod: 26,,, | Performed by: RADIOLOGY

## 2021-01-01 PROCEDURE — 83605 ASSAY OF LACTIC ACID: CPT | Performed by: STUDENT IN AN ORGANIZED HEALTH CARE EDUCATION/TRAINING PROGRAM

## 2021-01-01 PROCEDURE — 78815 NM PET CT ROUTINE: ICD-10-PCS | Mod: 26,PS,, | Performed by: RADIOLOGY

## 2021-01-01 PROCEDURE — 80061 LIPID PANEL: CPT | Performed by: STUDENT IN AN ORGANIZED HEALTH CARE EDUCATION/TRAINING PROGRAM

## 2021-01-01 PROCEDURE — 99999 PR PBB SHADOW E&M-EST. PATIENT-LVL V: CPT | Mod: PBBFAC,,, | Performed by: INTERNAL MEDICINE

## 2021-01-01 PROCEDURE — 99497 ADVNCD CARE PLAN 30 MIN: CPT | Mod: S$GLB,,, | Performed by: STUDENT IN AN ORGANIZED HEALTH CARE EDUCATION/TRAINING PROGRAM

## 2021-01-01 PROCEDURE — 99214 PR OFFICE/OUTPT VISIT, EST, LEVL IV, 30-39 MIN: ICD-10-PCS | Mod: S$GLB,,, | Performed by: STUDENT IN AN ORGANIZED HEALTH CARE EDUCATION/TRAINING PROGRAM

## 2021-01-01 PROCEDURE — 1126F PR PAIN SEVERITY QUANTIFIED, NO PAIN PRESENT: ICD-10-PCS | Mod: S$GLB,,, | Performed by: PSYCHIATRY & NEUROLOGY

## 2021-01-01 PROCEDURE — 84300 ASSAY OF URINE SODIUM: CPT | Performed by: PHYSICIAN ASSISTANT

## 2021-01-01 PROCEDURE — 1159F PR MEDICATION LIST DOCUMENTED IN MEDICAL RECORD: ICD-10-PCS | Mod: 95,,, | Performed by: NURSE PRACTITIONER

## 2021-01-01 PROCEDURE — 1125F AMNT PAIN NOTED PAIN PRSNT: CPT | Mod: S$GLB,,, | Performed by: INTERNAL MEDICINE

## 2021-01-01 PROCEDURE — 99214 PR OFFICE/OUTPT VISIT, EST, LEVL IV, 30-39 MIN: ICD-10-PCS | Mod: S$GLB,,, | Performed by: PSYCHIATRY & NEUROLOGY

## 2021-01-01 PROCEDURE — 99214 PR OFFICE/OUTPT VISIT, EST, LEVL IV, 30-39 MIN: ICD-10-PCS | Mod: S$GLB,,, | Performed by: INTERNAL MEDICINE

## 2021-01-01 PROCEDURE — 99215 OFFICE O/P EST HI 40 MIN: CPT | Mod: S$GLB,,, | Performed by: NURSE PRACTITIONER

## 2021-01-01 PROCEDURE — 1111F DSCHRG MED/CURRENT MED MERGE: CPT | Mod: CPTII,S$GLB,, | Performed by: NURSE PRACTITIONER

## 2021-01-01 PROCEDURE — 99215 OFFICE O/P EST HI 40 MIN: CPT | Mod: S$GLB,,, | Performed by: INTERNAL MEDICINE

## 2021-01-01 PROCEDURE — 1111F PR DISCHARGE MEDS RECONCILED W/ CURRENT OUTPATIENT MED LIST: ICD-10-PCS | Mod: CPTII,S$GLB,, | Performed by: NURSE PRACTITIONER

## 2021-01-01 PROCEDURE — 1126F PR PAIN SEVERITY QUANTIFIED, NO PAIN PRESENT: ICD-10-PCS | Mod: S$GLB,,, | Performed by: NURSE PRACTITIONER

## 2021-01-01 PROCEDURE — 3288F PR FALLS RISK ASSESSMENT DOCUMENTED: ICD-10-PCS | Mod: CPTII,S$GLB,, | Performed by: INTERNAL MEDICINE

## 2021-01-01 PROCEDURE — 82010 KETONE BODYS QUAN: CPT | Performed by: EMERGENCY MEDICINE

## 2021-01-01 PROCEDURE — 99232 SBSQ HOSP IP/OBS MODERATE 35: CPT | Mod: ,,, | Performed by: NURSE PRACTITIONER

## 2021-01-01 PROCEDURE — 85610 PROTHROMBIN TIME: CPT | Performed by: STUDENT IN AN ORGANIZED HEALTH CARE EDUCATION/TRAINING PROGRAM

## 2021-01-01 PROCEDURE — 27201012 HC FORCEPS, HOT/COLD, DISP: Performed by: INTERNAL MEDICINE

## 2021-01-01 PROCEDURE — 85025 COMPLETE CBC W/AUTO DIFF WBC: CPT

## 2021-01-01 PROCEDURE — 3008F PR BODY MASS INDEX (BMI) DOCUMENTED: ICD-10-PCS | Mod: CPTII,S$GLB,, | Performed by: PSYCHIATRY & NEUROLOGY

## 2021-01-01 PROCEDURE — 3288F PR FALLS RISK ASSESSMENT DOCUMENTED: ICD-10-PCS | Mod: CPTII,S$GLB,, | Performed by: PSYCHIATRY & NEUROLOGY

## 2021-01-01 PROCEDURE — 83935 ASSAY OF URINE OSMOLALITY: CPT | Performed by: PHYSICIAN ASSISTANT

## 2021-01-01 PROCEDURE — 99223 1ST HOSP IP/OBS HIGH 75: CPT | Mod: AI,GC,, | Performed by: INTERNAL MEDICINE

## 2021-01-01 PROCEDURE — 82010 KETONE BODYS QUAN: CPT | Performed by: PHYSICIAN ASSISTANT

## 2021-01-01 PROCEDURE — 80047 BASIC METABLC PNL IONIZED CA: CPT

## 2021-01-01 PROCEDURE — 84100 ASSAY OF PHOSPHORUS: CPT | Performed by: PHYSICIAN ASSISTANT

## 2021-01-01 PROCEDURE — 78815 PET IMAGE W/CT SKULL-THIGH: CPT | Mod: TC

## 2021-01-01 PROCEDURE — 3078F DIAST BP <80 MM HG: CPT | Mod: CPTII,S$GLB,, | Performed by: PSYCHIATRY & NEUROLOGY

## 2021-01-01 PROCEDURE — 86900 BLOOD TYPING SEROLOGIC ABO: CPT | Performed by: INTERNAL MEDICINE

## 2021-01-01 PROCEDURE — 82962 GLUCOSE BLOOD TEST: CPT | Mod: 91

## 2021-01-01 PROCEDURE — 99214 OFFICE O/P EST MOD 30 MIN: CPT | Mod: S$GLB,,, | Performed by: NURSE PRACTITIONER

## 2021-01-01 PROCEDURE — 99222 PR INITIAL HOSPITAL CARE,LEVL II: ICD-10-PCS | Mod: ,,, | Performed by: NURSE PRACTITIONER

## 2021-01-01 PROCEDURE — 25500020 PHARM REV CODE 255: Performed by: PHYSICIAN ASSISTANT

## 2021-01-01 PROCEDURE — 99499 UNLISTED E&M SERVICE: CPT | Mod: S$GLB,,, | Performed by: INTERNAL MEDICINE

## 2021-01-01 PROCEDURE — 96417 CHEMO IV INFUS EACH ADDL SEQ: CPT

## 2021-01-01 PROCEDURE — E9220 PRA ENDO ANESTHESIA: ICD-10-PCS | Mod: ,,, | Performed by: NURSE ANESTHETIST, CERTIFIED REGISTERED

## 2021-01-01 PROCEDURE — 1157F PR ADVANCE CARE PLAN OR EQUIV PRESENT IN MEDICAL RECORD: ICD-10-PCS | Mod: S$GLB,,, | Performed by: STUDENT IN AN ORGANIZED HEALTH CARE EDUCATION/TRAINING PROGRAM

## 2021-01-01 PROCEDURE — G0180 PR HOME HEALTH MD CERTIFICATION: ICD-10-PCS | Mod: ,,, | Performed by: INTERNAL MEDICINE

## 2021-01-01 PROCEDURE — 1157F ADVNC CARE PLAN IN RCRD: CPT | Mod: S$GLB,,, | Performed by: STUDENT IN AN ORGANIZED HEALTH CARE EDUCATION/TRAINING PROGRAM

## 2021-01-01 PROCEDURE — 99232 PR SUBSEQUENT HOSPITAL CARE,LEVL II: ICD-10-PCS | Mod: ,,, | Performed by: INTERNAL MEDICINE

## 2021-01-01 PROCEDURE — 85025 COMPLETE CBC W/AUTO DIFF WBC: CPT | Performed by: EMERGENCY MEDICINE

## 2021-01-01 PROCEDURE — 80048 BASIC METABOLIC PNL TOTAL CA: CPT | Performed by: HOSPITALIST

## 2021-01-01 PROCEDURE — 43239 PR EGD, FLEX, W/BIOPSY, SGL/MULTI: ICD-10-PCS | Mod: ,,, | Performed by: INTERNAL MEDICINE

## 2021-01-01 PROCEDURE — 80053 COMPREHEN METABOLIC PANEL: CPT | Performed by: EMERGENCY MEDICINE

## 2021-01-01 PROCEDURE — 99214 OFFICE O/P EST MOD 30 MIN: CPT | Mod: S$GLB,,, | Performed by: INTERNAL MEDICINE

## 2021-01-01 PROCEDURE — 37000009 HC ANESTHESIA EA ADD 15 MINS: Performed by: INTERNAL MEDICINE

## 2021-01-01 PROCEDURE — 80047 BASIC METABLC PNL IONIZED CA: CPT | Mod: 59

## 2021-01-01 PROCEDURE — 99214 OFFICE O/P EST MOD 30 MIN: CPT | Mod: S$GLB,,, | Performed by: STUDENT IN AN ORGANIZED HEALTH CARE EDUCATION/TRAINING PROGRAM

## 2021-01-01 PROCEDURE — 83690 ASSAY OF LIPASE: CPT | Performed by: STUDENT IN AN ORGANIZED HEALTH CARE EDUCATION/TRAINING PROGRAM

## 2021-01-01 PROCEDURE — 99205 PR OFFICE/OUTPT VISIT, NEW, LEVL V, 60-74 MIN: ICD-10-PCS | Mod: S$GLB,,, | Performed by: STUDENT IN AN ORGANIZED HEALTH CARE EDUCATION/TRAINING PROGRAM

## 2021-01-01 PROCEDURE — 74176 CT ABDOMEN PELVIS WITHOUT CONTRAST: ICD-10-PCS | Mod: 26,,, | Performed by: INTERNAL MEDICINE

## 2021-01-01 PROCEDURE — 84439 ASSAY OF FREE THYROXINE: CPT

## 2021-01-01 PROCEDURE — 99499 UNLISTED E&M SERVICE: CPT | Mod: 95,,, | Performed by: NURSE PRACTITIONER

## 2021-01-01 PROCEDURE — C9399 UNCLASSIFIED DRUGS OR BIOLOG: HCPCS | Performed by: GENERAL ACUTE CARE HOSPITAL

## 2021-01-01 PROCEDURE — 86803 HEPATITIS C AB TEST: CPT | Performed by: EMERGENCY MEDICINE

## 2021-01-01 PROCEDURE — 3288F FALL RISK ASSESSMENT DOCD: CPT | Mod: CPTII,S$GLB,, | Performed by: STUDENT IN AN ORGANIZED HEALTH CARE EDUCATION/TRAINING PROGRAM

## 2021-01-01 PROCEDURE — 74019 XR ABDOMEN FLAT AND ERECT: ICD-10-PCS | Mod: 26,,, | Performed by: RADIOLOGY

## 2021-01-01 PROCEDURE — 36415 COLL VENOUS BLD VENIPUNCTURE: CPT | Performed by: HOSPITALIST

## 2021-01-01 PROCEDURE — 99284 PR EMERGENCY DEPT VISIT,LEVEL IV: ICD-10-PCS | Mod: ,,, | Performed by: EMERGENCY MEDICINE

## 2021-01-01 PROCEDURE — 83605 ASSAY OF LACTIC ACID: CPT

## 2021-01-01 PROCEDURE — 99284 EMERGENCY DEPT VISIT MOD MDM: CPT | Mod: 25,,, | Performed by: PHYSICIAN ASSISTANT

## 2021-01-01 PROCEDURE — 99215 PR OFFICE/OUTPT VISIT, EST, LEVL V, 40-54 MIN: ICD-10-PCS | Mod: S$GLB,,, | Performed by: STUDENT IN AN ORGANIZED HEALTH CARE EDUCATION/TRAINING PROGRAM

## 2021-01-01 PROCEDURE — 99499 RISK ADDL DX/OHS AUDIT: ICD-10-PCS | Mod: 95,,, | Performed by: NURSE PRACTITIONER

## 2021-01-01 PROCEDURE — 99291 CRITICAL CARE FIRST HOUR: CPT | Mod: CS,,, | Performed by: EMERGENCY MEDICINE

## 2021-01-01 PROCEDURE — 3074F PR MOST RECENT SYSTOLIC BLOOD PRESSURE < 130 MM HG: ICD-10-PCS | Mod: CPTII,S$GLB,, | Performed by: PSYCHIATRY & NEUROLOGY

## 2021-01-01 PROCEDURE — 1101F PR PT FALLS ASSESS DOC 0-1 FALLS W/OUT INJ PAST YR: ICD-10-PCS | Mod: CPTII,S$GLB,, | Performed by: PSYCHIATRY & NEUROLOGY

## 2021-01-01 PROCEDURE — 81001 URINALYSIS AUTO W/SCOPE: CPT | Performed by: STUDENT IN AN ORGANIZED HEALTH CARE EDUCATION/TRAINING PROGRAM

## 2021-01-01 PROCEDURE — 99285 PR EMERGENCY DEPT VISIT,LEVEL V: ICD-10-PCS | Mod: CS,,, | Performed by: EMERGENCY MEDICINE

## 2021-01-01 PROCEDURE — 1159F MED LIST DOCD IN RCRD: CPT | Mod: 95,,, | Performed by: NURSE PRACTITIONER

## 2021-01-01 PROCEDURE — 85025 COMPLETE CBC W/AUTO DIFF WBC: CPT | Performed by: PHYSICIAN ASSISTANT

## 2021-01-01 PROCEDURE — 3078F DIAST BP <80 MM HG: CPT | Mod: CPTII,S$GLB,, | Performed by: NURSE PRACTITIONER

## 2021-01-01 PROCEDURE — 99285 EMERGENCY DEPT VISIT HI MDM: CPT | Mod: CS,,, | Performed by: EMERGENCY MEDICINE

## 2021-01-01 PROCEDURE — 1101F PR PT FALLS ASSESS DOC 0-1 FALLS W/OUT INJ PAST YR: ICD-10-PCS | Mod: CPTII,S$GLB,, | Performed by: STUDENT IN AN ORGANIZED HEALTH CARE EDUCATION/TRAINING PROGRAM

## 2021-01-01 PROCEDURE — 3074F SYST BP LT 130 MM HG: CPT | Mod: CPTII,S$GLB,, | Performed by: NURSE PRACTITIONER

## 2021-01-01 PROCEDURE — 3078F PR MOST RECENT DIASTOLIC BLOOD PRESSURE < 80 MM HG: ICD-10-PCS | Mod: CPTII,S$GLB,, | Performed by: NURSE PRACTITIONER

## 2021-01-01 PROCEDURE — 63600175 PHARM REV CODE 636 W HCPCS: Performed by: PHYSICIAN ASSISTANT

## 2021-01-01 PROCEDURE — 3077F PR MOST RECENT SYSTOLIC BLOOD PRESSURE >= 140 MM HG: ICD-10-PCS | Mod: CPTII,S$GLB,, | Performed by: STUDENT IN AN ORGANIZED HEALTH CARE EDUCATION/TRAINING PROGRAM

## 2021-01-01 PROCEDURE — U0002 COVID-19 LAB TEST NON-CDC: HCPCS | Performed by: EMERGENCY MEDICINE

## 2021-01-01 PROCEDURE — 99999 PR PBB SHADOW E&M-EST. PATIENT-LVL IV: ICD-10-PCS | Mod: PBBFAC,,, | Performed by: NURSE PRACTITIONER

## 2021-01-01 PROCEDURE — 3077F PR MOST RECENT SYSTOLIC BLOOD PRESSURE >= 140 MM HG: ICD-10-PCS | Mod: CPTII,S$GLB,, | Performed by: INTERNAL MEDICINE

## 2021-01-01 PROCEDURE — 43239 EGD BIOPSY SINGLE/MULTIPLE: CPT | Mod: ,,, | Performed by: INTERNAL MEDICINE

## 2021-01-01 PROCEDURE — 3077F SYST BP >= 140 MM HG: CPT | Mod: CPTII,S$GLB,, | Performed by: STUDENT IN AN ORGANIZED HEALTH CARE EDUCATION/TRAINING PROGRAM

## 2021-01-01 PROCEDURE — 99223 PR INITIAL HOSPITAL CARE,LEVL III: ICD-10-PCS | Mod: AI,GC,, | Performed by: HOSPITALIST

## 2021-01-01 PROCEDURE — 83690 ASSAY OF LIPASE: CPT | Performed by: PHYSICIAN ASSISTANT

## 2021-01-01 PROCEDURE — 1157F ADVNC CARE PLAN IN RCRD: CPT | Mod: S$GLB,,, | Performed by: NURSE PRACTITIONER

## 2021-01-01 PROCEDURE — 85025 COMPLETE CBC W/AUTO DIFF WBC: CPT | Performed by: NURSE PRACTITIONER

## 2021-01-01 PROCEDURE — 82962 GLUCOSE BLOOD TEST: CPT | Mod: 59

## 2021-01-01 PROCEDURE — 99239 PR HOSPITAL DISCHARGE DAY,>30 MIN: ICD-10-PCS | Mod: GC,,, | Performed by: INTERNAL MEDICINE

## 2021-01-01 PROCEDURE — 99223 1ST HOSP IP/OBS HIGH 75: CPT | Mod: AI,GC,, | Performed by: HOSPITALIST

## 2021-01-01 PROCEDURE — 85027 COMPLETE CBC AUTOMATED: CPT

## 2021-01-01 PROCEDURE — 3044F PR MOST RECENT HEMOGLOBIN A1C LEVEL <7.0%: ICD-10-PCS | Mod: CPTII,S$GLB,, | Performed by: NURSE PRACTITIONER

## 2021-01-01 PROCEDURE — 1111F PR DISCHARGE MEDS RECONCILED W/ CURRENT OUTPATIENT MED LIST: ICD-10-PCS | Mod: CPTII,S$GLB,, | Performed by: STUDENT IN AN ORGANIZED HEALTH CARE EDUCATION/TRAINING PROGRAM

## 2021-01-01 PROCEDURE — 63600175 PHARM REV CODE 636 W HCPCS: Mod: JG | Performed by: INTERNAL MEDICINE

## 2021-01-01 PROCEDURE — 83735 ASSAY OF MAGNESIUM: CPT | Performed by: PHYSICIAN ASSISTANT

## 2021-01-01 PROCEDURE — 84443 ASSAY THYROID STIM HORMONE: CPT | Performed by: NURSE PRACTITIONER

## 2021-01-01 PROCEDURE — 99232 PR SUBSEQUENT HOSPITAL CARE,LEVL II: ICD-10-PCS | Mod: 95,,, | Performed by: NURSE PRACTITIONER

## 2021-01-01 PROCEDURE — 88305 TISSUE EXAM BY PATHOLOGIST: ICD-10-PCS | Mod: 26,,, | Performed by: PATHOLOGY

## 2021-01-01 PROCEDURE — 99285 PR EMERGENCY DEPT VISIT,LEVEL V: ICD-10-PCS | Mod: ,,, | Performed by: EMERGENCY MEDICINE

## 2021-01-01 PROCEDURE — A4216 STERILE WATER/SALINE, 10 ML: HCPCS | Performed by: INTERNAL MEDICINE

## 2021-01-01 PROCEDURE — 99223 PR INITIAL HOSPITAL CARE,LEVL III: ICD-10-PCS | Mod: AI,GC,, | Performed by: INTERNAL MEDICINE

## 2021-01-01 PROCEDURE — 3075F SYST BP GE 130 - 139MM HG: CPT | Mod: CPTII,S$GLB,, | Performed by: STUDENT IN AN ORGANIZED HEALTH CARE EDUCATION/TRAINING PROGRAM

## 2021-01-01 PROCEDURE — 97165 OT EVAL LOW COMPLEX 30 MIN: CPT

## 2021-01-01 PROCEDURE — 99285 EMERGENCY DEPT VISIT HI MDM: CPT | Mod: ,,, | Performed by: EMERGENCY MEDICINE

## 2021-01-01 PROCEDURE — 3044F HG A1C LEVEL LT 7.0%: CPT | Mod: CPTII,95,, | Performed by: NURSE PRACTITIONER

## 2021-01-01 PROCEDURE — 3077F SYST BP >= 140 MM HG: CPT | Mod: CPTII,S$GLB,, | Performed by: NURSE PRACTITIONER

## 2021-01-01 PROCEDURE — 80048 BASIC METABOLIC PNL TOTAL CA: CPT | Mod: 91 | Performed by: STUDENT IN AN ORGANIZED HEALTH CARE EDUCATION/TRAINING PROGRAM

## 2021-01-01 PROCEDURE — 3008F BODY MASS INDEX DOCD: CPT | Mod: CPTII,S$GLB,, | Performed by: PSYCHIATRY & NEUROLOGY

## 2021-01-01 PROCEDURE — 99232 SBSQ HOSP IP/OBS MODERATE 35: CPT | Mod: 95,,, | Performed by: NURSE PRACTITIONER

## 2021-01-01 PROCEDURE — 82150 ASSAY OF AMYLASE: CPT | Performed by: NURSE PRACTITIONER

## 2021-01-01 PROCEDURE — 1101F PT FALLS ASSESS-DOCD LE1/YR: CPT | Mod: CPTII,S$GLB,, | Performed by: PSYCHIATRY & NEUROLOGY

## 2021-01-01 PROCEDURE — 83930 ASSAY OF BLOOD OSMOLALITY: CPT | Performed by: PHYSICIAN ASSISTANT

## 2021-01-01 PROCEDURE — 99999 PR PBB SHADOW E&M-EST. PATIENT-LVL IV: ICD-10-PCS | Mod: PBBFAC,,, | Performed by: STUDENT IN AN ORGANIZED HEALTH CARE EDUCATION/TRAINING PROGRAM

## 2021-01-01 PROCEDURE — 1157F ADVNC CARE PLAN IN RCRD: CPT | Mod: S$GLB,,, | Performed by: PSYCHIATRY & NEUROLOGY

## 2021-01-01 PROCEDURE — 1159F PR MEDICATION LIST DOCUMENTED IN MEDICAL RECORD: ICD-10-PCS | Mod: S$GLB,,, | Performed by: PSYCHIATRY & NEUROLOGY

## 2021-01-01 PROCEDURE — 99999 PR PBB SHADOW E&M-EST. PATIENT-LVL I: ICD-10-PCS | Mod: PBBFAC,,, | Performed by: NURSE PRACTITIONER

## 2021-01-01 PROCEDURE — 1126F AMNT PAIN NOTED NONE PRSNT: CPT | Mod: S$GLB,,, | Performed by: PSYCHIATRY & NEUROLOGY

## 2021-01-01 PROCEDURE — G0180 MD CERTIFICATION HHA PATIENT: HCPCS | Mod: ,,, | Performed by: INTERNAL MEDICINE

## 2021-01-01 PROCEDURE — 99999 PR PBB SHADOW E&M-EST. PATIENT-LVL I: CPT | Mod: PBBFAC,,, | Performed by: NURSE PRACTITIONER

## 2021-01-01 PROCEDURE — 97161 PT EVAL LOW COMPLEX 20 MIN: CPT

## 2021-01-01 PROCEDURE — 1157F PR ADVANCE CARE PLAN OR EQUIV PRESENT IN MEDICAL RECORD: ICD-10-PCS | Mod: S$GLB,,, | Performed by: PSYCHIATRY & NEUROLOGY

## 2021-01-01 PROCEDURE — 3077F SYST BP >= 140 MM HG: CPT | Mod: CPTII,S$GLB,, | Performed by: INTERNAL MEDICINE

## 2021-01-01 PROCEDURE — 82010 KETONE BODYS QUAN: CPT | Performed by: STUDENT IN AN ORGANIZED HEALTH CARE EDUCATION/TRAINING PROGRAM

## 2021-01-01 PROCEDURE — 99239 HOSP IP/OBS DSCHRG MGMT >30: CPT | Mod: GC,,, | Performed by: INTERNAL MEDICINE

## 2021-01-01 PROCEDURE — U0003 INFECTIOUS AGENT DETECTION BY NUCLEIC ACID (DNA OR RNA); SEVERE ACUTE RESPIRATORY SYNDROME CORONAVIRUS 2 (SARS-COV-2) (CORONAVIRUS DISEASE [COVID-19]), AMPLIFIED PROBE TECHNIQUE, MAKING USE OF HIGH THROUGHPUT TECHNOLOGIES AS DESCRIBED BY CMS-2020-01-R: HCPCS

## 2021-01-01 PROCEDURE — 83930 ASSAY OF BLOOD OSMOLALITY: CPT | Performed by: STUDENT IN AN ORGANIZED HEALTH CARE EDUCATION/TRAINING PROGRAM

## 2021-01-01 PROCEDURE — 99285 PR EMERGENCY DEPT VISIT,LEVEL V: ICD-10-PCS | Mod: GC,CS,, | Performed by: EMERGENCY MEDICINE

## 2021-01-01 PROCEDURE — 99291 PR CRITICAL CARE, E/M 30-74 MINUTES: ICD-10-PCS | Mod: CS,,, | Performed by: EMERGENCY MEDICINE

## 2021-01-01 PROCEDURE — 83605 ASSAY OF LACTIC ACID: CPT | Performed by: PHYSICIAN ASSISTANT

## 2021-01-01 PROCEDURE — 83690 ASSAY OF LIPASE: CPT

## 2021-01-01 PROCEDURE — 86900 BLOOD TYPING SEROLOGIC ABO: CPT | Performed by: EMERGENCY MEDICINE

## 2021-01-01 PROCEDURE — 1159F MED LIST DOCD IN RCRD: CPT | Mod: S$GLB,,, | Performed by: PSYCHIATRY & NEUROLOGY

## 2021-01-01 PROCEDURE — 99239 HOSP IP/OBS DSCHRG MGMT >30: CPT | Mod: GC,,, | Performed by: HOSPITALIST

## 2021-01-01 PROCEDURE — E9220 PRA ENDO ANESTHESIA: HCPCS | Mod: ,,, | Performed by: NURSE ANESTHETIST, CERTIFIED REGISTERED

## 2021-01-01 PROCEDURE — 3078F PR MOST RECENT DIASTOLIC BLOOD PRESSURE < 80 MM HG: ICD-10-PCS | Mod: CPTII,S$GLB,, | Performed by: PSYCHIATRY & NEUROLOGY

## 2021-01-01 PROCEDURE — 78264 GASTRIC EMPTYING IMG STUDY: CPT | Mod: TC

## 2021-01-01 PROCEDURE — 99233 SBSQ HOSP IP/OBS HIGH 50: CPT | Mod: GC,,, | Performed by: HOSPITALIST

## 2021-01-01 PROCEDURE — 99285 EMERGENCY DEPT VISIT HI MDM: CPT | Mod: GC,CS,, | Performed by: EMERGENCY MEDICINE

## 2021-01-01 PROCEDURE — 99214 OFFICE O/P EST MOD 30 MIN: CPT | Mod: S$GLB,,, | Performed by: PSYCHIATRY & NEUROLOGY

## 2021-01-01 PROCEDURE — 80048 BASIC METABOLIC PNL TOTAL CA: CPT | Mod: 91 | Performed by: HOSPITALIST

## 2021-01-01 PROCEDURE — 3075F PR MOST RECENT SYSTOLIC BLOOD PRESS GE 130-139MM HG: ICD-10-PCS | Mod: CPTII,S$GLB,, | Performed by: STUDENT IN AN ORGANIZED HEALTH CARE EDUCATION/TRAINING PROGRAM

## 2021-01-01 PROCEDURE — 87040 BLOOD CULTURE FOR BACTERIA: CPT | Mod: 59 | Performed by: STUDENT IN AN ORGANIZED HEALTH CARE EDUCATION/TRAINING PROGRAM

## 2021-01-01 PROCEDURE — 99999 PR PBB SHADOW E&M-EST. PATIENT-LVL IV: ICD-10-PCS | Mod: PBBFAC,,, | Performed by: INTERNAL MEDICINE

## 2021-01-01 PROCEDURE — 99214 PR OFFICE/OUTPT VISIT, EST, LEVL IV, 30-39 MIN: ICD-10-PCS | Mod: S$GLB,,, | Performed by: NURSE PRACTITIONER

## 2021-01-01 PROCEDURE — 99233 PR SUBSEQUENT HOSPITAL CARE,LEVL III: ICD-10-PCS | Mod: GC,,, | Performed by: HOSPITALIST

## 2021-01-01 PROCEDURE — 3044F HG A1C LEVEL LT 7.0%: CPT | Mod: CPTII,S$GLB,, | Performed by: NURSE PRACTITIONER

## 2021-01-01 PROCEDURE — 36415 COLL VENOUS BLD VENIPUNCTURE: CPT

## 2021-01-01 PROCEDURE — 88305 TISSUE EXAM BY PATHOLOGIST: CPT | Mod: 26,,, | Performed by: PATHOLOGY

## 2021-01-01 PROCEDURE — 99999 PR PBB SHADOW E&M-EST. PATIENT-LVL IV: CPT | Mod: PBBFAC,,, | Performed by: STUDENT IN AN ORGANIZED HEALTH CARE EDUCATION/TRAINING PROGRAM

## 2021-01-01 PROCEDURE — 36415 COLL VENOUS BLD VENIPUNCTURE: CPT | Performed by: NURSE PRACTITIONER

## 2021-01-01 PROCEDURE — 99999 PR PBB SHADOW E&M-EST. PATIENT-LVL V: ICD-10-PCS | Mod: PBBFAC,,, | Performed by: PSYCHIATRY & NEUROLOGY

## 2021-01-01 PROCEDURE — 84443 ASSAY THYROID STIM HORMONE: CPT

## 2021-01-01 PROCEDURE — 3288F FALL RISK ASSESSMENT DOCD: CPT | Mod: CPTII,S$GLB,, | Performed by: PSYCHIATRY & NEUROLOGY

## 2021-01-01 RX ORDER — IBUPROFEN 200 MG
16 TABLET ORAL
Status: DISCONTINUED | OUTPATIENT
Start: 2021-01-01 | End: 2021-01-01 | Stop reason: HOSPADM

## 2021-01-01 RX ORDER — HYDROCODONE BITARTRATE AND ACETAMINOPHEN 5; 325 MG/1; MG/1
1 TABLET ORAL EVERY 6 HOURS PRN
Qty: 120 TABLET | Refills: 0 | Status: SHIPPED | OUTPATIENT
Start: 2021-01-01 | End: 2021-01-01

## 2021-01-01 RX ORDER — PREDNISONE 20 MG/1
80 TABLET ORAL DAILY
Qty: 120 TABLET | Refills: 0 | Status: ON HOLD | OUTPATIENT
Start: 2021-01-01 | End: 2021-01-01 | Stop reason: HOSPADM

## 2021-01-01 RX ORDER — ONDANSETRON 2 MG/ML
4 INJECTION INTRAMUSCULAR; INTRAVENOUS
Status: COMPLETED | OUTPATIENT
Start: 2021-01-01 | End: 2021-01-01

## 2021-01-01 RX ORDER — INSULIN ASPART 100 [IU]/ML
10 INJECTION, SOLUTION INTRAVENOUS; SUBCUTANEOUS
Status: DISCONTINUED | OUTPATIENT
Start: 2021-01-01 | End: 2021-01-01

## 2021-01-01 RX ORDER — PANTOPRAZOLE SODIUM 40 MG/10ML
80 INJECTION, POWDER, LYOPHILIZED, FOR SOLUTION INTRAVENOUS
Status: COMPLETED | OUTPATIENT
Start: 2021-01-01 | End: 2021-01-01

## 2021-01-01 RX ORDER — LANCING DEVICE
1 EACH MISCELLANEOUS 2 TIMES DAILY WITH MEALS
Qty: 1 EACH | Refills: 0 | Status: SHIPPED | OUTPATIENT
Start: 2021-01-01 | End: 2021-01-01 | Stop reason: SDUPTHER

## 2021-01-01 RX ORDER — PROCHLORPERAZINE EDISYLATE 5 MG/ML
5 INJECTION INTRAMUSCULAR; INTRAVENOUS EVERY 6 HOURS PRN
Status: DISCONTINUED | OUTPATIENT
Start: 2021-01-01 | End: 2021-01-01 | Stop reason: HOSPADM

## 2021-01-01 RX ORDER — OLANZAPINE 2.5 MG/1
5 TABLET ORAL NIGHTLY
Status: DISCONTINUED | OUTPATIENT
Start: 2021-01-01 | End: 2021-01-01

## 2021-01-01 RX ORDER — GLUCAGON INJECTION, SOLUTION 1 MG/.2ML
1 INJECTION, SOLUTION SUBCUTANEOUS
Qty: 2 SYRINGE | Refills: 0 | Status: SHIPPED | OUTPATIENT
Start: 2021-01-01

## 2021-01-01 RX ORDER — LIDOCAINE AND PRILOCAINE 25; 25 MG/G; MG/G
CREAM TOPICAL
Qty: 30 G | Refills: 1 | Status: SHIPPED | OUTPATIENT
Start: 2021-01-01

## 2021-01-01 RX ORDER — HEPARIN 100 UNIT/ML
500 SYRINGE INTRAVENOUS
Status: DISCONTINUED | OUTPATIENT
Start: 2021-01-01 | End: 2021-01-01 | Stop reason: HOSPADM

## 2021-01-01 RX ORDER — INSULIN ASPART 100 [IU]/ML
10 INJECTION, SOLUTION INTRAVENOUS; SUBCUTANEOUS
Status: DISCONTINUED | OUTPATIENT
Start: 2021-01-01 | End: 2021-01-01 | Stop reason: HOSPADM

## 2021-01-01 RX ORDER — HEPARIN SODIUM 5000 [USP'U]/ML
5000 INJECTION, SOLUTION INTRAVENOUS; SUBCUTANEOUS EVERY 8 HOURS
Status: DISCONTINUED | OUTPATIENT
Start: 2021-01-01 | End: 2021-01-01 | Stop reason: HOSPADM

## 2021-01-01 RX ORDER — PREDNISONE 50 MG/1
100 TABLET ORAL DAILY
Status: DISCONTINUED | OUTPATIENT
Start: 2021-01-01 | End: 2021-01-01 | Stop reason: HOSPADM

## 2021-01-01 RX ORDER — PANTOPRAZOLE SODIUM 40 MG/1
40 TABLET, DELAYED RELEASE ORAL
Status: DISCONTINUED | OUTPATIENT
Start: 2021-01-01 | End: 2021-01-01 | Stop reason: HOSPADM

## 2021-01-01 RX ORDER — ONDANSETRON 2 MG/ML
4 INJECTION INTRAMUSCULAR; INTRAVENOUS EVERY 6 HOURS PRN
Status: DISCONTINUED | OUTPATIENT
Start: 2021-01-01 | End: 2021-01-01

## 2021-01-01 RX ORDER — DICLOFENAC SODIUM 10 MG/G
4 GEL TOPICAL DAILY
Status: DISCONTINUED | OUTPATIENT
Start: 2021-01-01 | End: 2021-01-01

## 2021-01-01 RX ORDER — PREDNISONE 20 MG/1
40 TABLET ORAL DAILY
Status: DISCONTINUED | OUTPATIENT
Start: 2021-01-01 | End: 2021-01-01 | Stop reason: HOSPADM

## 2021-01-01 RX ORDER — PANTOPRAZOLE SODIUM 40 MG/10ML
40 INJECTION, POWDER, LYOPHILIZED, FOR SOLUTION INTRAVENOUS 2 TIMES DAILY
Status: DISCONTINUED | OUTPATIENT
Start: 2021-01-01 | End: 2021-01-01

## 2021-01-01 RX ORDER — SODIUM CHLORIDE 0.9 % (FLUSH) 0.9 %
10 SYRINGE (ML) INJECTION
Status: CANCELLED | OUTPATIENT
Start: 2021-01-01

## 2021-01-01 RX ORDER — GLUCAGON 1 MG
1 KIT INJECTION
Status: DISCONTINUED | OUTPATIENT
Start: 2021-01-01 | End: 2021-01-01 | Stop reason: HOSPADM

## 2021-01-01 RX ORDER — EPINEPHRINE 0.3 MG/.3ML
0.3 INJECTION SUBCUTANEOUS ONCE AS NEEDED
Status: CANCELLED | OUTPATIENT
Start: 2021-01-01

## 2021-01-01 RX ORDER — HEPARIN 100 UNIT/ML
300 SYRINGE INTRAVENOUS ONCE
Status: COMPLETED | OUTPATIENT
Start: 2021-01-01 | End: 2021-01-01

## 2021-01-01 RX ORDER — SODIUM,POTASSIUM PHOSPHATES 280-250MG
2 POWDER IN PACKET (EA) ORAL EVERY 4 HOURS
Status: COMPLETED | OUTPATIENT
Start: 2021-01-01 | End: 2021-01-01

## 2021-01-01 RX ORDER — PREDNISONE 20 MG/1
TABLET ORAL
Qty: 6 TABLET | Refills: 0 | Status: SHIPPED | OUTPATIENT
Start: 2021-01-01 | End: 2021-01-01

## 2021-01-01 RX ORDER — OLANZAPINE 2.5 MG/1
5 TABLET ORAL NIGHTLY
Status: DISCONTINUED | OUTPATIENT
Start: 2021-01-01 | End: 2021-01-01 | Stop reason: HOSPADM

## 2021-01-01 RX ORDER — OXYCODONE HYDROCHLORIDE 10 MG/1
10 TABLET ORAL EVERY 6 HOURS PRN
Status: DISCONTINUED | OUTPATIENT
Start: 2021-01-01 | End: 2021-01-01 | Stop reason: HOSPADM

## 2021-01-01 RX ORDER — POTASSIUM CHLORIDE 750 MG/1
30 CAPSULE, EXTENDED RELEASE ORAL ONCE
Status: COMPLETED | OUTPATIENT
Start: 2021-01-01 | End: 2021-01-01

## 2021-01-01 RX ORDER — PEN NEEDLE, DIABETIC 30 GX3/16"
1 NEEDLE, DISPOSABLE MISCELLANEOUS 2 TIMES DAILY WITH MEALS
Qty: 100 EACH | Refills: 11 | Status: SHIPPED | OUTPATIENT
Start: 2021-01-01

## 2021-01-01 RX ORDER — METHYLPREDNISOLONE SOD SUCC 125 MG
125 VIAL (EA) INJECTION ONCE AS NEEDED
Status: DISCONTINUED | OUTPATIENT
Start: 2021-01-01 | End: 2021-01-01 | Stop reason: HOSPADM

## 2021-01-01 RX ORDER — INSULIN ASPART 100 [IU]/ML
8 INJECTION, SOLUTION INTRAVENOUS; SUBCUTANEOUS
Status: DISCONTINUED | OUTPATIENT
Start: 2021-01-01 | End: 2021-01-01

## 2021-01-01 RX ORDER — OXYCODONE HYDROCHLORIDE 10 MG/1
10 TABLET ORAL ONCE AS NEEDED
Status: COMPLETED | OUTPATIENT
Start: 2021-01-01 | End: 2021-01-01

## 2021-01-01 RX ORDER — PROCHLORPERAZINE EDISYLATE 5 MG/ML
10 INJECTION INTRAMUSCULAR; INTRAVENOUS EVERY 6 HOURS PRN
Status: DISCONTINUED | OUTPATIENT
Start: 2021-01-01 | End: 2021-01-01 | Stop reason: HOSPADM

## 2021-01-01 RX ORDER — INSULIN ASPART 100 [IU]/ML
0-5 INJECTION, SOLUTION INTRAVENOUS; SUBCUTANEOUS
Status: DISCONTINUED | OUTPATIENT
Start: 2021-01-01 | End: 2021-01-01

## 2021-01-01 RX ORDER — MORPHINE SULFATE 4 MG/ML
4 INJECTION, SOLUTION INTRAMUSCULAR; INTRAVENOUS
Status: COMPLETED | OUTPATIENT
Start: 2021-01-01 | End: 2021-01-01

## 2021-01-01 RX ORDER — ATORVASTATIN CALCIUM 20 MG/1
40 TABLET, FILM COATED ORAL DAILY
Status: DISCONTINUED | OUTPATIENT
Start: 2021-01-01 | End: 2021-01-01 | Stop reason: HOSPADM

## 2021-01-01 RX ORDER — INSULIN ASPART 100 [IU]/ML
1-10 INJECTION, SOLUTION INTRAVENOUS; SUBCUTANEOUS
Qty: 3 SYRINGE | Refills: 0 | Status: SHIPPED | OUTPATIENT
Start: 2021-01-01 | End: 2021-01-01

## 2021-01-01 RX ORDER — INSULIN PUMP SYRINGE, 3 ML
EACH MISCELLANEOUS
Qty: 1 EACH | Refills: 0 | Status: SHIPPED | OUTPATIENT
Start: 2021-01-01 | End: 2021-01-01 | Stop reason: SDUPTHER

## 2021-01-01 RX ORDER — METHYLPREDNISOLONE SOD SUCC 125 MG
125 VIAL (EA) INJECTION ONCE AS NEEDED
Status: CANCELLED | OUTPATIENT
Start: 2021-01-01

## 2021-01-01 RX ORDER — CARVEDILOL 3.12 MG/1
3.12 TABLET ORAL 2 TIMES DAILY
Status: DISCONTINUED | OUTPATIENT
Start: 2021-01-01 | End: 2021-01-01 | Stop reason: HOSPADM

## 2021-01-01 RX ORDER — INSULIN ASPART 100 [IU]/ML
1-10 INJECTION, SOLUTION INTRAVENOUS; SUBCUTANEOUS
Qty: 9 ML | Refills: 0 | Status: SHIPPED | OUTPATIENT
Start: 2021-01-01 | End: 2021-01-01

## 2021-01-01 RX ORDER — PALONOSETRON 0.05 MG/ML
0.25 INJECTION, SOLUTION INTRAVENOUS ONCE
Status: COMPLETED | OUTPATIENT
Start: 2021-01-01 | End: 2021-01-01

## 2021-01-01 RX ORDER — SULFAMETHOXAZOLE AND TRIMETHOPRIM 400; 80 MG/1; MG/1
1 TABLET ORAL
Status: DISCONTINUED | OUTPATIENT
Start: 2021-01-01 | End: 2021-01-01 | Stop reason: HOSPADM

## 2021-01-01 RX ORDER — INSULIN ASPART 100 [IU]/ML
10 INJECTION, SOLUTION INTRAVENOUS; SUBCUTANEOUS
Qty: 9 ML | Refills: 11 | Status: SHIPPED | OUTPATIENT
Start: 2021-01-01 | End: 2021-01-01

## 2021-01-01 RX ORDER — TALC
6 POWDER (GRAM) TOPICAL NIGHTLY PRN
Status: DISCONTINUED | OUTPATIENT
Start: 2021-01-01 | End: 2021-01-01 | Stop reason: HOSPADM

## 2021-01-01 RX ORDER — INSULIN ASPART 100 [IU]/ML
1-10 INJECTION, SOLUTION INTRAVENOUS; SUBCUTANEOUS
Status: DISCONTINUED | OUTPATIENT
Start: 2021-01-01 | End: 2021-01-01

## 2021-01-01 RX ORDER — OXYCODONE AND ACETAMINOPHEN 5; 325 MG/1; MG/1
1 TABLET ORAL EVERY 6 HOURS PRN
Qty: 60 EACH | Refills: 0 | Status: SHIPPED | OUTPATIENT
Start: 2021-01-01 | End: 2021-01-01 | Stop reason: SINTOL

## 2021-01-01 RX ORDER — FLASH GLUCOSE SENSOR
2 KIT MISCELLANEOUS
Qty: 2 KIT | Refills: 3 | Status: SHIPPED | OUTPATIENT
Start: 2021-01-01 | End: 2021-01-01

## 2021-01-01 RX ORDER — FLUCONAZOLE 100 MG/1
100 TABLET ORAL DAILY
Qty: 11 TABLET | Refills: 0 | Status: ON HOLD | OUTPATIENT
Start: 2021-01-01 | End: 2021-01-01 | Stop reason: SDUPTHER

## 2021-01-01 RX ORDER — ATORVASTATIN CALCIUM 40 MG/1
40 TABLET, FILM COATED ORAL DAILY
Status: DISCONTINUED | OUTPATIENT
Start: 2021-01-01 | End: 2021-01-01 | Stop reason: HOSPADM

## 2021-01-01 RX ORDER — LANCING DEVICE
1 EACH MISCELLANEOUS 2 TIMES DAILY WITH MEALS
Qty: 1 EACH | Refills: 0 | Status: SHIPPED | OUTPATIENT
Start: 2021-01-01 | End: 2022-06-13

## 2021-01-01 RX ORDER — DIPHENHYDRAMINE HYDROCHLORIDE 50 MG/ML
25 INJECTION INTRAMUSCULAR; INTRAVENOUS EVERY 10 MIN PRN
Status: DISCONTINUED | OUTPATIENT
Start: 2021-01-01 | End: 2021-01-01 | Stop reason: HOSPADM

## 2021-01-01 RX ORDER — LACTULOSE 10 G/15ML
10 SOLUTION ORAL 2 TIMES DAILY
Status: DISCONTINUED | OUTPATIENT
Start: 2021-01-01 | End: 2021-01-01 | Stop reason: HOSPADM

## 2021-01-01 RX ORDER — SODIUM CHLORIDE 9 MG/ML
INJECTION, SOLUTION INTRAVENOUS
Status: COMPLETED | OUTPATIENT
Start: 2021-01-01 | End: 2021-01-01

## 2021-01-01 RX ORDER — HYDROMORPHONE HYDROCHLORIDE 1 MG/ML
1 INJECTION, SOLUTION INTRAMUSCULAR; INTRAVENOUS; SUBCUTANEOUS EVERY 6 HOURS PRN
Status: DISCONTINUED | OUTPATIENT
Start: 2021-01-01 | End: 2021-01-01

## 2021-01-01 RX ORDER — EPINEPHRINE 0.3 MG/.3ML
0.3 INJECTION SUBCUTANEOUS ONCE AS NEEDED
Status: DISCONTINUED | OUTPATIENT
Start: 2021-01-01 | End: 2021-01-01 | Stop reason: HOSPADM

## 2021-01-01 RX ORDER — TRAMADOL HYDROCHLORIDE 50 MG/1
50 TABLET ORAL EVERY 8 HOURS PRN
Qty: 90 TABLET | Refills: 0 | Status: ON HOLD | OUTPATIENT
Start: 2021-01-01 | End: 2021-01-01 | Stop reason: HOSPADM

## 2021-01-01 RX ORDER — HEPARIN 100 UNIT/ML
500 SYRINGE INTRAVENOUS
Status: CANCELLED | OUTPATIENT
Start: 2021-01-01

## 2021-01-01 RX ORDER — LIDOCAINE AND PRILOCAINE 25; 25 MG/G; MG/G
CREAM TOPICAL
Qty: 30 G | Refills: 1 | Status: SHIPPED | OUTPATIENT
Start: 2021-01-01 | End: 2021-01-01 | Stop reason: SDUPTHER

## 2021-01-01 RX ORDER — INSULIN ASPART 100 [IU]/ML
8 INJECTION, SOLUTION INTRAVENOUS; SUBCUTANEOUS
Status: DISCONTINUED | OUTPATIENT
Start: 2021-01-01 | End: 2021-01-01 | Stop reason: HOSPADM

## 2021-01-01 RX ORDER — INSULIN LISPRO 100 [IU]/ML
8 INJECTION, SOLUTION INTRAVENOUS; SUBCUTANEOUS
Qty: 21.6 ML | Refills: 0 | Status: ON HOLD | OUTPATIENT
Start: 2021-01-01 | End: 2021-01-01 | Stop reason: SDUPTHER

## 2021-01-01 RX ORDER — ONDANSETRON HYDROCHLORIDE 8 MG/1
8 TABLET, FILM COATED ORAL EVERY 8 HOURS PRN
Qty: 30 TABLET | Refills: 0 | Status: SHIPPED | OUTPATIENT
Start: 2021-01-01 | End: 2021-01-01

## 2021-01-01 RX ORDER — PREDNISONE 10 MG/1
10 TABLET ORAL DAILY
Qty: 20 TABLET | Refills: 0 | Status: SHIPPED | OUTPATIENT
Start: 2021-01-01 | End: 2021-01-01

## 2021-01-01 RX ORDER — INSULIN ASPART 100 [IU]/ML
1-10 INJECTION, SOLUTION INTRAVENOUS; SUBCUTANEOUS
Status: DISCONTINUED | OUTPATIENT
Start: 2021-01-01 | End: 2021-01-01 | Stop reason: HOSPADM

## 2021-01-01 RX ORDER — DIPHENHYDRAMINE HYDROCHLORIDE 50 MG/ML
25 INJECTION INTRAMUSCULAR; INTRAVENOUS EVERY 10 MIN PRN
Status: CANCELLED | OUTPATIENT
Start: 2021-01-01 | End: 2021-01-01

## 2021-01-01 RX ORDER — ACETAMINOPHEN 325 MG/1
650 TABLET ORAL EVERY 4 HOURS PRN
Status: DISCONTINUED | OUTPATIENT
Start: 2021-01-01 | End: 2021-01-01 | Stop reason: HOSPADM

## 2021-01-01 RX ORDER — SODIUM CHLORIDE, SODIUM LACTATE, POTASSIUM CHLORIDE, CALCIUM CHLORIDE 600; 310; 30; 20 MG/100ML; MG/100ML; MG/100ML; MG/100ML
INJECTION, SOLUTION INTRAVENOUS CONTINUOUS
Status: DISCONTINUED | OUTPATIENT
Start: 2021-01-01 | End: 2021-01-01

## 2021-01-01 RX ORDER — INSULIN LISPRO 100 [IU]/ML
48 INJECTION, SOLUTION INTRAVENOUS; SUBCUTANEOUS
Qty: 43.2 ML | Refills: 11 | Status: ON HOLD | OUTPATIENT
Start: 2021-01-01 | End: 2021-01-01 | Stop reason: HOSPADM

## 2021-01-01 RX ORDER — PREDNISONE 20 MG/1
TABLET ORAL
Qty: 68 TABLET | Refills: 0 | Status: SHIPPED | OUTPATIENT
Start: 2021-01-01 | End: 2021-01-01

## 2021-01-01 RX ORDER — PREDNISONE 20 MG/1
60 TABLET ORAL DAILY
Status: DISCONTINUED | OUTPATIENT
Start: 2021-01-01 | End: 2021-01-01 | Stop reason: HOSPADM

## 2021-01-01 RX ORDER — HYOSCYAMINE SULFATE 0.38 MG/1
0.38 TABLET, EXTENDED RELEASE ORAL EVERY 12 HOURS
Status: DISCONTINUED | OUTPATIENT
Start: 2021-01-01 | End: 2021-01-01 | Stop reason: HOSPADM

## 2021-01-01 RX ORDER — OXYCODONE HYDROCHLORIDE 5 MG/1
5 TABLET ORAL EVERY 6 HOURS PRN
Status: DISCONTINUED | OUTPATIENT
Start: 2021-01-01 | End: 2021-01-01 | Stop reason: HOSPADM

## 2021-01-01 RX ORDER — SULFAMETHOXAZOLE AND TRIMETHOPRIM 400; 80 MG/1; MG/1
1 TABLET ORAL
Qty: 12 TABLET | Refills: 0 | Status: SHIPPED | OUTPATIENT
Start: 2021-01-01 | End: 2021-01-01

## 2021-01-01 RX ORDER — INSULIN ASPART 100 [IU]/ML
8 INJECTION, SOLUTION INTRAVENOUS; SUBCUTANEOUS 3 TIMES DAILY
Qty: 9 ML | Refills: 2 | Status: SHIPPED | OUTPATIENT
Start: 2021-01-01 | End: 2021-01-01 | Stop reason: HOSPADM

## 2021-01-01 RX ORDER — SODIUM CHLORIDE AND POTASSIUM CHLORIDE 150; 900 MG/100ML; MG/100ML
INJECTION, SOLUTION INTRAVENOUS CONTINUOUS
Status: DISCONTINUED | OUTPATIENT
Start: 2021-01-01 | End: 2021-01-01

## 2021-01-01 RX ORDER — INSULIN ASPART 100 [IU]/ML
0-5 INJECTION, SOLUTION INTRAVENOUS; SUBCUTANEOUS
Status: DISCONTINUED | OUTPATIENT
Start: 2021-01-01 | End: 2021-01-01 | Stop reason: HOSPADM

## 2021-01-01 RX ORDER — OXYCODONE HYDROCHLORIDE 10 MG/1
10 TABLET ORAL EVERY 6 HOURS PRN
Qty: 28 TABLET | Refills: 0 | Status: SHIPPED | OUTPATIENT
Start: 2021-01-01 | End: 2021-01-01

## 2021-01-01 RX ORDER — SODIUM CHLORIDE 0.9 % (FLUSH) 0.9 %
10 SYRINGE (ML) INJECTION
Status: DISCONTINUED | OUTPATIENT
Start: 2021-01-01 | End: 2021-01-01 | Stop reason: HOSPADM

## 2021-01-01 RX ORDER — LIDOCAINE HYDROCHLORIDE 20 MG/ML
15 SOLUTION OROPHARYNGEAL EVERY 6 HOURS PRN
Status: DISCONTINUED | OUTPATIENT
Start: 2021-01-01 | End: 2021-01-01 | Stop reason: HOSPADM

## 2021-01-01 RX ORDER — SODIUM CHLORIDE, SODIUM LACTATE, POTASSIUM CHLORIDE, CALCIUM CHLORIDE 600; 310; 30; 20 MG/100ML; MG/100ML; MG/100ML; MG/100ML
INJECTION, SOLUTION INTRAVENOUS CONTINUOUS
Status: ACTIVE | OUTPATIENT
Start: 2021-01-01 | End: 2021-01-01

## 2021-01-01 RX ORDER — HEPARIN SODIUM 5000 [USP'U]/ML
5000 INJECTION, SOLUTION INTRAVENOUS; SUBCUTANEOUS EVERY 8 HOURS
Status: DISCONTINUED | OUTPATIENT
Start: 2021-01-01 | End: 2021-01-01

## 2021-01-01 RX ORDER — PREDNISONE 20 MG/1
TABLET ORAL
Status: CANCELLED | OUTPATIENT
Start: 2021-01-01

## 2021-01-01 RX ORDER — LISINOPRIL 2.5 MG/1
2.5 TABLET ORAL DAILY
Status: DISCONTINUED | OUTPATIENT
Start: 2021-01-01 | End: 2021-01-01 | Stop reason: HOSPADM

## 2021-01-01 RX ORDER — NYSTATIN 100000 [USP'U]/ML
4 SUSPENSION ORAL 4 TIMES DAILY
Status: DISCONTINUED | OUTPATIENT
Start: 2021-01-01 | End: 2021-01-01 | Stop reason: HOSPADM

## 2021-01-01 RX ORDER — ONDANSETRON HYDROCHLORIDE 8 MG/1
8 TABLET, FILM COATED ORAL EVERY 8 HOURS PRN
Qty: 90 TABLET | Refills: 2 | Status: SHIPPED | OUTPATIENT
Start: 2021-01-01 | End: 2021-01-01

## 2021-01-01 RX ORDER — OLANZAPINE 5 MG/1
5 TABLET ORAL NIGHTLY
Qty: 30 TABLET | Refills: 0 | Status: SHIPPED | OUTPATIENT
Start: 2021-01-01 | End: 2021-01-01

## 2021-01-01 RX ORDER — ZOLPIDEM TARTRATE 5 MG/1
5 TABLET ORAL NIGHTLY PRN
Qty: 30 TABLET | Refills: 0 | Status: SHIPPED | OUTPATIENT
Start: 2021-01-01

## 2021-01-01 RX ORDER — LANCETS
1 EACH MISCELLANEOUS 2 TIMES DAILY WITH MEALS
Qty: 100 EACH | Refills: 11 | Status: SHIPPED | OUTPATIENT
Start: 2021-01-01 | End: 2021-01-01 | Stop reason: HOSPADM

## 2021-01-01 RX ORDER — IBUPROFEN 200 MG
16 TABLET ORAL
Status: DISCONTINUED | OUTPATIENT
Start: 2021-01-01 | End: 2021-01-01

## 2021-01-01 RX ORDER — PREDNISONE 20 MG/1
80 TABLET ORAL DAILY
Status: DISCONTINUED | OUTPATIENT
Start: 2021-01-01 | End: 2021-01-01 | Stop reason: HOSPADM

## 2021-01-01 RX ORDER — AMOXICILLIN 250 MG
2 CAPSULE ORAL 2 TIMES DAILY
Status: DISCONTINUED | OUTPATIENT
Start: 2021-01-01 | End: 2021-01-01 | Stop reason: HOSPADM

## 2021-01-01 RX ORDER — IBUPROFEN 200 MG
24 TABLET ORAL
Status: DISCONTINUED | OUTPATIENT
Start: 2021-01-01 | End: 2021-01-01 | Stop reason: HOSPADM

## 2021-01-01 RX ORDER — HYDROXYZINE HYDROCHLORIDE 25 MG/1
25 TABLET, FILM COATED ORAL 3 TIMES DAILY PRN
Qty: 45 TABLET | Refills: 0 | Status: SHIPPED | OUTPATIENT
Start: 2021-01-01 | End: 2021-01-01

## 2021-01-01 RX ORDER — DEXTROSE MONOHYDRATE 100 MG/ML
INJECTION, SOLUTION INTRAVENOUS
Status: DISCONTINUED | OUTPATIENT
Start: 2021-01-01 | End: 2021-01-01 | Stop reason: HOSPADM

## 2021-01-01 RX ORDER — POLYETHYLENE GLYCOL 3350 17 G/17G
17 POWDER, FOR SOLUTION ORAL DAILY
Status: DISCONTINUED | OUTPATIENT
Start: 2021-01-01 | End: 2021-01-01

## 2021-01-01 RX ORDER — LANCETS
1 EACH MISCELLANEOUS 2 TIMES DAILY WITH MEALS
Qty: 100 EACH | Refills: 11 | Status: SHIPPED | OUTPATIENT
Start: 2021-01-01

## 2021-01-01 RX ORDER — LIDOCAINE AND PRILOCAINE 25; 25 MG/G; MG/G
CREAM TOPICAL ONCE
Status: COMPLETED | OUTPATIENT
Start: 2021-01-01 | End: 2021-01-01

## 2021-01-01 RX ORDER — INSULIN ASPART 100 [IU]/ML
0-5 INJECTION, SOLUTION INTRAVENOUS; SUBCUTANEOUS EVERY 6 HOURS PRN
Status: DISCONTINUED | OUTPATIENT
Start: 2021-01-01 | End: 2021-01-01 | Stop reason: HOSPADM

## 2021-01-01 RX ORDER — HYDRALAZINE HYDROCHLORIDE 20 MG/ML
10 INJECTION INTRAMUSCULAR; INTRAVENOUS EVERY 8 HOURS PRN
Status: DISCONTINUED | OUTPATIENT
Start: 2021-01-01 | End: 2021-01-01 | Stop reason: HOSPADM

## 2021-01-01 RX ORDER — INSULIN ASPART 100 [IU]/ML
36 INJECTION, SOLUTION INTRAVENOUS; SUBCUTANEOUS
Qty: 32.4 ML | Refills: 11 | Status: SHIPPED | OUTPATIENT
Start: 2021-01-01 | End: 2021-01-01

## 2021-01-01 RX ORDER — HYDROMORPHONE HYDROCHLORIDE 1 MG/ML
1 INJECTION, SOLUTION INTRAMUSCULAR; INTRAVENOUS; SUBCUTANEOUS EVERY 4 HOURS PRN
Status: DISCONTINUED | OUTPATIENT
Start: 2021-01-01 | End: 2021-01-01 | Stop reason: HOSPADM

## 2021-01-01 RX ORDER — INSULIN ASPART 100 [IU]/ML
4 INJECTION, SOLUTION INTRAVENOUS; SUBCUTANEOUS
Status: DISCONTINUED | OUTPATIENT
Start: 2021-01-01 | End: 2021-01-01

## 2021-01-01 RX ORDER — IBUPROFEN 200 MG
24 TABLET ORAL
Status: DISCONTINUED | OUTPATIENT
Start: 2021-01-01 | End: 2021-01-01

## 2021-01-01 RX ORDER — SODIUM CHLORIDE 9 MG/ML
INJECTION, SOLUTION INTRAVENOUS CONTINUOUS
Status: DISCONTINUED | OUTPATIENT
Start: 2021-01-01 | End: 2021-01-01 | Stop reason: HOSPADM

## 2021-01-01 RX ORDER — INSULIN LISPRO 100 [IU]/ML
8 INJECTION, SOLUTION INTRAVENOUS; SUBCUTANEOUS
Qty: 9 ML | Refills: 2 | Status: SHIPPED | OUTPATIENT
Start: 2021-01-01 | End: 2021-01-01

## 2021-01-01 RX ORDER — PROMETHAZINE HYDROCHLORIDE 12.5 MG/1
12.5 TABLET ORAL EVERY 6 HOURS PRN
Status: DISCONTINUED | OUTPATIENT
Start: 2021-01-01 | End: 2021-01-01

## 2021-01-01 RX ORDER — PREDNISONE 20 MG/1
TABLET ORAL
Qty: 63 TABLET | Refills: 0 | Status: ON HOLD | OUTPATIENT
Start: 2021-01-01 | End: 2021-01-01 | Stop reason: HOSPADM

## 2021-01-01 RX ORDER — PROPOFOL 10 MG/ML
VIAL (ML) INTRAVENOUS CONTINUOUS PRN
Status: DISCONTINUED | OUTPATIENT
Start: 2021-01-01 | End: 2021-01-01

## 2021-01-01 RX ORDER — DICLOFENAC SODIUM 10 MG/G
4 GEL TOPICAL 3 TIMES DAILY
Status: DISCONTINUED | OUTPATIENT
Start: 2021-01-01 | End: 2021-01-01 | Stop reason: HOSPADM

## 2021-01-01 RX ORDER — PROCHLORPERAZINE MALEATE 10 MG
10 TABLET ORAL 3 TIMES DAILY PRN
Qty: 10 TABLET | Refills: 0 | Status: SHIPPED | OUTPATIENT
Start: 2021-01-01 | End: 2021-01-01

## 2021-01-01 RX ORDER — HYDROMORPHONE HYDROCHLORIDE 1 MG/ML
0.5 INJECTION, SOLUTION INTRAMUSCULAR; INTRAVENOUS; SUBCUTANEOUS EVERY 6 HOURS PRN
Status: DISCONTINUED | OUTPATIENT
Start: 2021-01-01 | End: 2021-01-01 | Stop reason: HOSPADM

## 2021-01-01 RX ORDER — PALONOSETRON 0.05 MG/ML
0.25 INJECTION, SOLUTION INTRAVENOUS ONCE
Status: CANCELLED
Start: 2021-01-01

## 2021-01-01 RX ORDER — FENTANYL CITRATE 50 UG/ML
INJECTION, SOLUTION INTRAMUSCULAR; INTRAVENOUS
Status: DISCONTINUED | OUTPATIENT
Start: 2021-01-01 | End: 2021-01-01

## 2021-01-01 RX ORDER — LANCETS
EACH MISCELLANEOUS
Qty: 200 EACH | Refills: 0 | Status: SHIPPED | OUTPATIENT
Start: 2021-01-01 | End: 2021-01-01 | Stop reason: SDUPTHER

## 2021-01-01 RX ORDER — PROPOFOL 10 MG/ML
VIAL (ML) INTRAVENOUS
Status: DISCONTINUED | OUTPATIENT
Start: 2021-01-01 | End: 2021-01-01

## 2021-01-01 RX ORDER — HYDROMORPHONE HYDROCHLORIDE 1 MG/ML
0.5 INJECTION, SOLUTION INTRAMUSCULAR; INTRAVENOUS; SUBCUTANEOUS
Status: COMPLETED | OUTPATIENT
Start: 2021-01-01 | End: 2021-01-01

## 2021-01-01 RX ORDER — HYDROXYZINE HYDROCHLORIDE 25 MG/1
25 TABLET, FILM COATED ORAL 3 TIMES DAILY PRN
Status: DISCONTINUED | OUTPATIENT
Start: 2021-01-01 | End: 2021-01-01 | Stop reason: HOSPADM

## 2021-01-01 RX ORDER — INSULIN ASPART 100 [IU]/ML
12 INJECTION, SOLUTION INTRAVENOUS; SUBCUTANEOUS
Status: DISCONTINUED | OUTPATIENT
Start: 2021-01-01 | End: 2021-01-01 | Stop reason: HOSPADM

## 2021-01-01 RX ORDER — AMOXICILLIN 250 MG
1 CAPSULE ORAL 2 TIMES DAILY
Status: DISCONTINUED | OUTPATIENT
Start: 2021-01-01 | End: 2021-01-01

## 2021-01-01 RX ORDER — TALC
6 POWDER (GRAM) TOPICAL NIGHTLY
Status: DISCONTINUED | OUTPATIENT
Start: 2021-01-01 | End: 2021-01-01 | Stop reason: HOSPADM

## 2021-01-01 RX ORDER — AMLODIPINE BESYLATE 10 MG/1
10 TABLET ORAL
Status: COMPLETED | OUTPATIENT
Start: 2021-01-01 | End: 2021-01-01

## 2021-01-01 RX ORDER — ONDANSETRON 8 MG/1
8 TABLET, ORALLY DISINTEGRATING ORAL EVERY 8 HOURS PRN
Status: DISCONTINUED | OUTPATIENT
Start: 2021-01-01 | End: 2021-01-01 | Stop reason: HOSPADM

## 2021-01-01 RX ORDER — AMLODIPINE BESYLATE 10 MG/1
10 TABLET ORAL DAILY
Status: DISCONTINUED | OUTPATIENT
Start: 2021-01-01 | End: 2021-01-01 | Stop reason: HOSPADM

## 2021-01-01 RX ORDER — INSULIN LISPRO 100 [IU]/ML
8 INJECTION, SOLUTION INTRAVENOUS; SUBCUTANEOUS
Qty: 7.2 ML | Refills: 2 | Status: SHIPPED | OUTPATIENT
Start: 2021-01-01 | End: 2021-01-01 | Stop reason: HOSPADM

## 2021-01-01 RX ORDER — HYDROCODONE BITARTRATE AND ACETAMINOPHEN 10; 325 MG/1; MG/1
1 TABLET ORAL EVERY 6 HOURS PRN
Qty: 120 TABLET | Refills: 0 | Status: SHIPPED | OUTPATIENT
Start: 2021-01-01 | End: 2021-01-01

## 2021-01-01 RX ORDER — INSULIN ASPART 100 [IU]/ML
15 INJECTION, SOLUTION INTRAVENOUS; SUBCUTANEOUS
Qty: 129.6 ML | Refills: 0 | Status: SHIPPED | OUTPATIENT
Start: 2021-01-01 | End: 2022-06-30

## 2021-01-01 RX ORDER — INSULIN LISPRO 100 [IU]/ML
5 INJECTION, SOLUTION INTRAVENOUS; SUBCUTANEOUS
Qty: 13.5 ML | Refills: 0 | Status: SHIPPED | OUTPATIENT
Start: 2021-01-01 | End: 2021-01-01 | Stop reason: HOSPADM

## 2021-01-01 RX ORDER — DEXTROSE MONOHYDRATE, SODIUM CHLORIDE, AND POTASSIUM CHLORIDE 50; 2.24; 4.5 G/1000ML; G/1000ML; G/1000ML
INJECTION, SOLUTION INTRAVENOUS CONTINUOUS
Status: DISCONTINUED | OUTPATIENT
Start: 2021-01-01 | End: 2021-01-01

## 2021-01-01 RX ORDER — INSULIN ASPART 100 [IU]/ML
5 INJECTION, SOLUTION INTRAVENOUS; SUBCUTANEOUS
Status: DISCONTINUED | OUTPATIENT
Start: 2021-01-01 | End: 2021-01-01

## 2021-01-01 RX ORDER — INSULIN ASPART 100 [IU]/ML
12 INJECTION, SOLUTION INTRAVENOUS; SUBCUTANEOUS 3 TIMES DAILY
Qty: 129.6 ML | Refills: 0 | Status: SHIPPED | OUTPATIENT
Start: 2021-01-01 | End: 2021-01-01

## 2021-01-01 RX ORDER — ONDANSETRON 4 MG/1
4 TABLET, ORALLY DISINTEGRATING ORAL EVERY 6 HOURS PRN
Qty: 12 TABLET | Refills: 0 | Status: SHIPPED | OUTPATIENT
Start: 2021-01-01 | End: 2021-01-01

## 2021-01-01 RX ORDER — POTASSIUM CHLORIDE 750 MG/1
10 TABLET, EXTENDED RELEASE ORAL DAILY
Qty: 90 TABLET | Refills: 1 | OUTPATIENT
Start: 2021-01-01

## 2021-01-01 RX ORDER — LISINOPRIL 5 MG/1
5 TABLET ORAL DAILY
Status: DISCONTINUED | OUTPATIENT
Start: 2021-01-01 | End: 2021-01-01 | Stop reason: HOSPADM

## 2021-01-01 RX ORDER — INSULIN PUMP SYRINGE, 3 ML
EACH MISCELLANEOUS
Qty: 1 EACH | Refills: 0 | Status: SHIPPED | OUTPATIENT
Start: 2021-01-01 | End: 2021-01-01 | Stop reason: HOSPADM

## 2021-01-01 RX ORDER — GLUCAGON 1 MG
1 KIT INJECTION
Status: DISCONTINUED | OUTPATIENT
Start: 2021-01-01 | End: 2021-01-01

## 2021-01-01 RX ORDER — LANCETS 28 GAUGE
1 EACH MISCELLANEOUS DAILY PRN
Qty: 200 EACH | Refills: 2 | Status: SHIPPED | OUTPATIENT
Start: 2021-01-01 | End: 2021-01-01 | Stop reason: HOSPADM

## 2021-01-01 RX ORDER — PANTOPRAZOLE SODIUM 40 MG/10ML
40 INJECTION, POWDER, LYOPHILIZED, FOR SOLUTION INTRAVENOUS EVERY 12 HOURS
Status: DISCONTINUED | OUTPATIENT
Start: 2021-01-01 | End: 2021-01-01

## 2021-01-01 RX ORDER — ZOLPIDEM TARTRATE 5 MG/1
5 TABLET ORAL NIGHTLY PRN
Qty: 15 TABLET | Refills: 1 | Status: SHIPPED | OUTPATIENT
Start: 2021-01-01 | End: 2021-01-01

## 2021-01-01 RX ORDER — TALC
6 POWDER (GRAM) TOPICAL NIGHTLY PRN
Status: DISCONTINUED | OUTPATIENT
Start: 2021-01-01 | End: 2021-01-01

## 2021-01-01 RX ORDER — NYSTATIN 100000 [USP'U]/ML
4 SUSPENSION ORAL 4 TIMES DAILY
Qty: 160 ML | Refills: 0 | Status: SHIPPED | OUTPATIENT
Start: 2021-01-01 | End: 2021-01-01

## 2021-01-01 RX ORDER — INSULIN ASPART 100 [IU]/ML
0-5 INJECTION, SOLUTION INTRAVENOUS; SUBCUTANEOUS
Qty: 3 ML | Refills: 1 | Status: SHIPPED | OUTPATIENT
Start: 2021-01-01 | End: 2021-01-01

## 2021-01-01 RX ORDER — INSULIN ASPART 100 [IU]/ML
0-5 INJECTION, SOLUTION INTRAVENOUS; SUBCUTANEOUS
Qty: 3 ML | Refills: 1 | OUTPATIENT
Start: 2021-01-01 | End: 2021-01-01

## 2021-01-01 RX ORDER — ONDANSETRON 2 MG/ML
4 INJECTION INTRAMUSCULAR; INTRAVENOUS EVERY 8 HOURS PRN
Status: DISCONTINUED | OUTPATIENT
Start: 2021-01-01 | End: 2021-01-01 | Stop reason: HOSPADM

## 2021-01-01 RX ORDER — LANCETS
1 EACH MISCELLANEOUS 2 TIMES DAILY WITH MEALS
Qty: 100 EACH | Refills: 11 | Status: SHIPPED | OUTPATIENT
Start: 2021-01-01 | End: 2021-01-01 | Stop reason: SDUPTHER

## 2021-01-01 RX ORDER — ZOLPIDEM TARTRATE 5 MG/1
5 TABLET ORAL NIGHTLY PRN
Status: DISCONTINUED | OUTPATIENT
Start: 2021-01-01 | End: 2021-01-01 | Stop reason: HOSPADM

## 2021-01-01 RX ORDER — SODIUM CHLORIDE 9 MG/ML
INJECTION, SOLUTION INTRAVENOUS CONTINUOUS
Status: DISCONTINUED | OUTPATIENT
Start: 2021-01-01 | End: 2021-01-01

## 2021-01-01 RX ORDER — LISINOPRIL 2.5 MG/1
2.5 TABLET ORAL DAILY
Status: DISCONTINUED | OUTPATIENT
Start: 2021-01-01 | End: 2021-01-01

## 2021-01-01 RX ORDER — DICLOFENAC SODIUM 20 MG/G
2 SOLUTION TOPICAL 2 TIMES DAILY
Qty: 112 G | Refills: 5 | Status: SHIPPED | OUTPATIENT
Start: 2021-01-01

## 2021-01-01 RX ORDER — INSULIN PUMP SYRINGE, 3 ML
EACH MISCELLANEOUS
Qty: 1 EACH | Refills: 0 | Status: SHIPPED | OUTPATIENT
Start: 2021-01-01 | End: 2022-06-13

## 2021-01-01 RX ORDER — FLUCONAZOLE 200 MG/1
200 TABLET ORAL DAILY
Status: DISCONTINUED | OUTPATIENT
Start: 2021-01-01 | End: 2021-01-01

## 2021-01-01 RX ORDER — PEN NEEDLE, DIABETIC 30 GX3/16"
1 NEEDLE, DISPOSABLE MISCELLANEOUS 2 TIMES DAILY WITH MEALS
Qty: 100 EACH | Refills: 11 | Status: SHIPPED | OUTPATIENT
Start: 2021-01-01 | End: 2021-01-01 | Stop reason: HOSPADM

## 2021-01-01 RX ORDER — CARVEDILOL 3.12 MG/1
3.12 TABLET ORAL ONCE
Status: DISCONTINUED | OUTPATIENT
Start: 2021-01-01 | End: 2021-01-01

## 2021-01-01 RX ORDER — AMOXICILLIN 250 MG
1 CAPSULE ORAL 2 TIMES DAILY
Status: DISCONTINUED | OUTPATIENT
Start: 2021-01-01 | End: 2021-01-01 | Stop reason: HOSPADM

## 2021-01-01 RX ORDER — HEPARIN 100 UNIT/ML
5 SYRINGE INTRAVENOUS
Status: DISCONTINUED | OUTPATIENT
Start: 2021-01-01 | End: 2021-01-01 | Stop reason: HOSPADM

## 2021-01-01 RX ORDER — SODIUM CHLORIDE 9 MG/ML
INJECTION, SOLUTION INTRAVENOUS CONTINUOUS
Status: ACTIVE | OUTPATIENT
Start: 2021-01-01 | End: 2021-01-01

## 2021-01-01 RX ORDER — INSULIN ASPART 100 [IU]/ML
11 INJECTION, SOLUTION INTRAVENOUS; SUBCUTANEOUS
Status: DISCONTINUED | OUTPATIENT
Start: 2021-01-01 | End: 2021-01-01

## 2021-01-01 RX ORDER — PANTOPRAZOLE SODIUM 40 MG/1
40 TABLET, DELAYED RELEASE ORAL
Qty: 60 TABLET | Refills: 0 | Status: ON HOLD | OUTPATIENT
Start: 2021-01-01 | End: 2021-01-01 | Stop reason: SDUPTHER

## 2021-01-01 RX ORDER — PEN NEEDLE, DIABETIC 30 GX3/16"
1 NEEDLE, DISPOSABLE MISCELLANEOUS 2 TIMES DAILY WITH MEALS
Qty: 100 EACH | Refills: 11 | Status: SHIPPED | OUTPATIENT
Start: 2021-01-01 | End: 2021-01-01 | Stop reason: SDUPTHER

## 2021-01-01 RX ORDER — OLANZAPINE 5 MG/1
5 TABLET ORAL NIGHTLY
Status: DISCONTINUED | OUTPATIENT
Start: 2021-01-01 | End: 2021-01-01 | Stop reason: HOSPADM

## 2021-01-01 RX ORDER — LANCETS
EACH MISCELLANEOUS
Qty: 200 EACH | Refills: 0 | Status: SHIPPED | OUTPATIENT
Start: 2021-01-01 | End: 2021-01-01 | Stop reason: HOSPADM

## 2021-01-01 RX ORDER — POLYETHYLENE GLYCOL 3350 17 G/17G
17 POWDER, FOR SOLUTION ORAL 2 TIMES DAILY
Status: DISCONTINUED | OUTPATIENT
Start: 2021-01-01 | End: 2021-01-01 | Stop reason: HOSPADM

## 2021-01-01 RX ORDER — INSULIN ASPART 100 [IU]/ML
4 INJECTION, SOLUTION INTRAVENOUS; SUBCUTANEOUS 3 TIMES DAILY
Qty: 10.8 ML | Refills: 0 | Status: SHIPPED | OUTPATIENT
Start: 2021-01-01 | End: 2021-01-01 | Stop reason: HOSPADM

## 2021-01-01 RX ORDER — CARVEDILOL 6.25 MG/1
6.25 TABLET ORAL 2 TIMES DAILY WITH MEALS
Status: DISCONTINUED | OUTPATIENT
Start: 2021-01-01 | End: 2021-01-01 | Stop reason: HOSPADM

## 2021-01-01 RX ORDER — INSULIN LISPRO 100 [IU]/ML
4 INJECTION, SOLUTION INTRAVENOUS; SUBCUTANEOUS
Qty: 12 ML | Refills: 0 | Status: SHIPPED | OUTPATIENT
Start: 2021-01-01 | End: 2021-01-01 | Stop reason: SDUPTHER

## 2021-01-01 RX ORDER — INSULIN LISPRO 100 [IU]/ML
INJECTION, SOLUTION INTRAVENOUS; SUBCUTANEOUS
OUTPATIENT
Start: 2021-01-01 | End: 2022-06-21

## 2021-01-01 RX ORDER — INSULIN LISPRO 100 [IU]/ML
0-5 INJECTION, SOLUTION INTRAVENOUS; SUBCUTANEOUS
Qty: 6 ML | Refills: 2 | Status: SHIPPED | OUTPATIENT
Start: 2021-01-01 | End: 2021-01-01

## 2021-01-01 RX ORDER — LANCETS 28 GAUGE
1 EACH MISCELLANEOUS DAILY PRN
Qty: 200 EACH | Refills: 2 | Status: SHIPPED | OUTPATIENT
Start: 2021-01-01 | End: 2021-01-01 | Stop reason: SDUPTHER

## 2021-01-01 RX ORDER — SODIUM CHLORIDE AND POTASSIUM CHLORIDE 150; 450 MG/100ML; MG/100ML
INJECTION, SOLUTION INTRAVENOUS CONTINUOUS
Status: DISCONTINUED | OUTPATIENT
Start: 2021-01-01 | End: 2021-01-01

## 2021-01-01 RX ORDER — PANTOPRAZOLE SODIUM 40 MG/1
40 TABLET, DELAYED RELEASE ORAL 2 TIMES DAILY
Status: DISCONTINUED | OUTPATIENT
Start: 2021-01-01 | End: 2021-01-01 | Stop reason: HOSPADM

## 2021-01-01 RX ORDER — LANCING DEVICE
1 EACH MISCELLANEOUS 2 TIMES DAILY WITH MEALS
Qty: 1 EACH | Refills: 0 | Status: SHIPPED | OUTPATIENT
Start: 2021-01-01 | End: 2021-01-01 | Stop reason: HOSPADM

## 2021-01-01 RX ORDER — PREDNISONE 50 MG/1
100 TABLET ORAL DAILY
Qty: 14 TABLET | Refills: 0 | Status: SHIPPED | OUTPATIENT
Start: 2021-01-01 | End: 2021-01-01

## 2021-01-01 RX ORDER — AMOXICILLIN 250 MG
1 CAPSULE ORAL DAILY
Status: DISCONTINUED | OUTPATIENT
Start: 2021-01-01 | End: 2021-01-01

## 2021-01-01 RX ORDER — CARVEDILOL 3.12 MG/1
3.12 TABLET ORAL 2 TIMES DAILY WITH MEALS
Status: DISCONTINUED | OUTPATIENT
Start: 2021-01-01 | End: 2021-01-01

## 2021-01-01 RX ORDER — INSULIN LISPRO 100 [IU]/ML
15 INJECTION, SOLUTION INTRAVENOUS; SUBCUTANEOUS
Qty: 13.5 ML | Refills: 11 | Status: SHIPPED | OUTPATIENT
Start: 2021-01-01 | End: 2022-06-30

## 2021-01-01 RX ORDER — HYDROXYZINE HYDROCHLORIDE 25 MG/1
25 TABLET, FILM COATED ORAL 3 TIMES DAILY PRN
Qty: 90 TABLET | Refills: 0 | Status: SHIPPED | OUTPATIENT
Start: 2021-01-01 | End: 2021-01-01

## 2021-01-01 RX ORDER — HYOSCYAMINE SULFATE 0.38 MG/1
0.38 TABLET, EXTENDED RELEASE ORAL EVERY 12 HOURS
Qty: 30 TABLET | Refills: 0 | Status: SHIPPED | OUTPATIENT
Start: 2021-01-01 | End: 2021-01-01

## 2021-01-01 RX ORDER — INSULIN ASPART 100 [IU]/ML
0-10 INJECTION, SOLUTION INTRAVENOUS; SUBCUTANEOUS
Status: DISCONTINUED | OUTPATIENT
Start: 2021-01-01 | End: 2021-01-01

## 2021-01-01 RX ORDER — INSULIN LISPRO 100 [IU]/ML
12 INJECTION, SOLUTION INTRAVENOUS; SUBCUTANEOUS
Qty: 10.8 ML | Refills: 11 | Status: SHIPPED | OUTPATIENT
Start: 2021-01-01 | End: 2021-01-01

## 2021-01-01 RX ORDER — FLUCONAZOLE 100 MG/1
100 TABLET ORAL DAILY
Status: DISCONTINUED | OUTPATIENT
Start: 2021-01-01 | End: 2021-01-01 | Stop reason: HOSPADM

## 2021-01-01 RX ORDER — INSULIN ASPART 100 [IU]/ML
0-5 INJECTION, SOLUTION INTRAVENOUS; SUBCUTANEOUS
Qty: 3 ML | Refills: 1 | Status: SHIPPED | OUTPATIENT
Start: 2021-01-01 | End: 2021-01-01 | Stop reason: HOSPADM

## 2021-01-01 RX ORDER — PANTOPRAZOLE SODIUM 40 MG/1
40 TABLET, DELAYED RELEASE ORAL 2 TIMES DAILY
Qty: 60 TABLET | Refills: 0 | Status: SHIPPED | OUTPATIENT
Start: 2021-01-01 | End: 2021-01-01

## 2021-01-01 RX ORDER — LISINOPRIL 5 MG/1
2.5 TABLET ORAL DAILY
Qty: 45 TABLET | Refills: 3 | Status: SHIPPED | OUTPATIENT
Start: 2021-01-01 | End: 2022-06-13

## 2021-01-01 RX ORDER — DULOXETIN HYDROCHLORIDE 30 MG/1
CAPSULE, DELAYED RELEASE ORAL
Qty: 30 CAPSULE | Refills: 1 | OUTPATIENT
Start: 2021-01-01

## 2021-01-01 RX ORDER — MAGNESIUM SULFATE HEPTAHYDRATE 40 MG/ML
2 INJECTION, SOLUTION INTRAVENOUS ONCE
Status: COMPLETED | OUTPATIENT
Start: 2021-01-01 | End: 2021-01-01

## 2021-01-01 RX ORDER — PREDNISONE 20 MG/1
80 TABLET ORAL DAILY
Status: DISCONTINUED | OUTPATIENT
Start: 2021-01-01 | End: 2021-01-01

## 2021-01-01 RX ORDER — HYDROMORPHONE HYDROCHLORIDE 1 MG/ML
0.5 INJECTION, SOLUTION INTRAMUSCULAR; INTRAVENOUS; SUBCUTANEOUS EVERY 6 HOURS PRN
Status: DISCONTINUED | OUTPATIENT
Start: 2021-01-01 | End: 2021-01-01

## 2021-01-01 RX ORDER — INSULIN ASPART 100 [IU]/ML
8 INJECTION, SOLUTION INTRAVENOUS; SUBCUTANEOUS ONCE
Status: COMPLETED | OUTPATIENT
Start: 2021-01-01 | End: 2021-01-01

## 2021-01-01 RX ORDER — FLUCONAZOLE 100 MG/1
100 TABLET ORAL DAILY
Qty: 5 TABLET | Refills: 0 | Status: SHIPPED | OUTPATIENT
Start: 2021-01-01 | End: 2021-01-01

## 2021-01-01 RX ORDER — LIDOCAINE AND PRILOCAINE 25; 25 MG/G; MG/G
CREAM TOPICAL
Status: DISCONTINUED | OUTPATIENT
Start: 2021-01-01 | End: 2021-01-01 | Stop reason: HOSPADM

## 2021-01-01 RX ORDER — PANTOPRAZOLE SODIUM 40 MG/1
40 TABLET, DELAYED RELEASE ORAL
Status: DISCONTINUED | OUTPATIENT
Start: 2021-01-01 | End: 2021-01-01

## 2021-01-01 RX ADMIN — CARVEDILOL 6.25 MG: 6.25 TABLET, FILM COATED ORAL at 09:05

## 2021-01-01 RX ADMIN — LISINOPRIL 2.5 MG: 2.5 TABLET ORAL at 09:06

## 2021-01-01 RX ADMIN — HEPARIN SODIUM 5000 UNITS: 5000 INJECTION INTRAVENOUS; SUBCUTANEOUS at 02:05

## 2021-01-01 RX ADMIN — PANTOPRAZOLE SODIUM 40 MG: 40 TABLET, DELAYED RELEASE ORAL at 05:06

## 2021-01-01 RX ADMIN — NYSTATIN 400000 UNITS: 500000 SUSPENSION ORAL at 09:06

## 2021-01-01 RX ADMIN — HEPARIN SODIUM 5000 UNITS: 5000 INJECTION INTRAVENOUS; SUBCUTANEOUS at 09:05

## 2021-01-01 RX ADMIN — PANTOPRAZOLE SODIUM 40 MG: 40 TABLET, DELAYED RELEASE ORAL at 03:06

## 2021-01-01 RX ADMIN — FLUCONAZOLE 100 MG: 100 TABLET ORAL at 08:06

## 2021-01-01 RX ADMIN — INSULIN ASPART 2 UNITS: 100 INJECTION, SOLUTION INTRAVENOUS; SUBCUTANEOUS at 09:06

## 2021-01-01 RX ADMIN — INSULIN ASPART 8 UNITS: 100 INJECTION, SOLUTION INTRAVENOUS; SUBCUTANEOUS at 05:06

## 2021-01-01 RX ADMIN — PREDNISONE 60 MG: 20 TABLET ORAL at 08:06

## 2021-01-01 RX ADMIN — INSULIN DETEMIR 12 UNITS: 100 INJECTION, SOLUTION SUBCUTANEOUS at 08:06

## 2021-01-01 RX ADMIN — POLYETHYLENE GLYCOL 3350 17 G: 17 POWDER, FOR SOLUTION ORAL at 09:06

## 2021-01-01 RX ADMIN — AMLODIPINE BESYLATE 10 MG: 10 TABLET ORAL at 12:06

## 2021-01-01 RX ADMIN — INSULIN ASPART 2 UNITS: 100 INJECTION, SOLUTION INTRAVENOUS; SUBCUTANEOUS at 12:06

## 2021-01-01 RX ADMIN — POTASSIUM CHLORIDE, DEXTROSE MONOHYDRATE AND SODIUM CHLORIDE: 224; 5; 450 INJECTION, SOLUTION INTRAVENOUS at 09:06

## 2021-01-01 RX ADMIN — PREDNISONE 100 MG: 50 TABLET ORAL at 09:05

## 2021-01-01 RX ADMIN — PALONOSETRON 0.25 MG: 0.25 INJECTION, SOLUTION INTRAVENOUS at 10:02

## 2021-01-01 RX ADMIN — INSULIN HUMAN 10 UNITS: 100 INJECTION, SOLUTION PARENTERAL at 03:06

## 2021-01-01 RX ADMIN — AMLODIPINE BESYLATE 10 MG: 10 TABLET ORAL at 09:06

## 2021-01-01 RX ADMIN — SODIUM ZIRCONIUM CYCLOSILICATE 10 G: 10 POWDER, FOR SUSPENSION ORAL at 03:06

## 2021-01-01 RX ADMIN — INSULIN ASPART 8 UNITS: 100 INJECTION, SOLUTION INTRAVENOUS; SUBCUTANEOUS at 08:06

## 2021-01-01 RX ADMIN — HYDROXYZINE HYDROCHLORIDE 25 MG: 25 TABLET, FILM COATED ORAL at 05:05

## 2021-01-01 RX ADMIN — PANTOPRAZOLE SODIUM 40 MG: 40 INJECTION, POWDER, FOR SOLUTION INTRAVENOUS at 08:05

## 2021-01-01 RX ADMIN — LISINOPRIL 2.5 MG: 2.5 TABLET ORAL at 08:05

## 2021-01-01 RX ADMIN — SODIUM CHLORIDE 1.75 UNITS/HR: 9 INJECTION, SOLUTION INTRAVENOUS at 02:06

## 2021-01-01 RX ADMIN — ATORVASTATIN CALCIUM 40 MG: 20 TABLET, FILM COATED ORAL at 09:06

## 2021-01-01 RX ADMIN — METHYLPREDNISOLONE SODIUM SUCCINATE 80 MG: 40 INJECTION, POWDER, FOR SOLUTION INTRAMUSCULAR; INTRAVENOUS at 01:05

## 2021-01-01 RX ADMIN — SODIUM CHLORIDE 3.5 UNITS/HR: 9 INJECTION, SOLUTION INTRAVENOUS at 12:06

## 2021-01-01 RX ADMIN — DOCUSATE SODIUM 50MG AND SENNOSIDES 8.6MG 2 TABLET: 8.6; 5 TABLET, FILM COATED ORAL at 09:06

## 2021-01-01 RX ADMIN — SULFAMETHOXAZOLE AND TRIMETHOPRIM 1 TABLET: 400; 80 TABLET ORAL at 08:06

## 2021-01-01 RX ADMIN — CARVEDILOL 3.12 MG: 3.12 TABLET, FILM COATED ORAL at 09:06

## 2021-01-01 RX ADMIN — POLYETHYLENE GLYCOL 3350 17 G: 17 POWDER, FOR SOLUTION ORAL at 08:06

## 2021-01-01 RX ADMIN — HYPROMELLOSE 2910 1 DROP: 5 SOLUTION OPHTHALMIC at 11:06

## 2021-01-01 RX ADMIN — OLANZAPINE 5 MG: 2.5 TABLET, FILM COATED ORAL at 09:06

## 2021-01-01 RX ADMIN — MORPHINE SULFATE 4 MG: 4 INJECTION INTRAVENOUS at 07:05

## 2021-01-01 RX ADMIN — CARVEDILOL 3.12 MG: 3.12 TABLET, FILM COATED ORAL at 08:06

## 2021-01-01 RX ADMIN — NYSTATIN 400000 UNITS: 100000 SUSPENSION ORAL at 04:06

## 2021-01-01 RX ADMIN — ONDANSETRON 4 MG: 2 INJECTION INTRAMUSCULAR; INTRAVENOUS at 11:01

## 2021-01-01 RX ADMIN — SODIUM CHLORIDE 1000 ML: 0.9 INJECTION, SOLUTION INTRAVENOUS at 01:06

## 2021-01-01 RX ADMIN — IOHEXOL 15 ML: 350 INJECTION, SOLUTION INTRAVENOUS at 11:02

## 2021-01-01 RX ADMIN — INSULIN HUMAN 4 UNITS: 100 INJECTION, SOLUTION PARENTERAL at 12:06

## 2021-01-01 RX ADMIN — FLUCONAZOLE 100 MG: 100 TABLET ORAL at 09:06

## 2021-01-01 RX ADMIN — PANTOPRAZOLE SODIUM 40 MG: 40 INJECTION, POWDER, FOR SOLUTION INTRAVENOUS at 09:06

## 2021-01-01 RX ADMIN — NYSTATIN 400000 UNITS: 500000 SUSPENSION ORAL at 12:06

## 2021-01-01 RX ADMIN — INSULIN ASPART 10 UNITS: 100 INJECTION, SOLUTION INTRAVENOUS; SUBCUTANEOUS at 08:06

## 2021-01-01 RX ADMIN — HYPROMELLOSE 2910 1 DROP: 5 SOLUTION OPHTHALMIC at 09:06

## 2021-01-01 RX ADMIN — SODIUM CHLORIDE 1000 ML: 0.9 INJECTION, SOLUTION INTRAVENOUS at 07:05

## 2021-01-01 RX ADMIN — AMLODIPINE BESYLATE 10 MG: 10 TABLET ORAL at 10:06

## 2021-01-01 RX ADMIN — METHYLPREDNISOLONE SODIUM SUCCINATE 80 MG: 40 INJECTION, POWDER, FOR SOLUTION INTRAMUSCULAR; INTRAVENOUS at 08:05

## 2021-01-01 RX ADMIN — PANTOPRAZOLE SODIUM 40 MG: 40 TABLET, DELAYED RELEASE ORAL at 08:06

## 2021-01-01 RX ADMIN — HYPROMELLOSE 2910 1 DROP: 5 SOLUTION OPHTHALMIC at 12:06

## 2021-01-01 RX ADMIN — POTASSIUM PHOSPHATE, MONOBASIC AND POTASSIUM PHOSPHATE, DIBASIC 30 MMOL: 224; 236 INJECTION, SOLUTION, CONCENTRATE INTRAVENOUS at 12:05

## 2021-01-01 RX ADMIN — PANTOPRAZOLE SODIUM 80 MG: 40 INJECTION, POWDER, FOR SOLUTION INTRAVENOUS at 07:05

## 2021-01-01 RX ADMIN — MELATONIN TAB 3 MG 6 MG: 3 TAB at 11:05

## 2021-01-01 RX ADMIN — PROCHLORPERAZINE EDISYLATE 10 MG: 5 INJECTION INTRAMUSCULAR; INTRAVENOUS at 02:05

## 2021-01-01 RX ADMIN — HYDROMORPHONE HYDROCHLORIDE 0.5 MG: 1 INJECTION, SOLUTION INTRAMUSCULAR; INTRAVENOUS; SUBCUTANEOUS at 12:05

## 2021-01-01 RX ADMIN — ATORVASTATIN CALCIUM 40 MG: 20 TABLET, FILM COATED ORAL at 08:05

## 2021-01-01 RX ADMIN — OXYCODONE HYDROCHLORIDE 10 MG: 10 TABLET ORAL at 04:06

## 2021-01-01 RX ADMIN — OXYCODONE HYDROCHLORIDE 10 MG: 10 TABLET ORAL at 03:05

## 2021-01-01 RX ADMIN — HYOSCYAMINE SULFATE 0.38 MG: 0.38 TABLET, EXTENDED RELEASE ORAL at 08:06

## 2021-01-01 RX ADMIN — SODIUM CHLORIDE 0.47 UNITS/HR: 9 INJECTION, SOLUTION INTRAVENOUS at 08:06

## 2021-01-01 RX ADMIN — INSULIN ASPART 8 UNITS: 100 INJECTION, SOLUTION INTRAVENOUS; SUBCUTANEOUS at 01:06

## 2021-01-01 RX ADMIN — INSULIN ASPART 1 UNITS: 100 INJECTION, SOLUTION INTRAVENOUS; SUBCUTANEOUS at 08:06

## 2021-01-01 RX ADMIN — SODIUM CHLORIDE, SODIUM LACTATE, POTASSIUM CHLORIDE, AND CALCIUM CHLORIDE 1000 ML: .6; .31; .03; .02 INJECTION, SOLUTION INTRAVENOUS at 11:05

## 2021-01-01 RX ADMIN — OLANZAPINE 5 MG: 5 TABLET, FILM COATED ORAL at 08:06

## 2021-01-01 RX ADMIN — DOCUSATE SODIUM 50MG AND SENNOSIDES 8.6MG 1 TABLET: 8.6; 5 TABLET, FILM COATED ORAL at 08:06

## 2021-01-01 RX ADMIN — ONDANSETRON 4 MG: 2 INJECTION INTRAMUSCULAR; INTRAVENOUS at 07:05

## 2021-01-01 RX ADMIN — ATORVASTATIN CALCIUM 40 MG: 20 TABLET, FILM COATED ORAL at 08:06

## 2021-01-01 RX ADMIN — LISINOPRIL 2.5 MG: 2.5 TABLET ORAL at 08:06

## 2021-01-01 RX ADMIN — INSULIN ASPART 3 UNITS: 100 INJECTION, SOLUTION INTRAVENOUS; SUBCUTANEOUS at 02:06

## 2021-01-01 RX ADMIN — INSULIN ASPART 1 UNITS: 100 INJECTION, SOLUTION INTRAVENOUS; SUBCUTANEOUS at 09:06

## 2021-01-01 RX ADMIN — OXYCODONE HYDROCHLORIDE 10 MG: 10 TABLET ORAL at 08:06

## 2021-01-01 RX ADMIN — HEPARIN SODIUM 5000 UNITS: 5000 INJECTION INTRAVENOUS; SUBCUTANEOUS at 06:05

## 2021-01-01 RX ADMIN — ONDANSETRON 4 MG: 2 INJECTION INTRAMUSCULAR; INTRAVENOUS at 11:06

## 2021-01-01 RX ADMIN — POLYETHYLENE GLYCOL 3350 17 G: 17 POWDER, FOR SOLUTION ORAL at 10:06

## 2021-01-01 RX ADMIN — ONDANSETRON 4 MG: 2 INJECTION INTRAMUSCULAR; INTRAVENOUS at 08:06

## 2021-01-01 RX ADMIN — SODIUM CHLORIDE 7 UNITS/HR: 9 INJECTION, SOLUTION INTRAVENOUS at 11:06

## 2021-01-01 RX ADMIN — POTASSIUM CHLORIDE: 149 INJECTION, SOLUTION, CONCENTRATE INTRAVENOUS at 05:06

## 2021-01-01 RX ADMIN — INSULIN ASPART 4 UNITS: 100 INJECTION, SOLUTION INTRAVENOUS; SUBCUTANEOUS at 12:06

## 2021-01-01 RX ADMIN — AMLODIPINE BESYLATE 10 MG: 10 TABLET ORAL at 08:05

## 2021-01-01 RX ADMIN — SODIUM CHLORIDE 3 UNITS/HR: 9 INJECTION, SOLUTION INTRAVENOUS at 12:06

## 2021-01-01 RX ADMIN — CARVEDILOL 3.12 MG: 3.12 TABLET, FILM COATED ORAL at 11:06

## 2021-01-01 RX ADMIN — POTASSIUM BICARBONATE 40 MEQ: 391 TABLET, EFFERVESCENT ORAL at 03:06

## 2021-01-01 RX ADMIN — VORTIOXETINE 10 MG: 10 TABLET, FILM COATED ORAL at 12:05

## 2021-01-01 RX ADMIN — PROPOFOL 30 MG: 10 INJECTION, EMULSION INTRAVENOUS at 11:02

## 2021-01-01 RX ADMIN — INSULIN ASPART 4 UNITS: 100 INJECTION, SOLUTION INTRAVENOUS; SUBCUTANEOUS at 04:06

## 2021-01-01 RX ADMIN — MELATONIN TAB 3 MG 6 MG: 3 TAB at 02:06

## 2021-01-01 RX ADMIN — SODIUM CHLORIDE 1000 ML: 0.9 INJECTION, SOLUTION INTRAVENOUS at 08:04

## 2021-01-01 RX ADMIN — OXYCODONE HYDROCHLORIDE 10 MG: 10 TABLET ORAL at 07:06

## 2021-01-01 RX ADMIN — SODIUM CHLORIDE 1000 ML: 0.9 INJECTION, SOLUTION INTRAVENOUS at 03:06

## 2021-01-01 RX ADMIN — PANTOPRAZOLE SODIUM 40 MG: 40 INJECTION, POWDER, FOR SOLUTION INTRAVENOUS at 08:06

## 2021-01-01 RX ADMIN — INSULIN ASPART 5 UNITS: 100 INJECTION, SOLUTION INTRAVENOUS; SUBCUTANEOUS at 05:06

## 2021-01-01 RX ADMIN — NYSTATIN 400000 UNITS: 500000 SUSPENSION ORAL at 05:06

## 2021-01-01 RX ADMIN — SODIUM CHLORIDE 1000 ML: 0.9 INJECTION, SOLUTION INTRAVENOUS at 12:06

## 2021-01-01 RX ADMIN — OXYCODONE HYDROCHLORIDE 10 MG: 10 TABLET ORAL at 05:05

## 2021-01-01 RX ADMIN — OLANZAPINE 5 MG: 5 TABLET, FILM COATED ORAL at 04:06

## 2021-01-01 RX ADMIN — HEPARIN 500 UNITS: 100 SYRINGE at 03:05

## 2021-01-01 RX ADMIN — PREDNISONE 70 MG: 20 TABLET ORAL at 08:06

## 2021-01-01 RX ADMIN — DOCUSATE SODIUM 50MG AND SENNOSIDES 8.6MG 1 TABLET: 8.6; 5 TABLET, FILM COATED ORAL at 08:05

## 2021-01-01 RX ADMIN — DICLOFENAC SODIUM 4 G: 10 GEL TOPICAL at 09:06

## 2021-01-01 RX ADMIN — LISINOPRIL 2.5 MG: 2.5 TABLET ORAL at 09:05

## 2021-01-01 RX ADMIN — NYSTATIN 400000 UNITS: 500000 SUSPENSION ORAL at 01:06

## 2021-01-01 RX ADMIN — MELATONIN TAB 3 MG 6 MG: 3 TAB at 09:06

## 2021-01-01 RX ADMIN — SODIUM CHLORIDE 0.88 UNITS/HR: 9 INJECTION, SOLUTION INTRAVENOUS at 03:06

## 2021-01-01 RX ADMIN — MORPHINE SULFATE 4 MG: 4 INJECTION INTRAVENOUS at 12:06

## 2021-01-01 RX ADMIN — LIDOCAINE AND PRILOCAINE: 25; 25 CREAM TOPICAL at 11:06

## 2021-01-01 RX ADMIN — PREDNISONE 80 MG: 20 TABLET ORAL at 02:06

## 2021-01-01 RX ADMIN — POTASSIUM CHLORIDE: 149 INJECTION, SOLUTION, CONCENTRATE INTRAVENOUS at 11:06

## 2021-01-01 RX ADMIN — PROCHLORPERAZINE EDISYLATE 10 MG: 5 INJECTION INTRAMUSCULAR; INTRAVENOUS at 06:05

## 2021-01-01 RX ADMIN — NYSTATIN 400000 UNITS: 100000 SUSPENSION ORAL at 12:06

## 2021-01-01 RX ADMIN — NYSTATIN 400000 UNITS: 500000 SUSPENSION ORAL at 10:06

## 2021-01-01 RX ADMIN — AMLODIPINE BESYLATE 10 MG: 10 TABLET ORAL at 08:06

## 2021-01-01 RX ADMIN — OXYCODONE HYDROCHLORIDE 10 MG: 10 TABLET ORAL at 04:05

## 2021-01-01 RX ADMIN — SODIUM CHLORIDE: 0.9 INJECTION, SOLUTION INTRAVENOUS at 11:06

## 2021-01-01 RX ADMIN — INSULIN ASPART 5 UNITS: 100 INJECTION, SOLUTION INTRAVENOUS; SUBCUTANEOUS at 04:06

## 2021-01-01 RX ADMIN — SULFAMETHOXAZOLE AND TRIMETHOPRIM 1 TABLET: 400; 80 TABLET ORAL at 09:06

## 2021-01-01 RX ADMIN — INSULIN DETEMIR 12 UNITS: 100 INJECTION, SOLUTION SUBCUTANEOUS at 09:06

## 2021-01-01 RX ADMIN — POLYETHYLENE GLYCOL 3350 17 G: 17 POWDER, FOR SOLUTION ORAL at 05:06

## 2021-01-01 RX ADMIN — INSULIN ASPART 11 UNITS: 100 INJECTION, SOLUTION INTRAVENOUS; SUBCUTANEOUS at 05:06

## 2021-01-01 RX ADMIN — PANTOPRAZOLE SODIUM 40 MG: 40 TABLET, DELAYED RELEASE ORAL at 06:06

## 2021-01-01 RX ADMIN — HYPROMELLOSE 2910 1 DROP: 5 SOLUTION OPHTHALMIC at 04:06

## 2021-01-01 RX ADMIN — CARVEDILOL 6.25 MG: 6.25 TABLET, FILM COATED ORAL at 08:05

## 2021-01-01 RX ADMIN — LACTULOSE 10 G: 10 SOLUTION ORAL at 03:06

## 2021-01-01 RX ADMIN — ZOLPIDEM TARTRATE 5 MG: 5 TABLET ORAL at 08:06

## 2021-01-01 RX ADMIN — HYOSCYAMINE SULFATE 0.38 MG: 0.38 TABLET, EXTENDED RELEASE ORAL at 11:06

## 2021-01-01 RX ADMIN — SODIUM CHLORIDE, SODIUM LACTATE, POTASSIUM CHLORIDE, AND CALCIUM CHLORIDE: .6; .31; .03; .02 INJECTION, SOLUTION INTRAVENOUS at 11:05

## 2021-01-01 RX ADMIN — INSULIN DETEMIR 3 UNITS: 100 INJECTION, SOLUTION SUBCUTANEOUS at 02:06

## 2021-01-01 RX ADMIN — OXYCODONE HYDROCHLORIDE 10 MG: 10 TABLET ORAL at 02:06

## 2021-01-01 RX ADMIN — HEPARIN 500 UNITS: 100 SYRINGE at 09:03

## 2021-01-01 RX ADMIN — OLANZAPINE 5 MG: 2.5 TABLET, FILM COATED ORAL at 08:06

## 2021-01-01 RX ADMIN — HYOSCYAMINE SULFATE 0.38 MG: 0.38 TABLET, EXTENDED RELEASE ORAL at 12:06

## 2021-01-01 RX ADMIN — PALONOSETRON 0.25 MG: 0.05 INJECTION, SOLUTION INTRAVENOUS at 08:04

## 2021-01-01 RX ADMIN — DOCUSATE SODIUM 50MG AND SENNOSIDES 8.6MG 1 TABLET: 8.6; 5 TABLET, FILM COATED ORAL at 10:06

## 2021-01-01 RX ADMIN — PALONOSETRON 0.25 MG: 0.05 INJECTION, SOLUTION INTRAVENOUS at 04:02

## 2021-01-01 RX ADMIN — INSULIN ASPART 10 UNITS: 100 INJECTION, SOLUTION INTRAVENOUS; SUBCUTANEOUS at 11:06

## 2021-01-01 RX ADMIN — INSULIN ASPART 12 UNITS: 100 INJECTION, SOLUTION INTRAVENOUS; SUBCUTANEOUS at 05:06

## 2021-01-01 RX ADMIN — INSULIN ASPART 8 UNITS: 100 INJECTION, SOLUTION INTRAVENOUS; SUBCUTANEOUS at 11:06

## 2021-01-01 RX ADMIN — PREDNISONE 100 MG: 50 TABLET ORAL at 08:05

## 2021-01-01 RX ADMIN — NYSTATIN 400000 UNITS: 500000 SUSPENSION ORAL at 04:06

## 2021-01-01 RX ADMIN — PANTOPRAZOLE SODIUM 40 MG: 40 TABLET, DELAYED RELEASE ORAL at 04:06

## 2021-01-01 RX ADMIN — SODIUM CHLORIDE: 0.9 INJECTION, SOLUTION INTRAVENOUS at 04:06

## 2021-01-01 RX ADMIN — PANTOPRAZOLE SODIUM 40 MG: 40 TABLET, DELAYED RELEASE ORAL at 03:05

## 2021-01-01 RX ADMIN — CARVEDILOL 3.12 MG: 3.12 TABLET, FILM COATED ORAL at 05:05

## 2021-01-01 RX ADMIN — SODIUM CHLORIDE, SODIUM LACTATE, POTASSIUM CHLORIDE, AND CALCIUM CHLORIDE: .6; .31; .03; .02 INJECTION, SOLUTION INTRAVENOUS at 02:06

## 2021-01-01 RX ADMIN — HYDROMORPHONE HYDROCHLORIDE 0.5 MG: 1 INJECTION, SOLUTION INTRAMUSCULAR; INTRAVENOUS; SUBCUTANEOUS at 02:05

## 2021-01-01 RX ADMIN — HYDROXYZINE HYDROCHLORIDE 25 MG: 25 TABLET, FILM COATED ORAL at 11:05

## 2021-01-01 RX ADMIN — INSULIN ASPART 1 UNITS: 100 INJECTION, SOLUTION INTRAVENOUS; SUBCUTANEOUS at 12:06

## 2021-01-01 RX ADMIN — PANTOPRAZOLE SODIUM 40 MG: 40 TABLET, DELAYED RELEASE ORAL at 05:05

## 2021-01-01 RX ADMIN — SODIUM CHLORIDE 69 MG: 9 INJECTION, SOLUTION INTRAVENOUS at 10:02

## 2021-01-01 RX ADMIN — POLYETHYLENE GLYCOL 3350 17 G: 17 POWDER, FOR SOLUTION ORAL at 08:05

## 2021-01-01 RX ADMIN — DICLOFENAC SODIUM 4 G: 10 GEL TOPICAL at 06:06

## 2021-01-01 RX ADMIN — HYPROMELLOSE 2910 1 DROP: 5 SOLUTION OPHTHALMIC at 08:06

## 2021-01-01 RX ADMIN — INSULIN ASPART 3 UNITS: 100 INJECTION, SOLUTION INTRAVENOUS; SUBCUTANEOUS at 05:05

## 2021-01-01 RX ADMIN — INSULIN DETEMIR 12 UNITS: 100 INJECTION, SOLUTION SUBCUTANEOUS at 01:06

## 2021-01-01 RX ADMIN — CARVEDILOL 3.12 MG: 3.12 TABLET, FILM COATED ORAL at 07:06

## 2021-01-01 RX ADMIN — INSULIN ASPART 1 UNITS: 100 INJECTION, SOLUTION INTRAVENOUS; SUBCUTANEOUS at 05:06

## 2021-01-01 RX ADMIN — SODIUM CHLORIDE 360 MG: 9 INJECTION, SOLUTION INTRAVENOUS at 08:03

## 2021-01-01 RX ADMIN — HYOSCYAMINE SULFATE 0.38 MG: 0.38 TABLET, EXTENDED RELEASE ORAL at 09:06

## 2021-01-01 RX ADMIN — INSULIN ASPART 2 UNITS: 100 INJECTION, SOLUTION INTRAVENOUS; SUBCUTANEOUS at 10:06

## 2021-01-01 RX ADMIN — SODIUM CHLORIDE: 0.9 INJECTION, SOLUTION INTRAVENOUS at 08:06

## 2021-01-01 RX ADMIN — ONDANSETRON 4 MG: 2 INJECTION INTRAMUSCULAR; INTRAVENOUS at 10:06

## 2021-01-01 RX ADMIN — CARVEDILOL 6.25 MG: 6.25 TABLET, FILM COATED ORAL at 05:05

## 2021-01-01 RX ADMIN — VORTIOXETINE 10 MG: 10 TABLET, FILM COATED ORAL at 01:05

## 2021-01-01 RX ADMIN — SODIUM CHLORIDE 360 MG: 9 INJECTION, SOLUTION INTRAVENOUS at 04:02

## 2021-01-01 RX ADMIN — VORTIOXETINE 10 MG: 10 TABLET, FILM COATED ORAL at 04:05

## 2021-01-01 RX ADMIN — PREDNISONE 80 MG: 20 TABLET ORAL at 10:06

## 2021-01-01 RX ADMIN — HEPARIN 500 UNITS: 100 SYRINGE at 09:04

## 2021-01-01 RX ADMIN — NYSTATIN 400000 UNITS: 100000 SUSPENSION ORAL at 10:06

## 2021-01-01 RX ADMIN — ONDANSETRON 4 MG: 2 INJECTION INTRAMUSCULAR; INTRAVENOUS at 12:06

## 2021-01-01 RX ADMIN — LISINOPRIL 2.5 MG: 2.5 TABLET ORAL at 12:06

## 2021-01-01 RX ADMIN — FENTANYL CITRATE 100 MCG: 50 INJECTION, SOLUTION INTRAMUSCULAR; INTRAVENOUS at 11:02

## 2021-01-01 RX ADMIN — INSULIN ASPART 11 UNITS: 100 INJECTION, SOLUTION INTRAVENOUS; SUBCUTANEOUS at 12:06

## 2021-01-01 RX ADMIN — DOCUSATE SODIUM 50MG AND SENNOSIDES 8.6MG 2 TABLET: 8.6; 5 TABLET, FILM COATED ORAL at 08:06

## 2021-01-01 RX ADMIN — SODIUM CHLORIDE 3 UNITS/HR: 9 INJECTION, SOLUTION INTRAVENOUS at 06:06

## 2021-01-01 RX ADMIN — NYSTATIN 400000 UNITS: 500000 SUSPENSION ORAL at 02:06

## 2021-01-01 RX ADMIN — SODIUM CHLORIDE 6 UNITS/HR: 9 INJECTION, SOLUTION INTRAVENOUS at 10:06

## 2021-01-01 RX ADMIN — SODIUM CHLORIDE 69 MG: 9 INJECTION, SOLUTION INTRAVENOUS at 09:03

## 2021-01-01 RX ADMIN — INSULIN ASPART 5 UNITS: 100 INJECTION, SOLUTION INTRAVENOUS; SUBCUTANEOUS at 12:06

## 2021-01-01 RX ADMIN — DICLOFENAC SODIUM 4 G: 10 GEL TOPICAL at 08:06

## 2021-01-01 RX ADMIN — INSULIN DETEMIR 3 UNITS: 100 INJECTION, SOLUTION SUBCUTANEOUS at 10:06

## 2021-01-01 RX ADMIN — OLANZAPINE 5 MG: 5 TABLET, FILM COATED ORAL at 09:06

## 2021-01-01 RX ADMIN — OXYCODONE HYDROCHLORIDE 10 MG: 10 TABLET ORAL at 08:05

## 2021-01-01 RX ADMIN — SODIUM CHLORIDE: 0.9 INJECTION, SOLUTION INTRAVENOUS at 10:06

## 2021-01-01 RX ADMIN — SODIUM CHLORIDE, SODIUM LACTATE, POTASSIUM CHLORIDE, AND CALCIUM CHLORIDE: .6; .31; .03; .02 INJECTION, SOLUTION INTRAVENOUS at 10:05

## 2021-01-01 RX ADMIN — POTASSIUM & SODIUM PHOSPHATES POWDER PACK 280-160-250 MG 2 PACKET: 280-160-250 PACK at 09:06

## 2021-01-01 RX ADMIN — PROPOFOL 20 MG: 10 INJECTION, EMULSION INTRAVENOUS at 11:02

## 2021-01-01 RX ADMIN — HEPARIN SODIUM 5000 UNITS: 5000 INJECTION INTRAVENOUS; SUBCUTANEOUS at 06:06

## 2021-01-01 RX ADMIN — INSULIN DETEMIR 15 UNITS: 100 INJECTION, SOLUTION SUBCUTANEOUS at 03:06

## 2021-01-01 RX ADMIN — PREDNISONE 80 MG: 20 TABLET ORAL at 08:06

## 2021-01-01 RX ADMIN — INSULIN ASPART 4 UNITS: 100 INJECTION, SOLUTION INTRAVENOUS; SUBCUTANEOUS at 09:06

## 2021-01-01 RX ADMIN — OXYCODONE HYDROCHLORIDE 10 MG: 10 TABLET ORAL at 09:06

## 2021-01-01 RX ADMIN — POTASSIUM CHLORIDE, DEXTROSE MONOHYDRATE AND SODIUM CHLORIDE: 224; 5; 450 INJECTION, SOLUTION INTRAVENOUS at 03:06

## 2021-01-01 RX ADMIN — OXYCODONE HYDROCHLORIDE 10 MG: 10 TABLET ORAL at 10:05

## 2021-01-01 RX ADMIN — INSULIN ASPART 4 UNITS: 100 INJECTION, SOLUTION INTRAVENOUS; SUBCUTANEOUS at 08:06

## 2021-01-01 RX ADMIN — SODIUM CHLORIDE: 9 INJECTION, SOLUTION INTRAVENOUS at 08:04

## 2021-01-01 RX ADMIN — SODIUM CHLORIDE 500 ML: 0.9 INJECTION, SOLUTION INTRAVENOUS at 08:06

## 2021-01-01 RX ADMIN — MAGNESIUM SULFATE 2 G: 2 INJECTION INTRAVENOUS at 10:05

## 2021-01-01 RX ADMIN — HEPARIN 500 UNITS: 100 SYRINGE at 04:02

## 2021-01-01 RX ADMIN — LISINOPRIL 5 MG: 5 TABLET ORAL at 08:06

## 2021-01-01 RX ADMIN — POTASSIUM & SODIUM PHOSPHATES POWDER PACK 280-160-250 MG 2 PACKET: 280-160-250 PACK at 07:06

## 2021-01-01 RX ADMIN — CARVEDILOL 3.12 MG: 3.12 TABLET, FILM COATED ORAL at 08:05

## 2021-01-01 RX ADMIN — FLUCONAZOLE 100 MG: 100 TABLET ORAL at 10:06

## 2021-01-01 RX ADMIN — OXYCODONE HYDROCHLORIDE 10 MG: 10 TABLET ORAL at 11:06

## 2021-01-01 RX ADMIN — NYSTATIN 400000 UNITS: 100000 SUSPENSION ORAL at 08:06

## 2021-01-01 RX ADMIN — PREDNISONE 80 MG: 20 TABLET ORAL at 09:06

## 2021-01-01 RX ADMIN — ACETAMINOPHEN 650 MG: 325 TABLET ORAL at 12:06

## 2021-01-01 RX ADMIN — HYDROXYZINE HYDROCHLORIDE 25 MG: 25 TABLET, FILM COATED ORAL at 09:05

## 2021-01-01 RX ADMIN — PROPOFOL 125 MCG/KG/MIN: 10 INJECTION, EMULSION INTRAVENOUS at 11:02

## 2021-01-01 RX ADMIN — HYPROMELLOSE 2910 1 DROP: 5 SOLUTION OPHTHALMIC at 05:06

## 2021-01-01 RX ADMIN — HYPROMELLOSE 2910 1 DROP: 5 SOLUTION OPHTHALMIC at 01:06

## 2021-01-01 RX ADMIN — INSULIN ASPART 11 UNITS: 100 INJECTION, SOLUTION INTRAVENOUS; SUBCUTANEOUS at 08:06

## 2021-01-01 RX ADMIN — ATORVASTATIN CALCIUM 40 MG: 40 TABLET, FILM COATED ORAL at 12:06

## 2021-01-01 RX ADMIN — HEPARIN SODIUM 5000 UNITS: 5000 INJECTION INTRAVENOUS; SUBCUTANEOUS at 01:05

## 2021-01-01 RX ADMIN — MELATONIN TAB 3 MG 6 MG: 3 TAB at 09:05

## 2021-01-01 RX ADMIN — OXYCODONE HYDROCHLORIDE 10 MG: 10 TABLET ORAL at 03:06

## 2021-01-01 RX ADMIN — NYSTATIN 400000 UNITS: 100000 SUSPENSION ORAL at 09:06

## 2021-01-01 RX ADMIN — HYPROMELLOSE 2910 1 DROP: 5 SOLUTION OPHTHALMIC at 10:06

## 2021-01-01 RX ADMIN — FLUCONAZOLE 200 MG: 200 TABLET ORAL at 09:06

## 2021-01-01 RX ADMIN — INSULIN ASPART 12 UNITS: 100 INJECTION, SOLUTION INTRAVENOUS; SUBCUTANEOUS at 08:06

## 2021-01-01 RX ADMIN — INSULIN DETEMIR 8 UNITS: 100 INJECTION, SOLUTION SUBCUTANEOUS at 10:06

## 2021-01-01 RX ADMIN — PANTOPRAZOLE SODIUM 80 MG: 40 INJECTION, POWDER, FOR SOLUTION INTRAVENOUS at 10:06

## 2021-01-01 RX ADMIN — ONDANSETRON 8 MG: 8 TABLET, ORALLY DISINTEGRATING ORAL at 12:06

## 2021-01-01 RX ADMIN — SULFAMETHOXAZOLE AND TRIMETHOPRIM 1 TABLET: 400; 80 TABLET ORAL at 05:06

## 2021-01-01 RX ADMIN — DOCUSATE SODIUM 50MG AND SENNOSIDES 8.6MG 1 TABLET: 8.6; 5 TABLET, FILM COATED ORAL at 09:06

## 2021-01-01 RX ADMIN — INSULIN HUMAN 3 UNITS: 100 INJECTION, SOLUTION PARENTERAL at 03:06

## 2021-01-01 RX ADMIN — SODIUM CHLORIDE 1000 ML: 0.9 INJECTION, SOLUTION INTRAVENOUS at 10:06

## 2021-01-01 RX ADMIN — SODIUM CHLORIDE: 0.9 INJECTION, SOLUTION INTRAVENOUS at 10:02

## 2021-01-01 RX ADMIN — ATORVASTATIN CALCIUM 40 MG: 40 TABLET, FILM COATED ORAL at 08:06

## 2021-01-01 RX ADMIN — PREDNISONE 40 MG: 20 TABLET ORAL at 08:06

## 2021-01-01 RX ADMIN — POTASSIUM CHLORIDE, DEXTROSE MONOHYDRATE AND SODIUM CHLORIDE: 224; 5; 450 INJECTION, SOLUTION INTRAVENOUS at 06:06

## 2021-01-01 RX ADMIN — INSULIN ASPART 2 UNITS: 100 INJECTION, SOLUTION INTRAVENOUS; SUBCUTANEOUS at 03:06

## 2021-01-01 RX ADMIN — AMLODIPINE BESYLATE 10 MG: 10 TABLET ORAL at 09:05

## 2021-01-01 RX ADMIN — SODIUM CHLORIDE 360 MG: 9 INJECTION, SOLUTION INTRAVENOUS at 09:04

## 2021-01-01 RX ADMIN — HYDROMORPHONE HYDROCHLORIDE 0.5 MG: 1 INJECTION, SOLUTION INTRAMUSCULAR; INTRAVENOUS; SUBCUTANEOUS at 11:01

## 2021-01-01 RX ADMIN — HEPARIN SODIUM 5000 UNITS: 5000 INJECTION INTRAVENOUS; SUBCUTANEOUS at 05:05

## 2021-01-01 RX ADMIN — POTASSIUM CHLORIDE: 149 INJECTION, SOLUTION, CONCENTRATE INTRAVENOUS at 03:06

## 2021-01-01 RX ADMIN — SODIUM CHLORIDE 1 UNITS/HR: 9 INJECTION, SOLUTION INTRAVENOUS at 04:06

## 2021-01-01 RX ADMIN — MORPHINE SULFATE 4 MG: 4 INJECTION INTRAVENOUS at 10:06

## 2021-01-01 RX ADMIN — INSULIN ASPART 4 UNITS: 100 INJECTION, SOLUTION INTRAVENOUS; SUBCUTANEOUS at 05:06

## 2021-01-01 RX ADMIN — INSULIN ASPART 3 UNITS: 100 INJECTION, SOLUTION INTRAVENOUS; SUBCUTANEOUS at 05:06

## 2021-01-01 RX ADMIN — SODIUM CHLORIDE, SODIUM LACTATE, POTASSIUM CHLORIDE, AND CALCIUM CHLORIDE: .6; .31; .03; .02 INJECTION, SOLUTION INTRAVENOUS at 06:05

## 2021-01-01 RX ADMIN — ATORVASTATIN CALCIUM 40 MG: 20 TABLET, FILM COATED ORAL at 09:05

## 2021-01-01 RX ADMIN — INSULIN ASPART 2 UNITS: 100 INJECTION, SOLUTION INTRAVENOUS; SUBCUTANEOUS at 08:06

## 2021-01-01 RX ADMIN — PROCHLORPERAZINE EDISYLATE 10 MG: 5 INJECTION INTRAMUSCULAR; INTRAVENOUS at 10:05

## 2021-01-01 RX ADMIN — INSULIN ASPART 1 UNITS: 100 INJECTION, SOLUTION INTRAVENOUS; SUBCUTANEOUS at 02:06

## 2021-01-01 RX ADMIN — INSULIN HUMAN 10 UNITS: 100 INJECTION, SOLUTION PARENTERAL at 10:06

## 2021-01-01 RX ADMIN — OXYCODONE HYDROCHLORIDE 10 MG: 10 TABLET ORAL at 11:05

## 2021-01-01 RX ADMIN — OLANZAPINE 5 MG: 2.5 TABLET, FILM COATED ORAL at 03:05

## 2021-01-01 RX ADMIN — SODIUM CHLORIDE: 0.9 INJECTION, SOLUTION INTRAVENOUS at 08:03

## 2021-01-01 RX ADMIN — HEPARIN 500 UNITS: 100 SYRINGE at 12:05

## 2021-01-01 RX ADMIN — POTASSIUM CHLORIDE 30 MEQ: 10 CAPSULE, COATED, EXTENDED RELEASE ORAL at 05:05

## 2021-01-01 RX ADMIN — INSULIN ASPART 2 UNITS: 100 INJECTION, SOLUTION INTRAVENOUS; SUBCUTANEOUS at 02:06

## 2021-01-01 RX ADMIN — DOCUSATE SODIUM 50MG AND SENNOSIDES 8.6MG 2 TABLET: 8.6; 5 TABLET, FILM COATED ORAL at 10:06

## 2021-01-01 RX ADMIN — PREDNISONE 60 MG: 20 TABLET ORAL at 09:06

## 2021-01-01 RX ADMIN — INSULIN ASPART 6 UNITS: 100 INJECTION, SOLUTION INTRAVENOUS; SUBCUTANEOUS at 11:06

## 2021-01-01 RX ADMIN — HEPARIN 300 UNITS: 100 SYRINGE at 04:06

## 2021-01-01 RX ADMIN — SODIUM CHLORIDE: 0.9 INJECTION, SOLUTION INTRAVENOUS at 06:06

## 2021-01-01 RX ADMIN — SODIUM CHLORIDE 360 MG: 9 INJECTION, SOLUTION INTRAVENOUS at 10:02

## 2021-01-01 RX ADMIN — CARVEDILOL 3.12 MG: 3.12 TABLET, FILM COATED ORAL at 12:06

## 2021-01-01 RX ADMIN — ATORVASTATIN CALCIUM 40 MG: 20 TABLET, FILM COATED ORAL at 10:06

## 2021-01-01 RX ADMIN — PALONOSETRON 0.25 MG: 0.25 INJECTION, SOLUTION INTRAVENOUS at 08:03

## 2021-01-01 RX ADMIN — INSULIN ASPART 12 UNITS: 100 INJECTION, SOLUTION INTRAVENOUS; SUBCUTANEOUS at 12:06

## 2021-01-01 RX ADMIN — HYOSCYAMINE SULFATE 0.38 MG: 0.38 TABLET, EXTENDED RELEASE ORAL at 10:06

## 2021-01-01 RX ADMIN — SODIUM CHLORIDE: 9 INJECTION, SOLUTION INTRAVENOUS at 04:02

## 2021-01-01 RX ADMIN — PANTOPRAZOLE SODIUM 40 MG: 40 TABLET, DELAYED RELEASE ORAL at 12:06

## 2021-01-01 RX ADMIN — OXYCODONE HYDROCHLORIDE 10 MG: 10 TABLET ORAL at 06:05

## 2021-01-01 RX ADMIN — OXYCODONE HYDROCHLORIDE 10 MG: 10 TABLET ORAL at 01:06

## 2021-01-01 RX ADMIN — POLYETHYLENE GLYCOL 3350 17 G: 17 POWDER, FOR SOLUTION ORAL at 09:05

## 2021-01-01 RX ADMIN — NYSTATIN 400000 UNITS: 500000 SUSPENSION ORAL at 08:06

## 2021-01-01 RX ADMIN — ONDANSETRON 4 MG: 2 INJECTION INTRAMUSCULAR; INTRAVENOUS at 07:04

## 2021-01-01 RX ADMIN — VORTIOXETINE 10 MG: 10 TABLET, FILM COATED ORAL at 08:05

## 2021-01-01 RX ADMIN — SODIUM CHLORIDE, SODIUM LACTATE, POTASSIUM CHLORIDE, AND CALCIUM CHLORIDE 100 ML/HR: .6; .31; .03; .02 INJECTION, SOLUTION INTRAVENOUS at 04:06

## 2021-01-01 RX ADMIN — INSULIN HUMAN 10 UNITS: 100 INJECTION, SOLUTION PARENTERAL at 02:06

## 2021-01-01 RX ADMIN — INSULIN HUMAN 4 UNITS: 100 INJECTION, SOLUTION PARENTERAL at 02:06

## 2021-01-01 RX ADMIN — INSULIN ASPART 5 UNITS: 100 INJECTION, SOLUTION INTRAVENOUS; SUBCUTANEOUS at 09:06

## 2021-01-01 RX ADMIN — Medication 10 ML: at 09:03

## 2021-01-01 RX ADMIN — ONDANSETRON 4 MG: 2 INJECTION INTRAMUSCULAR; INTRAVENOUS at 05:06

## 2021-01-01 RX ADMIN — MELATONIN TAB 3 MG 6 MG: 3 TAB at 08:05

## 2021-01-01 RX ADMIN — SODIUM CHLORIDE: 0.9 INJECTION, SOLUTION INTRAVENOUS at 12:06

## 2021-01-01 RX ADMIN — LIDOCAINE AND PRILOCAINE: 25; 25 CREAM TOPICAL at 11:05

## 2021-01-01 RX ADMIN — SODIUM CHLORIDE 500 ML: 0.9 INJECTION, SOLUTION INTRAVENOUS at 02:06

## 2021-01-01 RX ADMIN — SODIUM CHLORIDE 1000 ML: 0.9 INJECTION, SOLUTION INTRAVENOUS at 11:01

## 2021-01-01 RX ADMIN — MORPHINE SULFATE 4 MG: 4 INJECTION INTRAVENOUS at 07:04

## 2021-01-01 RX ADMIN — SODIUM CHLORIDE AND POTASSIUM CHLORIDE: .9; .15 SOLUTION INTRAVENOUS at 11:06

## 2021-01-01 RX ADMIN — OXYCODONE HYDROCHLORIDE 10 MG: 10 TABLET ORAL at 09:05

## 2021-01-01 RX ADMIN — PREDNISONE 40 MG: 20 TABLET ORAL at 12:06

## 2021-01-04 NOTE — PROGRESS NOTES
Good morning, Dr. Burt. I know that you are on hospital service this week. I was wondering if you get a chance, would you mind calling this patient about her scans. It appears that she may be progressing?    When you have a moment, can you give her a call to discuss? Thank you so much.     Naina

## 2021-02-15 PROBLEM — Z13.9 ENCOUNTER FOR SCREENING: Status: RESOLVED | Noted: 2020-01-01 | Resolved: 2021-01-01

## 2021-05-14 PROBLEM — R10.9 ABDOMINAL PAIN: Status: ACTIVE | Noted: 2021-01-01

## 2021-05-15 PROBLEM — K85.90 ACUTE PANCREATITIS: Status: ACTIVE | Noted: 2021-01-01

## 2021-05-15 PROBLEM — K92.0 HEMATEMESIS: Status: ACTIVE | Noted: 2021-01-01

## 2021-05-17 PROBLEM — R94.31 PROLONGED Q-T INTERVAL ON ECG: Status: ACTIVE | Noted: 2021-01-01

## 2021-05-17 PROBLEM — K85.30 DRUG-INDUCED ACUTE PANCREATITIS: Status: ACTIVE | Noted: 2021-01-01

## 2021-06-05 PROBLEM — D69.6 THROMBOCYTOPENIA: Status: ACTIVE | Noted: 2021-01-01

## 2021-06-05 PROBLEM — R11.10 EMESIS: Status: ACTIVE | Noted: 2021-01-01

## 2021-06-06 PROBLEM — B37.49 YEAST UTI: Status: ACTIVE | Noted: 2021-01-01

## 2021-06-06 PROBLEM — T38.0X5A ADVERSE EFFECT OF CORTICOSTEROIDS: Status: ACTIVE | Noted: 2021-01-01

## 2021-06-08 PROBLEM — F41.9 ANXIETY: Status: RESOLVED | Noted: 2020-01-01 | Resolved: 2021-01-01

## 2021-06-08 PROBLEM — R73.9 HYPERGLYCEMIA: Status: ACTIVE | Noted: 2021-01-01

## 2021-06-08 PROBLEM — R73.9 HYPERGLYCEMIA: Status: RESOLVED | Noted: 2021-01-01 | Resolved: 2021-01-01

## 2021-06-08 PROBLEM — E11.00 TYPE 2 DIABETES MELLITUS WITH HYPEROSMOLAR HYPERGLYCEMIC STATE (HHS): Status: ACTIVE | Noted: 2021-01-01

## 2021-06-11 PROBLEM — N18.5 CKD (CHRONIC KIDNEY DISEASE) STAGE 5, GFR LESS THAN 15 ML/MIN: Status: RESOLVED | Noted: 2020-05-29 | Resolved: 2021-01-01

## 2021-06-25 PROBLEM — R73.9 HYPERGLYCEMIA: Status: ACTIVE | Noted: 2021-01-01

## 2021-06-25 PROBLEM — E11.10 DKA (DIABETIC KETOACIDOSES): Status: ACTIVE | Noted: 2021-01-01

## 2021-06-26 PROBLEM — R73.9 HYPERGLYCEMIA: Status: RESOLVED | Noted: 2021-01-01 | Resolved: 2021-01-01

## 2021-06-26 PROBLEM — N18.4 ACUTE RENAL FAILURE SUPERIMPOSED ON STAGE 4 CHRONIC KIDNEY DISEASE: Status: ACTIVE | Noted: 2019-07-02

## 2021-06-30 PROBLEM — E11.9 TYPE 2 DIABETES MELLITUS WITHOUT COMPLICATION, WITHOUT LONG-TERM CURRENT USE OF INSULIN: Chronic | Status: ACTIVE | Noted: 2019-06-05

## 2021-08-20 ENCOUNTER — PATIENT OUTREACH (OUTPATIENT)
Dept: ADMINISTRATIVE | Facility: OTHER | Age: 76
End: 2021-08-20

## 2021-09-23 DIAGNOSIS — E11.9 TYPE 2 DIABETES MELLITUS WITHOUT COMPLICATION: ICD-10-CM

## 2021-12-09 NOTE — LETTER
August 1, 2019      Austin Burt MD  [address]  [facility] [address]           [place] - Thoracic Surgery  [address]  [facility] [address]  Phone: [phone]  Fax: [phone]          Patient: Isabell Preston   MR Number: [id number]   YOB: 1945   Date of Visit: 8/1/2019       Dear Dr. Austin Burt:    Thank you for referring Isabell Preston to me for evaluation. Attached you will find relevant portions of my assessment and plan of care.    If you have questions, please do not hesitate to call me. I look forward to following Isabell Preston along with you.    Sincerely,    Ben Smith MD    Enclosure  CC:  No Recipients    If you would like to receive this communication electronically, please contact [email or web address] or [phone] to request more information on [facility] Link access.    For providers and/or their staff who would like to refer a patient to [facility], please contact us through our one-stop-shop provider referral line, [facility], at [phone].    If you feel you have received this communication in error or would no longer like to receive these types of communications, please e-mail [email or web address]          Additional Notes: Patient consent was obtained to proceed with the visit and recommended plan of care after discussion of all risks and benefits, including the risks of COVID-19 exposure. Detail Level: Simple

## 2022-05-19 NOTE — PLAN OF CARE
Per Michelle with [facility], the patient has been assigned to [facility] for [facility]. SW faxed referral to [facility] via  for review. SW will continue to follow.        08/04/20 1300   Post-Acute Status   Post-Acute Authorization Home Health   Home Health Status Set-up Complete       1:31 PM  [facility]  - Accepted    Heidi Juarez LMSW   - [facility]  Ext. [phone]     Normal Normal

## 2024-09-26 NOTE — ASSESSMENT & PLAN NOTE
- cont home meds amlodipine 10mg daily  - unclear why home metoprolol was held on admission (75mg daily at home)  - remains normotensive on amlodipine, will consider restarting metoprolol at lower dose  - continue to monitor   [Former] : former [< 20 pack-years] : < 20 pack-years [TextBox_4] : Ms. CORREIA is a 50 year woman with a medical history significant for asthma, hiatal hernia/GERD, and prior provoked pulmonary embolism (off AC) presenting today to the clinic for follow-up from a recent ED visit for left-sided pleuritic chest pain.  Was previously seeing Dr Shanelle Acevedo, not seen since 2022.  Went to ED earlier this month Has been having intermittent asthma since she had a pulmonary embolism in 2021.  Was anticoagulated for about 3-4 months. Recently followed up with Dr Liang and had an echocardiogram which was normal. 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  CT chest unremarkable for causes of restriction. Reports that the Symbicort is helping sometimes, but the past few weeks isn't doing anything. Taking symbicort 2x/day for about the past 10 days Reports that the 5 days of prednisone probably helped? She's not sure. [TextBox_11] : .25 [TextBox_13] : 17 [YearQuit] : 2008

## 2024-10-28 NOTE — TELEPHONE ENCOUNTER
----- Message from GABRIEL Mejia sent at 4/10/2018  3:30 PM CDT -----  Not sure if you wanted to address her GFR? None of this explains her dizziness.  LB  ----- Message -----  From: Sudarshan, [facility]  Sent: 4/9/2018   4:49 PM  To: FATOUMATA MejiaC        
Patient notified of charted test result with charted recommendations. Patient has schedule test to be done at [facility] on 4/21/18. Appointment slips in the mail. Patient does not have any questions for provider at this time. Will wait until after test results.  
Slight decrease in kidney function. Please get renal ultrasound and urine protein study. I would also like for her to see nephrology. I have entered these orders. Please inform patient of results and plan I'm happy to talk her with result  
3 = A little assistance

## 2025-02-18 NOTE — NURSING
1000- npo status maintained , off oxygen . No acute events. Continue to monitor ,rails up x 3 head of bed up bed alarm on .       1240- patient off floor in procedural area npo status maintained . Continue to monitor.    Cognitive Behavioral Coping Skills Cognitive Behavioral Coping Skills Cognitive Behavioral Coping Skills

## (undated) DEVICE — GLOVE PI ULTRA TOUCH G SURGEON

## (undated) DEVICE — SUT VICRYL 3-0 27 SH

## (undated) DEVICE — SET MICPUNC ACC STIFF CANNULA

## (undated) DEVICE — DRESSING TRANS 2X2 TEGADERM

## (undated) DEVICE — SYR ONLY LEUR TIP 30CC

## (undated) DEVICE — TAPE SILK 3IN

## (undated) DEVICE — ELECTRODE BLADE INSULATED 1 IN

## (undated) DEVICE — GOWN SMART IMP BREATHABLE XXLG

## (undated) DEVICE — SEE MEDLINE ITEM 146420

## (undated) DEVICE — TRAY PORT IR PLACEMENT REMOVAL

## (undated) DEVICE — KIT ANTIFOG

## (undated) DEVICE — SUT PROLENE 0 MO6 30IN BLUE

## (undated) DEVICE — DRAPE THYROID WITH ARMBOARD

## (undated) DEVICE — NDL HYPO REG 25G X 1 1/2

## (undated) DEVICE — SEE MEDLINE ITEM 156918

## (undated) DEVICE — TRAY MINOR GEN SURG

## (undated) DEVICE — SET DECANTER MEDICHOICE

## (undated) DEVICE — SEE MEDLINE ITEM 146313

## (undated) DEVICE — DRESSING TELFA STRL 4X3 LF

## (undated) DEVICE — SPONGE DERMACEA 4X4IN 12PLY

## (undated) DEVICE — SUT MONOCYRL 4-0 PS2 UND

## (undated) DEVICE — SCALPEL #15 BLADE STRL DISP.

## (undated) DEVICE — DRAPE ABDOMINAL TIBURON 14X11

## (undated) DEVICE — SYR ONLY LUER LOCK 20CC

## (undated) DEVICE — WARMER DRAPE STERILE LF

## (undated) DEVICE — SOL NACL 0.9% INJ PF/50151

## (undated) DEVICE — APPLICATOR CHLORAPREP ORN 26ML

## (undated) DEVICE — SEE MEDLINE ITEM 157131

## (undated) DEVICE — SYR DISP LL 5CC

## (undated) DEVICE — GLOVE BIOGEL SKINSENSE PI 7.0

## (undated) DEVICE — SUT 3-0 12-18IN SILK

## (undated) DEVICE — GAUZE SPONGE 4X4 12PLY

## (undated) DEVICE — CLOSURE SKIN STERI STRIP 1/2X4

## (undated) DEVICE — SUT 3-0 VICRYL / SH (J416)

## (undated) DEVICE — SPONGE IV DRAIN 4X4 STERILE

## (undated) DEVICE — DRAIN CHEST DRY SUCTION

## (undated) DEVICE — CUP MEDICINE STERILE 2OZ

## (undated) DEVICE — BLADE ELECTRO EDGE INSULATED

## (undated) DEVICE — DEVICE EVACUATION PLEURX FUSN

## (undated) DEVICE — SUT 0 VICRYL / CT-1

## (undated) DEVICE — DRAPE C ARM 42 X 120 10/BX

## (undated) DEVICE — GLOVE BIOGEL SKINSENSE PI 8.0

## (undated) DEVICE — SUT MCRYL PLUS 4-0 PS2 27IN

## (undated) DEVICE — BLADE SURG CARBON STEEL SZ11

## (undated) DEVICE — SUT 0 30IN NUROLON BLK CT-1

## (undated) DEVICE — DRAPE INCISE IOBAN 2 23X17IN

## (undated) DEVICE — ELECTRODE REM PLYHSV RETURN 9

## (undated) DEVICE — KIT PROBE COVER WITH GEL

## (undated) DEVICE — DRESSING TRANS 4X4 TEGADERM

## (undated) DEVICE — GOWN SURG 2XL DISP TIE BACK

## (undated) DEVICE — ADHESIVE DERMABOND ADVANCED

## (undated) DEVICE — SEE MEDLINE ITEM 157117

## (undated) DEVICE — DRESSING MEPORE ISLAND 31/2X4